# Patient Record
Sex: FEMALE | Race: WHITE | NOT HISPANIC OR LATINO | Employment: FULL TIME
[De-identification: names, ages, dates, MRNs, and addresses within clinical notes are randomized per-mention and may not be internally consistent; named-entity substitution may affect disease eponyms.]

---

## 2018-02-28 ENCOUNTER — RECORDS - HEALTHEAST (OUTPATIENT)
Dept: ADMINISTRATIVE | Facility: OTHER | Age: 38
End: 2018-02-28

## 2019-07-18 ENCOUNTER — RECORDS - HEALTHEAST (OUTPATIENT)
Dept: LAB | Facility: HOSPITAL | Age: 39
End: 2019-07-18

## 2019-07-22 LAB
GAMMA INTERFERON BACKGROUND BLD IA-ACNC: 0.14 IU/ML
M TB IFN-G BLD-IMP: NEGATIVE
MITOGEN IGNF BCKGRD COR BLD-ACNC: -0.07 IU/ML
MITOGEN IGNF BCKGRD COR BLD-ACNC: -0.08 IU/ML
QTF INTERPRETATION: NORMAL
QTF MITOGEN - NIL: 8.67 IU/ML

## 2019-10-09 ENCOUNTER — HOSPITAL ENCOUNTER (OUTPATIENT)
Dept: RADIOLOGY | Facility: HOSPITAL | Age: 39
Discharge: HOME | End: 2019-10-09

## 2019-10-09 ENCOUNTER — HOSPITAL ENCOUNTER (OUTPATIENT)
Dept: PHYSICAL MEDICINE AND REHAB | Facility: CLINIC | Age: 39
Discharge: HOME | End: 2019-10-09
Attending: NURSE PRACTITIONER

## 2019-10-09 DIAGNOSIS — M43.17 SPONDYLOLISTHESIS AT L5-S1 LEVEL: ICD-10-CM

## 2019-10-09 DIAGNOSIS — M47.816 LUMBAR FACET ARTHROPATHY: ICD-10-CM

## 2019-10-09 DIAGNOSIS — M53.3 SACROILIAC JOINT DYSFUNCTION: ICD-10-CM

## 2019-10-09 DIAGNOSIS — G89.29 CHRONIC BILATERAL LOW BACK PAIN WITH RIGHT-SIDED SCIATICA: ICD-10-CM

## 2019-10-09 DIAGNOSIS — M53.3 SACROILIAC JOINT PAIN: ICD-10-CM

## 2019-10-09 DIAGNOSIS — M54.41 CHRONIC BILATERAL LOW BACK PAIN WITH RIGHT-SIDED SCIATICA: ICD-10-CM

## 2019-10-09 DIAGNOSIS — M54.12 RADICULITIS OF LEFT CERVICAL REGION: ICD-10-CM

## 2019-10-09 DIAGNOSIS — R20.2 PARESTHESIA OF LEFT ARM: ICD-10-CM

## 2019-10-09 DIAGNOSIS — M79.18 CERVICAL MYOFASCIAL PAIN SYNDROME: ICD-10-CM

## 2019-10-09 DIAGNOSIS — M54.2 CERVICALGIA: ICD-10-CM

## 2019-10-11 ENCOUNTER — AMBULATORY - HEALTHEAST (OUTPATIENT)
Dept: PHYSICAL MEDICINE AND REHAB | Facility: CLINIC | Age: 39
End: 2019-10-11

## 2019-10-11 ENCOUNTER — COMMUNICATION - HEALTHEAST (OUTPATIENT)
Dept: PHYSICAL MEDICINE AND REHAB | Facility: CLINIC | Age: 39
End: 2019-10-11

## 2019-10-11 DIAGNOSIS — M54.2 CERVICALGIA: ICD-10-CM

## 2019-10-11 DIAGNOSIS — M79.18 CERVICAL MYOFASCIAL PAIN SYNDROME: ICD-10-CM

## 2019-10-11 DIAGNOSIS — R20.2 PARESTHESIA OF LEFT ARM: ICD-10-CM

## 2019-10-11 DIAGNOSIS — M54.12 RADICULITIS OF LEFT CERVICAL REGION: ICD-10-CM

## 2019-10-11 DIAGNOSIS — M54.41 CHRONIC BILATERAL LOW BACK PAIN WITH RIGHT-SIDED SCIATICA: ICD-10-CM

## 2019-10-11 DIAGNOSIS — G89.29 CHRONIC BILATERAL LOW BACK PAIN WITH RIGHT-SIDED SCIATICA: ICD-10-CM

## 2019-10-11 DIAGNOSIS — M53.3 SACROILIAC JOINT PAIN: ICD-10-CM

## 2019-10-11 DIAGNOSIS — M43.17 SPONDYLOLISTHESIS AT L5-S1 LEVEL: ICD-10-CM

## 2019-10-11 DIAGNOSIS — M47.816 LUMBAR FACET ARTHROPATHY: ICD-10-CM

## 2019-10-11 DIAGNOSIS — M53.3 SACROILIAC JOINT DYSFUNCTION: ICD-10-CM

## 2019-10-21 ENCOUNTER — COMMUNICATION - HEALTHEAST (OUTPATIENT)
Dept: PHYSICAL MEDICINE AND REHAB | Facility: CLINIC | Age: 39
End: 2019-10-21

## 2019-10-21 DIAGNOSIS — M79.18 MYOFASCIAL MUSCLE PAIN: ICD-10-CM

## 2019-10-24 ENCOUNTER — RECORDS - HEALTHEAST (OUTPATIENT)
Dept: RADIOLOGY | Facility: CLINIC | Age: 39
End: 2019-10-24

## 2019-11-01 ENCOUNTER — OFFICE VISIT - HEALTHEAST (OUTPATIENT)
Dept: PHYSICAL THERAPY | Facility: CLINIC | Age: 39
End: 2019-11-01

## 2019-11-01 DIAGNOSIS — G89.29 CHRONIC BILATERAL LOW BACK PAIN WITHOUT SCIATICA: ICD-10-CM

## 2019-11-01 DIAGNOSIS — M54.50 CHRONIC BILATERAL LOW BACK PAIN WITHOUT SCIATICA: ICD-10-CM

## 2019-11-01 DIAGNOSIS — M54.2 CHRONIC NECK PAIN: ICD-10-CM

## 2019-11-01 DIAGNOSIS — G89.29 CHRONIC NECK PAIN: ICD-10-CM

## 2019-11-01 DIAGNOSIS — R29.898 DECREASED ROM OF NECK: ICD-10-CM

## 2019-11-01 DIAGNOSIS — M79.18 MYOFASCIAL PAIN: ICD-10-CM

## 2019-11-01 DIAGNOSIS — M53.3 SACROILIAC JOINT DYSFUNCTION: ICD-10-CM

## 2019-11-26 ENCOUNTER — COMMUNICATION - HEALTHEAST (OUTPATIENT)
Dept: PHYSICAL MEDICINE AND REHAB | Facility: CLINIC | Age: 39
End: 2019-11-26

## 2019-11-26 DIAGNOSIS — M79.18 MYOFASCIAL MUSCLE PAIN: ICD-10-CM

## 2019-12-05 ENCOUNTER — AMBULATORY - HEALTHEAST (OUTPATIENT)
Dept: PHYSICAL MEDICINE AND REHAB | Facility: CLINIC | Age: 39
End: 2019-12-05

## 2019-12-05 DIAGNOSIS — M54.12 RADICULITIS OF LEFT CERVICAL REGION: ICD-10-CM

## 2019-12-05 DIAGNOSIS — M54.2 CERVICALGIA: ICD-10-CM

## 2019-12-05 DIAGNOSIS — M79.18 CERVICAL MYOFASCIAL PAIN SYNDROME: ICD-10-CM

## 2019-12-05 DIAGNOSIS — R20.2 PARESTHESIA OF HAND, BILATERAL: ICD-10-CM

## 2020-01-07 ENCOUNTER — COMMUNICATION - HEALTHEAST (OUTPATIENT)
Dept: PHYSICAL MEDICINE AND REHAB | Facility: CLINIC | Age: 40
End: 2020-01-07

## 2020-01-07 ENCOUNTER — HOSPITAL ENCOUNTER (OUTPATIENT)
Dept: PHYSICAL MEDICINE AND REHAB | Facility: CLINIC | Age: 40
Discharge: HOME | End: 2020-01-07
Attending: PHYSICAL MEDICINE & REHABILITATION

## 2020-01-07 DIAGNOSIS — R20.2 PARESTHESIA OF HAND, BILATERAL: ICD-10-CM

## 2020-01-08 ENCOUNTER — AMBULATORY - HEALTHEAST (OUTPATIENT)
Dept: PHYSICAL MEDICINE AND REHAB | Facility: CLINIC | Age: 40
End: 2020-01-08

## 2020-01-08 DIAGNOSIS — G56.03 BILATERAL CARPAL TUNNEL SYNDROME: ICD-10-CM

## 2020-01-08 DIAGNOSIS — R20.2 PARESTHESIA OF HAND, BILATERAL: ICD-10-CM

## 2020-01-13 ENCOUNTER — HOSPITAL ENCOUNTER (OUTPATIENT)
Dept: PHYSICAL MEDICINE AND REHAB | Facility: CLINIC | Age: 40
Discharge: HOME | End: 2020-01-13
Attending: PAIN MEDICINE

## 2020-01-13 DIAGNOSIS — G56.03 BILATERAL CARPAL TUNNEL SYNDROME: ICD-10-CM

## 2020-01-13 DIAGNOSIS — R20.2 PARESTHESIA OF HAND, BILATERAL: ICD-10-CM

## 2020-04-27 ENCOUNTER — HOSPITAL ENCOUNTER (OUTPATIENT)
Dept: PHYSICAL MEDICINE AND REHAB | Facility: CLINIC | Age: 40
Discharge: HOME | End: 2020-04-27
Attending: PHYSICAL MEDICINE & REHABILITATION

## 2020-04-27 ENCOUNTER — COMMUNICATION - HEALTHEAST (OUTPATIENT)
Dept: PHYSICAL MEDICINE AND REHAB | Facility: CLINIC | Age: 40
End: 2020-04-27

## 2020-04-27 DIAGNOSIS — M54.50 LUMBAR SPINE PAIN: ICD-10-CM

## 2020-04-27 DIAGNOSIS — M54.16 LUMBAR RADICULAR PAIN: ICD-10-CM

## 2020-04-27 DIAGNOSIS — M51.369 DDD (DEGENERATIVE DISC DISEASE), LUMBAR: ICD-10-CM

## 2020-05-12 ENCOUNTER — COMMUNICATION - HEALTHEAST (OUTPATIENT)
Dept: PHYSICAL MEDICINE AND REHAB | Facility: CLINIC | Age: 40
End: 2020-05-12

## 2020-05-12 ENCOUNTER — RECORDS - HEALTHEAST (OUTPATIENT)
Dept: RADIOLOGY | Facility: CLINIC | Age: 40
End: 2020-05-12

## 2020-05-14 ENCOUNTER — AMBULATORY - HEALTHEAST (OUTPATIENT)
Dept: PHYSICAL MEDICINE AND REHAB | Facility: CLINIC | Age: 40
End: 2020-05-14

## 2020-05-14 ENCOUNTER — COMMUNICATION - HEALTHEAST (OUTPATIENT)
Dept: PHYSICAL MEDICINE AND REHAB | Facility: CLINIC | Age: 40
End: 2020-05-14

## 2020-05-14 ENCOUNTER — HOSPITAL ENCOUNTER (OUTPATIENT)
Dept: PHYSICAL MEDICINE AND REHAB | Facility: CLINIC | Age: 40
Discharge: HOME | End: 2020-05-14
Attending: PAIN MEDICINE

## 2020-05-14 DIAGNOSIS — G56.03 BILATERAL CARPAL TUNNEL SYNDROME: ICD-10-CM

## 2020-05-18 ENCOUNTER — COMMUNICATION - HEALTHEAST (OUTPATIENT)
Dept: PHYSICAL MEDICINE AND REHAB | Facility: CLINIC | Age: 40
End: 2020-05-18

## 2020-05-20 ENCOUNTER — HOSPITAL ENCOUNTER (OUTPATIENT)
Dept: PHYSICAL MEDICINE AND REHAB | Facility: CLINIC | Age: 40
Discharge: HOME | End: 2020-05-20
Attending: NURSE PRACTITIONER

## 2020-05-20 DIAGNOSIS — M54.42 CHRONIC BILATERAL LOW BACK PAIN WITH BILATERAL SCIATICA: ICD-10-CM

## 2020-05-20 DIAGNOSIS — G89.29 CHRONIC BILATERAL LOW BACK PAIN WITH BILATERAL SCIATICA: ICD-10-CM

## 2020-05-20 DIAGNOSIS — M54.16 LUMBAR RADICULAR PAIN: ICD-10-CM

## 2020-05-20 DIAGNOSIS — M54.41 CHRONIC BILATERAL LOW BACK PAIN WITH BILATERAL SCIATICA: ICD-10-CM

## 2020-05-20 DIAGNOSIS — M51.369 DDD (DEGENERATIVE DISC DISEASE), LUMBAR: ICD-10-CM

## 2020-05-20 DIAGNOSIS — M54.50 LUMBAR SPINE PAIN: ICD-10-CM

## 2020-05-20 DIAGNOSIS — M47.816 LUMBAR FACET ARTHROPATHY: ICD-10-CM

## 2020-05-20 DIAGNOSIS — M79.18 MYOFASCIAL MUSCLE PAIN: ICD-10-CM

## 2020-05-20 DIAGNOSIS — M48.061 LUMBAR FORAMINAL STENOSIS: ICD-10-CM

## 2020-06-03 ENCOUNTER — HOSPITAL ENCOUNTER (OUTPATIENT)
Dept: PHYSICAL MEDICINE AND REHAB | Facility: CLINIC | Age: 40
Discharge: HOME | End: 2020-06-03
Attending: PAIN MEDICINE

## 2020-06-03 DIAGNOSIS — M54.41 CHRONIC BILATERAL LOW BACK PAIN WITH BILATERAL SCIATICA: ICD-10-CM

## 2020-06-03 DIAGNOSIS — M54.16 LUMBAR RADICULAR PAIN: ICD-10-CM

## 2020-06-03 DIAGNOSIS — M54.42 CHRONIC BILATERAL LOW BACK PAIN WITH BILATERAL SCIATICA: ICD-10-CM

## 2020-06-03 DIAGNOSIS — G89.29 CHRONIC BILATERAL LOW BACK PAIN WITH BILATERAL SCIATICA: ICD-10-CM

## 2020-06-03 DIAGNOSIS — M48.061 LUMBAR FORAMINAL STENOSIS: ICD-10-CM

## 2020-06-03 DIAGNOSIS — M51.369 DDD (DEGENERATIVE DISC DISEASE), LUMBAR: ICD-10-CM

## 2020-06-16 ENCOUNTER — HOSPITAL ENCOUNTER (OUTPATIENT)
Dept: PHYSICAL MEDICINE AND REHAB | Facility: CLINIC | Age: 40
Discharge: HOME | End: 2020-06-16
Attending: NURSE PRACTITIONER

## 2020-06-16 DIAGNOSIS — M48.061 LUMBAR FORAMINAL STENOSIS: ICD-10-CM

## 2020-06-16 DIAGNOSIS — R29.898 DECONDITIONED LOW BACK: ICD-10-CM

## 2020-06-16 DIAGNOSIS — T14.90XA TRAUMA: ICD-10-CM

## 2020-06-16 DIAGNOSIS — M54.42 CHRONIC BILATERAL LOW BACK PAIN WITH BILATERAL SCIATICA: ICD-10-CM

## 2020-06-16 DIAGNOSIS — M51.369 DDD (DEGENERATIVE DISC DISEASE), LUMBAR: ICD-10-CM

## 2020-06-16 DIAGNOSIS — T14.8XXA MUSCULOSKELETAL STRAIN: ICD-10-CM

## 2020-06-16 DIAGNOSIS — M47.816 LUMBAR FACET ARTHROPATHY: ICD-10-CM

## 2020-06-16 DIAGNOSIS — M79.18 MYOFASCIAL PAIN: ICD-10-CM

## 2020-06-16 DIAGNOSIS — M54.41 CHRONIC BILATERAL LOW BACK PAIN WITH BILATERAL SCIATICA: ICD-10-CM

## 2020-06-16 DIAGNOSIS — G89.29 CHRONIC BILATERAL LOW BACK PAIN WITH BILATERAL SCIATICA: ICD-10-CM

## 2020-06-16 ASSESSMENT — MIFFLIN-ST. JEOR: SCORE: 1456.47

## 2020-10-25 ENCOUNTER — OFFICE VISIT - HEALTHEAST (OUTPATIENT)
Dept: FAMILY MEDICINE | Facility: CLINIC | Age: 40
End: 2020-10-25

## 2020-10-25 DIAGNOSIS — G43.911 INTRACTABLE MIGRAINE WITH STATUS MIGRAINOSUS, UNSPECIFIED MIGRAINE TYPE: ICD-10-CM

## 2020-10-25 DIAGNOSIS — N39.0 URINARY TRACT INFECTION IN FEMALE: ICD-10-CM

## 2020-10-25 DIAGNOSIS — R30.9 PAINFUL URINATION: ICD-10-CM

## 2020-10-25 LAB
BACTERIA #/AREA URNS HPF: ABNORMAL HPF
CLUE CELLS: NORMAL
MUCOUS THREADS #/AREA URNS LPF: ABNORMAL LPF
RBC #/AREA URNS AUTO: ABNORMAL HPF
SQUAMOUS #/AREA URNS AUTO: ABNORMAL LPF
TRICHOMONAS, WET PREP: NORMAL
WBC #/AREA URNS AUTO: ABNORMAL HPF
YEAST, WET PREP: NORMAL

## 2020-10-27 LAB — BACTERIA SPEC CULT: ABNORMAL

## 2020-11-04 ENCOUNTER — OFFICE VISIT - HEALTHEAST (OUTPATIENT)
Dept: FAMILY MEDICINE | Facility: CLINIC | Age: 40
End: 2020-11-04

## 2020-11-04 DIAGNOSIS — Z20.822 SUSPECTED 2019 NOVEL CORONAVIRUS INFECTION: ICD-10-CM

## 2020-11-05 ENCOUNTER — RECORDS - HEALTHEAST (OUTPATIENT)
Dept: LAB | Facility: CLINIC | Age: 40
End: 2020-11-05

## 2020-11-05 LAB
SARS-COV-2 PCR COMMENT: NORMAL
SARS-COV-2 RNA SPEC QL NAA+PROBE: NEGATIVE
SARS-COV-2 VIRUS SPECIMEN SOURCE: NORMAL

## 2020-11-10 ENCOUNTER — COMMUNICATION - HEALTHEAST (OUTPATIENT)
Dept: PHYSICAL MEDICINE AND REHAB | Facility: CLINIC | Age: 40
End: 2020-11-10

## 2020-11-10 ENCOUNTER — AMBULATORY - HEALTHEAST (OUTPATIENT)
Dept: PHYSICAL MEDICINE AND REHAB | Facility: CLINIC | Age: 40
End: 2020-11-10

## 2020-11-10 DIAGNOSIS — R20.2 PARESTHESIA OF HAND, BILATERAL: ICD-10-CM

## 2020-11-10 DIAGNOSIS — G56.03 BILATERAL CARPAL TUNNEL SYNDROME: ICD-10-CM

## 2020-11-11 ENCOUNTER — HOSPITAL ENCOUNTER (OUTPATIENT)
Dept: PHYSICAL MEDICINE AND REHAB | Facility: CLINIC | Age: 40
Discharge: HOME | End: 2020-11-11
Attending: PAIN MEDICINE

## 2020-11-11 DIAGNOSIS — G56.03 BILATERAL CARPAL TUNNEL SYNDROME: ICD-10-CM

## 2020-11-11 DIAGNOSIS — R20.2 PARESTHESIA OF HAND, BILATERAL: ICD-10-CM

## 2020-12-14 ENCOUNTER — AMBULATORY - HEALTHEAST (OUTPATIENT)
Dept: NEUROSURGERY | Facility: CLINIC | Age: 40
End: 2020-12-14

## 2020-12-14 ENCOUNTER — HOSPITAL ENCOUNTER (OUTPATIENT)
Dept: RADIOLOGY | Facility: HOSPITAL | Age: 40
Discharge: HOME | End: 2020-12-14
Attending: SURGERY

## 2020-12-14 DIAGNOSIS — M54.9 BACK PAIN: ICD-10-CM

## 2020-12-15 ENCOUNTER — AMBULATORY - HEALTHEAST (OUTPATIENT)
Dept: NEUROSURGERY | Facility: CLINIC | Age: 40
End: 2020-12-15

## 2020-12-15 DIAGNOSIS — M54.16 LUMBAR RADICULOPATHY: ICD-10-CM

## 2020-12-16 ENCOUNTER — RECORDS - HEALTHEAST (OUTPATIENT)
Dept: RADIOLOGY | Facility: CLINIC | Age: 40
End: 2020-12-16

## 2020-12-16 ENCOUNTER — AMBULATORY - HEALTHEAST (OUTPATIENT)
Dept: NEUROSURGERY | Facility: CLINIC | Age: 40
End: 2020-12-16

## 2020-12-16 ENCOUNTER — OFFICE VISIT - HEALTHEAST (OUTPATIENT)
Dept: NEUROSURGERY | Facility: CLINIC | Age: 40
End: 2020-12-16

## 2020-12-16 DIAGNOSIS — M54.16 LUMBAR RADICULOPATHY: ICD-10-CM

## 2020-12-16 ASSESSMENT — MIFFLIN-ST. JEOR: SCORE: 1415.65

## 2020-12-29 ENCOUNTER — HOSPITAL ENCOUNTER (EMERGENCY)
Facility: CLINIC | Age: 40
Discharge: HOME | End: 2020-12-29
Attending: EMERGENCY MEDICINE | Admitting: EMERGENCY MEDICINE
Payer: COMMERCIAL

## 2020-12-29 VITALS
RESPIRATION RATE: 18 BRPM | OXYGEN SATURATION: 97 % | HEIGHT: 64 IN | SYSTOLIC BLOOD PRESSURE: 113 MMHG | TEMPERATURE: 98.3 F | BODY MASS INDEX: 29.37 KG/M2 | HEART RATE: 106 BPM | WEIGHT: 172 LBS | DIASTOLIC BLOOD PRESSURE: 68 MMHG

## 2020-12-29 DIAGNOSIS — T78.40XA ACUTE ALLERGIC REACTION, INITIAL ENCOUNTER: ICD-10-CM

## 2020-12-29 PROCEDURE — 96375 TX/PRO/DX INJ NEW DRUG ADDON: CPT

## 2020-12-29 PROCEDURE — 250N000011 HC RX IP 250 OP 636: Performed by: EMERGENCY MEDICINE

## 2020-12-29 PROCEDURE — 250N000009 HC RX 250

## 2020-12-29 PROCEDURE — 250N000009 HC RX 250: Performed by: EMERGENCY MEDICINE

## 2020-12-29 PROCEDURE — 96374 THER/PROPH/DIAG INJ IV PUSH: CPT

## 2020-12-29 PROCEDURE — 99291 CRITICAL CARE FIRST HOUR: CPT | Mod: 25

## 2020-12-29 PROCEDURE — 99207 PR NO CHARGE LOS: CPT | Performed by: NURSE PRACTITIONER

## 2020-12-29 PROCEDURE — 96372 THER/PROPH/DIAG INJ SC/IM: CPT | Mod: 59

## 2020-12-29 RX ORDER — GABAPENTIN 600 MG/1
600 TABLET ORAL DAILY
COMMUNITY
End: 2021-08-06

## 2020-12-29 RX ORDER — DIPHENHYDRAMINE HYDROCHLORIDE 50 MG/ML
50 INJECTION INTRAMUSCULAR; INTRAVENOUS ONCE
Status: COMPLETED | OUTPATIENT
Start: 2020-12-29 | End: 2020-12-29

## 2020-12-29 RX ORDER — METHYLPREDNISOLONE SODIUM SUCCINATE 125 MG/2ML
125 INJECTION, POWDER, LYOPHILIZED, FOR SOLUTION INTRAMUSCULAR; INTRAVENOUS ONCE
Status: COMPLETED | OUTPATIENT
Start: 2020-12-29 | End: 2020-12-29

## 2020-12-29 RX ORDER — FLUOXETINE 40 MG/1
40 CAPSULE ORAL DAILY
COMMUNITY
End: 2021-12-27

## 2020-12-29 RX ORDER — EPINEPHRINE 0.3 MG/.3ML
0.3 INJECTION SUBCUTANEOUS
Qty: 2 EACH | Refills: 3 | Status: SHIPPED | OUTPATIENT
Start: 2020-12-29 | End: 2024-02-01

## 2020-12-29 RX ORDER — DIPHENHYDRAMINE HYDROCHLORIDE 50 MG/ML
INJECTION INTRAMUSCULAR; INTRAVENOUS
Status: DISCONTINUED
Start: 2020-12-29 | End: 2020-12-29 | Stop reason: HOSPADM

## 2020-12-29 RX ORDER — KETOROLAC TROMETHAMINE 15 MG/ML
15 INJECTION, SOLUTION INTRAMUSCULAR; INTRAVENOUS ONCE
Status: COMPLETED | OUTPATIENT
Start: 2020-12-29 | End: 2020-12-29

## 2020-12-29 RX ORDER — PREDNISONE 20 MG/1
40 TABLET ORAL DAILY
Qty: 8 TABLET | Refills: 0 | Status: SHIPPED | OUTPATIENT
Start: 2020-12-30 | End: 2021-01-03

## 2020-12-29 RX ADMIN — DIPHENHYDRAMINE HYDROCHLORIDE 50 MG: 50 INJECTION, SOLUTION INTRAMUSCULAR; INTRAVENOUS at 10:07

## 2020-12-29 RX ADMIN — EPINEPHRINE 0.3 MG: 1 INJECTION, SOLUTION INTRAMUSCULAR; SUBCUTANEOUS at 10:07

## 2020-12-29 RX ADMIN — METHYLPREDNISOLONE SODIUM SUCCINATE 125 MG: 125 INJECTION, POWDER, FOR SOLUTION INTRAMUSCULAR; INTRAVENOUS at 10:08

## 2020-12-29 RX ADMIN — FAMOTIDINE 20 MG: 10 INJECTION, SOLUTION INTRAVENOUS at 10:08

## 2020-12-29 RX ADMIN — KETOROLAC TROMETHAMINE 15 MG: 15 INJECTION, SOLUTION INTRAMUSCULAR; INTRAVENOUS at 13:48

## 2020-12-29 RX ADMIN — EPINEPHRINE 0.3 MG: 1 INJECTION INTRAMUSCULAR; INTRAVENOUS; SUBCUTANEOUS at 11:08

## 2020-12-29 ASSESSMENT — ENCOUNTER SYMPTOMS
VOICE CHANGE: 1
SORE THROAT: 1
COUGH: 1

## 2020-12-29 ASSESSMENT — MIFFLIN-ST. JEOR: SCORE: 1425.19

## 2020-12-29 NOTE — CODE/RAPID RESPONSE
[facility]    RRT Note  12/29/2020   Time Called: 9: 45 AM    RRT called for: hives after COVID vaccine    Assessment & Plan   Concern for allergic reaction  RRT called for generalized itching of throat, roof of mouth, and whole body after receiving COVID vaccine at 9: 20 AM 12/29/2020. Airway, breathing, and circulation without obvious signs of compromise therefore Ms. Szymanski was transported quickly to ED for further evaluation.     Discussed with and defer further cares to Dr. Murphy, ED attending.     PERRI Alexander, CNP  Hospitalist Service, House Officer  [facility]     Text Page  Pager: [phone]    Allergies   Allergies   Allergen Reactions     Coconut Flavor Anaphylaxis     Covid-19 (Mrna) Vaccine Anaphylaxis     Pfizer      Penicillins Hives       Physical Exam   Vital Signs with Ranges:  Temp:  [98.1  F (36.7  C)] 98.1  F (36.7  C)  Pulse:  [86] 86  Resp:  [16] 16  BP: (155)/(107) 155/107  SpO2:  [100 %] 100 %  No intake/output data recorded.    Constitutional: 40- year old female without significant distress seated in chair in conference room.    Pulmonary: No audible wheezing or stridor. No obvious signs of immediate airway compromise. Breathing adequate, quiet, unlabored.   Cardiovascular: Warm, easily palpable radial pulse. Appears adequately perfused.   Skin/Integumen: Redness over throat, upper chest.   Neuro: Awake, alert, oriented x 4. Non-focal.   Psych:  Calm, cooperative.   Extremities: Moves all extremities.    Time Spent on this Encounter   I spent 15 minutes on the unit/floor managing the care of Madeline Szymanski. Over 50% of my time was spent counseling the patient and/or coordinating care regarding services listed in this note.

## 2020-12-29 NOTE — ED NOTES
"Pt. States she is itchy \"all over\". Scratching face, arms. Redness noted over left arm and face. MD aware and will order another dose of epi.  "

## 2020-12-29 NOTE — ED PROVIDER NOTES
"History   Chief Complaint:  Allergic Reaction     HPI   Madeline Szymanski is a 40 year old female who presents with an allergic reaction. Per nursing report, the patient was administered the COVID vaccine at 0920 and developed tingling in the back of the throat around 0950. She has an allergy to penicillin, coconut, and dragonfruit. The patient denies any other ingestions and states when she ate dragonfruit last she had significant lip swelling. She reports her voice is a slightly more raspy than at baseline.     Review of Systems   HENT: Positive for sore throat and voice change.    Respiratory: Positive for cough.    Skin: Positive for rash.   All other systems reviewed and are negative.    Allergies:  No known drug allergies     Medications:  The patient is not currently taking any prescribed medications.     Past Medical History:    The patient does not have any past medical history.      Social History:  Patient presents unaccompanied to the ED.   Patient works at [employer].     Physical Exam     Patient Vitals for the past 24 hrs:   BP Temp Temp src Pulse Resp SpO2 Height Weight   12/29/20 1338 113/68 98.3  F (36.8  C) Oral 106 18 97 % -- --   12/29/20 1152 131/78 98.1  F (36.7  C) Oral 103 16 99 % -- --   12/29/20 1100 117/75 -- -- 96 13 99 % -- --   12/29/20 1045 135/80 -- -- 99 11 100 % -- --   12/29/20 1015 137/86 -- -- 93 10 100 % -- --   12/29/20 1008 -- -- -- -- -- -- 1.626 m (5' 4\") 78 kg (172 lb)   12/29/20 1005 (!) 138/92 -- -- 91 12 100 % -- --   12/29/20 1004 (!) 155/107 98.1  F (36.7  C) Oral 86 16 100 % -- --     Physical Exam  General: Alert, No distress. Nontoxic appearance  Head: No signs of trauma.   Mouth/Throat: Oropharynx moist.   Eyes: Conjunctivae are normal. Pupils are equal..   Neck: Normal range of motion.  No stridor.  CV: Appears well perfused.  Resp:No respiratory distress. Intermittent cough. Lungs are clear.  MSK: Normal range of motion. No obvious deformity.   Neuro: The " patient is alert and interactive. SHAY. Speech normal. GCS 15  Skin: No lesion or sign of trauma noted. Red raised, itchy rash over her upper chest.  Psych: normal mood and affect. behavior is normal.     Emergency Department Course     Emergency Department Course:  Reviewed:  0959: I reviewed the patient's nursing notes, vitals, past medical records, and Care Everywhere.     Assessments:  0953: I performed an exam of the patient as documented above.     1013: I rechecked the patient and discussed their ED workup thus far.      1226: I rechecked the patient.     1315: I rechecked the patient.     1425: I rechecked the patient and she feels comfortable with discharge.     Interventions:  1007 Adrenalin 0.3 mg IM    1007 Benadryl 50 mg IV    1008 Pepcid 20 mg IV    1008 solu-Medrol 125 mg IV    1108 Adrenalin 0.3 mg IM    Disposition:  Discharged to home.    Impression & Plan     Medical Decision Making:  Madeline Szymanski is a 40 year old female who presents for evaluation of an allergic reaction.  Their rash and associated symptoms are most consistent with allergic phenomena.  I do not appreciate  signs of angioedema, respiratory compromise, shock, serious systemic reaction, etc.  There are no signs of serious infection, cellulitis, vasculitis, or other skin manifestation of a serious underlying disease.  The patient responded well to symptomatic medication while in the Emergency Department. Supportive outpatient management is indicated, medications for discharge noted below.  Close followup with primary care physician.  Return if  wheezing, progressive shortness of breath, facial or throat/tongue swelling. Full anticipatory guidance given prior to discharge.     Diagnosis:    ICD-10-CM   1. Acute allergic reaction, initial encounter  T78.40XA         Scribe Disclosure:  Cyndi CAMPOS, am serving as a scribe at 10:06 AM on 12/29/2020 to document services personally performed by Jhon Murphy MD based on my  observations and the provider's statements to me.       Jhon Murphy MD  12/29/20 4724

## 2020-12-29 NOTE — ED AVS SNAPSHOT
[facility] Emergency Dept  [address] [address]  Phone: [phone]  Fax: [phone]                                    Madeline Szymanski   MRN: [id number]    Department: [facility] Emergency Dept   Date of Visit: 12/29/2020           After Visit Summary Signature Page    I have received my discharge instructions, and my questions have been answered. I have discussed any challenges I see with this plan with the nurse or doctor.    ..........................................................................................................................................  Patient/Patient Representative Signature      ..........................................................................................................................................  Patient Representative Print Name and Relationship to Patient    ..................................................               ................................................  Date                                   Time    ..........................................................................................................................................  Reviewed by Signature/Title    ...................................................              ..............................................  Date                                               Time          22EPIC Rev 08/18

## 2021-02-25 ENCOUNTER — OFFICE VISIT (OUTPATIENT)
Dept: NEUROSURGERY | Facility: CLINIC | Age: 41
End: 2021-02-25
Attending: PSYCHIATRY & NEUROLOGY
Payer: COMMERCIAL

## 2021-02-25 VITALS
HEIGHT: 64 IN | WEIGHT: 172 LBS | OXYGEN SATURATION: 99 % | SYSTOLIC BLOOD PRESSURE: 134 MMHG | DIASTOLIC BLOOD PRESSURE: 85 MMHG | BODY MASS INDEX: 29.37 KG/M2 | HEART RATE: 95 BPM

## 2021-02-25 DIAGNOSIS — G43.001 MIGRAINE WITHOUT AURA AND WITH STATUS MIGRAINOSUS, NOT INTRACTABLE: Primary | ICD-10-CM

## 2021-02-25 PROCEDURE — 99204 OFFICE O/P NEW MOD 45 MIN: CPT | Performed by: PSYCHIATRY & NEUROLOGY

## 2021-02-25 RX ORDER — ELETRIPTAN HYDROBROMIDE 40 MG/1
40 TABLET, FILM COATED ORAL
COMMUNITY
Start: 2019-06-05 | End: 2022-01-20

## 2021-02-25 RX ORDER — LIDOCAINE 50 MG/G
1 PATCH TOPICAL PRN
COMMUNITY
Start: 2020-10-01 | End: 2021-09-20

## 2021-02-25 RX ORDER — MULTIVITAMIN,THER AND MINERALS
1 TABLET ORAL DAILY
COMMUNITY
End: 2021-08-06

## 2021-02-25 RX ORDER — RIZATRIPTAN BENZOATE 10 MG/1
10 TABLET, ORALLY DISINTEGRATING ORAL PRN
COMMUNITY
Start: 2019-07-15 | End: 2021-08-06

## 2021-02-25 SDOH — HEALTH STABILITY: MENTAL HEALTH: HOW OFTEN DO YOU HAVE 6 OR MORE DRINKS ON ONE OCCASION?: NEVER

## 2021-02-25 SDOH — HEALTH STABILITY: MENTAL HEALTH: HOW OFTEN DO YOU HAVE A DRINK CONTAINING ALCOHOL?: NEVER

## 2021-02-25 SDOH — HEALTH STABILITY: MENTAL HEALTH: HOW MANY STANDARD DRINKS CONTAINING ALCOHOL DO YOU HAVE ON A TYPICAL DAY?: NOT ASKED

## 2021-02-25 ASSESSMENT — PAIN SCALES - GENERAL: PAINLEVEL: EXTREME PAIN (8)

## 2021-02-25 ASSESSMENT — MIFFLIN-ST. JEOR: SCORE: 1420.19

## 2021-02-25 NOTE — NURSING NOTE
"February 25, 2021 1:10 PM   Madeline Szymanski is a 41 year old female who presents for:    Chief Complaint   Patient presents with     Consult     Migraines      Initial Vitals: /85   Pulse 95   Ht 5' 4\" (1.626 m)   Wt 172 lb (78 kg)   SpO2 99%   BMI 29.52 kg/m   Estimated body mass index is 29.52 kg/m  as calculated from the following:    Height as of this encounter: 5' 4\" (1.626 m).    Weight as of this encounter: 172 lb (78 kg). Body surface area is 1.88 meters squared.  Data Unavailable Comment: Data Unavailable       Clinical concerns: Madeline Szymanski is here today for migraines.    Alvin Clemente MA  "
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
## 2021-05-06 ENCOUNTER — AMBULATORY - HEALTHEAST (OUTPATIENT)
Dept: PHYSICAL MEDICINE AND REHAB | Facility: CLINIC | Age: 41
End: 2021-05-06

## 2021-05-06 DIAGNOSIS — R20.2 PARESTHESIA OF HAND, BILATERAL: ICD-10-CM

## 2021-05-06 DIAGNOSIS — G56.03 BILATERAL CARPAL TUNNEL SYNDROME: ICD-10-CM

## 2021-05-12 ENCOUNTER — HOSPITAL ENCOUNTER (OUTPATIENT)
Dept: PHYSICAL MEDICINE AND REHAB | Facility: CLINIC | Age: 41
Discharge: HOME | End: 2021-05-12
Attending: PAIN MEDICINE
Payer: COMMERCIAL

## 2021-05-12 DIAGNOSIS — R20.2 PARESTHESIA OF HAND, BILATERAL: ICD-10-CM

## 2021-05-12 DIAGNOSIS — G56.03 BILATERAL CARPAL TUNNEL SYNDROME: ICD-10-CM

## 2021-05-19 ENCOUNTER — APPOINTMENT (OUTPATIENT)
Dept: CT IMAGING | Facility: CLINIC | Age: 41
End: 2021-05-19
Attending: EMERGENCY MEDICINE
Payer: COMMERCIAL

## 2021-05-19 ENCOUNTER — HOSPITAL ENCOUNTER (EMERGENCY)
Facility: CLINIC | Age: 41
Discharge: HOME | End: 2021-05-19
Attending: EMERGENCY MEDICINE | Admitting: EMERGENCY MEDICINE
Payer: COMMERCIAL

## 2021-05-19 VITALS
HEIGHT: 64 IN | HEART RATE: 92 BPM | BODY MASS INDEX: 32.78 KG/M2 | DIASTOLIC BLOOD PRESSURE: 94 MMHG | TEMPERATURE: 99.3 F | SYSTOLIC BLOOD PRESSURE: 131 MMHG | OXYGEN SATURATION: 98 % | WEIGHT: 192 LBS | RESPIRATION RATE: 14 BRPM

## 2021-05-19 DIAGNOSIS — M54.50 ACUTE RIGHT-SIDED LOW BACK PAIN WITHOUT SCIATICA: ICD-10-CM

## 2021-05-19 LAB
ALBUMIN SERPL-MCNC: 3.6 G/DL (ref 3.4–5)
ALBUMIN UR-MCNC: NEGATIVE MG/DL
ALP SERPL-CCNC: 78 U/L (ref 40–150)
ALT SERPL W P-5'-P-CCNC: 20 U/L (ref 0–50)
ANION GAP SERPL CALCULATED.3IONS-SCNC: 6 MMOL/L (ref 3–14)
APPEARANCE UR: CLEAR
AST SERPL W P-5'-P-CCNC: 14 U/L (ref 0–45)
BASOPHILS # BLD AUTO: 0.1 10E9/L (ref 0–0.2)
BASOPHILS NFR BLD AUTO: 1.1 %
BILIRUB SERPL-MCNC: 0.5 MG/DL (ref 0.2–1.3)
BILIRUB UR QL STRIP: NEGATIVE
BUN SERPL-MCNC: 11 MG/DL (ref 7–30)
CALCIUM SERPL-MCNC: 8.9 MG/DL (ref 8.5–10.1)
CHLORIDE SERPL-SCNC: 108 MMOL/L (ref 94–109)
CO2 SERPL-SCNC: 26 MMOL/L (ref 20–32)
COLOR UR AUTO: NORMAL
CREAT SERPL-MCNC: 1.04 MG/DL (ref 0.52–1.04)
DIFFERENTIAL METHOD BLD: NORMAL
EOSINOPHIL # BLD AUTO: 0.1 10E9/L (ref 0–0.7)
EOSINOPHIL NFR BLD AUTO: 0.8 %
ERYTHROCYTE [DISTWIDTH] IN BLOOD BY AUTOMATED COUNT: 12.5 % (ref 10–15)
GFR SERPL CREATININE-BSD FRML MDRD: 66 ML/MIN/{1.73_M2}
GLUCOSE SERPL-MCNC: 87 MG/DL (ref 70–99)
GLUCOSE UR STRIP-MCNC: NEGATIVE MG/DL
HCT VFR BLD AUTO: 44 % (ref 35–47)
HGB BLD-MCNC: 14.4 G/DL (ref 11.7–15.7)
HGB UR QL STRIP: NEGATIVE
IMM GRANULOCYTES # BLD: 0 10E9/L (ref 0–0.4)
IMM GRANULOCYTES NFR BLD: 0.5 %
KETONES UR STRIP-MCNC: NEGATIVE MG/DL
LEUKOCYTE ESTERASE UR QL STRIP: NEGATIVE
LYMPHOCYTES # BLD AUTO: 2.1 10E9/L (ref 0.8–5.3)
LYMPHOCYTES NFR BLD AUTO: 27.9 %
MCH RBC QN AUTO: 28.9 PG (ref 26.5–33)
MCHC RBC AUTO-ENTMCNC: 32.7 G/DL (ref 31.5–36.5)
MCV RBC AUTO: 88 FL (ref 78–100)
MONOCYTES # BLD AUTO: 0.5 10E9/L (ref 0–1.3)
MONOCYTES NFR BLD AUTO: 6.4 %
NEUTROPHILS # BLD AUTO: 4.7 10E9/L (ref 1.6–8.3)
NEUTROPHILS NFR BLD AUTO: 63.3 %
NITRATE UR QL: NEGATIVE
NRBC # BLD AUTO: 0 10*3/UL
NRBC BLD AUTO-RTO: 0 /100
PH UR STRIP: 6.5 PH (ref 5–7)
PLATELET # BLD AUTO: 318 10E9/L (ref 150–450)
POTASSIUM SERPL-SCNC: 3.7 MMOL/L (ref 3.4–5.3)
PROT SERPL-MCNC: 7.4 G/DL (ref 6.8–8.8)
RBC # BLD AUTO: 4.98 10E12/L (ref 3.8–5.2)
SODIUM SERPL-SCNC: 140 MMOL/L (ref 133–144)
SOURCE: NORMAL
SP GR UR STRIP: 1 (ref 1–1.03)
UROBILINOGEN UR STRIP-MCNC: 0 MG/DL (ref 0–2)
WBC # BLD AUTO: 7.5 10E9/L (ref 4–11)

## 2021-05-19 PROCEDURE — 96374 THER/PROPH/DIAG INJ IV PUSH: CPT

## 2021-05-19 PROCEDURE — 81003 URINALYSIS AUTO W/O SCOPE: CPT | Performed by: EMERGENCY MEDICINE

## 2021-05-19 PROCEDURE — 96376 TX/PRO/DX INJ SAME DRUG ADON: CPT

## 2021-05-19 PROCEDURE — 96361 HYDRATE IV INFUSION ADD-ON: CPT

## 2021-05-19 PROCEDURE — 250N000011 HC RX IP 250 OP 636: Performed by: EMERGENCY MEDICINE

## 2021-05-19 PROCEDURE — 258N000003 HC RX IP 258 OP 636: Performed by: EMERGENCY MEDICINE

## 2021-05-19 PROCEDURE — 74176 CT ABD & PELVIS W/O CONTRAST: CPT

## 2021-05-19 PROCEDURE — 99285 EMERGENCY DEPT VISIT HI MDM: CPT | Mod: 25

## 2021-05-19 PROCEDURE — 96375 TX/PRO/DX INJ NEW DRUG ADDON: CPT

## 2021-05-19 PROCEDURE — 80053 COMPREHEN METABOLIC PANEL: CPT | Performed by: EMERGENCY MEDICINE

## 2021-05-19 PROCEDURE — 85025 COMPLETE CBC W/AUTO DIFF WBC: CPT | Performed by: EMERGENCY MEDICINE

## 2021-05-19 RX ORDER — CYCLOBENZAPRINE HCL 10 MG
10 TABLET ORAL 3 TIMES DAILY PRN
Qty: 20 TABLET | Refills: 0 | Status: SHIPPED | OUTPATIENT
Start: 2021-05-19 | End: 2021-05-26

## 2021-05-19 RX ORDER — ONDANSETRON 2 MG/ML
4 INJECTION INTRAMUSCULAR; INTRAVENOUS EVERY 30 MIN PRN
Status: DISCONTINUED | OUTPATIENT
Start: 2021-05-19 | End: 2021-05-19 | Stop reason: HOSPADM

## 2021-05-19 RX ORDER — MORPHINE SULFATE 4 MG/ML
4 INJECTION, SOLUTION INTRAMUSCULAR; INTRAVENOUS
Status: COMPLETED | OUTPATIENT
Start: 2021-05-19 | End: 2021-05-19

## 2021-05-19 RX ORDER — HYDROCODONE BITARTRATE AND ACETAMINOPHEN 5; 325 MG/1; MG/1
1 TABLET ORAL EVERY 6 HOURS PRN
Qty: 10 TABLET | Refills: 0 | Status: SHIPPED | OUTPATIENT
Start: 2021-05-19 | End: 2021-05-22

## 2021-05-19 RX ORDER — KETOROLAC TROMETHAMINE 15 MG/ML
30 INJECTION, SOLUTION INTRAMUSCULAR; INTRAVENOUS ONCE
Status: COMPLETED | OUTPATIENT
Start: 2021-05-19 | End: 2021-05-19

## 2021-05-19 RX ADMIN — ONDANSETRON 4 MG: 2 INJECTION INTRAMUSCULAR; INTRAVENOUS at 12:24

## 2021-05-19 RX ADMIN — SODIUM CHLORIDE 1000 ML: 9 INJECTION, SOLUTION INTRAVENOUS at 11:29

## 2021-05-19 RX ADMIN — MORPHINE SULFATE 4 MG: 4 INJECTION, SOLUTION INTRAMUSCULAR; INTRAVENOUS at 11:29

## 2021-05-19 RX ADMIN — ONDANSETRON 4 MG: 2 INJECTION INTRAMUSCULAR; INTRAVENOUS at 11:29

## 2021-05-19 RX ADMIN — KETOROLAC TROMETHAMINE 30 MG: 15 INJECTION, SOLUTION INTRAMUSCULAR; INTRAVENOUS at 12:26

## 2021-05-19 ASSESSMENT — ENCOUNTER SYMPTOMS
FLANK PAIN: 1
VOMITING: 0
NAUSEA: 1
FEVER: 0
DIFFICULTY URINATING: 1
HEMATURIA: 0

## 2021-05-19 ASSESSMENT — MIFFLIN-ST. JEOR: SCORE: 1510.91

## 2021-05-19 NOTE — ED PROVIDER NOTES
"  History   Chief Complaint:  Flank Pain       HPI   Madeline Szymanski is a 41 year old female with history of pancreatitis and hypertension who presents with right sided flank pain with associated nausea and difficulty urinating since this morning. She denies any hematuria, fever, or vomiting. She denies a history of kidney stones. She denies recent heavy lifting. She did take gabapentin without relief but has not taken any pain medications.    Review of Systems   Constitutional: Negative for fever.   Gastrointestinal: Positive for nausea. Negative for vomiting.   Genitourinary: Positive for difficulty urinating and flank pain. Negative for hematuria.   All other systems reviewed and are negative.      Allergies:  Prochlorperazine  Cephalexin  Metoclopramide   Coconut Flavor  Covid-19 (Mrna) Vaccine  Penicillins    Medications:  Topamax  Gabapentin  Flexeril  Prozac  Relpax  Maxalt  Elavil  Epinephrine     Past Medical History:    Depression   Migraines without aura  Obesity   Pancreatitis  TMJ  Lyme disease  Panic attacks  DDD  Sciatica  Hypertension  Gastric ulcer     Past Surgical History:    Gastric sleeve  Tubal ligation  Hysterectomy   C section x3  EGD    Family History:    Alcohol - father  MI - father  Lipids - father  Hypertension - father  Stroke - father    Social History:  Patient presents alone.  Patient works in neurosurgery.    Physical Exam     Patient Vitals for the past 24 hrs:   BP Temp Temp src Pulse Resp SpO2 Height Weight   05/19/21 1230 (!) 131/94 -- -- 92 -- 98 % -- --   05/19/21 1158 (!) 140/93 99.3  F (37.4  C) Oral 108 14 98 % 1.626 m (5' 4\") 87.1 kg (192 lb)       Physical Exam  General: Patient is alert and uncomfortable appearing.  HEENT: Head atraumatic    Eyes: pupils equal and reactive. Conjunctiva clear   Nares: patent   Oropharynx: no lesions, uvula midline, no palatal draping, normal voice, no trismus  Neck: Supple without lymphadenopathy, no meningismus  Chest: Tachycardic but " regular  Lungs: Equal clear to auscultation with no wheeze or rales  Abdomen: Soft, non tender, nondistended, normal bowel sounds  Back: Right costovertebral angle tenderness, no midline C, T or L spine tenderness  Neuro: Grossly nonfocal, normal speech, strength equal bilaterally, CN 2-12 intact  Extremities: No deformities, equal radial and DP pulses. No clubbing, cyanosis.  No edema  Skin: Warm and dry with no rash.       Emergency Department Course   Imaging:  CT Abdomen Pelvis w/o Contrast  1.  No acute findings in the abdomen and pelvis on noncontrast CT. No  urinary system calculi, hydronephrosis or perinephric stranding.  2.  Hepatic steatosis.  As read by Radiology.    Laboratory:  CMP: WNL (Creatinine 1.04)    CBC: WBC 7.5, HGB 14.4,      UA with microscopic: WNL     Emergency Department Course:    Reviewed:  I reviewed nursing notes, vitals, past medical history and care everywhere    Assessments:  1115 I obtained history and examined the patient as noted above.   1220 I rechecked the patient and explained findings. I answered all questions prior to discharge.    Interventions:  1129 NS 1L IV Bolus  1129 morphine 4 mg IV  1129 Zofran 4 mg IV  1224 Zofran 4 mg IV  1226 Toradol 30 mg IV    Disposition:  The patient was discharged to home.       Impression & Plan   CMS Diagnoses: None    Medical Decision Making:  Patient is a 41-year-old female who presents emergency department with right flank pain.  She denies any specific injury.  She has no midline pain on examination and no loss of control of her bowel or bladder or weakness or numbness into her lower extremities.  Do not suspect equina syndrome, spinal epidural abscess or hematoma or cord impingement.  Patient also states she had some difficulty urinating.  No evidence of pyelonephritis or urinary tract infection.  CT scan without obvious obstructing ureteral calculus or cause for her pain.  She has had a gastric sleeve but does not have  significant vomiting and I do not suspect failure of this as the cause of her symptoms.  All of her symptoms are in the right low back.  She did feel improvement with pain medications here in the emergency department.  Her appendix was normal and no other identifiable cause of her symptoms was found.  She was informed of the hepatic steatosis as an incidental finding.  Discussed with patient symptomatic care at home and follow-up instructions with orthospine.  If she has continued symptoms will need an MRI of her low back.  In addition small prescription for Norco was provided as she is unable to tolerate ibuprofen due to her gastric sleeve.  Flexeril was provided as well.  Gentle activity was discussed.  Patient expressed agreement understanding of the plan for discharge.  All questions and concerns addressed.    Diagnosis:    ICD-10-CM   1. Acute right-sided low back pain without sciatica  M54.5       Discharge Medications:  New Prescriptions    CYCLOBENZAPRINE (FLEXERIL) 10 MG TABLET    Take 1 tablet (10 mg) by mouth 3 times daily as needed for muscle spasms    HYDROCODONE-ACETAMINOPHEN (NORCO) 5-325 MG TABLET    Take 1 tablet by mouth every 6 hours as needed for severe pain       Scribe Disclosure:  BETH, Torrey Weaver, am serving as a scribe at 11:10 AM on 5/19/2021 to document services personally performed by Lily Ma MD based on my observations and the provider's statements to me.              Lily Ma MD  05/19/21 2133

## 2021-05-27 VITALS
OXYGEN SATURATION: 97 % | SYSTOLIC BLOOD PRESSURE: 114 MMHG | DIASTOLIC BLOOD PRESSURE: 75 MMHG | RESPIRATION RATE: 12 BRPM | HEART RATE: 81 BPM | TEMPERATURE: 98.2 F

## 2021-06-02 NOTE — TELEPHONE ENCOUNTER
Called and spoke with pt. She will  the rx. Advised her to follow up with Esme. She states she will call and make appt once Esme returns on 11/6.

## 2021-06-02 NOTE — PROGRESS NOTES
[facility]   Cervico-thoracic and Lumbo-Pelvic Initial Evaluation    Patient Name: Madeline Szymanski  Date of evaluation: 11/1/2019  Referral Diagnosis:   Cervicalgia [M54.2]  - Primary       Radiculitis of left cervical region [M54.12]       Paresthesia of left arm [R20.2]       Cervical myofascial pain syndrome [M79.18]       Chronic bilateral low back pain with right-sided sciatica [M54.41, G89.29]       Sacroiliac joint dysfunction [M53.3]       Sacroiliac joint pain [M53.3]       Spondylolisthesis at L5-S1 level [M43.17]       Lumbar facet arthropathy [M47.816]       Referring provider: Esme Hodgson C*  Visit Diagnosis:     ICD-10-CM    1. Chronic bilateral low back pain without sciatica M54.5     G89.29    2. Sacroiliac joint dysfunction M53.3    3. Chronic neck pain M54.2     G89.29    4. Myofascial pain M79.18    5. Decreased ROM of neck R29.898        Assessment:        Madeline Szymanski is a 39 y.o. female who presents to therapy today with chief complaints of chronic neck and low back pain. Patient does report occasional right glut pain and left LE pain in posterior thigh. She has newer onset left> right numbness while driving primarily with the arm the is extended on the steering wheel. She is limited with yard/housework, moving head/neck for driving, walking, lifting due to pain and tightness. With examination, the patient demonstrates decreased cervical and hip mobility, tightness and tenderness to gluts and cervical musculature. PSIS tender bilaterally, but negative SI pain provocation testing today. The patients pain is consistent with myofascial pain, and likely SI pain, but SI pain improving with regular medication usage. The patient would benefit from skilled PT to improve her mobility, strength, pain, and overall function.    POC and pathology of condition were reviewed with patient.  Pt. is in agreement with the Plan of Care  A Home Exercise Program (HEP) was initiated today.  Pt.  was instructed in exercises by PT and patient was given a handout with detailed instructions.    Goals:  Pt. will be independent with home exercise program in : 4 weeks  Pt. will report decreased intensity, frequency of : Pain;in 4 weeks    Patient will decrease : WIL score;NDI score;by _ points;for improved quality of function;in 6 weeks  by ___ points: 30% and 10%  Pt will: report doing house and yardwork without increased pain in 7 weeks:  Pt will: be able to rotated to the right 60 degrees or greater, for improved ability to driving in 4 weeks:  Pt will: demonstrated > average strength on MEDX per age matched norms, in 8 weeks, cervical and lumbar:      Patient's expectations/goals are realistic.    Barriers to Learning or Achieving Goals:  No Barriers.       Plan / Patient Instructions:        Plan of Care:   Communication with: Referral Source  Patient Related Instruction: Treatment plan and rationale;Nature of Condition;Self Care instruction;Expected outcome;Basis of treatment;Body mechanics;Precautions;Posture;Next steps  Times per Week: 1-2  Number of Weeks: 12  Number of Visits: 17  Discharge Planning: pt will meet all PT goals or reach a plateau with PT  Precautions / Restrictions : stable spondylolisthesis  Therapeutic Exercise: ROM;Stretching;Strengthening  Neuromuscular Reeducation: posture;TNE;core  Manual Therapy: soft tissue mobilization;myofascial release;joint mobilization;muscle energy  Modalities: TENS;electrical stimulation      Plan for next visit: initiate MEDX lumbar and cervical testing and DE, rotary torso, hip flexor stretch, look at mechanics with lumbar exercises and pelvic control. Cervical strengthening. Rotation SNAGS, possible cervical proprioception in future, patient rests with head rotated left     Subjective:         Social information:   Occupation:[employer]   Work Status:Working full time    History of Present Illness:      Pain is chronic in the low back. Had ablation for low back  in 2019, was better until September. Started getting pain down R>L at the time. Had stopped taking medication. Began taking gabapentin regularly which has helped to reduce her pain to normal again. Was waking up at night due to pain. Occasionally now will get pain in the back and leg. Worse with aerobic exercises and too much walking, also limited with doing yardwork and house projects. Usually about 30 minutes is her maximum with walking. Denies weakness in the legs, with exception of one episode knee gave out on her. No balance issues or change in bowel/bladder control.    In the neck she reports tension migraines with sensitivity to light and sound. Can wake up with a headache and has significantly limited ROM. Medication can help. Doesn't do well with MT. Neck pain has been present for 3 years. Left arm falls asleep when she is driving or doing things with the arm bent. Whole hand can be affected with no specific fingers. Occasionally some on the right. Uses ice to help with the neck, occasionally heat.    Previous Treatment Bilateral L5-S1 TFESI 3/21/2018 Dr. Gustafson [place].  Preproc pain 710, post 0/10.  Inpatient bilateral L4, L5 MBB 2018- [place]  Bilateral L4, L5 MBB 2019 and 2019 [place].  RFA bilateral L4, L5 3/6/2019 [place] with relief x5 months only then return of symptoms    Pain Ratin  Pain rating at best: 2  Pain rating at worst: 9  Pain description: aching, dull, numbness, pain, sharp and tingling    Functional limitations are described as occurring with:   Walking, changing sleeping positions, stairs, lifting, yard/housework, moving head/neck, driving.            Objective:      Note: Items left blank indicates the item was not performed or not indicated at the time of the evaluation.    Patient Outcome Measures :    Modified Oswestry Low Back Pain Disablity Questionnaire  in %: 34     Scores range from 0-100%, where a score of 0% represents minimal pain and  maximal function. The minimal clinically important difference is a score reduction of 12%.  Neck Disability Score in %: 36     Scores range from 0-100%, where a score of 0% represents minimal pain and maximal function. The minmal clinically important difference is a score reduction of 10%.    Examination  1. Chronic bilateral low back pain without sciatica     2. Sacroiliac joint dysfunction     3. Chronic neck pain     4. Myofascial pain     5. Decreased ROM of neck       Precautions/Restrictions: None- stable spondylolisthesis   Involved side: R>L low back, L>R neck  Posture Observation:      Cervical protraction, scapular protraction    Lumbar ROM:    Date: 11/1/19     *Indicate scale AROM AROM AROM   Lumbar Flexion Just shy of toes     Lumbar Extension WNL      Right Left Right Left Right Left   Lumbar Sidebending WNL WNL       Lumbar Rotation         Thoracic Rotation           Cervical ROM  Date: 11/1/19     *Indicate scale AROM AROM AROM   Cervical Flexion 40 deg     Cervical Extension 44 deg      Right Left Right Left Right Left   Cervical Sidebending 25 deg 26 deg       Cervical Rotation 45 deg 60 deg         UE myotome screen: intact throughout.    Lower Extremity Strength:     Date: 11/1/19     LE strength/5 Right Left Right Left Right Left   Hip Flexion (L1-3) 5 5       Hip Extension (L5-S1) 5- 5-       Hip Abduction (L4-5) 5 5-       Hip Adduction (L2-3)         Hip External Rotation         Hip Internal Rotation         Knee Extension (L3-4) 5 5       Knee Flexion         Ankle Dorsiflexion (L4-5) 5 5       Great Toe Extension (L5)         Ankle Plantar flexion (S1) Can toe walk Can toe walk       Abdominals        UE and LE Sensation Screen    WNL       Reflex Testing  Lumbar Dermatomes Right Left UE Reflexes Right Left   Iliac Crest and Groin (L1)   Biceps (C5-6)     Anterior Medial Thigh (L2)   Brachioradialis (C5-6)     Anterior Thigh, Medial Epicondyle Femur (L3)   Triceps (C7-8)     Lateral  Thigh, Anterior Knee, Medial Leg/Malleolus (L4)   Corby s test     Lateral Leg, Dorsal Foot (L5)   LE Reflexes     Lateral Foot (S1)   Patellar (L3-4)     Posterior Leg (S2)   Achilles (S1-2)     Other:   Babinski Response       Palpation: tenderness to PSIS and QL bilaterally, right glut tender with referral to foot with numbness.    Cervical Special Tests  Cervical Special Tests Right Left UE Nerve Mobility Right Left   Cervical compression   Median nerve - -   Cervical distraction - - Ulnar nerve - -   Spurling s test - - Radial nerve - -   Shoulder abduction sign   Thoracic outlet     Deep neck flexor endurance test   Scott     Upper cervical rotation   Adson s     Sharper-Kahlil   Cervical rotation lateral flexion     Alar ligament test   Other:     Other:   Other:       Lumbar Special Tests:     Lumbar Special Tests Right Left SI Tests Right  Left   Quadrant test   SI Compression     Straight leg raise 75 deg 75 deg SI Distraction     Crossover response   POSH Test - -   Slump   Sacral Thrust - -   Sit-up test  FADIR - tight - tight   Trunk extensor endurance test  Resisted Abduction - -   Prone instability test  MIREYA - tight - tight    Pubic shotgun  Other:       Passive Mobility - Joint Integrity:  WNL  lumbar, not tested cervical    LE Screen:  hip ROM restricted bilaterally     Treatment Today     TREATMENT MINUTES COMMENTS   Evaluation 23 Low complexity   Self-care/ Home management     Manual therapy     Neuromuscular Re-education     Therapeutic Activity     Therapeutic Exercises 8 HEP trial, handout given, MEDX protocol described   Gait training     Modality__________________                Total 31    Blank areas are intentional and mean the treatment did not include these items.     PT Evaluation Code: (Please list factors)  Patient History/Comorbidities:   Past Medical History:   Diagnosis Date     Kidney infection      Migraine      Pancreatitis      UTI (lower urinary tract infection)     Examination: see objective  Clinical Presentation: stable  Clinical Decision Making: low    Patient History/  Comorbidities Examination  (body structures and functions, activity limitations, and/or participation restrictions) Clinical Presentation Clinical Decision Making (Complexity)   No documented Comorbidities or personal factors 1-2 Elements Stable and/or uncomplicated Low   1-2 documented comorbidities or personal factor 3 Elements Evolving clinical presentation with changing characteristics Moderate   3-4 documented comorbidities or personal factors 4 or more Unstable and unpredictable High            Zheng LEIGH  11/1/2019  6:47 AM

## 2021-06-02 NOTE — PATIENT INSTRUCTIONS - HE
Discussed the importance of core strengthening, ROM, stretching exercises with the patient and how each of these entities is important in decreasing pain.  Explained to the patient that the purpose of physical therapy is to teach the patient a home exercise program.  These exercises need to be performed every day in order to decrease pain and prevent future occurrences of pain.        ~Please call Nurse Navigation line [phone] with any questions or concerns about your treatment plan, if symptoms worsen and you would like to be seen urgently, or if you have problems controlling bladder and bowel function.  ~Follow Up Appointment time slots with [facility]son C[facility], are also available at the [facility] location near [facility] on the first and third THURSDAY afternoons of each month.

## 2021-06-02 NOTE — TELEPHONE ENCOUNTER
I can provide short course of Flexeril for her until she can be seen by Esme in the next couple of weeks.

## 2021-06-02 NOTE — TELEPHONE ENCOUNTER
I called and left a VM on pt's phone. Rx for Flexeril was sent to [facility] on [address] [facility] [address] in [place] by Dr. Bay. Short course was sent since Esme is out. To call us back if she has any other questions.

## 2021-06-02 NOTE — TELEPHONE ENCOUNTER
Pt stated that her neck pain has been worse lately with stiffness. Also C/O B/L shoulder pain as well. She won't be starting PT until the end of this week 10/24/19. Pt is wondering if she get a a muscle relaxer prescribed for the time being, like Flexeril if possible. If ok with Rx, she uses [facility] on [address] in [facility].     Last OV was 10/9/19 with Esme. No future appt with [facility] scheduled as of now. Please advise.

## 2021-06-02 NOTE — PROGRESS NOTES
ASSESSMENT: Madeline Szymanski is a 39 y.o. female presents for consultation at the request of PCP Tre Jj MD, with past medical history significant for gastric banding procedure 2014 with slippage and removal of gastric banding components surgery 9/26/2019, history Helicobacter infection, history tubal ligation, migraine, who presents today for new patient evaluation of :     -Chronic generalized cervicalgia with progressive left arm pain and paresthesias nonspecific pattern.  Previous cervical CT scan 2017 did show degenerative changes at C5-6 with disc protrusion and mild central stenosis.    -Generalized cervical myofascial pain    -Chronic bilateral low back pain at the lumbosacral junction with pain into the right buttock right lateral thigh and posterior thigh, 2018 lumbar spine imaging showed no nerve compression on the right however very minimal L5-S1 spondylolisthesis.  History of short-term relief with bilateral L4, L5 radio frequency ablation targeting L5-S1 facet joint 3/2019 O[facility].  Increased pain with facet loading on exam right greater than left today.    -Right buttock pain with increased pain with SI provocative maneuvers indicating sacroiliac joint dysfunction/pain.    Patient is neurologically intact on exam. No myelopathic or red flag symptoms.      WIL Score: 32    WHO 5: 13       Diagnoses and all orders for this visit:    Cervicalgia  -     MR Cervical Spine Without Contrast; Future; Expected date: 10/09/2019    Radiculitis of left cervical region  -     MR Cervical Spine Without Contrast; Future; Expected date: 10/09/2019    Paresthesia of left arm  -     MR Cervical Spine Without Contrast; Future; Expected date: 10/09/2019    Cervical myofascial pain syndrome    Chronic bilateral low back pain with right-sided sciatica  -     XR Lumbar Spine Flex and Ext 2 or 3 VWS; Future; Expected date: 10/09/2019    Sacroiliac joint dysfunction    Sacroiliac joint pain    Spondylolisthesis at L5-S1  level  -     XR Lumbar Spine Flex and Ext 2 or 3 VWS; Future; Expected date: 10/09/2019    Lumbar facet arthropathy  -     XR Lumbar Spine Flex and Ext 2 or 3 VWS; Future; Expected date: 10/09/2019       PLAN:  Reviewed spine anatomy and disease process. Discussed diagnosis and treatment options with the patient today. A shared decision making model was used. The patient's values and choices were respected. The following represents what was discussed and decided upon by the provider and the patient.     -DIAGNOSTIC TESTS:  Images were personally reviewed and interpreted and explained to patient today using spine model.   --Ordered cervical spine MRI to further evaluate progressive neck pain and left arm pain/paresthesias.  Patient prefers [facility] imaging for MRI.  --Also ordered lumbar flexion extension x-ray to evaluate L5-S1 spondylolisthesis stability.  Patient okay with [facility] for x-ray.  --[facility] lumbar spine MRI 2/28/2018 shows mild to moderate degenerative changes L1-2 and mild at L5-S1.  Mild facet arthropathy right greater than left L5-S1, with minimal left foraminal stenosis.    -PHYSICAL THERAPY: Discussed that we would likely recommend physical therapy pending MRI imaging prior to considering any injections.  We did discuss different options including pool therapy as she previously did not tolerate land therapy.  Discussed the importance of core strengthening, ROM, stretching exercises with the patient and how each of these entities is important in decreasing pain.  Explained to the patient that the purpose of physical therapy is to teach the patient a home exercise program.  These exercises need to be performed every day in order to decrease pain and prevent future occurrences of pain.  Likened it to brushing one's teeth.      -MEDICATIONS: No change in medications at this time.    -INTERVENTIONS: Discussed with patient that if flexion-extension x-ray shows no instability and patient is  not getting relief with physical therapy we could consider a right sacroiliac joint steroid injection.  --Would recommend physical therapy prior to any cervical injections unless severe nerve compression noted on MRI.  Discussed risks and benefits of injections with patient today.  --Rudimentary S1-S2 disc space.    -PATIENT EDUCATION: 45 minutes of total visit time was spent face to face with the patient today, greater than 50% of total time spent with patient was spent on counseling, education, and coordinating care.   -10 minutes spent outside of visit time, non-face-to-face time, reviewing chart.    -FOLLOW-UP:   Advised patient to follow-up to review imaging however she does prefer a phone call to discuss results of her imaging versus coming in for follow-up appointment unless significant findings are noted.    Advised patient to call the [facility] if symptoms worsen or you have problems controlling bladder and bowel function.   ______________________________________________________________________    SUBJECTIVE:   Madeline Szymanski  is a 39 y.o. female who presents today for new patient evaluation of neck pain generalized cervical spine pain is been ongoing for years however worse in the last 1 month bilateral neck and trapezius region that radiates intermittently into the left forearm with numbness and tingling into second through fourth fingers typically worse with repetitive movements and driving, she does report that the thumb is typically spared with symptoms, very minimal symptoms on the right, she is left-hand-dominant.  Patient denies upper extremity weakness, denies recent trips falls or balance changes, denies bowel or bladder loss control.  Patient also endorses chronic low back pain that is slightly worse on the right lumbosacral junction that radiates to the right buttock right medial and lateral thigh as well as posterior calf with occasional pain into the right foot without numbness or tingling  sensations.  She does report back pain is constant however also worse in the last 1 month.  Overall neck and back symptoms are currently 6/10, and 9 at its worst, a 2 at its best.    -Treatment to Date: No prior spinal surgery    Bilateral L5-S1 TFESI 3/21/2018 Dr. Gustafson Buna.  Preproc pain 7/10, post 0/10.  Inpatient bilateral L4, L5 MBB 4/6/2018- [facility]  Bilateral L4, L5 MBB 1/30/2019 and 2/13/2019 B[facility].  RFA bilateral L4, L5 3/6/2019 Buna with relief x5 months only then return of symptoms.    -Medications:  Gabapentin 600 mg at bedtime  Celebrex 1 twice daily    Current Outpatient Medications on File Prior to Encounter   Medication Sig Dispense Refill     celecoxib (CELEBREX) 200 MG capsule Take 200 mg by mouth 2 (two) times a day as needed.       cholecalciferol, vitamin D3, 2,000 unit capsule Take 2,000 Units by mouth daily.       famotidine (PEPCID) 20 MG tablet Take 20 mg by mouth 2 (two) times a day.       FLUoxetine (PROZAC) 40 MG capsule Take 40 mg by mouth daily.       HYDROcodone-acetaminophen (HYCET) 5-216.7 mg/10 mL Take 10 mL by mouth every 6 (six) hours as needed for pain. 118 mL 0     metroNIDAZOLE (METROCREAM) 0.75 % cream Apply 2 times daily as needed       multivitamin with minerals (THERA-M) 9 mg iron-400 mcg Tab tablet Take 1 tablet by mouth daily.       omeprazole (PRILOSEC) 40 MG capsule Take 40 mg by mouth as needed.              phentermine (ADIPEX-P) 37.5 mg tablet Take 37.5 mg by mouth daily.       topiramate (TOPAMAX) 50 MG tablet Take 50 mg by mouth 2 (two) times a day.       eletriptan (RELPAX) 40 MG tablet TK 1 T PO PRF HEADACHE AT ONSET OF MIGRAINE. MAY REPEAT IN 2 H IF NEEDED. MAS 2 / DAY AND 4 DAYS / MONTH.  3     gabapentin (NEURONTIN) 300 MG capsule Take 2 capsules by mouth at bedtime.  5     rizatriptan (MAXALT-MLT) 10 MG disintegrating tablet DIS ONE T PO PRF HEADACHE AT ONSET OF MIGRAINE. MAY REPEAT IN 2 H IF NEEDED. MAX 30MG / 24 H AND 5 DAYS / MONTH.  3      [DISCONTINUED] ondansetron (ZOFRAN ODT) 4 MG disintegrating tablet Take 1 tablet (4 mg total) by mouth every 8 (eight) hours as needed for nausea. 5 tablet 0     [DISCONTINUED] ondansetron (ZOFRAN) 4 MG tablet Take 1 tablet (4 mg total) by mouth every 6 (six) hours. 12 tablet 0     [DISCONTINUED] SUMAtriptan (IMITREX) 100 MG tablet        [DISCONTINUED] traZODone (DESYREL) 50 MG tablet Take 50 mg by mouth bedtime.       No current facility-administered medications on file prior to encounter.        Allergies   Allergen Reactions     Cephalexin Hives     Other Allergy (See Comments) Anaphylaxis     Dragon Fruit      Compazine [Prochlorperazine Edisylate]      Penicillins      Reglan [Metoclopramide Hcl]        Past Medical History:   Diagnosis Date     Kidney infection      Migraine      Pancreatitis      UTI (lower urinary tract infection)         There is no problem list on file for this patient.      Past Surgical History:   Procedure Laterality Date      SECTION         No family history on file.    Reviewed past medical, surgical, and family history with patient found on new patient intake packet located in EMR Media tab.     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  Patient is .  Patient denies smoking/tobacco use occasionally drinks socially but this is rare, denies history being a heavy drinker, denies recreational drug use.    ROS: Positive for joint pain, muscle pain, dizziness, insomnia, abdominal pain, nausea/vomiting, headache, weight gain.  Specifically negative for bowel/bladder dysfunction, balance changes, headache, dizziness, foot drop, fevers, chills, appetite changes, nausea/vomiting, unexplained weight loss. Otherwise 13 systems reviewed are negative. Please see the patient's intake questionnaire from today for details.    OBJECTIVE:  /79 (Patient Site: Right Arm, Patient Position: Sitting)   Pulse (!) 105    Wt 211 lb (95.7 kg)   LMP 04/03/2015   SpO2 98%   BMI 36.22 kg/m      PHYSICAL EXAMINATION:  --CONSTITUTIONAL: Vital signs as above. No acute distress. The patient is well nourished and well groomed.  --PSYCHIATRIC: The patient is awake, alert, oriented to person, place, time and answering questions appropriately with clear speech. Appropriate mood and affect   --HEENT: Sclera are non-injected. Extraocular muscles are intact. Thyroid moves easily upon swallowing.  Moist oral mucosa.  --SKIN: Skin over the face, bilateral upper extremities, and posterior torso is clean, dry, intact without rashes.  --RESPIRATORY: Normal rhythm and effort. No abnormal accessory muscle breathing patterns noted.   --GROSS MOTOR: Easily arises from a seated position.   --CERVICAL SPINE: Inspection reveals no evidence of deformity. Range of motion is not limited in cervical flexion, extension, lateral rotation, slight increased pain with forward flexion.  Generalized tenderness to palpation cervical spine greatest at C2-3 C3-4 and bilateral trapezius region.  Spurling maneuver negative bilaterally.  --SHOULDERS: Full range of motion bilaterally. Negative empty can.  --UPPER EXTREMITY MOTOR TESTING:  Wrist flexion left 5/5, right 5/5  Wrist extension left 5/5, right 5/5  Pronators left 5/5, right 5/5  Biceps left 5/5, right 5/5   Triceps left 5/5, right 5/5   Shoulder abduction left 5/5, right 5/5   left 5/5, right 5/5  --NEUROLOGIC: CN III-XII are grossly intact. 2/4 symmetric biceps, brachioradialis, triceps reflexes bilaterally. Sensation to upper extremities is intact.  Negative William's bilaterally.    --VASCULAR: 2/4 radial pulses bilaterally. Warm upper limbs bilaterally. Capillary refill in the upper extremities is less than 1 second.    --STANDING EXAMINATION:  Normal lumbar lordosis noted, no lateral shift.  --MUSCULOSKELETAL: Lumbar spine inspection reveals no evidence of deformity. Range of motion is slightly limited in  forward flexion due to pain with tightness into the right hamstring, increased pain with right greater than left lateral rotation and extension simultaneously bilaterally.  Tenderness to palpation lumbar sacral junction right greater than left. Straight leg raising in the supine position is negative to radicular pain. Sciatic notch non-tender.  --SACROILIAC JOINT: Positive right greater than left distraction.  Positive right Zeb's with reproduction of pain to affected extremity. One Finger point test positive right.  --GROSS MOTOR: Gait is non-antalgic. Easily arises from a seated position. Toe walking and heel walking are normal without significant difficulty.    --LOWER EXTREMITY MOTOR TESTING:  Plantar flexion left 5/5, right 5/5   Dorsiflexion left 5/5, right 5/5   Great toe MTP extension left 5/5, right 5/5  Knee flexion left 5/5, right 5/5  Knee extension left 5/5, right 5/5   Hip flexion left 5/5, right 5/5  Hip abduction left 5/5, right 5/5  Hip adduction left 5/5, right 5/5   --HIPS: Full range of motion bilaterally. Negative FABERs on the involved lower extremity.   --NEUROLOGICAL:  3/4 patellar, medial hamstring, and achilles reflexes bilaterally.  Sensation to light touch is intact in the bilateral L4, L5, and S1 dermatomes. Babinski is negative. No clonus.  --VASCULAR:  2/4 dorsalis pedis and posterior tibialsi pulses bilaterally.  Bilateral lower extremities are warm.  There is no pitting edema of the bilateral lower extremities.    RESULTS: Prior medical records from [facility] 1/21/2016 to current and care everywhere were reviewed today.     Imaging:  Cervical spine Imaging was personally reviewed and interpreted today. The images were shown to the patient and the findings were explained using a spine model.      Ct Abdomen Pelvis Without Oral With Iv Contrast  Result Date: 9/26/2019  Musculoskeletal: Minimal degenerative changes in the spine.   CONCLUSION:   1.  Interval development of  minimal inferior displacement of the gastric band creating a small pouch with residual fluid material involving in the upper stomach. Band is more inferior than typically placed and therefore causing left upper quadrant or epigastric pain given the distention with food material the upper quarter stomach. No distal esophageal dilatation. Recommend consideration of follow-up surgical consultation. It also may be beneficial to further follow-up with a upper GI evaluation to evaluate drainage through the gastric band given retention of food material. Distal stomach unremarkable.   2.  Diffuse fatty infiltration of the liver.   3.  Normal size spleen. No evidence for pancreatitis or left renal hydronephrosis as a cause of left upper quadrant pain.        MR LUMBAR SPINE WO IV CONT -[facility]  2/28/2018   INDICATION: Continual back pain.  TECHNIQUE: Without IV contrast.  CONTRAST: None.  COMPARISON: 02/22/2017 CT abdomen pelvis provides partial comparison.  FINDINGS: 5 lumbar type vertebrae with a rudimentary S1-S2 disc space.  Exaggerated lumbar lordosis. Slight rightward curvature to the lower   Thoracic and upper lumbar spine. Minimal retrolisthesis of L5 on S1. Small Schmorl's nodes along the superior and inferior endplates of T12 and L1. Vertebral Body heights otherwise maintained. Small osseous hemangioma versus prominent Focal fat at the posterior aspect of L2. Small amount of type II Modic endplate degenerative change at the anterior inferior aspect of L1. Marrow signal otherwise unremarkable. No pars defect. The conus tip is identified at L2. 1.4 cm presumed cyst in the right kidney. No abdominal aortic aneurysm. The visualized portions of the bony pelvis are normal for age.  T12-L1: Moderate loss of disc height with mild loss of disc signal and no  significant posterior disc bulge. No significant facet arthropathy. Spinal Canal and neural foramina are widely patent.  L1-L2: Mild to moderate  loss of disc height with an unremarkable disc   signal and no significant posterior disc bulge. Facet arthropathy is minimal. Spinal canal and neural foramina are widely patent.  L2-L3: Normal disc height and signal with no posterior disc bulge. Facet  arthropathy is minimal. Spinal canal and neural foramina are widely patent.  L3-L4: Normal disc height and signal with no posterior disc bulge and only minimal bilateral facet arthropathy. Spinal canal and neural foramina are Widely patent.  L4-L5: Normal disc height and signal with no posterior disc bulge and no  significant facet arthropathy. Spinal canal and neural foramina are widely patent.  L5-S1: Mild loss of disc height. Mild loss of disc signal. Very small   Diffuse posterior disc bulge with a left foraminal predominance. Mild right-sided Facet arthropathy. Spinal canal and right neural foramen are patent. Mild/minimal left neural foraminal stenosis.  CONCLUSION:  1.  Degenerative disc disease is moderate at the T12-L1 level, mild to   Moderate at the L1-L2 level, and mild at the L5-S1 level. There is no significant posterior disc bulge at any level, and no significant spinal canal or Neural foraminal stenosis. Facet arthropathy is predominantly minimal, with only mild right-sided facet arthropathy at L5-S1.      Cervical CT scan without contrast-[facility] everywhere  05/19/2017  8:23 PM CDT  Clinical History: Migraine headache, fall recently, trauma, neck pain.   Technique: Cervical spine CT without contrast. Helical acquisition with multiplanar reconstructions. This CT scan used dose modulation, iterative reconstruction, and/or weight based dosing when appropriate to reduce radiation dose to as low as reasonably achievable.  Comparison: None.  Findings: Cervical vertebral bodies normal in height and alignment. Odontoid process intact. Modest cervical degenerative changes. Posterior disc bulging or shallow protrusion C5-C6 appears to have mild effect upon  the ventral aspect of the cord. No acute cervical fracture or subluxation.  Impression:  1. No acute cervical fracture or subluxation.  2. Modest lower cervical degenerative changes most notably C5-C6 where there is posterior disc bulging or shallow protrusion which appears to have mild effect upon the ventral cord.  [facility]

## 2021-06-02 NOTE — TELEPHONE ENCOUNTER
Pt returned call. Results and recommendations given. Pt stated understanding. She would like PT referral to be placed through [facility], her preferred location being at the [facility] in [place].

## 2021-06-02 NOTE — TELEPHONE ENCOUNTER
----- Message from Esme Hodgson CNP sent at 10/10/2019  4:14 PM CDT -----  Please call patient and notify her that her lumbar spine x-ray shows very minimal spondylolisthesis/shifting at L5-S1 2 mm that is stable with flexion-extension, no instability or concerning findings.  Please also notify patient that I did review her cervical spine MRI with [facility] completed 10/9/2019, which does show C5-6 disc degeneration with disc protrusion with mild nerve compression on the left with mild facet arthropathy/arthritis at this level.  Mild disc bulge with mild central stenosis at C6-7.  No high-grade nerve compression at any level and no concerning findings.  Would recommend physical therapy again for both neck and back prior to considering any injections at this point to establish long-term home exercise program for both neck and back symptoms, strengthening and nerve glides.  If no benefit with physical therapy then I would recommend following up in clinic to discuss other options including possible injections.

## 2021-06-03 VITALS — WEIGHT: 211 LBS | BODY MASS INDEX: 36.22 KG/M2

## 2021-06-03 NOTE — TELEPHONE ENCOUNTER
----- Message from Leticia Carlos sent at 11/26/2019  7:45 AM CST -----  Regarding: med refill  Esme,    Can you please send a refill of Flexerial 10mg to the [facility] in [place].    Thank you  Leticia HAYDEN

## 2021-06-04 VITALS — WEIGHT: 180 LBS | HEIGHT: 64 IN | BODY MASS INDEX: 30.73 KG/M2

## 2021-06-05 VITALS
HEIGHT: 64 IN | RESPIRATION RATE: 16 BRPM | BODY MASS INDEX: 29.19 KG/M2 | WEIGHT: 171 LBS | OXYGEN SATURATION: 98 % | HEART RATE: 84 BPM | DIASTOLIC BLOOD PRESSURE: 84 MMHG | SYSTOLIC BLOOD PRESSURE: 127 MMHG

## 2021-06-05 NOTE — TELEPHONE ENCOUNTER
Injection order placed for bilateral carpal tunnel steroid injection with Dr. Zaragoza under ultrasound, given the injection is for both wrists, she should have a  the day of the injection.

## 2021-06-05 NOTE — TELEPHONE ENCOUNTER
----- Message from Esme Hodgson CNP sent at 1/7/2020  2:28 PM CST -----  Regarding: EMG results  Please call patient and notify her that her EMG did show mild bilateral carpal tunnel syndrome.     Recommend wrist braces at night to limit wrist movement and diminish symptoms.   Also could consider hand therapy with OT.  Steroid injections would be an option as well if she would like with Dr. Zaragoza.    Thanks,  Esme

## 2021-06-05 NOTE — PATIENT INSTRUCTIONS - HE
DISCHARGE INSTRUCTIONS    During office hours (8:00 a.m.- 4:00 p.m.) questions or concerns may be answered  by calling [facility] Navigation Nurses at  [phone].  Messages received after hours will be returned the following business day.      In the case of an emergency, please dial 911 or seek assistance at the nearest Emergency Room/Urgent Care facility.     All Patients:    ? You may experience an increase in your symptoms for the first 2 days (It may take anywhere between 2 days- 2 weeks for the steroid to have maximum effect).    ? You may use ice on the injection site, as frequently as 20 minutes each hour if needed.    ? You may take your pain medicine.    ? You may continue taking your regular medication after your injection.     ? You may shower. No swimming, tub bath or hot tub for 48 hours.  You may remove your bandaid/bandage as soon as you are home.    ? You may resume light activities, as tolerated.    ? Resume your usual diet as tolerated.      POSSIBLE STEROID SIDE EFFECTS (If steroid/cortisone was used for your procedure)    -If you experience these symptoms, it should only last for a short period      Swelling of the legs                Skin redness (flushing)       Mouth (oral) irritation     Blood sugar (glucose) levels              Sweats                      Mood changes    Headache    Sleeplessness         POSSIBLE PROCEDURE SIDE EFFECTS  -Call the [facility] Center if you are concerned    Increased Pain             Increased numbness/tingling        Nausea/Vomiting            Bruising/bleeding at site        Redness or swelling                                                Difficulty walking        Weakness             Fever greater than 100.5

## 2021-06-05 NOTE — TELEPHONE ENCOUNTER
Spoke with patient about provider's response. Pt stated understanding. Pt will schedule injections at her earliest convenience.

## 2021-06-05 NOTE — PROGRESS NOTES
Patient presents at the request of Esme Hodgson for a bilateral upper extremity EMG.  She has bilateral hand numbness and tingling digits 1-5.  Right equal to left in severity.  She is left-handed.  Has been wearing carpal tunnel splints.    EMG/NCS  results: Please see scanned full report.    Comment NCS: Abnormal study:    1.  Mildly prolonged latency bilateral median  transcarpal studies.  2.  Prolonged bilateral median versus ulnar transcarpal latency comparison.  Normal left median and ulnar SNAPs3.,  Bilateral ulnar transcarpal studies, bilateral median and ulnar CMAPs.    Comment EMG: Normal study.  1.  Normal needle EMG bilateral upper extremities.    Interpretation: Abnormal study: There is electrodiagnostic evidence of:    1.  Bilateral median neuropathy at the wrist consistent with entrapment in the carpal tunnel, mild in severity.  Right equal to left in severity.    2. There is no electrodiagnostic evidence of cervical radiculopathy, brachial plexopathy, or ulnar neuropathy in the bilateral upper extremities.      The testing was completed in its entirety by the physician.      It was our pleasure caring for your patient today, if there any questions or concerns please do not hesitate to contact us.

## 2021-06-05 NOTE — TELEPHONE ENCOUNTER
Discussed results with patient. She has wrist braces at home and will use these. She also would like to try steroid injections with Dr. Zaragoza. Will inform provider to have order placed. Then will go over injection requirements with pt and have her scheduled.

## 2021-06-05 NOTE — PATIENT INSTRUCTIONS - HE
Thank you for choosing the [facility] for your EMG testing.    The ordering provider will receive your final EMG results within the next few days.  Please follow up with your provider for the results and further treatment recommendations.

## 2021-06-07 NOTE — PROGRESS NOTES
[facility] Discharge Summary  Patient Name: Madeline Szymanski  Date: 4/8/2020  Referral Diagnosis:   Cervicalgia [M54.2]  - Primary       Radiculitis of left cervical region [M54.12]       Paresthesia of left arm [R20.2]       Cervical myofascial pain syndrome [M79.18]       Chronic bilateral low back pain with right-sided sciatica [M54.41, G89.29]       Sacroiliac joint dysfunction [M53.3]       Sacroiliac joint pain [M53.3]       Spondylolisthesis at L5-S1 level [M43.17]       Lumbar facet arthropathy [M47.816]       Referring provider: Esme Hodgson C*  Visit Diagnosis:   1. Chronic bilateral low back pain without sciatica     2. Sacroiliac joint dysfunction     3. Chronic neck pain     4. Myofascial pain     5. Decreased ROM of neck         Goals: Not Assessed  Pt. will be independent with home exercise program in : 4 weeks  Pt. will report decreased intensity, frequency of : Pain;in 4 weeks  Patient will decrease : WIL score;NDI score;by _ points;for improved quality of function;in 6 weeks  by ___ points: 30% and 10%  Pt will: report doing house and yardwork without increased pain in 7 weeks:  Pt will: be able to rotated to the right 60 degrees or greater, for improved ability to driving in 4 weeks:  Pt will: demonstrated > average strength on MEDX per age matched norms, in 8 weeks, cervical and lumbar:    Patient was seen for 1 visit on 11/1/19 for her initial evaluation. The patient did not return to PT following this visit. Goals not met/assessed.    Therapy will be discontinued at this time.  The patient will need a new referral to resume.    Thank you for your referral.  Zheng LEIGH, PT  4/8/2020  9:39 AM

## 2021-06-07 NOTE — PROGRESS NOTES
"Madeline Szymanski is a 40 y.o. female who is being evaluated via a billable telephone visit.      The patient has been notified of following:     \"This telephone visit will be conducted via a call between you and your physician/provider. We have found that certain health care needs can be provided without the need for a physical exam.  This service lets us provide the care you need with a short phone conversation.  If a prescription is necessary we can send it directly to your pharmacy.    If during the course of the call the physician/provider feels a telephone visit is not appropriate, you will not be charged for this service.\"     Due to the COVID 19 crisis, this visit was done over the telephone with a telemedicine visit as opposed to face-to-face.  This was done to limit exposure/risk for the patient and provider.    Patient has given verbal consent to a Telephone visit??Yes          Madeline Szymanski complains of    Chief Complaint   Patient presents with     Back Pain     Leg Pain     left leg     Patient presents for an evaluation of low back pain via telemedicine visit.  She has a history of chronic back pain for several years and reports having radiofrequency ablation done in the lower lumbar spine at [facility].  It was helpful but over the past few months she has had waxing and waning of her pain.  2 to 3 weeks ago without new injury had severe increase in pain.  Pain is across lumbosacral junction shooting down the left greater than right leg to the back of the knee the left leg is fairly constant rated a 5/10 today.  Worse with any bending but also sitting and lying down are quite painful in the back.  The best position is standing but that is also difficult for her.  Pain is sharp shooting with some numbness and tingling left greater than right.  Pain was so severe that she presented to chiropractor last week had plain films done in a adjustment which was not helpful.  Has been doing some physical therapy " exercises.  She was seen by physical therapist in November and has been doing her home exercises daily for the past couple of weeks.  These are stretching exercises.  Takes gabapentin daily which has not been helpful.  Tylenol has not been helpful and cyclobenzaprine has not been helpful.  She denies weakness of the legs or bowel or bladder incontinence.      Imaging: I personally reviewed CT abdomen and pelvis from September 2019.  This shows gastric sleeve.  This shows some degenerative disc disease L5-S1 with disc height loss no high-grade central stenosis or foraminal stenosis on my review.    Plain films done last Friday at the chiropractor was sent via my chart and I reviewed these there is suboptimal imaging they do show normal alignment with degenerative disc height loss L5-S1.  Left greater than right hip osteoarthritis which is relatively mild.      ALLERGIES  Cephalexin; Other allergy (see comments); Compazine [prochlorperazine edisylate]; Penicillins; and Reglan [metoclopramide hcl]    Review of systems:  Complains of headaches which is not new.  No numbness, tingling or weakness.  No bowel or bladder incontinence.  No urinary retention.  No fevers, unintentional weight loss, balance changes,  frequent falling, difficulty swallowing, or coordination difficulties.    I have reviewed and updated the patient's Past Medical History, Social History, Family History and Medication List.      Assessment:  Diagnoses and all orders for this visit:    Lumbar spine pain  -     MR Lumbar Spine Without Contrast; Future; Expected date: 04/27/2020  -     methylPREDNISolone (MEDROL DOSEPACK) 4 mg tablet; Follow package directions  Dispense: 21 tablet; Refill: 0    Lumbar radicular pain  -     MR Lumbar Spine Without Contrast; Future; Expected date: 04/27/2020  -     methylPREDNISolone (MEDROL DOSEPACK) 4 mg tablet; Follow package directions  Dispense: 21 tablet; Refill: 0    DDD (degenerative disc disease), lumbar  -      MR Lumbar Spine Without Contrast; Future; Expected date: 04/27/2020  -     methylPREDNISolone (MEDROL DOSEPACK) 4 mg tablet; Follow package directions  Dispense: 21 tablet; Refill: 0        Pleasant 40 y.o. female with past medical history significant for gastric banding procedure 2014 with slippage and removal of gastric banding components surgery 9/26/2019, history Helicobacter infection, history tubal ligation, migraine, with:      1.  Acute lumbar spine pain with left greater than right radicular pain down the back of the leg to the knee with dysesthesias.  She has degenerative disc disease at L5-S1 with some facet arthropathy on CT scan of the abdomen and pelvis from September 2019.  Tells me she has had radiofrequency ablation in the past and [place].  Severe flare over the past 2 to 3 weeks.  Has failed physical therapy exercises, activity modifications and chiropractic.  Unable to take NSAIDs due to gastric band.       PLAN:    1.  I reviewed the CT abdomen and pelvis.  Reviewed treatment options including imaging versus medications and further therapy.  Given that she has been doing physical therapy home exercises regularly and tried medication such as gabapentin, cyclobenzaprine and Tylenol, she would like to be more aggressive.  The pain is to a point where she would be unable to return to work at this point if she was needed to return to work.    2.  We will order an MRI of the lumbar spine at her request at [facility] for an evaluation of the lumbar L5-S1 L4-5 disc for disc herniation.    3.  Trial Medrol Dosepak.    4.  Can consider lumbar injection based on MRI results.  We will schedule video phone visit for follow-up in 1 week after MRIs been completed.      Call start: [id number]  Call completed: [phone]    Phone call duration: 17 minutes    Natanael Bay DO

## 2021-06-08 NOTE — PATIENT INSTRUCTIONS - HE

## 2021-06-08 NOTE — TELEPHONE ENCOUNTER
Discussed with patient. She reports she had complete relief since her last injection of 1/13. Numbness and tingling returned last with and has been progressively worsening. Dr. Zaragoza did her last injection and will do another unilateral for patient.

## 2021-06-08 NOTE — TELEPHONE ENCOUNTER
How much relief did she have from the injection?  If she had significant relief, she could be scheduled for a repeat left carpal tunnel injection.  This would be considered a Tier 3 injection which I believe is being scheduled for July.

## 2021-06-08 NOTE — PROGRESS NOTES
"Madeline Szymanski is a 40 y.o. female who is being evaluated via a billable telephone visit.      The patient has been notified of following:     \"This telephone visit will be conducted via a call between you and your physician/provider. We have found that certain health care needs can be provided without the need for a physical exam.  This service lets us provide the care you need with a short phone conversation.  If a prescription is necessary we can send it directly to your pharmacy.  If lab work is needed we can place an order for that and you can then stop by our lab to have the test done at a later time.    Telephone visits are billed at different rates depending on your insurance coverage. During this emergency period, for some insurers they may be billed the same as an in-person visit.  Please reach out to your insurance provider with any questions.    If during the course of the call the physician/provider feels a telephone visit is not appropriate, you will not be charged for this service.\"    Patient has given verbal consent to a Telephone visit? Yes    What phone number would you like to be contacted at? [phone]    Patient would like to receive their AVS by AVS Preference: Andreea.    Phone call duration: 15 minutes    Esme Hodgson CNP      Assessment:     Diagnoses and all orders for this visit:    Lumbar radicular pain  -     OPS TFESI Lumbar Bilateral; Future; Expected date: 05/20/2020    Lumbar spine pain    DDD (degenerative disc disease), lumbar  -     OPS TFESI Lumbar Bilateral; Future; Expected date: 05/20/2020    Lumbar facet arthropathy    Myofascial muscle pain  -     cyclobenzaprine (FLEXERIL) 10 MG tablet; Take 0.5-1 tablets (5-10 mg total) by mouth 3 (three) times a day as needed for muscle spasms.  Dispense: 30 tablet; Refill: 1    Chronic bilateral low back pain with bilateral sciatica  -     OPS TFESI Lumbar Bilateral; Future; Expected date: 05/20/2020    Lumbar foraminal " stenosis  -     OPS TFESI Lumbar Bilateral; Future; Expected date: 05/20/2020       Madeline Szymanski is a 40 y.o. y.o. female with past medical history significant for gastric banding procedure 2014 with slippage and removal of gastric banding components surgery 9/26/2019, history Helicobacter infection, history tubal ligation, migraine who presents today for follow-up via telephone regarding:    -Progressive bilateral low back pain with bilateral right greater than left alternating radicular pain.  Patient does have L5-S1 disc osteophyte complex and degenerative changes with mild bilateral foraminal stenosis and has done well with a bilateral L5-S1 TFESI in the past.  Patient denies weakness lower extremities.    I have reviewed the note as documented. This accurately captures the substance of my conversation with the patient.     Plan:     A shared decision making plan was used. The patient's values and choices were respected. Prior medical records from 10/9/2019 to current were reviewed today. The following represents what was discussed and decided upon by the provider and the patient.         -DIAGNOSTIC TESTS: Images were personally reviewed and interpreted.   --Lumbar spine MRI SPR 5/8/2020 shows left disc osteophyte complex at L5-S1 with protrusion with mild lateral recess stenosis and left foraminal stenosis.  Mild facet arthropathy.  --Bilateral upper extremity EMG 1/7/2020 with mild carpal tunnel syndrome right equal to left.  No cervical radiculopathy noted.  --Lumbar spine flexion-extension x-ray 10/9/2019 shows 2 mm L5-S1 spondylolisthesis, NO dynamic instability.  --Cervical spine MRI [facility] 10/9/2019 shows C5-6 disc degeneration with left disc protrusion with mild left cord compression.  Mild spinal stenosis C6-7.  Right disc protrusion C7-T1 without nerve compression.  --[facility] lumbar spine MRI 2/28/2018 shows mild to moderate degenerative changes L1-2 and mild at L5-S1.  Mild facet arthropathy  right greater than left L5-S1, with minimal left foraminal stenosis.    -INTERVENTIONS: Ordered urgent bilateral L5-S1 transforaminal epidural steroid injection to be completed with Dr. Zaragoza see if we get further relief of her back and radicular pain.  Currently her symptoms are worse on the right but she pain that alternates right to the left in the last couple of weeks.    -MEDICATIONS: Refilled Flexeril 10 mg 1 tablet 3 times daily as needed for myofascial pain.  Discussed side effects of medications and proper use. Patient verbalized understanding.    -PHYSICAL THERAPY: Encourage patient continue with home exercises from prior physical therapy sessions which she is doing at this time with minimal benefit.  Patient does not feel she would tolerate the [facility] program at this time however if she does get benefit with the injection this would be a good option for more intensive core strengthening and she will consider it down the road.  Discussed the importance of core strengthening, ROM, stretching exercises with the patient and how each of these entities is important in decreasing pain.  Explained to the patient that the purpose of physical therapy is to teach the patient a home exercise program.  These exercises need to be performed every day in order to decrease pain and prevent future occurrences of pain.        -PATIENT EDUCATION: 15 minutes of total visit time was spent on the telephone with the patient today, 60 % of the visit was spent on counseling, education, and coordinating care.   -5 minutes spent outside of visit time, non-face-to-face time, reviewing chart.  -Today we also discussed the issues related to the current COVID-19 pandemic, the pros and cons of the current treatment plan, the CDC guidelines such as social distancing, washing the hands, and covering the cough.    -FOLLOW UP: Follow-up for injection with Dr. Zaragoza in 2 weeks postinjection.  Advised to contact clinic if symptoms  worsen or change.    Subjective:     Madeline Szymanski is a 40 y.o. female who presents today for follow-up via telephone regarding progressive worsening bilateral low back pain right greater than left with lower extremity pain that is worse into the lateral thigh with some numbness and tingling in bilateral feet with prolonged sitting as well as prolonged standing.  Patient reports generalized lower extremity fatigue but denies any weakness in her lower extremities or any episodes of her legs giving out on her.  Her pain has been progressively worsening however though and is significant at a 7/10 and intolerable at this time, she does not feel that she can wait until July for an injection at this time.  She is hoping for an injection for her back and leg pain and has done well with injections in the past.  Patient denies bowel or bladder loss control, denies saddle anesthesia.    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    -Treatment to Date: No prior spinal surgery     Bilateral L5-S1 TFESI 3/21/2018 Dr. Gustafson [place].  Preproc pain 7/10, post 0/10.  Inpatient bilateral L4, L5 MBB 4/6/2018- [place]  Bilateral L4, L5 MBB 1/30/2019 and 2/13/2019 [place].  RFA bilateral L4, L5 3/6/2019 [place] with relief x5 months only then return of symptoms.  Bilateral carpal tunnel steroid injection 1/13/2020 with relief.  Left carpal tunnel steroid injection 5/14/2020 with relief.     -Medications:  Flexeril 10 mg  Gabapentin 600 mg at bedtime  Celebrex 200 mg 1 twice daily     *Medrol Dosepak prescribed 4/27/2020.    Current Outpatient Medications on File Prior to Encounter   Medication Sig Dispense Refill     calcium citrate (CALCITRATE) 200 mg (950 mg) tablet Take 1 tablet by mouth daily. Patient reports taking 1500mg PO daily       celecoxib (CELEBREX) 200 MG capsule Take 200 mg by mouth 2 (two) times a day as needed.       cholecalciferol, vitamin D3, 2,000 unit capsule Take 2,000 Units  by mouth daily.       eletriptan (RELPAX) 40 MG tablet TK 1 T PO PRF HEADACHE AT ONSET OF MIGRAINE. MAY REPEAT IN 2 H IF NEEDED. MAS 2 / DAY AND 4 DAYS / MONTH.  3     FLUoxetine (PROZAC) 40 MG capsule Take 40 mg by mouth daily.       gabapentin (NEURONTIN) 300 MG capsule Take 2 capsules by mouth at bedtime.  5     HYDROcodone-acetaminophen (HYCET) 5-216.7 mg/10 mL Take 10 mL by mouth every 6 (six) hours as needed for pain. 118 mL 0     methylPREDNISolone (MEDROL DOSEPACK) 4 mg tablet Follow package directions 21 tablet 0     metroNIDAZOLE (METROCREAM) 0.75 % cream Apply 2 times daily as needed       multivitamin with minerals (THERA-M) 9 mg iron-400 mcg Tab tablet Take 1 tablet by mouth daily.       omeprazole (PRILOSEC) 40 MG capsule Take 40 mg by mouth as needed.              rizatriptan (MAXALT-MLT) 10 MG disintegrating tablet DIS ONE T PO PRF HEADACHE AT ONSET OF MIGRAINE. MAY REPEAT IN 2 H IF NEEDED. MAX 30MG / 24 H AND 5 DAYS / MONTH.  3     topiramate (TOPAMAX) 50 MG tablet Take 50 mg by mouth 2 (two) times a day.       [DISCONTINUED] cyclobenzaprine (FLEXERIL) 10 MG tablet Take 0.5-1 tablets (5-10 mg total) by mouth 3 (three) times a day as needed for muscle spasms. 30 tablet 1     No current facility-administered medications on file prior to encounter.        Allergies   Allergen Reactions     Cephalexin Hives     Other Allergy (See Comments) Anaphylaxis     Dragon Fruit      Compazine [Prochlorperazine Edisylate]      Penicillins      Reglan [Metoclopramide Hcl]        Past Medical History:   Diagnosis Date     Kidney infection      Migraine      Pancreatitis      UTI (lower urinary tract infection)         Review of Systems  ROS: Positive for numbness and tingling, headache, increased pain in the evening hours.  Specifically negative for bowel/bladder dysfunction, balance changes, headache, dizziness, foot drop, fevers, chills, appetite changes, nausea/vomiting, unexplained weight loss. Otherwise 13  systems reviewed are negative. Please see the patient's intake questionnaire from today for details.    Reviewed Social, Family, Past Medical and Past Surgical history with patient, no significant changes noted since prior visit.     Objective:     Virtual telephone visit: No physical exam completed.  --PSYCHIATRIC: The patient is verbally awake, alert, oriented to person, place, time and answering questions appropriately with clear speech. Appropriate mood.    RESULTS:   Imaging: Spine imaging was reviewed today.       MRI LUMBAR SPINE W/O CONTRAST  LOCATION: [facility]  DATE/TIME: 5/8/2020 11:00 AM  INDICATION: Lumbar spine pain.  COMPARISON: Lumbar MRI 02/28/2018. Lumbar radiograph 10/09/2019.  TECHNIQUE: Without IV contrast  FINDINGS:  Nomenclature is based on 5 lumbar type vertebral bodies. Normal vertebral body heights and alignment. Intraosseous hemangioma posteriorly at L2. Marrow signal intensity is otherwise unremarkable. Normal distal spinal cord and cauda equina with conus medullaris at mid L2. Round T2 hyperintensity within the inferior pole of the right kidney, most consistent with a cyst. Unremarkable visualized bony pelvis.  T12-L1: Mild disc space narrowing with mild anterior marginal osteophyte. No herniation. Normal facets. No spinal canal or foraminal stenosis.  L1-L2: Mild disc space narrowing with mild disc bulge and marginal osteophyte. Mild facet arthropathy. No spinal canal or foraminal stenosis.  L2-L3: Normal disc height without herniation. Mild facet arthropathy. No spinal canal or foraminal stenosis.  L3-L4: Normal disc height without herniation. Mild facet arthropathy. No spinal canal or foraminal stenosis.  L4-L5: Normal disc height without herniation. Minimal disc bulge. Minimal facet arthropathy. No spinal canal or foraminal stenosis.  L5-S1: Mild disc space narrowing with mild disc bulge and broad-based left subarticular/foraminal disc protrusion. Unremarkable  facets. No spinal canal stenosis. Mild left lateral recess stenosis and mild left foraminal stenosis. No right foraminal stenosis.  IMPRESSION:  1. Left posterolateral osteophyte at L5-S1 with broad-based disc protrusion causes mild left lateral recess stenosis and minimal left foraminal stenosis.  2. Mild spondylosis at other levels without spinal canal or foraminal stenosis.      MR CERVICAL SPINE WITHOUT CONTRAST 1.5T  [facility] 10/9/2019  CLINICAL INFORMATION: Neck, bilateral trapezius and bilateral arm pain with tingling and numbness left greater than right. No injury.  TECHNICAL INFORMATION: T1, T2 GRE, T2 FSE and STIR sagittal thin sections with T2 GRE and FSE axial sections at selected levels.  COMPARISON IMAGES: No comparisons.  INTERPRETATION: Normal signal intensity within the cervical and upper thoracic cord. No Chiari malformation or syrinx, no intrinsic cord lesion and no intradural mass. Normal vertebral artery flow voids.  Cervical and upper thoracic spondylosis with lordotic alignment of the cervical vertebrae. No posterior ligamentous injury and no spinous process avulsion.  T3-4: Moderate disc degeneration with dorsal and right lateral bulging, normal facet joints and no stenosis or impingement.  T2-3 and T1-2: Mild disc degeneration with normal facet joints and no stenosis or impingement.  C7-T1: Mild disc degeneration with a 2 mm broad-based right paracentral disc protrusion, normal facet joints and no stenosis or impingement.  C6-7: Mild dorsal bulging with mild narrowing of the central canal. Normal facet joints and patent neural foramina.  C5-6: Mild disc degeneration with a 1-2 mm left paracentral disc protrusion, mild to moderate narrowing of the central canal (midline AP diameter 8 mm) and mild left ventral cord compression. Neural foramina patent with uncovertebral arthrosis on the left and normal facet joints.  C4-5: Dorsal disc margins unremarkable with normal facet joints and no stenosis or  impingement.  C3-4: Uncovertebral arthrosis on the right with normal facet joints and no stenosis or impingement.  C2-3: Normal intervertebral disc and facet joints. No stenosis or impingement.  No degenerative or erosive changes within the atlantoaxial or cervico-occipital joints.  Normal signal intensity with the vertebral marrow spaces. No destructive bone lesion and no paraspinous mass.  CONCLUSION:  1. C5-6 disc degeneration with a 1-2 mm left paracentral disc protrusion and mild left ventral cord compression.  2. Mild central canal stenosis at C6-7.  3. 2 mm right paracentral disc protrusion at C7-T1 without cord impingement.  4. No intrinsic cord abnormality and no intradural mass.  5. No neoplasm, fracture or infection.      Ct Abdomen Pelvis Without Oral With Iv Contrast  Result Date: 9/26/2019  Musculoskeletal: Minimal degenerative changes in the spine.   CONCLUSION:   1.  Interval development of minimal inferior displacement of the gastric band creating a small pouch with residual fluid material involving in the upper stomach. Band is more inferior than typically placed and therefore causing left upper quadrant or epigastric pain given the distention with food material the upper quarter stomach. No distal esophageal dilatation. Recommend consideration of follow-up surgical consultation. It also may be beneficial to further follow-up with a upper GI evaluation to evaluate drainage through the gastric band given retention of food material. Distal stomach unremarkable.   2.  Diffuse fatty infiltration of the liver.   3.  Normal size spleen. No evidence for pancreatitis or left renal hydronephrosis as a cause of left upper quadrant pain.                    MR LUMBAR SPINE WO IV CONT -[facility] everywhere  2/28/2018   INDICATION: Continual back pain.  TECHNIQUE: Without IV contrast.  CONTRAST: None.  COMPARISON: 02/22/2017 CT abdomen pelvis provides partial comparison.  FINDINGS: 5 lumbar type  vertebrae with a rudimentary S1-S2 disc space.  Exaggerated lumbar lordosis. Slight rightward curvature to the lower   Thoracic and upper lumbar spine. Minimal retrolisthesis of L5 on S1. Small Schmorl's nodes along the superior and inferior endplates of T12 and L1. Vertebral Body heights otherwise maintained. Small osseous hemangioma versus prominent Focal fat at the posterior aspect of L2. Small amount of type II Modic endplate degenerative change at the anterior inferior aspect of L1. Marrow signal otherwise unremarkable. No pars defect. The conus tip is identified at L2. 1.4 cm presumed cyst in the right kidney. No abdominal aortic aneurysm. The visualized portions of the bony pelvis are normal for age.  T12-L1: Moderate loss of disc height with mild loss of disc signal and no  significant posterior disc bulge. No significant facet arthropathy. Spinal Canal and neural foramina are widely patent.  L1-L2: Mild to moderate loss of disc height with an unremarkable disc   signal and no significant posterior disc bulge. Facet arthropathy is minimal. Spinal canal and neural foramina are widely patent.  L2-L3: Normal disc height and signal with no posterior disc bulge. Facet  arthropathy is minimal. Spinal canal and neural foramina are widely patent.  L3-L4: Normal disc height and signal with no posterior disc bulge and only minimal bilateral facet arthropathy. Spinal canal and neural foramina are Widely patent.  L4-L5: Normal disc height and signal with no posterior disc bulge and no  significant facet arthropathy. Spinal canal and neural foramina are widely patent.  L5-S1: Mild loss of disc height. Mild loss of disc signal. Very small   Diffuse posterior disc bulge with a left foraminal predominance. Mild right-sided Facet arthropathy. Spinal canal and right neural foramen are patent. Mild/minimal left neural foraminal stenosis.  CONCLUSION:  1.  Degenerative disc disease is moderate at the T12-L1 level, mild to    Moderate at the L1-L2 level, and mild at the L5-S1 level. There is no significant posterior disc bulge at any level, and no significant spinal canal or Neural foraminal stenosis. Facet arthropathy is predominantly minimal, with only mild right-sided facet arthropathy at L5-S1.        Cervical CT scan without contrast-[facility] everywhere  05/19/2017  8:23 PM CDT  Clinical History: Migraine headache, fall recently, trauma, neck pain.   Technique: Cervical spine CT without contrast. Helical acquisition with multiplanar reconstructions. This CT scan used dose modulation, iterative reconstruction, and/or weight based dosing when appropriate to reduce radiation dose to as low as reasonably achievable.  Comparison: None.  Findings: Cervical vertebral bodies normal in height and alignment. Odontoid process intact. Modest cervical degenerative changes. Posterior disc bulging or shallow protrusion C5-C6 appears to have mild effect upon the ventral aspect of the cord. No acute cervical fracture or subluxation.  Impression:  1. No acute cervical fracture or subluxation.  2. Modest lower cervical degenerative changes most notably C5-C6 where there is posterior disc bulging or shallow protrusion which appears to have mild effect upon the ventral cord.  [facility]

## 2021-06-08 NOTE — PROGRESS NOTES
Assessment:     Diagnoses and all orders for this visit:    Chronic bilateral low back pain with bilateral sciatica  -     Ambulatory referral to PT/OT  -     lidocaine (LIDODERM) 5 %; Place 1 patch on the skin daily. Remove & Discard patch within 12 hours or as directed by MD  Dispense: 14 patch; Refill: 1    DDD (degenerative disc disease), lumbar  -     Ambulatory referral to PT/OT  -     lidocaine (LIDODERM) 5 %; Place 1 patch on the skin daily. Remove & Discard patch within 12 hours or as directed by MD  Dispense: 14 patch; Refill: 1    Lumbar foraminal stenosis  -     Ambulatory referral to PT/OT  -     lidocaine (LIDODERM) 5 %; Place 1 patch on the skin daily. Remove & Discard patch within 12 hours or as directed by MD  Dispense: 14 patch; Refill: 1    Lumbar facet arthropathy  -     Ambulatory referral to PT/OT  -     lidocaine (LIDODERM) 5 %; Place 1 patch on the skin daily. Remove & Discard patch within 12 hours or as directed by MD  Dispense: 14 patch; Refill: 1    Myofascial pain  -     Ambulatory referral to PT/OT  -     lidocaine (LIDODERM) 5 %; Place 1 patch on the skin daily. Remove & Discard patch within 12 hours or as directed by MD  Dispense: 14 patch; Refill: 1    Deconditioned low back  -     Ambulatory referral to PT/OT    Trauma  -     lidocaine (LIDODERM) 5 %; Place 1 patch on the skin daily. Remove & Discard patch within 12 hours or as directed by MD  Dispense: 14 patch; Refill: 1    Musculoskeletal strain  -     acetaminophen-codeine (TYLENOL #3) 300-30 mg per tablet; Take 1 tablet by mouth 2 (two) times a day as needed for pain (Severe breakthrough pain).  Dispense: 10 tablet; Refill: 0    Other orders  -     ketorolac injection (TORADOL)       Madeline Szymanski is a 40 y.o. y.o. female with past medical history significant for gastric banding procedure 2014 with slippage and removal of gastric banding components surgery 9/26/2019, history Helicobacter infection, history tubal ligation,  migraine who presents today for follow-up regarding:    -Chronic bilateral low back pain with right greater than left radicular pain, short-term relief with bilateral L5-S1 TFESI however patient did have a recent fall landing on right hip which reaggravated her pain.  Mild perceived weakness bilateral lower extremities however patient is neurologically intact on exam today.  Low concern for acute fracture.     Plan:     A shared decision making plan was used. The patient's values and choices were respected. Prior medical records from 5/20/2020 were reviewed today. The following represents what was discussed and decided upon by the provider and the patient.        -DIAGNOSTIC TESTS: Images were personally reviewed and interpreted.   --Could consider further imaging if symptoms are not improving in the next couple weeks.  --Lumbar spine MRI SPR 5/8/2020 shows left disc osteophyte complex at L5-S1 with protrusion with mild lateral recess stenosis and left foraminal stenosis.  Mild facet arthropathy.  --Bilateral upper extremity EMG 1/7/2020 with mild carpal tunnel syndrome right equal to left.  No cervical radiculopathy noted.  --Lumbar spine flexion-extension x-ray 10/9/2019 shows 2 mm L5-S1 spondylolisthesis, NO dynamic instability.  --Cervical spine MRI [facility] 10/9/2019 shows C5-6 disc degeneration with left disc protrusion with mild left cord compression.  Mild spinal stenosis C6-7.  Right disc protrusion C7-T1 without nerve compression.  --[facility] lumbar spine MRI 2/28/2018 shows mild to moderate degenerative changes L1-2 and mild at L5-S1.  Mild facet arthropathy right greater than left L5-S1, with minimal left foraminal stenosis.    -INTERVENTIONS: No further injection recommendations at this time.    -MEDICATIONS: Prescribed lidocaine patch that she can utilize at nighttime to help with pain.  -Patient reports that she has diclofenac liquid gel at home, advised 3 times a day currently for  anti-inflammatory.  -Ordered Toradol 30 mg IM, given in clinic today.  -Prescribed Tylenol 3 1 tablet twice daily as needed for severe breakthrough pain, number 10 tablets given for 5 days worth.  Patient is aware that this is for acute pain post fall only and not a medication we recommend for long-term pain control.  -Patient unable to take oral NSAIDs due to history of gastric band during procedure.  -[place]  checked. Discussed the risks (eg, addiction, overdose, worsening pain) verses benefit of opioid use with patient today. Explained that this medication will not be a long term solution to ongoing pain. Discussed using lowest effective dose and the importance of other measures for pain management including PT, other non-opioid medications, behavioral treatments, and other procedure options.   Discussed side effects of medications and proper use. Patient verbalized understanding.    -PHYSICAL THERAPY: Referral to physical therapy here at the spine center starting with traditional therapy to establish home exercises however progressing to [facility] program as tolerated for intensive core strengthening for long-term spine health.  Discussed the importance of core strengthening, ROM, stretching exercises with the patient and how each of these entities is important in decreasing pain.  Explained to the patient that the purpose of physical therapy is to teach the patient a home exercise program.  These exercises need to be performed every day in order to decrease pain and prevent future occurrences of pain.        -PATIENT EDUCATION:  18 minutes of total visit time was spent face to face with the patient today, 60 % of the visit was spent on counseling, education, and coordinating care.   -5 minutes spent outside of visit time, non-face-to-face time, reviewing chart.  -Today we also discussed the issues related to the current COVID-19 pandemic, the pros and cons of the current treatment plan, the CDC guidelines such as  social distancing, washing the hands, and covering the cough.    -FOLLOW UP: Follow-up in 2 weeks if symptoms are not improving, sooner if pain is worsening or new symptoms arise.  Advised to contact clinic if symptoms worsen or change.    Subjective:     Madeline Szymanski is a 40 y.o. female who presents today for follow-up in person regarding ongoing chronic bilateral low back pain at the belt line and lumbosacral junction that radiates right greater than left in the lateral hips lateral thighs lateral ankles/shin and into the lateral foot.  Patient did have bilateral L5-S1 TFESI 6/3/2020 in which she did feel some initial benefit.  However 3 days after she had a fall while she was rollerblading and landed on her right hip and did have significant bruising where her rollerblade hit her left ankle as well.  Since then her typical back pain has been significantly worse currently 6/10, 9 at its worst, a 2 at its best.  She reports that sitting and lying is most comfortable however her pain is constant, walking makes her pain worse.  She does tolerate bending at this time.  Patient denies any trips or falls because of her legs giving out on her however she does feel slight perceived weakness in her lower extremities since the fall, worse on the right.  Patient denies any bowel or bladder loss control, denies saddle anesthesia.    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     -Treatment to Date: No prior spinal surgery     Bilateral L5-S1 TFESI 3/21/2018 Dr. Gustafson [place].  Preproc pain 7/10, post 0/10.  Inpatient bilateral L4, L5 MBB 4/6/2018- [facility]  Bilateral L4, L5 MBB 1/30/2019 and 2/13/2019 [facility].  RFA bilateral L4, L5 3/6/2019 [facility] with relief x5 months only then return of symptoms.    Dr. Zaragoza injections:  Bilateral carpal tunnel steroid injection 1/13/2020 with relief.  Left carpal tunnel steroid injection 5/14/2020 with relief.  Bilateral L5-S1 TFESI 6/3/2020  with initial benefit only however had trauma/fall 3 days postinjection.  Preprocedure pain 8/10, post 5/10.     -Medications:  Flexeril 10 mg  Gabapentin 600 mg at bedtime  Celebrex 200 mg 1 twice daily      *Medrol Dosepak prescribed 4/27/2020.    Current Outpatient Medications on File Prior to Encounter   Medication Sig Dispense Refill     calcium citrate (CALCITRATE) 200 mg (950 mg) tablet Take 1 tablet by mouth daily. Patient reports taking 1500mg PO daily       cholecalciferol, vitamin D3, 2,000 unit capsule Take 2,000 Units by mouth daily.       cyclobenzaprine (FLEXERIL) 10 MG tablet Take 0.5-1 tablets (5-10 mg total) by mouth 3 (three) times a day as needed for muscle spasms. 30 tablet 1     eletriptan (RELPAX) 40 MG tablet TK 1 T PO PRF HEADACHE AT ONSET OF MIGRAINE. MAY REPEAT IN 2 H IF NEEDED. MAS 2 / DAY AND 4 DAYS / MONTH.  3     FLUoxetine (PROZAC) 40 MG capsule Take 40 mg by mouth daily.       gabapentin (NEURONTIN) 300 MG capsule Take 2 capsules by mouth at bedtime.  5     metroNIDAZOLE (METROCREAM) 0.75 % cream Apply 2 times daily as needed       multivitamin with minerals (THERA-M) 9 mg iron-400 mcg Tab tablet Take 1 tablet by mouth daily.       omeprazole (PRILOSEC) 40 MG capsule Take 40 mg by mouth as needed.              rizatriptan (MAXALT-MLT) 10 MG disintegrating tablet DIS ONE T PO PRF HEADACHE AT ONSET OF MIGRAINE. MAY REPEAT IN 2 H IF NEEDED. MAX 30MG / 24 H AND 5 DAYS / MONTH.  3     topiramate (TOPAMAX) 50 MG tablet Take 50 mg by mouth 2 (two) times a day.       [DISCONTINUED] acetaminophen-codeine (TYLENOL #3) 300-30 mg per tablet Take 1-2 tablets by mouth every 6 (six) hours as needed for pain. 15 tablet 0     celecoxib (CELEBREX) 200 MG capsule Take 200 mg by mouth 2 (two) times a day as needed.       HYDROcodone-acetaminophen (HYCET) 5-216.7 mg/10 mL Take 10 mL by mouth every 6 (six) hours as needed for pain. 118 mL 0     methylPREDNISolone (MEDROL DOSEPACK) 4 mg tablet Follow  "package directions 21 tablet 0     No current facility-administered medications on file prior to encounter.        Allergies   Allergen Reactions     Cephalexin Hives     Other Allergy (See Comments) Anaphylaxis     Dragon Fruit      Compazine [Prochlorperazine Edisylate]      Penicillins      Reglan [Metoclopramide Hcl]        Past Medical History:   Diagnosis Date     Kidney infection      Migraine      Pancreatitis      UTI (lower urinary tract infection)         Review of Systems  ROS: Positive for numbness and tingling and weakness, headache, increased pain in the evening hours.  Specifically negative for bowel/bladder dysfunction, balance changes, dizziness, foot drop, fevers, chills, appetite changes, nausea/vomiting, unexplained weight loss. Otherwise 13 systems reviewed are negative. Please see the patient's intake questionnaire from today for details.    Reviewed Social, Family, Past Medical and Past Surgical history with patient, no significant changes noted since prior visit.     Objective:     BP (!) 130/91 (Patient Site: Right Arm, Patient Position: Sitting, Cuff Size: Adult Regular)   Pulse 97   Temp 98.2  F (36.8  C) (Oral)   Ht 5' 4\" (1.626 m)   Wt 180 lb (81.6 kg)   LMP 04/03/2015   BMI 30.90 kg/m      PHYSICAL EXAMINATION:    --CONSTITUTIONAL: Well developed, well nourished, healthy appearing individual.  --PSYCHIATRIC: Appropriate mood and affect. No difficulty interacting due to temper, social withdrawal, or memory issues.  --SKIN: Lumbar region is dry and intact.   --RESPIRATORY: Normal rhythm and effort. No abnormal accessory muscle breathing patterns noted.   --MUSCULOSKELETAL:  Normal lumbar lordosis noted, no lateral shift.  --GROSS MOTOR: Easily arises from a seated position. Gait is non-antalgic  --LUMBAR SPINE:  Inspection reveals no evidence of deformity. Range of motion is not limited in lumbar flexion, extension, lateral rotation.  Tenderness to palpation L4-5 and L5-S1 midline " and bilaterally. Straight leg raising in the supine position is negative to radicular pain however does irritate on the right into the right low back. Sciatic notch non-tender.   --SACROILIAC JOINT: Negative distraction.  Negative Zeb's with reproduction of pain to affected extremity. One Finger point test negative.  --LOWER EXTREMITY MOTOR TESTING:  Plantar flexion left 5/5, right 5/5   Dorsiflexion left 5/5, right 5/5   Great toe MTP extension left 5/5, right 5/5  Knee flexion left 5/5, right 5/5  Knee extension left 5/5, right 5/5   Hip flexion left 5/5, right 5/5  Hip abduction left 5/5, right 5/5  Hip adduction left 5/5, right 5/5   --HIPS: Full range of motion bilaterally, however slight increase in pain with all range of motion on the right. Negative FABERs on the involved lower extremity.   --NEUROLOGIC: Bilateral patellar and achilles reflexes are physiologic and symmetric. Sensation to light touch is intact in the bilateral L4, L5, and S1 dermatomes.    RESULTS:   Imaging: Lumbar spine imaging was reviewed today. The images were shown to the patient and the findings were explained using a spine model.      MRI LUMBAR SPINE W/O CONTRAST  LOCATION: [facility]  DATE/TIME: 5/8/2020 11:00 AM  INDICATION: Lumbar spine pain.  COMPARISON: Lumbar MRI 02/28/2018. Lumbar radiograph 10/09/2019.  TECHNIQUE: Without IV contrast  FINDINGS:  Nomenclature is based on 5 lumbar type vertebral bodies. Normal vertebral body heights and alignment. Intraosseous hemangioma posteriorly at L2. Marrow signal intensity is otherwise unremarkable. Normal distal spinal cord and cauda equina with conus medullaris at mid L2. Round T2 hyperintensity within the inferior pole of the right kidney, most consistent with a cyst. Unremarkable visualized bony pelvis.  T12-L1: Mild disc space narrowing with mild anterior marginal osteophyte. No herniation. Normal facets. No spinal canal or foraminal stenosis.  L1-L2: Mild disc  space narrowing with mild disc bulge and marginal osteophyte. Mild facet arthropathy. No spinal canal or foraminal stenosis.  L2-L3: Normal disc height without herniation. Mild facet arthropathy. No spinal canal or foraminal stenosis.  L3-L4: Normal disc height without herniation. Mild facet arthropathy. No spinal canal or foraminal stenosis.  L4-L5: Normal disc height without herniation. Minimal disc bulge. Minimal facet arthropathy. No spinal canal or foraminal stenosis.  L5-S1: Mild disc space narrowing with mild disc bulge and broad-based left subarticular/foraminal disc protrusion. Unremarkable facets. No spinal canal stenosis. Mild left lateral recess stenosis and mild left foraminal stenosis. No right foraminal stenosis.  IMPRESSION:  1. Left posterolateral osteophyte at L5-S1 with broad-based disc protrusion causes mild left lateral recess stenosis and minimal left foraminal stenosis.  2. Mild spondylosis at other levels without spinal canal or foraminal stenosis.        MR CERVICAL SPINE WITHOUT CONTRAST 1.5T  [facility] 10/9/2019  CLINICAL INFORMATION: Neck, bilateral trapezius and bilateral arm pain with tingling and numbness left greater than right. No injury.  TECHNICAL INFORMATION: T1, T2 GRE, T2 FSE and STIR sagittal thin sections with T2 GRE and FSE axial sections at selected levels.  COMPARISON IMAGES: No comparisons.  INTERPRETATION: Normal signal intensity within the cervical and upper thoracic cord. No Chiari malformation or syrinx, no intrinsic cord lesion and no intradural mass. Normal vertebral artery flow voids.  Cervical and upper thoracic spondylosis with lordotic alignment of the cervical vertebrae. No posterior ligamentous injury and no spinous process avulsion.  T3-4: Moderate disc degeneration with dorsal and right lateral bulging, normal facet joints and no stenosis or impingement.  T2-3 and T1-2: Mild disc degeneration with normal facet joints and no stenosis or impingement.  C7-T1: Mild  disc degeneration with a 2 mm broad-based right paracentral disc protrusion, normal facet joints and no stenosis or impingement.  C6-7: Mild dorsal bulging with mild narrowing of the central canal. Normal facet joints and patent neural foramina.  C5-6: Mild disc degeneration with a 1-2 mm left paracentral disc protrusion, mild to moderate narrowing of the central canal (midline AP diameter 8 mm) and mild left ventral cord compression. Neural foramina patent with uncovertebral arthrosis on the left and normal facet joints.  C4-5: Dorsal disc margins unremarkable with normal facet joints and no stenosis or impingement.  C3-4: Uncovertebral arthrosis on the right with normal facet joints and no stenosis or impingement.  C2-3: Normal intervertebral disc and facet joints. No stenosis or impingement.  No degenerative or erosive changes within the atlantoaxial or cervico-occipital joints.  Normal signal intensity with the vertebral marrow spaces. No destructive bone lesion and no paraspinous mass.  CONCLUSION:  1. C5-6 disc degeneration with a 1-2 mm left paracentral disc protrusion and mild left ventral cord compression.  2. Mild central canal stenosis at C6-7.  3. 2 mm right paracentral disc protrusion at C7-T1 without cord impingement.  4. No intrinsic cord abnormality and no intradural mass.  5. No neoplasm, fracture or infection.        Ct Abdomen Pelvis Without Oral With Iv Contrast  Result Date: 9/26/2019  Musculoskeletal: Minimal degenerative changes in the spine.   CONCLUSION:   1.  Interval development of minimal inferior displacement of the gastric band creating a small pouch with residual fluid material involving in the upper stomach. Band is more inferior than typically placed and therefore causing left upper quadrant or epigastric pain given the distention with food material the upper quarter stomach. No distal esophageal dilatation. Recommend consideration of follow-up surgical consultation. It also may be  beneficial to further follow-up with a upper GI evaluation to evaluate drainage through the gastric band given retention of food material. Distal stomach unremarkable.   2.  Diffuse fatty infiltration of the liver.   3.  Normal size spleen. No evidence for pancreatitis or left renal hydronephrosis as a cause of left upper quadrant pain.                    MR LUMBAR SPINE WO IV CONT -[facility] everywhere  2/28/2018   INDICATION: Continual back pain.  TECHNIQUE: Without IV contrast.  CONTRAST: None.  COMPARISON: 02/22/2017 CT abdomen pelvis provides partial comparison.  FINDINGS: 5 lumbar type vertebrae with a rudimentary S1-S2 disc space.  Exaggerated lumbar lordosis. Slight rightward curvature to the lower   Thoracic and upper lumbar spine. Minimal retrolisthesis of L5 on S1. Small Schmorl's nodes along the superior and inferior endplates of T12 and L1. Vertebral Body heights otherwise maintained. Small osseous hemangioma versus prominent Focal fat at the posterior aspect of L2. Small amount of type II Modic endplate degenerative change at the anterior inferior aspect of L1. Marrow signal otherwise unremarkable. No pars defect. The conus tip is identified at L2. 1.4 cm presumed cyst in the right kidney. No abdominal aortic aneurysm. The visualized portions of the bony pelvis are normal for age.  T12-L1: Moderate loss of disc height with mild loss of disc signal and no  significant posterior disc bulge. No significant facet arthropathy. Spinal Canal and neural foramina are widely patent.  L1-L2: Mild to moderate loss of disc height with an unremarkable disc   signal and no significant posterior disc bulge. Facet arthropathy is minimal. Spinal canal and neural foramina are widely patent.  L2-L3: Normal disc height and signal with no posterior disc bulge. Facet  arthropathy is minimal. Spinal canal and neural foramina are widely patent.  L3-L4: Normal disc height and signal with no posterior disc bulge and  only minimal bilateral facet arthropathy. Spinal canal and neural foramina are Widely patent.  L4-L5: Normal disc height and signal with no posterior disc bulge and no  significant facet arthropathy. Spinal canal and neural foramina are widely patent.  L5-S1: Mild loss of disc height. Mild loss of disc signal. Very small   Diffuse posterior disc bulge with a left foraminal predominance. Mild right-sided Facet arthropathy. Spinal canal and right neural foramen are patent. Mild/minimal left neural foraminal stenosis.  CONCLUSION:  1.  Degenerative disc disease is moderate at the T12-L1 level, mild to   Moderate at the L1-L2 level, and mild at the L5-S1 level. There is no significant posterior disc bulge at any level, and no significant spinal canal or Neural foraminal stenosis. Facet arthropathy is predominantly minimal, with only mild right-sided facet arthropathy at L5-S1.        Cervical CT scan without contrast-[facility] care everywhere  05/19/2017  8:23 PM CDT  Clinical History: Migraine headache, fall recently, trauma, neck pain.   Technique: Cervical spine CT without contrast. Helical acquisition with multiplanar reconstructions. This CT scan used dose modulation, iterative reconstruction, and/or weight based dosing when appropriate to reduce radiation dose to as low as reasonably achievable.  Comparison: None.  Findings: Cervical vertebral bodies normal in height and alignment. Odontoid process intact. Modest cervical degenerative changes. Posterior disc bulging or shallow protrusion C5-C6 appears to have mild effect upon the ventral aspect of the cord. No acute cervical fracture or subluxation.  Impression:  1. No acute cervical fracture or subluxation.  2. Modest lower cervical degenerative changes most notably C5-C6 where there is posterior disc bulging or shallow protrusion which appears to have mild effect upon the ventral cord.  [facility]

## 2021-06-08 NOTE — TELEPHONE ENCOUNTER
Discussed results and recommendations with pt. Pt stated understanding. She will schedule follow-up appt with Dr. Bay.

## 2021-06-08 NOTE — TELEPHONE ENCOUNTER
Patient asking about getting another injection for carpal tunnel. States had them done bilaterally in January, but only would need for the left side this time. Reports she spoke to [facility] at clinic last week, but no order was placed. She is aware [facility] is not in clinic until next week.    Explained [facility]SP or covering provider would be notified of her request.

## 2021-06-08 NOTE — TELEPHONE ENCOUNTER
Please contact the patient and inform her that the MRI of the lumbar spine was reviewed.  This shows really minimal degenerative changes.  At L5-S1 there is a small left sided disc bulge which may contact the left S1 nerve.  This is small and she does not have any significant disc herniations or significant stenosis.      Recommend she schedule a video visit follow-up to review images and discuss options.

## 2021-06-08 NOTE — PATIENT INSTRUCTIONS - HE
Follow-up virtual visit with Esme Hodgson in 2 weeks to discuss injection outcome and determine care plan going forward.       DISCHARGE INSTRUCTIONS    During office hours (8:00 a.m.- 4:00 p.m.) questions or concerns may be answered  by calling [facility] Nurses at  [phone].  Messages received after hours will be returned the following business day.      In the case of an emergency, please dial 911 or seek assistance at the nearest Emergency Room/Urgent Care facility.     All Patients:    ? You may experience an increase in your symptoms for the first 2 days (It may take anywhere between 2 days- 2 weeks for the steroid to have maximum effect).    ? You may use ice on the injection site, as frequently as 20 minutes each hour if needed.    ? You may take your pain medicine.    ? You may continue taking your regular medication after your injection. If you have had a Medial Branch Block you may resume pain medication once your pain diary is completed.    ? You may shower. No swimming, tub bath or hot tub for 48 hours.  You may remove your bandaid/bandage as soon as you are home.    ? You may resume light activities, as tolerated.    ? Resume your usual diet as tolerated.    ? It is strongly advised that you do not drive for 1-3 hours post injection.    ? If you have had oral sedation:  Do not drive for 8 hours post injection.      ? If you have had IV sedation:  Do not drive for 24 hours post injection.  Do not operate hazardous machinery or make important personal/business decisions for 24 hours.      POSSIBLE STEROID SIDE EFFECTS (If steroid/cortisone was used for your procedure)    -If you experience these symptoms, it should only last for a short period      Swelling of the legs                Skin redness (flushing)       Mouth (oral) irritation     Blood sugar (glucose) levels              Sweats                      Mood changes    Headache    Sleeplessness         POSSIBLE PROCEDURE SIDE  EFFECTS  -Call the Spine Center if you are concerned    Increased Pain             Increased numbness/tingling        Nausea/Vomiting            Bruising/bleeding at site        Redness or swelling                                                Difficulty walking        Weakness             Fever greater than 100.5    *In the event of a severe headache after an epidural steroid injection that is relieved by lying down, please call the [facility] to speak with a clinical staff member*

## 2021-06-12 NOTE — PROGRESS NOTES
Migraine: 3 days, tension type migraine.  Regular migraine medications are not working, took all of them.  Cannot take NSAIDs due to bariatric surgery.  Toradol has worked in the past, can take injections of NSAIDS, not orally. Same as usual migraines in past.  Migraines requiring toradol occur every 6 months.      Urinary symptoms include burning, frequency, suprapubic pain.  Denies flank pain.      Currently on clindamycin, started 4 days ago for dental issues.  Has taken nitrofurontin in past for UTIs and has resolved symptoms.  Denies discharge, fevers.

## 2021-06-12 NOTE — TELEPHONE ENCOUNTER
Frankie Victoria,    Please advise pt's mychart message. Did you want her to F/U in clinic or order the injection? Thanks!    1/13/20 B/L carpal tunnel injection with Dr. Zaragoza   5/14/20 left carpal tunnel injection with Dr. Zaragoza

## 2021-06-13 NOTE — PROGRESS NOTES
NEUROSURGERY CONSULTATION NOTE    2020     Madeline Szymanski is a 40 y.o. female who is sent to us in consultation by Tre Jj    for evaluation of lumbar radiculopathy.         CONSULTATION ASSESSMENT AND PLAN:    41 yo female who presents with b/l leg pain and right leg numnbess. Left leg pain appears to be in L5 distribution. Right leg pain more diffuse. MRI lumbar spine shows mild L5-S1 far lateral stenosis on left.  Recommend CT with thin cuts through L5-S1 level and EMG of b/l legs. Return to clinic after EMG and CT.     I spent more than 30 minutes in this apt, examining the pt, reviewing the scans, reviewing notes from chart, discussing treatment options with risks and benefits and coordinating care. >50 % clinic time was spent in face to face counseling and coordinating care    Radha Lilly     HPI:  41 yo female who presents with b/l leg pain and right leg numnbess. Pain began almost 1 week ago. Left leg pain goes posterolateral to her knee and right leg pain goes to right groin and down lateral leg to her toes diffusely. Numbness on right side of lateral thigh and medial and lateral calves and foot. When numbness is severe will feel weak otherwise no jesus alberto weakness. Some gait issues when has the numbness. No bowel or bladder dysfunction. Sitting worsens symptoms and walking improves symptoms. Sleeping on the right side worsens her symptoms     B/l L5-S1 TEFSI on 6/3/20 worsened symptoms. Had RFA in the past with 8 months of relief. Chiropractor intermittently with relief when has symptoms. Medrol dose pack and gabapentin has helped in the past.     Prior Spine Surgery:no    Past Medical History:   Diagnosis Date     Kidney infection      Migraine      Pancreatitis      UTI (lower urinary tract infection)      Past Surgical History:   Procedure Laterality Date      SECTION         REVIEW OF SYSTEMS:  ROS reviewed with pt as documented on pt health form of 2020.         MEDICATIONS:  Current Outpatient Medications   Medication Sig Dispense Refill     cyclobenzaprine (FLEXERIL) 10 MG tablet Take 0.5-1 tablets (5-10 mg total) by mouth 3 (three) times a day as needed for muscle spasms. 30 tablet 1     eletriptan (RELPAX) 40 MG tablet TK 1 T PO PRF HEADACHE AT ONSET OF MIGRAINE. MAY REPEAT IN 2 H IF NEEDED. MAS 2 / DAY AND 4 DAYS / MONTH.  3     gabapentin (NEURONTIN) 300 MG capsule Take 2 capsules by mouth at bedtime.  5     lidocaine (LIDODERM) 5 % PLACE 1 PATCH ON THE SKIN D. REMOVE AND DISSCARDE PATCH WITHIN 12 H OR UTD BY MD       multivitamin with minerals (THERA-M) 9 mg iron-400 mcg Tab tablet Take 1 tablet by mouth daily.       rizatriptan (MAXALT-MLT) 10 MG disintegrating tablet DIS ONE T PO PRF HEADACHE AT ONSET OF MIGRAINE. MAY REPEAT IN 2 H IF NEEDED. MAX 30MG / 24 H AND 5 DAYS / MONTH.  3     topiramate (TOPAMAX) 50 MG tablet Take 50 mg by mouth 2 (two) times a day.       No current facility-administered medications for this visit.          ALLERGIES/SENSITIVITIES:     Allergies   Allergen Reactions     Cephalexin Hives     Other Allergy (See Comments) Anaphylaxis     Dragon Fruit      Coconut Unknown     Compazine [Prochlorperazine Edisylate]      Penicillins      Reglan [Metoclopramide Hcl]        PERTINENT SOCIAL HISTORY:   Social History     Socioeconomic History     Marital status:      Spouse name: None     Number of children: None     Years of education: None     Highest education level: None   Occupational History     None   Social Needs     Financial resource strain: None     Food insecurity     Worry: None     Inability: None     Transportation needs     Medical: None     Non-medical: None   Tobacco Use     Smoking status: Never Smoker     Smokeless tobacco: Never Used   Substance and Sexual Activity     Alcohol use: No     Drug use: None     Sexual activity: None   Lifestyle     Physical activity     Days per week: None     Minutes per session: None      "Stress: None   Relationships     Social connections     Talks on phone: None     Gets together: None     Attends Voodoo service: None     Active member of club or organization: None     Attends meetings of clubs or organizations: None     Relationship status: None     Intimate partner violence     Fear of current or ex partner: None     Emotionally abused: None     Physically abused: None     Forced sexual activity: None   Other Topics Concern     None   Social History Narrative     None         FAMILY HISTORY:  History reviewed. No pertinent family history.     PHYSICAL EXAM:   Constitutional: /84   Pulse 84   Resp 16   Ht 5' 4\" (1.626 m)   Wt 171 lb (77.6 kg)   LMP 04/03/2015   SpO2 98%   BMI 29.35 kg/m       Mental Status: A & O in no acute distress.  Affect is appropriate.  Speech is fluent.  Recent and remote memory are intact.  Attention span and concentration are normal.     Motor:  Normal bulk and tone all muscle groups of upper and lower extremities.      Sensory: Sensation intact bilaterally to light touch diminished ditally below knee to LT on right     Coordination:  Heel/toe/  gait intact. Intact   tandem gait      Reflexes; supinator, biceps, triceps, knee/ ankle jerk intact. no hoffmans/ no    babinski/ clonus.    IMAGING: I personally reviewed all radiographic images      Cc:   Tre Jj MD  [address] [facility] [address]  "

## 2021-06-13 NOTE — PROGRESS NOTES
Neurosurgery consultation was requested by: Self   Pain: Low back   Radicular Pain is present: Right leg and groin on right  Motor complaints: No weakness   Sensory complaints: Numbness right leg to toes   Gait and balance issues: Some trouble walking with pain/numbness   Bowel or bladder issues: None   Duration of SX is: 1 week   The symptoms are worse with: Sitting   The symptoms are better with: Walking   Injury: None   Severity is:   Patient has tried the following conservative measures: Chiropractor, Injection, Radio frequency ablation   Lucy Tineo,[facility],8:53 AM     Modified Oswestry Low back Pain Disability Questionnaire    1. PAIN INTENSITY  1- The pain is bad, but I manage without taking pain medication.  2. PERSONAL CARE  1- I can take care of myself normally, but it increases my pain.   3. LIFTING  1- I can lift heavy weights, but it gives me extra pain  4. WALKING  0- Pain does not prevent me from walking any distance  5. SITTING  4- Pain prevents me from sitting more than 10 minutes.                                                                          6. STANDING  1- I can stand as long as I want, but it increases my pain.  7. SLEEPING  2- Even when I take medication, I sleep less than 6 hours.  8. SOCIAL LIFE  2- Pain prevents me from participating in more energetic activities (e.g., sports, dancing).  9. TRAVELING  3- My pain restricts my travel over 1 hour.  10. EMPLOYMENT/HOMEMAKING  1- My normal homemaking/job activities increase my pain, but I can still perform all that is required of me.    SCORE:16 x2=32%

## 2021-06-13 NOTE — PATIENT INSTRUCTIONS - HE
DISCHARGE INSTRUCTIONS    During office hours (8:00 a.m.- 4:00 p.m.) questions or concerns may be answered  by calling [facility] Nurses at  [phone].  Messages received after hours will be returned the following business day.      In the case of an emergency, please dial 911 or seek assistance at the nearest Emergency Room/Urgent Care facility.     All Patients:    ? You may experience an increase in your symptoms for the first 2 days (It may take anywhere between 2 days- 2 weeks for the steroid to have maximum effect).    ? You may use ice on the injection site, as frequently as 20 minutes each hour if needed.    ? You may take your pain medicine.    ? You may continue taking your regular medication after your injection.    ? You may shower. No swimming, tub bath or hot tub for 48 hours.  You may remove your bandaid/bandage as soon as you are home.    ? You may resume light activities, as tolerated.    ? Resume your usual diet as tolerated.      POSSIBLE STEROID SIDE EFFECTS (If steroid/cortisone was used for your procedure)    -If you experience these symptoms, it should only last for a short period      Swelling of the legs                Skin redness (flushing)       Mouth (oral) irritation     Blood sugar (glucose) levels              Sweats                      Mood changes    Headache    Sleeplessness    Weakened immune system for up to 14 days, which could increase the risk of manuel the COVID-19 virus and/or experiencing more severe symptoms of the disease, if exposed.    Decreased effectiveness of the flu vaccine if given within 2 weeks of the steroid.         POSSIBLE PROCEDURE SIDE EFFECTS  -Call the Spine Center if you are concerned    Increased Pain             Increased numbness/tingling        Nausea/Vomiting            Bruising/bleeding at site        Redness or swelling                                                Difficulty walking        Weakness             Fever  greater than 100.5

## 2021-06-17 NOTE — PATIENT INSTRUCTIONS - HE
Patient Instructions by Zheng Mesa PT at 11/1/2019  7:00 AM     Author: Zheng Mesa PT Service: -- Author Type: Physical Therapist    Filed: 11/1/2019  7:34 AM Encounter Date: 11/1/2019 Status: Signed    : Zheng Mesa PT (Physical Therapist)        DOORWAY STRETCH - HIGH    While standing in a doorway, place your arms up on the door frame and lean in until a stretch is felt along the front of your chest and/or shoulders. Your upper arms should be placed upward along the door frame.     NOTE: Your legs should control how much you stretch by bending or straightening your knee through the doorway.    Hold 30 seconds.  2-3x/day      LEVATOR SCAPULAE STRETCH    Place the arm on the affected side behind your back and use your other hand to draw your head downward and towards the opposite side.     You should be looking towards your opposite pocket of the affected side.    Hold 30 seconds.  2-3x/day      UPPER TRAP STRETCH    Begin by retracting your head back into a chin tuck position. Next, place one hand behind your back and gently draw your head towards the opposite side with the help of your other arm.    Hold 30 seconds.  2-3x/day         SEATED PIRIFORMIS STRETCH    While sitting in a chair, cross your leg with the ankle of one foot on the knee of the other.    Next, pull the top knee upward towards your opposite shoulder for a stretch.    Hold 30 seconds.  2-3x/day

## 2021-06-17 NOTE — PATIENT INSTRUCTIONS - HE
Follow-up visit as needed to discuss injection outcome and determine care plan going forward.     DISCHARGE INSTRUCTIONS    During office hours (8:00 a.m.- 4:00 p.m.) questions or concerns may be answered  by calling [facility] Nurses at  [phone].  Messages received after hours will be returned the following business day.      In the case of an emergency, please dial 911 or seek assistance at the nearest Emergency Room/Urgent Care facility.     All Patients:    ? You may experience an increase in your symptoms for the first 2 days (It may take anywhere between 2 days- 2 weeks for the steroid to have maximum effect).    ? You may use ice on the injection site, as frequently as 20 minutes each hour if needed.    ? You may take your pain medicine.    ? You may continue taking your regular medication after your injection. If you have had a Medial Branch Block you may resume pain medication once your pain diary is completed.    ? You may shower. No swimming, tub bath or hot tub for 48 hours.  You may remove your bandaid/bandage as soon as you are home.    ? You may resume light activities, as tolerated.    ? Resume your usual diet as tolerated.        POSSIBLE STEROID SIDE EFFECTS (If steroid/cortisone was used for your procedure)    -If you experience these symptoms, it should only last for a short period      Swelling of the legs                Skin redness (flushing)       Mouth (oral) irritation     Blood sugar (glucose) levels              Sweats                      Mood changes    Headache    Sleeplessness    Weakened immune system for up to 14 days, which could increase the risk of manuel the COVID-19 virus and/or experiencing more severe symptoms of the disease, if exposed.    Decreased effectiveness of the flu vaccine if given within 2 weeks of the steroid.         POSSIBLE PROCEDURE SIDE EFFECTS  -Call the Spine Center if you are concerned    Increased Pain             Increased  numbness/tingling        Nausea/Vomiting            Bruising/bleeding at site        Redness or swelling                                                Difficulty walking        Weakness             Fever greater than 100.5    *In the event of a severe headache after an epidural steroid injection that is relieved by lying down, please call the [facility] to speak with a clinical staff member*

## 2021-06-18 NOTE — PATIENT INSTRUCTIONS - HE
Patient Instructions by Rosalind Brito at 10/25/2020  9:00 AM     Author: Rosalind Brito Service: -- Author Type: Medical Student    Filed: 10/25/2020 10:27 AM Encounter Date: 10/25/2020 Status: Signed    : Rosalind Brito (Medical Student)       Patient Education     Follow-up for UTI in 2 to 3 days if still having symptoms.  Follow-up sooner if you develop a fever or abdominal pain worsens.    If migraine symptoms continue tomorrow please follow-up with your primary care provider.  If symptoms worsen significantly today you can follow-up in the emergency department.    Migraine Headache  Urinary Tract Infections in Women    Urinary tract infections (UTIs) are most often caused by bacteria. These bacteria enter the urinary tract. The bacteria may come from inside the body. Or they may travel from the skin outside the rectum or vagina into the urethra. Female anatomy makes it easy for bacteria from the bowel to enter a womans urinary tract, which is the most common source of UTI. This means women develop UTIs more often than men. Pain in or around the urinary tract is a common UTI symptom. But the only way to know for sure if you have a UTI for the healthcare provider to test your urine. The two tests that may be done are the urinalysis and urine culture.  Types of UTIs    Cystitis. A bladder infection (cystitis) is the most common UTI in women. You may have urgent or frequent need to pee. You may also have pain, burning when you pee, and bloody urine.    Urethritis. This is an inflamed urethra, which is the tube that carries urine from the bladder to outside the body. You may have lower stomach or back pain. You may also have urgent or frequent need to pee.    Pyelonephritis. This is a kidney infection. If not treated, it can be serious and damage your kidneys. In severe cases, you may need to stay in the hospital. You may have a fever and lower back pain.    Medicines to treat a UTI  Most UTIs are treated with  antibiotics. These kill the bacteria. The length of time you need to take them depends on the type of infection. It may be as short as 3 days. If you have repeated UTIs, you may need a low-dose antibiotic for several months. Take antibiotics exactly as directed. Dont stop taking them until all of the medicine is gone. If you stop taking the antibiotic too soon, the infection may not go away. You may also develop a resistance to the antibiotic. This can make it much harder to treat.  Lifestyle changes to treat and prevent UTIs  The lifestyle changes below will help get rid of your UTI. They may also help prevent future UTIs.    Drink plenty of fluids. This includes water, juice, or other caffeine-free drinks. Fluids help flush bacteria out of your body.    Empty your bladder. Always empty your bladder when you feel the urge to pee. And always pee before going to sleep. Urine that stays in your bladder can lead to infection. Try to pee before and after sex as well.    Practice good personal hygiene. Wipe yourself from front to back after using the toilet. This helps keep bacteria from getting into the urethra.    Use condoms during sex. These help prevent UTIs caused by sexually transmitted bacteria. Also don't use spermicides during sex. These can increase the risk for UTIs. Choose other forms of birth control instead. For women who tend to get UTIs after sex, a low-dose of a preventive antibiotic may be used. Be sure to discuss this option with your healthcare provider.    Follow up with your healthcare provider as directed. He or she may test to make sure the infection has cleared. If needed, more treatment may be started.  Date Last Reviewed: 7/1/2019 2000-2019 [employer]. [address] [place] [address], [place] [address]. All rights reserved. This information is not intended as a substitute for professional medical care. Always follow your healthcare professional's instructions.           This often  severe type of headache is different from other types of headaches in that symptoms other than pain occur with the headache. Nausea and vomiting, lightheadedness, sensitivity to light (photophobia), and other visual disturbances are common migraine symptoms. The pain may last from a few hours to several days. It is not clear why migraines occur but certain factors called triggers can raise the risk of having a migraine attack. A migraine may be triggered by emotional stress or depression, or by hormone changes during the menstrual cycle. Other triggers include birth control pills, overuse of migraine medicines, alcohol or caffeine, foods with tyramine (such as aged cheese and wine), eyestrain, weather changes, missed meals, or too little or too much sleep.  Home care  Follow these tips when taking care of yourself at home:    Dont drive yourself home if you were given pain medicine for your headache or are having visual symptoms. Instead, have someone else drive you home. Try to sleep when you get home. You should feel much better when you wake up.    Cold can help ease migraine symptoms. Put an ice pack on your forehead or at the base of your skull. Put heat on the back of your neck to help ease any neck spasm.    Drink only clear liquids or eat a light diet until your symptoms get better. This will help you avoid nausea and vomiting.  How to prevent migraines  Pay attention to what seems to trigger your headache. Try to avoid the triggers when you can. If you have frequent headaches, consider keeping a headache diary. In it, write down what you were doing, feeling, or eating in the hours before each headache. Show this to your healthcare provider to help find the cause of your headaches.  If stress seems to be a trigger for your headaches, figure out what is causing stress in your life. Learn new ways to handle your stress. Ideas include regular exercise, biofeedback, self-hypnosis, yoga, and meditation. Talk with  your healthcare provider to find out more information about managing stress. Many books and digital media are also available on this subject.  Tyramine is a substance found in many foods. It can trigger a migraine in some people. These foods contain tyramine:    Chocolate    Yogurt    All cheeses, but especially aged cheeses    Smoked or pickled fish and meat, including herring, caviar, bologna, pepperoni, and salami    Liver    Avocados    Bananas    Figs    Raisins    Red wine  Try staying away from these foods for 1 to 2 months to see if you have fewer headaches.  How to treat future headaches    Take time out at the first sign of a headache, if possible. Find a quiet, dark, comfortable place to sit or lie down. Let yourself relax or sleep.    Put an ice pack on your forehead or on the area of greatest pain. A heating pad and massage may help if you are having a muscle spasm and tightness in your neck.    If you have been prescribed a medicine to stop a migraine headache, use this at the first warning sign of the headache for best results. First signs may be an aura or pain.    If you need to take medicine often for your migraine, talk with your healthcare provider about other ways to prevent your headaches.  Follow-up care  Follow up with your healthcare provider, or as advised. Talk with your provider if you have frequent headaches. He or she can figure out a treatment plan. Ask if you can have medicine to take at home the next time you get a bad headache. This may keep you from having to visit the emergency department in the future. You may need to see a headache specialist (neurologist) if you continue to have headaches.  When to seek medical advice  Call your healthcare provider right away if any of these occur:    Your head pain gets worse, or doesnt get better within 24 hours    You cant keep liquids down (repeated vomiting)    Pain in your sinuses, ears, or throat    Fever of 100.4  F (38  C) or higher,  or as directed by your healthcare provider    Stiff neck    Extreme drowsiness, confusion, or fainting    Dizziness, or dizziness with spinning sensation (vertigo)    Weakness in an arm or leg, or on one side of your face    Difficulty talking or seeing  Date Last Reviewed: 8/1/2016 2000-2017 [employer]. [address] [place] [address]. All rights reserved. This information is not intended as a substitute for professional medical care. Always follow your healthcare professional's instructions.

## 2021-06-19 NOTE — LETTER
Letter by Esme Hodgson CNP at      Author: Esme Hodgson CNP Service: -- Author Type: --    Filed:  Date of Service:  Status: Signed       October 9, 2019     Patient: Madeline Szymanski   YOB: 1980   Date of Visit: 10/9/2019       To Whom It May Concern:    It is my medical opinion that Madeline Szymanski Will greatly benefit from a sit to stand workstation due to ongoing back pain, to allow transitions throughout the day as needed/tolerated without interrupting her work.    If you have any questions or concerns, please don't hesitate to call.    Sincerely,        Esme Hodgson CNP

## 2021-06-29 NOTE — PROGRESS NOTES
Progress Notes by Stefanie Garcia CNP at 10/25/2020  9:00 AM     Author: Stefanie Garcia CNP Service: -- Author Type: Nurse Practitioner    Filed: 10/25/2020  4:08 PM Encounter Date: 10/25/2020 Status: Signed    : Stefanie Garcia CNP (Nurse Practitioner)       Chief Complaint   Patient presents with   ? Urinary Tract Infection     painful urniation x 1 week- patient on antibiotics for teeth    ? Migraine     x 3 days        ASSESSMENT & PLAN:   Diagnoses and all orders for this visit:    Urinary tract infection in female  -     nitrofurantoin, macrocrystal-monohydrate, (MACROBID) 100 MG capsule; Take 1 capsule (100 mg total) by mouth 2 (two) times a day for 5 days.  Dispense: 10 capsule; Refill: 0  -     Culture, Urine    Painful urination  -     Cancel: Urinalysis-UC if Indicated  -     Urine,Microscopic  -     Wet Prep, Vaginal    Intractable migraine with status migrainosus, unspecified migraine type  -     ketorolac injection 30 mg (TORADOL)        MDM:    Exam revealed suprapubic pain but no CVA tenderness or fever.  Symptoms consistent with UTI.      UA showed bacteria and 5-10 WBCs. Due to history of clindamycin for past 4 days, wet prep performed to rule out candida infection which was negative.  Hx of hysterectomy so no concern for PID.    Symptoms consistent with previous migraines.  30 mg ketoralac given IM in clinic as per routine, pt reported symptoms improved following administration.    Instructed to follow up with PCP if UTI symptoms do not improve in 3 days or if migraine symptoms not improved by tomorrow.      Supportive care discussed.  See discharge instructions below for specific recommendations given.    At the end of the encounter, I discussed results, diagnosis, medications. Discussed red flags for immediate return to clinic/ER, as well as indications for follow up if no improvement. Patient and/or caregiver understood and agreed to plan. Patient was stable for  "discharge.    SUBJECTIVE    HPI:  HPI  Madeline Szymanski presents to the walk-in clinic with   Chief Complaint   Patient presents with   ? Urinary Tract Infection     painful urniation x 1 week- patient on antibiotics for teeth    ? Migraine     x 3 days      UTI:  Reports increased frequency, pain and burning with urination, suprapubic pain x 1 week.  Has had UTIs in past and this feels like a UTI to her.  Has also had pyelonephritis before but this does not feel like it is \"that bad\" to her.  Due to allergies has had success with nitrofurotin in past.      Migraine: Migraine for past three days.  Took all of her migraine medications but cannot take PO NSAIDs due to bariatric surgery.  Has had to come to clinic before when symptoms do not improve for ketoralac injection.  Symptoms consistent with previous migraines.      Taking clindamycin for past 4 days due to dental problem.      Associated with: History of migraines.      Denies: Fevers, chills, flank tenderness.    See ROS for additional symptoms and/or pertinent negatives.       History obtained from the patient.    Past Medical History:   Diagnosis Date   ? Kidney infection    ? Migraine    ? Pancreatitis    ? UTI (lower urinary tract infection)        There are no active non-hospital problems to display for this patient.      No family history on file.    Social History     Tobacco Use   ? Smoking status: Never Smoker   ? Smokeless tobacco: Never Used   Substance Use Topics   ? Alcohol use: No       Review of Systems   Constitutional: Negative for appetite change, chills and fever.   HENT: Positive for dental problem.    Gastrointestinal: Negative for nausea and vomiting.   Genitourinary: Positive for dysuria, frequency and pelvic pain. Negative for decreased urine volume, flank pain, hematuria and vaginal discharge.   Musculoskeletal: Negative for back pain.   Neurological: Positive for headaches.       OBJECTIVE    Vitals:    10/25/20 0913   BP: 114/75 "   Pulse: 81   Resp: 12   Temp: 98.2  F (36.8  C)   TempSrc: Oral   SpO2: 97%       Physical Exam  Constitutional:       Appearance: Normal appearance.   HENT:      Head: Normocephalic.      Right Ear: External ear normal.      Left Ear: External ear normal.   Eyes:      General:         Right eye: No discharge.         Left eye: No discharge.      Conjunctiva/sclera: Conjunctivae normal.   Cardiovascular:      Pulses: Normal pulses.   Pulmonary:      Effort: Pulmonary effort is normal.   Abdominal:      General: There is no distension.      Palpations: Abdomen is soft. There is no mass.      Tenderness: There is abdominal tenderness. There is no right CVA tenderness, left CVA tenderness, guarding or rebound.      Hernia: No hernia is present.      Comments: Suprapubic tenderness to palpation     Musculoskeletal: Normal range of motion.   Skin:     General: Skin is warm and dry.   Neurological:      Mental Status: She is alert and oriented to person, place, and time.   Psychiatric:         Mood and Affect: Mood normal.         Behavior: Behavior normal.         Thought Content: Thought content normal.         Judgment: Judgment normal.         Labs:  Recent Results (from the past 240 hour(s))   Urine,Microscopic   Result Value Ref Range    Bacteria, UA Many (!) None Seen hpf    RBC, UA 0-2 None Seen, 0-2 hpf    WBC, UA 5-10 (!) None Seen, 0-5 hpf    Squam Epithel, UA 0-5 None Seen, 0-5 lpf    Mucus, UA Many (!) None Seen lpf   Wet Prep, Vaginal    Specimen: Genital   Result Value Ref Range    Yeast Result No yeast seen No yeast seen    Trichomonas No Trichomonas seen No Trichomonas seen    Clue Cells, Wet Prep No Clue cells seen No Clue cells seen         Radiology:    No results found.    PATIENT INSTRUCTIONS:   Patient Instructions   Patient Education     Follow-up for UTI in 2 to 3 days if still having symptoms.  Follow-up sooner if you develop a fever or abdominal pain worsens.    If migraine symptoms continue  tomorrow please follow-up with your primary care provider.  If symptoms worsen significantly today you can follow-up in the emergency department.    Migraine Headache  Urinary Tract Infections in Women    Urinary tract infections (UTIs) are most often caused by bacteria. These bacteria enter the urinary tract. The bacteria may come from inside the body. Or they may travel from the skin outside the rectum or vagina into the urethra. Female anatomy makes it easy for bacteria from the bowel to enter a womans urinary tract, which is the most common source of UTI. This means women develop UTIs more often than men. Pain in or around the urinary tract is a common UTI symptom. But the only way to know for sure if you have a UTI for the healthcare provider to test your urine. The two tests that may be done are the urinalysis and urine culture.  Types of UTIs    Cystitis. A bladder infection (cystitis) is the most common UTI in women. You may have urgent or frequent need to pee. You may also have pain, burning when you pee, and bloody urine.    Urethritis. This is an inflamed urethra, which is the tube that carries urine from the bladder to outside the body. You may have lower stomach or back pain. You may also have urgent or frequent need to pee.    Pyelonephritis. This is a kidney infection. If not treated, it can be serious and damage your kidneys. In severe cases, you may need to stay in the hospital. You may have a fever and lower back pain.    Medicines to treat a UTI  Most UTIs are treated with antibiotics. These kill the bacteria. The length of time you need to take them depends on the type of infection. It may be as short as 3 days. If you have repeated UTIs, you may need a low-dose antibiotic for several months. Take antibiotics exactly as directed. Dont stop taking them until all of the medicine is gone. If you stop taking the antibiotic too soon, the infection may not go away. You may also develop a resistance to  the antibiotic. This can make it much harder to treat.  Lifestyle changes to treat and prevent UTIs  The lifestyle changes below will help get rid of your UTI. They may also help prevent future UTIs.    Drink plenty of fluids. This includes water, juice, or other caffeine-free drinks. Fluids help flush bacteria out of your body.    Empty your bladder. Always empty your bladder when you feel the urge to pee. And always pee before going to sleep. Urine that stays in your bladder can lead to infection. Try to pee before and after sex as well.    Practice good personal hygiene. Wipe yourself from front to back after using the toilet. This helps keep bacteria from getting into the urethra.    Use condoms during sex. These help prevent UTIs caused by sexually transmitted bacteria. Also don't use spermicides during sex. These can increase the risk for UTIs. Choose other forms of birth control instead. For women who tend to get UTIs after sex, a low-dose of a preventive antibiotic may be used. Be sure to discuss this option with your healthcare provider.    Follow up with your healthcare provider as directed. He or she may test to make sure the infection has cleared. If needed, more treatment may be started.  Date Last Reviewed: 7/1/2019 2000-2019 [employer]. [address] [place] [address], [place] [address]. All rights reserved. This information is not intended as a substitute for professional medical care. Always follow your healthcare professional's instructions.           This often severe type of headache is different from other types of headaches in that symptoms other than pain occur with the headache. Nausea and vomiting, lightheadedness, sensitivity to light (photophobia), and other visual disturbances are common migraine symptoms. The pain may last from a few hours to several days. It is not clear why migraines occur but certain factors called triggers can raise the risk of having a migraine attack. A  migraine may be triggered by emotional stress or depression, or by hormone changes during the menstrual cycle. Other triggers include birth control pills, overuse of migraine medicines, alcohol or caffeine, foods with tyramine (such as aged cheese and wine), eyestrain, weather changes, missed meals, or too little or too much sleep.  Home care  Follow these tips when taking care of yourself at home:    Dont drive yourself home if you were given pain medicine for your headache or are having visual symptoms. Instead, have someone else drive you home. Try to sleep when you get home. You should feel much better when you wake up.    Cold can help ease migraine symptoms. Put an ice pack on your forehead or at the base of your skull. Put heat on the back of your neck to help ease any neck spasm.    Drink only clear liquids or eat a light diet until your symptoms get better. This will help you avoid nausea and vomiting.  How to prevent migraines  Pay attention to what seems to trigger your headache. Try to avoid the triggers when you can. If you have frequent headaches, consider keeping a headache diary. In it, write down what you were doing, feeling, or eating in the hours before each headache. Show this to your healthcare provider to help find the cause of your headaches.  If stress seems to be a trigger for your headaches, figure out what is causing stress in your life. Learn new ways to handle your stress. Ideas include regular exercise, biofeedback, self-hypnosis, yoga, and meditation. Talk with your healthcare provider to find out more information about managing stress. Many books and digital media are also available on this subject.  Tyramine is a substance found in many foods. It can trigger a migraine in some people. These foods contain tyramine:    Chocolate    Yogurt    All cheeses, but especially aged cheeses    Smoked or pickled fish and meat, including herring, caviar, bologna, pepperoni, and  salami    Liver    Avocados    Bananas    Figs    Raisins    Red wine  Try staying away from these foods for 1 to 2 months to see if you have fewer headaches.  How to treat future headaches    Take time out at the first sign of a headache, if possible. Find a quiet, dark, comfortable place to sit or lie down. Let yourself relax or sleep.    Put an ice pack on your forehead or on the area of greatest pain. A heating pad and massage may help if you are having a muscle spasm and tightness in your neck.    If you have been prescribed a medicine to stop a migraine headache, use this at the first warning sign of the headache for best results. First signs may be an aura or pain.    If you need to take medicine often for your migraine, talk with your healthcare provider about other ways to prevent your headaches.  Follow-up care  Follow up with your healthcare provider, or as advised. Talk with your provider if you have frequent headaches. He or she can figure out a treatment plan. Ask if you can have medicine to take at home the next time you get a bad headache. This may keep you from having to visit the emergency department in the future. You may need to see a headache specialist (neurologist) if you continue to have headaches.  When to seek medical advice  Call your healthcare provider right away if any of these occur:    Your head pain gets worse, or doesnt get better within 24 hours    You cant keep liquids down (repeated vomiting)    Pain in your sinuses, ears, or throat    Fever of 100.4  F (38  C) or higher, or as directed by your healthcare provider    Stiff neck    Extreme drowsiness, confusion, or fainting    Dizziness, or dizziness with spinning sensation (vertigo)    Weakness in an arm or leg, or on one side of your face    Difficulty talking or seeing  Date Last Reviewed: 8/1/2016 2000-2017 [employer]. [address] [place] [address], [place] [address]. All rights reserved. This information is not  intended as a substitute for professional medical care. Always follow your healthcare professional's instructions.

## 2021-07-03 NOTE — ADDENDUM NOTE
Addendum Note by Esme Hodgson CNP at 10/9/2019  9:00 AM     Author: Esme Hodgson CNP Service: -- Author Type: Nurse Practitioner    Filed: 10/10/2019  4:10 PM Date of Service: 10/9/2019  9:00 AM Status: Signed    : Esme Hodgson CNP (Nurse Practitioner)    Encounter addended by: Esme Hodgson CNP on: 10/10/2019  4:10 PM      Actions taken: Clinical Note Signed

## 2021-07-03 NOTE — ADDENDUM NOTE
Addendum Note by Cristel Olmstead CMA at 10/9/2019  9:00 AM     Author: Cristel Olmstead CMA Service: -- Author Type: Certified Medical Assistant    Filed: 10/9/2019 11:10 AM Date of Service: 10/9/2019  9:00 AM Status: Signed    : Cristel Olmstead CMA ([facility] Medical Assistant)    Encounter addended by: Cristel Olmstead CMA on: 10/9/2019 11:10 AM      Actions taken: Visit Navigator Flowsheet section accepted

## 2021-07-03 NOTE — ADDENDUM NOTE
Addendum Note by Esme Hodgson CNP at 6/16/2020  3:20 PM     Author: Esme Hodgson CNP Service: -- Author Type: Nurse Practitioner    Filed: 6/16/2020 11:54 AM Date of Service: 6/16/2020  3:20 PM Status: Signed    : Esme Hodgson CNP (Nurse Practitioner)    Encounter addended by: Esme Hodgson CNP on: 6/16/2020 11:54 AM      Actions taken: Clinical Note Signed

## 2021-08-02 DIAGNOSIS — M54.16 LUMBAR RADICULOPATHY: Primary | ICD-10-CM

## 2021-08-02 RX ORDER — METHYLPREDNISOLONE 4 MG
TABLET, DOSE PACK ORAL
Qty: 21 TABLET | Refills: 0 | Status: SHIPPED | OUTPATIENT
Start: 2021-08-02 | End: 2021-08-31

## 2021-08-03 ENCOUNTER — ANCILLARY PROCEDURE (OUTPATIENT)
Dept: PHYSICAL MEDICINE AND REHAB | Facility: CLINIC | Age: 41
End: 2021-08-03
Attending: NURSE PRACTITIONER
Payer: COMMERCIAL

## 2021-08-03 ENCOUNTER — OFFICE VISIT (OUTPATIENT)
Dept: NEUROSURGERY | Facility: CLINIC | Age: 41
End: 2021-08-03
Attending: PHYSICIAN ASSISTANT
Payer: COMMERCIAL

## 2021-08-03 VITALS
WEIGHT: 197 LBS | HEIGHT: 64 IN | SYSTOLIC BLOOD PRESSURE: 120 MMHG | DIASTOLIC BLOOD PRESSURE: 84 MMHG | OXYGEN SATURATION: 97 % | BODY MASS INDEX: 33.63 KG/M2 | HEART RATE: 103 BPM

## 2021-08-03 VITALS
HEIGHT: 64 IN | OXYGEN SATURATION: 98 % | BODY MASS INDEX: 33.63 KG/M2 | TEMPERATURE: 98.2 F | SYSTOLIC BLOOD PRESSURE: 121 MMHG | HEART RATE: 111 BPM | WEIGHT: 197 LBS | DIASTOLIC BLOOD PRESSURE: 87 MMHG

## 2021-08-03 DIAGNOSIS — M54.16 LUMBAR RADICULOPATHY: ICD-10-CM

## 2021-08-03 DIAGNOSIS — M54.16 LUMBAR RADICULOPATHY: Primary | ICD-10-CM

## 2021-08-03 PROCEDURE — 99203 OFFICE O/P NEW LOW 30 MIN: CPT | Performed by: NURSE PRACTITIONER

## 2021-08-03 PROCEDURE — G0463 HOSPITAL OUTPT CLINIC VISIT: HCPCS

## 2021-08-03 PROCEDURE — 64483 NJX AA&/STRD TFRM EPI L/S 1: CPT | Mod: LT | Performed by: PHYSICAL MEDICINE & REHABILITATION

## 2021-08-03 RX ORDER — LIDOCAINE HYDROCHLORIDE 10 MG/ML
INJECTION, SOLUTION EPIDURAL; INFILTRATION; INTRACAUDAL; PERINEURAL
Status: COMPLETED | OUTPATIENT
Start: 2021-08-03 | End: 2021-08-03

## 2021-08-03 RX ORDER — METHYLPREDNISOLONE ACETATE 80 MG/ML
INJECTION, SUSPENSION INTRA-ARTICULAR; INTRALESIONAL; INTRAMUSCULAR; SOFT TISSUE
Status: COMPLETED | OUTPATIENT
Start: 2021-08-03 | End: 2021-08-03

## 2021-08-03 RX ADMIN — METHYLPREDNISOLONE ACETATE 80 MG: 80 INJECTION, SUSPENSION INTRA-ARTICULAR; INTRALESIONAL; INTRAMUSCULAR; SOFT TISSUE at 15:20

## 2021-08-03 RX ADMIN — LIDOCAINE HYDROCHLORIDE 2 ML: 10 INJECTION, SOLUTION EPIDURAL; INFILTRATION; INTRACAUDAL; PERINEURAL at 15:20

## 2021-08-03 ASSESSMENT — PAIN SCALES - GENERAL
PAINLEVEL: EXTREME PAIN (8)
PAINLEVEL: EXTREME PAIN (8)
PAINLEVEL: MILD PAIN (2)

## 2021-08-03 ASSESSMENT — MIFFLIN-ST. JEOR
SCORE: 1533.59
SCORE: 1533.59

## 2021-08-03 NOTE — PROGRESS NOTES
Dr. Rony Johns   [facility] Neurosurgery Clinic Visit      CC: low back and leg pain     Primary care Provider: Tre Jj    Reason For Visit:   Evaluation of low back and leg pain       HPI: Madeline Szymanski is a 41 year old female who presents for evaluation of acute on chronic low back and left leg pain that initially started in . She reports pain recently increased after a long motorcycle ride on . She also recalls a near-fall on Saturday after missing a step. Pain is located in the left side low back and radiates to left posterior/lateral left leg to calf and bottom of left foot. Numbness in this pattern as well. She has noticed some weakness in left leg that started yesterday. Denies any bladder/bowel incontinence. Denies right leg symptoms. Describes the pain as shooting and aching. Pain is worsened with activity. She saw Sports Medicine and Dr. Lilly in the past. She tried MBB/RFA, CURT, and PT. Dr. Lilly ordered EMG and CT in , but patient did not get this done at the time because symptoms improved. She was recently prescribed a medrol dosepak 3 days ago. She also went to chiropractic yesterday and has been using TENS unit and ice packs, but symptoms continue to worsen.     Current pain: 8/10   At worst: 10/10    Past Medical History:   Diagnosis Date     Depressive disorder      Kidney infection      Migraine      Pancreatitis      UTI (lower urinary tract infection)        Past Medical History reviewed with patient during visit.    Past Surgical History:   Procedure Laterality Date      SECTION       GI SURGERY       Past Surgical History reviewed with patient during visit.    Current Outpatient Medications   Medication     amitriptyline (ELAVIL) 25 MG tablet     eletriptan (RELPAX) 40 MG tablet     EPINEPHrine (ANY BX GENERIC EQUIV) 0.3 MG/0.3ML injection 2-pack     FLUoxetine (PROZAC) 40 MG capsule     gabapentin (NEURONTIN) 600 MG tablet     lidocaine (LIDODERM) 5 % patch      methylPREDNISolone (MEDROL DOSEPAK) 4 MG tablet therapy pack     rizatriptan (MAXALT-MLT) 10 MG ODT     vitamin  s/Minerals TABS     No current facility-administered medications for this visit.       Allergies   Allergen Reactions     Coconut Flavor Anaphylaxis     Covid-19 (Mrna) Vaccine Anaphylaxis     Pfizer      Penicillins Hives       Social History     Socioeconomic History     Marital status:      Spouse name: Not on file     Number of children: Not on file     Years of education: Not on file     Highest education level: Not on file   Occupational History     Not on file   Tobacco Use     Smoking status: Never Smoker     Smokeless tobacco: Never Used   Substance and Sexual Activity     Alcohol use: Never     Drug use: Never     Sexual activity: Not on file   Other Topics Concern     Not on file   Social History Narrative     Not on file     Social Determinants of Health     Financial Resource Strain:      Difficulty of Paying Living Expenses:    Food Insecurity:      Worried About Running Out of Food in the Last Year:      Ran Out of Food in the Last Year:    Transportation Needs:      Lack of Transportation (Medical):      Lack of Transportation (Non-Medical):    Physical Activity:      Days of Exercise per Week:      Minutes of Exercise per Session:    Stress:      Feeling of Stress :    Social Connections:      Frequency of Communication with Friends and Family:      Frequency of Social Gatherings with Friends and Family:      Attends Islam Services:      Active Member of Clubs or Organizations:      Attends Club or Organization Meetings:      Marital Status:    Intimate Partner Violence:      Fear of Current or Ex-Partner:      Emotionally Abused:      Physically Abused:      Sexually Abused:      ROS: 10 point ROS neg other than the symptoms noted above in the HPI.    Vital Signs: /87 (BP Location: Left arm, Patient Position: Standing, Cuff Size: Adult Regular)   Pulse 111   Temp  "98.2  F (36.8  C)   Ht 5' 4\" (1.626 m)   Wt 197 lb (89.4 kg)   SpO2 98%   BMI 33.81 kg/m      Examination:  Neurological:  Awake  Alert  Oriented x 3  Speech clear  Cranial nerves II - XII grossly intact  PERRL  EOMI  Face symmetric  Tongue midline  Motor exam   Hip Flexor:                 Right: 5/5  Left:  5/5  Quadriceps:               Right:  5/5  Left:  5/5  Hamstrings:               Right:  5/5  Left:  5/5  Gastroc Soleus:         Right:  5/5  Left:  5/5  Tib/Ant:                      Right:  5/5  Left:  5/5  EHL:                          Right:  5/5  Left:  5/5         Sensation normal to bilateral lower extremities.    Reflexes are 2+ in the patellar and Achilles. There is no clonus. Downgoing Babinski.    Musculoskeletal:  Gait: Able to stand from a seated position. Antalgic gait noted.     Lumbar examination reveals no tenderness of the spine or paraspinous muscles.  Hip height is symmetrical. Negative SI joint, sciatic notch or greater trochanteric tenderness to palpation bilaterally.      Imaging:   MRI of the lumbar spine from 12/15/20 was reviewed in the office today. Reveals mild L5-S1 far lateral stenosis on the left.     Assessment/Plan:   41 year old female who presents for evaluation of acute on chronic low back and left leg pain that initially started in 2017. She reports pain recently increased after a long motorcycle ride on Sunday. She also recalls a near-fall on Saturday after missing a step. Pain is located in the left side low back and radiates to left posterior/lateral left leg to calf and bottom of left foot. Numbness in this pattern as well. She has noticed some weakness in left leg that started yesterday. Symptoms persist despite conservative measures. Imaging reviewed and we discussed options at this time. Recommend left L5-S1 CURT. Advised patient to follow-up in clinic if symptoms persist or worsen.     Advised patient to call our clinic with any questions or concerns. Discussed " red flag symptoms and advised to seek medical attention if these develop. Patient voiced understanding and agreement.      Belén Power CNP  [facility] Neurosurgery  [address] [address]  Tel [phone]  Pager [phone]

## 2021-08-03 NOTE — LETTER
8/3/2021         RE: Madeline Szymanski  [address]  [facility] [address]        Dear Colleague,    Thank you for referring your patient, Madeline Szymanski, to the [facility] NEUROSURGERY [facility]INIC [place]. Please see a copy of my visit note below.    Dr. Rony Johns   [facility] Neurosurgery Clinic Visit      CC: low back and leg pain     Primary care Provider: Tre Jj    Reason For Visit:   Evaluation of low back and leg pain       HPI: Madeline Szymanski is a 41 year old female who presents for evaluation of acute on chronic low back and left leg pain that initially started in . She reports pain recently increased after a long motorcycle ride on . She also recalls a near-fall on Saturday after missing a step. Pain is located in the left side low back and radiates to left posterior/lateral left leg to calf and bottom of left foot. Numbness in this pattern as well. She has noticed some weakness in left leg that started yesterday. Denies any bladder/bowel incontinence. Denies right leg symptoms. Describes the pain as shooting and aching. Pain is worsened with activity. She saw Sports Medicine and Dr. Lilly in the past. She tried MBB/RFA, CURT, and PT. Dr. Lilly ordered EMG and CT in , but patient did not get this done at the time because symptoms improved. She was recently prescribed a medrol dosepak 3 days ago. She also went to chiropractic yesterday and has been using TENS unit and ice packs, but symptoms continue to worsen.     Current pain: 8/10   At worst: 10/10    Past Medical History:   Diagnosis Date     Depressive disorder      Kidney infection      Migraine      Pancreatitis      UTI (lower urinary tract infection)        Past Medical History reviewed with patient during visit.    Past Surgical History:   Procedure Laterality Date      SECTION       GI SURGERY       Past Surgical History reviewed with patient during visit.    Current Outpatient Medications   Medication      amitriptyline (ELAVIL) 25 MG tablet     eletriptan (RELPAX) 40 MG tablet     EPINEPHrine (ANY BX GENERIC EQUIV) 0.3 MG/0.3ML injection 2-pack     FLUoxetine (PROZAC) 40 MG capsule     gabapentin (NEURONTIN) 600 MG tablet     lidocaine (LIDODERM) 5 % patch     methylPREDNISolone (MEDROL DOSEPAK) 4 MG tablet therapy pack     rizatriptan (MAXALT-MLT) 10 MG ODT     vitamin  s/Minerals TABS     No current facility-administered medications for this visit.       Allergies   Allergen Reactions     Coconut Flavor Anaphylaxis     Covid-19 (Mrna) Vaccine Anaphylaxis     Pfizer      Penicillins Hives       Social History     Socioeconomic History     Marital status:      Spouse name: Not on file     Number of children: Not on file     Years of education: Not on file     Highest education level: Not on file   Occupational History     Not on file   Tobacco Use     Smoking status: Never Smoker     Smokeless tobacco: Never Used   Substance and Sexual Activity     Alcohol use: Never     Drug use: Never     Sexual activity: Not on file   Other Topics Concern     Not on file   Social History Narrative     Not on file     Social Determinants of Health     Financial Resource Strain:      Difficulty of Paying Living Expenses:    Food Insecurity:      Worried About Running Out of Food in the Last Year:      Ran Out of Food in the Last Year:    Transportation Needs:      Lack of Transportation (Medical):      Lack of Transportation (Non-Medical):    Physical Activity:      Days of Exercise per Week:      Minutes of Exercise per Session:    Stress:      Feeling of Stress :    Social Connections:      Frequency of Communication with Friends and Family:      Frequency of Social Gatherings with Friends and Family:      Attends Spiritism Services:      Active Member of Clubs or Organizations:      Attends Club or Organization Meetings:      Marital Status:    Intimate Partner Violence:      Fear of Current or Ex-Partner:       "Emotionally Abused:      Physically Abused:      Sexually Abused:      ROS: 10 point ROS neg other than the symptoms noted above in the HPI.    Vital Signs: /87 (BP Location: Left arm, Patient Position: Standing, Cuff Size: Adult Regular)   Pulse 111   Temp 98.2  F (36.8  C)   Ht 5' 4\" (1.626 m)   Wt 197 lb (89.4 kg)   SpO2 98%   BMI 33.81 kg/m      Examination:  Neurological:  Awake  Alert  Oriented x 3  Speech clear  Cranial nerves II - XII grossly intact  PERRL  EOMI  Face symmetric  Tongue midline  Motor exam   Hip Flexor:                 Right: 5/5  Left:  5/5  Quadriceps:               Right:  5/5  Left:  5/5  Hamstrings:               Right:  5/5  Left:  5/5  Gastroc Soleus:         Right:  5/5  Left:  5/5  Tib/Ant:                      Right:  5/5  Left:  5/5  EHL:                          Right:  5/5  Left:  5/5         Sensation normal to bilateral lower extremities.    Reflexes are 2+ in the patellar and Achilles. There is no clonus. Downgoing Babinski.    Musculoskeletal:  Gait: Able to stand from a seated position. Antalgic gait noted.     Lumbar examination reveals no tenderness of the spine or paraspinous muscles.  Hip height is symmetrical. Negative SI joint, sciatic notch or greater trochanteric tenderness to palpation bilaterally.      Imaging:   MRI of the lumbar spine from 12/15/20 was reviewed in the office today. Reveals mild L5-S1 far lateral stenosis on the left.     Assessment/Plan:   41 year old female who presents for evaluation of acute on chronic low back and left leg pain that initially started in 2017. She reports pain recently increased after a long motorcycle ride on Sunday. She also recalls a near-fall on Saturday after missing a step. Pain is located in the left side low back and radiates to left posterior/lateral left leg to calf and bottom of left foot. Numbness in this pattern as well. She has noticed some weakness in left leg that started yesterday. Symptoms persist " despite conservative measures. Imaging reviewed and we discussed options at this time. Recommend left L5-S1 CURT. Advised patient to follow-up in clinic if symptoms persist or worsen.     Advised patient to call our clinic with any questions or concerns. Discussed red flag symptoms and advised to seek medical attention if these develop. Patient voiced understanding and agreement.      Belén Power CNP  [facility] Neurosurgery  [address] [address]  Tel [phone]  Pager [phone]          Again, thank you for allowing me to participate in the care of your patient.        Sincerely,        Belén Power, EVANGELIST

## 2021-08-03 NOTE — NURSING NOTE
"Madeline Szymanski is a 41 year old female who presents for:  Chief Complaint   Patient presents with     Neurologic Problem     lower back        Initial Vitals:  /87 (BP Location: Left arm, Patient Position: Standing, Cuff Size: Adult Regular)   Pulse 111   Temp 98.2  F (36.8  C)   Ht 5' 4\" (1.626 m)   Wt 197 lb (89.4 kg)   SpO2 98%   BMI 33.81 kg/m   Estimated body mass index is 33.81 kg/m  as calculated from the following:    Height as of this encounter: 5' 4\" (1.626 m).    Weight as of this encounter: 197 lb (89.4 kg).. Body surface area is 2.01 meters squared. BP completed using cuff size: regular  Extreme Pain (8)          "

## 2021-08-03 NOTE — PATIENT INSTRUCTIONS
Please follow up two weeks post procedure to evaluate your plan of care.       DISCHARGE INSTRUCTIONS    During office hours (8:00 a.m.- 4:00 p.m.) questions or concerns may be answered  by calling Spine Center Navigation Nurses at  [phone].  Messages received after hours will be returned the following business day.      In the case of an emergency, please dial 911 or seek assistance at the nearest Emergency Room/Urgent Care facility.     All Patients:    ? You may experience an increase in your symptoms for the first 2 days (It may take anywhere between 2 days- 2 weeks for the steroid to have maximum effect).    ? You may use ice on the injection site, as frequently as 20 minutes each hour if needed.    ? You may take your pain medicine.    ? You may continue taking your regular medication after your injection. If you have had a Medial Branch Block you may resume pain medication once your pain diary is completed.    ? You may shower. No swimming, tub bath or hot tub for 48 hours.  You may remove your bandaid/bandage as soon as you are home.    ? You may resume light activities, as tolerated.    ? Resume your usual diet as tolerated.    ? It is strongly advised that you do not drive for 1-3 hours post injection.    ? If you have had oral sedation:  Do not drive for 8 hours post injection.      ? If you have had IV sedation:  Do not drive for 24 hours post injection.  Do not operate hazardous machinery or make important personal/business decisions for 24 hours.      POSSIBLE STEROID SIDE EFFECTS (If steroid/cortisone was used for your procedure)    -If you experience these symptoms, it should only last for a short period      Swelling of the legs                Skin redness (flushing)       Mouth (oral) irritation     Blood sugar (glucose) levels              Sweats                      Mood changes    Headache    Sleeplessness    Weakened immune system for up to 14 days, which could increase the risk of  manuel the COVID-19 virus and/or experiencing more severe symptoms of the disease, if exposed.    Decreased effectiveness of the flu vaccine if given within 2 weeks of the steroid.         POSSIBLE PROCEDURE SIDE EFFECTS  -Call the Spine Center if you are concerned    Increased Pain             Increased numbness/tingling        Nausea/Vomiting            Bruising/bleeding at site        Redness or swelling                                                Difficulty walking        Weakness             Fever greater than 100.5    *In the event of a severe headache after an epidural steroid injection that is relieved by lying down, please call the [facility] to speak with a clinical staff member*

## 2021-08-04 ENCOUNTER — HOSPITAL ENCOUNTER (EMERGENCY)
Facility: HOSPITAL | Age: 41
Discharge: LEFT BEFORE DISCHARGE | End: 2021-08-04
Payer: COMMERCIAL

## 2021-08-04 VITALS
RESPIRATION RATE: 16 BRPM | BODY MASS INDEX: 33.81 KG/M2 | SYSTOLIC BLOOD PRESSURE: 125 MMHG | OXYGEN SATURATION: 97 % | TEMPERATURE: 99 F | WEIGHT: 197 LBS | DIASTOLIC BLOOD PRESSURE: 88 MMHG | HEART RATE: 110 BPM

## 2021-08-04 NOTE — ED TRIAGE NOTES
Pt developed left leg pain three days ago. She states that it radiates from her left lower back pain. She states she has flares of chronic back pain. Yesterday, pt had an epidural. She states that after the numbness wore off, her left leg pain was significantly worse.

## 2021-08-06 ENCOUNTER — HOSPITAL ENCOUNTER (OUTPATIENT)
Facility: CLINIC | Age: 41
Setting detail: OBSERVATION
Discharge: HOME | End: 2021-08-07
Attending: EMERGENCY MEDICINE | Admitting: HOSPITALIST
Payer: COMMERCIAL

## 2021-08-06 ENCOUNTER — APPOINTMENT (OUTPATIENT)
Dept: MRI IMAGING | Facility: CLINIC | Age: 41
End: 2021-08-06
Attending: NURSE PRACTITIONER
Payer: COMMERCIAL

## 2021-08-06 ENCOUNTER — APPOINTMENT (OUTPATIENT)
Dept: MRI IMAGING | Facility: CLINIC | Age: 41
End: 2021-08-06
Attending: EMERGENCY MEDICINE
Payer: COMMERCIAL

## 2021-08-06 ENCOUNTER — TELEPHONE (OUTPATIENT)
Dept: NEUROSURGERY | Facility: CLINIC | Age: 41
End: 2021-08-06

## 2021-08-06 DIAGNOSIS — M54.16 ACUTE RADICULAR LOW BACK PAIN: Primary | ICD-10-CM

## 2021-08-06 DIAGNOSIS — G83.4: ICD-10-CM

## 2021-08-06 DIAGNOSIS — R33.9: ICD-10-CM

## 2021-08-06 LAB
ALBUMIN UR-MCNC: NEGATIVE MG/DL
ANION GAP SERPL CALCULATED.3IONS-SCNC: 1 MMOL/L (ref 3–14)
APPEARANCE UR: CLEAR
BASOPHILS # BLD AUTO: 0.1 10E3/UL (ref 0–0.2)
BASOPHILS NFR BLD AUTO: 1 %
BILIRUB UR QL STRIP: NEGATIVE
BUN SERPL-MCNC: 12 MG/DL (ref 7–30)
CALCIUM SERPL-MCNC: 8.9 MG/DL (ref 8.5–10.1)
CHLORIDE BLD-SCNC: 105 MMOL/L (ref 94–109)
CO2 SERPL-SCNC: 32 MMOL/L (ref 20–32)
COLOR UR AUTO: ABNORMAL
CREAT SERPL-MCNC: 1.03 MG/DL (ref 0.52–1.04)
EOSINOPHIL # BLD AUTO: 0.1 10E3/UL (ref 0–0.7)
EOSINOPHIL NFR BLD AUTO: 2 %
ERYTHROCYTE [DISTWIDTH] IN BLOOD BY AUTOMATED COUNT: 13 % (ref 10–15)
GFR SERPL CREATININE-BSD FRML MDRD: 67 ML/MIN/1.73M2
GLUCOSE BLD-MCNC: 84 MG/DL (ref 70–99)
GLUCOSE UR STRIP-MCNC: NEGATIVE MG/DL
HCT VFR BLD AUTO: 40.9 % (ref 35–47)
HGB BLD-MCNC: 13.3 G/DL (ref 11.7–15.7)
HGB UR QL STRIP: NEGATIVE
IMM GRANULOCYTES # BLD: 0.1 10E3/UL
IMM GRANULOCYTES NFR BLD: 1 %
KETONES UR STRIP-MCNC: NEGATIVE MG/DL
LEUKOCYTE ESTERASE UR QL STRIP: NEGATIVE
LYMPHOCYTES # BLD AUTO: 1.8 10E3/UL (ref 0.8–5.3)
LYMPHOCYTES NFR BLD AUTO: 24 %
MCH RBC QN AUTO: 29 PG (ref 26.5–33)
MCHC RBC AUTO-ENTMCNC: 32.5 G/DL (ref 31.5–36.5)
MCV RBC AUTO: 89 FL (ref 78–100)
MONOCYTES # BLD AUTO: 0.5 10E3/UL (ref 0–1.3)
MONOCYTES NFR BLD AUTO: 6 %
MUCOUS THREADS #/AREA URNS LPF: PRESENT /LPF
NEUTROPHILS # BLD AUTO: 5 10E3/UL (ref 1.6–8.3)
NEUTROPHILS NFR BLD AUTO: 66 %
NITRATE UR QL: NEGATIVE
NRBC # BLD AUTO: 0 10E3/UL
NRBC BLD AUTO-RTO: 0 /100
PH UR STRIP: 6.5 [PH] (ref 5–7)
PLATELET # BLD AUTO: 291 10E3/UL (ref 150–450)
POTASSIUM BLD-SCNC: 3.7 MMOL/L (ref 3.4–5.3)
RBC # BLD AUTO: 4.59 10E6/UL (ref 3.8–5.2)
RBC URINE: <1 /HPF
SODIUM SERPL-SCNC: 138 MMOL/L (ref 133–144)
SP GR UR STRIP: 1.01 (ref 1–1.03)
UROBILINOGEN UR STRIP-MCNC: NORMAL MG/DL
WBC # BLD AUTO: 7.5 10E3/UL (ref 4–11)
WBC URINE: <1 /HPF

## 2021-08-06 PROCEDURE — 250N000013 HC RX MED GY IP 250 OP 250 PS 637: Performed by: HOSPITALIST

## 2021-08-06 PROCEDURE — 96376 TX/PRO/DX INJ SAME DRUG ADON: CPT

## 2021-08-06 PROCEDURE — 96374 THER/PROPH/DIAG INJ IV PUSH: CPT

## 2021-08-06 PROCEDURE — 250N000011 HC RX IP 250 OP 636: Performed by: EMERGENCY MEDICINE

## 2021-08-06 PROCEDURE — G0378 HOSPITAL OBSERVATION PER HR: HCPCS

## 2021-08-06 PROCEDURE — 99220 PR INITIAL OBSERVATION CARE,LEVEL III: CPT | Performed by: HOSPITALIST

## 2021-08-06 PROCEDURE — 72148 MRI LUMBAR SPINE W/O DYE: CPT

## 2021-08-06 PROCEDURE — 99285 EMERGENCY DEPT VISIT HI MDM: CPT | Mod: 25

## 2021-08-06 PROCEDURE — 96375 TX/PRO/DX INJ NEW DRUG ADDON: CPT

## 2021-08-06 PROCEDURE — 80048 BASIC METABOLIC PNL TOTAL CA: CPT | Performed by: EMERGENCY MEDICINE

## 2021-08-06 PROCEDURE — C9803 HOPD COVID-19 SPEC COLLECT: HCPCS

## 2021-08-06 PROCEDURE — 81001 URINALYSIS AUTO W/SCOPE: CPT | Performed by: EMERGENCY MEDICINE

## 2021-08-06 PROCEDURE — 85025 COMPLETE CBC W/AUTO DIFF WBC: CPT | Performed by: EMERGENCY MEDICINE

## 2021-08-06 PROCEDURE — 72146 MRI CHEST SPINE W/O DYE: CPT

## 2021-08-06 PROCEDURE — 87635 SARS-COV-2 COVID-19 AMP PRB: CPT | Performed by: HOSPITALIST

## 2021-08-06 PROCEDURE — 36415 COLL VENOUS BLD VENIPUNCTURE: CPT | Performed by: EMERGENCY MEDICINE

## 2021-08-06 RX ORDER — CYCLOBENZAPRINE HCL 10 MG
10 TABLET ORAL 3 TIMES DAILY PRN
Status: DISCONTINUED | OUTPATIENT
Start: 2021-08-06 | End: 2021-08-07 | Stop reason: HOSPADM

## 2021-08-06 RX ORDER — TOPIRAMATE 50 MG/1
50 TABLET, FILM COATED ORAL DAILY
COMMUNITY
End: 2021-12-27

## 2021-08-06 RX ORDER — TOPIRAMATE 50 MG/1
50 TABLET, FILM COATED ORAL DAILY
Status: DISCONTINUED | OUTPATIENT
Start: 2021-08-06 | End: 2021-08-07 | Stop reason: HOSPADM

## 2021-08-06 RX ORDER — ONDANSETRON 4 MG/1
4 TABLET, ORALLY DISINTEGRATING ORAL EVERY 6 HOURS PRN
Status: DISCONTINUED | OUTPATIENT
Start: 2021-08-06 | End: 2021-08-07 | Stop reason: HOSPADM

## 2021-08-06 RX ORDER — ONDANSETRON 2 MG/ML
4 INJECTION INTRAMUSCULAR; INTRAVENOUS EVERY 6 HOURS PRN
Status: DISCONTINUED | OUTPATIENT
Start: 2021-08-06 | End: 2021-08-07 | Stop reason: HOSPADM

## 2021-08-06 RX ORDER — ELETRIPTAN HYDROBROMIDE 40 MG/1
40 TABLET, FILM COATED ORAL
Status: DISCONTINUED | OUTPATIENT
Start: 2021-08-06 | End: 2021-08-07 | Stop reason: HOSPADM

## 2021-08-06 RX ORDER — NALOXONE HYDROCHLORIDE 0.4 MG/ML
0.4 INJECTION, SOLUTION INTRAMUSCULAR; INTRAVENOUS; SUBCUTANEOUS
Status: DISCONTINUED | OUTPATIENT
Start: 2021-08-06 | End: 2021-08-07 | Stop reason: HOSPADM

## 2021-08-06 RX ORDER — LIDOCAINE 4 G/G
1 PATCH TOPICAL EVERY 24 HOURS
Status: DISCONTINUED | OUTPATIENT
Start: 2021-08-06 | End: 2021-08-07 | Stop reason: HOSPADM

## 2021-08-06 RX ORDER — DOCUSATE SODIUM 100 MG/1
100 CAPSULE, LIQUID FILLED ORAL 2 TIMES DAILY
Status: DISCONTINUED | OUTPATIENT
Start: 2021-08-06 | End: 2021-08-07 | Stop reason: HOSPADM

## 2021-08-06 RX ORDER — DIAZEPAM 10 MG/2ML
2.5 INJECTION, SOLUTION INTRAMUSCULAR; INTRAVENOUS ONCE
Status: COMPLETED | OUTPATIENT
Start: 2021-08-06 | End: 2021-08-06

## 2021-08-06 RX ORDER — RIZATRIPTAN BENZOATE 10 MG/1
10 TABLET, ORALLY DISINTEGRATING ORAL
COMMUNITY
End: 2022-01-20

## 2021-08-06 RX ORDER — ACETAMINOPHEN 650 MG/1
650 SUPPOSITORY RECTAL EVERY 6 HOURS PRN
Status: DISCONTINUED | OUTPATIENT
Start: 2021-08-06 | End: 2021-08-07 | Stop reason: HOSPADM

## 2021-08-06 RX ORDER — GABAPENTIN 300 MG/1
300-600 CAPSULE ORAL AT BEDTIME
Status: DISCONTINUED | OUTPATIENT
Start: 2021-08-06 | End: 2021-08-07 | Stop reason: HOSPADM

## 2021-08-06 RX ORDER — NALOXONE HYDROCHLORIDE 0.4 MG/ML
0.2 INJECTION, SOLUTION INTRAMUSCULAR; INTRAVENOUS; SUBCUTANEOUS
Status: DISCONTINUED | OUTPATIENT
Start: 2021-08-06 | End: 2021-08-07 | Stop reason: HOSPADM

## 2021-08-06 RX ORDER — ACETAMINOPHEN 325 MG/1
650 TABLET ORAL EVERY 6 HOURS PRN
Status: DISCONTINUED | OUTPATIENT
Start: 2021-08-06 | End: 2021-08-07 | Stop reason: HOSPADM

## 2021-08-06 RX ORDER — AMITRIPTYLINE HYDROCHLORIDE 75 MG/1
75 TABLET ORAL AT BEDTIME
COMMUNITY
End: 2022-02-15

## 2021-08-06 RX ORDER — RIZATRIPTAN BENZOATE 10 MG/1
10 TABLET, ORALLY DISINTEGRATING ORAL
Status: DISCONTINUED | OUTPATIENT
Start: 2021-08-06 | End: 2021-08-07 | Stop reason: HOSPADM

## 2021-08-06 RX ORDER — CYCLOBENZAPRINE HCL 10 MG
10 TABLET ORAL 3 TIMES DAILY PRN
Status: ON HOLD | COMMUNITY
End: 2021-10-04

## 2021-08-06 RX ORDER — GABAPENTIN 300 MG/1
300-600 CAPSULE ORAL AT BEDTIME
COMMUNITY
End: 2022-02-24 | Stop reason: ALTCHOICE

## 2021-08-06 RX ORDER — HYDROMORPHONE HYDROCHLORIDE 1 MG/ML
0.5 INJECTION, SOLUTION INTRAMUSCULAR; INTRAVENOUS; SUBCUTANEOUS
Status: COMPLETED | OUTPATIENT
Start: 2021-08-06 | End: 2021-08-06

## 2021-08-06 RX ORDER — OXYCODONE HYDROCHLORIDE 5 MG/1
5 TABLET ORAL EVERY 4 HOURS PRN
Status: DISCONTINUED | OUTPATIENT
Start: 2021-08-06 | End: 2021-08-07

## 2021-08-06 RX ADMIN — CYCLOBENZAPRINE 10 MG: 10 TABLET, FILM COATED ORAL at 21:15

## 2021-08-06 RX ADMIN — GABAPENTIN 300 MG: 300 CAPSULE ORAL at 21:15

## 2021-08-06 RX ADMIN — FLUOXETINE 40 MG: 20 CAPSULE ORAL at 18:39

## 2021-08-06 RX ADMIN — LIDOCAINE 1 PATCH: 246 PATCH TOPICAL at 18:39

## 2021-08-06 RX ADMIN — TOPIRAMATE 50 MG: 50 TABLET, FILM COATED ORAL at 18:40

## 2021-08-06 RX ADMIN — AMITRIPTYLINE HYDROCHLORIDE 75 MG: 25 TABLET, FILM COATED ORAL at 21:15

## 2021-08-06 RX ADMIN — HYDROMORPHONE HYDROCHLORIDE 0.5 MG: 1 INJECTION, SOLUTION INTRAMUSCULAR; INTRAVENOUS; SUBCUTANEOUS at 11:29

## 2021-08-06 RX ADMIN — HYDROMORPHONE HYDROCHLORIDE 0.5 MG: 1 INJECTION, SOLUTION INTRAMUSCULAR; INTRAVENOUS; SUBCUTANEOUS at 15:12

## 2021-08-06 RX ADMIN — OXYCODONE HYDROCHLORIDE 5 MG: 5 TABLET ORAL at 19:54

## 2021-08-06 RX ADMIN — DIAZEPAM 2.5 MG: 10 INJECTION, SOLUTION INTRAMUSCULAR; INTRAVENOUS at 11:30

## 2021-08-06 ASSESSMENT — MIFFLIN-ST. JEOR: SCORE: 1538.12

## 2021-08-06 ASSESSMENT — ENCOUNTER SYMPTOMS
BACK PAIN: 1
FEVER: 0
NUMBNESS: 1
CHILLS: 0
WEAKNESS: 1
DIFFICULTY URINATING: 1
COUGH: 0

## 2021-08-06 NOTE — ED TRIAGE NOTES
Patient reports severe low back that radiates down left leg and into left foot. Patient reports having a steroid injection to area on Tuesday followed by a medrol dose pack which she did not finish. Patient took gabapentin for pain this morning with no relief. Last night she had been taking gabapentin and flexeril.

## 2021-08-06 NOTE — PROGRESS NOTES
RECEIVING UNIT ED HANDOFF REVIEW    ED Nurse Handoff Report was reviewed by: Michelle Tsang RN on August 6, 2021 at 4:43 PM

## 2021-08-06 NOTE — ED PROVIDER NOTES
History   Chief Complaint:  Back Pain and Leg Pain    HPI   Madeline Szymanski is a 41 year old female with a history of lower back pain who presents with lumbar pain and pelvic numbness. The patient has experienced exacerbated pain since going on a long motorcycle ride 5 days ago and numbness in her pelvis since yesterday afternoon. She also reports numbness and some weakness as well as difficulty urinating. The pain is constant with a severity of 8/10, radiating from her lower back down the back of her leg, wrapping around to her groin. She was last seen by neurosurgery on 08/03/21,when the patient received an epidural steroid injection, as ordered by Belén Power CNP. The injection improved her pain for 3 hours. It has since become much worse and she has been unable to relieve her pain. The patient also reports a near fall on Saturday that may have contributed to the exacerbation. She denies feeling fever, chills, cough, or cold symptoms. There are also no arm symptoms. Of note, her history of lumbar pain dates back to 2017, and she has never had back surgery. The most recent MRI of her lumbar spine was taken on 12/15/20, which revealed mild L5 to S1 far lateral stenosis on the left, per neurosurgery notes.    Review of Systems   Constitutional: Negative for chills and fever.   Respiratory: Negative for cough.    Genitourinary: Positive for difficulty urinating.   Musculoskeletal: Positive for back pain (Lumbar).   Neurological: Positive for weakness and numbness (Pelvis and bottom of left foot).   All other systems reviewed and are negative.    Allergies:  Cephalexin  Coconut Flavor  Covid-19 (Mrna) Vaccine  Prochlorperazine  Coconut Fatty Acids  Metoclopramide  Penicillins    Medications:  Elavil  Relpax  Prozac  Neurontin  Medrol Dosepak  Maxalt    Past Medical History:    Depressive disorder  Kidney infection  Migraine   Pancreatitis  UTI    Anxiety  Lumbar radiculopathy  diastolic dysfunction  Carpal tunnel  "syndrome  Sciatica  Epicondylitis  TMJ  Lyme disease  Panic attack  Helicobacter infection  Gastric ulcer     Past Surgical History:      Laparoscopic gastric banding  Tubal ligation  Hysterectomy   Esophagoscopy, gastroscopy, duodenoscopy, combined  Endometrial ablation  Cyst removal  Endoscopic ultrasound    Family History:    The patient denies past family history.     Social History:  The patient presents alone.   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     Physical Exam     Patient Vitals for the past 24 hrs:   BP Temp Temp src Pulse Resp SpO2 Height Weight   21 0907 (!) 137/91 98.1  F (36.7  C) Temporal 112 16 98 % 1.626 m (5' 4\") 89.8 kg (198 lb)       Physical Exam  Nursing note and vitals reviewed.  Constitutional:  Appears well-developed and well-nourished.   HENT:   Head:    Atraumatic.   Mouth/Throat:   Oropharynx is clear and moist. No oropharyngeal exudate.   Eyes:    Pupils are equal, round, and reactive to light.   Neck:    Normal range of motion. Neck supple.      No tracheal deviation present. No thyromegaly present.   Cardiovascular:  Normal rate, regular rhythm, no murmur   Pulmonary/Chest: Breath sounds are clear and equal without wheezes or crackles.  Abdominal:   Soft. Bowel sounds are normal. Exhibits no distension and      no mass. There is no tenderness.      There is no rebound and no guarding.   Musculoskeletal:  Exhibits no edema.   Lymphadenopathy:  No cervical adenopathy.   Neurological:   Alert and oriented to person, place, and time. GCS 15.  CN 2-12 intact.  and proximal upper extremity strength strong and equal.  Bilateral lower extremity strength strong and equal, including strong dorsiflexion and plantarflexion strength.  Sensation intact and equal to the face, arms and legs.  No facial droop or weakness. Normal speech.  Follows commands and answers questions normally. Decreased sensation to saddle area. DTR's +2 equal bilateral patella " and achilles.   Skin:    Skin is warm and dry. No rash noted. No pallor.       Emergency Department Course   Imaging:  MRI Lumbar Spine WO Contrast  1. Multilevel degenerative changes of the lumbar spine, as described.  2. No high-grade spinal canal stenosis.  3. Mild left greater than right lateral recess stenosis at L5-S1.  4. Mild left neural foraminal stenosis at L5-S1. Otherwise, no  significant neural foraminal narrowing.  Reading per radiology.    Laboratory:  CBC: WBC 7.5, HGB 13.3,   BMP: Anion Gap 1 (L) o/w WNL (Creatinine 1.03)       UA with microscopic: Pending     Asymptomatic COVID19 Virus PCR by nasopharyngeal swab: Pending     Emergency Department Course:    Reviewed:  I reviewed nursing notes, vitals, past medical history and care everywhere    Assessments:  1102 I obtained history and examined the patient as noted above.   1309 I rechecked the patient and explained findings.     Consults:   1252 I spoke with Belén Power CNP, neurosurgery, regarding the patient.   1341 I spoke with CAROLINA Power again regarding the patient.   1422 I spoke with Dr. Mtz, hospitalist, who agreed to admit the patient.     Interventions:  1129 Dilaudid 0.5 mg IV   1130 Valium 2.5 mg IV     Disposition:  The patient was admitted to the hospital under the care of Dr. Mtz.       Impression & Plan     Medical Decision Making:  I found this patient to have low back pain with radiculopathy, urinary retention, and subtle area anaesthesia concerning for acute cauda equina syndrome with neurogenic bladder. Therefore MRI of the lumbar spine was obtained with the above findings, which are not showing any obvious findings of cauda equina syndrome or surgical findings. I consulted neurosurgery and spoke with Belén. Based on our discussion, a Maxwell catheter was placed and she was admitted to the care of the hospitalist, Dr. Mtz, for further evaluation and treatment.    [id number]  Madeline Szymanski was  evaluated during a global COVID-19 pandemic, which necessitated consideration that the patient might be at risk for infection with the SARS-CoV-2 virus that causes COVID-19.   Applicable protocols for evaluation were followed during the patient's care.       Diagnosis:    ICD-10-CM    1. Painless urinary retention due to cauda equina syndrome (H)  G83.4     R33.9      Scribe Disclosure:  I, Jarad Naidu, am serving as a scribe on 8/6/2021 at 12:12 PM to personally document services performed by Solange Lui MD based on my observations and the provider's statements to me.     I, Og Campa, am serving as a scribe () on 8/6/2021 at 12:28 PM to personally document services performed by Solange Lui MD based on my observations and the provider's statements to me.        Solange Lui MD  08/06/21 2604

## 2021-08-06 NOTE — PHARMACY-ADMISSION MEDICATION HISTORY
Pharmacy Medication History  Admission medication history interview status for the 8/6/2021  admission is complete. See EPIC admission navigator for prior to admission medications     Location of Interview: Patient room  Medication history sources: Patient and Surescripts    Significant changes made to the medication list:  Changed: Elavil from 25 mg to 75 mg  Added directions: Relpax and Maxalt  Added: MVI patch, Saxenda, Topiramate, Flexeril  Modified: Gabapentin    In the past week, patient estimated taking medication this percent of the time: greater than 90%    Additional medication history information:   none    Medication reconciliation completed by provider prior to medication history? No    Time spent in this activity: 20 min    Prior to Admission medications    Medication Sig Last Dose Taking? Auth Provider   amitriptyline (ELAVIL) 75 MG tablet Take 75 mg by mouth At Bedtime 8/5/2021 at Unknown time Yes Unknown, Entered By History   cyclobenzaprine (FLEXERIL) 10 MG tablet Take 10 mg by mouth 3 times daily as needed for muscle spasms 8/5/2021 at Unknown time Yes Unknown, Entered By History   eletriptan (RELPAX) 40 MG tablet Take 40 mg by mouth at onset of headache May repeat if needed in 2 hours. Max of 2 doses/24hours Max of 4 days/month prn at prn Yes Reported, Patient   EPINEPHrine (ANY BX GENERIC EQUIV) 0.3 MG/0.3ML injection 2-pack Inject 0.3 mLs (0.3 mg) into the muscle once as needed for anaphylaxis prn at prn Yes Jhon Murphy MD   FLUoxetine (PROZAC) 40 MG capsule Take 40 mg by mouth daily 8/5/2021 at Unknown time Yes Reported, Patient   gabapentin (NEURONTIN) 300 MG capsule Take 300-600 mg by mouth At Bedtime Usually takes 300 mg qhs 8/6/2021 at Unknown time Yes Unknown, Entered By History   lidocaine (LIDODERM) 5 % patch Place 1 patch onto the skin as needed > month Yes Reported, Patient   liraglutide - Weight Management (SAXENDA) 18 MG/3ML pen Inject 3 mg Subcutaneous daily 8/5/2021 at  Unknown time Yes Unknown, Entered By History   rizatriptan (MAXALT-MLT) 10 MG ODT Take 10 mg by mouth at onset of headache for migraine May repeat in 2 hours, with a max of 30 mg in 24 hours and a max of 5 days/month prn at prn Yes Unknown, Entered By History   topiramate (TOPAMAX) 50 MG tablet Take 50 mg by mouth daily RX reads 50 mg twice a day 8/5/2021 at Unknown time Yes Unknown, Entered By History   UNABLE TO FIND Apply 1 patch topically At Bedtime MEDICATION NAME: Patch MD-Vitamin patch 8/5/2021 at Unknown time Yes Unknown, Entered By History   methylPREDNISolone (MEDROL DOSEPAK) 4 MG tablet therapy pack Follow Package Directions   Ashley Lopez PA-C       The information provided in this note is only as accurate as the sources available at the time of update(s)

## 2021-08-06 NOTE — ED NOTES
[facility]  ED Nurse Handoff Report    ED Chief complaint: Back Pain and Leg Pain      ED Diagnosis:   Final diagnoses:   None       Code Status: Full Code    Allergies:   Allergies   Allergen Reactions     Cephalexin Hives     Coconut Flavor Anaphylaxis     Covid-19 (Mrna) Vaccine Anaphylaxis     Pfizer      Prochlorperazine Other (See Comments)     Other reaction(s): Tremors  Tremors  Other reaction(s): Tremors       Coconut Fatty Acids      Other reaction(s): Edema     Metoclopramide Other (See Comments)     Other reaction(s): Tremors  tremors  Other reaction(s): Tremors       Penicillins Hives       Patient Story: Patient presents complaining of back pain and urinary retention   Focused Assessment:  Alert, oriented, able to ambulated.  Bladder scanned for 600 MLs.  Unable to void, aquino in place.  MRI done in the ED.     Treatments and/or interventions provided: labs, imaging   Patient's response to treatments and/or interventions:   Labs Ordered and Resulted from Time of ED Arrival Up to the Time of Departure from the ED   BASIC METABOLIC PANEL - Abnormal; Notable for the following components:       Result Value    Anion Gap 1 (*)     All other components within normal limits   CBC WITH PLATELETS AND DIFFERENTIAL - Abnormal; Notable for the following components:    Absolute Immature Granulocytes 0.1 (*)     All other components within normal limits   CBC WITH PLATELETS & DIFFERENTIAL    Narrative:     The following orders were created for panel order CBC with platelets differential.  Procedure                               Abnormality         Status                     ---------                               -----------         ------                     CBC with platelets and d...[[id number]]  Abnormal            Final result                 Please view results for these tests on the individual orders.   ROUTINE UA WITH MICROSCOPIC REFLEX TO CULTURE   IP ACUTE INDWELLING URINARY CATHETER  "(MELANY)   BLADDER SCAN         To be done/followed up on inpatient unit:      Does this patient have any cognitive concerns?: none    Activity level - Baseline/Home:  Independent  Activity Level - Current:   Stand with Assist    Patient's Preferred language: English   Needed?: No    Isolation: None  Infection: Not Applicable  Patient tested for COVID 19 prior to admission: YES  Bariatric?: No    Vital Signs:   Vitals:    08/06/21 0907   BP: (!) 137/91   Pulse: 112   Resp: 16   Temp: 98.1  F (36.7  C)   TempSrc: Temporal   SpO2: 98%   Weight: 89.8 kg (198 lb)   Height: 1.626 m (5' 4\")       Cardiac Rhythm:     Was the PSS-3 completed:   Yes  What interventions are required if any?               Family Comments:   OBS brochure/video discussed/provided to patient/family: N/A              Name of person given brochure if not patient:               Relationship to patient:     For the majority of the shift this patient's behavior was Green.   Behavioral interventions performed were .    ED NURSE PHONE NUMBER: *[id number]         "

## 2021-08-06 NOTE — CONSULTS
[facility]    Neurosurgery Consultation     Date of Admission:  8/6/2021  Date of Consult (When I saw the patient): 08/06/21    Assessment & Plan   Madeline Szymanski is a 41 year old female who was admitted on 8/6/2021. I was asked to see the patient in consultation.     Assessment: 41F with increased left sided low back pain, left leg pain, urinary retention, and saddle anesthesia. MRI lumbar spine with mild left lateral recess stenosis and mild left foraminal stenosis at L5-S1, no high grade spinal canal stenosis.   Plan:  - No surgical intervention at this time.  - Recommend thoracic spine MRI without contrast to further evaluate for any possible spinal cord findings.   - Continue pain control measures.   - Will continue to follow and provide further recommendations once MRI has been completed.     I have discussed the following assessment and plan with Dr. Escamilla who is in agreement.    Belén Power CNP  [facility] Neurosurgery  [address] [address]  Tel [phone]  Pager [phone]    Code Status    No Order    Reason for Consult   Reason for consult: I was asked by Dr. Lui to evaluate this patient for low back and leg pain.    Primary Care Physician   Tre Jj    Chief Complaint   Low back pain, left leg pain, saddle anesthesia, urinary retention    History is obtained from the patient, electronic health record and emergency department physician    History of Present Illness   Madeline Szymanski is a 41 year old female who was admitted on 8/6/2021. I was asked to see the patient for severe left sided low back pain, left leg pain, urinary retention, and saddle anesthesia. Patient was recently seen at our Neurosurgery clinic on 8/3/21. At that visit, she reported acute on chronic low back pain and left leg pain that started to increase after a long motorcycle ride on Sunday. She also recalls a near-fall on Saturday  after missing a step. She tried a medrol dosepak and underwent S1-2 CURT on 8/3/21. This provided a few hours of relief, but then pain returned and became more severe, along with numbness in the left leg and foot. She developed urinary retention and saddle anesthesia this morning, which prompted her to present to the ED for further evaluation. Patient reports pain starts in the left side low back and radiates to left buttocks and left posterior leg to foot. On exam, patient is awake, alert, and oriented. She is able to move all extremities, but has some pain with movement of left leg. No weakness noted. She was able to feel when tugging on aquino catheter. She did have tenderness to palpation of left SI joint as well.     MRI LUMBAR SPINE WITHOUT CONTRAST   2021 12:23 PM   IMPRESSION:  1. Multilevel degenerative changes of the lumbar spine, as described.  2. No high-grade spinal canal stenosis.  3. Mild left greater than right lateral recess stenosis at L5-S1.  4. Mild left neural foraminal stenosis at L5-S1. Otherwise, no  significant neural foraminal narrowing.    Past Medical History   I have reviewed this patient's medical history and updated it with pertinent information if needed.   Past Medical History:   Diagnosis Date     Depressive disorder      Kidney infection      Migraine      Pancreatitis      UTI (lower urinary tract infection)        Past Surgical History   I have reviewed this patient's surgical history and updated it with pertinent information if needed.  Past Surgical History:   Procedure Laterality Date      SECTION       GI SURGERY         Prior to Admission Medications   Prior to Admission Medications   Prescriptions Last Dose Informant Patient Reported? Taking?   EPINEPHrine (ANY BX GENERIC EQUIV) 0.3 MG/0.3ML injection 2-pack   No No   Sig: Inject 0.3 mLs (0.3 mg) into the muscle once as needed for anaphylaxis   FLUoxetine (PROZAC) 40 MG capsule   Yes No   Sig: Take 40 mg by mouth  daily   amitriptyline (ELAVIL) 25 MG tablet   Yes No   Sig: Take 25 mg by mouth daily   eletriptan (RELPAX) 40 MG tablet   Yes No   Sig: Take 40 mg by mouth as needed   gabapentin (NEURONTIN) 600 MG tablet   Yes No   Sig: Take 600 mg by mouth daily   lidocaine (LIDODERM) 5 % patch   Yes No   Sig: Place 1 patch onto the skin as needed   methylPREDNISolone (MEDROL DOSEPAK) 4 MG tablet therapy pack   No No   Sig: Follow Package Directions   rizatriptan (MAXALT-MLT) 10 MG ODT   Yes No   Sig: Take 10 mg by mouth as needed   vitamin  s/Minerals TABS   Yes No   Sig: Take 1 tablet by mouth daily      Facility-Administered Medications: None     Allergies   Allergies   Allergen Reactions     Cephalexin Hives     Coconut Flavor Anaphylaxis     Covid-19 (Mrna) Vaccine Anaphylaxis     Pfizer      Prochlorperazine Other (See Comments)     Other reaction(s): Tremors  Tremors  Other reaction(s): Tremors       Coconut Fatty Acids      Other reaction(s): Edema     Metoclopramide Other (See Comments)     Other reaction(s): Tremors  tremors  Other reaction(s): Tremors       Penicillins Hives       Social History   I have reviewed this patient's social history and updated it with pertinent information if needed. Madeline Szymanski  reports that she has never smoked. She has never used smokeless tobacco. She reports that she does not drink alcohol and does not use drugs.    Family History   I have reviewed this patient's family history and updated it with pertinent information if needed.   History reviewed. No pertinent family history.    Review of Systems   10 point ROS negative other than symptoms noted in HPI    Physical Exam   Temp: 98.1  F (36.7  C) Temp src: Temporal BP: (!) 137/91 Pulse: 112   Resp: 16 SpO2: 98 %      Vital Signs with Ranges  Temp:  [98.1  F (36.7  C)] 98.1  F (36.7  C)  Pulse:  [112] 112  Resp:  [16] 16  BP: (137)/(91) 137/91  SpO2:  [98 %] 98 %  198 lbs 0 oz     , Blood pressure (!) 137/91, pulse 112, temperature  "98.1  F (36.7  C), temperature source Temporal, resp. rate 16, height 5' 4\" (1.626 m), weight 198 lb (89.8 kg), SpO2 98 %.  198 lbs 0 oz    NEUROLOGICAL EXAMINATION:   Mental status:  Alert and Oriented x 3, speech is fluent.  Motor:    Hip Flexor:                Right: 5/5  Left:  5/5  Hip Adductor:            Right:  5/5  Left:  5/5  Hip Abductor:            Right:  5/5  Left:  5/5  Gastroc Soleus:        Right:  5/5  Left:  5/5  Tib/Ant:                     Right:  5/5  Left:  5/5  EHL:                         Right:  5/5  Left:  5/5  Sensation:  Patient able to feel when tugging on aquino catheter. Sensation intact throughout BLE.   Reflexes:   Negative Babinski.  Negative Clonus.     Lumbar examination reveals no tenderness of the spine or paraspinous muscles.    Tenderness to palpation of left SI joint.     Data     CBC RESULTS:   Recent Labs   Lab Test 08/06/21  1122   WBC 7.5   RBC 4.59   HGB 13.3   HCT 40.9   MCV 89   MCH 29.0   MCHC 32.5   RDW 13.0        Basic Metabolic Panel:  Lab Results   Component Value Date     08/06/2021     05/19/2021      Lab Results   Component Value Date    POTASSIUM 3.7 08/06/2021    POTASSIUM 3.7 05/19/2021     Lab Results   Component Value Date    CHLORIDE 105 08/06/2021    CHLORIDE 108 05/19/2021     Lab Results   Component Value Date    JE 8.9 08/06/2021    JE 8.9 05/19/2021     Lab Results   Component Value Date    CO2 32 08/06/2021    CO2 26 05/19/2021     Lab Results   Component Value Date    BUN 12 08/06/2021    BUN 11 05/19/2021     Lab Results   Component Value Date    CR 1.03 08/06/2021    CR 1.04 05/19/2021     Lab Results   Component Value Date    GLC 84 08/06/2021    GLC 87 05/19/2021     INR:  No results found for: INR      "

## 2021-08-06 NOTE — TELEPHONE ENCOUNTER
Spoke to patient today. Patient had CURT on 8/3/21. She states she developed numbness in her foot and leg that started yesterday, and has now progressed to concerns for saddle anesthesia. She describes decreased sensation in the groin and inability to fully empty bladder. Her leg pain has also increased making it difficult to walk.     Given her symptoms, I advised patient to present to the ED for urgent evaluation. Patient voiced agreement with this plan.     Belén Power CNP  [facility] Neurosurgery  [address] [address]  Tel [phone]  Pager [phone]

## 2021-08-06 NOTE — H&P
[facility]    History and Physical - Hospitalist Service       Date of Admission:  8/6/2021    Assessment & Plan      Madeline Szymanski is a 41 year old female admitted on 8/6/2021.     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  Pain localizes to lumbar spine  I am not able appreciate any true weakness  Neurosurgery consulted in the emergency department, nonoperative management advised at this time, but they will consult  Plan  -Registered observation  -Neurosurgery and neurology consultations  -Urinary catheter placed in the emergency department  -UA  -Symptom control with Flexeril, gabapentin, lidocaine patches  -As needed low-dose oxycodone  -Stool softeners      Chronic medical problems  Chronic migraines  Panic attacks  -Resume amitriptyline, Relpax, Prozac, Maxalt, and Topamax     Diet:   regular  DVT Prophylaxis: Pneumatic Compression Devices  Maxwell Catheter: PRESENT, indication:    Central Lines: None  Code Status:   full code    Clinically Significant Risk Factors Present on Admission                   Disposition Plan   Expected discharge:  recommended to prior living arrangement once workup complete.     The patient's care was discussed with the Patient.    Orion Mtz DO  [facility]  Securely message with the Vocera Web Console (learn more here)  Text page via [employer] Paging/Directory      ______________________________________________________________________    Chief Complaint   Saddle anesthesia    History is obtained from the patient    History of Present Illness   Madeline Szymanski is a 41 year old female who comes into the emergency department for evaluation of severe low back pain that radiates down her left leg  into her left foot.  She reports chronic pain that started initially in .  It seems to be worse after a long bicycle ride on .  Additionally she had a near fall fall on Saturday after losing her footing, but endorses no significant trauma following that episode.  She had a steroid injection on Tuesday with 3 hours of relief.  She has been taking gabapentin, Medrol Dosepak, and Flexeril with no relief.    Neurosurgery was involved in the emergency department and recommended nonoperative management and symptom control.  MRI of the L-spine as below.    She was unable to urinate with greater than 500 cc in her bladder and so Maxwell catheter was placed    Review of Systems    The 10 point Review of Systems is negative other than noted in the HPI or here.     Past Medical History    I have reviewed this patient's medical history and updated it with pertinent information if needed.   Past Medical History:   Diagnosis Date     Depressive disorder      Kidney infection      Migraine      Pancreatitis      UTI (lower urinary tract infection)        Past Surgical History   I have reviewed this patient's surgical history and updated it with pertinent information if needed.  Past Surgical History:   Procedure Laterality Date      SECTION       GI SURGERY         Social History   I have reviewed this patient's social history and updated it with pertinent information if needed.  Social History     Tobacco Use     Smoking status: Never Smoker     Smokeless tobacco: Never Used   Substance Use Topics     Alcohol use: Never     Drug use: Never       Family History   I have reviewed this patient's family history and updated it with pertinent information if needed.  Family History   Problem Relation Age of Onset     Heart Disease Father      Low Back Problems Mother         back surgery         Prior to Admission Medications   Prior to Admission Medications   Prescriptions Last Dose Informant Patient Reported? Taking?    EPINEPHrine (ANY BX GENERIC EQUIV) 0.3 MG/0.3ML injection 2-pack prn at prn  No Yes   Sig: Inject 0.3 mLs (0.3 mg) into the muscle once as needed for anaphylaxis   FLUoxetine (PROZAC) 40 MG capsule 8/5/2021 at Unknown time  Yes Yes   Sig: Take 40 mg by mouth daily   UNABLE TO FIND 8/5/2021 at Unknown time  Yes Yes   Sig: Apply 1 patch topically At Bedtime MEDICATION NAME: Patch MD-Vitamin patch   amitriptyline (ELAVIL) 75 MG tablet 8/5/2021 at Unknown time  Yes Yes   Sig: Take 75 mg by mouth At Bedtime   cyclobenzaprine (FLEXERIL) 10 MG tablet 8/5/2021 at Unknown time  Yes Yes   Sig: Take 10 mg by mouth 3 times daily as needed for muscle spasms   eletriptan (RELPAX) 40 MG tablet prn at prn  Yes Yes   Sig: Take 40 mg by mouth at onset of headache May repeat if needed in 2 hours. Max of 2 doses/24hours Max of 4 days/month   gabapentin (NEURONTIN) 300 MG capsule 8/6/2021 at Unknown time  Yes Yes   Sig: Take 300-600 mg by mouth At Bedtime Usually takes 300 mg qhs   lidocaine (LIDODERM) 5 % patch > month  Yes Yes   Sig: Place 1 patch onto the skin as needed   liraglutide - Weight Management (SAXENDA) 18 MG/3ML pen 8/5/2021 at Unknown time  Yes Yes   Sig: Inject 3 mg Subcutaneous daily   methylPREDNISolone (MEDROL DOSEPAK) 4 MG tablet therapy pack   No No   Sig: Follow Package Directions   rizatriptan (MAXALT-MLT) 10 MG ODT prn at prn  Yes Yes   Sig: Take 10 mg by mouth at onset of headache for migraine May repeat in 2 hours, with a max of 30 mg in 24 hours and a max of 5 days/month   topiramate (TOPAMAX) 50 MG tablet 8/5/2021 at Unknown time  Yes Yes   Sig: Take 50 mg by mouth daily RX reads 50 mg twice a day      Facility-Administered Medications: None     Allergies   Allergies   Allergen Reactions     Cephalexin Hives     Coconut Flavor Anaphylaxis     Covid-19 (Mrna) Vaccine Anaphylaxis     Pfizer      Prochlorperazine Other (See Comments)     Other reaction(s): Tremors  Tremors  Other reaction(s): Tremors        Coconut Fatty Acids      Other reaction(s): Edema     Metoclopramide Other (See Comments)     Other reaction(s): Tremors  tremors  Other reaction(s): Tremors       Penicillins Hives       Physical Exam   Vital Signs: Temp: 98.1  F (36.7  C) Temp src: Temporal BP: (!) 137/91 Pulse: 112   Resp: 16 SpO2: 98 %      Weight: 198 lbs 0 oz    Physical Exam  Vitals and nursing note reviewed.   Constitutional:       Appearance: Normal appearance.   HENT:      Head: Normocephalic and atraumatic.      Nose: Nose normal.      Mouth/Throat:      Mouth: Mucous membranes are moist.      Pharynx: Oropharynx is clear.   Eyes:      Extraocular Movements: Extraocular movements intact.      Conjunctiva/sclera: Conjunctivae normal.      Pupils: Pupils are equal, round, and reactive to light.   Cardiovascular:      Rate and Rhythm: Normal rate and regular rhythm.      Pulses: Normal pulses.      Heart sounds: Normal heart sounds.   Pulmonary:      Effort: Pulmonary effort is normal.      Breath sounds: Normal breath sounds.   Abdominal:      General: Abdomen is flat. Bowel sounds are normal.      Palpations: Abdomen is soft.   Musculoskeletal:         General: No swelling or deformity. Normal range of motion.      Cervical back: Normal range of motion and neck supple.      Comments: Left straight leg elicits pain < 30 degrees.     Skin:     General: Skin is warm and dry.      Capillary Refill: Capillary refill takes less than 2 seconds.   Neurological:      General: No focal deficit present.      Mental Status: She is alert and oriented to person, place, and time. Mental status is at baseline.      Cranial Nerves: No cranial nerve deficit.      Sensory: Sensory deficit present.      Motor: No weakness.      Deep Tendon Reflexes: Reflexes normal.      Comments: Muscle strength intact bilaterally   Psychiatric:         Mood and Affect: Mood normal.         Behavior: Behavior normal.         Thought Content: Thought content normal.          Judgment: Judgment normal.           Data   Data reviewed today: I reviewed all medications, new labs and imaging results over the last 24 hours. I personally reviewed     MRI of the L-spine with and without contrast 8/6/2021: Multilevel degenerative change with no high-grade spinal canal stenosis, mild right lateral stenosis L5/S1, mild left foraminal stenosis at L5/S1    Recent Labs   Lab 08/06/21  1122   WBC 7.5   HGB 13.3   MCV 89         POTASSIUM 3.7   CHLORIDE 105   CO2 32   BUN 12   CR 1.03   ANIONGAP 1*   JE 8.9   GLC 84     Most Recent 3 CBC's:  Recent Labs   Lab Test 08/06/21  1122 05/19/21  1126 09/26/19  0254   WBC 7.5 7.5 9.0   HGB 13.3 14.4 14.0   MCV 89 88 88    318 333     Most Recent 3 BMP's:  Recent Labs   Lab Test 08/06/21  1122 05/19/21  1126 09/26/19  0254    140 138   POTASSIUM 3.7 3.7 4.0   CHLORIDE 105 108 105   CO2 32 26 25   BUN 12 11 8   CR 1.03 1.04 0.83   ANIONGAP 1* 6 8   JE 8.9 8.9 9.1   GLC 84 87 97     Most Recent 2 LFT's:  Recent Labs   Lab Test 05/19/21  1126 09/26/19  0254   AST 14 17   ALT 20 25   ALKPHOS 78 74   BILITOTAL 0.5 0.5     Recent Results (from the past 24 hour(s))   Lumbar spine MRI w/o contrast    Narrative    MRI LUMBAR SPINE WITHOUT CONTRAST   8/6/2021 12:23 PM     HISTORY: Low back pain, cauda equina syndrome suspected; Saddle  anesthesia, unable to urinate and low back pain radiating down left  leg with foot numbness.     TECHNIQUE: Multiplanar multisequence MRI of the lumbar spine without  contrast.     COMPARISON: None.     FINDINGS: Nomenclature is based on 5 lumbar vertebral bodies. Normal  vertebral body heights and sagittal alignment. Unremarkable bone  marrow signal. Varying degrees of multilevel intervertebral disc  desiccation, most pronounced at L5-S1. Normal appearance of the distal  spinal cord with the conus terminating at L2. There is a T2  hyperintense ovoid lesion in the right kidney measuring up to  approximately 14  mm (series 801 image 18) incompletely characterized,  but presumably representing a cyst. Otherwise, the visualized  paraspinous soft tissues and bony pelvis are unremarkable.    Segmental analysis:  T12-L1: Minimal disc height loss. No herniation. Normal facets. No  spinal canal or neural foraminal stenosis.    L1-L2: Minimal disc height loss. No herniation. Normal facets. No  spinal canal or neural foraminal stenosis.    L2-L3: Normal disc height. No herniation. Normal facets. No spinal  canal or neural foraminal stenosis.    L3-L4: Normal disc height. No herniation. Normal facets. No spinal  canal or neural foraminal stenosis.    L4-L5: Normal disc height. No herniation. Normal facets. No spinal  canal or neural foraminal stenosis.    L5-S1: Mild disc height loss. Disc bulge slightly eccentric to the  left with posterior/posterolateral endplate marginal osteophytes. Mild  facet arthropathy. Mild bilateral, left greater than right, lateral  recess stenosis without displacement or overt compression of the  traversing S1 nerve roots. No central spinal canal stenosis. Mild left  neural foraminal narrowing. The right neural foramen is not  significantly narrowed.      Impression    IMPRESSION:  1. Multilevel degenerative changes of the lumbar spine, as described.  2. No high-grade spinal canal stenosis.  3. Mild left greater than right lateral recess stenosis at L5-S1.  4. Mild left neural foraminal stenosis at L5-S1. Otherwise, no  significant neural foraminal narrowing.

## 2021-08-07 ENCOUNTER — APPOINTMENT (OUTPATIENT)
Dept: MRI IMAGING | Facility: CLINIC | Age: 41
End: 2021-08-07
Attending: INTERNAL MEDICINE
Payer: COMMERCIAL

## 2021-08-07 VITALS
BODY MASS INDEX: 33.8 KG/M2 | HEIGHT: 64 IN | OXYGEN SATURATION: 97 % | DIASTOLIC BLOOD PRESSURE: 67 MMHG | HEART RATE: 92 BPM | SYSTOLIC BLOOD PRESSURE: 113 MMHG | RESPIRATION RATE: 16 BRPM | TEMPERATURE: 96.7 F | WEIGHT: 198 LBS

## 2021-08-07 LAB
ANION GAP SERPL CALCULATED.3IONS-SCNC: 2 MMOL/L (ref 3–14)
BASOPHILS # BLD AUTO: 0.1 10E3/UL (ref 0–0.2)
BASOPHILS NFR BLD AUTO: 1 %
BUN SERPL-MCNC: 11 MG/DL (ref 7–30)
CALCIUM SERPL-MCNC: 8.7 MG/DL (ref 8.5–10.1)
CHLORIDE BLD-SCNC: 107 MMOL/L (ref 94–109)
CO2 SERPL-SCNC: 30 MMOL/L (ref 20–32)
CREAT SERPL-MCNC: 0.9 MG/DL (ref 0.52–1.04)
EOSINOPHIL # BLD AUTO: 0.1 10E3/UL (ref 0–0.7)
EOSINOPHIL NFR BLD AUTO: 1 %
ERYTHROCYTE [DISTWIDTH] IN BLOOD BY AUTOMATED COUNT: 13 % (ref 10–15)
GFR SERPL CREATININE-BSD FRML MDRD: 79 ML/MIN/1.73M2
GLUCOSE BLD-MCNC: 87 MG/DL (ref 70–99)
HCT VFR BLD AUTO: 41.7 % (ref 35–47)
HGB BLD-MCNC: 13.7 G/DL (ref 11.7–15.7)
IMM GRANULOCYTES # BLD: 0.1 10E3/UL
IMM GRANULOCYTES NFR BLD: 1 %
LYMPHOCYTES # BLD AUTO: 1.9 10E3/UL (ref 0.8–5.3)
LYMPHOCYTES NFR BLD AUTO: 27 %
MCH RBC QN AUTO: 29.5 PG (ref 26.5–33)
MCHC RBC AUTO-ENTMCNC: 32.9 G/DL (ref 31.5–36.5)
MCV RBC AUTO: 90 FL (ref 78–100)
MONOCYTES # BLD AUTO: 0.4 10E3/UL (ref 0–1.3)
MONOCYTES NFR BLD AUTO: 6 %
NEUTROPHILS # BLD AUTO: 4.4 10E3/UL (ref 1.6–8.3)
NEUTROPHILS NFR BLD AUTO: 64 %
NRBC # BLD AUTO: 0 10E3/UL
NRBC BLD AUTO-RTO: 0 /100
PLATELET # BLD AUTO: 270 10E3/UL (ref 150–450)
POTASSIUM BLD-SCNC: 3.7 MMOL/L (ref 3.4–5.3)
RBC # BLD AUTO: 4.65 10E6/UL (ref 3.8–5.2)
SARS-COV-2 RNA RESP QL NAA+PROBE: NEGATIVE
SODIUM SERPL-SCNC: 139 MMOL/L (ref 133–144)
WBC # BLD AUTO: 6.9 10E3/UL (ref 4–11)

## 2021-08-07 PROCEDURE — 72141 MRI NECK SPINE W/O DYE: CPT

## 2021-08-07 PROCEDURE — 99217 PR OBSERVATION CARE DISCHARGE: CPT | Performed by: PHYSICIAN ASSISTANT

## 2021-08-07 PROCEDURE — 80048 BASIC METABOLIC PNL TOTAL CA: CPT | Performed by: HOSPITALIST

## 2021-08-07 PROCEDURE — 36415 COLL VENOUS BLD VENIPUNCTURE: CPT | Performed by: HOSPITALIST

## 2021-08-07 PROCEDURE — G0378 HOSPITAL OBSERVATION PER HR: HCPCS

## 2021-08-07 PROCEDURE — 250N000013 HC RX MED GY IP 250 OP 250 PS 637: Performed by: HOSPITALIST

## 2021-08-07 PROCEDURE — 96375 TX/PRO/DX INJ NEW DRUG ADDON: CPT

## 2021-08-07 PROCEDURE — 85025 COMPLETE CBC W/AUTO DIFF WBC: CPT | Performed by: HOSPITALIST

## 2021-08-07 PROCEDURE — 250N000011 HC RX IP 250 OP 636: Performed by: PHYSICIAN ASSISTANT

## 2021-08-07 RX ORDER — KETOROLAC TROMETHAMINE 30 MG/ML
30 INJECTION, SOLUTION INTRAMUSCULAR; INTRAVENOUS EVERY 6 HOURS PRN
Status: DISCONTINUED | OUTPATIENT
Start: 2021-08-07 | End: 2021-08-07 | Stop reason: HOSPADM

## 2021-08-07 RX ORDER — OXYCODONE HYDROCHLORIDE 5 MG/1
2.5-5 TABLET ORAL EVERY 4 HOURS PRN
Qty: 6 TABLET | Refills: 0 | Status: SHIPPED | OUTPATIENT
Start: 2021-08-07 | End: 2021-08-10

## 2021-08-07 RX ORDER — ACETAMINOPHEN 500 MG
1000 TABLET ORAL EVERY 6 HOURS PRN
COMMUNITY
Start: 2021-08-07 | End: 2022-08-04

## 2021-08-07 RX ADMIN — TOPIRAMATE 50 MG: 50 TABLET, FILM COATED ORAL at 08:51

## 2021-08-07 RX ADMIN — RIZATRIPTAN BENZOATE 10 MG: 10 TABLET, ORALLY DISINTEGRATING ORAL at 08:52

## 2021-08-07 RX ADMIN — CYCLOBENZAPRINE 10 MG: 10 TABLET, FILM COATED ORAL at 07:01

## 2021-08-07 RX ADMIN — OXYCODONE HYDROCHLORIDE 5 MG: 5 TABLET ORAL at 06:59

## 2021-08-07 RX ADMIN — FLUOXETINE 40 MG: 20 CAPSULE ORAL at 08:50

## 2021-08-07 RX ADMIN — KETOROLAC TROMETHAMINE 30 MG: 30 INJECTION, SOLUTION INTRAMUSCULAR; INTRAVENOUS at 13:54

## 2021-08-07 NOTE — PLAN OF CARE
Pt A&O X4. VSS on RA. Up SBA. Regular diet. PIV SL. Maxwell in place with good output. PRN oxycodone given @1954 and Flexeril given @2115 for pain. Thoracic MRI done and concern for possible cervical spine stenosis, MRI C spine done @0135. Neurology/ Neurosurgery/PT consult. Continue to monitor.      Observation goals PRIOR TO DISCHARGE    Comments: -diagnostic tests and consults completed and resulted -Not met  -vital signs normal or at patient baseline -Met  Nurse to notify provider when observation goals have been met and patient is ready for discharge.

## 2021-08-07 NOTE — PROGRESS NOTES
Observation goals PRIOR TO DISCHARGE    Comments: -diagnostic tests and consults completed and resulted - partially met, nuero surgery consulted  -vital signs normal or at patient baseline -Met.

## 2021-08-07 NOTE — PROGRESS NOTES
Coverage: paged bt Dr Feldman- Neuroradiologist; it seems that her MRI T spine looks fine but there was concern for possible cervical spine stenosis that can cause some edema; recommended MRI C spine- ordered.    ADDENDUM: 2:45 am: reviewed MRI C spine-  at C5-6 there is moderate to severe central canal stenosis to the left of midline secondary to an asymmetric left disc osteophyte complex. No abnormal cord signal; NS will review MRI C spine in am for further recommendations.     Mihaela Robbins MD

## 2021-08-07 NOTE — PROVIDER NOTIFICATION
MD Notification    Notified Person: MD    Notified Person Name: Dr Robbins    Notification Date/Time: 8/7/21 @0017    Notification Interaction: [employer]    Purpose of Notification: Could you call Dr Feldman at [phone] regarding MR thoracic spine results? Thanks    Orders Received:    Comments:

## 2021-08-07 NOTE — PLAN OF CARE
Physical Therapy: Orders received. Chart reviewed and discussed with care team.? Per chart & confirmed with RN at rounds, pt is up SBA, no acute PT needs identified. Physical Therapy not indicated due to no acute PT needs identified.? Defer discharge recommendations to medical team.? Will complete orders.

## 2021-08-07 NOTE — DISCHARGE SUMMARY
[facility]  Hospitalist Discharge Summary       Date of Admission:  8/6/2021  Date of Discharge:  8/7/2021  Discharging Provider: JoAnna K. Barthell, PA-C    Discharge Diagnoses    Left radicular leg pain, suspected sciatica.  Acute low back pain.  Acute urinary retention.  Saddle anesthesia.  Moderate-severe C5-C6 central canal stenosis.  Concern for cauda equina syndrome.  Chronic migraines.  Panic attacks.    Follow-ups Needed After Discharge   Follow-up Appointments     Follow-up and recommended labs and tests      Follow-up with Dr. Escamilla for further evaluation of cervical stenosis   and outpatient EMG study.           Discharge Disposition   Discharged to home  Condition at discharge: Stable    Hospital Course   Madeline Szymanski is a 41 year old female with chronic back pain who presented with acute L low back pain, numbness/tingling pain down posterior L leg, urinary retention (>500 on bladder scan in ED), and complaints of saddles anesthesia. No high grade spinal canal stenosis or neural foraminal narrowing demonstrated on MRI T- and L-spine imaging. T-spine MRI incidentally showed disc osteophyte complex at C5-C6 that was further evaluated with dedicated C-spine MRI that did show moderate-severe central canal stenosis to the left of midline 2/2 disc osteophyte.    Maxwell removed and patient voiding on own prior to discharge with reported resolution of saddle anesthesia -- possible these symptoms related to several doses of Flexeril within short time period and PTA anti-cholinergics. No weakness appreciated on admitting provider or NSG exams.  - Discussed potential side effects of medications.  - Discharge with PTA meds and limited Rx oxycodone. Additionally instructed to resume medrol dose yuni discontinued earlier in the week when she had an CURT.  - Neurology consulted as well, patient informed of having outpatient EMG ordered through her NSG clinic with plans to follow-up with  neurology as indicated. With improving symptoms, would like to discharge home.  - Follow-up with neurosurg for further mngt C5-C6 central canal stenosis.    Consultations This Hospital Stay   NEUROLOGY IP CONSULT  NEUROSURGERY IP CONSULT  PHYSICAL THERAPY ADULT IP CONSULT  PHYSICAL THERAPY ADULT IP CONSULT    Code Status   Full Code    Time Spent on this Encounter   I, JoAnna K. Barthell, PA-C, personally saw the patient today and spent greater than 30 minutes discharging this patient.       This patient was discussed with Dr. Carrera of the Hospitalist Service who agrees with current plans as outlined above.    JoAnna K. Barthell, PA-C  [facility]  ______________________________________________________________________  Physical Exam   Temp: (!) 96.7  F (35.9  C) Temp src: Oral BP: 113/67 Pulse: 92   Resp: 16 SpO2: 97 % O2 Device: None (Room air)    Constitutional: Appears stated age, no acute distress.  Respiratory: Breath sounds CTA. No increased work of breathing.  Cardiovascular: RRR, no rub or murmur. No peripheral edema.  GI: Soft, non-tender, non-distended.  Skin: Warm, dry, no rashes or lesions.  Neuro: Deferred. Present during thorough neurosurg physical evaluation. Please refer exam by Bonnie Mohan PA-C.       Primary Care Physician   Tre Jj    Discharge Orders      Follow-up and recommended labs and tests    Follow-up with Dr. Escamilla for further evaluation of cervical stenosis and outpatient EMG study.     Activity    Your activity upon discharge: activity as tolerated     Reason for your hospital stay    Further evaluation and management of left radicular back pain suspect secondary to sciatica.     Full Code     Diet    Follow this diet upon discharge: Orders Placed This Encounter      Regular Diet Adult         Significant Results and Procedures   Most Recent 3 CBC's:Recent Labs   Lab Test 08/07/21  0649 08/06/21  1122 05/19/21  1126   WBC 6.9 7.5 7.5   HGB 13.7 13.3  14.4   MCV 90 89 88    291 318     Most Recent 3 BMP's:Recent Labs   Lab Test 08/07/21  0649 08/06/21  1122 05/19/21  1126    138 140   POTASSIUM 3.7 3.7 3.7   CHLORIDE 107 105 108   CO2 30 32 26   BUN 11 12 11   CR 0.90 1.03 1.04   ANIONGAP 2* 1* 6   JE 8.7 8.9 8.9   GLC 87 84 87     Most Recent 2 LFT's:Recent Labs   Lab Test 05/19/21  1126 09/26/19  0254   AST 14 17   ALT 20 25   ALKPHOS 78 74   BILITOTAL 0.5 0.5     Most Recent ESR & CRP:No lab results found.,   Results for orders placed or performed during the hospital encounter of 08/06/21   Lumbar spine MRI w/o contrast    Narrative    MRI LUMBAR SPINE WITHOUT CONTRAST   8/6/2021 12:23 PM     HISTORY: Low back pain, cauda equina syndrome suspected; Saddle  anesthesia, unable to urinate and low back pain radiating down left  leg with foot numbness.     TECHNIQUE: Multiplanar multisequence MRI of the lumbar spine without  contrast.     COMPARISON: None.     FINDINGS: Nomenclature is based on 5 lumbar vertebral bodies. Normal  vertebral body heights and sagittal alignment. Unremarkable bone  marrow signal. Varying degrees of multilevel intervertebral disc  desiccation, most pronounced at L5-S1. Normal appearance of the distal  spinal cord with the conus terminating at L2. There is a T2  hyperintense ovoid lesion in the right kidney measuring up to  approximately 14 mm (series 801 image 18) incompletely characterized,  but presumably representing a cyst. Otherwise, the visualized  paraspinous soft tissues and bony pelvis are unremarkable.    Segmental analysis:  T12-L1: Minimal disc height loss. No herniation. Normal facets. No  spinal canal or neural foraminal stenosis.    L1-L2: Minimal disc height loss. No herniation. Normal facets. No  spinal canal or neural foraminal stenosis.    L2-L3: Normal disc height. No herniation. Normal facets. No spinal  canal or neural foraminal stenosis.    L3-L4: Normal disc height. No herniation. Normal facets. No  spinal  canal or neural foraminal stenosis.    L4-L5: Normal disc height. No herniation. Normal facets. No spinal  canal or neural foraminal stenosis.    L5-S1: Mild disc height loss. Disc bulge slightly eccentric to the  left with posterior/posterolateral endplate marginal osteophytes. Mild  facet arthropathy. Mild bilateral, left greater than right, lateral  recess stenosis without displacement or overt compression of the  traversing S1 nerve roots. No central spinal canal stenosis. Mild left  neural foraminal narrowing. The right neural foramen is not  significantly narrowed.      Impression    IMPRESSION:  1. Multilevel degenerative changes of the lumbar spine, as described.  2. No high-grade spinal canal stenosis.  3. Mild left greater than right lateral recess stenosis at L5-S1.  4. Mild left neural foraminal stenosis at L5-S1. Otherwise, no  significant neural foraminal narrowing.    JUNIOR MARTINEZ MD         SYSTEM ID:  [id number]   MR Thoracic Spine w/o Contrast    Narrative    EXAM: MR THORACIC SPINE W/O CONTRAST  LOCATION: [facility]  DATE/TIME: 8/6/2021 11:23 PM    INDICATION: Low back pain, leg pain, urinary retention, saddle anesthesia.    COMPARISON: Lumbar spine MRI dated 8/6/2021.    TECHNIQUE: Routine Thoracic Spine MRI without IV contrast.    FINDINGS: Partially visualized disc osteophyte complex at C5-C6 indents the ventral spinal cord. Question cord edema in the lower cervical spine cord, only seen on the first few images of the axial T2 sequence.    Slight left apex curvature centered in the lower thoracic spine. There is accentuation of the normal thoracic spine kyphosis, with degenerative endplate changes and Schmorl's nodes, suggestive of Scheuermann's disease. There are Modic type I degenerative   endplate changes at T3-T4. No evidence of an acute compression deformity. There are multilevel disc protrusions with a small disc protrusion at C7-T1, T1-T2, a central  disc extrusion at T3-T4 that extends above and below the intervertebral disc space   measuring 12 mm in craniocaudal dimension, at T7-T8, T10-T11, and T11-T12. There is no high grade canal stenosis. Multilevel facet arthropathy. There is no high grade neural foraminal narrowing. No definite cord signal abnormality within the thoracic   spinal cord.     Small bilateral pleural effusions.      Impression    IMPRESSION:  1.  Degenerative changes of the thoracic spine with findings suggestive of Scheuermann's disease.  2.  No high grade spinal canal or neural foraminal narrowing in the thoracic spine.  3.  Partially visualized disc osteophyte complex at C5-C6 that indents the ventral spinal cord. In addition there is questionable cord signal change in the lower cervical spine, which is incompletely evaluated on the current exam. Consider further   evaluation with dedicated cervical spine MRI.    Findings were discussed with Dr. Robbins at 12:24 AM on 08/07/2021.   MR Cervical Spine w/o Contrast    Narrative    EXAM: MR CERVICAL SPINE W/O CONTRAST  LOCATION: [facility]  DATE/TIME: 8/7/2021 1:19 AM    INDICATION: Spinal stenosis, C-spine  COMPARISON: Thoracic spine MRI 8/6/2021  TECHNIQUE: MRI Cervical Spine without IV contrast.    FINDINGS:   Vertebral body height and alignment is preserved. Moderate Modic type I changes anteriorly at T3-4. No definite abnormal signal in the cervical spine. No abnormal cord signal. No extraspinal abnormality.    Craniovertebral junction and C1-C2: Normal.    C2-C3: Normal disc height. No herniation. Normal facets. No spinal canal or neural foraminal stenosis.     C3-C4: Normal disc height. No herniation. Normal facets. No spinal canal or neural foraminal stenosis.     C4-C5: Slight loss of disc height. Slight facet arthropathy. No spinal canal or neural foraminal stenosis.     C5-C6: Moderate loss of disc height. Mild to moderate diffuse disc osteophyte complex,  asymmetric to the left. Slight left uncinate spurring. Moderate to severe central canal stenosis to the left of midline. No definite foraminal narrowing.    C6-C7: Normal disc height. No herniation. Normal facets. No spinal canal or neural foraminal stenosis.     C7-T1: Normal disc height. No herniation. Normal facets. No spinal canal or neural foraminal stenosis.      Impression    IMPRESSION:  1.  At C5-6 there is moderate to severe central canal stenosis to the left of midline secondary to an asymmetric left disc osteophyte complex. No abnormal cord signal.  2.  Minimal degenerative changes elsewhere as described.           Discharge Medications   Current Discharge Medication List      START taking these medications    Details   acetaminophen (TYLENOL) 500 MG tablet Take 2 tablets (1,000 mg) by mouth every 6 hours as needed for mild pain or other (and adjunct with moderate or severe pain or per patient request)    Associated Diagnoses: Acute radicular low back pain      oxyCODONE (ROXICODONE) 5 MG tablet Take 0.5-1 tablets (2.5-5 mg) by mouth every 4 hours as needed for moderate to severe pain  Qty: 6 tablet, Refills: 0    Associated Diagnoses: Acute radicular low back pain         CONTINUE these medications which have NOT CHANGED    Details   amitriptyline (ELAVIL) 75 MG tablet Take 75 mg by mouth At Bedtime      cyclobenzaprine (FLEXERIL) 10 MG tablet Take 10 mg by mouth 3 times daily as needed for muscle spasms      eletriptan (RELPAX) 40 MG tablet Take 40 mg by mouth at onset of headache May repeat if needed in 2 hours. Max of 2 doses/24hours Max of 4 days/month      EPINEPHrine (ANY BX GENERIC EQUIV) 0.3 MG/0.3ML injection 2-pack Inject 0.3 mLs (0.3 mg) into the muscle once as needed for anaphylaxis  Qty: 2 each, Refills: 3      FLUoxetine (PROZAC) 40 MG capsule Take 40 mg by mouth daily      gabapentin (NEURONTIN) 300 MG capsule Take 300-600 mg by mouth At Bedtime Usually takes 300 mg qhs      lidocaine  (LIDODERM) 5 % patch Place 1 patch onto the skin as needed      liraglutide - Weight Management (SAXENDA) 18 MG/3ML pen Inject 3 mg Subcutaneous daily      rizatriptan (MAXALT-MLT) 10 MG ODT Take 10 mg by mouth at onset of headache for migraine May repeat in 2 hours, with a max of 30 mg in 24 hours and a max of 5 days/month      topiramate (TOPAMAX) 50 MG tablet Take 50 mg by mouth daily RX reads 50 mg twice a day      UNABLE TO FIND Apply 1 patch topically At Bedtime MEDICATION NAME: Patch MD-Vitamin patch      methylPREDNISolone (MEDROL DOSEPAK) 4 MG tablet therapy pack Follow Package Directions  Qty: 21 tablet, Refills: 0    Associated Diagnoses: Lumbar radiculopathy           Allergies   Allergies   Allergen Reactions     Cephalexin Hives     Coconut Flavor Anaphylaxis     Covid-19 (Mrna) Vaccine Anaphylaxis     Pfizer      Prochlorperazine Other (See Comments)     Other reaction(s): Tremors  Tremors  Other reaction(s): Tremors       Coconut Fatty Acids      Other reaction(s): Edema     Metoclopramide Other (See Comments)     Other reaction(s): Tremors  tremors  Other reaction(s): Tremors       Penicillins Hives

## 2021-08-07 NOTE — PLAN OF CARE
A&Ox4. VSS on RA. Regular diet. Ind in room. Maxwell removed, voiding, PVR 6mL. Back pain managed w/ oxycodone, tylenol, toradol. MRI spine completed. Neuro surgery consult complete, no surgical interventions ordered. C/O some tingling in LLE foot when ambulating. IV removed, AVS and discharge medication script explained and given to pt at discharge, all questions answered. Pt left unit via w/c by NA,  providing ride home for pt.   no

## 2021-08-07 NOTE — PLAN OF CARE
Observation goals PRIOR TO DISCHARGE     Comments: -diagnostic tests and consults completed and resulted - not met  -vital signs normal or at patient baseline - met  Nurse to notify provider when observation goals have been met and patient is ready for discharge.        A&Ox4. VSS on RA. SBA. Back pain 7/10 radiates to LLE, L flank, and abdomen, lidocaine patch applied. Regular diet. C/O LLE tingling, BLE neuros intact. Maxwell patent and draining. Neurology and Neurosurgery following, Thoracic MRI ordered. PT Consulted. Will monitor.

## 2021-08-07 NOTE — PROGRESS NOTES
Neurosurgery following for left lateral leg pain/paresthesia.  Her aquino catheter was removed this morning.  She feels better today than yesterday.  No upper extremity symptoms or loss of dexterity.  On exam, here saddle numbness has improved, intact strength and sensation.  She does have a jackson sign bilaterally and her reflexes are 3+ throughout, no clonus or babinski.    She underwent a thoracic and cervical MRI which shoes moderate stenosis at C5-C6    Cervical MRI:  FINDINGS:   Vertebral body height and alignment is preserved. Moderate Modic type I changes anteriorly at T3-4. No definite abnormal signal in the cervical spine. No abnormal cord signal. No extraspinal abnormality.     Craniovertebral junction and C1-C2: Normal.     C2-C3: Normal disc height. No herniation. Normal facets. No spinal canal or neural foraminal stenosis.      C3-C4: Normal disc height. No herniation. Normal facets. No spinal canal or neural foraminal stenosis.      C4-C5: Slight loss of disc height. Slight facet arthropathy. No spinal canal or neural foraminal stenosis.      C5-C6: Moderate loss of disc height. Mild to moderate diffuse disc osteophyte complex, asymmetric to the left. Slight left uncinate spurring. Moderate to severe central canal stenosis to the left of midline. No definite foraminal narrowing.     C6-C7: Normal disc height. No herniation. Normal facets. No spinal canal or neural foraminal stenosis.      C7-T1: Normal disc height. No herniation. Normal facets. No spinal canal or neural foraminal stenosis.                                                                    IMPRESSION:  1.  At C5-6 there is moderate to severe central canal stenosis to the left of midline secondary to an asymmetric left disc osteophyte complex. No abnormal cord signal.  2.  Minimal degenerative changes elsewhere as described.    Thoracic MRI:     IMPRESSION:  1.  Degenerative changes of the thoracic spine with findings suggestive of  Scheuermann's disease.  2.  No high grade spinal canal or neural foraminal narrowing in the thoracic spine.  3.  Partially visualized disc osteophyte complex at C5-C6 that indents the ventral spinal cord. In addition there is questionable cord signal change in the lower cervical spine, which is incompletely evaluated on the current exam. Consider further   evaluation with dedicated cervical spine MRI.     RECOMMENDATIONS:  Neurology consult for further work up.  Discussed her cervical MRI findings with her and recommended outpatient follow up with Dr. Lilly to discuss possible surgical intervention in the future for her findings at C5-C6.  No immediate operative NS intervention indicated.  Okay to discharge from our standpoint.  Can continue medrol dose pack at home.    Discussed with Dr. Lilly

## 2021-08-07 NOTE — PROGRESS NOTES
Observation goals PRIOR TO DISCHARGE    Comments: -diagnostic tests and consults completed and resulted -Not met  -vital signs normal or at patient baseline -Met  Nurse to notify provider when observation goals have been met and patient is ready for discharge.

## 2021-08-07 NOTE — PROGRESS NOTES
Observation goals PRIOR TO DISCHARGE    Comments: -diagnostic tests and consults completed and resulted -Not met. Neurology and neurosurgery consulted and following.   -vital signs normal or at patient baseline -Met.   Patient with complaints of migraine headache this morning.  Patient with lingering left low lumbar back pain that radiates down to her foot.

## 2021-08-07 NOTE — PLAN OF CARE
RN:  Patient A/O x4.  VSS on RA.  Able to call and state needs.  Maxalt for complaints of  migraine pain.  States pain in left lumbar area still exists.  Pain radiates down to toes. Ice packs to lower back helps with pain control.   Calls for assistance; not a fall risk.  Tolerating diet.  Neurosurgery following but intends to sign off.  Neurology following.  Maxwell removed.  Patient due to void.   Discharge pending progress.

## 2021-08-10 ENCOUNTER — ANCILLARY PROCEDURE (OUTPATIENT)
Dept: GENERAL RADIOLOGY | Facility: CLINIC | Age: 41
End: 2021-08-10
Attending: SURGERY
Payer: COMMERCIAL

## 2021-08-10 ENCOUNTER — TELEPHONE (OUTPATIENT)
Dept: NEUROSURGERY | Facility: CLINIC | Age: 41
End: 2021-08-10

## 2021-08-10 DIAGNOSIS — M54.2 NECK PAIN: ICD-10-CM

## 2021-08-10 DIAGNOSIS — M54.16 LUMBAR RADICULOPATHY: ICD-10-CM

## 2021-08-10 DIAGNOSIS — M54.2 NECK PAIN: Primary | ICD-10-CM

## 2021-08-10 PROCEDURE — 72050 X-RAY EXAM NECK SPINE 4/5VWS: CPT | Performed by: RADIOLOGY

## 2021-08-10 PROCEDURE — 72110 X-RAY EXAM L-2 SPINE 4/>VWS: CPT | Performed by: RADIOLOGY

## 2021-08-10 NOTE — TELEPHONE ENCOUNTER
PATIENT NAME: Madeline Szymanski  YOB: 1980   MRN: [id number]  SURGEON: Maxx  DATE of CONSULT: 8/7/2021  FINDINGS: central canal stenosis at C5-6 , lumbar radiculopathy with saddle anesthesia       FOLLOW-UP PLAN:   Provider: Dr. Lilly  DIAGNOSTICS: none  DISPOSITION: home         ADDITIONAL INSTRUCTIONS FOR MEDICAL STAFF:

## 2021-08-11 ENCOUNTER — OFFICE VISIT (OUTPATIENT)
Dept: NEUROSURGERY | Facility: CLINIC | Age: 41
End: 2021-08-11
Payer: COMMERCIAL

## 2021-08-11 VITALS
WEIGHT: 198 LBS | RESPIRATION RATE: 16 BRPM | SYSTOLIC BLOOD PRESSURE: 134 MMHG | HEIGHT: 64 IN | DIASTOLIC BLOOD PRESSURE: 92 MMHG | BODY MASS INDEX: 33.8 KG/M2 | OXYGEN SATURATION: 98 % | HEART RATE: 96 BPM

## 2021-08-11 DIAGNOSIS — M48.061 SPINAL STENOSIS OF LUMBAR REGION, UNSPECIFIED WHETHER NEUROGENIC CLAUDICATION PRESENT: Primary | ICD-10-CM

## 2021-08-11 PROCEDURE — 99214 OFFICE O/P EST MOD 30 MIN: CPT | Performed by: SURGERY

## 2021-08-11 ASSESSMENT — MIFFLIN-ST. JEOR: SCORE: 1538.12

## 2021-08-11 NOTE — NURSING NOTE
Neurosurgery consultation was requested by: Self   Pain: Neck and Back  Radicular Pain is present: Left leg, left arm   Lhermitte sign: No  Motor complaints: Minimal weakness   Sensory complaints: Numbness/tingling left foot and left arm intermittently   Gait and balance issues: Pain with walking   Bowel or bladder issues: No  Duration of SX is: 1-2 weeks   Injury: No  Severity is: Severe   Patient has tried the following conservative measures: Injection, PT at home  Lucy Tineo MA,CMA,3:12 PM

## 2021-08-11 NOTE — LETTER
"    8/11/2021         RE: Madeline Szymanski  [address]  [facility] [address]        Dear Colleague,    Thank you for referring your patient, Madeline Szymanski, to the [facility]. Please see a copy of my visit note below.    NEUROSURGERY FOLLOWUP  NOTE    Madeline Szymanski comes today in f/u.  Left lower back pain and left leg pain. Pain is located primarily in her left calf and posterior and lateral foot. Injection at left S1-2 on 8/3/21 last week. Felt great afterwards then worsened her symptoms after that. Developed numbness in her saddle region and was admitted to [facility]. Saddle anesthesia has improved. Leg symptoms stable. Gabapentin and flexeril with minimal relief. Oxycodone and medrol dose pack without significant relief.      She has clumsiness in general of her hands and is s/p CTR b/l. Left hand will fall asleep while driving regularly which may be d/t residual CTS. No arm weakness or pain or paresthesias.     PHYSICAL EXAM:   Constitutional: BP (!) 134/92   Pulse 96   Resp 16   Ht 5' 4\" (1.626 m)   Wt 198 lb (89.8 kg)   SpO2 98%   BMI 33.99 kg/m       Mental Status: A & O in no acute distress.  Affect is appropriate.  Speech is fluent.  Recent and remote memory are intact.  Attention span and concentration are normal.     Motor:  Normal bulk and tone all muscle groups of upper and lower extremities.     Sensory: Sensation intact bilaterally to light touch.     Coordination:   intact tandem gait      Reflexes; supinator, biceps, triceps, knee/ ankle jerk intact- brisk x 4.  b/l hoffmans/   no babinski/ clonus.    IMAGING:   I personally reviewed all radiographic images        CONSULTATION ASSESSMENT AND PLAN:    42 yo female who presenst with left lower back and foot pain as well as hand clumsiness.  MRI lumbar spine shows mild facet arthropathy at lumbar 5-sacral 1 as well as mild bilateral left and right lateral recess stenosis without significant " progression of the S1 nerve roots.  Mild left foraminal narrowing at this level as well.  MRI of cervical spine shows cervical 5-6 moderate to severe spinal canal stenosis due to a left eccentric disc osteophyte complex.  In regards to lumbar spine discussed that without overt compression of the S1 nerve root is unclear how much benefit patient would have from decompressing the nerve root. Could consider SCS trial. Also discussed cervical stenosis and risks and benefits of decompressing this level. At this time she is largely asymptomatic and would like to proceed with conservative management.     I spent more than 30 minutes in this apt, examining the pt, reviewing the scans, reviewing notes from chart, discussing treatment options with risks and benefits and coordinating care.     Radha Lilly MD      CC:     Tre Jj  [address]  [place] [id number]        Again, thank you for allowing me to participate in the care of your patient.        Sincerely,        Radha Lilly MD

## 2021-08-11 NOTE — PROGRESS NOTES
"NEUROSURGERY FOLLOWUP  NOTE    Madeline Szymanski comes today in f/u.  Left lower back pain and left leg pain. Pain is located primarily in her left calf and posterior and lateral foot. Injection at left S1-2 on 8/3/21 last week. Felt great afterwards then worsened her symptoms after that. Developed numbness in her saddle region and was admitted to [facility]. Saddle anesthesia has improved. Leg symptoms stable. Gabapentin and flexeril with minimal relief. Oxycodone and medrol dose pack without significant relief.      She has clumsiness in general of her hands and is s/p CTR b/l. Left hand will fall asleep while driving regularly which may be d/t residual CTS. No arm weakness or pain or paresthesias.     PHYSICAL EXAM:   Constitutional: BP (!) 134/92   Pulse 96   Resp 16   Ht 5' 4\" (1.626 m)   Wt 198 lb (89.8 kg)   SpO2 98%   BMI 33.99 kg/m       Mental Status: A & O in no acute distress.  Affect is appropriate.  Speech is fluent.  Recent and remote memory are intact.  Attention span and concentration are normal.     Motor:  Normal bulk and tone all muscle groups of upper and lower extremities.     Sensory: Sensation intact bilaterally to light touch.     Coordination:   intact tandem gait      Reflexes; supinator, biceps, triceps, knee/ ankle jerk intact- brisk x 4.  b/l hoffmans/   no babinski/ clonus.    IMAGING:   I personally reviewed all radiographic images        CONSULTATION ASSESSMENT AND PLAN:    42 yo female who presenst with left lower back and foot pain as well as hand clumsiness.  MRI lumbar spine shows mild facet arthropathy at lumbar 5-sacral 1 as well as mild bilateral left and right lateral recess stenosis without significant progression of the S1 nerve roots.  Mild left foraminal narrowing at this level as well.  MRI of cervical spine shows cervical 5-6 moderate to severe spinal canal stenosis due to a left eccentric disc osteophyte complex.  In regards to lumbar spine discussed that " without overt compression of the S1 nerve root is unclear how much benefit patient would have from decompressing the nerve root. Could consider SCS trial. Also discussed cervical stenosis and risks and benefits of decompressing this level with artifical disc replacement given patient complains of myelopathy symptoms and as well as examination findings of myelopathy. She will call the office if she wishes to proceed.     I spent more than 30 minutes in this apt, examining the pt, reviewing the scans, reviewing notes from chart, discussing treatment options with risks and benefits and coordinating care.     Radha Lilly MD      CC:     Tre Jj  [address]  [facility] [address]

## 2021-08-15 ENCOUNTER — HEALTH MAINTENANCE LETTER (OUTPATIENT)
Age: 41
End: 2021-08-15

## 2021-08-24 ENCOUNTER — PREP FOR PROCEDURE (OUTPATIENT)
Dept: NEUROSURGERY | Facility: CLINIC | Age: 41
End: 2021-08-24

## 2021-08-24 ENCOUNTER — HOSPITAL ENCOUNTER (INPATIENT)
Facility: HOSPITAL | Age: 41
Setting detail: SURGERY ADMIT
End: 2021-08-24
Attending: SURGERY | Admitting: SURGERY
Payer: COMMERCIAL

## 2021-08-24 DIAGNOSIS — M48.02 SPINAL STENOSIS IN CERVICAL REGION: Primary | ICD-10-CM

## 2021-08-24 DIAGNOSIS — M48.02 SPINAL STENOSIS IN CERVICAL REGION: ICD-10-CM

## 2021-08-31 ENCOUNTER — OFFICE VISIT (OUTPATIENT)
Dept: PHYSICAL MEDICINE AND REHAB | Facility: CLINIC | Age: 41
End: 2021-08-31
Payer: COMMERCIAL

## 2021-08-31 VITALS
DIASTOLIC BLOOD PRESSURE: 84 MMHG | BODY MASS INDEX: 33.8 KG/M2 | WEIGHT: 198 LBS | HEIGHT: 64 IN | SYSTOLIC BLOOD PRESSURE: 128 MMHG

## 2021-08-31 DIAGNOSIS — M48.061 LUMBAR FORAMINAL STENOSIS: ICD-10-CM

## 2021-08-31 DIAGNOSIS — M51.369 DDD (DEGENERATIVE DISC DISEASE), LUMBAR: ICD-10-CM

## 2021-08-31 DIAGNOSIS — M54.16 LEFT LUMBAR RADICULOPATHY: Primary | ICD-10-CM

## 2021-08-31 DIAGNOSIS — M51.369 BULGING LUMBAR DISC: ICD-10-CM

## 2021-08-31 PROCEDURE — 99214 OFFICE O/P EST MOD 30 MIN: CPT | Performed by: NURSE PRACTITIONER

## 2021-08-31 ASSESSMENT — PAIN SCALES - GENERAL: PAINLEVEL: SEVERE PAIN (6)

## 2021-08-31 ASSESSMENT — MIFFLIN-ST. JEOR: SCORE: 1538.12

## 2021-08-31 NOTE — LETTER
2021         RE: Madeline Szymanski  [address]  [facility] [address]        Dear Colleague,    Thank you for referring your patient, Madeline Szymanski, to the [facility] [place]. Please see a copy of my visit note below.      Assessment:     Diagnoses and all orders for this visit:  Left lumbar radiculopathy  -     PAIN Transforaminal CURT Inj Lumbosacral Left; Future  Bulging lumbar disc  -     PAIN Transforaminal CURT Inj Lumbosacral Left; Future  Lumbar foraminal stenosis  -     PAIN Transforaminal CURT Inj Lumbosacral Left; Future  DDD (degenerative disc disease), lumbar  -     PAIN Transforaminal CURT Inj Lumbosacral Left; Future     Madeline Szymanski is a 41 year old y.o. female with past medical history significant for migraine, depressive disorder, , gastric bypass surgery who presents today for follow-up regarding:    -Ongoing left lumbar radiculopathy L5 and S1 dermatome pattern, no lasting benefit with left S1-S2 TFESI.  Currently her leg pain is her biggest concern.    -Subtle weakness on exam left EHL as well as heel raises on the left.     Plan:     A shared decision making plan was used. The patient's values and choices were respected. Prior medical records were reviewed today. The following represents what was discussed and decided upon by the provider and the patient.        -DIAGNOSTIC TESTS: Images were personally reviewed and interpreted.   --Consider left lower extremity EMG if weakness does not improve post injection.  Discussed this option today and patient would like to hold off and trial the injection first.  --Lumbar spine flexion-extension x-ray 8/10/2021 shows no spondylolisthesis and no instability.  --Lumbar spine MRI 2021 with L5-S1 mild disc height loss with disc bulge on the left with osteophytes with mild left foraminal stenosis lateral recess stenosis and S1 compression.    -INTERVENTIONS: Ordered and Left L5-S1 TFESI to see if we get further  relief of left lumbar radiculopathy.  She got no lasting benefit with left S1-S2 TFESI unfortunately.  -Did discuss medial branch block and radiofrequency ablation options however discussed that this would only improve potential back pain, does not improve lower extremity pain.  Patient verbalized understanding.    -MEDICATIONS: Unable to tolerate OTC NSAIDs due to history of gastric bypass surgery.  Advised patient to continue with gabapentin at this time  Discussed side effects of medications and proper use. Patient verbalized understanding.    -PHYSICAL THERAPY: Did discuss lumbar MedX program for intensive core strengthening which patient will be a good candidate for.  Will notify us if she would like to trial this.  Advised patient to continue with home exercises from prior physical therapy sessions and did give her further nerve flossing/nerve glide exercises to trial at home as well.  Discussed the importance of core strengthening, ROM, stretching exercises with the patient and how each of these entities is important in decreasing pain.  Explained to the patient that the purpose of physical therapy is to teach the patient a home exercise program.  These exercises need to be performed every day in order to decrease pain and prevent future occurrences of pain.        -PATIENT EDUCATION:  Total time of 32 minutes spent with the patient, reviewing the chart, placing orders, and documenting.   -Today we also discussed the issues related to the current COVID-19 pandemic, the pros and cons of the current treatment plan, the CDC guidelines such as social distancing, washing the hands, and covering the cough.    -FOLLOW UP: Follow-up for injection with Dr. Zaragoza, sooner at any time if pain symptoms are worsening or weakness worsens.  Advised to contact clinic if symptoms worsen or change.    Subjective:     Madeline Szymanski is a 41 year old female who presents today for follow-up regarding bilateral low back pain  with worsening left low back pain rating to the left lateral thigh posterior calf and lateral calf which is fairly significant into the calf area currently a 6/10, 9 at its worst, 3 at its best.  She does report that its constant and quite significant at this time specifically at bedtime as well.  She reports she got minimal lasting benefit unfortunately with recent TFESI and again her leg pain is much greater than her back pain at this time.    Patient denies current numbness or tingling sensations, does feel slight fatiguing left lower extremity however denies any episodes of her leg giving out on her.  Currently denies right leg symptoms.    Treatment to Date:    Bilateral carpal tunnel steroid injection 11/11/2020 and 5/12/2021 with significant benefit.    L5-S1 TFESI 3/21/2018  Lumbar MBB 2/13/2019 with benefit LBP  Lumbar RFA 3/6/2019  Bilateral L5-S1 TFESI 6/3/2020 with benefit LBP.  Preprocedure pain 8/10, post 5/10.  Left S1-S2 TFESI 8/3/2021 with no lasting benefit.  Preprocedure pain 8/10, post 2/10.    Medications:  Gabapentin 300 mg 1 to 2 tablets at bedtime, unable to tolerate during the daytime.  Amitriptyline 75 mg at bedtime for migraines  Flexeril as needed  Tylenol as needed  Lidocaine gel as needed    No benefit with recent Medrol Dosepak.    *Patient unable to tolerate NSAIDs due to GI surgery.    Patient Active Problem List   Diagnosis     Painless urinary retention due to cauda equina syndrome (H)     Spinal stenosis in cervical region       Current Outpatient Medications   Medication     acetaminophen (TYLENOL) 500 MG tablet     amitriptyline (ELAVIL) 75 MG tablet     cyclobenzaprine (FLEXERIL) 10 MG tablet     gabapentin (NEURONTIN) 300 MG capsule     lidocaine (LIDODERM) 5 % patch     eletriptan (RELPAX) 40 MG tablet     EPINEPHrine (ANY BX GENERIC EQUIV) 0.3 MG/0.3ML injection 2-pack     FLUoxetine (PROZAC) 40 MG capsule     liraglutide - Weight Management (SAXENDA) 18 MG/3ML pen      "rizatriptan (MAXALT-MLT) 10 MG ODT     topiramate (TOPAMAX) 50 MG tablet     UNABLE TO FIND     No current facility-administered medications for this visit.       Allergies   Allergen Reactions     Cephalexin Hives     Coconut Flavor Anaphylaxis     Covid-19 (Mrna) Vaccine Anaphylaxis     Pfizer      Prochlorperazine Other (See Comments)     Other reaction(s): Tremors  Tremors  Other reaction(s): Tremors       Coconut Fatty Acids      Other reaction(s): Edema     Metoclopramide Other (See Comments)     Other reaction(s): Tremors  tremors  Other reaction(s): Tremors       Penicillins Hives       Past Medical History:   Diagnosis Date     Depressive disorder      Kidney infection      Migraine      Pancreatitis      UTI (lower urinary tract infection)         Review of Systems  ROS:  Specifically negative for bowel/bladder dysfunction, balance changes, headache, dizziness, foot drop, fevers, chills, appetite changes, nausea/vomiting, unexplained weight loss. Otherwise 13 systems reviewed are negative. Please see the patient's intake questionnaire from today for details.    Reviewed Social, Family, Past Medical and Past Surgical history with patient, no significant changes noted since prior visit.     Objective:     /84 (BP Location: Right arm, Patient Position: Sitting)   Ht 5' 4\" (1.626 m)   Wt 198 lb (89.8 kg)   BMI 33.99 kg/m      PHYSICAL EXAMINATION:    --CONSTITUTIONAL: Well developed, well nourished, healthy appearing individual.  --PSYCHIATRIC: Appropriate mood and affect. No difficulty interacting due to temper, social withdrawal, or memory issues.  --SKIN: Lumbar region is dry and intact.   --RESPIRATORY: Normal rhythm and effort. No abnormal accessory muscle breathing patterns noted.   --MUSCULOSKELETAL:  Normal lumbar lordosis noted, no lateral shift.  --GROSS MOTOR: Easily arises from a seated position. Gait is non-antalgic  --LUMBAR SPINE:  Inspection reveals no evidence of deformity. Range of " motion is not limited in lumbar flexion, extension, lateral rotation.   --SACROILIAC JOINT:  One Finger point test negative.  --LOWER EXTREMITY MOTOR TESTING:  Plantar flexion left 5/5, right 5/5   Dorsiflexion left 5/5, right 5/5   Great toe MTP extension left 4/5, right 5/5  Knee flexion left 5/5, right 5/5  Knee extension left 5/5, right 5/5   Hip flexion left 5/5, right 5/5  Hip abduction left 5/5, right 5/5  Hip adduction left 5/5, right 5/5   Heel raises 3/5 left and 5/5 right  --HIPS: Full range of motion bilaterally.  --NEUROLOGIC: Bilateral patellar and achilles reflexes are physiologic and symmetric. Sensation to light touch is intact in the bilateral L4, L5, and S1 dermatomes.    RESULTS:   Imaging: Lumbar spine imaging was reviewed today. The images were shown to the patient and the findings were explained using a spine model.      XR Cervical Spine G/E 4 Views  Result Date: 8/11/2021  CERVICAL SPINE FOUR OR MORE VIEWS August 10, 2021 5:20 PM HISTORY: Neck pain. COMPARISON: MRI of the cervical spine dated 8/7/2021.   IMPRESSION: No gross vertebral body height loss identified. Alignment is within normal limits. On flexion and extension views, no significant dynamic spondylolisthesis identified. Multilevel degenerative disc disease, including mild-to-moderate disc space narrowing at C5-C6 and C6-C7. Small multilevel anterior endplate osteophytes. The prevertebral soft tissues appear normal. JUNIOR MARTINEZ MD   SYSTEM ID:  [id number]      XR Lumbar Spine G/E 4 Views  Result Date: 8/11/2021  LUMBAR SPINE FOUR OR MORE VIEWS August 10, 2021 5:15 PM HISTORY: Lumbar radiculopathy. COMPARISON: MRI lumbar spine 8/6/2021. CT abdomen and pelvis 5/19/2021.   IMPRESSION: Nomenclature is based on five lumbar vertebral bodies. There appears to be mild dextroconvex curvature at the thoracolumbar junction. Alignment otherwise appears within normal limits. No significant dynamic spondylolisthesis identified on  flexion-extension views. No gross vertebral body height loss. Mild multilevel degenerative disc space narrowing with small anterior marginal endplate osteophytes. Mild lower lumbar degenerative facet arthropathy. There is a suture material noted in the left upper quadrant of the abdomen, as before. JUNIOR MARTINEZ MD   SYSTEM ID:  [id number]      MR Cervical Spine w/o Contrast  Result Date: 8/7/2021  EXAM: MR CERVICAL SPINE W/O CONTRAST LOCATION: [facility] DATE/TIME: 8/7/2021 1:19 AM INDICATION: Spinal stenosis, C-spine COMPARISON: Thoracic spine MRI 8/6/2021 TECHNIQUE: MRI Cervical Spine without IV contrast. FINDINGS: Vertebral body height and alignment is preserved. Moderate Modic type I changes anteriorly at T3-4. No definite abnormal signal in the cervical spine. No abnormal cord signal. No extraspinal abnormality. Craniovertebral junction and C1-C2: Normal. C2-C3: Normal disc height. No herniation. Normal facets. No spinal canal or neural foraminal stenosis. C3-C4: Normal disc height. No herniation. Normal facets. No spinal canal or neural foraminal stenosis. C4-C5: Slight loss of disc height. Slight facet arthropathy. No spinal canal or neural foraminal stenosis. C5-C6: Moderate loss of disc height. Mild to moderate diffuse disc osteophyte complex, asymmetric to the left. Slight left uncinate spurring. Moderate to severe central canal stenosis to the left of midline. No definite foraminal narrowing. C6-C7: Normal disc height. No herniation. Normal facets. No spinal canal or neural foraminal stenosis. C7-T1: Normal disc height. No herniation. Normal facets. No spinal canal or neural foraminal stenosis.   IMPRESSION:   1.  At C5-6 there is moderate to severe central canal stenosis to the left of midline secondary to an asymmetric left disc osteophyte complex. No abnormal cord signal.   2.  Minimal degenerative changes elsewhere as described.     Lumbar spine MRI w/o contrast  Result  Date: 8/6/2021  MRI LUMBAR SPINE WITHOUT CONTRAST   8/6/2021 12:23 PM HISTORY: Low back pain, cauda equina syndrome suspected; Saddle anesthesia, unable to urinate and low back pain radiating down left leg with foot numbness. TECHNIQUE: Multiplanar multisequence MRI of the lumbar spine without contrast. COMPARISON: None. FINDINGS: Nomenclature is based on 5 lumbar vertebral bodies. Normal vertebral body heights and sagittal alignment. Unremarkable bone marrow signal. Varying degrees of multilevel intervertebral disc desiccation, most pronounced at L5-S1. Normal appearance of the distal spinal cord with the conus terminating at L2. There is a T2 hyperintense ovoid lesion in the right kidney measuring up to approximately 14 mm (series 801 image 18) incompletely characterized, but presumably representing a cyst. Otherwise, the visualized paraspinous soft tissues and bony pelvis are unremarkable. Segmental analysis: T12-L1: Minimal disc height loss. No herniation. Normal facets. No spinal canal or neural foraminal stenosis. L1-L2: Minimal disc height loss. No herniation. Normal facets. No spinal canal or neural foraminal stenosis. L2-L3: Normal disc height. No herniation. Normal facets. No spinal canal or neural foraminal stenosis. L3-L4: Normal disc height. No herniation. Normal facets. No spinal canal or neural foraminal stenosis. L4-L5: Normal disc height. No herniation. Normal facets. No spinal canal or neural foraminal stenosis. L5-S1: Mild disc height loss. Disc bulge slightly eccentric to the left with posterior/posterolateral endplate marginal osteophytes. Mild facet arthropathy. Mild bilateral, left greater than right, lateral recess stenosis without displacement or overt compression of the traversing S1 nerve roots. No central spinal canal stenosis. Mild left neural foraminal narrowing. The right neural foramen is not significantly narrowed.   IMPRESSION:   1. Multilevel degenerative changes of the lumbar  spine, as described.   2. No high-grade spinal canal stenosis.   3. Mild left greater than right lateral recess stenosis at L5-S1.   4. Mild left neural foraminal stenosis at L5-S1. Otherwise, no significant neural foraminal narrowing. JUNIOR MARTINEZ MD   SYSTEM ID:  [id number]    MR Thoracic Spine w/o Contrast  Result Date: 8/7/2021  EXAM: MR THORACIC SPINE W/O CONTRAST LOCATION: [facility] DATE/TIME: 8/6/2021 11:23 PM INDICATION: Low back pain, leg pain, urinary retention, saddle anesthesia. COMPARISON: Lumbar spine MRI dated 8/6/2021. TECHNIQUE: Routine Thoracic Spine MRI without IV contrast. FINDINGS: Partially visualized disc osteophyte complex at C5-C6 indents the ventral spinal cord. Question cord edema in the lower cervical spine cord, only seen on the first few images of the axial T2 sequence. Slight left apex curvature centered in the lower thoracic spine. There is accentuation of the normal thoracic spine kyphosis, with degenerative endplate changes and Schmorl's nodes, suggestive of Scheuermann's disease. There are Modic type I degenerative  endplate changes at T3-T4. No evidence of an acute compression deformity. There are multilevel disc protrusions with a small disc protrusion at C7-T1, T1-T2, a central disc extrusion at T3-T4 that extends above and below the intervertebral disc space measuring 12 mm in craniocaudal dimension, at T7-T8, T10-T11, and T11-T12. There is no high grade canal stenosis. Multilevel facet arthropathy. There is no high grade neural foraminal narrowing. No definite cord signal abnormality within the thoracic spinal cord. Small bilateral pleural effusions.   IMPRESSION:   1.  Degenerative changes of the thoracic spine with findings suggestive of Scheuermann's disease.   2.  No high grade spinal canal or neural foraminal narrowing in the thoracic spine.   3.  Partially visualized disc osteophyte complex at C5-C6 that indents the ventral spinal cord. In  addition there is questionable cord signal change in the lower cervical spine, which is incompletely evaluated on the current exam. Consider further evaluation with dedicated cervical spine MRI. Findings were discussed with Dr. Robbins at 12:24 AM on 08/07/2021.                            Again, thank you for allowing me to participate in the care of your patient.        Sincerely,        Esme Hodgson, CNP

## 2021-08-31 NOTE — PROGRESS NOTES
Assessment:     Diagnoses and all orders for this visit:  Left lumbar radiculopathy  -     PAIN Transforaminal CURT Inj Lumbosacral Left; Future  -     oxyCODONE-acetaminophen (PERCOCET) 5-325 MG tablet; Take 1 tablet by mouth nightly as needed for severe pain  Bulging lumbar disc  -     PAIN Transforaminal CURT Inj Lumbosacral Left; Future  Lumbar foraminal stenosis  -     PAIN Transforaminal CURT Inj Lumbosacral Left; Future  DDD (degenerative disc disease), lumbar  -     PAIN Transforaminal CURT Inj Lumbosacral Left; Future     Madeline Szymanski is a 41 year old y.o. female with past medical history significant for migraine, depressive disorder, , gastric bypass surgery who presents today for follow-up regarding:    -Ongoing left lumbar radiculopathy L5 and S1 dermatome pattern, no lasting benefit with left S1-S2 TFESI.  Currently her leg pain is her biggest concern.    -Subtle weakness on exam left EHL as well as heel raises on the left.     Plan:     A shared decision making plan was used. The patient's values and choices were respected. Prior medical records were reviewed today. The following represents what was discussed and decided upon by the provider and the patient.        -DIAGNOSTIC TESTS: Images were personally reviewed and interpreted.   --Consider left lower extremity EMG if weakness does not improve post injection.  Discussed this option today and patient would like to hold off and trial the injection first.  --Lumbar spine flexion-extension x-ray 8/10/2021 shows no spondylolisthesis and no instability.  --Lumbar spine MRI 2021 with L5-S1 mild disc height loss with disc bulge on the left with osteophytes with mild left foraminal stenosis lateral recess stenosis and S1 compression.    -INTERVENTIONS: Ordered and Left L5-S1 TFESI to see if we get further relief of left lumbar radiculopathy.  She got no lasting benefit with left S1-S2 TFESI unfortunately.  -Did discuss medial branch block and  radiofrequency ablation options however discussed that this would only improve potential back pain, does not improve lower extremity pain.  Patient verbalized understanding.    -MEDICATIONS: Unable to tolerate OTC NSAIDs due to history of gastric bypass surgery.  Advised patient to continue with gabapentin at this time.  -Prescription sent for Percocet 5/325 mg 1 tablet as needed for severe breakthrough pain at bedtime only.  # 7 tablets given for 7 days worth.  Patient is aware that she should not drive while taking this medication and that this is for acute severe breakthrough pain only, not a long-term option for medication management.  [place]  checked. Discussed the risks (eg, addiction, overdose, worsening pain) verses benefit of opioid use with patient today. Explained that this medication will not be a long term solution to ongoing pain. Discussed using lowest effective dose and the importance of other measures for pain management including PT, other non-opioid medications, behavioral treatments, and other procedure options.   Discussed side effects of medications and proper use. Patient verbalized understanding.    -PHYSICAL THERAPY: Did discuss lumbar MedX program for intensive core strengthening which patient will be a good candidate for.  Will notify us if she would like to trial this.  Advised patient to continue with home exercises from prior physical therapy sessions and did give her further nerve flossing/nerve glide exercises to trial at home as well.  Discussed the importance of core strengthening, ROM, stretching exercises with the patient and how each of these entities is important in decreasing pain.  Explained to the patient that the purpose of physical therapy is to teach the patient a home exercise program.  These exercises need to be performed every day in order to decrease pain and prevent future occurrences of pain.        -PATIENT EDUCATION:  Total time of 32 minutes spent with the patient,  reviewing the chart, placing orders, and documenting.   -Today we also discussed the issues related to the current COVID-19 pandemic, the pros and cons of the current treatment plan, the CDC guidelines such as social distancing, washing the hands, and covering the cough.    -FOLLOW UP: Follow-up for injection with Dr. Zaragoza, sooner at any time if pain symptoms are worsening or weakness worsens.  Advised to contact clinic if symptoms worsen or change.    Subjective:     Madeline Szymanski is a 41 year old female who presents today for follow-up regarding bilateral low back pain with worsening left low back pain rating to the left lateral thigh posterior calf and lateral calf which is fairly significant into the calf area currently a 6/10, 9 at its worst, 3 at its best.  She does report that its constant and quite significant at this time specifically at bedtime as well.  She reports she got minimal lasting benefit unfortunately with recent TFESI and again her leg pain is much greater than her back pain at this time.    Patient denies current numbness or tingling sensations, does feel slight fatiguing left lower extremity however denies any episodes of her leg giving out on her.  Currently denies right leg symptoms.    Treatment to Date:    Bilateral carpal tunnel steroid injection 11/11/2020 and 5/12/2021 with significant benefit.    L5-S1 TFESI 3/21/2018  Lumbar MBB 2/13/2019 with benefit LBP  Lumbar RFA 3/6/2019  Bilateral L5-S1 TFESI 6/3/2020 with benefit LBP.  Preprocedure pain 8/10, post 5/10.  Left S1-S2 TFESI 8/3/2021 with no lasting benefit.  Preprocedure pain 8/10, post 2/10.    Medications:  Gabapentin 300 mg 1 to 2 tablets at bedtime, unable to tolerate during the daytime.  Amitriptyline 75 mg at bedtime for migraines  Flexeril as needed  Tylenol as needed  Lidocaine gel as needed    No benefit with recent Medrol Dosepak.    *Patient unable to tolerate NSAIDs due to GI surgery.    Patient Active Problem  "List   Diagnosis     Painless urinary retention due to cauda equina syndrome (H)     Spinal stenosis in cervical region       Current Outpatient Medications   Medication     acetaminophen (TYLENOL) 500 MG tablet     amitriptyline (ELAVIL) 75 MG tablet     cyclobenzaprine (FLEXERIL) 10 MG tablet     gabapentin (NEURONTIN) 300 MG capsule     lidocaine (LIDODERM) 5 % patch     oxyCODONE-acetaminophen (PERCOCET) 5-325 MG tablet     eletriptan (RELPAX) 40 MG tablet     EPINEPHrine (ANY BX GENERIC EQUIV) 0.3 MG/0.3ML injection 2-pack     FLUoxetine (PROZAC) 40 MG capsule     liraglutide - Weight Management (SAXENDA) 18 MG/3ML pen     rizatriptan (MAXALT-MLT) 10 MG ODT     topiramate (TOPAMAX) 50 MG tablet     UNABLE TO FIND     No current facility-administered medications for this visit.       Allergies   Allergen Reactions     Cephalexin Hives     Coconut Flavor Anaphylaxis     Covid-19 (Mrna) Vaccine Anaphylaxis     Pfizer      Prochlorperazine Other (See Comments)     Other reaction(s): Tremors  Tremors  Other reaction(s): Tremors       Coconut Fatty Acids      Other reaction(s): Edema     Metoclopramide Other (See Comments)     Other reaction(s): Tremors  tremors  Other reaction(s): Tremors       Penicillins Hives       Past Medical History:   Diagnosis Date     Depressive disorder      Kidney infection      Migraine      Pancreatitis      UTI (lower urinary tract infection)         Review of Systems  ROS:  Specifically negative for bowel/bladder dysfunction, balance changes, headache, dizziness, foot drop, fevers, chills, appetite changes, nausea/vomiting, unexplained weight loss. Otherwise 13 systems reviewed are negative. Please see the patient's intake questionnaire from today for details.    Reviewed Social, Family, Past Medical and Past Surgical history with patient, no significant changes noted since prior visit.     Objective:     /84 (BP Location: Right arm, Patient Position: Sitting)   Ht 5' 4\" (1.626 " m)   Wt 198 lb (89.8 kg)   BMI 33.99 kg/m      PHYSICAL EXAMINATION:    --CONSTITUTIONAL: Well developed, well nourished, healthy appearing individual.  --PSYCHIATRIC: Appropriate mood and affect. No difficulty interacting due to temper, social withdrawal, or memory issues.  --SKIN: Lumbar region is dry and intact.   --RESPIRATORY: Normal rhythm and effort. No abnormal accessory muscle breathing patterns noted.   --MUSCULOSKELETAL:  Normal lumbar lordosis noted, no lateral shift.  --GROSS MOTOR: Easily arises from a seated position. Gait is non-antalgic  --LUMBAR SPINE:  Inspection reveals no evidence of deformity. Range of motion is not limited in lumbar flexion, extension, lateral rotation.   --SACROILIAC JOINT:  One Finger point test negative.  --LOWER EXTREMITY MOTOR TESTING:  Plantar flexion left 5/5, right 5/5   Dorsiflexion left 5/5, right 5/5   Great toe MTP extension left 4/5, right 5/5  Knee flexion left 5/5, right 5/5  Knee extension left 5/5, right 5/5   Hip flexion left 5/5, right 5/5  Hip abduction left 5/5, right 5/5  Hip adduction left 5/5, right 5/5   Heel raises 3/5 left and 5/5 right  --HIPS: Full range of motion bilaterally.  --NEUROLOGIC: Bilateral patellar and achilles reflexes are physiologic and symmetric. Sensation to light touch is intact in the bilateral L4, L5, and S1 dermatomes.    RESULTS:   Imaging: Lumbar spine imaging was reviewed today. The images were shown to the patient and the findings were explained using a spine model.      XR Cervical Spine G/E 4 Views  Result Date: 8/11/2021  CERVICAL SPINE FOUR OR MORE VIEWS August 10, 2021 5:20 PM HISTORY: Neck pain. COMPARISON: MRI of the cervical spine dated 8/7/2021.   IMPRESSION: No gross vertebral body height loss identified. Alignment is within normal limits. On flexion and extension views, no significant dynamic spondylolisthesis identified. Multilevel degenerative disc disease, including mild-to-moderate disc space narrowing at  C5-C6 and C6-C7. Small multilevel anterior endplate osteophytes. The prevertebral soft tissues appear normal. JUNIOR MARTINEZ MD   SYSTEM ID:  [id number]      XR Lumbar Spine G/E 4 Views  Result Date: 8/11/2021  LUMBAR SPINE FOUR OR MORE VIEWS August 10, 2021 5:15 PM HISTORY: Lumbar radiculopathy. COMPARISON: MRI lumbar spine 8/6/2021. CT abdomen and pelvis 5/19/2021.   IMPRESSION: Nomenclature is based on five lumbar vertebral bodies. There appears to be mild dextroconvex curvature at the thoracolumbar junction. Alignment otherwise appears within normal limits. No significant dynamic spondylolisthesis identified on flexion-extension views. No gross vertebral body height loss. Mild multilevel degenerative disc space narrowing with small anterior marginal endplate osteophytes. Mild lower lumbar degenerative facet arthropathy. There is a suture material noted in the left upper quadrant of the abdomen, as before. JUNIOR MARTINEZ MD   SYSTEM ID:  [id number]      MR Cervical Spine w/o Contrast  Result Date: 8/7/2021  EXAM: MR CERVICAL SPINE W/O CONTRAST LOCATION: [facility] DATE/TIME: 8/7/2021 1:19 AM INDICATION: Spinal stenosis, C-spine COMPARISON: Thoracic spine MRI 8/6/2021 TECHNIQUE: MRI Cervical Spine without IV contrast. FINDINGS: Vertebral body height and alignment is preserved. Moderate Modic type I changes anteriorly at T3-4. No definite abnormal signal in the cervical spine. No abnormal cord signal. No extraspinal abnormality. Craniovertebral junction and C1-C2: Normal. C2-C3: Normal disc height. No herniation. Normal facets. No spinal canal or neural foraminal stenosis. C3-C4: Normal disc height. No herniation. Normal facets. No spinal canal or neural foraminal stenosis. C4-C5: Slight loss of disc height. Slight facet arthropathy. No spinal canal or neural foraminal stenosis. C5-C6: Moderate loss of disc height. Mild to moderate diffuse disc osteophyte complex, asymmetric to the left.  Slight left uncinate spurring. Moderate to severe central canal stenosis to the left of midline. No definite foraminal narrowing. C6-C7: Normal disc height. No herniation. Normal facets. No spinal canal or neural foraminal stenosis. C7-T1: Normal disc height. No herniation. Normal facets. No spinal canal or neural foraminal stenosis.   IMPRESSION:   1.  At C5-6 there is moderate to severe central canal stenosis to the left of midline secondary to an asymmetric left disc osteophyte complex. No abnormal cord signal.   2.  Minimal degenerative changes elsewhere as described.     Lumbar spine MRI w/o contrast  Result Date: 8/6/2021  MRI LUMBAR SPINE WITHOUT CONTRAST   8/6/2021 12:23 PM HISTORY: Low back pain, cauda equina syndrome suspected; Saddle anesthesia, unable to urinate and low back pain radiating down left leg with foot numbness. TECHNIQUE: Multiplanar multisequence MRI of the lumbar spine without contrast. COMPARISON: None. FINDINGS: Nomenclature is based on 5 lumbar vertebral bodies. Normal vertebral body heights and sagittal alignment. Unremarkable bone marrow signal. Varying degrees of multilevel intervertebral disc desiccation, most pronounced at L5-S1. Normal appearance of the distal spinal cord with the conus terminating at L2. There is a T2 hyperintense ovoid lesion in the right kidney measuring up to approximately 14 mm (series 801 image 18) incompletely characterized, but presumably representing a cyst. Otherwise, the visualized paraspinous soft tissues and bony pelvis are unremarkable. Segmental analysis: T12-L1: Minimal disc height loss. No herniation. Normal facets. No spinal canal or neural foraminal stenosis. L1-L2: Minimal disc height loss. No herniation. Normal facets. No spinal canal or neural foraminal stenosis. L2-L3: Normal disc height. No herniation. Normal facets. No spinal canal or neural foraminal stenosis. L3-L4: Normal disc height. No herniation. Normal facets. No spinal canal or  neural foraminal stenosis. L4-L5: Normal disc height. No herniation. Normal facets. No spinal canal or neural foraminal stenosis. L5-S1: Mild disc height loss. Disc bulge slightly eccentric to the left with posterior/posterolateral endplate marginal osteophytes. Mild facet arthropathy. Mild bilateral, left greater than right, lateral recess stenosis without displacement or overt compression of the traversing S1 nerve roots. No central spinal canal stenosis. Mild left neural foraminal narrowing. The right neural foramen is not significantly narrowed.   IMPRESSION:   1. Multilevel degenerative changes of the lumbar spine, as described.   2. No high-grade spinal canal stenosis.   3. Mild left greater than right lateral recess stenosis at L5-S1.   4. Mild left neural foraminal stenosis at L5-S1. Otherwise, no significant neural foraminal narrowing. JUNIOR MARTINEZ MD   SYSTEM ID:  [id number]    MR Thoracic Spine w/o Contrast  Result Date: 8/7/2021  EXAM: MR THORACIC SPINE W/O CONTRAST LOCATION: [facility] DATE/TIME: 8/6/2021 11:23 PM INDICATION: Low back pain, leg pain, urinary retention, saddle anesthesia. COMPARISON: Lumbar spine MRI dated 8/6/2021. TECHNIQUE: Routine Thoracic Spine MRI without IV contrast. FINDINGS: Partially visualized disc osteophyte complex at C5-C6 indents the ventral spinal cord. Question cord edema in the lower cervical spine cord, only seen on the first few images of the axial T2 sequence. Slight left apex curvature centered in the lower thoracic spine. There is accentuation of the normal thoracic spine kyphosis, with degenerative endplate changes and Schmorl's nodes, suggestive of Scheuermann's disease. There are Modic type I degenerative  endplate changes at T3-T4. No evidence of an acute compression deformity. There are multilevel disc protrusions with a small disc protrusion at C7-T1, T1-T2, a central disc extrusion at T3-T4 that extends above and below the  intervertebral disc space measuring 12 mm in craniocaudal dimension, at T7-T8, T10-T11, and T11-T12. There is no high grade canal stenosis. Multilevel facet arthropathy. There is no high grade neural foraminal narrowing. No definite cord signal abnormality within the thoracic spinal cord. Small bilateral pleural effusions.   IMPRESSION:   1.  Degenerative changes of the thoracic spine with findings suggestive of Scheuermann's disease.   2.  No high grade spinal canal or neural foraminal narrowing in the thoracic spine.   3.  Partially visualized disc osteophyte complex at C5-C6 that indents the ventral spinal cord. In addition there is questionable cord signal change in the lower cervical spine, which is incompletely evaluated on the current exam. Consider further evaluation with dedicated cervical spine MRI. Findings were discussed with Dr. Robbins at 12:24 AM on 08/07/2021.

## 2021-09-01 ENCOUNTER — ANCILLARY PROCEDURE (OUTPATIENT)
Dept: PHYSICAL MEDICINE AND REHAB | Facility: CLINIC | Age: 41
End: 2021-09-01
Attending: NURSE PRACTITIONER
Payer: COMMERCIAL

## 2021-09-01 VITALS
TEMPERATURE: 98.5 F | SYSTOLIC BLOOD PRESSURE: 122 MMHG | OXYGEN SATURATION: 99 % | DIASTOLIC BLOOD PRESSURE: 88 MMHG | HEART RATE: 93 BPM

## 2021-09-01 DIAGNOSIS — M51.369 BULGING LUMBAR DISC: ICD-10-CM

## 2021-09-01 DIAGNOSIS — M51.369 DDD (DEGENERATIVE DISC DISEASE), LUMBAR: ICD-10-CM

## 2021-09-01 DIAGNOSIS — M54.16 LEFT LUMBAR RADICULOPATHY: ICD-10-CM

## 2021-09-01 DIAGNOSIS — M48.061 LUMBAR FORAMINAL STENOSIS: ICD-10-CM

## 2021-09-01 PROCEDURE — 64483 NJX AA&/STRD TFRM EPI L/S 1: CPT | Mod: LT | Performed by: PHYSICAL MEDICINE & REHABILITATION

## 2021-09-01 RX ORDER — METHYLPREDNISOLONE ACETATE 80 MG/ML
INJECTION, SUSPENSION INTRA-ARTICULAR; INTRALESIONAL; INTRAMUSCULAR; SOFT TISSUE
Status: COMPLETED | OUTPATIENT
Start: 2021-09-01 | End: 2021-09-01

## 2021-09-01 RX ORDER — LIDOCAINE HYDROCHLORIDE 10 MG/ML
INJECTION, SOLUTION EPIDURAL; INFILTRATION; INTRACAUDAL; PERINEURAL
Status: COMPLETED | OUTPATIENT
Start: 2021-09-01 | End: 2021-09-01

## 2021-09-01 RX ORDER — OXYCODONE AND ACETAMINOPHEN 5; 325 MG/1; MG/1
1 TABLET ORAL
Qty: 7 TABLET | Refills: 0 | Status: SHIPPED | OUTPATIENT
Start: 2021-09-01 | End: 2021-09-08

## 2021-09-01 RX ADMIN — LIDOCAINE HYDROCHLORIDE 2 ML: 10 INJECTION, SOLUTION EPIDURAL; INFILTRATION; INTRACAUDAL; PERINEURAL at 11:43

## 2021-09-01 RX ADMIN — METHYLPREDNISOLONE ACETATE 80 MG: 80 INJECTION, SUSPENSION INTRA-ARTICULAR; INTRALESIONAL; INTRAMUSCULAR; SOFT TISSUE at 11:43

## 2021-09-01 ASSESSMENT — PAIN SCALES - GENERAL
PAINLEVEL: MODERATE PAIN (4)
PAINLEVEL: SEVERE PAIN (7)

## 2021-09-01 NOTE — PATIENT INSTRUCTIONS
Please follow up two weeks post procedure with Esme to evaluate your plan of care.       DISCHARGE INSTRUCTIONS    During office hours (8:00 a.m.- 4:00 p.m.) questions or concerns may be answered  by calling Spine Center Navigation Nurses at  [phone].  Messages received after hours will be returned the following business day.      In the case of an emergency, please dial 911 or seek assistance at the nearest Emergency Room/Urgent Care facility.     All Patients:    ? You may experience an increase in your symptoms for the first 2 days (It may take anywhere between 2 days- 2 weeks for the steroid to have maximum effect).    ? You may use ice on the injection site, as frequently as 20 minutes each hour if needed.    ? You may take your pain medicine.    ? You may continue taking your regular medication after your injection. If you have had a Medial Branch Block you may resume pain medication once your pain diary is completed.    ? You may shower. No swimming, tub bath or hot tub for 48 hours.  You may remove your bandaid/bandage as soon as you are home.    ? You may resume light activities, as tolerated.    ? Resume your usual diet as tolerated.    ? It is strongly advised that you do not drive for 1-3 hours post injection.    ? If you have had oral sedation:  Do not drive for 8 hours post injection.      ? If you have had IV sedation:  Do not drive for 24 hours post injection.  Do not operate hazardous machinery or make important personal/business decisions for 24 hours.      POSSIBLE STEROID SIDE EFFECTS (If steroid/cortisone was used for your procedure)    -If you experience these symptoms, it should only last for a short period      Swelling of the legs                Skin redness (flushing)       Mouth (oral) irritation     Blood sugar (glucose) levels              Sweats                      Mood changes    Headache    Sleeplessness    Weakened immune system for up to 14 days, which could increase the  risk of manuel the COVID-19 virus and/or experiencing more severe symptoms of the disease, if exposed.    Decreased effectiveness of the flu vaccine if given within 2 weeks of the steroid.         POSSIBLE PROCEDURE SIDE EFFECTS  -Call the Spine Center if you are concerned    Increased Pain             Increased numbness/tingling        Nausea/Vomiting            Bruising/bleeding at site        Redness or swelling                                                Difficulty walking        Weakness             Fever greater than 100.5    *In the event of a severe headache after an epidural steroid injection that is relieved by lying down, please call the [facility] to speak with a clinical staff member*

## 2021-09-03 DIAGNOSIS — Z11.59 ENCOUNTER FOR SCREENING FOR OTHER VIRAL DISEASES: ICD-10-CM

## 2021-09-07 ENCOUNTER — MEDICAL CORRESPONDENCE (OUTPATIENT)
Dept: NEUROSURGERY | Facility: CLINIC | Age: 41
End: 2021-09-07

## 2021-09-10 DIAGNOSIS — G89.4 CHRONIC PAIN SYNDROME: ICD-10-CM

## 2021-09-10 DIAGNOSIS — M54.16 LEFT LUMBAR RADICULOPATHY: Primary | ICD-10-CM

## 2021-09-15 ENCOUNTER — TELEPHONE (OUTPATIENT)
Dept: NEUROSURGERY | Facility: CLINIC | Age: 41
End: 2021-09-15

## 2021-09-15 NOTE — TELEPHONE ENCOUNTER
ORDER FROM: Dr. Lilly    PRE AUTHORIZATION: PA pending. Ok to schedule    METHOD OF PATIENT CONTACT: Spoke with Madeline on the phone. Best number to reach: [phone]    PROCEDURE: Cervical 5-cervical 6 cervical discectomy and artificial disc replacement with Mobi-C    SURGICAL DATE: 10/01/2021 @ 7:30 am - JALEN BARROSO TEST: 9/28/21 @ [facility] lab    READINESS VISIT: PLEASE CALL    PCP, CLINIC, PHONE#: Dr. Tre Jj, [facility], [phone]    PRE-OP PHYSICAL: 9/28/21 @ 7:00 AM with TRISTON Brandt PA-C at the [facility]    FILM INFO: XRAY CERVICAL: 8/10/21 @ [facility]          MRI CERVICAL/TH/LUMBAR: 8/6/21 @ [facility]    SURGICAL LETTER: Mailed to patient on 9/15/21

## 2021-09-20 ENCOUNTER — OFFICE VISIT (OUTPATIENT)
Dept: PHYSICAL MEDICINE AND REHAB | Facility: CLINIC | Age: 41
End: 2021-09-20
Payer: COMMERCIAL

## 2021-09-20 VITALS — SYSTOLIC BLOOD PRESSURE: 128 MMHG | DIASTOLIC BLOOD PRESSURE: 82 MMHG

## 2021-09-20 DIAGNOSIS — M51.369 BULGING LUMBAR DISC: ICD-10-CM

## 2021-09-20 DIAGNOSIS — M54.2 CHRONIC NECK PAIN: ICD-10-CM

## 2021-09-20 DIAGNOSIS — M54.16 LEFT LUMBAR RADICULOPATHY: ICD-10-CM

## 2021-09-20 DIAGNOSIS — R29.898 WEAKNESS OF LEFT LOWER EXTREMITY: ICD-10-CM

## 2021-09-20 DIAGNOSIS — M48.061 LUMBAR FORAMINAL STENOSIS: ICD-10-CM

## 2021-09-20 DIAGNOSIS — M54.16 LEFT LUMBAR RADICULOPATHY: Primary | ICD-10-CM

## 2021-09-20 DIAGNOSIS — G89.29 CHRONIC NECK PAIN: ICD-10-CM

## 2021-09-20 DIAGNOSIS — M51.369 DDD (DEGENERATIVE DISC DISEASE), LUMBAR: ICD-10-CM

## 2021-09-20 PROCEDURE — 95909 NRV CNDJ TST 5-6 STUDIES: CPT | Performed by: PHYSICAL MEDICINE & REHABILITATION

## 2021-09-20 PROCEDURE — 95886 MUSC TEST DONE W/N TEST COMP: CPT | Mod: LT | Performed by: PHYSICAL MEDICINE & REHABILITATION

## 2021-09-20 PROCEDURE — 99214 OFFICE O/P EST MOD 30 MIN: CPT | Performed by: NURSE PRACTITIONER

## 2021-09-20 RX ORDER — LIDOCAINE 50 MG/G
PATCH TOPICAL
Qty: 14 PATCH | Refills: 3 | Status: SHIPPED | OUTPATIENT
Start: 2021-09-20 | End: 2022-04-20

## 2021-09-20 RX ORDER — OXYCODONE AND ACETAMINOPHEN 5; 325 MG/1; MG/1
1 TABLET ORAL EVERY 8 HOURS PRN
Qty: 21 TABLET | Refills: 0 | Status: SHIPPED | OUTPATIENT
Start: 2021-09-20 | End: 2021-09-27

## 2021-09-20 ASSESSMENT — PAIN SCALES - GENERAL: PAINLEVEL: SEVERE PAIN (7)

## 2021-09-20 NOTE — PROGRESS NOTES
Patient presents at the request of Esme Hodgson for a left lower extremity EMG.  She has 1 month history of low back with left lower extremity pain with foot numbness and tingling and weakness.  On exam she has weakness of the ankle plantar flexors and dorsiflexors of left lower extremity 4/5.  Normal sensation to light touch through the lower extremities and normal patellar and Achilles reflexes.    EMG/NCS  results: Please see scanned full report.    Comment NCS: Normal study  1.  Normal left sural SNAP, peroneal and tibial CMAPs, and symmetric tibial H reflexes.    Comment EMG: Abnormal study  1.  Increased insertional activity with denervation potentials of the left peroneus longus and tibialis posterior.  Remainder left lower extremity needle EMG normal.    Interpretation: Abnormal study: There is electrodiagnostic evidence of:    1.  Left L5 and/or S1 radiculopathy, active.  S1 nerve root involvement would be less likely given symmetric tibial H reflexes and nerve conduction study testing.      2. There is no electrodiagnostic evidence of  focal neuropathy in the left lower extremity.    The testing was completed in its entirety by the physician.      It was our pleasure caring for your patient today, if there any questions or concerns please do not hesitate to contact us.        
No complaints

## 2021-09-20 NOTE — PATIENT INSTRUCTIONS
~Please call our [facility] Nurse Navigation line [phone] with any questions or concerns about your treatment plan, if symptoms worsen and you would like to be seen urgently, or if you have problems controlling bladder and bowel function.

## 2021-09-20 NOTE — LETTER
9/20/2021         RE: Madeline Szymanski  [address]  [place] [facility] [address]        Dear Colleague,    Thank you for referring your patient, Madeline Szymanski, to the [facility] [place]. Please see a copy of my visit note below.    Patient presents at the request of Esme Hodgson for a left lower extremity EMG.  She has 1 month history of low back with left lower extremity pain with foot numbness and tingling and weakness.  On exam she has weakness of the ankle plantar flexors and dorsiflexors of left lower extremity 4/5.  Normal sensation to light touch through the lower extremities and normal patellar and Achilles reflexes.    EMG/NCS  results: Please see scanned full report.    Comment NCS: Normal study  1.  Normal left sural SNAP, peroneal and tibial CMAPs, and symmetric tibial H reflexes.    Comment EMG: Abnormal study  1.  Increased insertional activity with denervation potentials of the left peroneus longus and tibialis posterior.  Remainder left lower extremity needle EMG normal.    Interpretation: Abnormal study: There is electrodiagnostic evidence of:    1.  Left L5 and/or S1 radiculopathy, active.  S1 nerve root involvement would be less likely given symmetric tibial H reflexes and nerve conduction study testing.      2. There is no electrodiagnostic evidence of  focal neuropathy in the left lower extremity.    The testing was completed in its entirety by the physician.      It was our pleasure caring for your patient today, if there any questions or concerns please do not hesitate to contact us.            Again, thank you for allowing me to participate in the care of your patient.        Sincerely,        Natanael Bay, DO

## 2021-09-20 NOTE — LETTER
2021         RE: Madeline Szymanski  [address]  [facility] [address]        Dear Colleague,    Thank you for referring your patient, Madeline Szymanski, to the [facility] [place]. Please see a copy of my visit note below.      Assessment:     Diagnoses and all orders for this visit:  Left lumbar radiculopathy  -     EMG; Future  -     oxyCODONE-acetaminophen (PERCOCET) 5-325 MG tablet; Take 1 tablet by mouth every 8 hours as needed for severe pain  Bulging lumbar disc  -     EMG; Future  Lumbar foraminal stenosis  -     EMG; Future  DDD (degenerative disc disease), lumbar  Weakness of left lower extremity  -     EMG; Future  Chronic neck pain  -     lidocaine (LIDODERM) 5 % patch; Apply 1 patch to affected area as needed for 12 hours, remove for 12 hours before reapplying.     Madeline Szymanski is a 41 year old y.o. female with past medical history significant for migraine, depressive disorder, , gastric bypass surgery who presents today for follow-up regarding:    -Ongoing significant left lumbar radiculopathy L5 and S1 dermatomal pattern with perceived weakness as well as weakness on exam left EHL and heel raises with concern for potential acute radiculopathy.    -Chronic neck pain.  Moderate to severe spinal stenosis at C5-6.    *Patient is scheduled for C5-6 discectomy with Mobi-C disc replacement 10/1/2021 with Dr. Lilly.     Plan:     A shared decision making plan was used. The patient's values and choices were respected. Prior medical records were reviewed today. The following represents what was discussed and decided upon by the provider and the patient.        -DIAGNOSTIC TESTS: Images were personally reviewed and interpreted.   --Ordered left lower extremity EMG to evaluate for potential acute radiculopathy.  Discussed with patient that if there is active radiculopathy would recommend reevaluation with Dr. Lilly on a more urgent basis for surgical evaluation.  If no  active radiculopathy noted we could consider further conservative treatments such as MedX program or potential spinal cord stimulator down the road.  --Cervical MRI 8/7/2021 Moderate to Severe spinal stenosis C5-6.   --Lumbar spine flexion-extension x-ray 8/10/2021 shows no spondylolisthesis and no instability.  --Lumbar spine MRI 8/6/2021 with L5-S1 mild disc height loss with disc bulge on the left with osteophytes with mild left foraminal stenosis lateral recess stenosis and S1 compression.    -INTERVENTIONS: No further spinal injections recommendations at this time.  No lasting benefit with L5-S1 or S1-S2 TFESI.    -MEDICATIONS: Advised patient continue with gabapentin.  Refilled lidocaine patch for ongoing neck pain which does provide benefit.  Percocet 5/325 mg 1 tablet every 8 hours as needed for severe breakthrough pain, number 21 tablets given for 7 days worth.  She is only been taking a half a tablet occasionally at bedtime mostly but it does help with pain and sleep at this time.  Patient is aware that this is not a long-term solution it is not something we will prescribe long-term here at the spine center.  [place]  checked. Discussed the risks (eg, addiction, overdose, worsening pain) verses benefit of opioid use with patient today. Explained that this medication will not be a long term solution to ongoing pain. Discussed using lowest effective dose and the importance of other measures for pain management including PT, other non-opioid medications, behavioral treatments, and other procedure options.   Discussed side effects of medications and proper use. Patient verbalized understanding.    -PHYSICAL THERAPY: Consider MedX program if no active radiculopathy noted on EMG.  Discussed the importance of core strengthening, ROM, stretching exercises with the patient and how each of these entities is important in decreasing pain.  Explained to the patient that the purpose of physical therapy is to teach the  patient a home exercise program.  These exercises need to be performed every day in order to decrease pain and prevent future occurrences of pain.        -PATIENT EDUCATION:  Total time of 32 minutes spent with the patient, reviewing the chart, placing orders, and documenting.   -Today we also discussed the issues related to the current COVID-19 pandemic, the pros and cons of the current treatment plan, the CDC guidelines such as social distancing, washing the hands, and covering the cough.    -FOLLOW UP: Follow-up for EMG with Dr. Bay  Advised to contact clinic if symptoms worsen or change.    Subjective:     Madeline Szymanski is a 41 year old female who presents today for follow-up regarding left low back pain rating to the left lateral hip and buttock region but most intense pain is in the left lateral and posterior calf and lateral foot with associated tingling and perceived weakness.  She received 20% initial relief only with left L5-S1 TFESI, unfortunately no lasting benefit.  No lasting benefit with prior S1-S2 TFESI.  Patient is concerned about the weakness in her leg at this time as well.  Patient denies bowel or bladder loss control, denies saddle anesthesia.    *Neurosurgical evaluation Dr. Lilly 8/11/2021.  No recommendations for lumbar spine surgery at this time, recommended conservative treatment potentially spinal cord stimulator trial.  *Patient is scheduled for C5-6 discectomy with Mobi-C disc replacement 10/1/2021 with Dr. Lilly.    Treatment to Date: No prior spinal surgery.     Bilateral carpal tunnel steroid injection 11/11/2020 and 5/12/2021 with significant benefit.     L5-S1 TFESI 3/21/2018  Lumbar MBB 2/13/2019 with benefit LBP  Lumbar RFA 3/6/2019  Bilateral L5-S1 TFESI 6/3/2020 with benefit LBP.  Preprocedure pain 8/10, post 5/10.  Left S1-S2 TFESI 8/3/2021 with no lasting benefit.  Preprocedure pain 8/10, post 2/10.  Left L5-S1 TFESI 9/1/2021 with minimal lasting benefit.   Preprocedure pain 7/10, post 4/10.     Medications:  Gabapentin 300 mg 1 to 2 tablets at bedtime, unable to tolerate during the daytime.  Amitriptyline 75 mg at bedtime for migraines  Flexeril as needed  Tylenol as needed  Lidocaine gel as needed     No benefit with recent Medrol Dosepak.     *Patient unable to tolerate NSAIDs due to GI surgery.    Patient Active Problem List   Diagnosis     Painless urinary retention due to cauda equina syndrome (H)     Spinal stenosis in cervical region       Current Outpatient Medications   Medication     acetaminophen (TYLENOL) 500 MG tablet     cyclobenzaprine (FLEXERIL) 10 MG tablet     gabapentin (NEURONTIN) 300 MG capsule     lidocaine (LIDODERM) 5 % patch     oxyCODONE-acetaminophen (PERCOCET) 5-325 MG tablet     amitriptyline (ELAVIL) 75 MG tablet     eletriptan (RELPAX) 40 MG tablet     EPINEPHrine (ANY BX GENERIC EQUIV) 0.3 MG/0.3ML injection 2-pack     FLUoxetine (PROZAC) 40 MG capsule     liraglutide - Weight Management (SAXENDA) 18 MG/3ML pen     rizatriptan (MAXALT-MLT) 10 MG ODT     topiramate (TOPAMAX) 50 MG tablet     UNABLE TO FIND     No current facility-administered medications for this visit.       Allergies   Allergen Reactions     Cephalexin Hives     Coconut Flavor Anaphylaxis     Covid-19 (Mrna) Vaccine Anaphylaxis     Pfizer      Prochlorperazine Other (See Comments)     Other reaction(s): Tremors  Tremors  Other reaction(s): Tremors       Coconut Fatty Acids      Other reaction(s): Edema     Metoclopramide Other (See Comments)     Other reaction(s): Tremors  tremors  Other reaction(s): Tremors       Penicillins Hives       Past Medical History:   Diagnosis Date     Depressive disorder      Kidney infection      Migraine      Pancreatitis      UTI (lower urinary tract infection)         Review of Systems  ROS:  Specifically negative for bowel/bladder dysfunction, balance changes, headache, dizziness, foot drop, fevers, chills, appetite changes,  nausea/vomiting, unexplained weight loss. Otherwise 13 systems reviewed are negative. Please see the patient's intake questionnaire from today for details.    Reviewed Social, Family, Past Medical and Past Surgical history with patient, no significant changes noted since prior visit.     Objective:     /82 (BP Location: Right arm, Patient Position: Sitting)     PHYSICAL EXAMINATION:    --CONSTITUTIONAL: Well developed, well nourished, healthy appearing individual.  --PSYCHIATRIC: Appropriate mood and affect. No difficulty interacting due to temper, social withdrawal, or memory issues.  --SKIN: Lumbar region is dry and intact.   --RESPIRATORY: Normal rhythm and effort. No abnormal accessory muscle breathing patterns noted.   --MUSCULOSKELETAL:  Normal lumbar lordosis noted, no lateral shift.  --GROSS MOTOR: Easily arises from a seated position. Gait is non-antalgic  --LUMBAR SPINE:  Inspection reveals no evidence of deformity.   --LOWER EXTREMITY MOTOR TESTING:  Plantar flexion left 5/5, right 5/5   Dorsiflexion left 5/5, right 5/5   Great toe MTP extension left 4/5, right 5/5  Knee flexion left 5/5, right 5/5  Knee extension left 5/5, right 5/5   Hip flexion left 5/5, right 5/5  Hip abduction left 5/5, right 5/5  Hip adduction left 5/5, right 5/5   Heel raises 0/5 left and 5/5 right.  --HIPS: Full range of motion bilaterally.   --NEUROLOGIC: Bilateral patellar and achilles reflexes are physiologic and symmetric. Sensation to light touch is intact in the bilateral L4, L5, and S1 dermatomes.    RESULTS:   Imaging: Lumbar spine imaging was reviewed today. The images were shown to the patient and the findings were explained using a spine model.      XR Cervical Spine G/E 4 Views  Result Date: 8/11/2021  CERVICAL SPINE FOUR OR MORE VIEWS August 10, 2021 5:20 PM HISTORY: Neck pain. COMPARISON: MRI of the cervical spine dated 8/7/2021.   IMPRESSION: No gross vertebral body height loss identified. Alignment is within  normal limits. On flexion and extension views, no significant dynamic spondylolisthesis identified. Multilevel degenerative disc disease, including mild-to-moderate disc space narrowing at C5-C6 and C6-C7. Small multilevel anterior endplate osteophytes. The prevertebral soft tissues appear normal. JUNIOR MARTINEZ MD   SYSTEM ID:  [id number]        XR Lumbar Spine G/E 4 Views  Result Date: 8/11/2021  LUMBAR SPINE FOUR OR MORE VIEWS August 10, 2021 5:15 PM HISTORY: Lumbar radiculopathy. COMPARISON: MRI lumbar spine 8/6/2021. CT abdomen and pelvis 5/19/2021.   IMPRESSION: Nomenclature is based on five lumbar vertebral bodies. There appears to be mild dextroconvex curvature at the thoracolumbar junction. Alignment otherwise appears within normal limits. No significant dynamic spondylolisthesis identified on flexion-extension views. No gross vertebral body height loss. Mild multilevel degenerative disc space narrowing with small anterior marginal endplate osteophytes. Mild lower lumbar degenerative facet arthropathy. There is a suture material noted in the left upper quadrant of the abdomen, as before. JUNIOR MARTINEZ MD   SYSTEM ID:  [id number]        MR Cervical Spine w/o Contrast  Result Date: 8/7/2021  EXAM: MR CERVICAL SPINE W/O CONTRAST LOCATION: [facility] DATE/TIME: 8/7/2021 1:19 AM INDICATION: Spinal stenosis, C-spine COMPARISON: Thoracic spine MRI 8/6/2021 TECHNIQUE: MRI Cervical Spine without IV contrast. FINDINGS: Vertebral body height and alignment is preserved. Moderate Modic type I changes anteriorly at T3-4. No definite abnormal signal in the cervical spine. No abnormal cord signal. No extraspinal abnormality. Craniovertebral junction and C1-C2: Normal. C2-C3: Normal disc height. No herniation. Normal facets. No spinal canal or neural foraminal stenosis. C3-C4: Normal disc height. No herniation. Normal facets. No spinal canal or neural foraminal stenosis. C4-C5: Slight loss of disc  height. Slight facet arthropathy. No spinal canal or neural foraminal stenosis. C5-C6: Moderate loss of disc height. Mild to moderate diffuse disc osteophyte complex, asymmetric to the left. Slight left uncinate spurring. Moderate to severe central canal stenosis to the left of midline. No definite foraminal narrowing. C6-C7: Normal disc height. No herniation. Normal facets. No spinal canal or neural foraminal stenosis. C7-T1: Normal disc height. No herniation. Normal facets. No spinal canal or neural foraminal stenosis.   IMPRESSION:   1.  At C5-6 there is moderate to severe central canal stenosis to the left of midline secondary to an asymmetric left disc osteophyte complex. No abnormal cord signal.   2.  Minimal degenerative changes elsewhere as described.      Lumbar spine MRI w/o contrast  Result Date: 8/6/2021  MRI LUMBAR SPINE WITHOUT CONTRAST   8/6/2021 12:23 PM HISTORY: Low back pain, cauda equina syndrome suspected; Saddle anesthesia, unable to urinate and low back pain radiating down left leg with foot numbness. TECHNIQUE: Multiplanar multisequence MRI of the lumbar spine without contrast. COMPARISON: None. FINDINGS: Nomenclature is based on 5 lumbar vertebral bodies. Normal vertebral body heights and sagittal alignment. Unremarkable bone marrow signal. Varying degrees of multilevel intervertebral disc desiccation, most pronounced at L5-S1. Normal appearance of the distal spinal cord with the conus terminating at L2. There is a T2 hyperintense ovoid lesion in the right kidney measuring up to approximately 14 mm (series 801 image 18) incompletely characterized, but presumably representing a cyst. Otherwise, the visualized paraspinous soft tissues and bony pelvis are unremarkable. Segmental analysis: T12-L1: Minimal disc height loss. No herniation. Normal facets. No spinal canal or neural foraminal stenosis. L1-L2: Minimal disc height loss. No herniation. Normal facets. No spinal canal or neural foraminal  stenosis. L2-L3: Normal disc height. No herniation. Normal facets. No spinal canal or neural foraminal stenosis. L3-L4: Normal disc height. No herniation. Normal facets. No spinal canal or neural foraminal stenosis. L4-L5: Normal disc height. No herniation. Normal facets. No spinal canal or neural foraminal stenosis. L5-S1: Mild disc height loss. Disc bulge slightly eccentric to the left with posterior/posterolateral endplate marginal osteophytes. Mild facet arthropathy. Mild bilateral, left greater than right, lateral recess stenosis without displacement or overt compression of the traversing S1 nerve roots. No central spinal canal stenosis. Mild left neural foraminal narrowing. The right neural foramen is not significantly narrowed.   IMPRESSION:   1. Multilevel degenerative changes of the lumbar spine, as described.   2. No high-grade spinal canal stenosis.   3. Mild left greater than right lateral recess stenosis at L5-S1.   4. Mild left neural foraminal stenosis at L5-S1. Otherwise, no significant neural foraminal narrowing. JUNIOR MARTINEZ MD   SYSTEM ID:  [id number]     MR Thoracic Spine w/o Contrast  Result Date: 8/7/2021  EXAM: MR THORACIC SPINE W/O CONTRAST LOCATION: [facility] DATE/TIME: 8/6/2021 11:23 PM INDICATION: Low back pain, leg pain, urinary retention, saddle anesthesia. COMPARISON: Lumbar spine MRI dated 8/6/2021. TECHNIQUE: Routine Thoracic Spine MRI without IV contrast. FINDINGS: Partially visualized disc osteophyte complex at C5-C6 indents the ventral spinal cord. Question cord edema in the lower cervical spine cord, only seen on the first few images of the axial T2 sequence. Slight left apex curvature centered in the lower thoracic spine. There is accentuation of the normal thoracic spine kyphosis, with degenerative endplate changes and Schmorl's nodes, suggestive of Scheuermann's disease. There are Modic type I degenerative  endplate changes at T3-T4. No evidence of  an acute compression deformity. There are multilevel disc protrusions with a small disc protrusion at C7-T1, T1-T2, a central disc extrusion at T3-T4 that extends above and below the intervertebral disc space measuring 12 mm in craniocaudal dimension, at T7-T8, T10-T11, and T11-T12. There is no high grade canal stenosis. Multilevel facet arthropathy. There is no high grade neural foraminal narrowing. No definite cord signal abnormality within the thoracic spinal cord. Small bilateral pleural effusions.   IMPRESSION:   1.  Degenerative changes of the thoracic spine with findings suggestive of Scheuermann's disease.   2.  No high grade spinal canal or neural foraminal narrowing in the thoracic spine.   3.  Partially visualized disc osteophyte complex at C5-C6 that indents the ventral spinal cord. In addition there is questionable cord signal change in the lower cervical spine, which is incompletely evaluated on the current exam. Consider further evaluation with dedicated cervical spine MRI. Findings were discussed with Dr. Robbins at 12:24 AM on 08/07/2021.                          Again, thank you for allowing me to participate in the care of your patient.        Sincerely,        Esme Hodgson, CNP

## 2021-09-20 NOTE — PATIENT INSTRUCTIONS
Thank you for choosing the [facility] for your EMG testing.    The ordering provider will receive your final EMG results within the next few days.  Please follow up with your provider for the results and further treatment recommendations.

## 2021-09-20 NOTE — PROGRESS NOTES
Assessment:     Diagnoses and all orders for this visit:  Left lumbar radiculopathy  -     EMG; Future  -     oxyCODONE-acetaminophen (PERCOCET) 5-325 MG tablet; Take 1 tablet by mouth every 8 hours as needed for severe pain  Bulging lumbar disc  -     EMG; Future  Lumbar foraminal stenosis  -     EMG; Future  DDD (degenerative disc disease), lumbar  Weakness of left lower extremity  -     EMG; Future  Chronic neck pain  -     lidocaine (LIDODERM) 5 % patch; Apply 1 patch to affected area as needed for 12 hours, remove for 12 hours before reapplying.     Madeline Szymanski is a 41 year old y.o. female with past medical history significant for migraine, depressive disorder, , gastric bypass surgery who presents today for follow-up regarding:    -Ongoing significant left lumbar radiculopathy L5 and S1 dermatomal pattern with perceived weakness as well as weakness on exam left EHL and heel raises with concern for potential acute radiculopathy.    -Chronic neck pain.  Moderate to severe spinal stenosis at C5-6.    *Patient is scheduled for C5-6 discectomy with Mobi-C disc replacement 10/1/2021 with Dr. Lilly.     Plan:     A shared decision making plan was used. The patient's values and choices were respected. Prior medical records were reviewed today. The following represents what was discussed and decided upon by the provider and the patient.        -DIAGNOSTIC TESTS: Images were personally reviewed and interpreted.   --Ordered left lower extremity EMG to evaluate for potential acute radiculopathy.  Discussed with patient that if there is active radiculopathy would recommend reevaluation with Dr. Lilly on a more urgent basis for surgical evaluation.  If no active radiculopathy noted we could consider further conservative treatments such as MedX program or potential spinal cord stimulator down the road.  --Cervical MRI 2021 Moderate to Severe spinal stenosis C5-6.   --Lumbar spine flexion-extension x-ray  8/10/2021 shows no spondylolisthesis and no instability.  --Lumbar spine MRI 8/6/2021 with L5-S1 mild disc height loss with disc bulge on the left with osteophytes with mild left foraminal stenosis lateral recess stenosis and S1 compression.    -INTERVENTIONS: No further spinal injections recommendations at this time.  No lasting benefit with L5-S1 or S1-S2 TFESI.    -MEDICATIONS: Advised patient continue with gabapentin.  Refilled lidocaine patch for ongoing neck pain which does provide benefit.  Percocet 5/325 mg 1 tablet every 8 hours as needed for severe breakthrough pain, number 21 tablets given for 7 days worth.  She is only been taking a half a tablet occasionally at bedtime mostly but it does help with pain and sleep at this time.  Patient is aware that this is not a long-term solution it is not something we will prescribe long-term here at the spine center.  [place]  checked. Discussed the risks (eg, addiction, overdose, worsening pain) verses benefit of opioid use with patient today. Explained that this medication will not be a long term solution to ongoing pain. Discussed using lowest effective dose and the importance of other measures for pain management including PT, other non-opioid medications, behavioral treatments, and other procedure options.   Discussed side effects of medications and proper use. Patient verbalized understanding.    -PHYSICAL THERAPY: Consider MedX program if no active radiculopathy noted on EMG.  Discussed the importance of core strengthening, ROM, stretching exercises with the patient and how each of these entities is important in decreasing pain.  Explained to the patient that the purpose of physical therapy is to teach the patient a home exercise program.  These exercises need to be performed every day in order to decrease pain and prevent future occurrences of pain.        -PATIENT EDUCATION:  Total time of 32 minutes spent with the patient, reviewing the chart, placing orders,  and documenting.   -Today we also discussed the issues related to the current COVID-19 pandemic, the pros and cons of the current treatment plan, the CDC guidelines such as social distancing, washing the hands, and covering the cough.    -FOLLOW UP: Follow-up for EMG with Dr. Bay  Advised to contact clinic if symptoms worsen or change.    Subjective:     Madeline Szymanski is a 41 year old female who presents today for follow-up regarding left low back pain rating to the left lateral hip and buttock region but most intense pain is in the left lateral and posterior calf and lateral foot with associated tingling and perceived weakness.  She received 20% initial relief only with left L5-S1 TFESI, unfortunately no lasting benefit.  No lasting benefit with prior S1-S2 TFESI.  Patient is concerned about the weakness in her leg at this time as well.  Patient denies bowel or bladder loss control, denies saddle anesthesia.    *Neurosurgical evaluation Dr. Lilly 8/11/2021.  No recommendations for lumbar spine surgery at this time, recommended conservative treatment potentially spinal cord stimulator trial.  *Patient is scheduled for C5-6 discectomy with Mobi-C disc replacement 10/1/2021 with Dr. Lilly.    Treatment to Date: No prior spinal surgery.     Bilateral carpal tunnel steroid injection 11/11/2020 and 5/12/2021 with significant benefit.     L5-S1 TFESI 3/21/2018  Lumbar MBB 2/13/2019 with benefit LBP  Lumbar RFA 3/6/2019  Bilateral L5-S1 TFESI 6/3/2020 with benefit LBP.  Preprocedure pain 8/10, post 5/10.  Left S1-S2 TFESI 8/3/2021 with no lasting benefit.  Preprocedure pain 8/10, post 2/10.  Left L5-S1 TFESI 9/1/2021 with minimal lasting benefit.  Preprocedure pain 7/10, post 4/10.     Medications:  Gabapentin 300 mg 1 to 2 tablets at bedtime, unable to tolerate during the daytime.  Amitriptyline 75 mg at bedtime for migraines  Flexeril as needed  Tylenol as needed  Lidocaine gel as needed     No benefit with  recent Medrol Dosepak.     *Patient unable to tolerate NSAIDs due to GI surgery.    Patient Active Problem List   Diagnosis     Painless urinary retention due to cauda equina syndrome (H)     Spinal stenosis in cervical region       Current Outpatient Medications   Medication     acetaminophen (TYLENOL) 500 MG tablet     cyclobenzaprine (FLEXERIL) 10 MG tablet     gabapentin (NEURONTIN) 300 MG capsule     lidocaine (LIDODERM) 5 % patch     oxyCODONE-acetaminophen (PERCOCET) 5-325 MG tablet     amitriptyline (ELAVIL) 75 MG tablet     eletriptan (RELPAX) 40 MG tablet     EPINEPHrine (ANY BX GENERIC EQUIV) 0.3 MG/0.3ML injection 2-pack     FLUoxetine (PROZAC) 40 MG capsule     liraglutide - Weight Management (SAXENDA) 18 MG/3ML pen     rizatriptan (MAXALT-MLT) 10 MG ODT     topiramate (TOPAMAX) 50 MG tablet     UNABLE TO FIND     No current facility-administered medications for this visit.       Allergies   Allergen Reactions     Cephalexin Hives     Coconut Flavor Anaphylaxis     Covid-19 (Mrna) Vaccine Anaphylaxis     Pfizer      Prochlorperazine Other (See Comments)     Other reaction(s): Tremors  Tremors  Other reaction(s): Tremors       Coconut Fatty Acids      Other reaction(s): Edema     Metoclopramide Other (See Comments)     Other reaction(s): Tremors  tremors  Other reaction(s): Tremors       Penicillins Hives       Past Medical History:   Diagnosis Date     Depressive disorder      Kidney infection      Migraine      Pancreatitis      UTI (lower urinary tract infection)         Review of Systems  ROS:  Specifically negative for bowel/bladder dysfunction, balance changes, headache, dizziness, foot drop, fevers, chills, appetite changes, nausea/vomiting, unexplained weight loss. Otherwise 13 systems reviewed are negative. Please see the patient's intake questionnaire from today for details.    Reviewed Social, Family, Past Medical and Past Surgical history with patient, no significant changes noted since prior  visit.     Objective:     /82 (BP Location: Right arm, Patient Position: Sitting)     PHYSICAL EXAMINATION:    --CONSTITUTIONAL: Well developed, well nourished, healthy appearing individual.  --PSYCHIATRIC: Appropriate mood and affect. No difficulty interacting due to temper, social withdrawal, or memory issues.  --SKIN: Lumbar region is dry and intact.   --RESPIRATORY: Normal rhythm and effort. No abnormal accessory muscle breathing patterns noted.   --MUSCULOSKELETAL:  Normal lumbar lordosis noted, no lateral shift.  --GROSS MOTOR: Easily arises from a seated position. Gait is non-antalgic  --LUMBAR SPINE:  Inspection reveals no evidence of deformity.   --LOWER EXTREMITY MOTOR TESTING:  Plantar flexion left 5/5, right 5/5   Dorsiflexion left 5/5, right 5/5   Great toe MTP extension left 4/5, right 5/5  Knee flexion left 5/5, right 5/5  Knee extension left 5/5, right 5/5   Hip flexion left 5/5, right 5/5  Hip abduction left 5/5, right 5/5  Hip adduction left 5/5, right 5/5   Heel raises 0/5 left and 5/5 right.  --HIPS: Full range of motion bilaterally.   --NEUROLOGIC: Bilateral patellar and achilles reflexes are physiologic and symmetric. Sensation to light touch is intact in the bilateral L4, L5, and S1 dermatomes.    RESULTS:   Imaging: Lumbar spine imaging was reviewed today. The images were shown to the patient and the findings were explained using a spine model.      XR Cervical Spine G/E 4 Views  Result Date: 8/11/2021  CERVICAL SPINE FOUR OR MORE VIEWS August 10, 2021 5:20 PM HISTORY: Neck pain. COMPARISON: MRI of the cervical spine dated 8/7/2021.   IMPRESSION: No gross vertebral body height loss identified. Alignment is within normal limits. On flexion and extension views, no significant dynamic spondylolisthesis identified. Multilevel degenerative disc disease, including mild-to-moderate disc space narrowing at C5-C6 and C6-C7. Small multilevel anterior endplate osteophytes. The prevertebral soft  tissues appear normal. JUNIOR MARTINEZ MD   SYSTEM ID:  [id number]        XR Lumbar Spine G/E 4 Views  Result Date: 8/11/2021  LUMBAR SPINE FOUR OR MORE VIEWS August 10, 2021 5:15 PM HISTORY: Lumbar radiculopathy. COMPARISON: MRI lumbar spine 8/6/2021. CT abdomen and pelvis 5/19/2021.   IMPRESSION: Nomenclature is based on five lumbar vertebral bodies. There appears to be mild dextroconvex curvature at the thoracolumbar junction. Alignment otherwise appears within normal limits. No significant dynamic spondylolisthesis identified on flexion-extension views. No gross vertebral body height loss. Mild multilevel degenerative disc space narrowing with small anterior marginal endplate osteophytes. Mild lower lumbar degenerative facet arthropathy. There is a suture material noted in the left upper quadrant of the abdomen, as before. JUNIOR MARTINEZ MD   SYSTEM ID:  [id number]        MR Cervical Spine w/o Contrast  Result Date: 8/7/2021  EXAM: MR CERVICAL SPINE W/O CONTRAST LOCATION: [facility] DATE/TIME: 8/7/2021 1:19 AM INDICATION: Spinal stenosis, C-spine COMPARISON: Thoracic spine MRI 8/6/2021 TECHNIQUE: MRI Cervical Spine without IV contrast. FINDINGS: Vertebral body height and alignment is preserved. Moderate Modic type I changes anteriorly at T3-4. No definite abnormal signal in the cervical spine. No abnormal cord signal. No extraspinal abnormality. Craniovertebral junction and C1-C2: Normal. C2-C3: Normal disc height. No herniation. Normal facets. No spinal canal or neural foraminal stenosis. C3-C4: Normal disc height. No herniation. Normal facets. No spinal canal or neural foraminal stenosis. C4-C5: Slight loss of disc height. Slight facet arthropathy. No spinal canal or neural foraminal stenosis. C5-C6: Moderate loss of disc height. Mild to moderate diffuse disc osteophyte complex, asymmetric to the left. Slight left uncinate spurring. Moderate to severe central canal stenosis to the left  of midline. No definite foraminal narrowing. C6-C7: Normal disc height. No herniation. Normal facets. No spinal canal or neural foraminal stenosis. C7-T1: Normal disc height. No herniation. Normal facets. No spinal canal or neural foraminal stenosis.   IMPRESSION:   1.  At C5-6 there is moderate to severe central canal stenosis to the left of midline secondary to an asymmetric left disc osteophyte complex. No abnormal cord signal.   2.  Minimal degenerative changes elsewhere as described.      Lumbar spine MRI w/o contrast  Result Date: 8/6/2021  MRI LUMBAR SPINE WITHOUT CONTRAST   8/6/2021 12:23 PM HISTORY: Low back pain, cauda equina syndrome suspected; Saddle anesthesia, unable to urinate and low back pain radiating down left leg with foot numbness. TECHNIQUE: Multiplanar multisequence MRI of the lumbar spine without contrast. COMPARISON: None. FINDINGS: Nomenclature is based on 5 lumbar vertebral bodies. Normal vertebral body heights and sagittal alignment. Unremarkable bone marrow signal. Varying degrees of multilevel intervertebral disc desiccation, most pronounced at L5-S1. Normal appearance of the distal spinal cord with the conus terminating at L2. There is a T2 hyperintense ovoid lesion in the right kidney measuring up to approximately 14 mm (series 801 image 18) incompletely characterized, but presumably representing a cyst. Otherwise, the visualized paraspinous soft tissues and bony pelvis are unremarkable. Segmental analysis: T12-L1: Minimal disc height loss. No herniation. Normal facets. No spinal canal or neural foraminal stenosis. L1-L2: Minimal disc height loss. No herniation. Normal facets. No spinal canal or neural foraminal stenosis. L2-L3: Normal disc height. No herniation. Normal facets. No spinal canal or neural foraminal stenosis. L3-L4: Normal disc height. No herniation. Normal facets. No spinal canal or neural foraminal stenosis. L4-L5: Normal disc height. No herniation. Normal facets. No  spinal canal or neural foraminal stenosis. L5-S1: Mild disc height loss. Disc bulge slightly eccentric to the left with posterior/posterolateral endplate marginal osteophytes. Mild facet arthropathy. Mild bilateral, left greater than right, lateral recess stenosis without displacement or overt compression of the traversing S1 nerve roots. No central spinal canal stenosis. Mild left neural foraminal narrowing. The right neural foramen is not significantly narrowed.   IMPRESSION:   1. Multilevel degenerative changes of the lumbar spine, as described.   2. No high-grade spinal canal stenosis.   3. Mild left greater than right lateral recess stenosis at L5-S1.   4. Mild left neural foraminal stenosis at L5-S1. Otherwise, no significant neural foraminal narrowing. JUNIOR MARTINEZ MD   SYSTEM ID:  [id number]     MR Thoracic Spine w/o Contrast  Result Date: 8/7/2021  EXAM: MR THORACIC SPINE W/O CONTRAST LOCATION: [facility] DATE/TIME: 8/6/2021 11:23 PM INDICATION: Low back pain, leg pain, urinary retention, saddle anesthesia. COMPARISON: Lumbar spine MRI dated 8/6/2021. TECHNIQUE: Routine Thoracic Spine MRI without IV contrast. FINDINGS: Partially visualized disc osteophyte complex at C5-C6 indents the ventral spinal cord. Question cord edema in the lower cervical spine cord, only seen on the first few images of the axial T2 sequence. Slight left apex curvature centered in the lower thoracic spine. There is accentuation of the normal thoracic spine kyphosis, with degenerative endplate changes and Schmorl's nodes, suggestive of Scheuermann's disease. There are Modic type I degenerative  endplate changes at T3-T4. No evidence of an acute compression deformity. There are multilevel disc protrusions with a small disc protrusion at C7-T1, T1-T2, a central disc extrusion at T3-T4 that extends above and below the intervertebral disc space measuring 12 mm in craniocaudal dimension, at T7-T8, T10-T11, and  T11-T12. There is no high grade canal stenosis. Multilevel facet arthropathy. There is no high grade neural foraminal narrowing. No definite cord signal abnormality within the thoracic spinal cord. Small bilateral pleural effusions.   IMPRESSION:   1.  Degenerative changes of the thoracic spine with findings suggestive of Scheuermann's disease.   2.  No high grade spinal canal or neural foraminal narrowing in the thoracic spine.   3.  Partially visualized disc osteophyte complex at C5-C6 that indents the ventral spinal cord. In addition there is questionable cord signal change in the lower cervical spine, which is incompletely evaluated on the current exam. Consider further evaluation with dedicated cervical spine MRI. Findings were discussed with Dr. Robbins at 12:24 AM on 08/07/2021.

## 2021-09-23 ENCOUNTER — OFFICE VISIT (OUTPATIENT)
Dept: NEUROSURGERY | Facility: CLINIC | Age: 41
End: 2021-09-23
Payer: COMMERCIAL

## 2021-09-23 VITALS
DIASTOLIC BLOOD PRESSURE: 94 MMHG | BODY MASS INDEX: 33.8 KG/M2 | WEIGHT: 198 LBS | HEART RATE: 104 BPM | HEIGHT: 64 IN | SYSTOLIC BLOOD PRESSURE: 128 MMHG | OXYGEN SATURATION: 98 %

## 2021-09-23 DIAGNOSIS — M47.12 CERVICAL SPONDYLOSIS WITH MYELOPATHY: Primary | ICD-10-CM

## 2021-09-23 PROCEDURE — 99213 OFFICE O/P EST LOW 20 MIN: CPT | Performed by: SURGERY

## 2021-09-23 ASSESSMENT — MIFFLIN-ST. JEOR: SCORE: 1538.12

## 2021-09-23 NOTE — LETTER
9/23/2021         RE: Madeline Szymanski  [address]  [facility] [address]        Dear Colleague,    Thank you for referring your patient, Madeline Szymanski, to the [facility]. Please see a copy of my visit note below.    Discussed findings of new EMG. Shows active left L5/S1 radiculopathy. Does have mild lateral recess and foraminal stenosis at left L5-S.At this time she is more concerned with dropping objects and feels imbalanced while walking. On exam continues to have some symptoms of myelopathy including brisk reflexes. We discussed cervical stenosis and risks and benefits of decompressing this level with artifical disc replacement. She would like to proceed. We will re discuss possible lumbar decompression following cervical surgery.     Radha Lilly MD       Again, thank you for allowing me to participate in the care of your patient.        Sincerely,        Radha Lilly MD

## 2021-09-23 NOTE — PROGRESS NOTES
Discussed findings of new EMG. Shows active left L5/S1 radiculopathy. Does have mild lateral recess and foraminal stenosis at left L5-S.At this time she is more concerned with dropping objects and feels imbalanced while walking. On exam continues to have some symptoms of myelopathy including brisk reflexes. We discussed cervical stenosis and risks and benefits of decompressing this level with artifical disc replacement. She would like to proceed. We will re discuss possible lumbar decompression following cervical surgery.     Radha Lilly MD

## 2021-09-28 ENCOUNTER — OFFICE VISIT (OUTPATIENT)
Dept: FAMILY MEDICINE | Facility: CLINIC | Age: 41
End: 2021-09-28
Payer: COMMERCIAL

## 2021-09-28 VITALS
HEIGHT: 64 IN | TEMPERATURE: 98.2 F | WEIGHT: 206.5 LBS | BODY MASS INDEX: 35.26 KG/M2 | DIASTOLIC BLOOD PRESSURE: 86 MMHG | OXYGEN SATURATION: 98 % | SYSTOLIC BLOOD PRESSURE: 131 MMHG | HEART RATE: 96 BPM

## 2021-09-28 DIAGNOSIS — Z01.818 PREOP GENERAL PHYSICAL EXAM: Primary | ICD-10-CM

## 2021-09-28 DIAGNOSIS — Z11.59 ENCOUNTER FOR SCREENING FOR OTHER VIRAL DISEASES: ICD-10-CM

## 2021-09-28 DIAGNOSIS — M54.16 LUMBAR RADICULOPATHY: ICD-10-CM

## 2021-09-28 DIAGNOSIS — M48.062 SPINAL STENOSIS, LUMBAR REGION, WITH NEUROGENIC CLAUDICATION: ICD-10-CM

## 2021-09-28 LAB
ANION GAP SERPL CALCULATED.3IONS-SCNC: 6 MMOL/L (ref 3–14)
APTT PPP: 32 SECONDS (ref 22–38)
BUN SERPL-MCNC: 11 MG/DL (ref 7–30)
CALCIUM SERPL-MCNC: 9.1 MG/DL (ref 8.5–10.1)
CHLORIDE BLD-SCNC: 108 MMOL/L (ref 94–109)
CLOSURE TME COLL+EPINEP BLD: 75 SECONDS
CO2 SERPL-SCNC: 29 MMOL/L (ref 20–32)
CREAT SERPL-MCNC: 1.11 MG/DL (ref 0.52–1.04)
ERYTHROCYTE [DISTWIDTH] IN BLOOD BY AUTOMATED COUNT: 13.3 % (ref 10–15)
GFR SERPL CREATININE-BSD FRML MDRD: 61 ML/MIN/1.73M2
GLUCOSE BLD-MCNC: 92 MG/DL (ref 70–99)
HCT VFR BLD AUTO: 41.8 % (ref 35–47)
HGB BLD-MCNC: 13.5 G/DL (ref 11.7–15.7)
INR PPP: 0.95 (ref 0.85–1.15)
MCH RBC QN AUTO: 29.9 PG (ref 26.5–33)
MCHC RBC AUTO-ENTMCNC: 32.3 G/DL (ref 31.5–36.5)
MCV RBC AUTO: 93 FL (ref 78–100)
PLATELET # BLD AUTO: 259 10E3/UL (ref 150–450)
POTASSIUM BLD-SCNC: 4 MMOL/L (ref 3.4–5.3)
RBC # BLD AUTO: 4.52 10E6/UL (ref 3.8–5.2)
SODIUM SERPL-SCNC: 143 MMOL/L (ref 133–144)
WBC # BLD AUTO: 5.1 10E3/UL (ref 4–11)

## 2021-09-28 PROCEDURE — U0003 INFECTIOUS AGENT DETECTION BY NUCLEIC ACID (DNA OR RNA); SEVERE ACUTE RESPIRATORY SYNDROME CORONAVIRUS 2 (SARS-COV-2) (CORONAVIRUS DISEASE [COVID-19]), AMPLIFIED PROBE TECHNIQUE, MAKING USE OF HIGH THROUGHPUT TECHNOLOGIES AS DESCRIBED BY CMS-2020-01-R: HCPCS | Performed by: PHYSICIAN ASSISTANT

## 2021-09-28 PROCEDURE — 85730 THROMBOPLASTIN TIME PARTIAL: CPT | Performed by: PHYSICIAN ASSISTANT

## 2021-09-28 PROCEDURE — 36415 COLL VENOUS BLD VENIPUNCTURE: CPT | Performed by: PHYSICIAN ASSISTANT

## 2021-09-28 PROCEDURE — U0005 INFEC AGEN DETEC AMPLI PROBE: HCPCS | Performed by: PHYSICIAN ASSISTANT

## 2021-09-28 PROCEDURE — 85027 COMPLETE CBC AUTOMATED: CPT | Performed by: PHYSICIAN ASSISTANT

## 2021-09-28 PROCEDURE — 80048 BASIC METABOLIC PNL TOTAL CA: CPT | Performed by: PHYSICIAN ASSISTANT

## 2021-09-28 PROCEDURE — 99214 OFFICE O/P EST MOD 30 MIN: CPT | Performed by: PHYSICIAN ASSISTANT

## 2021-09-28 PROCEDURE — 85610 PROTHROMBIN TIME: CPT | Performed by: PHYSICIAN ASSISTANT

## 2021-09-28 PROCEDURE — 85576 BLOOD PLATELET AGGREGATION: CPT

## 2021-09-28 ASSESSMENT — MIFFLIN-ST. JEOR: SCORE: 1576.68

## 2021-09-28 NOTE — PROGRESS NOTES
[facility]  [address] [address]  Phone: [phone]  Primary Provider: Tre Jj  Pre-op Performing Provider: DIXON MATHEW      PREOPERATIVE EVALUATION:  Today's date: 9/28/2021    Madeline Szymanski is a 41 year old female who presents for a preoperative evaluation.    Surgical Information:  Surgery/Procedure: LEFT LUMBAR 5-SACRAL 1 HEMILAMINECTOMY, MEDIAL FACETECTOMY      Surgery Location: [facility]  Surgeon: Dr. Radha Lilly  Surgery Date: 10/01/2021  Time of Surgery: 7:30 AM  Where patient plans to recover: At home with family  Fax number for surgical facility: Note does not need to be faxed, will be available electronically in Epic.    Type of Anesthesia Anticipated: General    Assessment & Plan     The proposed surgical procedure is considered INTERMEDIATE risk.  1. Preop general physical exam  2. Lumbar radiculopathy  - Platelet function closure with reflex  - INR  - Partial thromboplastin time  - Basic metabolic panel  (Ca, Cl, CO2, Creat, Gluc, K, Na, BUN)  - CBC with platelets    3. Spinal stenosis, lumbar region,     4. Encounter for screening for other viral diseases  - Asymptomatic COVID-19 Virus (Coronavirus) by PCR Nose           RECOMMENDATION:  APPROVAL GIVEN to proceed with proposed procedure, without further diagnostic evaluation.    Subjective     HPI related to upcoming procedure: Upper extremity weakness for several years.      Health Care Directive:  Patient does not have a Health Care Directive or Living Will: Discussed advance care planning with patient; however, patient declined at this time.    Preoperative Review of :   reviewed - controlled substances reflected in medication list.          Review of Systems  CONSTITUTIONAL: NEGATIVE for fever, chills, change in weight  INTEGUMENTARY/SKIN: NEGATIVE for worrisome rashes, moles or lesions  EYES: NEGATIVE for vision changes or irritation  ENT/MOUTH: NEGATIVE for  ear, mouth and throat problems  RESP: NEGATIVE for significant cough or SOB  CV: NEGATIVE for chest pain, palpitations or peripheral edema  GI: NEGATIVE for nausea, abdominal pain, heartburn, or change in bowel habits  : NEGATIVE for frequency, dysuria, or hematuria  MUSCULOSKELETAL: NEGATIVE for significant arthralgias or myalgia  NEURO: NEGATIVE for weakness, dizziness or paresthesias  ENDOCRINE: NEGATIVE for temperature intolerance, skin/hair changes  HEME: NEGATIVE for bleeding problems  PSYCHIATRIC: NEGATIVE for changes in mood or affect    Patient Active Problem List    Diagnosis Date Noted     Spinal stenosis in cervical region 2021     Priority: Medium     Added automatically from request for surgery [id number]       Painless urinary retention due to cauda equina syndrome (H) 2021     Priority: Medium      Past Medical History:   Diagnosis Date     Arthritis      Depressive disorder      Kidney infection      Migraine      Pancreatitis      UTI (lower urinary tract infection)      Past Surgical History:   Procedure Laterality Date      SECTION       GI SURGERY       GYN SURGERY  2018    Hysterectomy     Current Outpatient Medications   Medication Sig Dispense Refill     acetaminophen (TYLENOL) 500 MG tablet Take 2 tablets (1,000 mg) by mouth every 6 hours as needed for mild pain or other (and adjunct with moderate or severe pain or per patient request)       amitriptyline (ELAVIL) 75 MG tablet Take 75 mg by mouth At Bedtime       cyclobenzaprine (FLEXERIL) 10 MG tablet Take 10 mg by mouth 3 times daily as needed for muscle spasms       eletriptan (RELPAX) 40 MG tablet Take 40 mg by mouth at onset of headache May repeat if needed in 2 hours. Max of 2 doses/24hours Max of 4 days/month       EPINEPHrine (ANY BX GENERIC EQUIV) 0.3 MG/0.3ML injection 2-pack Inject 0.3 mLs (0.3 mg) into the muscle once as needed for anaphylaxis 2 each 3     FLUoxetine (PROZAC) 40 MG capsule Take 40 mg by mouth  "daily       gabapentin (NEURONTIN) 300 MG capsule Take 300-600 mg by mouth At Bedtime Usually takes 300 mg qhs       lidocaine (LIDODERM) 5 % patch Apply 1 patch to affected area as needed for 12 hours, remove for 12 hours before reapplying. 14 patch 3     rizatriptan (MAXALT-MLT) 10 MG ODT Take 10 mg by mouth at onset of headache for migraine May repeat in 2 hours, with a max of 30 mg in 24 hours and a max of 5 days/month       topiramate (TOPAMAX) 50 MG tablet Take 50 mg by mouth daily RX reads 50 mg twice a day       UNABLE TO FIND Apply 1 patch topically At Bedtime MEDICATION NAME: Patch MD-Vitamin patch       liraglutide - Weight Management (SAXENDA) 18 MG/3ML pen Inject 3 mg Subcutaneous daily         Allergies   Allergen Reactions     Cephalexin Hives     Coconut Flavor Anaphylaxis     Covid-19 (Mrna) Vaccine Anaphylaxis     Pfizer      Prochlorperazine Other (See Comments)     Other reaction(s): Tremors  Tremors  Other reaction(s): Tremors       Coconut Fatty Acids      Other reaction(s): Edema     Metoclopramide Other (See Comments)     Other reaction(s): Tremors  tremors  Other reaction(s): Tremors       Penicillins Hives        Social History     Tobacco Use     Smoking status: Never Smoker     Smokeless tobacco: Never Used   Substance Use Topics     Alcohol use: Never       History   Drug Use Unknown         Objective     /86   Pulse 96   Temp 98.2  F (36.8  C) (Oral)   Ht 1.626 m (5' 4\")   Wt 93.7 kg (206 lb 8 oz)   SpO2 98%   BMI 35.45 kg/m      Physical Exam    GENERAL APPEARANCE: healthy, alert and no distress     EYES: EOMI, PERRL     HENT: ear canals and TM's normal and nose and mouth without ulcers or lesions     NECK: no adenopathy, no asymmetry, masses, or scars and thyroid normal to palpation     RESP: lungs clear to auscultation - no rales, rhonchi or wheezes     CV: regular rates and rhythm, normal S1 S2, no S3 or S4 and no murmur, click or rub     ABDOMEN:  soft, nontender, no " HSM or masses and bowel sounds normal     MS: extremities normal- no gross deformities noted, no evidence of inflammation in joints, FROM in all extremities.     SKIN: no suspicious lesions or rashes     NEURO: Normal strength and tone, sensory exam grossly normal, mentation intact and speech normal     PSYCH: mentation appears normal. and affect normal/bright     LYMPHATICS: No cervical adenopathy    Recent Labs   Lab Test 08/07/21  0649 08/06/21  1122   HGB 13.7 13.3    291    138   POTASSIUM 3.7 3.7   CR 0.90 1.03        Diagnostics:  Labs pending at this time.  Results will be reviewed when available.   No EKG required, no history of coronary heart disease, significant arrhythmia, peripheral arterial disease or other structural heart disease.    Revised Cardiac Risk Index (RCRI):  The patient has the following serious cardiovascular risks for perioperative complications:   - No serious cardiac risks = 0 points     RCRI Interpretation: 0 points: Class I (very low risk - 0.4% complication rate)           Signed Electronically by: Ranulfo Brandt PA-C  Copy of this evaluation report is provided to requesting physician.

## 2021-09-28 NOTE — H&P (VIEW-ONLY)
[facility]  [address] [address]  Phone: [phone]  Primary Provider: Tre Jj  Pre-op Performing Provider: DIXON MATHEW      PREOPERATIVE EVALUATION:  Today's date: 9/28/2021    Madeline Szymanski is a 41 year old female who presents for a preoperative evaluation.    Surgical Information:  Surgery/Procedure: LEFT LUMBAR 5-SACRAL 1 HEMILAMINECTOMY, MEDIAL FACETECTOMY      Surgery Location: [facility]  Surgeon: Dr. Radha Lilly  Surgery Date: 10/01/2021  Time of Surgery: 7:30 AM  Where patient plans to recover: At home with family  Fax number for surgical facility: Note does not need to be faxed, will be available electronically in Epic.    Type of Anesthesia Anticipated: General    Assessment & Plan     The proposed surgical procedure is considered INTERMEDIATE risk.  1. Preop general physical exam  2. Lumbar radiculopathy  - Platelet function closure with reflex  - INR  - Partial thromboplastin time  - Basic metabolic panel  (Ca, Cl, CO2, Creat, Gluc, K, Na, BUN)  - CBC with platelets    3. Spinal stenosis, lumbar region,     4. Encounter for screening for other viral diseases  - Asymptomatic COVID-19 Virus (Coronavirus) by PCR Nose           RECOMMENDATION:  APPROVAL GIVEN to proceed with proposed procedure, without further diagnostic evaluation.    Subjective     HPI related to upcoming procedure: Upper extremity weakness for several years.      Health Care Directive:  Patient does not have a Health Care Directive or Living Will: Discussed advance care planning with patient; however, patient declined at this time.    Preoperative Review of :   reviewed - controlled substances reflected in medication list.          Review of Systems  CONSTITUTIONAL: NEGATIVE for fever, chills, change in weight  INTEGUMENTARY/SKIN: NEGATIVE for worrisome rashes, moles or lesions  EYES: NEGATIVE for vision changes or irritation  ENT/MOUTH: NEGATIVE for  ear, mouth and throat problems  RESP: NEGATIVE for significant cough or SOB  CV: NEGATIVE for chest pain, palpitations or peripheral edema  GI: NEGATIVE for nausea, abdominal pain, heartburn, or change in bowel habits  : NEGATIVE for frequency, dysuria, or hematuria  MUSCULOSKELETAL: NEGATIVE for significant arthralgias or myalgia  NEURO: NEGATIVE for weakness, dizziness or paresthesias  ENDOCRINE: NEGATIVE for temperature intolerance, skin/hair changes  HEME: NEGATIVE for bleeding problems  PSYCHIATRIC: NEGATIVE for changes in mood or affect    Patient Active Problem List    Diagnosis Date Noted     Spinal stenosis in cervical region 2021     Priority: Medium     Added automatically from request for surgery [id number]       Painless urinary retention due to cauda equina syndrome (H) 2021     Priority: Medium      Past Medical History:   Diagnosis Date     Arthritis      Depressive disorder      Kidney infection      Migraine      Pancreatitis      UTI (lower urinary tract infection)      Past Surgical History:   Procedure Laterality Date      SECTION       GI SURGERY       GYN SURGERY  2018    Hysterectomy     Current Outpatient Medications   Medication Sig Dispense Refill     acetaminophen (TYLENOL) 500 MG tablet Take 2 tablets (1,000 mg) by mouth every 6 hours as needed for mild pain or other (and adjunct with moderate or severe pain or per patient request)       amitriptyline (ELAVIL) 75 MG tablet Take 75 mg by mouth At Bedtime       cyclobenzaprine (FLEXERIL) 10 MG tablet Take 10 mg by mouth 3 times daily as needed for muscle spasms       eletriptan (RELPAX) 40 MG tablet Take 40 mg by mouth at onset of headache May repeat if needed in 2 hours. Max of 2 doses/24hours Max of 4 days/month       EPINEPHrine (ANY BX GENERIC EQUIV) 0.3 MG/0.3ML injection 2-pack Inject 0.3 mLs (0.3 mg) into the muscle once as needed for anaphylaxis 2 each 3     FLUoxetine (PROZAC) 40 MG capsule Take 40 mg by mouth  "daily       gabapentin (NEURONTIN) 300 MG capsule Take 300-600 mg by mouth At Bedtime Usually takes 300 mg qhs       lidocaine (LIDODERM) 5 % patch Apply 1 patch to affected area as needed for 12 hours, remove for 12 hours before reapplying. 14 patch 3     rizatriptan (MAXALT-MLT) 10 MG ODT Take 10 mg by mouth at onset of headache for migraine May repeat in 2 hours, with a max of 30 mg in 24 hours and a max of 5 days/month       topiramate (TOPAMAX) 50 MG tablet Take 50 mg by mouth daily RX reads 50 mg twice a day       UNABLE TO FIND Apply 1 patch topically At Bedtime MEDICATION NAME: Patch MD-Vitamin patch       liraglutide - Weight Management (SAXENDA) 18 MG/3ML pen Inject 3 mg Subcutaneous daily         Allergies   Allergen Reactions     Cephalexin Hives     Coconut Flavor Anaphylaxis     Covid-19 (Mrna) Vaccine Anaphylaxis     Pfizer      Prochlorperazine Other (See Comments)     Other reaction(s): Tremors  Tremors  Other reaction(s): Tremors       Coconut Fatty Acids      Other reaction(s): Edema     Metoclopramide Other (See Comments)     Other reaction(s): Tremors  tremors  Other reaction(s): Tremors       Penicillins Hives        Social History     Tobacco Use     Smoking status: Never Smoker     Smokeless tobacco: Never Used   Substance Use Topics     Alcohol use: Never       History   Drug Use Unknown         Objective     /86   Pulse 96   Temp 98.2  F (36.8  C) (Oral)   Ht 1.626 m (5' 4\")   Wt 93.7 kg (206 lb 8 oz)   SpO2 98%   BMI 35.45 kg/m      Physical Exam    GENERAL APPEARANCE: healthy, alert and no distress     EYES: EOMI, PERRL     HENT: ear canals and TM's normal and nose and mouth without ulcers or lesions     NECK: no adenopathy, no asymmetry, masses, or scars and thyroid normal to palpation     RESP: lungs clear to auscultation - no rales, rhonchi or wheezes     CV: regular rates and rhythm, normal S1 S2, no S3 or S4 and no murmur, click or rub     ABDOMEN:  soft, nontender, no " HSM or masses and bowel sounds normal     MS: extremities normal- no gross deformities noted, no evidence of inflammation in joints, FROM in all extremities.     SKIN: no suspicious lesions or rashes     NEURO: Normal strength and tone, sensory exam grossly normal, mentation intact and speech normal     PSYCH: mentation appears normal. and affect normal/bright     LYMPHATICS: No cervical adenopathy    Recent Labs   Lab Test 08/07/21  0649 08/06/21  1122   HGB 13.7 13.3    291    138   POTASSIUM 3.7 3.7   CR 0.90 1.03        Diagnostics:  Labs pending at this time.  Results will be reviewed when available.   No EKG required, no history of coronary heart disease, significant arrhythmia, peripheral arterial disease or other structural heart disease.    Revised Cardiac Risk Index (RCRI):  The patient has the following serious cardiovascular risks for perioperative complications:   - No serious cardiac risks = 0 points     RCRI Interpretation: 0 points: Class I (very low risk - 0.4% complication rate)           Signed Electronically by: Ranulof Brandt PA-C  Copy of this evaluation report is provided to requesting physician.

## 2021-09-28 NOTE — PATIENT INSTRUCTIONS

## 2021-09-29 LAB — SARS-COV-2 RNA RESP QL NAA+PROBE: NEGATIVE

## 2021-09-30 ENCOUNTER — PREP FOR PROCEDURE (OUTPATIENT)
Dept: NEUROSURGERY | Facility: CLINIC | Age: 41
End: 2021-09-30

## 2021-09-30 DIAGNOSIS — Z11.52 ENCOUNTER FOR PREOPERATIVE SCREENING LABORATORY TESTING FOR COVID-19 VIRUS: Primary | ICD-10-CM

## 2021-09-30 DIAGNOSIS — M54.16 LUMBAR RADICULOPATHY: Primary | ICD-10-CM

## 2021-09-30 DIAGNOSIS — Z01.812 ENCOUNTER FOR PREOPERATIVE SCREENING LABORATORY TESTING FOR COVID-19 VIRUS: Primary | ICD-10-CM

## 2021-09-30 DIAGNOSIS — M48.061 SPINAL STENOSIS, LUMBAR REGION, WITHOUT NEUROGENIC CLAUDICATION: ICD-10-CM

## 2021-10-01 ENCOUNTER — TELEPHONE (OUTPATIENT)
Dept: NEUROSURGERY | Facility: CLINIC | Age: 41
End: 2021-10-01

## 2021-10-01 ENCOUNTER — TELEPHONE (OUTPATIENT)
Dept: FAMILY MEDICINE | Facility: CLINIC | Age: 41
End: 2021-10-01

## 2021-10-01 ENCOUNTER — LAB (OUTPATIENT)
Dept: LAB | Facility: CLINIC | Age: 41
End: 2021-10-01
Payer: COMMERCIAL

## 2021-10-01 DIAGNOSIS — Z01.812 ENCOUNTER FOR PREOPERATIVE SCREENING LABORATORY TESTING FOR COVID-19 VIRUS: ICD-10-CM

## 2021-10-01 DIAGNOSIS — Z11.52 ENCOUNTER FOR PREOPERATIVE SCREENING LABORATORY TESTING FOR COVID-19 VIRUS: ICD-10-CM

## 2021-10-01 LAB — SARS-COV-2 RNA RESP QL NAA+PROBE: NEGATIVE

## 2021-10-01 PROCEDURE — U0005 INFEC AGEN DETEC AMPLI PROBE: HCPCS

## 2021-10-01 PROCEDURE — U0003 INFECTIOUS AGENT DETECTION BY NUCLEIC ACID (DNA OR RNA); SEVERE ACUTE RESPIRATORY SYNDROME CORONAVIRUS 2 (SARS-COV-2) (CORONAVIRUS DISEASE [COVID-19]), AMPLIFIED PROBE TECHNIQUE, MAKING USE OF HIGH THROUGHPUT TECHNOLOGIES AS DESCRIBED BY CMS-2020-01-R: HCPCS

## 2021-10-01 NOTE — TELEPHONE ENCOUNTER
Called patient, discussed surgery, post-op course, expectations, follow up plan.    Reviewed H&P from 09/28/2021 - cleared for surgery  Labs - WNL    MRI done on 08/06/2021 - in [place]    To OR as planned.     Check in - 0830    Nothing to eat or drink after midnight the night before surgery.     Reviewed with patient: Arrive 2.5 -3 hours prior to scheduled surgery.    Bring all pertinent films to hospital the day of surgery.     Continue to refrain from NSAIDS (Ibuprofen, Aleve, Naprosyn), ASA, Over the counter herbal medications or supplements, anti-coagulants and blood thinners.     Patient confirmed they have help/assistance in place at home upon discharge    Instructions: using a washcloth and a bottle of provided Hibiclens, wash your body, avoiding your face and genitals. Preferably, shower the night before surgery and the morning of surgery using a half a bottle each time for your whole body shower.        Patient was informed that we will provide up to 1 week prescription of pain medication for post-operative pain.      Instructed patient about the following: After your surgery, if you will be staying in-patient, a nursing provider team will be monitoring you closely throughout your stay and communicate your health status to your surgeon and other providers.  You will be seen by Advanced Practice Providers (e.g., nurse practitioners, clinic nurse specialist, and physician assistants) who will check on you regularly to assess the status of your surgery.     Christy Rose RN, CNRN

## 2021-10-01 NOTE — TELEPHONE ENCOUNTER
ORDER FROM: Dr. Lilly    PRE AUTHORIZATION: No PA required. Ok to schedule    METHOD OF PATIENT CONTACT: Spoke with Madeline in clinic and on the phone. Best number to reach: [phone]    PROCEDURE: Left lumbar 5-sacral 1 hemilaminectomy, medial facetectomy, foraminotomy with possible lumbar 5-sacral 1 microdiscectomy    SURGICAL DATE: 10/4/21 @ 10:25 am - JALEN BARROSO TEST: 10/01/21 @ 1:45 PM at the [place] lab    READINESS VISIT: PLEASE CALL    PCP, CLINIC, PHONE#: Dr. Tre Jj, [facility], [phone]    PRE-OP PHYSICAL: 9/28/21 @ 7:00 AM with Dr. Brandt, [facility], [phone]    FILM INFO: XRAY LUMBAR: 8/10/21 @ [facility]          MRI LUMBAR: 8/6/21 @ [facility]    SURGICAL LETTER: GIVEN TO PATIENT IN CLINIC

## 2021-10-01 NOTE — TELEPHONE ENCOUNTER
Reason for Call:  Other addendum    Detailed comments: Deena from [facility] called and said pt was seen on 9/28 for a pre op for a surgical procedure but she will be getting the Lumbar surgery first and her surgery in on Monday and they are requesting and addendum to the pre op that was done on 9/28 Please advise thank you    Phone Number Patient can be reached at: Other phone number:  [phone]    Best Time: anytime    Can we leave a detailed message on this number? YES    Call taken on 10/1/2021 at 9:19 AM by Cely Cleveland

## 2021-10-04 ENCOUNTER — APPOINTMENT (OUTPATIENT)
Dept: RADIOLOGY | Facility: HOSPITAL | Age: 41
End: 2021-10-04
Attending: SURGERY
Payer: COMMERCIAL

## 2021-10-04 ENCOUNTER — TELEPHONE (OUTPATIENT)
Dept: NEUROSURGERY | Facility: CLINIC | Age: 41
End: 2021-10-04

## 2021-10-04 ENCOUNTER — HOSPITAL ENCOUNTER (OUTPATIENT)
Facility: HOSPITAL | Age: 41
Discharge: HOME | End: 2021-10-04
Attending: SURGERY | Admitting: SURGERY
Payer: COMMERCIAL

## 2021-10-04 ENCOUNTER — ANESTHESIA (OUTPATIENT)
Dept: SURGERY | Facility: HOSPITAL | Age: 41
End: 2021-10-04
Payer: COMMERCIAL

## 2021-10-04 ENCOUNTER — ANESTHESIA EVENT (OUTPATIENT)
Dept: SURGERY | Facility: HOSPITAL | Age: 41
End: 2021-10-04
Payer: COMMERCIAL

## 2021-10-04 VITALS
TEMPERATURE: 97.4 F | HEART RATE: 90 BPM | OXYGEN SATURATION: 95 % | BODY MASS INDEX: 35.27 KG/M2 | DIASTOLIC BLOOD PRESSURE: 65 MMHG | SYSTOLIC BLOOD PRESSURE: 113 MMHG | RESPIRATION RATE: 12 BRPM | WEIGHT: 205.5 LBS

## 2021-10-04 DIAGNOSIS — M54.16 LUMBAR RADICULOPATHY: ICD-10-CM

## 2021-10-04 DIAGNOSIS — M48.061 SPINAL STENOSIS, LUMBAR REGION, WITHOUT NEUROGENIC CLAUDICATION: ICD-10-CM

## 2021-10-04 PROCEDURE — 370N000017 HC ANESTHESIA TECHNICAL FEE, PER MIN: Performed by: SURGERY

## 2021-10-04 PROCEDURE — 250N000009 HC RX 250: Performed by: ANESTHESIOLOGY

## 2021-10-04 PROCEDURE — 999N000063 XR CROSSTABLE LATERAL LUMBAR SPINE PORTABLE

## 2021-10-04 PROCEDURE — 250N000009 HC RX 250: Performed by: STUDENT IN AN ORGANIZED HEALTH CARE EDUCATION/TRAINING PROGRAM

## 2021-10-04 PROCEDURE — 258N000003 HC RX IP 258 OP 636: Performed by: ANESTHESIOLOGY

## 2021-10-04 PROCEDURE — 999N000141 HC STATISTIC PRE-PROCEDURE NURSING ASSESSMENT: Performed by: SURGERY

## 2021-10-04 PROCEDURE — 360N000077 HC SURGERY LEVEL 4, PER MIN: Performed by: SURGERY

## 2021-10-04 PROCEDURE — 250N000025 HC SEVOFLURANE, PER MIN: Performed by: SURGERY

## 2021-10-04 PROCEDURE — 250N000011 HC RX IP 250 OP 636: Performed by: STUDENT IN AN ORGANIZED HEALTH CARE EDUCATION/TRAINING PROGRAM

## 2021-10-04 PROCEDURE — 250N000011 HC RX IP 250 OP 636: Performed by: NURSE PRACTITIONER

## 2021-10-04 PROCEDURE — 250N000011 HC RX IP 250 OP 636: Performed by: ANESTHESIOLOGY

## 2021-10-04 PROCEDURE — 250N000011 HC RX IP 250 OP 636: Performed by: REGISTERED NURSE

## 2021-10-04 PROCEDURE — 999N000063 XR LUMBAR SPINE 1 VIEW

## 2021-10-04 PROCEDURE — 250N000013 HC RX MED GY IP 250 OP 250 PS 637: Performed by: NURSE PRACTITIONER

## 2021-10-04 PROCEDURE — 710N000009 HC RECOVERY PHASE 1, LEVEL 1, PER MIN: Performed by: SURGERY

## 2021-10-04 PROCEDURE — 250N000011 HC RX IP 250 OP 636

## 2021-10-04 PROCEDURE — 250N000009 HC RX 250: Performed by: SURGERY

## 2021-10-04 PROCEDURE — 272N000001 HC OR GENERAL SUPPLY STERILE: Performed by: SURGERY

## 2021-10-04 PROCEDURE — 710N000012 HC RECOVERY PHASE 2, PER MINUTE: Performed by: SURGERY

## 2021-10-04 PROCEDURE — 63047 LAM FACETEC & FORAMOT LUMBAR: CPT | Performed by: SURGERY

## 2021-10-04 RX ORDER — ACETAMINOPHEN 325 MG/1
650 TABLET ORAL EVERY 4 HOURS PRN
Status: DISCONTINUED | OUTPATIENT
Start: 2021-10-07 | End: 2021-10-04 | Stop reason: HOSPADM

## 2021-10-04 RX ORDER — CLINDAMYCIN PHOSPHATE 900 MG/50ML
900 INJECTION, SOLUTION INTRAVENOUS
Status: COMPLETED | OUTPATIENT
Start: 2021-10-04 | End: 2021-10-04

## 2021-10-04 RX ORDER — SODIUM CHLORIDE, SODIUM LACTATE, POTASSIUM CHLORIDE, CALCIUM CHLORIDE 600; 310; 30; 20 MG/100ML; MG/100ML; MG/100ML; MG/100ML
INJECTION, SOLUTION INTRAVENOUS CONTINUOUS
Status: DISCONTINUED | OUTPATIENT
Start: 2021-10-04 | End: 2021-10-04 | Stop reason: HOSPADM

## 2021-10-04 RX ORDER — PROPOFOL 10 MG/ML
INJECTION, EMULSION INTRAVENOUS CONTINUOUS PRN
Status: DISCONTINUED | OUTPATIENT
Start: 2021-10-04 | End: 2021-10-04

## 2021-10-04 RX ORDER — DEXAMETHASONE SODIUM PHOSPHATE 10 MG/ML
10 INJECTION, SOLUTION INTRAMUSCULAR; INTRAVENOUS ONCE
Status: COMPLETED | OUTPATIENT
Start: 2021-10-04 | End: 2021-10-04

## 2021-10-04 RX ORDER — FENTANYL CITRATE 50 UG/ML
25 INJECTION, SOLUTION INTRAMUSCULAR; INTRAVENOUS EVERY 5 MIN PRN
Status: DISCONTINUED | OUTPATIENT
Start: 2021-10-04 | End: 2021-10-04 | Stop reason: HOSPADM

## 2021-10-04 RX ORDER — NALOXONE HYDROCHLORIDE 0.4 MG/ML
INJECTION, SOLUTION INTRAMUSCULAR; INTRAVENOUS; SUBCUTANEOUS PRN
Status: DISCONTINUED | OUTPATIENT
Start: 2021-10-04 | End: 2021-10-04

## 2021-10-04 RX ORDER — ONDANSETRON 2 MG/ML
INJECTION INTRAMUSCULAR; INTRAVENOUS PRN
Status: DISCONTINUED | OUTPATIENT
Start: 2021-10-04 | End: 2021-10-04

## 2021-10-04 RX ORDER — METHOCARBAMOL 500 MG/1
500 TABLET, FILM COATED ORAL EVERY 6 HOURS PRN
Status: DISCONTINUED | OUTPATIENT
Start: 2021-10-04 | End: 2021-10-04 | Stop reason: HOSPADM

## 2021-10-04 RX ORDER — HYDROMORPHONE HCL IN WATER/PF 6 MG/30 ML
0.4 PATIENT CONTROLLED ANALGESIA SYRINGE INTRAVENOUS
Status: DISCONTINUED | OUTPATIENT
Start: 2021-10-04 | End: 2021-10-04 | Stop reason: HOSPADM

## 2021-10-04 RX ORDER — ACETAMINOPHEN 325 MG/1
975 TABLET ORAL EVERY 8 HOURS
Status: DISCONTINUED | OUTPATIENT
Start: 2021-10-04 | End: 2021-10-04 | Stop reason: HOSPADM

## 2021-10-04 RX ORDER — ONDANSETRON 4 MG/1
4 TABLET, ORALLY DISINTEGRATING ORAL EVERY 30 MIN PRN
Status: DISCONTINUED | OUTPATIENT
Start: 2021-10-04 | End: 2021-10-04 | Stop reason: HOSPADM

## 2021-10-04 RX ORDER — GABAPENTIN 300 MG/1
300 CAPSULE ORAL
Status: COMPLETED | OUTPATIENT
Start: 2021-10-04 | End: 2021-10-04

## 2021-10-04 RX ORDER — ACETAMINOPHEN 325 MG/1
975 TABLET ORAL ONCE
Status: COMPLETED | OUTPATIENT
Start: 2021-10-04 | End: 2021-10-04

## 2021-10-04 RX ORDER — ONDANSETRON 2 MG/ML
4 INJECTION INTRAMUSCULAR; INTRAVENOUS EVERY 6 HOURS PRN
Status: DISCONTINUED | OUTPATIENT
Start: 2021-10-04 | End: 2021-10-04 | Stop reason: HOSPADM

## 2021-10-04 RX ORDER — LIDOCAINE HYDROCHLORIDE 20 MG/ML
INJECTION, SOLUTION INFILTRATION; PERINEURAL PRN
Status: DISCONTINUED | OUTPATIENT
Start: 2021-10-04 | End: 2021-10-04

## 2021-10-04 RX ORDER — METHOCARBAMOL 500 MG/1
500 TABLET, FILM COATED ORAL EVERY 6 HOURS PRN
Qty: 56 TABLET | Refills: 0 | Status: SHIPPED | OUTPATIENT
Start: 2021-10-04 | End: 2021-12-27

## 2021-10-04 RX ORDER — HYDROMORPHONE HCL IN WATER/PF 6 MG/30 ML
0.2 PATIENT CONTROLLED ANALGESIA SYRINGE INTRAVENOUS EVERY 5 MIN PRN
Status: DISCONTINUED | OUTPATIENT
Start: 2021-10-04 | End: 2021-10-04 | Stop reason: HOSPADM

## 2021-10-04 RX ORDER — OXYCODONE HYDROCHLORIDE 5 MG/1
10 TABLET ORAL EVERY 4 HOURS PRN
Status: DISCONTINUED | OUTPATIENT
Start: 2021-10-04 | End: 2021-10-04 | Stop reason: HOSPADM

## 2021-10-04 RX ORDER — NALOXONE HYDROCHLORIDE 0.4 MG/ML
0.2 INJECTION, SOLUTION INTRAMUSCULAR; INTRAVENOUS; SUBCUTANEOUS
Status: DISCONTINUED | OUTPATIENT
Start: 2021-10-04 | End: 2021-10-04 | Stop reason: HOSPADM

## 2021-10-04 RX ORDER — OXYCODONE HYDROCHLORIDE 5 MG/1
5 TABLET ORAL EVERY 4 HOURS PRN
Status: DISCONTINUED | OUTPATIENT
Start: 2021-10-04 | End: 2021-10-04 | Stop reason: HOSPADM

## 2021-10-04 RX ORDER — OXYCODONE HYDROCHLORIDE 5 MG/1
5 TABLET ORAL EVERY 4 HOURS PRN
Qty: 42 TABLET | Refills: 0 | Status: SHIPPED | OUTPATIENT
Start: 2021-10-04 | End: 2021-12-02

## 2021-10-04 RX ORDER — CLINDAMYCIN PHOSPHATE 900 MG/50ML
900 INJECTION, SOLUTION INTRAVENOUS SEE ADMIN INSTRUCTIONS
Status: DISCONTINUED | OUTPATIENT
Start: 2021-10-04 | End: 2021-10-04 | Stop reason: HOSPADM

## 2021-10-04 RX ORDER — ONDANSETRON 4 MG/1
4 TABLET, ORALLY DISINTEGRATING ORAL EVERY 6 HOURS PRN
Status: DISCONTINUED | OUTPATIENT
Start: 2021-10-04 | End: 2021-10-04 | Stop reason: HOSPADM

## 2021-10-04 RX ORDER — FAMOTIDINE 20 MG/1
20 TABLET, FILM COATED ORAL ONCE
Status: COMPLETED | OUTPATIENT
Start: 2021-10-04 | End: 2021-10-04

## 2021-10-04 RX ORDER — PROPOFOL 10 MG/ML
INJECTION, EMULSION INTRAVENOUS PRN
Status: DISCONTINUED | OUTPATIENT
Start: 2021-10-04 | End: 2021-10-04

## 2021-10-04 RX ORDER — LIDOCAINE 40 MG/G
CREAM TOPICAL
Status: DISCONTINUED | OUTPATIENT
Start: 2021-10-04 | End: 2021-10-04 | Stop reason: HOSPADM

## 2021-10-04 RX ORDER — NALOXONE HYDROCHLORIDE 0.4 MG/ML
0.4 INJECTION, SOLUTION INTRAMUSCULAR; INTRAVENOUS; SUBCUTANEOUS
Status: DISCONTINUED | OUTPATIENT
Start: 2021-10-04 | End: 2021-10-04 | Stop reason: HOSPADM

## 2021-10-04 RX ORDER — FENTANYL CITRATE 50 UG/ML
25 INJECTION, SOLUTION INTRAMUSCULAR; INTRAVENOUS
Status: DISCONTINUED | OUTPATIENT
Start: 2021-10-04 | End: 2021-10-04 | Stop reason: HOSPADM

## 2021-10-04 RX ORDER — ONDANSETRON 2 MG/ML
4 INJECTION INTRAMUSCULAR; INTRAVENOUS EVERY 30 MIN PRN
Status: DISCONTINUED | OUTPATIENT
Start: 2021-10-04 | End: 2021-10-04 | Stop reason: HOSPADM

## 2021-10-04 RX ORDER — KETAMINE HYDROCHLORIDE 10 MG/ML
INJECTION INTRAMUSCULAR; INTRAVENOUS PRN
Status: DISCONTINUED | OUTPATIENT
Start: 2021-10-04 | End: 2021-10-04

## 2021-10-04 RX ORDER — FENTANYL CITRATE 50 UG/ML
INJECTION, SOLUTION INTRAMUSCULAR; INTRAVENOUS PRN
Status: DISCONTINUED | OUTPATIENT
Start: 2021-10-04 | End: 2021-10-04

## 2021-10-04 RX ORDER — FENTANYL CITRATE 50 UG/ML
50 INJECTION, SOLUTION INTRAMUSCULAR; INTRAVENOUS ONCE
Status: DISCONTINUED | OUTPATIENT
Start: 2021-10-04 | End: 2021-10-04 | Stop reason: HOSPADM

## 2021-10-04 RX ORDER — SCOLOPAMINE TRANSDERMAL SYSTEM 1 MG/1
1 PATCH, EXTENDED RELEASE TRANSDERMAL ONCE
Status: DISCONTINUED | OUTPATIENT
Start: 2021-10-04 | End: 2021-10-04 | Stop reason: HOSPADM

## 2021-10-04 RX ORDER — MAGNESIUM SULFATE HEPTAHYDRATE 40 MG/ML
2 INJECTION, SOLUTION INTRAVENOUS ONCE
Status: COMPLETED | OUTPATIENT
Start: 2021-10-04 | End: 2021-10-04

## 2021-10-04 RX ORDER — HYDROMORPHONE HCL IN WATER/PF 6 MG/30 ML
0.2 PATIENT CONTROLLED ANALGESIA SYRINGE INTRAVENOUS
Status: DISCONTINUED | OUTPATIENT
Start: 2021-10-04 | End: 2021-10-04 | Stop reason: HOSPADM

## 2021-10-04 RX ADMIN — ONDANSETRON 4 MG: 2 INJECTION INTRAMUSCULAR; INTRAVENOUS at 11:34

## 2021-10-04 RX ADMIN — MAGNESIUM SULFATE HEPTAHYDRATE 2 G: 40 INJECTION, SOLUTION INTRAVENOUS at 09:53

## 2021-10-04 RX ADMIN — NALOXONE HYDROCHLORIDE 0.02 MG: 0.4 INJECTION, SOLUTION INTRAMUSCULAR; INTRAVENOUS; SUBCUTANEOUS at 12:06

## 2021-10-04 RX ADMIN — ROCURONIUM BROMIDE 20 MG: 50 INJECTION, SOLUTION INTRAVENOUS at 10:20

## 2021-10-04 RX ADMIN — ACETAMINOPHEN 975 MG: 325 TABLET ORAL at 09:15

## 2021-10-04 RX ADMIN — HYDROMORPHONE HYDROCHLORIDE 0.5 MG: 1 INJECTION, SOLUTION INTRAMUSCULAR; INTRAVENOUS; SUBCUTANEOUS at 10:40

## 2021-10-04 RX ADMIN — SCOPALAMINE 1 PATCH: 1 PATCH, EXTENDED RELEASE TRANSDERMAL at 09:16

## 2021-10-04 RX ADMIN — ROCURONIUM BROMIDE 50 MG: 50 INJECTION, SOLUTION INTRAVENOUS at 10:04

## 2021-10-04 RX ADMIN — MIDAZOLAM HYDROCHLORIDE 2 MG: 1 INJECTION, SOLUTION INTRAMUSCULAR; INTRAVENOUS at 09:52

## 2021-10-04 RX ADMIN — PROPOFOL 200 MG: 10 INJECTION, EMULSION INTRAVENOUS at 10:03

## 2021-10-04 RX ADMIN — FAMOTIDINE 20 MG: 20 TABLET, FILM COATED ORAL at 09:15

## 2021-10-04 RX ADMIN — DEXAMETHASONE SODIUM PHOSPHATE 10 MG: 10 INJECTION, SOLUTION INTRAMUSCULAR; INTRAVENOUS at 10:20

## 2021-10-04 RX ADMIN — SODIUM CHLORIDE, POTASSIUM CHLORIDE, SODIUM LACTATE AND CALCIUM CHLORIDE: 600; 310; 30; 20 INJECTION, SOLUTION INTRAVENOUS at 09:52

## 2021-10-04 RX ADMIN — FENTANYL CITRATE 50 MCG: 50 INJECTION, SOLUTION INTRAMUSCULAR; INTRAVENOUS at 10:03

## 2021-10-04 RX ADMIN — SODIUM CHLORIDE, POTASSIUM CHLORIDE, SODIUM LACTATE AND CALCIUM CHLORIDE: 600; 310; 30; 20 INJECTION, SOLUTION INTRAVENOUS at 12:13

## 2021-10-04 RX ADMIN — SUGAMMADEX 200 MG: 100 INJECTION, SOLUTION INTRAVENOUS at 11:47

## 2021-10-04 RX ADMIN — KETAMINE HYDROCHLORIDE 50 MG: 10 INJECTION, SOLUTION INTRAMUSCULAR; INTRAVENOUS at 10:03

## 2021-10-04 RX ADMIN — NALOXONE HYDROCHLORIDE 0.02 MG: 0.4 INJECTION, SOLUTION INTRAMUSCULAR; INTRAVENOUS; SUBCUTANEOUS at 12:02

## 2021-10-04 RX ADMIN — PROPOFOL 200 MCG/KG/MIN: 10 INJECTION, EMULSION INTRAVENOUS at 10:03

## 2021-10-04 RX ADMIN — LIDOCAINE HYDROCHLORIDE 60 MG: 20 INJECTION, SOLUTION INFILTRATION; PERINEURAL at 10:03

## 2021-10-04 RX ADMIN — GABAPENTIN 300 MG: 300 CAPSULE ORAL at 09:16

## 2021-10-04 RX ADMIN — FENTANYL CITRATE 50 MCG: 50 INJECTION, SOLUTION INTRAMUSCULAR; INTRAVENOUS at 09:57

## 2021-10-04 RX ADMIN — CLINDAMYCIN PHOSPHATE 900 MG: 900 INJECTION, SOLUTION INTRAVENOUS at 10:12

## 2021-10-04 RX ADMIN — NALOXONE HYDROCHLORIDE 0.01 MG: 0.4 INJECTION, SOLUTION INTRAMUSCULAR; INTRAVENOUS; SUBCUTANEOUS at 12:04

## 2021-10-04 RX ADMIN — HYDROMORPHONE HYDROCHLORIDE 0.5 MG: 1 INJECTION, SOLUTION INTRAMUSCULAR; INTRAVENOUS; SUBCUTANEOUS at 11:22

## 2021-10-04 RX ADMIN — NALOXONE HYDROCHLORIDE 0.03 MG: 0.4 INJECTION, SOLUTION INTRAMUSCULAR; INTRAVENOUS; SUBCUTANEOUS at 12:07

## 2021-10-04 NOTE — TELEPHONE ENCOUNTER
PATIENT NAME:  Madeline Szymanski  YOB: 1980  MRN: [id number]  SURGEON: Dr. Lilly  DATE of SURGERY: 10/04/2021  PROCEDURE: Left L5-S1 HLMD    FOLLOW-UP:  Wound Check: 7-10 days  Staples/Sutures Out: n/a  Post Op Visit: 6 weeks  Post-op Provider: JOHN on Dr. Lilly clinic day  DIAGNOSTICS: n/a  DISPOSITION: Home same day    ADDITIONAL INSTRUCTIONS FOR MEDICAL STAFF:        Christy Rose RN, CNRN

## 2021-10-04 NOTE — ANESTHESIA PREPROCEDURE EVALUATION
Anesthesia Pre-Procedure Evaluation    Patient: Madeline Szymanski   MRN: [id number] : 1980        Preoperative Diagnosis: Lumbar radiculopathy [M54.16]  Spinal stenosis, lumbar region, without neurogenic claudication [M48.061]   Procedure : Procedure(s):  LEFT LUMBAR 5-SACRAL 1 HEMILAMINECTOMY, MEDIAL FACETECTOMY, FORAMINOTOMY WITH  POSSIBLE LUMBAR 5-SACRAL 1 MICRODISCECTOMY     Past Medical History:   Diagnosis Date     Arthritis      Depressive disorder      Kidney infection      Migraine      Painless urinary retention due to cauda equina syndrome (H)      Pancreatitis      Spinal stenosis in cervical region      UTI (lower urinary tract infection)       Past Surgical History:   Procedure Laterality Date      SECTION       GI SURGERY       GYN SURGERY  2018    Hysterectomy      Allergies   Allergen Reactions     Cephalexin Hives     Coconut Flavor Anaphylaxis     Covid-19 (Mrna) Vaccine Anaphylaxis     Pfizer      Prochlorperazine Other (See Comments)     Other reaction(s): Tremors  Tremors  Other reaction(s): Tremors       Coconut Fatty Acids      Other reaction(s): Edema     Metoclopramide Other (See Comments)     Other reaction(s): Tremors  tremors  Other reaction(s): Tremors       Penicillins Hives      Social History     Tobacco Use     Smoking status: Never Smoker     Smokeless tobacco: Never Used   Substance Use Topics     Alcohol use: Never      Wt Readings from Last 1 Encounters:   10/04/21 93.2 kg (205 lb 8 oz)        Anesthesia Evaluation   Pt has had prior anesthetic. Type: General.    History of anesthetic complications  - PONV.      ROS/MED HX  ENT/Pulmonary:  - neg pulmonary ROS  (-) asthma   Neurologic: Comment: Disc herniation with cauda equina s/f L5-s1 hemilaminectomy w/ medial facetectomy      (+) migraines (Reglan works best for migraine abatement in pacu),     Cardiovascular:  - neg cardiovascular ROS  (-) hypertension   METS/Exercise Tolerance:     Hematologic:  - neg  hematologic  ROS     Musculoskeletal:       GI/Hepatic:     (+) GERD (hx of gastric bypass),     Renal/Genitourinary:     (+) renal disease, type: CRI,     Endo:  - neg endo ROS   (+) Obesity (BMI 35),     Psychiatric/Substance Use:       Infectious Disease: Comment: Recent Results (from the past 120 hour(s))  -Asymptomatic COVID-19 Virus (Coronavirus) by PCR Nose  Collection Time: 10/01/21  1:36 PM  Specimen: Nose; Swab       Result                                            Value                         Ref Range                       SARS CoV2 PCR                                     Negative                      Negative                    - neg infectious disease ROS     Malignancy:  - neg malignancy ROS     Other:            Physical Exam    Airway        Mallampati: III   TM distance: > 3 FB   Neck ROM: full   Mouth opening: > 3 cm    Respiratory Devices and Support         Dental  no notable dental history         Cardiovascular   cardiovascular exam normal          Pulmonary   pulmonary exam normal                OUTSIDE LABS:  CBC:   Lab Results   Component Value Date    WBC 5.1 09/28/2021    WBC 6.9 08/07/2021    HGB 13.5 09/28/2021    HGB 13.7 08/07/2021    HCT 41.8 09/28/2021    HCT 41.7 08/07/2021     09/28/2021     08/07/2021     BMP:   Lab Results   Component Value Date     09/28/2021     08/07/2021    POTASSIUM 4.0 09/28/2021    POTASSIUM 3.7 08/07/2021    CHLORIDE 108 09/28/2021    CHLORIDE 107 08/07/2021    CO2 29 09/28/2021    CO2 30 08/07/2021    BUN 11 09/28/2021    BUN 11 08/07/2021    CR 1.11 (H) 09/28/2021    CR 0.90 08/07/2021    GLC 92 09/28/2021    GLC 87 08/07/2021     COAGS:   Lab Results   Component Value Date    PTT 32 09/28/2021    INR 0.95 09/28/2021     POC: No results found for: BGM, HCG, HCGS  HEPATIC:   Lab Results   Component Value Date    ALBUMIN 3.6 05/19/2021    PROTTOTAL 7.4 05/19/2021    ALT 20 05/19/2021    AST 14 05/19/2021    ALKPHOS 78  05/19/2021    BILITOTAL 0.5 05/19/2021     OTHER:   Lab Results   Component Value Date    JE 9.1 09/28/2021    MAG 2.0 09/26/2019    LIPASE 34 09/26/2019       Anesthesia Plan    ASA Status:  2   NPO Status:  NPO Appropriate    Anesthesia Type: General.     - Airway: ETT   Induction: Intravenous, Propofol.   Maintenance: Balanced.        Consents    Anesthesia Plan(s) and associated risks, benefits, and realistic alternatives discussed. Questions answered and patient/representative(s) expressed understanding.     - Discussed with:  Patient         Postoperative Care    Pain management: IV analgesics, Multi-modal analgesia.   PONV prophylaxis: Ondansetron (or other 5HT-3), Dexamethasone or Solumedrol, Background Propofol Infusion, Scopolamine patch     Comments:    Magnesium gtt, ketamine 50mg post induction, dilaudid IV for pain control  Decadron 10, zofran 4, scop patch for antiemesis             Nighat Whipple MD

## 2021-10-04 NOTE — DISCHARGE INSTRUCTIONS
Discharge Instructions: After Your Surgery  You ve just had surgery. During surgery, you were given medicine called anesthesia to keep you relaxed and free of pain. After surgery, you may have some pain or nausea. This is common. Here are some tips for feeling better and getting well after surgery.     Stay on schedule with your medicine.   Going home  Your healthcare provider will show you how to take care of yourself when you go home. He or she will also answer your questions. Have an adult family member or friend drive you home. For the first 24 hours after your surgery:    Don't drive or use heavy equipment.    Don't make important decisions or sign legal papers.    Don't drink alcohol.    Have someone stay with you, if needed. He or she can watch for problems and help keep you safe.  Be sure to go to all follow-up visits with your healthcare provider. And rest after your surgery for as long as your healthcare provider tells you to.  Coping with pain  If you have pain after surgery, pain medicine will help you feel better. Take it as told, before pain becomes severe. Also, ask your healthcare provider or pharmacist about other ways to control pain. This might be with heat, ice, or relaxation. And follow any other instructions your surgeon or nurse gives you.  Tips for taking pain medicine  To get the best relief possible, remember these points:    Pain medicines can upset your stomach. Taking them with a little food may help.    Most pain relievers taken by mouth need at least 20 to 30 minutes to start to work.    Don't wait till your pain becomes severe before you take your medicine. Try to time your medicine so that you can take it before starting an activity. This might be before you get dressed, go for a walk, or sit down for dinner.    Constipation is a common side effect of pain medicines. Call your healthcare provider before taking any medicines such as laxatives or stool softeners to help ease  constipation. Also ask if you should skip any foods. Drinking lots of fluids and eating foods such as fruits and vegetables that are high in fiber can also help. Remember, don't take laxatives unless your surgeon has prescribed them.    Drinking alcohol and taking pain medicine can cause dizziness and slow your breathing. It can even be deadly. Don't drink alcohol while taking pain medicine.    Pain medicine can make you react more slowly to things. Don't drive or run machinery while taking pain medicine.  Your healthcare provider may tell you to take acetaminophen to help ease your pain. Ask him or her how much you are supposed to take each day. Acetaminophen or other pain relievers may interact with your prescription medicines or other over-the-counter (OTC) medicines. Some prescription medicines have acetaminophen and other ingredients. Using both prescription and OTC acetaminophen for pain can cause you to overdose. Read the labels on your OTC medicines with care. This will help you to clearly know the list of ingredients, how much to take, and any warnings. It may also help you not take too much acetaminophen. If you have questions or don't understand the information, ask your pharmacist or healthcare provider to explain it to you before you take the OTC medicine.  Managing nausea  Some people have an upset stomach after surgery. This is often because of anesthesia, pain, or pain medicine, or the stress of surgery. These tips will help you handle nausea and eat healthy foods as you get better. If you were on a special food plan before surgery, ask your healthcare provider if you should follow it while you get better. These tips may help:    Don't push yourself to eat. Your body will tell you when to eat and how much.    Start off with clear liquids and soup. They are easier to digest.    Next try semi-solid foods, such as mashed potatoes, applesauce, and gelatin, as you feel ready.    Slowly move to solid  foods. Don t eat fatty, rich, or spicy foods at first.    Don't force yourself to have 3 large meals a day. Instead eat smaller amounts more often.    Take pain medicines with a small amount of solid food, such as crackers or toast, to prevent nausea.  When to call your healthcare provider  Call your healthcare provider if:    You still have intolerable pain an hour after taking medicine. The medicine may not be strong enough.    You feel too sleepy, dizzy, or groggy. The medicine may be too strong.    You have side effects such as nausea or vomiting, or skin changes such as rash, itching, or hives. Your healthcare provider may suggest other medicines to control side effects.  Rash, itching, or hives may mean you have an allergic reaction. Report this right away. If you have trouble breathing or facial swelling, call 911 right away.  If you have obstructive sleep apnea  You were given anesthesia medicine during surgery to keep you comfortable and free of pain. After surgery, you may have more apnea spells because of this medicine and other medicines you were given. The spells may last longer than usual.   At home:    Keep using the continuous positive airway pressure (CPAP) device when you sleep. Unless your healthcare provider tells you not to, use it when you sleep, day or night. CPAP is a common device used to treat obstructive sleep apnea.    Talk with your provider before taking any pain medicine, muscle relaxants, or sedatives. Your provider will tell you about the possible dangers of taking these medicines.  Dat[employer] last reviewed this educational content on 3/1/2019    2302-6577 [employer]. All rights reserved. This information is not intended as a substitute for professional medical care. Always follow your healthcare professional's instructions.

## 2021-10-04 NOTE — OP NOTE
NEUROSURGERY OPERATIVE REPORT    DATE OF SERVICE: 10/4/2021    PREOPERATIVE DIAGNOSES:  Lumbar radiculopathy  Spinal stenosis, lumbar region, without neurogenic claudication     POSTOPERATIVE DIAGNOSES:  same     PROCEDURES:  1.  Left lumbar 5-sacral 1 hemilaminectomy, medial facetectomy, foraminotomy and microdiscectomy.  2.  Use of operating room microscope.    SURGEON:  Radha Lilly MD    INDICATIONS:  Madeline Szymanski is a 41 year old female who presents with left leg pain. EMG showed a left L5 and/or S1 radiculopathy, active. Lumbar xray showed no significant dynamic spondylolisthesis. MRI lumbar spine shows a disc bulge at lumbar 5-sacral 1 with left>right lateral recess stenosis with encroachment of the S1 nerve roots as well as mild left L5-S1 foraminal narrowing. We discussed risks and benefits of left lumbar 5-sacral 1 hemilaminectomy, medial facetectomy, foraminotomy and possible microdiscectomy. She understood and elected to proceed.    PROCEDURE:  After obtaining informed consent, the patient was brought to the operating room with TEDs and pneumatic stockings in place.  IV antibiotics was administered.  She was intubated under general  endotracheal anesthesia with her neck in a completely neutral position, no hyperextension.  She was turned into the radiolucent laminectomy frame with all pressure points well padded.  She was prepped and draped in the usual sterile fashion.  A preincisional infiltration of local anesthetic was performed along the midline and the incision was opened sharply and extended down to the fascia.  The fascia was opened in one layer with monopolar electrocautery.  A subperiosteal dissection was undertaken to expose the lamina at left lumbar 5-sacral 1 .   We verified levels with a lateral x-ray.      Once levels were verified, we then proceeded to remove the inferior portion of the lamina of left lumbar 5 with  Kerrison's and a Midas drill. We drilled down to the ligamentum  elevated the ligamentum from the underlying thecal sac and removed it with a combination of Kerrison's and curettes.  We extended the dissection superiorly and inferiorly until there was no compression by the lamina or ligamentum on the underlying thecal sac.   We brought in the operating room microscope for this and all microdissection.  We next drilled a partial medial facetectomy  and foraminotomies opening up the lateral recess and expose the underlying impinged lateral recess and nerve roots.    We next exposed the annulus of the disc space protected the nerve and cut the annulus as needed to evacuate the disc bulge to release impingement on the nerve.  We used a combination of pituitaries and curettes to remove sufficient disc loose in the canal and from the interdiscal space, so as to decompress the nerve.   we used a small blunt hook under the thecal sac and in the foramen to verify that there was no further disc material to milk out into the dissection site.  Once the thecal sac and nerve roots were completely decompressed we used a small ball tip probe to verify no further pressure either in the canal or in the foramina.  The lateral recess was nicely decompressed.   We then proceeded to copiously irrigate the incision with antibiotic-containing saline and achieved hemostasis.    An assistant was needed for hemostasis and retraction and closure.    The incisions were closed in layers with interrupted 0 Vicryl to approximate the muscle and fascia, inverted 2-0 Vicryl to approximate the subcutaneous tissues and Monocryl to approximate the skin edges. Exofin was then placed. Sponge and needle counts were correct prior to closure x2.   Sterile dressings were placed.      Patient tolerated the procedure well, was taken to the recovery room where she was extubated and found to be at her neurological baseline with no new deficits appreciated.    Estimated blood loss: 25 ml    Specimens: none    Implants:  none    Findings:Small disc bulge extending from L5-S1 disc space causing S1 nerve impingement       Radha Lilly MD    Cc: Tre Jj A

## 2021-10-04 NOTE — ANESTHESIA POSTPROCEDURE EVALUATION
Patient: Madeline Szymanski    Procedure: Procedure(s):  LEFT LUMBAR 5-SACRAL 1 HEMILAMINECTOMY, MEDIAL FACETECTOMY, FORAMINOTOMY WITH  LUMBAR 5-SACRAL 1 MICRODISCECTOMY       Diagnosis:Lumbar radiculopathy [M54.16]  Spinal stenosis, lumbar region, without neurogenic claudication [M48.061]  Diagnosis Additional Information: No value filed.    Anesthesia Type:  General    Note:  Disposition: Outpatient   Postop Pain Control: Uneventful            Sign Out: Well controlled pain   PONV: No   Neuro/Psych: Uneventful            Sign Out: Acceptable/Baseline neuro status   Airway/Respiratory: Uneventful            Sign Out: Acceptable/Baseline resp. status   CV/Hemodynamics: Uneventful            Sign Out: Acceptable CV status; No obvious hypovolemia; No obvious fluid overload   Other NRE: NONE   DID A NON-ROUTINE EVENT OCCUR? No           Last vitals:  Vitals Value Taken Time   /59 10/04/21 1315   Temp 36.3  C (97.4  F) 10/04/21 1300   Pulse 88 10/04/21 1323   Resp 19 10/04/21 1323   SpO2 93 % 10/04/21 1323   Vitals shown include unvalidated device data.    Electronically Signed By: Nighat Whipple MD  October 4, 2021  4:57 PM

## 2021-10-04 NOTE — ANESTHESIA CARE TRANSFER NOTE
Patient: Madeline Szymanski    Procedure: Procedure(s):  LEFT LUMBAR 5-SACRAL 1 HEMILAMINECTOMY, MEDIAL FACETECTOMY, FORAMINOTOMY WITH  LUMBAR 5-SACRAL 1 MICRODISCECTOMY       Diagnosis: Lumbar radiculopathy [M54.16]  Spinal stenosis, lumbar region, without neurogenic claudication [M48.061]  Diagnosis Additional Information: No value filed.    Anesthesia Type:   General     Note:    Oropharynx: oropharynx clear of all foreign objects  Level of Consciousness: drowsy  Oxygen Supplementation: face mask  Level of Supplemental Oxygen (L/min / FiO2): 10  Independent Airway: airway patency satisfactory and stable  Dentition: dentition unchanged  Vital Signs Stable: post-procedure vital signs reviewed and stable  Report to RN Given: handoff report given  Patient transferred to: PACU    Handoff Report: Identifed the Patient, Identified the Reponsible Provider, Reviewed the pertinent medical history, Discussed the surgical course, Reviewed Intra-OP anesthesia mangement and issues during anesthesia, Set expectations for post-procedure period and Allowed opportunity for questions and acknowledgement of understanding      Vitals:  Vitals Value Taken Time   /75 10/04/21 1215   Temp 97.4F    Pulse 93 10/04/21 1215   Resp 15 10/04/21 1215   SpO2 99 % 10/04/21 1215   Vitals shown include unvalidated device data.    Electronically Signed By: PERRI Baptiste CRNA  October 4, 2021  12:15 PM

## 2021-10-04 NOTE — ANESTHESIA PROCEDURE NOTES
Airway       Patient location during procedure: OR       Procedure Start/Stop Times: 10/4/2021 10:06 AM  Staff -        CRNA: Tory Dias APRN CRNA       Performed By: CRNA  Consent for Airway        Urgency: elective  Indications and Patient Condition       Indications for airway management: erin-procedural       Induction type:intravenous       Mask difficulty assessment: 1 - vent by mask    Final Airway Details       Final airway type: endotracheal airway       Successful airway: ETT - single  Endotracheal Airway Details        ETT size (mm): 7.0       Cuffed: yes       Successful intubation technique: direct laryngoscopy       DL Blade Type: Cain 2       Grade View of Cords: 3       Adjucts: stylet and tooth guard       Position: Right       Measured from: lips       Secured at (cm): 21       Bite block used: None    Post intubation assessment        Placement verified by: capnometry and equal breath sounds        Number of attempts at approach: 1       Number of other approaches attempted: 0       Secured with: silk tape       Ease of procedure: easy       Dentition: Intact and Unchanged    Additional Comments       Stiff neck

## 2021-10-04 NOTE — BRIEF OP NOTE
[facility] Brief Operative Note    Pre-operative diagnosis: Lumbar radiculopathy [M54.16]  Spinal stenosis, lumbar region, without neurogenic claudication [M48.061]   Post-operative diagnosis same   Procedure: Procedure(s):  LEFT LUMBAR 5-SACRAL 1 HEMILAMINECTOMY, MEDIAL FACETECTOMY, FORAMINOTOMY WITH  LUMBAR 5-SACRAL 1 MICRODISCECTOMY   Surgeon(s): Surgeon(s) and Role:     * Radha Lilly MD - Primary     * Ashley Lopez PA-C   Estimated blood loss: 25 mL    Specimens: none   Findings: Small disc bulge extending from L5-S1 disc space causing S1 nerve impingement

## 2021-10-05 ENCOUNTER — MEDICAL CORRESPONDENCE (OUTPATIENT)
Dept: NEUROSURGERY | Facility: CLINIC | Age: 41
End: 2021-10-05

## 2021-10-06 DIAGNOSIS — M54.16 LUMBAR RADICULOPATHY: Primary | ICD-10-CM

## 2021-10-06 RX ORDER — CYCLOBENZAPRINE HCL 10 MG
10 TABLET ORAL 3 TIMES DAILY PRN
Qty: 30 TABLET | Refills: 0 | Status: SHIPPED | OUTPATIENT
Start: 2021-10-06 | End: 2021-12-27

## 2021-10-06 NOTE — TELEPHONE ENCOUNTER
After discussion with Dr. Lilly, instructed Madeline to stop taking Robaxin and to start on Flexeril 10 mg. Reminded about ice/heat, positioning. Encouraged to keep us updated.  Christy Rose RN, CNRN

## 2021-10-08 ENCOUNTER — ALLIED HEALTH/NURSE VISIT (OUTPATIENT)
Dept: NEUROSURGERY | Facility: CLINIC | Age: 41
End: 2021-10-08
Payer: COMMERCIAL

## 2021-10-08 VITALS — HEART RATE: 76 BPM | RESPIRATION RATE: 18 BRPM | SYSTOLIC BLOOD PRESSURE: 110 MMHG | DIASTOLIC BLOOD PRESSURE: 66 MMHG

## 2021-10-08 DIAGNOSIS — M54.16 LUMBAR RADICULOPATHY: Primary | ICD-10-CM

## 2021-10-08 PROCEDURE — 99024 POSTOP FOLLOW-UP VISIT: CPT

## 2021-10-08 RX ORDER — METHYLPREDNISOLONE 4 MG
TABLET, DOSE PACK ORAL
Qty: 21 TABLET | Refills: 0 | Status: SHIPPED | OUTPATIENT
Start: 2021-10-08 | End: 2021-12-02

## 2021-10-08 RX ORDER — OXYCODONE HYDROCHLORIDE 5 MG/1
10 TABLET ORAL EVERY 6 HOURS PRN
Qty: 32 TABLET | Refills: 0 | Status: SHIPPED | OUTPATIENT
Start: 2021-10-09 | End: 2021-10-13

## 2021-10-08 NOTE — PROGRESS NOTES
Madeline Szymanski is status post Left lumbar 5-sacral 1 hemilaminectomy, medial facetectomy, foraminotomy and microdiscectomy on 10/4/2021 with Dr. Lilly.  Preoperatively presented with left leg pain.  Today she returns in follow up for wound check and pain management. Reports much improved leg pain, but low back and left posterior moderate pain. Denies sensory or motor issues in LE. On Oxycodone, Flexeril and Gabapentin as prescribed. Uses ice packs.    Surgical wound WNL - CDI, no signs of infection or skin breakdown.  Incision well-healed: good skin approximation, no redness or visible/palpable edema, no tenderness to palpation.  PT. AF, denies fever, chills or sweats.  Pt. reports that the symptoms are improved from pre-op.    Christy Rose RN, CNRN

## 2021-10-08 NOTE — PROGRESS NOTES
Having increased pack low back into posterior left thigh. Currently taking the flexeril 10 mg TID, oxycodone 5 mg every 4 hours , gabapentin 600 mg at bedtime. Incision CDI.     Recommend continue flexeril 10 mg TID and gabapentin 600 mg at bedtime. Recommend medrol dose pack and increase oxycodone to 10 mg every 6 hours. We will schedule a telephone visit next Tuesday.     Radha Lilly MD

## 2021-10-12 ENCOUNTER — VIRTUAL VISIT (OUTPATIENT)
Dept: NEUROSURGERY | Facility: CLINIC | Age: 41
End: 2021-10-12
Payer: COMMERCIAL

## 2021-10-12 DIAGNOSIS — M54.9 BACK PAIN: Primary | ICD-10-CM

## 2021-10-12 PROCEDURE — 99207 PR NO CHARGE NURSE ONLY: CPT

## 2021-10-12 NOTE — TELEPHONE ENCOUNTER
10/12/21 Wound check changed to telephone visit so I called patient and left message for her to cb. 6 wk post op appt needs to be scheduled.

## 2021-10-12 NOTE — PROGRESS NOTES
Madeline Szymanski is status post Left lumbar 5-sacral 1 hemilaminectomy, medial facetectomy, foraminotomy and microdiscectomy on 10/4/2021 with Dr. Lilly.  Preoperatively presented with left leg pain.  Last seen in clinic on  10/8/2021 for increased low back and posterior left thigh pain.   Today she reports much improved back and leg pain (of note, did not initiate a course of oral steroids). States Flexeril is more beneficial than Oxycodone. Denies sensory or motor issues in LE.   Verbalizes no concerns regarding appearance of her wound - CDI, no redness, no edema, no TTP.    Will follow up in 4 weeks, sooner should she develop new or worsening symptoms.  Christy Rose, RN, CNRN

## 2021-10-17 ENCOUNTER — HOSPITAL ENCOUNTER (EMERGENCY)
Facility: HOSPITAL | Age: 41
Discharge: HOME | End: 2021-10-17
Attending: EMERGENCY MEDICINE | Admitting: EMERGENCY MEDICINE
Payer: COMMERCIAL

## 2021-10-17 ENCOUNTER — APPOINTMENT (OUTPATIENT)
Dept: MRI IMAGING | Facility: HOSPITAL | Age: 41
End: 2021-10-17
Attending: EMERGENCY MEDICINE
Payer: COMMERCIAL

## 2021-10-17 VITALS
HEIGHT: 64 IN | SYSTOLIC BLOOD PRESSURE: 135 MMHG | TEMPERATURE: 98 F | RESPIRATION RATE: 16 BRPM | BODY MASS INDEX: 35 KG/M2 | WEIGHT: 205 LBS | DIASTOLIC BLOOD PRESSURE: 76 MMHG | OXYGEN SATURATION: 98 % | HEART RATE: 96 BPM

## 2021-10-17 DIAGNOSIS — M54.9 POSTOPERATIVE BACK PAIN: ICD-10-CM

## 2021-10-17 DIAGNOSIS — G89.18 POSTOPERATIVE BACK PAIN: ICD-10-CM

## 2021-10-17 LAB
ALBUMIN UR-MCNC: NEGATIVE MG/DL
ANION GAP SERPL CALCULATED.3IONS-SCNC: 10 MMOL/L (ref 5–18)
APPEARANCE UR: CLEAR
BILIRUB UR QL STRIP: NEGATIVE
BUN SERPL-MCNC: 10 MG/DL (ref 8–22)
C REACTIVE PROTEIN LHE: 0.8 MG/DL (ref 0–0.8)
CALCIUM SERPL-MCNC: 9.9 MG/DL (ref 8.5–10.5)
CHLORIDE BLD-SCNC: 104 MMOL/L (ref 98–107)
CO2 SERPL-SCNC: 26 MMOL/L (ref 22–31)
COLOR UR AUTO: COLORLESS
CREAT SERPL-MCNC: 1.01 MG/DL (ref 0.6–1.1)
ERYTHROCYTE [DISTWIDTH] IN BLOOD BY AUTOMATED COUNT: 12.3 % (ref 10–15)
ERYTHROCYTE [SEDIMENTATION RATE] IN BLOOD BY WESTERGREN METHOD: 17 MM/HR (ref 0–20)
GFR SERPL CREATININE-BSD FRML MDRD: 68 ML/MIN/1.73M2
GLUCOSE BLD-MCNC: 102 MG/DL (ref 70–125)
GLUCOSE UR STRIP-MCNC: NEGATIVE MG/DL
HCT VFR BLD AUTO: 43.4 % (ref 35–47)
HGB BLD-MCNC: 14.1 G/DL (ref 11.7–15.7)
HGB UR QL STRIP: NEGATIVE
KETONES UR STRIP-MCNC: NEGATIVE MG/DL
LEUKOCYTE ESTERASE UR QL STRIP: NEGATIVE
MCH RBC QN AUTO: 29.7 PG (ref 26.5–33)
MCHC RBC AUTO-ENTMCNC: 32.5 G/DL (ref 31.5–36.5)
MCV RBC AUTO: 91 FL (ref 78–100)
NITRATE UR QL: NEGATIVE
PH UR STRIP: 7.5 [PH] (ref 5–7)
PLATELET # BLD AUTO: 325 10E3/UL (ref 150–450)
POTASSIUM BLD-SCNC: 4.2 MMOL/L (ref 3.5–5)
RBC # BLD AUTO: 4.75 10E6/UL (ref 3.8–5.2)
RBC URINE: <1 /HPF
SODIUM SERPL-SCNC: 140 MMOL/L (ref 136–145)
SP GR UR STRIP: 1.01 (ref 1–1.03)
UROBILINOGEN UR STRIP-MCNC: <2 MG/DL
WBC # BLD AUTO: 10.3 10E3/UL (ref 4–11)
WBC URINE: <1 /HPF

## 2021-10-17 PROCEDURE — 85652 RBC SED RATE AUTOMATED: CPT | Performed by: EMERGENCY MEDICINE

## 2021-10-17 PROCEDURE — 250N000011 HC RX IP 250 OP 636: Performed by: EMERGENCY MEDICINE

## 2021-10-17 PROCEDURE — 96374 THER/PROPH/DIAG INJ IV PUSH: CPT | Mod: 59

## 2021-10-17 PROCEDURE — 96375 TX/PRO/DX INJ NEW DRUG ADDON: CPT | Mod: 59

## 2021-10-17 PROCEDURE — 85027 COMPLETE CBC AUTOMATED: CPT | Performed by: EMERGENCY MEDICINE

## 2021-10-17 PROCEDURE — 82565 ASSAY OF CREATININE: CPT | Performed by: EMERGENCY MEDICINE

## 2021-10-17 PROCEDURE — 72158 MRI LUMBAR SPINE W/O & W/DYE: CPT

## 2021-10-17 PROCEDURE — 81001 URINALYSIS AUTO W/SCOPE: CPT | Performed by: EMERGENCY MEDICINE

## 2021-10-17 PROCEDURE — A9585 GADOBUTROL INJECTION: HCPCS | Performed by: EMERGENCY MEDICINE

## 2021-10-17 PROCEDURE — 86141 C-REACTIVE PROTEIN HS: CPT | Performed by: EMERGENCY MEDICINE

## 2021-10-17 PROCEDURE — 99285 EMERGENCY DEPT VISIT HI MDM: CPT | Mod: 25

## 2021-10-17 PROCEDURE — 36415 COLL VENOUS BLD VENIPUNCTURE: CPT | Performed by: EMERGENCY MEDICINE

## 2021-10-17 PROCEDURE — 255N000002 HC RX 255 OP 636: Performed by: EMERGENCY MEDICINE

## 2021-10-17 PROCEDURE — 250N000013 HC RX MED GY IP 250 OP 250 PS 637: Performed by: EMERGENCY MEDICINE

## 2021-10-17 RX ORDER — CYCLOBENZAPRINE HCL 10 MG
10 TABLET ORAL ONCE
Status: COMPLETED | OUTPATIENT
Start: 2021-10-17 | End: 2021-10-17

## 2021-10-17 RX ORDER — OXYCODONE HYDROCHLORIDE 5 MG/1
5 TABLET ORAL ONCE
Status: COMPLETED | OUTPATIENT
Start: 2021-10-17 | End: 2021-10-17

## 2021-10-17 RX ORDER — GADOBUTROL 604.72 MG/ML
10 INJECTION INTRAVENOUS ONCE
Status: COMPLETED | OUTPATIENT
Start: 2021-10-17 | End: 2021-10-17

## 2021-10-17 RX ORDER — ONDANSETRON 2 MG/ML
4 INJECTION INTRAMUSCULAR; INTRAVENOUS ONCE
Status: COMPLETED | OUTPATIENT
Start: 2021-10-17 | End: 2021-10-17

## 2021-10-17 RX ADMIN — CYCLOBENZAPRINE 10 MG: 10 TABLET, FILM COATED ORAL at 15:01

## 2021-10-17 RX ADMIN — GADOBUTROL 10 ML: 604.72 INJECTION INTRAVENOUS at 14:23

## 2021-10-17 RX ADMIN — ONDANSETRON 4 MG: 2 INJECTION INTRAMUSCULAR; INTRAVENOUS at 13:54

## 2021-10-17 RX ADMIN — HYDROMORPHONE HYDROCHLORIDE 0.5 MG: 1 INJECTION, SOLUTION INTRAMUSCULAR; INTRAVENOUS; SUBCUTANEOUS at 13:29

## 2021-10-17 RX ADMIN — OXYCODONE HYDROCHLORIDE 5 MG: 5 TABLET ORAL at 15:01

## 2021-10-17 ASSESSMENT — MIFFLIN-ST. JEOR: SCORE: 1569.87

## 2021-10-17 NOTE — ED TRIAGE NOTES
Pt had lumbar surgery 2 weeks ago. Pt did clean house last night and felt fine but today she developed sharp,shooting pains tht go down the left leg. Pain is 9/10.

## 2021-10-17 NOTE — ED PROVIDER NOTES
Emergency Department Encounter     Evaluation Date & Time:   10/17/2021 12:31 PM    CHIEF COMPLAINT:  Back Pain      Triage Note:Pt had lumbar surgery 2 weeks ago. Pt did clean house last night and felt fine but today she developed sharp,shooting pains tht go down the left leg. Pain is 9/10.        Impression and Plan       FINAL IMPRESSION:    ICD-10-CM    1. Postoperative back pain  G89.18     M54.9          ED COURSE & MEDICAL DECISION MAKIN:40 PM I met with the patient to gather history and perform an initial exam. PPE: gloves, N95 mask, face shield.     41 year old F, history of lumbar radiculopathy and lumbar spinal stenosis s/p left lumbar 5-sacral 1 hemilaminectomy, medial facetectomy, foraminotomy and microdiscectomy on 10/4/2021 (~2 weeks ago) with Dr Lilly, who presents for evaluation of acute, severe, sharp low back pain radiating to her left abdomen / groin and down the posterior aspect of her left leg to the knee. No lower extremity weakness or paresthesias. She urinated this morning, but has not gone since. No urinary or fecal incontinence. No fevers. No injury.    On exam, post-op wound clean, dry and intact without purulent drainage or tenderness to palpation (exam a little limited as it overlies a tattoo).  She is neuro intact.    Given recent surgery and severe pain, decision made to proceed with MRI imaging that demonstrated:  1.  New post-op changes with no residual disc herniation. No obvious spinal canal or neural foraminal narrowing at the postoperative level. There is enhancing   granulation tissue at the postoperative bed which abuts the traversing left S1 nerve root.  2.  No suspicious rim-enhancing fluid collection.  3.  Mild degenerative disc changes in the visualized upper lumbar spine and lower thoracic spine. No significant spinal canal or neural foraminal narrowing at any level.    Labs remarkable for no leukocytosis (WBC 10.3) with minimally elevated CRP (0.8) and normal  ESR (17).    UA negative for infection.  No hematuria to suggest a ureteral stone.    Patient feeling better after analgesics and was discharged to home. Patient advised to follow-up with her neurosurgeon. Return precautions provided. Patient stable throughout ED course.    At the conclusion of the encounter I discussed the results of all the tests and the disposition. The questions were answered. The patient acknowledged understanding and was agreeable with the care plan.    MEDICATIONS GIVEN IN THE EMERGENCY DEPARTMENT:  Medications   HYDROmorphone (DILAUDID) injection 0.5 mg (0.5 mg Intravenous Given 10/17/21 1329)   ondansetron (ZOFRAN) injection 4 mg (4 mg Intravenous Given 10/17/21 1354)   gadobutrol (GADAVIST) injection 10 mL (10 mLs Intravenous Given 10/17/21 1423)   oxyCODONE (ROXICODONE) tablet 5 mg (5 mg Oral Given 10/17/21 1501)   cyclobenzaprine (FLEXERIL) tablet 10 mg (10 mg Oral Given 10/17/21 1501)       NEW PRESCRIPTIONS STARTED AT TODAY'S ED VISIT:  Discharge Medication List as of 10/17/2021  3:11 PM          HPI     The history is provided by the patient.        Madeline Szymanski is a 41 year old female with a pertinent history of lumbar radiculopathy and lumbar spinal stenosis s/p left lumbar 5-sacral 1 hemilaminectomy, medial facetectomy, foraminotomy and microdiscectomy on 10/4/2021 (~2 weeks ago) with Dr Lilly who presents to this ED via walk-in for evaluation of back pain.    The patient reports severe lower back pain since this morning. The pain starts in her lower back and radiates around to her left abdomen, into her left groin and down the posterior aspect of her left leg to her knee. She describes it as a constant sharp pain that feels different than her chronic back pain and recent post-operative pain. She took gabapentin this morning without any relief of her pain. Movement, sitting, and riding in the car (hitting bumps) exacerbates her pain. Her post-operative pain was feeling improved  until Tuesday when it started worsening again, so she was started on Medrol Dose Scott of which she has completed four days. She thinks her pain flare today may be related to housecleaning yesterday including vacuuming and carrying laundry. She denies any injuries yesterday. She is concerned that she reherniated her lumbar spine. She reports associated nausea due to her pain without vomiting. She urinated around 5AM today (7.5 hours ago), but has been unable to urinate again since, although she does not feel the need to urinate.  No incontinence of urine or stool. No fevers.     She has surgical post-op recheck in about 6 weeks.    She has otherwise been in her usual state of health and denies chest pain, SOB, abdominal pain, diarrhea, cough or other concerns.     She reports a history of hysterectomy and gastric sleeve.      REVIEW OF SYSTEMS:  All other systems reviewed and are negative.      Medical History     Past Medical History:   Diagnosis Date     Arthritis      Depressive disorder      Kidney infection      Migraine      Painless urinary retention due to cauda equina syndrome (H)      Pancreatitis      Spinal stenosis in cervical region      UTI (lower urinary tract infection)        Past Surgical History:   Procedure Laterality Date      SECTION       GI SURGERY       GYN SURGERY  2018    Hysterectomy     LAMINECTOMY LUMBAR ONE LEVEL Left 10/4/2021    Procedure: LEFT LUMBAR 5-SACRAL 1 HEMILAMINECTOMY, MEDIAL FACETECTOMY, FORAMINOTOMY WITH;  Surgeon: Radha Lilly MD;  Location: [facility] OR     LUMBAR DISCECTOMY Left 10/4/2021    Procedure: LUMBAR 5-SACRAL 1 MICRODISCECTOMY;  Surgeon: Radah Lilly MD;  Location: [facility] OR       Family History   Problem Relation Age of Onset     Heart Disease Father      Hypertension Father      Substance Abuse Father      Low Back Problems Mother         back surgery     Breast Cancer Cousin      Breast Cancer Other      Mental Illness  "Nephew      Obesity Sister      Obesity Paternal Grandmother      Obesity Other        Social History     Tobacco Use     Smoking status: Never Smoker     Smokeless tobacco: Never Used   Substance Use Topics     Alcohol use: Never     Drug use: Never       acetaminophen (TYLENOL) 500 MG tablet  amitriptyline (ELAVIL) 75 MG tablet  cyclobenzaprine (FLEXERIL) 10 MG tablet  eletriptan (RELPAX) 40 MG tablet  EPINEPHrine (ANY BX GENERIC EQUIV) 0.3 MG/0.3ML injection 2-pack  FLUoxetine (PROZAC) 40 MG capsule  gabapentin (NEURONTIN) 300 MG capsule  lidocaine (LIDODERM) 5 % patch  methocarbamol (ROBAXIN) 500 MG tablet  methylPREDNISolone (MEDROL DOSEPAK) 4 MG tablet therapy pack  oxyCODONE (ROXICODONE) 5 MG tablet  rizatriptan (MAXALT-MLT) 10 MG ODT  topiramate (TOPAMAX) 50 MG tablet        Physical Exam     First Vitals:  Patient Vitals for the past 24 hrs:   BP Temp Temp src Pulse Resp SpO2 Height Weight   10/17/21 1517 135/76 98  F (36.7  C) -- 96 16 98 % -- --   10/17/21 1500 134/78 -- -- 101 -- 95 % -- --   10/17/21 1445 (!) 142/89 -- -- -- -- -- -- --   10/17/21 1345 125/76 -- -- 102 -- 96 % -- --   10/17/21 1330 116/71 -- -- 97 -- 98 % -- --   10/17/21 1229 (!) 136/98 98.4  F (36.9  C) Oral 117 12 96 % 1.626 m (5' 4\") 93 kg (205 lb)       PHYSICAL EXAM:   Physical Exam    GENERAL: Awake, alert.  In moderate acute distress secondary to back pain.   HEENT: Normocephalic, atraumatic. Pupils equal, round and reactive. Conjunctiva normal.  NECK: No stridor.  PULMONARY: Symmetrical breath sounds without distress.  Lungs clear to auscultation bilaterally without wheezes, rhonchi or rales.  CARDIO: Tachycardic rate with regular rhythm.  No significant murmur, rub or gallop.  Radial and pedal pulses strong and symmetrical.  ABDOMINAL: Abdomen soft, non-distended and non-tender to palpation.    BACK:  Post-op wound clean, dry and intact without purulent drainage or tenderness to palpation (exam a little limited as it overlies " a tattoo).  EXTREMITIES: No lower extremity swelling or edema.      NEURO: Alert and oriented to person, place and time.  Cranial nerves grossly intact.  No focal motor deficit.  Strength 5/5 BL lower extremities (hip flexion, knee extension / flexion, plantarflexion / dorsiflexion ankles and great toes) with sensation to light touch grossly intact.  PSYCH: Normal mood and affect.  SKIN: No rashes.     Results     LAB:  All pertinent labs reviewed and interpreted  Labs Ordered and Resulted from Time of ED Arrival Up to the Time of Departure from the ED   CRP INFLAMMATION - Abnormal; Notable for the following components:       Result Value    CRP 0.8 (*)     All other components within normal limits   CBC WITH PLATELETS - Normal   BASIC METABOLIC PANEL - Normal   ERYTHROCYTE SEDIMENTATION RATE AUTO - Normal   BLADDER SCAN       RADIOLOGY:  MR Lumbar Spine w/o & w Contrast   Final Result   IMPRESSION:   1.  New postoperative changes on the left at the L5-S1 level compared to preoperative MR 10/4/2021. No residual disc herniation is appreciated. No obvious spinal canal or neural foraminal narrowing at the postoperative level. There is enhancing    granulation tissue at the postoperative bed which abuts the traversing left S1 nerve root.   2.  No suspicious rim-enhancing fluid collection.   3.  Mild degenerative disc changes in the visualized upper lumbar spine and lower thoracic spine. No significant spinal canal or neural foraminal narrowing at any level.          I, Mable Gates, am serving as a scribe to document services personally performed by Prisca Jennings MD based on my observation and the provider's statements to me. I, Prisca Jennings MD attest that Mable Gates is acting in a scribe capacity, has observed my performance of the services and has documented them in accordance with my direction.    Prisca Jennings MD  Emergency Medicine  [facility] EMERGENCY DEPARTMENT            Prisca Jennings MD  10/17/21 7720

## 2021-10-17 NOTE — DISCHARGE INSTRUCTIONS
Please follow-up with the Neurosurgery Clinic this upcoming week; call to arrange appointment.    Return to the ER for worsening symptoms, worsening pain, weakness or numbness in either leg, if you are unable to empty your bladder, if you lose control of urine or stool, fever or other concerns.

## 2021-10-20 ENCOUNTER — ALLIED HEALTH/NURSE VISIT (OUTPATIENT)
Dept: NEUROSURGERY | Facility: CLINIC | Age: 41
End: 2021-10-20
Payer: COMMERCIAL

## 2021-10-20 DIAGNOSIS — M54.16 LUMBAR RADICULOPATHY: Primary | ICD-10-CM

## 2021-10-20 PROCEDURE — 99207 PR NO CHARGE NURSE ONLY: CPT

## 2021-10-20 NOTE — PROGRESS NOTES
"Pt presented today for an incision check. She is s/p Left L5-S1 HLMD with Dr. Lilly on 10/4/2021. Pre-op presented with LBP and LLE pain.    She reports that there is a piece of her incision that is \"poking out\" and that she may have torn her incision open on the waist band of her pants.    Upon inspection, incision was C/D/I, no signs of infection or skin breakdown.  Incision well-healed: good skin approximation, no redness or visible/palpable edema, no tenderness to palpation. There was one small piece of scab that pt identified that was causing her concern, writer removed it with sterile scissors and tweezers after cleaning the area with alcohol.     Discussed ongoing medication pain management with patient. She verbalized that she is trying not to take any narcotic pain medications. Instructed pt to continue muscle relaxants QID and gabapentin TID. Pt declined to use Tylenol as it is not helpful, she cannot use NSAIDS d/t previous bariatric surgery.     Pt will update us about her continued progress if her symptoms do not improve, change or worsen.     Odette Duran RN     "

## 2021-10-22 ENCOUNTER — MEDICAL CORRESPONDENCE (OUTPATIENT)
Dept: NEUROSURGERY | Facility: CLINIC | Age: 41
End: 2021-10-22

## 2021-10-28 DIAGNOSIS — M47.12 CERVICAL SPONDYLOSIS WITH MYELOPATHY: Primary | ICD-10-CM

## 2021-10-28 DIAGNOSIS — M54.16 LUMBAR RADICULOPATHY: ICD-10-CM

## 2021-10-28 RX ORDER — LIDOCAINE 50 MG/G
1 PATCH TOPICAL EVERY 24 HOURS
Qty: 10 PATCH | Refills: 0 | Status: SHIPPED | OUTPATIENT
Start: 2021-10-28 | End: 2021-12-27

## 2021-11-05 ENCOUNTER — MEDICAL CORRESPONDENCE (OUTPATIENT)
Dept: NEUROSURGERY | Facility: CLINIC | Age: 41
End: 2021-11-05

## 2021-11-15 ENCOUNTER — HOSPITAL ENCOUNTER (OUTPATIENT)
Dept: PHYSICAL THERAPY | Facility: CLINIC | Age: 41
End: 2021-11-15
Payer: COMMERCIAL

## 2021-11-15 DIAGNOSIS — M54.16 LUMBAR RADICULOPATHY: ICD-10-CM

## 2021-11-15 DIAGNOSIS — M47.12 CERVICAL SPONDYLOSIS WITH MYELOPATHY: Primary | ICD-10-CM

## 2021-11-15 PROCEDURE — 97110 THERAPEUTIC EXERCISES: CPT | Mod: GP | Performed by: PHYSICAL THERAPIST

## 2021-11-15 PROCEDURE — 97162 PT EVAL MOD COMPLEX 30 MIN: CPT | Mod: GP | Performed by: PHYSICAL THERAPIST

## 2021-11-15 NOTE — PROGRESS NOTES
11/15/21 0700   General Information   Type of Visit Initial OP Ortho PT Evaluation   Start of Care Date 11/15/21   Referring Physician Radha Lilly MD   Orders Evaluate and Treat   Certification Required? No   Medical Diagnosis cervical spondylosis with myelopathy, lumbar radiculopathy   Surgical/Medical history reviewed Yes   Precautions/Limitations other (see comments)  (s/p lumbar surgery)   Body Part(s)   Body Part(s) Cervical Spine;Lumbar Spine/SI   Presentation and Etiology   Pertinent history of current problem (include personal factors and/or comorbidities that impact the POC) Patient started having cervical myelopathy symptoms about a year ago. She reports noticing having trouble with fingers falling asleep, recently with brushing her hair. She is also noticing imbalance, dropping things on occasion. She reports not other significant weakness in the arms. No pain in the neck, no change in bowel/bladder control. She does feel slightly off balance, especially when closing her eyes, no falls, but some near falls. She notices symptoms worsen with brushing hair, driving, walking/balancing. Nothing helps symptoms. No other treatment at this time, has met with neurosurgery and will likely be pursuing this. Lumbar pain started pre-operatively was having lower back pain on the left started in July 2021, worsened in August. Leg symptoms progressed quickly, no relief. Ended up Madeline Szymanski is status post Left lumbar 5-sacral 1 hemilaminectomy, medial facetectomy, foraminotomy and microdiscectomy on 10/4/2021 with Dr. Lilly. Pain Described as at this point no pre operative symptoms have all improved. Some occasional back pain, mild localized to the back, some mild weakness generally feels weak. Worse with nothing.has been taking it easy. Better with deep tissue massage and lidocaine, ice.   Fall Risk Screen   Fall screen completed by PT   Have you fallen 2 or more times in the past year? No   Have you fallen  and had an injury in the past year? No   Is patient a fall risk? No   Abuse Screen (yes response referral indicated)   Feels Unsafe at Home or Work/School no   Feels Threatened by Someone no   Does Anyone Try to Keep You From Having Contact with Others or Doing Things Outside Your Home? no   Physical Signs of Abuse Present no   System Outcome Measures   Outcome Measures Low Back Pain (see Oswestry and Alfonzo)  (WIL 18, NDI 24)   Lumbar Spine/SI Objective Findings   Flexion ROM 14    Extension ROM normal   Right Side Bending ROM tight on left   Left Side Bending ROM normal   Ankle Plantar Flexion (S1) Strength heel raises 13 on left, > 15 on right   Lumbar/Hip/Knee/Foot Strength Comments normal unless otherwise noted   SLR 75/60   Cervical Spine   Posture scapular protraction moderate   Cervical Flexion ROM 25   Cervical Extension ROM 20   Cervical Right Side Bending ROM 20   Cervical Left Side Bending ROM 25   Cervical Right Rotation ROM 57   Cervical Left Rotation ROM 50   Shoulder Abd (C5) Strength 4/5   5th Finger Add (T1) Strength 4/5   Shoulder/Wrist/Hand Strength Comments  strength average 65#/45# Normal strength unless noted   Spurling Test -/-   Cervical Distraction Test -/-   Palpation increased tension to UT, levator, L cervical paraspinals   Dermatome/Sensory Testing dec left middle finger/pinky   Neurological Testing Comments + Median, ulnar, radial N tension   Planned Therapy Interventions   Planned Therapy Interventions ROM;strengthening;stretching;neuromuscular re-education;manual therapy   Planned Modality Interventions   Planned Modality Interventions Traction;TENS   Clinical Impression   Criteria for Skilled Therapeutic Interventions Met yes, treatment indicated   PT Diagnosis decreased neck ROM, neck myofascial pain, neural tension, gastroc weakness, decreased lumbar mobility   Clinical Presentation Stable/Uncomplicated   Clinical Decision Making (Complexity) Moderate complexity   Therapy  Frequency 2 times/Week   Predicted Duration of Therapy Intervention (days/wks) 6 visits   Risk & Benefits of therapy have been explained Yes   Patient, Family & other staff in agreement with plan of care Yes   Clinical Impression Comments Patient seen in PT for evaluation with 2 different diagnosis/complaints. The first being symptoms of left UE tingling/numbness, weakness, imbalance, consistent with cervical myelopathy with know stenosis on MRI. Patient is looking to pursue surgery, but is required to have PT first. She does demonstrate s/s consistent with this, PT will give education and HEP to address impairments of balance, strength of neck/scapular region/, as well as cervical mobility, however 2/2 nature of symptoms, may have limited benefit to improve symptoms. Will benefit from PT for pre-operative rehabilitation. Patients second area of concern related to low back pain following left L5-S1 hemilaminectomy, medial facetectomy, foraminotomy and microdiscectomy on 10/4/21 with Dr. Lilly. She is improving well at this time, with mostly some occasional discomfort and tightness in the low back. This will require further assessment next session as time limited with 30 minute evaluation. Patient will benefit from skilled PT to improve mobility restrictions and improve strength post surgery.   Education Assessment   Preferred Learning Style Listening;Reading;Demonstration;Pictures/video   Barriers to Learning No barriers   ORTHO GOALS   PT Ortho Eval Goals 1;2;3;4   Ortho Goal 1   Goal Identifier Strength   Goal Description Patient will improve gastroc strength to equal with right >15 heel raises  in 8 weeks   Target Date 01/10/22   Ortho Goal 2   Goal Identifier Mobility cervical   Goal Description patient will improve cervical rotation to > 60 degrees bilaterally for decrease neck tension, improved function, in 6 weeks:   Target Date 12/27/21   Ortho Goal 3   Goal Identifier Mobility lumbar   Goal  Description Patient will report no tightness with lumbar mobility testing in 6 weeks:   Target Date 12/27/21   Ortho Goal 4   Goal Identifier Balance   Goal Description Patient will demonstrate improved balance, with tandem stance EC > 30 seocnds no UE assist, in 8 weeks:   Target Date 01/10/22   Total Evaluation Time   PT Eval, Moderate Complexity Minutes (87224) 28     Date 11/15/2021   Exercise    UT stretch X 30 seconds   Median, ulnar, radial N glides X 10   Tandem Stance  X 30 second EC                                             Zheng Mesa, PT

## 2021-11-17 ENCOUNTER — HOSPITAL ENCOUNTER (OUTPATIENT)
Dept: PHYSICAL THERAPY | Facility: CLINIC | Age: 41
End: 2021-11-17
Payer: COMMERCIAL

## 2021-11-17 ENCOUNTER — OFFICE VISIT (OUTPATIENT)
Dept: NEUROSURGERY | Facility: CLINIC | Age: 41
End: 2021-11-17
Payer: COMMERCIAL

## 2021-11-17 ENCOUNTER — TELEPHONE (OUTPATIENT)
Dept: NEUROSURGERY | Facility: CLINIC | Age: 41
End: 2021-11-17

## 2021-11-17 ENCOUNTER — TELEPHONE (OUTPATIENT)
Dept: PHYSICAL MEDICINE AND REHAB | Facility: CLINIC | Age: 41
End: 2021-11-17

## 2021-11-17 VITALS
HEART RATE: 103 BPM | RESPIRATION RATE: 18 BRPM | DIASTOLIC BLOOD PRESSURE: 86 MMHG | OXYGEN SATURATION: 98 % | SYSTOLIC BLOOD PRESSURE: 125 MMHG

## 2021-11-17 DIAGNOSIS — M54.16 LUMBAR RADICULOPATHY: Primary | ICD-10-CM

## 2021-11-17 DIAGNOSIS — M54.2 CHRONIC NECK PAIN: ICD-10-CM

## 2021-11-17 DIAGNOSIS — M25.519 TRIGGER POINT OF SHOULDER REGION, UNSPECIFIED LATERALITY: Primary | ICD-10-CM

## 2021-11-17 DIAGNOSIS — G89.29 CHRONIC NECK PAIN: ICD-10-CM

## 2021-11-17 DIAGNOSIS — M54.16 LUMBAR RADICULOPATHY: ICD-10-CM

## 2021-11-17 DIAGNOSIS — M47.12 CERVICAL SPONDYLOSIS WITH MYELOPATHY: Primary | ICD-10-CM

## 2021-11-17 DIAGNOSIS — M79.18 MYOFASCIAL PAIN: ICD-10-CM

## 2021-11-17 PROCEDURE — 97110 THERAPEUTIC EXERCISES: CPT | Mod: GP | Performed by: PHYSICAL THERAPIST

## 2021-11-17 PROCEDURE — 99024 POSTOP FOLLOW-UP VISIT: CPT | Performed by: NURSE PRACTITIONER

## 2021-11-17 NOTE — TELEPHONE ENCOUNTER
Patient stated that she just started physical therapy. Her neck and B/L trapezius muscle is bothersome and wondering if she can trail a trigger point injection with Esme. Pt is aware provider is out of the office and is ok waiting upon the providers return.     Please advise

## 2021-11-17 NOTE — PATIENT INSTRUCTIONS
1. Monitor wound - let us know if drainage occurs, redness, larger opening  2. Return as needed for your back   3. Return for your neck follow up after your PT is completed  4. Ok to advance activity as you tolerate  5. OK to increase weight by 5 lbs per week until back to normal

## 2021-11-17 NOTE — PROGRESS NOTES
Neurosurgery Progress Note  11/17/2021    A/P: Madeline is S/P L5-S1 hemilaminectomy, medical facetectomy, foraminotomy, microdiscectomy 10/4/2021 with Dr Lilly.     Pre-op leg symptoms have resolved. No leg pain, no numbness or tingling. No weakness. She has started PT for her neck which will at some point incorporate her low back. She has a pin point opening at the top of her incision - appears very superficial. No drainage noted. Request she monitor it daily and alert us of any changes or non healing. Small pimple type raised skin noted left of bottom of incision.     PLAN: 1. Monitor wound - let us know if drainage occurs, redness, larger opening  2. Return as needed for your back   3. Return for your neck follow up after your PT is completed  4. Ok to advance activity as you tolerate  5. OK to increase weight by 5 lbs per week until back to normal     HPI: Madeline Szymanski is a 41 year old female who presents with left leg pain. EMG showed a left L5 and/or S1 radiculopathy, active. Lumbar xray showed no significant dynamic spondylolisthesis. MRI lumbar spine shows a disc bulge at lumbar 5-sacral 1 with left>right lateral recess stenosis with encroachment of the S1 nerve roots as well as mild left L5-S1 foraminal narrowing.    Physical Exam  /86   Pulse 103   Resp 18   SpO2 98%     General: alert, oriented. SHAY. Speech normal     Motor: normal bulk and tone    Strength: Full lower extremity strength     Sensation: intact to light touch     Incision:  She has a pin point opening at the top of her incision - appears very superficial. No drainage noted. Request she monitor it daily and alert us of any changes or non healing. Small pimple type raised skin noted left of bottom of incision.     Leticia To, [facility] Neurosurgery  O: [phone]

## 2021-11-22 NOTE — TELEPHONE ENCOUNTER
Patient could see another provider to eval for trigger point injections if she would like or wait for Esme's return.

## 2021-11-23 ENCOUNTER — E-VISIT (OUTPATIENT)
Dept: FAMILY MEDICINE | Facility: CLINIC | Age: 41
End: 2021-11-23
Payer: COMMERCIAL

## 2021-11-23 DIAGNOSIS — Z20.822 SUSPECTED COVID-19 VIRUS INFECTION: Primary | ICD-10-CM

## 2021-11-23 PROCEDURE — 99421 OL DIG E/M SVC 5-10 MIN: CPT | Performed by: PHYSICIAN ASSISTANT

## 2021-11-23 NOTE — PATIENT INSTRUCTIONS
Dear Madeline Szymanski,    Your symptoms show that you may have coronavirus (COVID-19). This illness can cause fever, cough and trouble breathing. Many people get a mild case and get better on their own. Some people can get very sick.    Will I be tested for COVID-19?  We would like to test you for Covid-19 virus. I have placed orders for this test.     For all employees or close contacts (except [place] and [facility] - see below), go to your [facility] home page and scroll down to the section that says  You have an appointment that needs to be scheduled  and click the large green button that says  Schedule Now  and follow the steps to find the next available opening.     If you are unable to complete these steps or if you cannot find any available times, please call [phone] to schedule employee testing.     [place] employees or close contacts, please call [phone].   [place] ([facility]) employees or close contacts call [phone].    Return to work/school/ guidance:  Please let your workplace manager and staffing office know when your quarantine ends     We can t give you an exact date as it depends on the above. You can calculate this on your own or work with your manager/staffing office to calculate this. (For example if you were exposed on 10/4, you would have to quarantine for 14 full days. That would be through 10/18. You could return on 10/19.)      If you receive a positive COVID-19 test result, follow the guidance of the those who are giving you the results. Usually the return to work is 10 (or in some cases 20 days from symptom onset.) If you work at [email or web address] [facility], you must also be cleared by Employee Occupational Health and Safety to return to work.        If you receive a negative COVID-19 test result and did not have a high risk exposure to someone with a known positive COVID-19 test, you can return to work once you're free of fever for 24 hours without fever-reducing medication and  your symptoms are improving or resolved.      If you receive a negative COVID-19 test and If you had a high risk exposure to someone who has tested positive for COVID-19 then you can return to work 14 days after your last contact with the positive individual    Note: If you have ongoing exposure to the covid positive person, this quarantine period may be more than 14 days. (For example, if you are continued to be exposed to your child who tested positive and cannot isolate from them, then the quarantine of 7-14 days can't start until your child is no longer contagious. This is typically 10 days from onset of the child's symptoms. So the total duration may be 17-24 days in this case.)    Sign up for [employer].   We know it's scary to hear that you might have COVID-19. We want to track your symptoms to make sure you're okay over the next 2 weeks. Please look for an email from [employer]--this is a free, online program that we'll use to keep in touch. To sign up, follow the link in the email you will receive. Learn more at [email or web address]    How can I take care of myself?    Get lots of rest. Drink extra fluids (unless a doctor has told you not to)    Take Tylenol (acetaminophen) or ibuprofen for fever or pain. If you have liver or kidney problems, ask your family doctor if it's okay to take Tylenol o ibuprofen    If you have other health problems (like cancer, heart failure, an organ transplant or severe kidney disease): Call your specialty clinic if you don't feel better in the next 2 days.    Know when to call 911. Emergency warning signs include:  o Trouble breathing or shortness of breath  o Pain or pressure in the chest that doesn't go away  o Feeling confused like you haven't felt before, or not being able to wake up  o Bluish-colored lips or face    Where can I get more information?   [employer] [place] - About COVID-19:   [email or web address]    CDC - What to Do If You're Sick:    www.cdc.gov/coronavirus/2019-ncov/about/steps-when-sick.html    November 23, 2021  RE:  Madeline Szymanski                                                                                                                  [address]  [facility] [address]      To whom it may concern:    I evaluated Madeline Szymanski on November 23, 2021. Madeline Szymanski should be excused from work/school.     They should let their workplace manager and staffing office know when their quarantine ends.    We can not give an exact date as it depends on the information below. They can calculate this on their own or work with their manager/staffing office to calculate this. (For example if they were exposed on 10/04, they would have to quarantine for 14 full days. That would be through 10/18. They could return on 10/19.)    Quarantine Guidelines:      If patient receives a positive COVID-19 test result, they should follow the guidance of those who are giving the results. Usually the return to work is 10 (or in some cases 20 days from symptom onset.) If they work at [employer], they must be cleared by Employee Occupational Health and Safety to return to work.        If patient receives a negative COVID-19 test result and did not have a high risk exposure to someone with a known positive COVID-19 test, they can return to work once they're free of fever for 24 hours without fever-reducing medication and their symptoms are improving or resolved.      If patient receives a negative COVID-19 test and if they had a high risk exposure to someone who has tested positive for COVID-19 then they can return to work 14 days after their last contact with the positive individual    Note: If there is ongoing exposure to the covid positive person, this quarantine period may be longer than 14 days. (For example, if they are continually exposed to their child, who tested positive and cannot isolate from them, then the quarantine of 7-14 days can't start  until their child is no longer contagious. This is typically 10 days from onset to the child's symptoms. So the total duration may be 17-24 days in this case.)     Sincerely,  Angy Novoa PA-C

## 2021-11-24 ENCOUNTER — LAB (OUTPATIENT)
Dept: FAMILY MEDICINE | Facility: CLINIC | Age: 41
End: 2021-11-24
Attending: PHYSICIAN ASSISTANT

## 2021-11-24 ENCOUNTER — APPOINTMENT (OUTPATIENT)
Dept: MRI IMAGING | Facility: HOSPITAL | Age: 41
End: 2021-11-24
Attending: EMERGENCY MEDICINE
Payer: COMMERCIAL

## 2021-11-24 ENCOUNTER — HOSPITAL ENCOUNTER (EMERGENCY)
Facility: HOSPITAL | Age: 41
Discharge: HOME | End: 2021-11-24
Attending: EMERGENCY MEDICINE | Admitting: EMERGENCY MEDICINE
Payer: COMMERCIAL

## 2021-11-24 ENCOUNTER — APPOINTMENT (OUTPATIENT)
Dept: CT IMAGING | Facility: HOSPITAL | Age: 41
End: 2021-11-24
Attending: EMERGENCY MEDICINE
Payer: COMMERCIAL

## 2021-11-24 VITALS
BODY MASS INDEX: 35.19 KG/M2 | SYSTOLIC BLOOD PRESSURE: 147 MMHG | WEIGHT: 205 LBS | HEART RATE: 95 BPM | DIASTOLIC BLOOD PRESSURE: 94 MMHG | RESPIRATION RATE: 20 BRPM | OXYGEN SATURATION: 97 % | TEMPERATURE: 97.9 F

## 2021-11-24 DIAGNOSIS — Z20.822 SUSPECTED COVID-19 VIRUS INFECTION: ICD-10-CM

## 2021-11-24 DIAGNOSIS — S19.9XXA INJURY OF NECK, INITIAL ENCOUNTER: ICD-10-CM

## 2021-11-24 DIAGNOSIS — S09.90XA INJURY OF HEAD, INITIAL ENCOUNTER: ICD-10-CM

## 2021-11-24 LAB — SARS-COV-2 RNA RESP QL NAA+PROBE: NEGATIVE

## 2021-11-24 PROCEDURE — 250N000013 HC RX MED GY IP 250 OP 250 PS 637: Performed by: EMERGENCY MEDICINE

## 2021-11-24 PROCEDURE — 99284 EMERGENCY DEPT VISIT MOD MDM: CPT | Mod: 25

## 2021-11-24 PROCEDURE — U0005 INFEC AGEN DETEC AMPLI PROBE: HCPCS

## 2021-11-24 PROCEDURE — 70450 CT HEAD/BRAIN W/O DYE: CPT

## 2021-11-24 PROCEDURE — 250N000011 HC RX IP 250 OP 636: Performed by: EMERGENCY MEDICINE

## 2021-11-24 PROCEDURE — 72141 MRI NECK SPINE W/O DYE: CPT

## 2021-11-24 PROCEDURE — U0003 INFECTIOUS AGENT DETECTION BY NUCLEIC ACID (DNA OR RNA); SEVERE ACUTE RESPIRATORY SYNDROME CORONAVIRUS 2 (SARS-COV-2) (CORONAVIRUS DISEASE [COVID-19]), AMPLIFIED PROBE TECHNIQUE, MAKING USE OF HIGH THROUGHPUT TECHNOLOGIES AS DESCRIBED BY CMS-2020-01-R: HCPCS

## 2021-11-24 RX ORDER — ONDANSETRON 4 MG/1
4 TABLET, ORALLY DISINTEGRATING ORAL ONCE
Status: COMPLETED | OUTPATIENT
Start: 2021-11-24 | End: 2021-11-24

## 2021-11-24 RX ORDER — OXYCODONE HYDROCHLORIDE 5 MG/1
5 TABLET ORAL ONCE
Status: COMPLETED | OUTPATIENT
Start: 2021-11-24 | End: 2021-11-24

## 2021-11-24 RX ORDER — HYDROCODONE BITARTRATE AND ACETAMINOPHEN 5; 325 MG/1; MG/1
2 TABLET ORAL ONCE
Status: COMPLETED | OUTPATIENT
Start: 2021-11-24 | End: 2021-11-24

## 2021-11-24 RX ADMIN — HYDROCODONE BITARTRATE AND ACETAMINOPHEN 2 TABLET: 5; 325 TABLET ORAL at 16:29

## 2021-11-24 RX ADMIN — OXYCODONE HYDROCHLORIDE 5 MG: 5 TABLET ORAL at 18:41

## 2021-11-24 RX ADMIN — ONDANSETRON 4 MG: 4 TABLET, ORALLY DISINTEGRATING ORAL at 16:29

## 2021-11-24 NOTE — ED PROVIDER NOTES
ED Provider In Triage Note  [facility]  Encounter Date: Nov 24, 2021    Chief Complaint   Patient presents with     Head Injury       Brief HPI:   Madeline Szymanski is a 41 year old female presenting to the Emergency Department with a chief complaint of head injury. She was up on a ladder earlier today trying to fix her garage door when her son hit the button and a panel on the garage door hit her on the top of her head. This occurred around 2:30 PM (1.5 hours ago). No loss of consciousness. She now has bilateral arm tingling, facial tingling, and neck pain with movement. She denies any other concerns.    Brief Physical Exam:  BP (!) 149/90   Pulse 109   Temp 97.9  F (36.6  C) (Temporal)   Resp 16   Wt 93 kg (205 lb)   SpO2 99%   BMI 35.19 kg/m    General: Non-toxic appearing  HEENT: midline lower cervical tenderness.   Resp: No respiratory distress  Abdomen: Non-peritoneal  Neuro: Alert, oriented, answers questions appropriately. Face symmetric  Psych: Behavior appropriate    Plan Initiated in Triage:  Orders Placed This Encounter     Cervical spine MRI w/o contrast       PIT Dispo:   Return to lobby while awaiting workup and ED bed availability    Eleazar Rudolph MD on 11/24/2021 at 4:05 PM    Patient was evaluated by the Physician in Triage due to a limitation of available rooms in the Emergency Department. A plan of care was discussed based on the information obtained on the initial evaluation and patient was consuled to return back to the Emergency Department lobby after this initial evalutaiton until results were obtained or a room became available in the Emergency Department. Patient was counseled not to leave prior to receiving the results of their workup.        Eleazar Rudolph MD  11/24/21 2107

## 2021-11-24 NOTE — ED TRIAGE NOTES
Pt arrives after getting hit on the top of the head by her garage door. Was on a ladder at the time. No LOC. Now has bilateral arm tingling and left sided face tingling. C-collar placed in triage. States she has cervical myelopathy C 6-7. Is also nauseated.

## 2021-11-24 NOTE — ED PROVIDER NOTES
"EMERGENCY DEPARTMENT ENCOUNTER            IMPRESSION:  Head injury  Neck injury with radicular features      MEDICAL DECISION MAKING:  Patient evaluated for head and neck injury.  She reported bilateral upper extremity radicular symptoms.  She had additional headache and neck pain.  No nausea or vomiting.     On exam the vital signs are normal.  There was no evidence of head injury.  There is some distal spinal tenderness palpation.  She has full range of motion.  Upper extremities have full strength and sensation.    She was given pain medication    CT of the head was unremarkable    MRI of the cervical spine showed some chronic degenerative changes but no acute spinal cord or radicular impingement.    The results were discussed the patient.  She feels better after pain medication.  She is stable for discharge home      =================================================================  CHIEF COMPLAINT:  Chief Complaint   Patient presents with     Head Injury         HPI  Madeline Szymanski is a 41 year old female with a history of migraine, spinal stenosis in cervical spine, arthritis, and depressive disorder who presents to the ED by walk-in accompanied by  for evaluation of a head injury.     The patient reports being struck on the top of the head by a double wide garage door that had fallen off the track while being repaired at ~1430 today (11/24). She noted to now feel tingling and aching in that radiates from her \"mid-back out\" through her arms down to her palms. There is no apparent laceration or open wound, but retains some pain in her lower neck as well as nausea. Furthermore, se has not taken any medication for pain and prefers to not be given valium or dilaudid. The patient denies any other symptoms or complaints at this time.        I, Mannie Escalona am serving as a scribe to document services personally performed by Dr. Joby Robin MD, based on my observation and the provider's statements to me. I, " Dr. Joby Robin MD attest that Mannie Escalona is acting in a scribe capacity, has observed my performance of the services and has documented them in accordance with my direction.      REVIEW OF SYSTEMS   Constitutional: Denies fever, chills, unintentional weight loss or fatigue   Eyes: Denies visual changes or discharge    HENT: Denies sore throat, ear pain or neck pain  Respiratory: Denies cough or shortness of breath    Cardiovascular: Denies chest pain, palpitations or leg swelling  GI: Denies abdominal pain, vomiting, or dark, bloody stools. Positive for nausea.   : Denies hematuria, dysuria, or flank pain  Musculoskeletal: Denies any new back pain or new muscle/joint pains. Positive for pain in her lower neck and top of her head.   Skin: Denies rash or wound  Neurologic: Denies current headache, new weakness, focal weakness, or sensory changes. Positive for tingling in her arms and back.   Lymphatic: Denies swollen glands    Psychiatric: Denies depression, suicidal ideation or homicidal ideation.    Remainder of systems reviewed, unless noted in HPI all others negative.      PAST MEDICAL HISTORY:  Past Medical History:   Diagnosis Date     Arthritis      Depressive disorder      Kidney infection      Migraine      Painless urinary retention due to cauda equina syndrome (H)      Pancreatitis      Spinal stenosis in cervical region      UTI (lower urinary tract infection)        PAST SURGICAL HISTORY:  Past Surgical History:   Procedure Laterality Date      SECTION       GI SURGERY       GYN SURGERY  2018    Hysterectomy     LAMINECTOMY LUMBAR ONE LEVEL Left 10/4/2021    Procedure: LEFT LUMBAR 5-SACRAL 1 HEMILAMINECTOMY, MEDIAL FACETECTOMY, FORAMINOTOMY WITH;  Surgeon: Radha Lilly MD;  [place] [facility] [place]     LUMBAR DISCECTOMY Left 10/4/2021    Procedure: LUMBAR 5-SACRAL 1 MICRODISCECTOMY;  Surgeon: Radha Lilly MD;  [place] [facility] [place]         CURRENT MEDICATIONS:     acetaminophen (TYLENOL) 500 MG tablet  amitriptyline (ELAVIL) 75 MG tablet  cyclobenzaprine (FLEXERIL) 10 MG tablet  eletriptan (RELPAX) 40 MG tablet  EPINEPHrine (ANY BX GENERIC EQUIV) 0.3 MG/0.3ML injection 2-pack  FLUoxetine (PROZAC) 40 MG capsule  gabapentin (NEURONTIN) 300 MG capsule  lidocaine (LIDODERM) 5 % patch  lidocaine (LIDODERM) 5 % patch  methocarbamol (ROBAXIN) 500 MG tablet  methylPREDNISolone (MEDROL DOSEPAK) 4 MG tablet therapy pack  oxyCODONE (ROXICODONE) 5 MG tablet  rizatriptan (MAXALT-MLT) 10 MG ODT  topiramate (TOPAMAX) 50 MG tablet        ALLERGIES:  Allergies   Allergen Reactions     Cephalexin Hives     Coconut Flavor Anaphylaxis     Covid-19 (Mrna) Vaccine Anaphylaxis     Pfizer      Prochlorperazine Other (See Comments)     Other reaction(s): Tremors  Tremors  Other reaction(s): Tremors       Coconut Fatty Acids      Other reaction(s): Edema     Metoclopramide Other (See Comments)     Other reaction(s): Tremors  tremors  Other reaction(s): Tremors       Penicillins Hives       FAMILY HISTORY:  Family History   Problem Relation Age of Onset     Heart Disease Father      Hypertension Father      Substance Abuse Father      Low Back Problems Mother         back surgery     Breast Cancer Cousin      Breast Cancer Other      Mental Illness Nephew      Obesity Sister      Obesity Paternal Grandmother      Obesity Other        SOCIAL HISTORY:   Social History     Socioeconomic History     Marital status:      Spouse name: Not on file     Number of children: Not on file     Years of education: Not on file     Highest education level: Not on file   Occupational History     Not on file   Tobacco Use     Smoking status: Never Smoker     Smokeless tobacco: Never Used   Substance and Sexual Activity     Alcohol use: Never     Drug use: Never     Sexual activity: Yes     Partners: Male     Birth control/protection: Female Surgical   Other Topics Concern     Parent/sibling w/ CABG, MI or  angioplasty before 65F 55M? Yes     Comment: Father   Social History Narrative     Not on file     Social Determinants of Health     Financial Resource Strain: Not on file   Food Insecurity: Not on file   Transportation Needs: Not on file   Physical Activity: Not on file   Stress: Not on file   Social Connections: Not on file   Intimate Partner Violence: Not on file   Housing Stability: Not on file       PHYSICAL EXAM:    BP (!) 147/94   Pulse 95   Temp 97.9  F (36.6  C) (Temporal)   Resp 20   Wt 93 kg (205 lb)   SpO2 97%   BMI 35.19 kg/m      Constitutional: Awake, alert, in no acute distress  Head: Normocephalic, atraumatic.  ENT: Mucous membranes moist. Posterior oropharynx appears normal.  Eyes: Pupils midrange and reactive ,no conjunctival discharge  Neck: No lymphadenopathy, no stridor, supple, soft tissue swelling.  Some tenderness distal cervical spine  Chest: No tenderness   Respiratory: Respirations even, unlabored. Lungs clear to ascultation bilaterally, in no acute respiratory distress.  Cardiovascular: Regular rate and rhythm.+2 radial pulses, equal bilaterally.  No murmurs.   GI: Abdomen soft, non-tender to palpation in all 4 quadrants. No guarding or rebound. Bowel sounds intact on all 4 quadrants.   Back: No CVA tenderness.    Musculoskeletal: Moves all 4 extremities equally, strength symmetrical on bilateral uppers and lowers.  No peripheral edema  Integument: Warm, dry. No rash. No bruising or petechiae.  Lymphatic: No cervical lymphadenopathy  Neurologic: Alert & oriented x 3. Normal speech. Grossly normal motor and sensory function. No focal deficits noted.  Upper extremities good sensation and good motor function  Psychiatric: Normal mood and affect. Normal judgement.    ED COURSE:    4:18 PM I met with the patient to gather history and to perform my initial exam. I discussed the plan for care while in the Emergency Department. PPE (gown, gloves, glasses, surgical cap, N95 mask, surgical  mask, and face shield) was worn during patient encounters.       LAB:  All pertinent labs reviewed and interpreted.  Results for orders placed or performed during the hospital encounter of 11/24/21   Cervical spine MRI w/o contrast    Impression    IMPRESSION:  1.  Mild to moderate left paracentral spinal stenosis at C5-C6 secondary to eccentric disc osteophyte complex. Finding is stable/unchanged compared to 08/07/2021.  2.  No acute abnormalities or other significant degenerative abnormalities identified.     CT Head w/o Contrast    Impression    IMPRESSION:  1.  No acute intracranial abnormality.       RADIOLOGY:  Reviewed all pertinent imaging. Please see official radiology report.  Cervical spine MRI w/o contrast   Final Result   IMPRESSION:   1.  Mild to moderate left paracentral spinal stenosis at C5-C6 secondary to eccentric disc osteophyte complex. Finding is stable/unchanged compared to 08/07/2021.   2.  No acute abnormalities or other significant degenerative abnormalities identified.         CT Head w/o Contrast   Final Result   IMPRESSION:   1.  No acute intracranial abnormality.             MEDICATIONS GIVEN IN THE EMERGENCY:  Medications   HYDROcodone-acetaminophen (NORCO) 5-325 MG per tablet 2 tablet (2 tablets Oral Given 11/24/21 1629)   ondansetron (ZOFRAN-ODT) ODT tab 4 mg (4 mg Oral Given 11/24/21 1629)   oxyCODONE (ROXICODONE) tablet 5 mg (5 mg Oral Given 11/24/21 1841)           NEW PRESCRIPTIONS STARTED AT TODAY'S ER VISIT:  New Prescriptions    No medications on file          FINAL DIAGNOSIS:    ICD-10-CM    1. Injury of head, initial encounter  S09.90XA    2. Injury of neck, initial encounter  S19.9XXA             At the conclusion of the encounter I discussed the results of all of the tests and the disposition. The questions were answered. The patient or family acknowledged understanding and was agreeable with the care plan.     NAME: Madeline BLAS Szymanski  AGE: 41 year old female  DATE OF BIRTH:  1980  MRN: [id number]  EVALUATION DATE & TIME: 11/24/2021  4:13 PM    PCP: Tre Jj    ED PROVIDER: Joby Robin M.D.      I, Mannie Escalona, am serving as a scribe to document services personally performed by Dr. Joby Robin based on my observation and the provider's statements to me. I, Joby Robin MD attest that Mannie Escalona is acting in a scribe capacity, has observed my performance of the services and has documented them in accordance with my direction.    Joby Roibn M.D.  Emergency Medicine  [facility] EMERGENCY DEPARTMENT  [facility] [facility] [address]  [phone]  Dept: [phone]  11/24/2021         Joby Robin MD  11/24/21 1123

## 2021-11-25 NOTE — DISCHARGE INSTRUCTIONS
Your CT is normal.  The MRI shows some degenerative changes but no acute impingement on the spinal cord.  Take over-the-counter Tylenol and Motrin and apply heat for symptoms.

## 2021-11-26 ENCOUNTER — HOSPITAL ENCOUNTER (OUTPATIENT)
Dept: PHYSICAL THERAPY | Facility: CLINIC | Age: 41
End: 2021-11-26
Payer: COMMERCIAL

## 2021-11-26 DIAGNOSIS — M47.12 CERVICAL SPONDYLOSIS WITH MYELOPATHY: Primary | ICD-10-CM

## 2021-11-26 DIAGNOSIS — M54.16 LUMBAR RADICULOPATHY: ICD-10-CM

## 2021-11-26 PROCEDURE — 97110 THERAPEUTIC EXERCISES: CPT | Mod: GP | Performed by: PHYSICAL THERAPIST

## 2021-12-01 DIAGNOSIS — M79.18 MYOFASCIAL PAIN: ICD-10-CM

## 2021-12-01 DIAGNOSIS — M25.519 TRIGGER POINT OF SHOULDER REGION, UNSPECIFIED LATERALITY: ICD-10-CM

## 2021-12-01 DIAGNOSIS — G89.29 CHRONIC NECK PAIN: Primary | ICD-10-CM

## 2021-12-01 DIAGNOSIS — M54.2 CHRONIC NECK PAIN: Primary | ICD-10-CM

## 2021-12-02 ENCOUNTER — OFFICE VISIT (OUTPATIENT)
Dept: PHYSICAL MEDICINE AND REHAB | Facility: CLINIC | Age: 41
End: 2021-12-02
Attending: NURSE PRACTITIONER
Payer: COMMERCIAL

## 2021-12-02 VITALS
SYSTOLIC BLOOD PRESSURE: 128 MMHG | OXYGEN SATURATION: 98 % | HEART RATE: 108 BPM | TEMPERATURE: 98.5 F | DIASTOLIC BLOOD PRESSURE: 72 MMHG

## 2021-12-02 DIAGNOSIS — G89.29 CHRONIC NECK PAIN: ICD-10-CM

## 2021-12-02 DIAGNOSIS — M25.519 TRIGGER POINT OF SHOULDER REGION, UNSPECIFIED LATERALITY: ICD-10-CM

## 2021-12-02 DIAGNOSIS — M54.2 CHRONIC NECK PAIN: ICD-10-CM

## 2021-12-02 DIAGNOSIS — M79.18 MYOFASCIAL PAIN: ICD-10-CM

## 2021-12-02 PROCEDURE — 20552 NJX 1/MLT TRIGGER POINT 1/2: CPT | Performed by: NURSE PRACTITIONER

## 2021-12-02 PROCEDURE — 99213 OFFICE O/P EST LOW 20 MIN: CPT | Mod: 25 | Performed by: NURSE PRACTITIONER

## 2021-12-02 NOTE — PROCEDURES
Pre-procedure diagnosis: Bilateral trapezius trigger points, myofascial pain.  Post-procedure diagnosis:  Same    Procedure: Bilateral trapezius trigger point injections (no steroid).    The risks and benefits of the procedure were discussed with the patient which included infection, bleeding, pneumothorax, damage to surrounding structures/tissues.  The patient gave both verbal and written consent toproceed.    The trigger points were palpated and marked with indelible marking pen.  The areas to be injected today are:  1.  Trapezius RIGHT x2  2.  Trapezius LEFT x2    The area over the first jatin was cleansed with an alcohol swab.  Using a 27 guage 1.25 inch needle, the trigger point was dry needled.  Several muscle twitches were felt and the patient reported reproduction of pain.  After aspiration was negative 1 ml of 1% Lidocaine was injected.  This was repeated at one other spots.  There was no bleeding afterwards but a bandaid was placed to prevent oozing offluid onto the clothes.      The patient's trapezius muscle was stretched.  The patient was shown how to stretchthis muscle. Stretching should be performed at least 3 times a day, holding the stretch for at least 60 seconds.   After a short period of observation the patient was discharged of their own power.

## 2021-12-02 NOTE — PROGRESS NOTES
Assessment:     Diagnoses and all orders for this visit:  Chronic neck pain  -     PAIN Trigger Point Injection One to Two Muscles  -     lidocaine 1 % 5 mL  Myofascial pain  -     PAIN Trigger Point Injection One to Two Muscles  -     lidocaine 1 % 5 mL  Trigger point of shoulder region, unspecified laterality  -     PAIN Trigger Point Injection One to Two Muscles  -     lidocaine 1 % 5 mL     Madeline Szymanski is a 41 year old y.o. female with past medical history significant for migraine, depressive disorder, , gastric bypass surgery who presents today for follow-up regarding:     -Chronic generalized neck pain with bilateral upper extremity radiculopathy with ongoing difficulty with fine motor skills, slightly progressed.    -Cervical myofascial pain and cervical trigger points bilateral trapezius region.    *Awaiting conservative treatment prior to insurance approving C5-6 discectomy with Mobi-C disc replacement that was previously recommended by Dr. Lilly however denied by insurance that she needs to trial of 6 weeks of physical therapy 1st.    Plan:     A shared decision making plan was used.  The patient's values and choices were respected. Prior medical records were reviewed today. The following represents what was discussed and decided upon by the provider and the patient.     -DIAGNOSTIC TESTS: Images were personally reviewed and interpreted.   --Cervical MRI 2021 Moderate to Severe spinal stenosis C5-6.   --Lumbar spine flexion-extension x-ray 8/10/2021 shows no spondylolisthesis and no instability.  --Lumbar spine MRI 2021 with L5-S1 mild disc height loss with disc bulge on the left with osteophytes with mild left foraminal stenosis lateral recess stenosis and S1 compression.    -INTERVENTIONS: Completed bilateral trapezius trigger point injections today, see procedure note.    -MEDICATIONS: No changes in medications today.  Discussed side effects of medications and proper use. Patient  verbalized understanding.    -PHYSICAL THERAPY: Advised patient continue with physical therapy.  Did discuss trialing TENS unit for generalized neck pain as well and myofascial pain, she does have a home unit, advised her to bring this with her to her next PT appointment for further education on placement.  Discussed the importance of core strengthening, ROM, stretching exercises with the patient and how each of these entities is important in decreasing pain.  Explained to the patient that the purpose of physical therapy is to teach the patient a home exercise program.  These exercises need to be performed every day in order to decrease pain and prevent future occurrences of pain.        -PATIENT EDUCATION: Total time of 28 minutes, on the day of service, spent with the patient, reviewing the chart, placing orders, and documenting.   -Today we also discussed the issues related to the current COVID-19 pandemic, the pros and cons of the current treatment plan, the CDC guidelines such as social distancing, washing the hands, and covering the cough.    -FOLLOW UP: Follow-up as needed  Advised to contact clinic if symptoms worsen or change.    Subjective:     Madeline Szymanski is a 41 year old female who presents today for follow-up regarding chronic generalized neck pain with upper extremity pain and paresthesias greater on the right that has been progressive.  She does report perceived weakness right greater than left upper extremity.  She is left-hand dominant.    She does feel difficulty with fine motor skills, specifically putting her earring backs on and is dropping objects due to lack of finger dexterity at this time.  Patient does report chronic balance problems.  Denies any trips or falls on flat ground but does have difficulties with uneven ground and walking on an incline in regards to feeling unsteady.  Patient denies bowel or bladder loss control, denies saddle anesthesia.    Treatment to Date:  Left L5-S1  hemilaminectomy, medial facetectomy, foraminotomy with L5-S1 left microdiscectomy 10/4/2021 Dr. Lilly.     Bilateral carpal tunnel steroid injection 11/11/2020 and 5/12/2021 with significant benefit.     L5-S1 TFESI 3/21/2018  Lumbar MBB 2/13/2019 with benefit LBP  Lumbar RFA 3/6/2019  Bilateral L5-S1 TFESI 6/3/2020 with benefit LBP.  Preprocedure pain 8/10, post 5/10.  Left S1-S2 TFESI 8/3/2021 with no lasting benefit.  Preprocedure pain 8/10, post 2/10.  Left L5-S1 TFESI 9/1/2021 with minimal lasting benefit.  Preprocedure pain 7/10, post 4/10.     Medications:  Gabapentin 300 mg 1 to 2 tablets at bedtime, unable to tolerate during the daytime.  Amitriptyline 75 mg at bedtime for migraines  Flexeril as needed  Tylenol as needed  Lidocaine gel as needed     No benefit with recent Medrol Dosepak.     *Patient unable to tolerate NSAIDs due to GI surgery.    Patient Active Problem List   Diagnosis     Painless urinary retention due to cauda equina syndrome (H)     Spinal stenosis in cervical region       Current Outpatient Medications   Medication     acetaminophen (TYLENOL) 500 MG tablet     amitriptyline (ELAVIL) 75 MG tablet     cyclobenzaprine (FLEXERIL) 10 MG tablet     gabapentin (NEURONTIN) 300 MG capsule     lidocaine (LIDODERM) 5 % patch     lidocaine (LIDODERM) 5 % patch     methocarbamol (ROBAXIN) 500 MG tablet     eletriptan (RELPAX) 40 MG tablet     EPINEPHrine (ANY BX GENERIC EQUIV) 0.3 MG/0.3ML injection 2-pack     FLUoxetine (PROZAC) 40 MG capsule     rizatriptan (MAXALT-MLT) 10 MG ODT     topiramate (TOPAMAX) 50 MG tablet     No current facility-administered medications for this visit.       Allergies   Allergen Reactions     Cephalexin Hives     Coconut Flavor Anaphylaxis     Covid-19 (Mrna) Vaccine Anaphylaxis     Pfizer      Prochlorperazine Other (See Comments)     Other reaction(s): Tremors  Tremors  Other reaction(s): Tremors       Coconut Fatty Acids      Other reaction(s): Edema      Metoclopramide Other (See Comments)     Other reaction(s): Tremors  tremors  Other reaction(s): Tremors       Penicillins Hives       Past Medical History:   Diagnosis Date     Arthritis      Depressive disorder      Kidney infection      Migraine      Painless urinary retention due to cauda equina syndrome (H)      Pancreatitis      Spinal stenosis in cervical region      UTI (lower urinary tract infection)         Review of Systems  ROS: Specifically negative for bowel/bladder dysfunction, balance changes, headache, dizziness, foot drop, fevers, chills, appetite changes, nausea/vomiting, unexplained weight loss. Otherwise 13 systems reviewed are negative. Please see the patient's intake questionnaire from today for details.    Reviewed Social, Family, Past Medical and Past Surgical history with patient, no significant changes noted since prior visit.     Objective:     /72 (BP Location: Right arm, Patient Position: Sitting)   Pulse 108   Temp 98.5  F (36.9  C) (Oral)   SpO2 98%     PHYSICAL EXAMINATION:   --CONSTITUTIONAL: Vital signs as above. No acute distress. The patient is well nourished and well groomed.  --PSYCHIATRIC:  The patient is awake, alert, oriented to person, place, time and answering questions appropriately with clear speech. Appropriate mood and affect   --HEENT: Sclera are non-injected. Extraocular muscles are intact.   --SKIN: Skin over the face, bilateral upper extremities, and posterior torso is clean, dry, intact without rashes.  --RESPIRATORY: Normal rhythm and effort. No abnormal accessory muscle breathing patterns noted.   --GROSS MOTOR: Easily arises from a seated position.   --CERVICAL SPINE: Inspection reveals no evidence of deformity. Range of motion is not limited in cervical flexion, extension, lateral rotation.  Generalized tenderness to palpation cervical paraspinal and trapezius region with tautness to the muscles as well as referring pain with palpation  bilaterally.  --UPPER EXTREMITY MOTOR TESTING:  Wrist flexion left 5/5, right 5/5  Wrist extension left 5/5, right 5/5  Biceps left 5/5, right 5/5   Triceps left 5/5, right 5/5   Shoulder abduction left 5/5, right 5/5   left 5/5, right 5/5  --NEUROLOGIC: CN III-XII are grossly intact. 2/4 symmetric biceps, brachioradialis, triceps reflexes bilaterally. Sensation to upper extremities is intact on the left, diminished below the elbow on the right. Negative William's bilaterally.   --VASCULAR: Warm upper limbs bilaterally.     Imaging of the cervical spine: Cervical spine imaging was reviewed today. The images were shown to the patient and the findings were explained using a spine model.    Cervical spine MRI w/o contrast  Result Date: 11/24/2021  EXAM: MR CERVICAL SPINE W/O CONTRAST LOCATION: [facility] DATE/TIME: 11/24/2021 7:50 PM INDICATION: Neck trauma, focal neuro deficit or paresthesia (Age 16-64y) COMPARISON: MRI cervical spine dated 08/07/2021. TECHNIQUE: MRI Cervical Spine without IV contrast. FINDINGS: Normal vertebral body heights, alignment and marrow signal. No abnormal cord signal. No extraspinal abnormality. Craniovertebral junction and C1-C2: Normal. C2-C3: Normal disc height. No herniation. Normal facets. No spinal canal or neural foraminal stenosis. C3-C4: Normal disc height. No herniation. Normal facets. No spinal canal or neural foraminal stenosis. C4-C5: Slight loss of disc height. No herniation. Mild facet arthropathy. No spinal canal or neural foraminal stenosis. C5-C6: Moderate loss of disc height. Moderate diffuse disc osteophyte complex asymmetric to left. No herniation. Mild to moderate left paracentral spinal stenosis. Neural foraminal stenosis. C6-C7: Normal disc height. No herniation. Normal facets. No spinal canal or neural foraminal stenosis. C7-T1: Normal disc height. No herniation. Normal facets. No spinal canal or neural foraminal stenosis.   IMPRESSION:    1.  Mild to moderate left paracentral spinal stenosis at C5-C6 secondary to eccentric disc osteophyte complex. Finding is stable/unchanged compared to 08/07/2021.   2.  No acute abnormalities or other significant degenerative abnormalities identified.       CT Head w/o Contrast  Result Date: 11/24/2021  EXAM: CT HEAD W/O CONTRAST LOCATION: [facility] DATE/TIME: 11/24/2021 4:55 PM INDICATION: Head injury. COMPARISON: Head CT 05/01/2019. TECHNIQUE: Routine CT Head without IV contrast. Multiplanar reformats. Dose reduction techniques were used. FINDINGS: INTRACRANIAL CONTENTS: No intracranial hemorrhage, extraaxial collection, or mass effect.  No CT evidence of acute infarct. Normal parenchymal attenuation. Normal ventricles and sulci. VISUALIZED ORBITS/SINUSES/MASTOIDS: No intraorbital abnormality. No paranasal sinus mucosal disease. No middle ear or mastoid effusion. BONES/SOFT TISSUES: No acute abnormality.   IMPRESSION: 1.  No acute intracranial abnormality.       XR Cervical Spine G/E 4 Views  Result Date: 8/11/2021  CERVICAL SPINE FOUR OR MORE VIEWS August 10, 2021 5:20 PM HISTORY: Neck pain. COMPARISON: MRI of the cervical spine dated 8/7/2021.   IMPRESSION: No gross vertebral body height loss identified. Alignment is within normal limits. On flexion and extension views, no significant dynamic spondylolisthesis identified. Multilevel degenerative disc disease, including mild-to-moderate disc space narrowing at C5-C6 and C6-C7. Small multilevel anterior endplate osteophytes. The prevertebral soft tissues appear normal. JUNIOR MARTINEZ MD   SYSTEM ID:  [id number]        XR Lumbar Spine G/E 4 Views  Result Date: 8/11/2021  LUMBAR SPINE FOUR OR MORE VIEWS August 10, 2021 5:15 PM HISTORY: Lumbar radiculopathy. COMPARISON: MRI lumbar spine 8/6/2021. CT abdomen and pelvis 5/19/2021.   IMPRESSION: Nomenclature is based on five lumbar vertebral bodies. There appears to be mild dextroconvex  curvature at the thoracolumbar junction. Alignment otherwise appears within normal limits. No significant dynamic spondylolisthesis identified on flexion-extension views. No gross vertebral body height loss. Mild multilevel degenerative disc space narrowing with small anterior marginal endplate osteophytes. Mild lower lumbar degenerative facet arthropathy. There is a suture material noted in the left upper quadrant of the abdomen, as before. JUNIOR MARTINEZ MD   SYSTEM ID:  [id number]        MR Cervical Spine w/o Contrast  Result Date: 8/7/2021  EXAM: MR CERVICAL SPINE W/O CONTRAST LOCATION: [facility] DATE/TIME: 8/7/2021 1:19 AM INDICATION: Spinal stenosis, C-spine COMPARISON: Thoracic spine MRI 8/6/2021 TECHNIQUE: MRI Cervical Spine without IV contrast. FINDINGS: Vertebral body height and alignment is preserved. Moderate Modic type I changes anteriorly at T3-4. No definite abnormal signal in the cervical spine. No abnormal cord signal. No extraspinal abnormality. Craniovertebral junction and C1-C2: Normal. C2-C3: Normal disc height. No herniation. Normal facets. No spinal canal or neural foraminal stenosis. C3-C4: Normal disc height. No herniation. Normal facets. No spinal canal or neural foraminal stenosis. C4-C5: Slight loss of disc height. Slight facet arthropathy. No spinal canal or neural foraminal stenosis. C5-C6: Moderate loss of disc height. Mild to moderate diffuse disc osteophyte complex, asymmetric to the left. Slight left uncinate spurring. Moderate to severe central canal stenosis to the left of midline. No definite foraminal narrowing. C6-C7: Normal disc height. No herniation. Normal facets. No spinal canal or neural foraminal stenosis. C7-T1: Normal disc height. No herniation. Normal facets. No spinal canal or neural foraminal stenosis.   IMPRESSION:   1.  At C5-6 there is moderate to severe central canal stenosis to the left of midline secondary to an asymmetric left disc  osteophyte complex. No abnormal cord signal.   2.  Minimal degenerative changes elsewhere as described.      Lumbar spine MRI w/o contrast-presurgical  Result Date: 8/6/2021  MRI LUMBAR SPINE WITHOUT CONTRAST   8/6/2021 12:23 PM HISTORY: Low back pain, cauda equina syndrome suspected; Saddle anesthesia, unable to urinate and low back pain radiating down left leg with foot numbness. TECHNIQUE: Multiplanar multisequence MRI of the lumbar spine without contrast. COMPARISON: None. FINDINGS: Nomenclature is based on 5 lumbar vertebral bodies. Normal vertebral body heights and sagittal alignment. Unremarkable bone marrow signal. Varying degrees of multilevel intervertebral disc desiccation, most pronounced at L5-S1. Normal appearance of the distal spinal cord with the conus terminating at L2. There is a T2 hyperintense ovoid lesion in the right kidney measuring up to approximately 14 mm (series 801 image 18) incompletely characterized, but presumably representing a cyst. Otherwise, the visualized paraspinous soft tissues and bony pelvis are unremarkable. Segmental analysis: T12-L1: Minimal disc height loss. No herniation. Normal facets. No spinal canal or neural foraminal stenosis. L1-L2: Minimal disc height loss. No herniation. Normal facets. No spinal canal or neural foraminal stenosis. L2-L3: Normal disc height. No herniation. Normal facets. No spinal canal or neural foraminal stenosis. L3-L4: Normal disc height. No herniation. Normal facets. No spinal canal or neural foraminal stenosis. L4-L5: Normal disc height. No herniation. Normal facets. No spinal canal or neural foraminal stenosis. L5-S1: Mild disc height loss. Disc bulge slightly eccentric to the left with posterior/posterolateral endplate marginal osteophytes. Mild facet arthropathy. Mild bilateral, left greater than right, lateral recess stenosis without displacement or overt compression of the traversing S1 nerve roots. No central spinal canal stenosis. Mild  left neural foraminal narrowing. The right neural foramen is not significantly narrowed.   IMPRESSION:   1. Multilevel degenerative changes of the lumbar spine, as described.   2. No high-grade spinal canal stenosis.   3. Mild left greater than right lateral recess stenosis at L5-S1.   4. Mild left neural foraminal stenosis at L5-S1. Otherwise, no significant neural foraminal narrowing. JUNIOR MARTINEZ MD   SYSTEM ID:  [id number]     MR Thoracic Spine w/o Contrast  Result Date: 8/7/2021  EXAM: MR THORACIC SPINE W/O CONTRAST LOCATION: [facility] DATE/TIME: 8/6/2021 11:23 PM INDICATION: Low back pain, leg pain, urinary retention, saddle anesthesia. COMPARISON: Lumbar spine MRI dated 8/6/2021. TECHNIQUE: Routine Thoracic Spine MRI without IV contrast. FINDINGS: Partially visualized disc osteophyte complex at C5-C6 indents the ventral spinal cord. Question cord edema in the lower cervical spine cord, only seen on the first few images of the axial T2 sequence. Slight left apex curvature centered in the lower thoracic spine. There is accentuation of the normal thoracic spine kyphosis, with degenerative endplate changes and Schmorl's nodes, suggestive of Scheuermann's disease. There are Modic type I degenerative  endplate changes at T3-T4. No evidence of an acute compression deformity. There are multilevel disc protrusions with a small disc protrusion at C7-T1, T1-T2, a central disc extrusion at T3-T4 that extends above and below the intervertebral disc space measuring 12 mm in craniocaudal dimension, at T7-T8, T10-T11, and T11-T12. There is no high grade canal stenosis. Multilevel facet arthropathy. There is no high grade neural foraminal narrowing. No definite cord signal abnormality within the thoracic spinal cord. Small bilateral pleural effusions.   IMPRESSION:   1.  Degenerative changes of the thoracic spine with findings suggestive of Scheuermann's disease.   2.  No high grade spinal canal  or neural foraminal narrowing in the thoracic spine.   3.  Partially visualized disc osteophyte complex at C5-C6 that indents the ventral spinal cord. In addition there is questionable cord signal change in the lower cervical spine, which is incompletely evaluated on the current exam. Consider further evaluation with dedicated cervical spine MRI. Findings were discussed with Dr. Robbins at 12:24 AM on 08/07/2021.

## 2021-12-02 NOTE — LETTER
2021         RE: Madeline Szymanski  [address]  [facility] [address]        Dear Colleague,    Thank you for referring your patient, Madeline Szymanski, to the [facility] [place]. Please see a copy of my visit note below.      Assessment:     Diagnoses and all orders for this visit:  Chronic neck pain  -     PAIN Trigger Point Injection One to Two Muscles  -     lidocaine 1 % 5 mL  Myofascial pain  -     PAIN Trigger Point Injection One to Two Muscles  -     lidocaine 1 % 5 mL  Trigger point of shoulder region, unspecified laterality  -     PAIN Trigger Point Injection One to Two Muscles  -     lidocaine 1 % 5 mL     Madeline Szymanski is a 41 year old y.o. female with past medical history significant for migraine, depressive disorder, , gastric bypass surgery who presents today for follow-up regarding:     -Chronic generalized neck pain with bilateral upper extremity radiculopathy with ongoing difficulty with fine motor skills, slightly progressed.    -Cervical myofascial pain and cervical trigger points bilateral trapezius region.    *Awaiting conservative treatment prior to insurance approving C5-6 discectomy with Mobi-C disc replacement that was previously recommended by Dr. Lilly however denied by insurance that she needs to trial of 6 weeks of physical therapy 1st.    Plan:     A shared decision making plan was used.  The patient's values and choices were respected. Prior medical records were reviewed today. The following represents what was discussed and decided upon by the provider and the patient.     -DIAGNOSTIC TESTS: Images were personally reviewed and interpreted.   --Cervical MRI 2021 Moderate to Severe spinal stenosis C5-6.   --Lumbar spine flexion-extension x-ray 8/10/2021 shows no spondylolisthesis and no instability.  --Lumbar spine MRI 2021 with L5-S1 mild disc height loss with disc bulge on the left with osteophytes with mild left foraminal stenosis  lateral recess stenosis and S1 compression.    -INTERVENTIONS: Completed bilateral trapezius trigger point injections today, see procedure note.    -MEDICATIONS: No changes in medications today.  Discussed side effects of medications and proper use. Patient verbalized understanding.    -PHYSICAL THERAPY: Advised patient continue with physical therapy.  Did discuss trialing TENS unit for generalized neck pain as well and myofascial pain, she does have a home unit, advised her to bring this with her to her next PT appointment for further education on placement.  Discussed the importance of core strengthening, ROM, stretching exercises with the patient and how each of these entities is important in decreasing pain.  Explained to the patient that the purpose of physical therapy is to teach the patient a home exercise program.  These exercises need to be performed every day in order to decrease pain and prevent future occurrences of pain.        -PATIENT EDUCATION: Total time of 28 minutes, on the day of service, spent with the patient, reviewing the chart, placing orders, and documenting.   -Today we also discussed the issues related to the current COVID-19 pandemic, the pros and cons of the current treatment plan, the CDC guidelines such as social distancing, washing the hands, and covering the cough.    -FOLLOW UP: Follow-up as needed  Advised to contact clinic if symptoms worsen or change.    Subjective:     Madeline Szymanski is a 41 year old female who presents today for follow-up regarding chronic generalized neck pain with upper extremity pain and paresthesias greater on the right that has been progressive.  She does report perceived weakness right greater than left upper extremity.  She is left-hand dominant.    She does feel difficulty with fine motor skills, specifically putting her earring backs on and is dropping objects due to lack of finger dexterity at this time.  Patient does report chronic balance problems.   Denies any trips or falls on flat ground but does have difficulties with uneven ground and walking on an incline in regards to feeling unsteady.  Patient denies bowel or bladder loss control, denies saddle anesthesia.    Treatment to Date:  Left L5-S1 hemilaminectomy, medial facetectomy, foraminotomy with L5-S1 left microdiscectomy 10/4/2021 Dr. Lilly.     Bilateral carpal tunnel steroid injection 11/11/2020 and 5/12/2021 with significant benefit.     L5-S1 TFESI 3/21/2018  Lumbar MBB 2/13/2019 with benefit LBP  Lumbar RFA 3/6/2019  Bilateral L5-S1 TFESI 6/3/2020 with benefit LBP.  Preprocedure pain 8/10, post 5/10.  Left S1-S2 TFESI 8/3/2021 with no lasting benefit.  Preprocedure pain 8/10, post 2/10.  Left L5-S1 TFESI 9/1/2021 with minimal lasting benefit.  Preprocedure pain 7/10, post 4/10.     Medications:  Gabapentin 300 mg 1 to 2 tablets at bedtime, unable to tolerate during the daytime.  Amitriptyline 75 mg at bedtime for migraines  Flexeril as needed  Tylenol as needed  Lidocaine gel as needed     No benefit with recent Medrol Dosepak.     *Patient unable to tolerate NSAIDs due to GI surgery.    Patient Active Problem List   Diagnosis     Painless urinary retention due to cauda equina syndrome (H)     Spinal stenosis in cervical region       Current Outpatient Medications   Medication     acetaminophen (TYLENOL) 500 MG tablet     amitriptyline (ELAVIL) 75 MG tablet     cyclobenzaprine (FLEXERIL) 10 MG tablet     gabapentin (NEURONTIN) 300 MG capsule     lidocaine (LIDODERM) 5 % patch     lidocaine (LIDODERM) 5 % patch     methocarbamol (ROBAXIN) 500 MG tablet     eletriptan (RELPAX) 40 MG tablet     EPINEPHrine (ANY BX GENERIC EQUIV) 0.3 MG/0.3ML injection 2-pack     FLUoxetine (PROZAC) 40 MG capsule     rizatriptan (MAXALT-MLT) 10 MG ODT     topiramate (TOPAMAX) 50 MG tablet     No current facility-administered medications for this visit.       Allergies   Allergen Reactions     Cephalexin Hives      Coconut Flavor Anaphylaxis     Covid-19 (Mrna) Vaccine Anaphylaxis     Pfizer      Prochlorperazine Other (See Comments)     Other reaction(s): Tremors  Tremors  Other reaction(s): Tremors       Coconut Fatty Acids      Other reaction(s): Edema     Metoclopramide Other (See Comments)     Other reaction(s): Tremors  tremors  Other reaction(s): Tremors       Penicillins Hives       Past Medical History:   Diagnosis Date     Arthritis      Depressive disorder      Kidney infection      Migraine      Painless urinary retention due to cauda equina syndrome (H)      Pancreatitis      Spinal stenosis in cervical region      UTI (lower urinary tract infection)         Review of Systems  ROS: Specifically negative for bowel/bladder dysfunction, balance changes, headache, dizziness, foot drop, fevers, chills, appetite changes, nausea/vomiting, unexplained weight loss. Otherwise 13 systems reviewed are negative. Please see the patient's intake questionnaire from today for details.    Reviewed Social, Family, Past Medical and Past Surgical history with patient, no significant changes noted since prior visit.     Objective:     /72 (BP Location: Right arm, Patient Position: Sitting)   Pulse 108   Temp 98.5  F (36.9  C) (Oral)   SpO2 98%     PHYSICAL EXAMINATION:   --CONSTITUTIONAL: Vital signs as above. No acute distress. The patient is well nourished and well groomed.  --PSYCHIATRIC:  The patient is awake, alert, oriented to person, place, time and answering questions appropriately with clear speech. Appropriate mood and affect   --HEENT: Sclera are non-injected. Extraocular muscles are intact.   --SKIN: Skin over the face, bilateral upper extremities, and posterior torso is clean, dry, intact without rashes.  --RESPIRATORY: Normal rhythm and effort. No abnormal accessory muscle breathing patterns noted.   --GROSS MOTOR: Easily arises from a seated position.   --CERVICAL SPINE: Inspection reveals no evidence of  deformity. Range of motion is not limited in cervical flexion, extension, lateral rotation.  Generalized tenderness to palpation cervical paraspinal and trapezius region with tautness to the muscles as well as referring pain with palpation bilaterally.  --UPPER EXTREMITY MOTOR TESTING:  Wrist flexion left 5/5, right 5/5  Wrist extension left 5/5, right 5/5  Biceps left 5/5, right 5/5   Triceps left 5/5, right 5/5   Shoulder abduction left 5/5, right 5/5   left 5/5, right 5/5  --NEUROLOGIC: CN III-XII are grossly intact. 2/4 symmetric biceps, brachioradialis, triceps reflexes bilaterally. Sensation to upper extremities is intact on the left, diminished below the elbow on the right. Negative William's bilaterally.   --VASCULAR: Warm upper limbs bilaterally.     Imaging of the cervical spine: Cervical spine imaging was reviewed today. The images were shown to the patient and the findings were explained using a spine model.    Cervical spine MRI w/o contrast  Result Date: 11/24/2021  EXAM: MR CERVICAL SPINE W/O CONTRAST LOCATION: [facility] DATE/TIME: 11/24/2021 7:50 PM INDICATION: Neck trauma, focal neuro deficit or paresthesia (Age 16-64y) COMPARISON: MRI cervical spine dated 08/07/2021. TECHNIQUE: MRI Cervical Spine without IV contrast. FINDINGS: Normal vertebral body heights, alignment and marrow signal. No abnormal cord signal. No extraspinal abnormality. Craniovertebral junction and C1-C2: Normal. C2-C3: Normal disc height. No herniation. Normal facets. No spinal canal or neural foraminal stenosis. C3-C4: Normal disc height. No herniation. Normal facets. No spinal canal or neural foraminal stenosis. C4-C5: Slight loss of disc height. No herniation. Mild facet arthropathy. No spinal canal or neural foraminal stenosis. C5-C6: Moderate loss of disc height. Moderate diffuse disc osteophyte complex asymmetric to left. No herniation. Mild to moderate left paracentral spinal stenosis. Neural  foraminal stenosis. C6-C7: Normal disc height. No herniation. Normal facets. No spinal canal or neural foraminal stenosis. C7-T1: Normal disc height. No herniation. Normal facets. No spinal canal or neural foraminal stenosis.   IMPRESSION:   1.  Mild to moderate left paracentral spinal stenosis at C5-C6 secondary to eccentric disc osteophyte complex. Finding is stable/unchanged compared to 08/07/2021.   2.  No acute abnormalities or other significant degenerative abnormalities identified.       CT Head w/o Contrast  Result Date: 11/24/2021  EXAM: CT HEAD W/O CONTRAST LOCATION: [facility] DATE/TIME: 11/24/2021 4:55 PM INDICATION: Head injury. COMPARISON: Head CT 05/01/2019. TECHNIQUE: Routine CT Head without IV contrast. Multiplanar reformats. Dose reduction techniques were used. FINDINGS: INTRACRANIAL CONTENTS: No intracranial hemorrhage, extraaxial collection, or mass effect.  No CT evidence of acute infarct. Normal parenchymal attenuation. Normal ventricles and sulci. VISUALIZED ORBITS/SINUSES/MASTOIDS: No intraorbital abnormality. No paranasal sinus mucosal disease. No middle ear or mastoid effusion. BONES/SOFT TISSUES: No acute abnormality.   IMPRESSION: 1.  No acute intracranial abnormality.       XR Cervical Spine G/E 4 Views  Result Date: 8/11/2021  CERVICAL SPINE FOUR OR MORE VIEWS August 10, 2021 5:20 PM HISTORY: Neck pain. COMPARISON: MRI of the cervical spine dated 8/7/2021.   IMPRESSION: No gross vertebral body height loss identified. Alignment is within normal limits. On flexion and extension views, no significant dynamic spondylolisthesis identified. Multilevel degenerative disc disease, including mild-to-moderate disc space narrowing at C5-C6 and C6-C7. Small multilevel anterior endplate osteophytes. The prevertebral soft tissues appear normal. JUNIOR MARTINEZ MD   SYSTEM ID:  [id number]        XR Lumbar Spine G/E 4 Views  Result Date: 8/11/2021  LUMBAR SPINE FOUR OR MORE VIEWS  August 10, 2021 5:15 PM HISTORY: Lumbar radiculopathy. COMPARISON: MRI lumbar spine 8/6/2021. CT abdomen and pelvis 5/19/2021.   IMPRESSION: Nomenclature is based on five lumbar vertebral bodies. There appears to be mild dextroconvex curvature at the thoracolumbar junction. Alignment otherwise appears within normal limits. No significant dynamic spondylolisthesis identified on flexion-extension views. No gross vertebral body height loss. Mild multilevel degenerative disc space narrowing with small anterior marginal endplate osteophytes. Mild lower lumbar degenerative facet arthropathy. There is a suture material noted in the left upper quadrant of the abdomen, as before. JUNIOR MARTINEZ MD   SYSTEM ID:  [id number]        MR Cervical Spine w/o Contrast  Result Date: 8/7/2021  EXAM: MR CERVICAL SPINE W/O CONTRAST LOCATION: [facility] DATE/TIME: 8/7/2021 1:19 AM INDICATION: Spinal stenosis, C-spine COMPARISON: Thoracic spine MRI 8/6/2021 TECHNIQUE: MRI Cervical Spine without IV contrast. FINDINGS: Vertebral body height and alignment is preserved. Moderate Modic type I changes anteriorly at T3-4. No definite abnormal signal in the cervical spine. No abnormal cord signal. No extraspinal abnormality. Craniovertebral junction and C1-C2: Normal. C2-C3: Normal disc height. No herniation. Normal facets. No spinal canal or neural foraminal stenosis. C3-C4: Normal disc height. No herniation. Normal facets. No spinal canal or neural foraminal stenosis. C4-C5: Slight loss of disc height. Slight facet arthropathy. No spinal canal or neural foraminal stenosis. C5-C6: Moderate loss of disc height. Mild to moderate diffuse disc osteophyte complex, asymmetric to the left. Slight left uncinate spurring. Moderate to severe central canal stenosis to the left of midline. No definite foraminal narrowing. C6-C7: Normal disc height. No herniation. Normal facets. No spinal canal or neural foraminal stenosis. C7-T1:  Normal disc height. No herniation. Normal facets. No spinal canal or neural foraminal stenosis.   IMPRESSION:   1.  At C5-6 there is moderate to severe central canal stenosis to the left of midline secondary to an asymmetric left disc osteophyte complex. No abnormal cord signal.   2.  Minimal degenerative changes elsewhere as described.      Lumbar spine MRI w/o contrast-presurgical  Result Date: 8/6/2021  MRI LUMBAR SPINE WITHOUT CONTRAST   8/6/2021 12:23 PM HISTORY: Low back pain, cauda equina syndrome suspected; Saddle anesthesia, unable to urinate and low back pain radiating down left leg with foot numbness. TECHNIQUE: Multiplanar multisequence MRI of the lumbar spine without contrast. COMPARISON: None. FINDINGS: Nomenclature is based on 5 lumbar vertebral bodies. Normal vertebral body heights and sagittal alignment. Unremarkable bone marrow signal. Varying degrees of multilevel intervertebral disc desiccation, most pronounced at L5-S1. Normal appearance of the distal spinal cord with the conus terminating at L2. There is a T2 hyperintense ovoid lesion in the right kidney measuring up to approximately 14 mm (series 801 image 18) incompletely characterized, but presumably representing a cyst. Otherwise, the visualized paraspinous soft tissues and bony pelvis are unremarkable. Segmental analysis: T12-L1: Minimal disc height loss. No herniation. Normal facets. No spinal canal or neural foraminal stenosis. L1-L2: Minimal disc height loss. No herniation. Normal facets. No spinal canal or neural foraminal stenosis. L2-L3: Normal disc height. No herniation. Normal facets. No spinal canal or neural foraminal stenosis. L3-L4: Normal disc height. No herniation. Normal facets. No spinal canal or neural foraminal stenosis. L4-L5: Normal disc height. No herniation. Normal facets. No spinal canal or neural foraminal stenosis. L5-S1: Mild disc height loss. Disc bulge slightly eccentric to the left with posterior/posterolateral  endplate marginal osteophytes. Mild facet arthropathy. Mild bilateral, left greater than right, lateral recess stenosis without displacement or overt compression of the traversing S1 nerve roots. No central spinal canal stenosis. Mild left neural foraminal narrowing. The right neural foramen is not significantly narrowed.   IMPRESSION:   1. Multilevel degenerative changes of the lumbar spine, as described.   2. No high-grade spinal canal stenosis.   3. Mild left greater than right lateral recess stenosis at L5-S1.   4. Mild left neural foraminal stenosis at L5-S1. Otherwise, no significant neural foraminal narrowing. JUNIOR MARTINEZ MD   SYSTEM ID:  [id number]     MR Thoracic Spine w/o Contrast  Result Date: 8/7/2021  EXAM: MR THORACIC SPINE W/O CONTRAST LOCATION: [facility] DATE/TIME: 8/6/2021 11:23 PM INDICATION: Low back pain, leg pain, urinary retention, saddle anesthesia. COMPARISON: Lumbar spine MRI dated 8/6/2021. TECHNIQUE: Routine Thoracic Spine MRI without IV contrast. FINDINGS: Partially visualized disc osteophyte complex at C5-C6 indents the ventral spinal cord. Question cord edema in the lower cervical spine cord, only seen on the first few images of the axial T2 sequence. Slight left apex curvature centered in the lower thoracic spine. There is accentuation of the normal thoracic spine kyphosis, with degenerative endplate changes and Schmorl's nodes, suggestive of Scheuermann's disease. There are Modic type I degenerative  endplate changes at T3-T4. No evidence of an acute compression deformity. There are multilevel disc protrusions with a small disc protrusion at C7-T1, T1-T2, a central disc extrusion at T3-T4 that extends above and below the intervertebral disc space measuring 12 mm in craniocaudal dimension, at T7-T8, T10-T11, and T11-T12. There is no high grade canal stenosis. Multilevel facet arthropathy. There is no high grade neural foraminal narrowing. No definite cord  signal abnormality within the thoracic spinal cord. Small bilateral pleural effusions.   IMPRESSION:   1.  Degenerative changes of the thoracic spine with findings suggestive of Scheuermann's disease.   2.  No high grade spinal canal or neural foraminal narrowing in the thoracic spine.   3.  Partially visualized disc osteophyte complex at C5-C6 that indents the ventral spinal cord. In addition there is questionable cord signal change in the lower cervical spine, which is incompletely evaluated on the current exam. Consider further evaluation with dedicated cervical spine MRI. Findings were discussed with Dr. Robbins at 12:24 AM on 08/07/2021.                   Again, thank you for allowing me to participate in the care of your patient.        Sincerely,        Esme Hodgson, CNP

## 2021-12-03 ENCOUNTER — VIRTUAL VISIT (OUTPATIENT)
Dept: NEUROSURGERY | Facility: CLINIC | Age: 41
End: 2021-12-03
Payer: COMMERCIAL

## 2021-12-03 ENCOUNTER — HOSPITAL ENCOUNTER (OUTPATIENT)
Dept: PHYSICAL THERAPY | Facility: CLINIC | Age: 41
End: 2021-12-03
Payer: COMMERCIAL

## 2021-12-03 DIAGNOSIS — R51.9 HEADACHE: ICD-10-CM

## 2021-12-03 DIAGNOSIS — M48.02 SPINAL STENOSIS IN CERVICAL REGION: Primary | ICD-10-CM

## 2021-12-03 DIAGNOSIS — M47.12 CERVICAL SPONDYLOSIS WITH MYELOPATHY: ICD-10-CM

## 2021-12-03 DIAGNOSIS — M54.16 LUMBAR RADICULOPATHY: Primary | ICD-10-CM

## 2021-12-03 PROCEDURE — 99207 PR NO CHARGE LOS: CPT | Performed by: SURGERY

## 2021-12-03 PROCEDURE — 97140 MANUAL THERAPY 1/> REGIONS: CPT | Mod: GP | Performed by: PHYSICAL THERAPIST

## 2021-12-03 PROCEDURE — 97110 THERAPEUTIC EXERCISES: CPT | Mod: GP | Performed by: PHYSICAL THERAPIST

## 2021-12-03 NOTE — LETTER
12/3/2021         RE: Madeline Szymanski  [address]  [facility] [address]        Dear Colleague,    Thank you for referring your patient, Madeline Szymanski, to the [facility]. Please see a copy of my visit note below.    Physical therapy is worsening her symptoms. Trigger point injections improve symptoms for 1 hour only at a time. Headache for last 2 weeks after an injury to her head. MRI cervical spine was done and showed no new changes which is reassuring.  Her PRN migraine medication is no longer working well. Difficulty lifting arms for prolonged period without getting numbness in arms. She also notes she is dropping more objects and having increased difficulty with fine motor skills like putting her earrings on. Feels her hands and arms are become increasingly weak as well. We discussed consultation with a concussion specialist to see if new headaches and neck pain improve with concussion therapy. She will call our office if new or worsening symptoms.    Radha Lilly MD       Again, thank you for allowing me to participate in the care of your patient.        Sincerely,        Radha Lilly MD

## 2021-12-03 NOTE — PROGRESS NOTES
Physical therapy is worsening her symptoms. Trigger point injections improve symptoms for 1 hour only at a time. Headache for last 2 weeks after an injury to her head. MRI cervical spine was done and showed no new changes which is reassuring.  Her PRN migraine medication is no longer working well. Difficulty lifting arms for prolonged period without getting numbness in arms. She also notes she is dropping more objects and having increased difficulty with fine motor skills like putting her earrings on. Feels her hands and arms are become increasingly weak as well. We discussed consultation with a concussion specialist to see if new headaches and neck pain improve with concussion therapy. She will call our office if new or worsening symptoms.    Radha Lilly MD

## 2021-12-08 ENCOUNTER — HOSPITAL ENCOUNTER (OUTPATIENT)
Dept: PHYSICAL THERAPY | Facility: CLINIC | Age: 41
End: 2021-12-08
Payer: COMMERCIAL

## 2021-12-08 ENCOUNTER — MYC MEDICAL ADVICE (OUTPATIENT)
Dept: PHYSICAL MEDICINE AND REHAB | Facility: CLINIC | Age: 41
End: 2021-12-08

## 2021-12-08 DIAGNOSIS — M25.561 BILATERAL CHRONIC KNEE PAIN: Primary | ICD-10-CM

## 2021-12-08 DIAGNOSIS — M47.12 CERVICAL SPONDYLOSIS WITH MYELOPATHY: ICD-10-CM

## 2021-12-08 DIAGNOSIS — G89.29 BILATERAL CHRONIC KNEE PAIN: Primary | ICD-10-CM

## 2021-12-08 DIAGNOSIS — M54.16 LUMBAR RADICULOPATHY: Primary | ICD-10-CM

## 2021-12-08 DIAGNOSIS — M25.562 BILATERAL CHRONIC KNEE PAIN: Primary | ICD-10-CM

## 2021-12-08 PROCEDURE — 97110 THERAPEUTIC EXERCISES: CPT | Mod: GP | Performed by: PHYSICAL THERAPIST

## 2021-12-10 ENCOUNTER — HOSPITAL ENCOUNTER (OUTPATIENT)
Dept: RADIOLOGY | Facility: HOSPITAL | Age: 41
End: 2021-12-10
Attending: NURSE PRACTITIONER
Payer: COMMERCIAL

## 2021-12-10 DIAGNOSIS — M25.562 BILATERAL CHRONIC KNEE PAIN: ICD-10-CM

## 2021-12-10 DIAGNOSIS — G89.29 BILATERAL CHRONIC KNEE PAIN: ICD-10-CM

## 2021-12-10 DIAGNOSIS — M25.561 BILATERAL CHRONIC KNEE PAIN: ICD-10-CM

## 2021-12-10 PROCEDURE — 73562 X-RAY EXAM OF KNEE 3: CPT | Mod: 50

## 2021-12-15 ENCOUNTER — HOSPITAL ENCOUNTER (OUTPATIENT)
Dept: PHYSICAL THERAPY | Facility: CLINIC | Age: 41
End: 2021-12-15

## 2021-12-15 DIAGNOSIS — M47.12 CERVICAL SPONDYLOSIS WITH MYELOPATHY: ICD-10-CM

## 2021-12-15 DIAGNOSIS — M54.16 LUMBAR RADICULOPATHY: Primary | ICD-10-CM

## 2021-12-15 PROCEDURE — 97110 THERAPEUTIC EXERCISES: CPT | Mod: GP | Performed by: PHYSICAL THERAPIST

## 2021-12-15 NOTE — PROGRESS NOTES
Daily Assessment:  Patient tested on lumbar and cervical Medx today, patient does have weakness on both lumbar and cervical machines, would benefit from use of Medx in POC to improve strength and mobility. Patient appropriate to continue with skilled PT per POC.    Date 12/15/2021   Lumbar Parameters    Top Dead Center (TDC) 18   Counterbalance (CB) 356   Seat Pad 1   Femur Restraint 6   Week/Visit    Enter Week/Visit # 1/1   Weight (lbs) 234# Max  110 Ex   Reps (#) 15   Time 124   ROM (degrees) 0-39   Pain fatigue   Flex:Ext ratio 3.21:1   Cervical Parameters    Top Dead Center (TDC) 54   Counterbalance (CB) 1.7   Seat Height 437   Week/Visit    Enter Week/Visit # 1/1   Weight (lbs) 214# Max  141# ex   Reps (#) 20   Time 81   ROM (degrees) 24-63   Pain    Flex:Ext ratio 1.86:1     Date 12/15/2021   Exercise    Nustep X 5 min level 4   Rotary torso 90 seconds 30#'s started ILDA [place], PT

## 2021-12-19 ENCOUNTER — OFFICE VISIT (OUTPATIENT)
Dept: FAMILY MEDICINE | Facility: CLINIC | Age: 41
End: 2021-12-19
Payer: COMMERCIAL

## 2021-12-19 ENCOUNTER — LAB REQUISITION (OUTPATIENT)
Dept: LAB | Facility: CLINIC | Age: 41
End: 2021-12-19

## 2021-12-19 DIAGNOSIS — Z91.199 FAILURE TO ATTEND APPOINTMENT: Primary | ICD-10-CM

## 2021-12-19 PROCEDURE — U0005 INFEC AGEN DETEC AMPLI PROBE: HCPCS | Performed by: INTERNAL MEDICINE

## 2021-12-20 LAB — SARS-COV-2 RNA RESP QL NAA+PROBE: NEGATIVE

## 2021-12-23 ENCOUNTER — APPOINTMENT (OUTPATIENT)
Dept: FAMILY MEDICINE | Facility: CLINIC | Age: 41
End: 2021-12-23
Payer: COMMERCIAL

## 2021-12-23 ENCOUNTER — LAB REQUISITION (OUTPATIENT)
Dept: LAB | Facility: CLINIC | Age: 41
End: 2021-12-23

## 2021-12-23 LAB — SARS-COV-2 RNA RESP QL NAA+PROBE: POSITIVE

## 2021-12-23 PROCEDURE — U0003 INFECTIOUS AGENT DETECTION BY NUCLEIC ACID (DNA OR RNA); SEVERE ACUTE RESPIRATORY SYNDROME CORONAVIRUS 2 (SARS-COV-2) (CORONAVIRUS DISEASE [COVID-19]), AMPLIFIED PROBE TECHNIQUE, MAKING USE OF HIGH THROUGHPUT TECHNOLOGIES AS DESCRIBED BY CMS-2020-01-R: HCPCS | Performed by: INTERNAL MEDICINE

## 2021-12-26 ENCOUNTER — APPOINTMENT (OUTPATIENT)
Dept: CT IMAGING | Facility: HOSPITAL | Age: 41
End: 2021-12-26
Attending: FAMILY MEDICINE
Payer: COMMERCIAL

## 2021-12-26 ENCOUNTER — HOSPITAL ENCOUNTER (EMERGENCY)
Facility: HOSPITAL | Age: 41
Discharge: HOME | End: 2021-12-26
Attending: FAMILY MEDICINE | Admitting: FAMILY MEDICINE
Payer: COMMERCIAL

## 2021-12-26 VITALS
TEMPERATURE: 99 F | WEIGHT: 195 LBS | DIASTOLIC BLOOD PRESSURE: 57 MMHG | BODY MASS INDEX: 33.47 KG/M2 | HEART RATE: 90 BPM | RESPIRATION RATE: 19 BRPM | OXYGEN SATURATION: 95 % | SYSTOLIC BLOOD PRESSURE: 112 MMHG

## 2021-12-26 DIAGNOSIS — J12.82 PNEUMONIA DUE TO 2019 NOVEL CORONAVIRUS: ICD-10-CM

## 2021-12-26 DIAGNOSIS — U07.1 PNEUMONIA DUE TO 2019 NOVEL CORONAVIRUS: ICD-10-CM

## 2021-12-26 DIAGNOSIS — S29.012A STRAIN OF THORACIC BACK REGION: ICD-10-CM

## 2021-12-26 LAB
ANION GAP SERPL CALCULATED.3IONS-SCNC: 7 MMOL/L (ref 5–18)
BNP SERPL-MCNC: <10 PG/ML (ref 0–64)
BUN SERPL-MCNC: 7 MG/DL (ref 8–22)
CALCIUM SERPL-MCNC: 9 MG/DL (ref 8.5–10.5)
CHLORIDE BLD-SCNC: 102 MMOL/L (ref 98–107)
CO2 SERPL-SCNC: 29 MMOL/L (ref 22–31)
CREAT SERPL-MCNC: 0.78 MG/DL (ref 0.6–1.1)
D DIMER PPP FEU-MCNC: 0.89 UG/ML FEU (ref 0–0.5)
GFR SERPL CREATININE-BSD FRML MDRD: >90 ML/MIN/1.73M2
GLUCOSE BLD-MCNC: 102 MG/DL (ref 70–125)
HOLD SPECIMEN: NORMAL
MAGNESIUM SERPL-MCNC: 1.9 MG/DL (ref 1.8–2.6)
POTASSIUM BLD-SCNC: 3.7 MMOL/L (ref 3.5–5)
SODIUM SERPL-SCNC: 138 MMOL/L (ref 136–145)
TROPONIN I SERPL-MCNC: <0.01 NG/ML (ref 0–0.29)

## 2021-12-26 PROCEDURE — 84484 ASSAY OF TROPONIN QUANT: CPT | Performed by: FAMILY MEDICINE

## 2021-12-26 PROCEDURE — 85379 FIBRIN DEGRADATION QUANT: CPT | Performed by: FAMILY MEDICINE

## 2021-12-26 PROCEDURE — 74177 CT ABD & PELVIS W/CONTRAST: CPT

## 2021-12-26 PROCEDURE — 36415 COLL VENOUS BLD VENIPUNCTURE: CPT | Performed by: FAMILY MEDICINE

## 2021-12-26 PROCEDURE — 83735 ASSAY OF MAGNESIUM: CPT | Performed by: FAMILY MEDICINE

## 2021-12-26 PROCEDURE — 250N000011 HC RX IP 250 OP 636: Performed by: FAMILY MEDICINE

## 2021-12-26 PROCEDURE — 80048 BASIC METABOLIC PNL TOTAL CA: CPT | Performed by: FAMILY MEDICINE

## 2021-12-26 PROCEDURE — 71275 CT ANGIOGRAPHY CHEST: CPT

## 2021-12-26 PROCEDURE — 96374 THER/PROPH/DIAG INJ IV PUSH: CPT | Mod: 59

## 2021-12-26 PROCEDURE — 96361 HYDRATE IV INFUSION ADD-ON: CPT

## 2021-12-26 PROCEDURE — 99285 EMERGENCY DEPT VISIT HI MDM: CPT | Mod: 25

## 2021-12-26 PROCEDURE — 96375 TX/PRO/DX INJ NEW DRUG ADDON: CPT

## 2021-12-26 PROCEDURE — 93005 ELECTROCARDIOGRAM TRACING: CPT | Performed by: FAMILY MEDICINE

## 2021-12-26 PROCEDURE — 83880 ASSAY OF NATRIURETIC PEPTIDE: CPT | Performed by: FAMILY MEDICINE

## 2021-12-26 PROCEDURE — 258N000003 HC RX IP 258 OP 636: Performed by: FAMILY MEDICINE

## 2021-12-26 RX ORDER — ONDANSETRON 2 MG/ML
4 INJECTION INTRAMUSCULAR; INTRAVENOUS ONCE
Status: COMPLETED | OUTPATIENT
Start: 2021-12-26 | End: 2021-12-26

## 2021-12-26 RX ORDER — KETOROLAC TROMETHAMINE 30 MG/ML
15 INJECTION, SOLUTION INTRAMUSCULAR; INTRAVENOUS ONCE
Status: COMPLETED | OUTPATIENT
Start: 2021-12-26 | End: 2021-12-26

## 2021-12-26 RX ORDER — IOPAMIDOL 755 MG/ML
100 INJECTION, SOLUTION INTRAVASCULAR ONCE
Status: COMPLETED | OUTPATIENT
Start: 2021-12-26 | End: 2021-12-26

## 2021-12-26 RX ORDER — ACETAMINOPHEN 325 MG/1
975 TABLET ORAL ONCE
Status: DISCONTINUED | OUTPATIENT
Start: 2021-12-26 | End: 2021-12-26

## 2021-12-26 RX ORDER — ONDANSETRON 4 MG/1
4 TABLET, ORALLY DISINTEGRATING ORAL ONCE
Status: DISCONTINUED | OUTPATIENT
Start: 2021-12-26 | End: 2021-12-26

## 2021-12-26 RX ADMIN — IOPAMIDOL 100 ML: 755 INJECTION, SOLUTION INTRAVENOUS at 13:31

## 2021-12-26 RX ADMIN — KETOROLAC TROMETHAMINE 15 MG: 30 INJECTION, SOLUTION INTRAMUSCULAR; INTRAVENOUS at 12:04

## 2021-12-26 RX ADMIN — ONDANSETRON 4 MG: 2 INJECTION INTRAMUSCULAR; INTRAVENOUS at 12:05

## 2021-12-26 RX ADMIN — SODIUM CHLORIDE 1000 ML: 9 INJECTION, SOLUTION INTRAVENOUS at 12:09

## 2021-12-26 NOTE — ED PROVIDER NOTES
EMERGENCY DEPARTMENT ENCOUNTER      NAME: Madeline Szymanski  AGE: 41 year old female  YOB: 1980  MRN: [id number]  EVALUATION DATE & TIME: 12/26/2021 11:31 AM    PCP: Tre Jj    ED PROVIDER: Joby Pickard M.D.    Chief Complaint   Patient presents with     Shortness of Breath       FINAL IMPRESSION:  1. Pneumonia due to 2019 novel coronavirus    2. Strain of thoracic back region        ED COURSE & MEDICAL DECISION MAKING:    Pertinent Labs & Imaging studies personally reviewed and interpreted by me. (See chart for details)  11:34 AM Patient seen and examined, prior records reviewed.  Differential diagnosis includes but not limited to musculoskeletal pain, pulmonary embolism, pneumothorax, pleurisy.  Patient with known Covid, increased shortness of breath and some left-sided back pain.  Labs and chest CT ordered.  2:35 PM D-dimer is minimally elevated, CT scan is negative for pulmonary embolism.  No other intrathoracic or retroperitoneal findings.  Patient's back and flank pain likely due to musculoskeletal strain from coughing.  Continue Tylenol ibuprofen, follow-up with primary care.       At the conclusion of the encounter I discussed the results of all of the tests and the disposition. The questions were answered. The patient or family acknowledged understanding and was agreeable with the care plan.     PROCEDURES:   Procedures    MEDICATIONS GIVEN IN THE EMERGENCY:  Medications   ketorolac (TORADOL) injection 15 mg (15 mg Intravenous Given 12/26/21 1204)   ondansetron (ZOFRAN) injection 4 mg (4 mg Intravenous Given 12/26/21 1205)   0.9% sodium chloride BOLUS (1,000 mLs Intravenous New Bag 12/26/21 1209)   iopamidol (ISOVUE-370) solution 100 mL (100 mLs Intravenous Given 12/26/21 1331)       NEW PRESCRIPTIONS STARTED AT TODAY'S ER VISIT  New Prescriptions    No medications on file       =================================================================    HPI    Patient information was obtained  from: Patient  Madeline Szymanski is a 41 year old female with a pertinent history of COVID who presents to this ED by private vehicle for evaluation of shortness of breath, fever, and back pain.  Patient had a positive Covid test on .    REVIEW OF SYSTEMS   Review of Systems   All other systems reviewed and negative    PAST MEDICAL HISTORY:  Past Medical History:   Diagnosis Date     Arthritis      Depressive disorder      Kidney infection      Migraine      Painless urinary retention due to cauda equina syndrome (H)      Pancreatitis      Spinal stenosis in cervical region      UTI (lower urinary tract infection)        PAST SURGICAL HISTORY:  Past Surgical History:   Procedure Laterality Date      SECTION       GI SURGERY       GYN SURGERY  2018    Hysterectomy     LAMINECTOMY LUMBAR ONE LEVEL Left 10/4/2021    Procedure: LEFT LUMBAR 5-SACRAL 1 HEMILAMINECTOMY, MEDIAL FACETECTOMY, FORAMINOTOMY WITH;  Surgeon: Radha Lilly MD;  Location: [facility] OR     LUMBAR DISCECTOMY Left 10/4/2021    Procedure: LUMBAR 5-SACRAL 1 MICRODISCECTOMY;  Surgeon: Radha Lilly MD;  Location: [facility] OR       CURRENT MEDICATIONS:    No current facility-administered medications for this encounter.     Current Outpatient Medications   Medication     acetaminophen (TYLENOL) 500 MG tablet     amitriptyline (ELAVIL) 75 MG tablet     cyclobenzaprine (FLEXERIL) 10 MG tablet     eletriptan (RELPAX) 40 MG tablet     EPINEPHrine (ANY BX GENERIC EQUIV) 0.3 MG/0.3ML injection 2-pack     FLUoxetine (PROZAC) 40 MG capsule     gabapentin (NEURONTIN) 300 MG capsule     lidocaine (LIDODERM) 5 % patch     lidocaine (LIDODERM) 5 % patch     methocarbamol (ROBAXIN) 500 MG tablet     rizatriptan (MAXALT-MLT) 10 MG ODT     topiramate (TOPAMAX) 50 MG tablet       ALLERGIES:  Allergies   Allergen Reactions     Cephalexin Hives     Coconut Flavor Anaphylaxis     Covid-19 (Mrna) Vaccine Anaphylaxis     Pfizer       Prochlorperazine Other (See Comments)     Other reaction(s): Tremors  Tremors  Other reaction(s): Tremors       Coconut Fatty Acids      Other reaction(s): Edema     Metoclopramide Other (See Comments)     Other reaction(s): Tremors  tremors  Other reaction(s): Tremors       Penicillins Hives       FAMILY HISTORY:  Family History   Problem Relation Age of Onset     Heart Disease Father      Hypertension Father      Substance Abuse Father      Low Back Problems Mother         back surgery     Breast Cancer Cousin      Breast Cancer Other      Mental Illness Nephew      Obesity Sister      Obesity Paternal Grandmother      Obesity Other        SOCIAL HISTORY:   Social History     Socioeconomic History     Marital status:      Spouse name: Not on file     Number of children: Not on file     Years of education: Not on file     Highest education level: Not on file   Occupational History     Not on file   Tobacco Use     Smoking status: Never Smoker     Smokeless tobacco: Never Used   Substance and Sexual Activity     Alcohol use: Never     Drug use: Never     Sexual activity: Yes     Partners: Male     Birth control/protection: Female Surgical   Other Topics Concern     Parent/sibling w/ CABG, MI or angioplasty before 65F 55M? Yes     Comment: Father   Social History Narrative     Not on file     Social Determinants of Health     Financial Resource Strain: Not on file   Food Insecurity: Not on file   Transportation Needs: Not on file   Physical Activity: Not on file   Stress: Not on file   Social Connections: Not on file   Intimate Partner Violence: Not on file   Housing Stability: Not on file       VITALS:  /69   Pulse 103   Temp 99  F (37.2  C)   Resp (!) 37   Wt 88.5 kg (195 lb)   SpO2 94%   BMI 33.47 kg/m      PHYSICAL EXAM:  Physical Exam     LAB:  All pertinent labs reviewed and interpreted.  Results for orders placed or performed during the hospital encounter of 12/26/21   CT Chest Pulmonary  Embolism w Contrast    Impression    IMPRESSION:   1.  No PE.  2.  Commonly reported imaging features of COVID 19 pneumonia are present. Other processes can have a similar imaging appearance.  3.  No hydronephrosis or hydroureter. No ureteral calculi.    CT Abdomen Pelvis w Contrast    Impression    IMPRESSION:   1.  No PE.  2.  Commonly reported imaging features of COVID 19 pneumonia are present. Other processes can have a similar imaging appearance.  3.  No hydronephrosis or hydroureter. No ureteral calculi.    Basic metabolic panel   Result Value Ref Range    Sodium 138 136 - 145 mmol/L    Potassium 3.7 3.5 - 5.0 mmol/L    Chloride 102 98 - 107 mmol/L    Carbon Dioxide (CO2) 29 22 - 31 mmol/L    Anion Gap 7 5 - 18 mmol/L    Urea Nitrogen 7 (L) 8 - 22 mg/dL    Creatinine 0.78 0.60 - 1.10 mg/dL    Calcium 9.0 8.5 - 10.5 mg/dL    Glucose 102 70 - 125 mg/dL    GFR Estimate >90 >60 mL/min/1.73m2   Result Value Ref Range    Troponin I <0.01 0.00 - 0.29 ng/mL   Result Value Ref Range    Magnesium 1.9 1.8 - 2.6 mg/dL   B-Type Natriuretic Peptide ([facility] Only)   Result Value Ref Range    BNP <10 0 - 64 pg/mL   D dimer quantitative   Result Value Ref Range    D-Dimer Quantitative 0.89 (H) 0.00 - 0.50 ug/mL FEU   Extra Blood Culture Bottle   Result Value Ref Range    Hold Specimen JIC    Extra Blue Top Tube   Result Value Ref Range    Hold Specimen JIC    Extra Red Top Tube   Result Value Ref Range    Hold Specimen JIC    Extra Green Top (Lithium Heparin) Tube   Result Value Ref Range    Hold Specimen JIC    Extra Purple Top Tube   Result Value Ref Range    Hold Specimen JIC    Extra Green Top (Lithium Heparin) ON ICE   Result Value Ref Range    Hold Specimen JIC        RADIOLOGY:  Reviewed all pertinent imaging. Please see official radiology report.  CT Abdomen Pelvis w Contrast   Final Result   IMPRESSION:    1.  No PE.   2.  Commonly reported imaging features of COVID 19 pneumonia are present. Other processes can have a  similar imaging appearance.   3.  No hydronephrosis or hydroureter. No ureteral calculi.       CT Chest Pulmonary Embolism w Contrast   Final Result   IMPRESSION:    1.  No PE.   2.  Commonly reported imaging features of COVID 19 pneumonia are present. Other processes can have a similar imaging appearance.   3.  No hydronephrosis or hydroureter. No ureteral calculi.         Joby Pickard M.D.  Emergency Medicine  [facility] EMERGENCY DEPARTMENT  [facility] [facility] [id number] [phone]  Dept: [phone]     Joby Pickard MD  12/26/21 8362

## 2021-12-26 NOTE — ED TRIAGE NOTES
Patient presents here with increasing shortness of breath and pain to her upper back, just under her left scapular area. She has been diagnosed with Covid 19 on 12/23. She had received one dose of a two step Moderna series. She states that she had an allergic reaction to her first dose.

## 2021-12-26 NOTE — Clinical Note
Madeline Szymanski was seen and treated in our emergency department on 12/26/2021.  She may return to work on 01/03/2022.       If you have any questions or concerns, please don't hesitate to call.      Joby Pickard MD

## 2021-12-27 ENCOUNTER — HOSPITAL ENCOUNTER (INPATIENT)
Facility: CLINIC | Age: 41
LOS: 5 days | Discharge: HOME | DRG: 177 | End: 2022-01-01
Attending: STUDENT IN AN ORGANIZED HEALTH CARE EDUCATION/TRAINING PROGRAM | Admitting: STUDENT IN AN ORGANIZED HEALTH CARE EDUCATION/TRAINING PROGRAM
Payer: COMMERCIAL

## 2021-12-27 ENCOUNTER — PATIENT OUTREACH (OUTPATIENT)
Dept: CARE COORDINATION | Facility: CLINIC | Age: 41
End: 2021-12-27
Payer: COMMERCIAL

## 2021-12-27 DIAGNOSIS — U07.1 INFECTION DUE TO 2019 NOVEL CORONAVIRUS: ICD-10-CM

## 2021-12-27 DIAGNOSIS — J96.21 ACUTE AND CHRONIC RESPIRATORY FAILURE WITH HYPOXIA (H): Primary | ICD-10-CM

## 2021-12-27 DIAGNOSIS — U07.1 PNEUMONIA DUE TO 2019 NOVEL CORONAVIRUS: ICD-10-CM

## 2021-12-27 DIAGNOSIS — J12.82 PNEUMONIA DUE TO 2019 NOVEL CORONAVIRUS: ICD-10-CM

## 2021-12-27 LAB
ABO/RH(D): NORMAL
ALBUMIN SERPL-MCNC: 2.8 G/DL (ref 3.5–5)
ALP SERPL-CCNC: 57 U/L (ref 45–120)
ALT SERPL W P-5'-P-CCNC: 22 U/L (ref 0–45)
ANION GAP SERPL CALCULATED.3IONS-SCNC: 5 MMOL/L (ref 5–18)
AST SERPL W P-5'-P-CCNC: 26 U/L (ref 0–40)
BASOPHILS # BLD AUTO: 0 10E3/UL (ref 0–0.2)
BASOPHILS NFR BLD AUTO: 0 %
BILIRUB SERPL-MCNC: 0.4 MG/DL (ref 0–1)
BNP SERPL-MCNC: <10 PG/ML (ref 0–64)
BUN SERPL-MCNC: 5 MG/DL (ref 8–22)
CALCIUM SERPL-MCNC: 8.1 MG/DL (ref 8.5–10.5)
CHLORIDE BLD-SCNC: 100 MMOL/L (ref 98–107)
CO2 SERPL-SCNC: 28 MMOL/L (ref 22–31)
CREAT SERPL-MCNC: 0.74 MG/DL (ref 0.6–1.1)
D DIMER PPP FEU-MCNC: 0.63 UG/ML FEU (ref 0–0.5)
D DIMER PPP FEU-MCNC: 0.68 UG/ML FEU (ref 0–0.5)
EOSINOPHIL # BLD AUTO: 0 10E3/UL (ref 0–0.7)
EOSINOPHIL NFR BLD AUTO: 0 %
ERYTHROCYTE [DISTWIDTH] IN BLOOD BY AUTOMATED COUNT: 12 % (ref 10–15)
GFR SERPL CREATININE-BSD FRML MDRD: >90 ML/MIN/1.73M2
GLUCOSE BLD-MCNC: 120 MG/DL (ref 70–125)
HCT VFR BLD AUTO: 37.6 % (ref 35–47)
HGB BLD-MCNC: 11.8 G/DL (ref 11.7–15.7)
HOLD SPECIMEN: NORMAL
IMM GRANULOCYTES # BLD: 0 10E3/UL
IMM GRANULOCYTES NFR BLD: 0 %
INR PPP: 0.95 (ref 0.85–1.15)
LACTATE SERPL-SCNC: 0.7 MMOL/L (ref 0.7–2)
LYMPHOCYTES # BLD AUTO: 0.5 10E3/UL (ref 0.8–5.3)
LYMPHOCYTES NFR BLD AUTO: 8 %
MAGNESIUM SERPL-MCNC: 1.7 MG/DL (ref 1.8–2.6)
MCH RBC QN AUTO: 28.5 PG (ref 26.5–33)
MCHC RBC AUTO-ENTMCNC: 31.4 G/DL (ref 31.5–36.5)
MCV RBC AUTO: 91 FL (ref 78–100)
MONOCYTES # BLD AUTO: 0.1 10E3/UL (ref 0–1.3)
MONOCYTES NFR BLD AUTO: 2 %
NEUTROPHILS # BLD AUTO: 5.5 10E3/UL (ref 1.6–8.3)
NEUTROPHILS NFR BLD AUTO: 90 %
NRBC # BLD AUTO: 0 10E3/UL
NRBC BLD AUTO-RTO: 0 /100
PLATELET # BLD AUTO: 139 10E3/UL (ref 150–450)
POTASSIUM BLD-SCNC: 3.4 MMOL/L (ref 3.5–5)
PROCALCITONIN SERPL-MCNC: 0.36 NG/ML (ref 0–0.49)
PROT SERPL-MCNC: 6 G/DL (ref 6–8)
RBC # BLD AUTO: 4.14 10E6/UL (ref 3.8–5.2)
SODIUM SERPL-SCNC: 133 MMOL/L (ref 136–145)
SPECIMEN EXPIRATION DATE: NORMAL
TROPONIN I SERPL-MCNC: <0.01 NG/ML (ref 0–0.29)
WBC # BLD AUTO: 6.1 10E3/UL (ref 4–11)

## 2021-12-27 PROCEDURE — XW043E5 INTRODUCTION OF REMDESIVIR ANTI-INFECTIVE INTO CENTRAL VEIN, PERCUTANEOUS APPROACH, NEW TECHNOLOGY GROUP 5: ICD-10-PCS | Performed by: STUDENT IN AN ORGANIZED HEALTH CARE EDUCATION/TRAINING PROGRAM

## 2021-12-27 PROCEDURE — 258N000003 HC RX IP 258 OP 636: Performed by: STUDENT IN AN ORGANIZED HEALTH CARE EDUCATION/TRAINING PROGRAM

## 2021-12-27 PROCEDURE — 96367 TX/PROPH/DG ADDL SEQ IV INF: CPT

## 2021-12-27 PROCEDURE — 250N000009 HC RX 250: Performed by: STUDENT IN AN ORGANIZED HEALTH CARE EDUCATION/TRAINING PROGRAM

## 2021-12-27 PROCEDURE — 96375 TX/PRO/DX INJ NEW DRUG ADDON: CPT

## 2021-12-27 PROCEDURE — 99285 EMERGENCY DEPT VISIT HI MDM: CPT | Mod: 25

## 2021-12-27 PROCEDURE — 85379 FIBRIN DEGRADATION QUANT: CPT | Performed by: STUDENT IN AN ORGANIZED HEALTH CARE EDUCATION/TRAINING PROGRAM

## 2021-12-27 PROCEDURE — 85025 COMPLETE CBC W/AUTO DIFF WBC: CPT | Performed by: STUDENT IN AN ORGANIZED HEALTH CARE EDUCATION/TRAINING PROGRAM

## 2021-12-27 PROCEDURE — 83735 ASSAY OF MAGNESIUM: CPT | Performed by: STUDENT IN AN ORGANIZED HEALTH CARE EDUCATION/TRAINING PROGRAM

## 2021-12-27 PROCEDURE — 83880 ASSAY OF NATRIURETIC PEPTIDE: CPT | Performed by: STUDENT IN AN ORGANIZED HEALTH CARE EDUCATION/TRAINING PROGRAM

## 2021-12-27 PROCEDURE — 96365 THER/PROPH/DIAG IV INF INIT: CPT

## 2021-12-27 PROCEDURE — 84484 ASSAY OF TROPONIN QUANT: CPT | Performed by: STUDENT IN AN ORGANIZED HEALTH CARE EDUCATION/TRAINING PROGRAM

## 2021-12-27 PROCEDURE — 85610 PROTHROMBIN TIME: CPT | Performed by: STUDENT IN AN ORGANIZED HEALTH CARE EDUCATION/TRAINING PROGRAM

## 2021-12-27 PROCEDURE — 120N000001 HC R&B MED SURG/OB

## 2021-12-27 PROCEDURE — 36415 COLL VENOUS BLD VENIPUNCTURE: CPT | Performed by: STUDENT IN AN ORGANIZED HEALTH CARE EDUCATION/TRAINING PROGRAM

## 2021-12-27 PROCEDURE — 250N000013 HC RX MED GY IP 250 OP 250 PS 637: Performed by: STUDENT IN AN ORGANIZED HEALTH CARE EDUCATION/TRAINING PROGRAM

## 2021-12-27 PROCEDURE — 99223 1ST HOSP IP/OBS HIGH 75: CPT | Mod: AI | Performed by: STUDENT IN AN ORGANIZED HEALTH CARE EDUCATION/TRAINING PROGRAM

## 2021-12-27 PROCEDURE — 83605 ASSAY OF LACTIC ACID: CPT | Performed by: STUDENT IN AN ORGANIZED HEALTH CARE EDUCATION/TRAINING PROGRAM

## 2021-12-27 PROCEDURE — 250N000011 HC RX IP 250 OP 636: Performed by: STUDENT IN AN ORGANIZED HEALTH CARE EDUCATION/TRAINING PROGRAM

## 2021-12-27 PROCEDURE — 86901 BLOOD TYPING SEROLOGIC RH(D): CPT | Performed by: STUDENT IN AN ORGANIZED HEALTH CARE EDUCATION/TRAINING PROGRAM

## 2021-12-27 PROCEDURE — 80053 COMPREHEN METABOLIC PANEL: CPT | Performed by: STUDENT IN AN ORGANIZED HEALTH CARE EDUCATION/TRAINING PROGRAM

## 2021-12-27 PROCEDURE — 84145 PROCALCITONIN (PCT): CPT | Performed by: STUDENT IN AN ORGANIZED HEALTH CARE EDUCATION/TRAINING PROGRAM

## 2021-12-27 RX ORDER — ONDANSETRON 2 MG/ML
4 INJECTION INTRAMUSCULAR; INTRAVENOUS ONCE
Status: COMPLETED | OUTPATIENT
Start: 2021-12-27 | End: 2021-12-27

## 2021-12-27 RX ORDER — POLYETHYLENE GLYCOL 3350 17 G/17G
17 POWDER, FOR SOLUTION ORAL DAILY PRN
Status: DISCONTINUED | OUTPATIENT
Start: 2021-12-27 | End: 2022-01-01 | Stop reason: HOSPADM

## 2021-12-27 RX ORDER — ACETAMINOPHEN 650 MG/1
650 SUPPOSITORY RECTAL EVERY 6 HOURS PRN
Status: DISCONTINUED | OUTPATIENT
Start: 2021-12-27 | End: 2022-01-01 | Stop reason: HOSPADM

## 2021-12-27 RX ORDER — GABAPENTIN 300 MG/1
300-600 CAPSULE ORAL AT BEDTIME
Status: DISCONTINUED | OUTPATIENT
Start: 2021-12-27 | End: 2022-01-01 | Stop reason: HOSPADM

## 2021-12-27 RX ORDER — ACETAMINOPHEN 325 MG/1
650 TABLET ORAL EVERY 6 HOURS PRN
Status: DISCONTINUED | OUTPATIENT
Start: 2021-12-27 | End: 2021-12-27

## 2021-12-27 RX ORDER — KETOROLAC TROMETHAMINE 15 MG/ML
15 INJECTION, SOLUTION INTRAMUSCULAR; INTRAVENOUS ONCE
Status: COMPLETED | OUTPATIENT
Start: 2021-12-27 | End: 2021-12-27

## 2021-12-27 RX ORDER — ONDANSETRON 2 MG/ML
4 INJECTION INTRAMUSCULAR; INTRAVENOUS EVERY 6 HOURS PRN
Status: DISCONTINUED | OUTPATIENT
Start: 2021-12-27 | End: 2022-01-01 | Stop reason: HOSPADM

## 2021-12-27 RX ORDER — PROCHLORPERAZINE MALEATE 10 MG
10 TABLET ORAL EVERY 6 HOURS PRN
Status: DISCONTINUED | OUTPATIENT
Start: 2021-12-27 | End: 2021-12-27

## 2021-12-27 RX ORDER — LIDOCAINE 4 G/G
1 PATCH TOPICAL
Status: DISCONTINUED | OUTPATIENT
Start: 2021-12-28 | End: 2022-01-01 | Stop reason: HOSPADM

## 2021-12-27 RX ORDER — LIDOCAINE 50 MG/G
1 PATCH TOPICAL EVERY 24 HOURS
Status: DISCONTINUED | OUTPATIENT
Start: 2021-12-28 | End: 2021-12-27

## 2021-12-27 RX ORDER — FAMOTIDINE 20 MG/1
20 TABLET, FILM COATED ORAL 2 TIMES DAILY
Status: DISCONTINUED | OUTPATIENT
Start: 2021-12-27 | End: 2022-01-01 | Stop reason: HOSPADM

## 2021-12-27 RX ORDER — LIDOCAINE 40 MG/G
CREAM TOPICAL
Status: DISCONTINUED | OUTPATIENT
Start: 2021-12-27 | End: 2022-01-01 | Stop reason: HOSPADM

## 2021-12-27 RX ORDER — ACETAMINOPHEN 325 MG/1
975 TABLET ORAL EVERY 6 HOURS PRN
Status: DISCONTINUED | OUTPATIENT
Start: 2021-12-27 | End: 2022-01-01 | Stop reason: HOSPADM

## 2021-12-27 RX ORDER — ACETAMINOPHEN 650 MG/1
650 SUPPOSITORY RECTAL EVERY 6 HOURS PRN
Status: DISCONTINUED | OUTPATIENT
Start: 2021-12-27 | End: 2021-12-27

## 2021-12-27 RX ORDER — PROCHLORPERAZINE 25 MG
25 SUPPOSITORY, RECTAL RECTAL EVERY 12 HOURS PRN
Status: DISCONTINUED | OUTPATIENT
Start: 2021-12-27 | End: 2021-12-27

## 2021-12-27 RX ORDER — ACETAMINOPHEN 325 MG/1
975 TABLET ORAL ONCE
Status: COMPLETED | OUTPATIENT
Start: 2021-12-27 | End: 2021-12-27

## 2021-12-27 RX ORDER — MAGNESIUM SULFATE HEPTAHYDRATE 40 MG/ML
2 INJECTION, SOLUTION INTRAVENOUS ONCE
Status: COMPLETED | OUTPATIENT
Start: 2021-12-27 | End: 2021-12-27

## 2021-12-27 RX ORDER — ONDANSETRON 4 MG/1
4 TABLET, ORALLY DISINTEGRATING ORAL EVERY 6 HOURS PRN
Status: DISCONTINUED | OUTPATIENT
Start: 2021-12-27 | End: 2022-01-01 | Stop reason: HOSPADM

## 2021-12-27 RX ORDER — IBUPROFEN 600 MG/1
600 TABLET, FILM COATED ORAL EVERY 6 HOURS PRN
Status: DISCONTINUED | OUTPATIENT
Start: 2021-12-27 | End: 2021-12-28

## 2021-12-27 RX ADMIN — GABAPENTIN 300 MG: 300 CAPSULE ORAL at 20:07

## 2021-12-27 RX ADMIN — FAMOTIDINE 20 MG: 20 TABLET ORAL at 20:06

## 2021-12-27 RX ADMIN — KETOROLAC TROMETHAMINE 15 MG: 15 INJECTION, SOLUTION INTRAMUSCULAR; INTRAVENOUS at 15:25

## 2021-12-27 RX ADMIN — AMITRIPTYLINE HYDROCHLORIDE 75 MG: 50 TABLET, FILM COATED ORAL at 20:08

## 2021-12-27 RX ADMIN — SODIUM CHLORIDE 50 ML: 9 INJECTION, SOLUTION INTRAVENOUS at 17:01

## 2021-12-27 RX ADMIN — ACETAMINOPHEN 975 MG: 325 TABLET ORAL at 15:23

## 2021-12-27 RX ADMIN — DEXAMETHASONE 6 MG: 2 TABLET ORAL at 18:47

## 2021-12-27 RX ADMIN — MAGNESIUM SULFATE HEPTAHYDRATE 2 G: 40 INJECTION, SOLUTION INTRAVENOUS at 15:25

## 2021-12-27 RX ADMIN — REMDESIVIR 200 MG: 100 INJECTION, POWDER, LYOPHILIZED, FOR SOLUTION INTRAVENOUS at 16:31

## 2021-12-27 RX ADMIN — ACETAMINOPHEN 650 MG: 325 TABLET ORAL at 18:46

## 2021-12-27 RX ADMIN — ENOXAPARIN SODIUM 40 MG: 100 INJECTION SUBCUTANEOUS at 18:47

## 2021-12-27 RX ADMIN — GUAIFENESIN 10 ML: 100 SOLUTION ORAL at 21:17

## 2021-12-27 RX ADMIN — ONDANSETRON 4 MG: 2 INJECTION INTRAMUSCULAR; INTRAVENOUS at 15:23

## 2021-12-27 ASSESSMENT — ACTIVITIES OF DAILY LIVING (ADL)
WALKING_OR_CLIMBING_STAIRS: OTHER (SEE COMMENTS)
ADLS_ACUITY_SCORE: 3
ADLS_ACUITY_SCORE: 3
WEAR_GLASSES_OR_BLIND: NO
DRESSING/BATHING_DIFFICULTY: NO
DIFFICULTY_EATING/SWALLOWING: NO
ADLS_ACUITY_SCORE: 6
ADLS_ACUITY_SCORE: 6
ADLS_ACUITY_SCORE: 4
DOING_ERRANDS_INDEPENDENTLY_DIFFICULTY: NO
WALKING_OR_CLIMBING_STAIRS_DIFFICULTY: YES
ADLS_ACUITY_SCORE: 6
TOILETING_ISSUES: NO
WHICH_OF_THE_ABOVE_FUNCTIONAL_RISKS_HAD_A_RECENT_ONSET_OR_CHANGE?: AMBULATION
ADLS_ACUITY_SCORE: 6
DIFFICULTY_COMMUNICATING: NO
FALL_HISTORY_WITHIN_LAST_SIX_MONTHS: NO
CONCENTRATING,_REMEMBERING_OR_MAKING_DECISIONS_DIFFICULTY: NO
ADLS_ACUITY_SCORE: 3

## 2021-12-27 ASSESSMENT — MIFFLIN-ST. JEOR
SCORE: 1524.51
SCORE: 1598

## 2021-12-27 NOTE — H&P
[facility]    History and Physical - Teaching Service        Date of Admission:  12/27/2021    Assessment & Plan      Madeline Szymanski is a 41 year old female admitted on 12/27/2021. She has a history of migraine and anxiety and is admitted for COVID-19 pneumonia with hypoxia.     Acute Hypoxic Respiratory Failure secondary to COVID-19 infection  Viral Pneumonia secondary to COVID-19 infection  Patient reports that symptoms started 12/20/2021 and she tested positive on 12/23/2021.  She previously received 1 dose of Moderna vaccine but had anaphylactic reaction to it.   and children also have COVID-19 right now.  Patient developed shortness of breath on 12/26/2021 and was seen at ED where she had CT chest which demonstrates Covid pneumonia.  Came back and was admitted 12/27/2021 due to hypoxia requiring oxygen at 4 L nasal cannula.    Admit to medical floor with continuous pulse ox, COVID-19 special precautions    Oxygen: continue current support with nasal cannula at 4 L/min; titrate to keep SpO2 between 90-96%    Daily Covid labs for the next 4 days    No IV fluids or antibiotics indicated at this time    COVID-Focused Medications: Dexamethasone 6 mg x 10 days or until hospital discharge, started on 12/27/21 and Remdesivir x 5 days or until hospital discharge, started on 12/27/21    DVT Prophylaxis: Lovenox 40 mg daily     Chronic medical conditions  Migraines: Continue PTA amitriptyline  Chronic pain, prior back surgery: Continue PTA gabapentin, lidocaine patch       Diet: Combination Diet Regular Diet Adult    DVT Prophylaxis: Enoxaparin (Lovenox) subcutaneous; famotidine  Maxwell Catheter: Not present  Fluids: Oral for now  Central Lines: None  Code Status: Full Code      Clinically Significant Risk Factors Present on Admission             # Obesity: last Body mass index is 33.47 kg/m .      Disposition Plan   Expected Discharge: TBD, pending oxygen needs     The patient's care  "was discussed with the Attending Physician, Dr. Mable Altamirano and Patient.    Arielle Bruno MD  [facility]  Text page via [facility] Paging/Directory    ______________________________________________________________________    Chief Complaint   Chief Complaint   Patient presents with     Covid Concern     Shortness of Breath     History is obtained from the patient who is a good historian.    History of Present Illness   Madeline Szymanski is a 41 year old female who has a history of migraines and is admitted for COVID-19 infection with hypoxia.  Patient reports that her symptoms started 1 week ago on 12/20/2021 with \"regular cold symptoms\" including runny nose, sneezing, and generalized fatigue.  Since then, symptoms have worsened and now include myalgias, occasional fever, nausea, poor appetite, and generalized fatigue.  She has been off of work with the symptoms and both her children and her  have Covid as well.    Starting yesterday, she developed shortness of breath which has progressively worsened.  On presentation to the ED, patient was requiring 4 L nasal cannula and is quite exhausted.    Patient received 1 dose of Moderna Covid vaccine, but had anaphylactic reaction so she did not receive further doses.    Review of Systems     ROS: 10 point ROS neg other than the symptoms noted above in the HPI.    Past Medical History    I have reviewed this patient's medical history and updated it with pertinent information if needed.   Past Medical History:   Diagnosis Date     Arthritis      Depressive disorder      Kidney infection      Migraine      Painless urinary retention due to cauda equina syndrome (H)      Pancreatitis      Spinal stenosis in cervical region      UTI (lower urinary tract infection)       Past Surgical History   I have reviewed this patient's surgical history and updated it with pertinent information if needed.  Past Surgical History:   Procedure " Laterality Date      SECTION       GI SURGERY       GYN SURGERY  2018    Hysterectomy     LAMINECTOMY LUMBAR ONE LEVEL Left 10/4/2021    Procedure: LEFT LUMBAR 5-SACRAL 1 HEMILAMINECTOMY, MEDIAL FACETECTOMY, FORAMINOTOMY WITH;  Surgeon: Radha Lilly MD;  Location: [facility] OR     LUMBAR DISCECTOMY Left 10/4/2021    Procedure: LUMBAR 5-SACRAL 1 MICRODISCECTOMY;  Surgeon: Radha Lilly MD;  Location: [facility] OR      Social History   I have reviewed this patient's social history and updated it with pertinent information if needed. Madeline Szymanski  reports that she has never smoked. She has never used smokeless tobacco. She reports that she does not drink alcohol and does not use drugs.    Family History   I have reviewed this patient's family history and updated it with pertinent information if needed.  Family History   Problem Relation Age of Onset     Heart Disease Father      Hypertension Father      Substance Abuse Father      Low Back Problems Mother         back surgery     Breast Cancer Cousin      Breast Cancer Other      Mental Illness Nephew      Obesity Sister      Obesity Paternal Grandmother      Obesity Other        Prior to Admission Medications   Prior to Admission Medications   Prescriptions Last Dose Informant Patient Reported? Taking?   EPINEPHrine (ANY BX GENERIC EQUIV) 0.3 MG/0.3ML injection 2-pack Unknown at Unknown time  No Yes   Sig: Inject 0.3 mLs (0.3 mg) into the muscle once as needed for anaphylaxis   acetaminophen (TYLENOL) 500 MG tablet 2021 at Unknown time  No Yes   Sig: Take 2 tablets (1,000 mg) by mouth every 6 hours as needed for mild pain or other (and adjunct with moderate or severe pain or per patient request)   amitriptyline (ELAVIL) 75 MG tablet 2021 at Unknown time  Yes Yes   Sig: Take 75 mg by mouth At Bedtime   eletriptan (RELPAX) 40 MG tablet Unknown at Unknown time  Yes Yes   Sig: Take 40 mg by mouth at onset of headache May  repeat if needed in 2 hours. Max of 2 doses/24hours Max of 4 days/month   gabapentin (NEURONTIN) 300 MG capsule 12/26/2021 at Unknown time  Yes Yes   Sig: Take 300-600 mg by mouth At Bedtime    lidocaine (LIDODERM) 5 % patch 12/27/2021 at patch on- need to remove 12/27  No Yes   Sig: Apply 1 patch to affected area as needed for 12 hours, remove for 12 hours before reapplying.   rizatriptan (MAXALT-MLT) 10 MG ODT Unknown at Unknown time  Yes Yes   Sig: Take 10 mg by mouth at onset of headache for migraine May repeat in 2 hours, with a max of 30 mg in 24 hours and a max of 5 days/month      Facility-Administered Medications: None     Allergies   Allergies   Allergen Reactions     Cephalexin Hives     Coconut Flavor Anaphylaxis     Covid-19 (Mrna) Vaccine Anaphylaxis     Pfizer      Prochlorperazine Other (See Comments)     Other reaction(s): Tremors  Tremors  Other reaction(s): Tremors       Coconut Fatty Acids      Other reaction(s): Edema     Metoclopramide Other (See Comments)     Other reaction(s): Tremors  tremors  Other reaction(s): Tremors       Penicillins Hives       Physical Exam   Vital Signs: Temp: 99.8  F (37.7  C) Temp src: Oral BP: 104/62 Pulse: 90   Resp: 20 SpO2: 93 % O2 Device: Nasal cannula Oxygen Delivery: 2 LPM Weight: 195 lbs 0 oz    General Appearance: Alert,cooperative, appears in mild distress, tired appearing  Head: Normocephalic, atraumatic  Eyes: conjunctiva/corneas clear, extraocular movements intact  Lungs: Diminished breath sounds bilaterally, labored respirations, wearing nasal cannula  Heart: RRR, normal S1 and S2. No murmurs, rubs, or gallops  Abdomen: Soft, non-tender, non-distended, bowel sounds present, no rebound or guarding  Extremities: Normal bulk and tone, atraumatic, no cyanosis, or LE edema  Pulses:  2+ radial, dorsalis pedis  Skin: Normal color, texture, and turgor. No concerning rashes or lesions noted.  Neurologic: Alert and oriented x3. No focal deficits. Normal speech,  no facial droop.    Data   Data reviewed today: I reviewed all medications, new labs and imaging results over the last 24 hours.    Results for orders placed or performed during the hospital encounter of 12/27/21   D dimer quantitative     Status: Abnormal   Result Value Ref Range    D-Dimer Quantitative 0.68 (H) 0.00 - 0.50 ug/mL FEU    Narrative    This D-dimer assay is intended for use in conjunction with a clinical pretest probability assessment model to exclude pulmonary embolism (PE) and deep venous thrombosis (DVT) in outpatients suspected of PE or DVT. The cut-off value is 0.50 ug/mL FEU.   INR     Status: Normal   Result Value Ref Range    INR 0.95 0.85 - 1.15   Comprehensive metabolic panel     Status: Abnormal   Result Value Ref Range    Sodium 133 (L) 136 - 145 mmol/L    Potassium 3.4 (L) 3.5 - 5.0 mmol/L    Chloride 100 98 - 107 mmol/L    Carbon Dioxide (CO2) 28 22 - 31 mmol/L    Anion Gap 5 5 - 18 mmol/L    Urea Nitrogen 5 (L) 8 - 22 mg/dL    Creatinine 0.74 0.60 - 1.10 mg/dL    Calcium 8.1 (L) 8.5 - 10.5 mg/dL    Glucose 120 70 - 125 mg/dL    Alkaline Phosphatase 57 45 - 120 U/L    AST 26 0 - 40 U/L    ALT 22 0 - 45 U/L    Protein Total 6.0 6.0 - 8.0 g/dL    Albumin 2.8 (L) 3.5 - 5.0 g/dL    Bilirubin Total 0.4 0.0 - 1.0 mg/dL    GFR Estimate >90 >60 mL/min/1.73m2   Lactic acid whole blood     Status: Normal   Result Value Ref Range    Lactic Acid 0.7 0.7 - 2.0 mmol/L   Troponin I     Status: Normal   Result Value Ref Range    Troponin I <0.01 0.00 - 0.29 ng/mL   Magnesium     Status: Abnormal   Result Value Ref Range    Magnesium 1.7 (L) 1.8 - 2.6 mg/dL   B-Type Natriuretic Peptide ([facility] Only)     Status: Normal   Result Value Ref Range    BNP <10 0 - 64 pg/mL   Procalcitonin     Status: Normal   Result Value Ref Range    Procalcitonin 0.36 0.00 - 0.49 ng/mL   CBC with platelets and differential     Status: Abnormal   Result Value Ref Range    WBC Count 6.1 4.0 - 11.0 10e3/uL    RBC Count 4.14  3.80 - 5.20 10e6/uL    Hemoglobin 11.8 11.7 - 15.7 g/dL    Hematocrit 37.6 35.0 - 47.0 %    MCV 91 78 - 100 fL    MCH 28.5 26.5 - 33.0 pg    MCHC 31.4 (L) 31.5 - 36.5 g/dL    RDW 12.0 10.0 - 15.0 %    Platelet Count 139 (L) 150 - 450 10e3/uL    % Neutrophils 90 %    % Lymphocytes 8 %    % Monocytes 2 %    % Eosinophils 0 %    % Basophils 0 %    % Immature Granulocytes 0 %    NRBCs per 100 WBC 0 <1 /100    Absolute Neutrophils 5.5 1.6 - 8.3 10e3/uL    Absolute Lymphocytes 0.5 (L) 0.8 - 5.3 10e3/uL    Absolute Monocytes 0.1 0.0 - 1.3 10e3/uL    Absolute Eosinophils 0.0 0.0 - 0.7 10e3/uL    Absolute Basophils 0.0 0.0 - 0.2 10e3/uL    Absolute Immature Granulocytes 0.0 <=0.4 10e3/uL    Absolute NRBCs 0.0 10e3/uL   CBC with platelets differential     Status: Abnormal    Narrative    The following orders were created for panel order CBC with platelets differential.  Procedure                               Abnormality         Status                     ---------                               -----------         ------                     CBC with platelets and d...[[id number]]  Abnormal            Final result                 Please view results for these tests on the individual orders.     CT Abdomen Pelvis w Contrast  Result Date: 12/26/2021    IMPRESSION: 1.  No PE. 2.  Commonly reported imaging features of COVID 19 pneumonia are present. Other processes can have a similar imaging appearance. 3.  No hydronephrosis or hydroureter. No ureteral calculi.     CT Chest Pulmonary Embolism w Contrast  Result Date: 12/26/2021  IMPRESSION: 1.  No PE. 2.  Commonly reported imaging features of COVID 19 pneumonia are present. Other processes can have a similar imaging appearance. 3.  No hydronephrosis or hydroureter. No ureteral calculi.

## 2021-12-27 NOTE — PROGRESS NOTES
Clinic Care Coordination Contact    Care Coordination ED Discharge Follow up Note    Patient referred for Virtual Home Monitoring Program for COVID-19.     Called and left message for patient encouraging them to schedule virtual visit with PCP and to watch for invitation from [employer]. Phone number to reach [facility] primary scheduling and Nurse Advisor line reviewed on message.      Stefanie Burch RN  [facility]  - Clinic Care Coordinator

## 2021-12-27 NOTE — PHARMACY-ADMISSION MEDICATION HISTORY
Pharmacy Note - Admission Medication History    Pertinent Provider Information: none     ______________________________________________________________________    Prior To Admission (PTA) med list completed and updated in EMR.       PTA Med List   Medication Sig Last Dose     acetaminophen (TYLENOL) 500 MG tablet Take 2 tablets (1,000 mg) by mouth every 6 hours as needed for mild pain or other (and adjunct with moderate or severe pain or per patient request) 12/27/2021 at Unknown time     amitriptyline (ELAVIL) 75 MG tablet Take 75 mg by mouth At Bedtime 12/26/2021 at Unknown time     eletriptan (RELPAX) 40 MG tablet Take 40 mg by mouth at onset of headache May repeat if needed in 2 hours. Max of 2 doses/24hours Max of 4 days/month Unknown at Unknown time     EPINEPHrine (ANY BX GENERIC EQUIV) 0.3 MG/0.3ML injection 2-pack Inject 0.3 mLs (0.3 mg) into the muscle once as needed for anaphylaxis Unknown at Unknown time     gabapentin (NEURONTIN) 300 MG capsule Take 300-600 mg by mouth At Bedtime  12/26/2021 at Unknown time     lidocaine (LIDODERM) 5 % patch Apply 1 patch to affected area as needed for 12 hours, remove for 12 hours before reapplying. 12/27/2021 at patch on- need to remove 12/27     rizatriptan (MAXALT-MLT) 10 MG ODT Take 10 mg by mouth at onset of headache for migraine May repeat in 2 hours, with a max of 30 mg in 24 hours and a max of 5 days/month Unknown at Unknown time       Information source(s): Patient and CareEverywhere/SureScripts  Method of interview communication: phone    Summary of Changes to PTA Med List  New: none  Discontinued: cyclobenzaprine, fluoxetine (Aug), duplicate lidocaine patch, methocarbamol (for surgery), topiramate (June)  Changed: none    Patient was asked about OTC/herbal products specifically.  PTA med list reflects this.    In the past week, patient estimated taking medication this percent of the time:  greater than 90%.    Allergies were reviewed, assessed, and updated  with the patient.      Patient does not use any multi-dose medications prior to admission.    The information provided in this note is only as accurate as the sources available at the time of the update(s).    Thank you for the opportunity to participate in the care of this patient.    Lety Madrid [facility]  12/27/2021 4:44 PM

## 2021-12-27 NOTE — ED PROVIDER NOTES
Emergency Department Encounter         FINAL IMPRESSION:  COVID      ED COURSE AND MEDICAL DECISION MAKING       ED Course as of 12/27/21 1704   Mon Dec 27, 2021   1403 Patient is an obese 41-year-old with a history of mental health issues was seen yesterday Víctor Torres for Covid, here now hypoxic.  Improved on 4 L nasal cannula.  Patient states he is a headache.  No nausea vomiting.  No abdominal pain.  No chest pain.  No urination issues.  Physical examination overall is unremarkable.  Heart and lungs are normal other than some mild tachycardia.  Lungs are clear.  Abdomen benign.  No leg swelling.     -Patient received Decadron and remdesivir.  Admitted to the resident service.  Patient otherwise hemodynamically stable at this time on 2 to 4 L nasal cannula.  Patient had CT PE done yesterday.  We will continue to monitor patient status and not rescan her.               2:01 PM I met with the patient to gather history and to perform my initial exam. I discussed the plan for care while in the Emergency Department. PPE (gloves N95 mask, and surgical mask) was worn by me during patient encounters while patient wore mask.   3:26 PM I discussed the case with resident, Dr. Bruno, who accepts the patient for admission.           At the conclusion of the encounter I discussed the results of all the tests and the disposition. The questions were answered. The patient or family acknowledged understanding and was agreeable with the care plan.                  MEDICATIONS GIVEN IN THE EMERGENCY DEPARTMENT:  Medications   remdesivir 200 mg in sodium chloride 0.9 % 250 mL intermittent infusion (200 mg Intravenous New Bag 12/27/21 1631)     Followed by   0.9% sodium chloride BOLUS (has no administration in time range)   remdesivir 100 mg in sodium chloride 0.9 % 250 mL intermittent infusion (has no administration in time range)     And   0.9% sodium chloride BOLUS (has no administration in time range)   ketorolac (TORADOL)  injection 15 mg (15 mg Intravenous Given 12/27/21 1525)   acetaminophen (TYLENOL) tablet 975 mg (975 mg Oral Given 12/27/21 1523)   ondansetron (ZOFRAN) injection 4 mg (4 mg Intravenous Given 12/27/21 1523)   magnesium sulfate 2 g in water intermittent infusion (0 g Intravenous Stopped 12/27/21 1625)       NEW PRESCRIPTIONS STARTED AT TODAY'S ED VISIT:  New Prescriptions    No medications on file       HPI     Patient information obtained from: Patient    Use of Interpretor: N/A     Madelineloraine Szymanski is a 41 year old female with a pertinent history of migraines who presents to this ED by walk in for evaluation of shortness of breath.    Per chart review, patient was seen in ED yesterday for known COVID, shortness of breath, fever, and back pain. D-dimer was minimally elevated at 0.89. CT negative for PE and showed findings consistent with COVID pneumonia.    Patient tested positive for COVID on 12/23/21 (4 days ago). She was seen yesterday for shortness of breath, fever, and back pain. She presents today with continued shortness of breath and arrived to ED hypoxic, which improved on 4L NC. She endorses a headache. She otherwise denies nausea, vomiting, abdominal pain, chest pain, urinary symptoms, or additional medical concerns or complaints at this time.      REVIEW OF SYSTEMS:  Review of Systems   Constitutional: Positive for fever. Negative for malaise  HEENT: Negative runny nose, sore throat, ear pain, neck pain  Respiratory: Positive for shortness of breath. Negative for cough, congestion  Cardiovascular: Negative for chest pain, leg edema  Gastrointestinal: Negative for abdominal distention, abdominal pain, constipation, vomiting, nausea, diarrhea  Genitourinary: Negative for dysuria and hematuria.   Integument: Negative for rash, skin breakdown  Neurological: Positive for headache. Negative for paresthesias, weakness.  Musculoskeletal: Positive for back pain. Negative for joint pain, joint swelling      All other  "systems reviewed and are negative.        MEDICAL HISTORY     Past Medical History:   Diagnosis Date     Arthritis      Depressive disorder      Kidney infection      Migraine      Painless urinary retention due to cauda equina syndrome (H)      Pancreatitis      Spinal stenosis in cervical region      UTI (lower urinary tract infection)        Past Surgical History:   Procedure Laterality Date      SECTION       GI SURGERY       GYN SURGERY  2018    Hysterectomy     LAMINECTOMY LUMBAR ONE LEVEL Left 10/4/2021    Procedure: LEFT LUMBAR 5-SACRAL 1 HEMILAMINECTOMY, MEDIAL FACETECTOMY, FORAMINOTOMY WITH;  Surgeon: Radha Lilly MD;  Location: [facility]     LUMBAR DISCECTOMY Left 10/4/2021    Procedure: LUMBAR 5-SACRAL 1 MICRODISCECTOMY;  Surgeon: Radha Lilly MD;  Location: [facility] OR       Social History     Tobacco Use     Smoking status: Never Smoker     Smokeless tobacco: Never Used   Substance Use Topics     Alcohol use: Never     Drug use: Never       No current outpatient medications on file.          PHYSICAL EXAM     /68   Pulse 91   Temp 99.8  F (37.7  C) (Oral)   Resp 25   Ht 1.626 m (5' 4\")   Wt 88.5 kg (195 lb)   SpO2 93%   BMI 33.47 kg/m        PHYSICAL EXAM:     General: Patient appears well, nontoxic, comfortable  HEENT: Moist mucous membranes, no tongue swelling.  No head trauma.  No midline neck pain.  Cardiovascular: Mild tachycardia, normal rhythm, no extremity edema.  No appreciable murmur.  Respiratory: No signs of respiratory distress, lungs are clear to auscultation bilaterally with no wheezes rhonchi or rales.   Abdominal: Soft, nontender, nondistended, no palpable masses, no guarding, no rebound  Musculoskeletal: Full range of motion of joints, no deformities appreciated.  Neurological: Alert and oriented, grossly neurologically intact.  Psychological: Normal affect and mood.  Integument: No rashes appreciated    RESULTS       Labs Ordered " and Resulted from Time of ED Arrival to Time of ED Departure   D DIMER QUANTITATIVE - Abnormal       Result Value    D-Dimer Quantitative 0.68 (*)    COMPREHENSIVE METABOLIC PANEL - Abnormal    Sodium 133 (*)     Potassium 3.4 (*)     Chloride 100      Carbon Dioxide (CO2) 28      Anion Gap 5      Urea Nitrogen 5 (*)     Creatinine 0.74      Calcium 8.1 (*)     Glucose 120      Alkaline Phosphatase 57      AST 26      ALT 22      Protein Total 6.0      Albumin 2.8 (*)     Bilirubin Total 0.4      GFR Estimate >90     MAGNESIUM - Abnormal    Magnesium 1.7 (*)    CBC WITH PLATELETS AND DIFFERENTIAL - Abnormal    WBC Count 6.1      RBC Count 4.14      Hemoglobin 11.8      Hematocrit 37.6      MCV 91      MCH 28.5      MCHC 31.4 (*)     RDW 12.0      Platelet Count 139 (*)     % Neutrophils 90      % Lymphocytes 8      % Monocytes 2      % Eosinophils 0      % Basophils 0      % Immature Granulocytes 0      NRBCs per 100 WBC 0      Absolute Neutrophils 5.5      Absolute Lymphocytes 0.5 (*)     Absolute Monocytes 0.1      Absolute Eosinophils 0.0      Absolute Basophils 0.0      Absolute Immature Granulocytes 0.0      Absolute NRBCs 0.0     INR - Normal    INR 0.95     LACTIC ACID WHOLE BLOOD - Normal    Lactic Acid 0.7     TROPONIN I - Normal    Troponin I <0.01     B-TYPE NATRIURETIC PEPTIDE ([facility] ONLY) - Normal    BNP <10     PROCALCITONIN - Normal    Procalcitonin 0.36         No orders to display                     PROCEDURES:  Procedures:  Procedures       I, Paulette Perez am serving as a scribe to document services personally performed by Corona Lambert DO, based on my observations and the provider's statements to me.  I, Corona Lambert DO, attest that Paulette Perez is acting in a scribe capacity, has observed my performance of the services and has documented them in accordance with my direction.    Corona Lambert DO  Emergency Medicine  [facility] EMERGENCY ROOM     OhCorona gonzalez,  DO  12/27/21 2036

## 2021-12-27 NOTE — LETTER
January 1, 2022      Madeline Szymanski  [address] [address]        To Whom It May Concern:    Madeline Szymanski  was hospitalized at [facility] from 12/27/2021 to 1/1/2022.  Please excuse her from work until 1/10/22 due to illness.        Sincerely,  MD Arielle Catherine MD

## 2021-12-27 NOTE — ED TRIAGE NOTES
Pt arrives to ED with c/o shortness of breath, weakness, fever, dizziness, headache, and fatigue related to covid 19. Pt was diagnosed with covid last Monday and was seen at [facility] yesterday but released. Pt oxygen was 83% in triage on room air. Pt flushed and tachycardiac at 123. Pt brought back to room 2 and put on 4L of nasal cannula now pt is at 95% and heart rate is now better at 104. Pt has an anaphylaxis reaction to the first Covid vaccine last year.

## 2021-12-28 PROBLEM — Z87.11 HISTORY OF GASTRIC ULCER: Status: ACTIVE | Noted: 2018-08-22

## 2021-12-28 PROBLEM — M54.16 LUMBAR RADICULOPATHY: Status: ACTIVE | Noted: 2019-01-21

## 2021-12-28 PROBLEM — K95.09 GASTRIC BAND SLIPPAGE: Status: ACTIVE | Noted: 2019-09-26

## 2021-12-28 PROBLEM — F41.0 PANIC ATTACKS: Status: ACTIVE | Noted: 2018-08-22

## 2021-12-28 LAB
ANION GAP SERPL CALCULATED.3IONS-SCNC: 12 MMOL/L (ref 5–18)
BUN SERPL-MCNC: 9 MG/DL (ref 8–22)
C REACTIVE PROTEIN LHE: 27.5 MG/DL (ref 0–0.8)
CALCIUM SERPL-MCNC: 8.7 MG/DL (ref 8.5–10.5)
CHLORIDE BLD-SCNC: 103 MMOL/L (ref 98–107)
CO2 SERPL-SCNC: 24 MMOL/L (ref 22–31)
CREAT SERPL-MCNC: 0.65 MG/DL (ref 0.6–1.1)
D DIMER PPP FEU-MCNC: 0.39 UG/ML FEU (ref 0–0.5)
ERYTHROCYTE [DISTWIDTH] IN BLOOD BY AUTOMATED COUNT: 11.9 % (ref 10–15)
FIBRINOGEN PPP-MCNC: 715 MG/DL (ref 170–490)
GFR SERPL CREATININE-BSD FRML MDRD: >90 ML/MIN/1.73M2
GLUCOSE BLD-MCNC: 126 MG/DL (ref 70–125)
HCT VFR BLD AUTO: 40.2 % (ref 35–47)
HGB BLD-MCNC: 12.6 G/DL (ref 11.7–15.7)
MCH RBC QN AUTO: 28.6 PG (ref 26.5–33)
MCHC RBC AUTO-ENTMCNC: 31.3 G/DL (ref 31.5–36.5)
MCV RBC AUTO: 91 FL (ref 78–100)
PLATELET # BLD AUTO: 164 10E3/UL (ref 150–450)
POTASSIUM BLD-SCNC: 4.3 MMOL/L (ref 3.5–5)
RBC # BLD AUTO: 4.41 10E6/UL (ref 3.8–5.2)
SODIUM SERPL-SCNC: 139 MMOL/L (ref 136–145)
WBC # BLD AUTO: 4.8 10E3/UL (ref 4–11)

## 2021-12-28 PROCEDURE — 86141 C-REACTIVE PROTEIN HS: CPT | Performed by: STUDENT IN AN ORGANIZED HEALTH CARE EDUCATION/TRAINING PROGRAM

## 2021-12-28 PROCEDURE — 85379 FIBRIN DEGRADATION QUANT: CPT | Performed by: STUDENT IN AN ORGANIZED HEALTH CARE EDUCATION/TRAINING PROGRAM

## 2021-12-28 PROCEDURE — 250N000013 HC RX MED GY IP 250 OP 250 PS 637: Performed by: STUDENT IN AN ORGANIZED HEALTH CARE EDUCATION/TRAINING PROGRAM

## 2021-12-28 PROCEDURE — 99233 SBSQ HOSP IP/OBS HIGH 50: CPT | Mod: GC | Performed by: STUDENT IN AN ORGANIZED HEALTH CARE EDUCATION/TRAINING PROGRAM

## 2021-12-28 PROCEDURE — 80048 BASIC METABOLIC PNL TOTAL CA: CPT | Performed by: STUDENT IN AN ORGANIZED HEALTH CARE EDUCATION/TRAINING PROGRAM

## 2021-12-28 PROCEDURE — 36415 COLL VENOUS BLD VENIPUNCTURE: CPT | Performed by: STUDENT IN AN ORGANIZED HEALTH CARE EDUCATION/TRAINING PROGRAM

## 2021-12-28 PROCEDURE — 250N000009 HC RX 250: Performed by: STUDENT IN AN ORGANIZED HEALTH CARE EDUCATION/TRAINING PROGRAM

## 2021-12-28 PROCEDURE — 85384 FIBRINOGEN ACTIVITY: CPT | Performed by: STUDENT IN AN ORGANIZED HEALTH CARE EDUCATION/TRAINING PROGRAM

## 2021-12-28 PROCEDURE — 258N000003 HC RX IP 258 OP 636: Performed by: STUDENT IN AN ORGANIZED HEALTH CARE EDUCATION/TRAINING PROGRAM

## 2021-12-28 PROCEDURE — 120N000001 HC R&B MED SURG/OB

## 2021-12-28 PROCEDURE — 85027 COMPLETE CBC AUTOMATED: CPT | Performed by: STUDENT IN AN ORGANIZED HEALTH CARE EDUCATION/TRAINING PROGRAM

## 2021-12-28 PROCEDURE — 250N000011 HC RX IP 250 OP 636: Performed by: STUDENT IN AN ORGANIZED HEALTH CARE EDUCATION/TRAINING PROGRAM

## 2021-12-28 RX ORDER — BENZONATATE 100 MG/1
100 CAPSULE ORAL 3 TIMES DAILY PRN
Status: DISCONTINUED | OUTPATIENT
Start: 2021-12-28 | End: 2022-01-01 | Stop reason: HOSPADM

## 2021-12-28 RX ADMIN — POLYETHYLENE GLYCOL 3350 17 G: 17 POWDER, FOR SOLUTION ORAL at 19:52

## 2021-12-28 RX ADMIN — ACETAMINOPHEN 975 MG: 325 TABLET ORAL at 08:18

## 2021-12-28 RX ADMIN — FAMOTIDINE 20 MG: 20 TABLET ORAL at 19:36

## 2021-12-28 RX ADMIN — GUAIFENESIN 10 ML: 100 SOLUTION ORAL at 01:56

## 2021-12-28 RX ADMIN — DEXAMETHASONE 6 MG: 2 TABLET ORAL at 11:56

## 2021-12-28 RX ADMIN — FAMOTIDINE 20 MG: 20 TABLET ORAL at 08:19

## 2021-12-28 RX ADMIN — SODIUM CHLORIDE 50 ML: 9 INJECTION, SOLUTION INTRAVENOUS at 15:46

## 2021-12-28 RX ADMIN — TOCILIZUMAB 800 MG: 20 INJECTION, SOLUTION, CONCENTRATE INTRAVENOUS at 17:52

## 2021-12-28 RX ADMIN — BENZONATATE 100 MG: 100 CAPSULE ORAL at 08:18

## 2021-12-28 RX ADMIN — LIDOCAINE 1 PATCH: 246 PATCH TOPICAL at 08:19

## 2021-12-28 RX ADMIN — ENOXAPARIN SODIUM 40 MG: 100 INJECTION SUBCUTANEOUS at 17:51

## 2021-12-28 RX ADMIN — GUAIFENESIN 10 ML: 100 SOLUTION ORAL at 08:19

## 2021-12-28 RX ADMIN — ACETAMINOPHEN 975 MG: 325 TABLET ORAL at 19:52

## 2021-12-28 RX ADMIN — REMDESIVIR 100 MG: 100 INJECTION, POWDER, LYOPHILIZED, FOR SOLUTION INTRAVENOUS at 15:45

## 2021-12-28 RX ADMIN — AMITRIPTYLINE HYDROCHLORIDE 75 MG: 50 TABLET, FILM COATED ORAL at 22:15

## 2021-12-28 ASSESSMENT — ACTIVITIES OF DAILY LIVING (ADL)
ADLS_ACUITY_SCORE: 6

## 2021-12-28 NOTE — PLAN OF CARE
Problem: Adult Inpatient Plan of Care  Goal: Absence of Hospital-Acquired Illness or Injury  Outcome: No Change  Intervention: Identify and Manage Fall Risk  Recent Flowsheet Documentation  Taken 12/27/2021 1830 by Nabila Marquis RN  Safety Promotion/Fall Prevention: activity supervised  Intervention: Prevent Skin Injury  Recent Flowsheet Documentation  Taken 12/27/2021 1830 by Nabila Marquis RN  Body Position: position changed independently  Goal: Optimal Comfort and Wellbeing  Outcome: No Change   Pt got to the floor at 1830. Vss. Pt experiencing head pain described as migraine pain. PRN tylenol given. Lung sounds diminished. Pt has not had a bowel movement in 3 days. Will continue to monitor.

## 2021-12-28 NOTE — PLAN OF CARE
Problem: Adult Inpatient Plan of Care  Goal: Readiness for Transition of Care    Problem: Activity Intolerance  Goal: Enhanced Capacity and Energy  Outcome: No Change    Problem: Gas Exchange Impaired  Goal: Optimal Gas Exchange  Outcome: Declining     Patient alert and oriented x 4. Able to make needs known. Patient O2 needs increased to 6L via nasal cannula overnight. Saturations in low 90's on 6L. Able to get IS up to 500mL only and it causes her to cough. She has a dry cough, MD notified and order received for prn robitussin, administered x 2. Shortness of breath with exertion. VSS. Call light within reach.

## 2021-12-28 NOTE — PROGRESS NOTES
[facility]    Progress Note - Teaching Service        Date of Admission:  12/27/2021    Assessment & Plan             Madeline Szymanski is a 41 year old female admitted on 12/27/2021. She has a history of migraine and anxiety and is admitted for COVID-19 pneumonia with hypoxia.      Acute Hypoxic Respiratory Failure secondary to COVID-19 infection  Viral Pneumonia secondary to COVID-19 infection  Symptoms started 12/20/2021, test positive 12/23/2021.  Had anaphylactic reaction to 1 dose of Materna, so partially vaccinated.  Developed shortness of breath 12/26/2021 and CT chest shows Covid pneumonia.  On admission 12/27 was requiring 4 L nasal cannula, now with increased oxygen needs--up to 6 L nasal cannula currently.    Remdesivir X 5 days (started 12/27/2021)    Dexamethasone 6 mg X 10 days (started 12/27/2021)    Consult ID --patient may qualify for monoclonal antibody    Continue daily Covid labs, currently labs are unremarkable    DVT prophylaxis with Lovenox 40 mg daily    No indication for IV fluids or antibiotics at this time    Chronic medical conditions  Mood disorder: Patient has history of anxiety and mood disorder.  Currently with somewhat depressed mood and flat affect.  We will continue to monitor.  Migraines: Continue PTA amitriptyline  Chronic pain, prior back surgery: Continue PTA gabapentin, lidocaine patch  History of gastric ulcers, bariatric surgery: Avoid NSAIDs  History of urinary retention: Monitor urine output, consider bladder protocol if needed     Diet: Combination Diet Regular Diet Adult    DVT Prophylaxis: Enoxaparin (Lovenox) SQ  Maxwell Catheter: Not present  Fluids: oral  Central Lines: None  Code Status: Full Code      Disposition Plan   Expected Discharge: 12/31/2021     Anticipated discharge location:  Awaiting care coordination huddle  Delays:     Covid Test Positive  Oxygen Needs            The patient's care was discussed with the Attending Physician,  Dr. Mable Altamirano and Patient.    Arielle Bruno MD  Teaching [facility]  Text page via [facility] Paging/Directory      Clinically Significant Risk Factors Present on Admission                # Obesity: last Body mass index is 36.25 kg/m .      ______________________________________________________________________    Interval History   Overnight there were no acute events.  Patient is now having higher oxygen requirements.  She is taking as needed antitussives and believes they are helping somewhat.  Patient reports she is having increasing shortness of breath with activity to the commode or bathroom.  She is trying incentive spirometry but not getting more than at 1000 mL.    Denies any fever, chest pain, nausea, or vomiting.    Data reviewed today: I reviewed all medications, new labs and imaging results over the last 24 hours. I personally reviewed no images or EKG's today.    Physical Exam   Vital Signs: Temp: 98.1  F (36.7  C) Temp src: Oral BP: 108/71 Pulse: 85   Resp: 18 SpO2: 92 % O2 Device: Nasal cannula Oxygen Delivery: 6 LPM  Weight: 211 lbs 3.2 oz     General Appearance: Alert,cooperative, NAD  Head: Normocephalic, atraumatic  Eyes: conjunctiva/corneas clear, extraocular movements intact  Lungs: Diminished breath sounds/air movement bilaterally, crackles on right middle and lower lobe; wearing nasal cannula  Heart: RRR, normal S1 and S2. No murmurs, rubs, or gallops  Abdomen: Soft, non-tender, non-distended, bowel sounds present, no rebound or guarding  Extremities: Normal bulk and tone, atraumatic, no cyanosis, or LE edema  Pulses:  2+ radial, dorsalis pedis  Skin: Normal color, texture, and turgor. No concerning rashes or lesions noted.  Neurologic: Alert and oriented x3. No focal deficits. Normal speech, no facial droop.  Psychiatric: Depressed mood, flat affect      Data   Results for today unremarkable    Basic metabolic panel normal, CRP elevated to 27.5, CBC  normal  Fibrinogen 715, D-dimer 0.39 (was 0.63 yesterday)    No new imaging

## 2021-12-29 LAB
ANION GAP SERPL CALCULATED.3IONS-SCNC: 8 MMOL/L (ref 5–18)
BUN SERPL-MCNC: 9 MG/DL (ref 8–22)
C REACTIVE PROTEIN LHE: 11.8 MG/DL (ref 0–0.8)
CALCIUM SERPL-MCNC: 8.4 MG/DL (ref 8.5–10.5)
CHLORIDE BLD-SCNC: 105 MMOL/L (ref 98–107)
CO2 SERPL-SCNC: 28 MMOL/L (ref 22–31)
CREAT SERPL-MCNC: 0.62 MG/DL (ref 0.6–1.1)
D DIMER PPP FEU-MCNC: 0.51 UG/ML FEU (ref 0–0.5)
ERYTHROCYTE [DISTWIDTH] IN BLOOD BY AUTOMATED COUNT: 12 % (ref 10–15)
FIBRINOGEN PPP-MCNC: 706 MG/DL (ref 170–490)
GFR SERPL CREATININE-BSD FRML MDRD: >90 ML/MIN/1.73M2
GLUCOSE BLD-MCNC: 132 MG/DL (ref 70–125)
HCT VFR BLD AUTO: 37.7 % (ref 35–47)
HGB BLD-MCNC: 12.1 G/DL (ref 11.7–15.7)
HOLD SPECIMEN: NORMAL
MCH RBC QN AUTO: 28 PG (ref 26.5–33)
MCHC RBC AUTO-ENTMCNC: 32.1 G/DL (ref 31.5–36.5)
MCV RBC AUTO: 87 FL (ref 78–100)
PLATELET # BLD AUTO: 225 10E3/UL (ref 150–450)
POTASSIUM BLD-SCNC: 4 MMOL/L (ref 3.5–5)
RBC # BLD AUTO: 4.32 10E6/UL (ref 3.8–5.2)
SODIUM SERPL-SCNC: 141 MMOL/L (ref 136–145)
WBC # BLD AUTO: 4.9 10E3/UL (ref 4–11)

## 2021-12-29 PROCEDURE — 85014 HEMATOCRIT: CPT | Performed by: STUDENT IN AN ORGANIZED HEALTH CARE EDUCATION/TRAINING PROGRAM

## 2021-12-29 PROCEDURE — 999N000157 HC STATISTIC RCP TIME EA 10 MIN

## 2021-12-29 PROCEDURE — 250N000009 HC RX 250: Performed by: STUDENT IN AN ORGANIZED HEALTH CARE EDUCATION/TRAINING PROGRAM

## 2021-12-29 PROCEDURE — 36415 COLL VENOUS BLD VENIPUNCTURE: CPT | Performed by: STUDENT IN AN ORGANIZED HEALTH CARE EDUCATION/TRAINING PROGRAM

## 2021-12-29 PROCEDURE — 250N000013 HC RX MED GY IP 250 OP 250 PS 637: Performed by: STUDENT IN AN ORGANIZED HEALTH CARE EDUCATION/TRAINING PROGRAM

## 2021-12-29 PROCEDURE — 258N000003 HC RX IP 258 OP 636: Performed by: STUDENT IN AN ORGANIZED HEALTH CARE EDUCATION/TRAINING PROGRAM

## 2021-12-29 PROCEDURE — 86141 C-REACTIVE PROTEIN HS: CPT | Performed by: STUDENT IN AN ORGANIZED HEALTH CARE EDUCATION/TRAINING PROGRAM

## 2021-12-29 PROCEDURE — 99233 SBSQ HOSP IP/OBS HIGH 50: CPT | Mod: GC | Performed by: STUDENT IN AN ORGANIZED HEALTH CARE EDUCATION/TRAINING PROGRAM

## 2021-12-29 PROCEDURE — 999N000215 HC STATISTIC HFNC ADULT NON-CPAP

## 2021-12-29 PROCEDURE — 80048 BASIC METABOLIC PNL TOTAL CA: CPT | Performed by: STUDENT IN AN ORGANIZED HEALTH CARE EDUCATION/TRAINING PROGRAM

## 2021-12-29 PROCEDURE — 250N000011 HC RX IP 250 OP 636: Performed by: STUDENT IN AN ORGANIZED HEALTH CARE EDUCATION/TRAINING PROGRAM

## 2021-12-29 PROCEDURE — 120N000001 HC R&B MED SURG/OB

## 2021-12-29 PROCEDURE — 85384 FIBRINOGEN ACTIVITY: CPT | Performed by: STUDENT IN AN ORGANIZED HEALTH CARE EDUCATION/TRAINING PROGRAM

## 2021-12-29 PROCEDURE — 85379 FIBRIN DEGRADATION QUANT: CPT | Performed by: STUDENT IN AN ORGANIZED HEALTH CARE EDUCATION/TRAINING PROGRAM

## 2021-12-29 RX ORDER — HYDROXYZINE HYDROCHLORIDE 25 MG/1
25 TABLET, FILM COATED ORAL 3 TIMES DAILY PRN
Status: DISCONTINUED | OUTPATIENT
Start: 2021-12-29 | End: 2022-01-01 | Stop reason: HOSPADM

## 2021-12-29 RX ADMIN — GABAPENTIN 600 MG: 300 CAPSULE ORAL at 21:03

## 2021-12-29 RX ADMIN — SODIUM CHLORIDE 50 ML: 9 INJECTION, SOLUTION INTRAVENOUS at 17:08

## 2021-12-29 RX ADMIN — ENOXAPARIN SODIUM 40 MG: 100 INJECTION SUBCUTANEOUS at 17:06

## 2021-12-29 RX ADMIN — HYDROXYZINE HYDROCHLORIDE 25 MG: 25 TABLET ORAL at 10:17

## 2021-12-29 RX ADMIN — AMITRIPTYLINE HYDROCHLORIDE 75 MG: 50 TABLET, FILM COATED ORAL at 21:02

## 2021-12-29 RX ADMIN — REMDESIVIR 100 MG: 100 INJECTION, POWDER, LYOPHILIZED, FOR SOLUTION INTRAVENOUS at 17:05

## 2021-12-29 RX ADMIN — FAMOTIDINE 20 MG: 20 TABLET ORAL at 19:52

## 2021-12-29 RX ADMIN — HYDROXYZINE HYDROCHLORIDE 25 MG: 25 TABLET ORAL at 17:35

## 2021-12-29 RX ADMIN — DEXAMETHASONE 6 MG: 2 TABLET ORAL at 12:21

## 2021-12-29 RX ADMIN — FAMOTIDINE 20 MG: 20 TABLET ORAL at 08:00

## 2021-12-29 RX ADMIN — LIDOCAINE 1 PATCH: 246 PATCH TOPICAL at 07:56

## 2021-12-29 ASSESSMENT — ACTIVITIES OF DAILY LIVING (ADL)
ADLS_ACUITY_SCORE: 6

## 2021-12-29 NOTE — PROGRESS NOTES
Patient reported having a bowel movement. Abdominal cramps have gone away. Oxygen was decreased to 5 L via NC at 2245. Sa02 has been reading 94-95%. Patient had no complaints of headache anymore.

## 2021-12-29 NOTE — PROGRESS NOTES
[facility]    Progress Note - Teaching Service        Date of Admission:  12/27/2021    Assessment & Plan             Madeline Szymanski is a 41 year old female admitted on 12/27/2021. She has a history of migraine and anxiety and is admitted for COVID-19 pneumonia with hypoxia.      Acute Hypoxic Respiratory Failure secondary to COVID-19 infection  Viral Pneumonia secondary to COVID-19 infection  Symptoms started 12/20/2021, test positive 12/23/2021.  Had anaphylactic reaction to 1 dose of Materna, so partially vaccinated.  Developed shortness of breath 12/26/2021 and CT chest shows Covid pneumonia.  On admission 12/27 was requiring 4 L nasal cannula, now with increased oxygen needs james with ambulation--up to HFNC 50 LPM with 50% FiO2 currently.  CRP downtrending.    Start strict bed rest with commode privileges    Self prone when awake as much as able    Encourage incentive spirometry    Remdesivir X 5 days (started 12/27/2021); Dexamethasone 6 mg X 10 days (started 12/27/2021); Received tocilizumab 12/28/21    Continue daily Covid labs, currently labs are unremarkable    DVT prophylaxis with Lovenox 40 mg daily    No indication for IV fluids or antibiotics at this time    Chronic medical conditions  Mood disorder: Patient has history of anxiety and mood disorder.  Hydroxyzine prn.  Migraines: Continue PTA amitriptyline  Chronic pain, prior back surgery: Continue PTA gabapentin, lidocaine patch  History of gastric ulcers, bariatric surgery: Avoid NSAIDs  History of urinary retention: Monitor urine output, consider bladder protocol if needed     Diet: Combination Diet Regular Diet Adult    DVT Prophylaxis: Enoxaparin (Lovenox) SQ  Maxwell Catheter: Not present  Fluids: oral  Central Lines: None  Code Status: Full Code      Disposition Plan   Expected Discharge: 12/31/2021     Anticipated discharge location:  Awaiting care coordination huddle  Delays:     Covid Test Positive  Oxygen  Needs  Administering IV medications            The patient's care was discussed with the Attending Physician, Dr. Mable Altamirano and Patient as well as bedside RN and RT Lee Bruno MD  [facility]  Text page via [facility] Paging/Directory      Clinically Significant Risk Factors Present on Admission              # Obesity: last Body mass index is 36.25 kg/m .      ______________________________________________________________________    Interval History   This morning pt with significantly increased oxygen needs after going to the bathroom.  Had to be increased from 6L to 15 L oxymask.  RT at bedside.    Started patient on HFNC and she is tolerating this well, now on 50% FiO2 and 50 LPM.  She endorses some anxiety and has been doing incentive spirometry, but not so much.  Reaching 1000 mL on IS.    Data reviewed today: I reviewed all medications, new labs and imaging results over the last 24 hours. I personally reviewed no images or EKG's today.    Physical Exam   Vital Signs: Temp: 97.4  F (36.3  C) Temp src: Oral BP: 119/78 Pulse: 78   Resp: 18 SpO2: 93 % O2 Device: (S) High Flow Nasal Cannula (HFNC) Oxygen Delivery: (S) 50 LPM  Weight: 211 lbs 3.2 oz     General Appearance: Alert,cooperative, NAD  Head: Normocephalic, atraumatic  Eyes: conjunctiva/corneas clear, extraocular movements intact  Lungs: Diminished breath sounds/air movement bilaterally, crackles in all lung fields  Heart: RRR, normal S1 and S2. No murmurs, rubs, or gallops  Abdomen: Soft, non-tender, non-distended, bowel sounds present, no rebound or guarding  Extremities: Normal bulk and tone, atraumatic, no cyanosis, or LE edema  Pulses:  2+ radial, dorsalis pedis  Skin: Normal color, texture, and turgor. No concerning rashes or lesions noted.  Neurologic: Alert and oriented x3. No focal deficits. Normal speech, no facial droop.  Psychiatric: anxious mood and affect      Data   Results for today  unremarkable    Basic metabolic panel normal, CRP 11.8 (was 27.5 yesterday), CBC normal  Fibrinogen 706, D-dimer 0.51    No new imaging

## 2021-12-29 NOTE — PLAN OF CARE
Problem: Gas Exchange Impaired  Goal: Optimal Gas Exchange  Outcome: No Change  Intervention: Optimize Oxygenation and Ventilation  Recent Flowsheet Documentation  Taken 12/28/2021 1540 by Nabila Marquis RN  Head of Bed (HOB) Positioning: HOB at 20-30 degrees  Taken 12/28/2021 0818 by Nabila Marquis RN  Head of Bed ([facility]) Positioning: HOB at 20-30 degrees   Vss. Pt denies pain at rest and says her pain elevates to an 8-9 when coughing. Meds given for cough. Pt remains on 6L NC and sating in the low 90's at rest. While ambulating and moving she de-sats and has needed quite a bit of time to recover. R/t desating I have encouraged pt to use the commode and promoted fluid intake, proning, and using the incentive spirometer. Will continue to monitor.

## 2021-12-29 NOTE — PROGRESS NOTES
Patient complained of headache which 975 mg tylenol was given at 1952. Patient also complains of stomach cramps from being constipated. Miralax was given at 1952. Reports having issues with constipation and is normal to go once a week and hasn't gone in about a week. Rates abdominal cramps at 4/10. Encouraged patient to call when needing commode so oxygen can be monitored when moving.

## 2021-12-29 NOTE — PROGRESS NOTES
Patients Sa02 at 87% on 5 L NC. Increased to 6 L via NC. Patient turned to prone position. Sa02 increased to 93%

## 2021-12-30 LAB
ANION GAP SERPL CALCULATED.3IONS-SCNC: 10 MMOL/L (ref 5–18)
BUN SERPL-MCNC: 11 MG/DL (ref 8–22)
C REACTIVE PROTEIN LHE: 5.6 MG/DL (ref 0–0.8)
CALCIUM SERPL-MCNC: 8.6 MG/DL (ref 8.5–10.5)
CHLORIDE BLD-SCNC: 106 MMOL/L (ref 98–107)
CO2 SERPL-SCNC: 26 MMOL/L (ref 22–31)
CREAT SERPL-MCNC: 0.66 MG/DL (ref 0.6–1.1)
D DIMER PPP FEU-MCNC: 0.38 UG/ML FEU (ref 0–0.5)
ERYTHROCYTE [DISTWIDTH] IN BLOOD BY AUTOMATED COUNT: 11.9 % (ref 10–15)
FIBRINOGEN PPP-MCNC: 588 MG/DL (ref 170–490)
GFR SERPL CREATININE-BSD FRML MDRD: >90 ML/MIN/1.73M2
GLUCOSE BLD-MCNC: 118 MG/DL (ref 70–125)
HCT VFR BLD AUTO: 38.9 % (ref 35–47)
HGB BLD-MCNC: 12.5 G/DL (ref 11.7–15.7)
MCH RBC QN AUTO: 28 PG (ref 26.5–33)
MCHC RBC AUTO-ENTMCNC: 32.1 G/DL (ref 31.5–36.5)
MCV RBC AUTO: 87 FL (ref 78–100)
PLATELET # BLD AUTO: 270 10E3/UL (ref 150–450)
POTASSIUM BLD-SCNC: 4.2 MMOL/L (ref 3.5–5)
RBC # BLD AUTO: 4.47 10E6/UL (ref 3.8–5.2)
SODIUM SERPL-SCNC: 142 MMOL/L (ref 136–145)
WBC # BLD AUTO: 6.7 10E3/UL (ref 4–11)

## 2021-12-30 PROCEDURE — 85027 COMPLETE CBC AUTOMATED: CPT | Performed by: STUDENT IN AN ORGANIZED HEALTH CARE EDUCATION/TRAINING PROGRAM

## 2021-12-30 PROCEDURE — 999N000157 HC STATISTIC RCP TIME EA 10 MIN

## 2021-12-30 PROCEDURE — 120N000001 HC R&B MED SURG/OB

## 2021-12-30 PROCEDURE — 250N000013 HC RX MED GY IP 250 OP 250 PS 637: Performed by: STUDENT IN AN ORGANIZED HEALTH CARE EDUCATION/TRAINING PROGRAM

## 2021-12-30 PROCEDURE — 85384 FIBRINOGEN ACTIVITY: CPT | Performed by: STUDENT IN AN ORGANIZED HEALTH CARE EDUCATION/TRAINING PROGRAM

## 2021-12-30 PROCEDURE — 99232 SBSQ HOSP IP/OBS MODERATE 35: CPT | Mod: GC | Performed by: STUDENT IN AN ORGANIZED HEALTH CARE EDUCATION/TRAINING PROGRAM

## 2021-12-30 PROCEDURE — 36415 COLL VENOUS BLD VENIPUNCTURE: CPT | Performed by: STUDENT IN AN ORGANIZED HEALTH CARE EDUCATION/TRAINING PROGRAM

## 2021-12-30 PROCEDURE — 85379 FIBRIN DEGRADATION QUANT: CPT | Performed by: STUDENT IN AN ORGANIZED HEALTH CARE EDUCATION/TRAINING PROGRAM

## 2021-12-30 PROCEDURE — 250N000009 HC RX 250: Performed by: STUDENT IN AN ORGANIZED HEALTH CARE EDUCATION/TRAINING PROGRAM

## 2021-12-30 PROCEDURE — 86141 C-REACTIVE PROTEIN HS: CPT | Performed by: STUDENT IN AN ORGANIZED HEALTH CARE EDUCATION/TRAINING PROGRAM

## 2021-12-30 PROCEDURE — 82310 ASSAY OF CALCIUM: CPT | Performed by: STUDENT IN AN ORGANIZED HEALTH CARE EDUCATION/TRAINING PROGRAM

## 2021-12-30 PROCEDURE — 250N000011 HC RX IP 250 OP 636: Performed by: STUDENT IN AN ORGANIZED HEALTH CARE EDUCATION/TRAINING PROGRAM

## 2021-12-30 PROCEDURE — 258N000003 HC RX IP 258 OP 636: Performed by: STUDENT IN AN ORGANIZED HEALTH CARE EDUCATION/TRAINING PROGRAM

## 2021-12-30 RX ADMIN — SODIUM CHLORIDE 50 ML: 9 INJECTION, SOLUTION INTRAVENOUS at 16:56

## 2021-12-30 RX ADMIN — REMDESIVIR 100 MG: 100 INJECTION, POWDER, LYOPHILIZED, FOR SOLUTION INTRAVENOUS at 16:55

## 2021-12-30 RX ADMIN — HYDROXYZINE HYDROCHLORIDE 25 MG: 25 TABLET ORAL at 21:03

## 2021-12-30 RX ADMIN — LIDOCAINE 1 PATCH: 246 PATCH TOPICAL at 09:37

## 2021-12-30 RX ADMIN — ENOXAPARIN SODIUM 40 MG: 100 INJECTION SUBCUTANEOUS at 18:05

## 2021-12-30 RX ADMIN — HYDROXYZINE HYDROCHLORIDE 25 MG: 25 TABLET ORAL at 09:36

## 2021-12-30 RX ADMIN — FAMOTIDINE 20 MG: 20 TABLET ORAL at 09:36

## 2021-12-30 RX ADMIN — BENZONATATE 100 MG: 100 CAPSULE ORAL at 20:22

## 2021-12-30 RX ADMIN — AMITRIPTYLINE HYDROCHLORIDE 75 MG: 50 TABLET, FILM COATED ORAL at 21:03

## 2021-12-30 RX ADMIN — FAMOTIDINE 20 MG: 20 TABLET ORAL at 20:17

## 2021-12-30 RX ADMIN — GABAPENTIN 600 MG: 300 CAPSULE ORAL at 21:03

## 2021-12-30 RX ADMIN — DEXAMETHASONE 6 MG: 2 TABLET ORAL at 12:31

## 2021-12-30 ASSESSMENT — ACTIVITIES OF DAILY LIVING (ADL)
ADLS_ACUITY_SCORE: 6

## 2021-12-30 ASSESSMENT — MIFFLIN-ST. JEOR: SCORE: 1529.96

## 2021-12-30 NOTE — PROGRESS NOTES
Pt remain on HFNC with settin/30/21 0310   Tech Time   $Tech Time (10 minute increments) 4   Oxygen Therapy   SpO2 97 %   O2 Device High Flow Nasal Cannula (HFNC)   FiO2 (%) 50 %   Oxygen Delivery 30 LPM     Lilia Huynh, RT

## 2021-12-30 NOTE — PROGRESS NOTES
[facility]    Progress Note - Teaching Service        Date of Admission:  12/27/2021    Assessment & Plan             Madeline Szymanski is a 41 year old female admitted on 12/27/2021. She has a history of migraine and anxiety and is admitted for COVID-19 pneumonia with hypoxia.       Acute Hypoxic Respiratory Failure secondary to COVID-19 infection  Viral Pneumonia secondary to COVID-19 infection  Symptoms started 12/20/2021, test positive 12/23/2021.  Had anaphylactic reaction to 1 dose of Materna, so partially vaccinated.  Developed shortness of breath 12/26/2021 and CT chest shows Covid pneumonia. O2 needs maxed at HFNC 50L on 12/29. Doing well with self-proning, incentive spirometry. Now improving oxygen needs, back down to 4L NC. CRP, d-dimer, fibrinogen down trending    Continue strict bed rest with commode privileges    Self prone when awake as much as able    Continue incentive spirometry; flutter valve    Remdesivir X 5 days (started 12/27/2021); Dexamethasone 6 mg X 10 days (started 12/27/2021); Received tocilizumab 12/28/21    Continue daily Covid labs, currently labs are unremarkable    DVT prophylaxis with Lovenox 40 mg daily    No indication for IV fluids or antibiotics at this time    Chronic medical conditions  Mood disorder: Patient has history of anxiety and mood disorder.  Hydroxyzine prn.  Migraines: Continue PTA amitriptyline  Chronic pain, prior back surgery: Continue PTA gabapentin, lidocaine patch  History of gastric ulcers, bariatric surgery: Avoid NSAIDs  History of urinary retention: Monitor urine output, consider bladder protocol if needed     Diet: Combination Diet Regular Diet Adult    DVT Prophylaxis: Enoxaparin (Lovenox) SQ  Maxwell Catheter: Not present  Fluids: oral  Central Lines: None  Code Status: Full Code      Disposition Plan   Expected Discharge: 01/03/2022     Anticipated discharge location:  Awaiting care coordination huddle  Delays:     Covid Test  Positive  Oxygen Needs  Administering IV medications            The patient's care was discussed with the Attending Physician, Dr. Mable Altamirano and Patient.    Arielle Bruno MD  [facility]  Text page via [employer] Paging/Directory      Clinically Significant Risk Factors Present on Admission              # Obesity: last Body mass index is 33.68 kg/m .      ______________________________________________________________________    Interval History   Overnight Madeline did well with self-proning and incentive spirometry.  She has also been using flutter valve.  Reports feeling much better today than yesterday.  Now on 3L NC, much improved from being on HFNC 50L yesterday.    Denies headache, fever, abdominal pain, nausea, or vomiting. Overall feels better than yesterday.    Data reviewed today: I reviewed all medications, new labs and imaging results over the last 24 hours. I personally reviewed no images or EKG's today.    Physical Exam   Vital Signs: Temp: 98.2  F (36.8  C) Temp src: Oral BP: 114/71 Pulse: 77   Resp: 20 SpO2: 98 % O2 Device: Nasal cannula Oxygen Delivery: 3 LPM  Weight: 196 lbs 3.2 oz     General Appearance: Alert,cooperative, NAD  Head: Normocephalic, atraumatic  Eyes: conjunctiva/corneas clear, extraocular movements intact  Lungs:Good air movement bilaterally (much improvement), crackles in all lung fields  Heart: RRR, normal S1 and S2. No murmurs, rubs, or gallops  Abdomen: Soft, non-tender, non-distended, bowel sounds present, no rebound or guarding  Extremities: Normal bulk and tone, atraumatic, no cyanosis, or LE edema  Pulses:  2+ radial, dorsalis pedis  Skin: Normal color, texture, and turgor. No concerning rashes or lesions noted.  Neurologic: Alert and oriented x3. No focal deficits. Normal speech, no facial droop.  Psychiatric: normal mood and affect      Data     12/30/2021   BMP & CBC normal  CRP, fibrinogen, & d-dimer downtrending  No new  imaging

## 2021-12-30 NOTE — PLAN OF CARE
Problem: Respiratory Compromise (Pneumonia)  Goal: Effective Oxygenation and Ventilation  Outcome: No Change    High flow nasal cannula initiated around 0915 this morning. Nasal congestion worse w/ HFNC. RT following to keep LPM flows as low as possible to promote comfort. FiO2 requirements today have been 40-50%. O2 sats generally hovering between 93-94%. Continues on continuous pulse oximetry. PRN hydroxyzine given for anxiety which has been helpful. Remdesivir infused as ordered without complication. Bedrest w/ commode privileges to minimize O2 requirements per MD order.

## 2021-12-30 NOTE — PLAN OF CARE
Pt proned and utilized the IS like a champ overnight. Still has some crackles, most concentrated in the RLL. Continues to be on high flow NC at 30 LPM and 50%.     Problem: Gas Exchange Impaired  Goal: Optimal Gas Exchange  Outcome: Improving     Problem: Activity Intolerance  Goal: Enhanced Capacity and Energy  Outcome: Improving     Problem: Fluid Imbalance (Pneumonia)  Goal: Fluid Balance  Outcome: Improving

## 2021-12-30 NOTE — PROGRESS NOTES
12/29/21 1756   Oxygen Therapy   SpO2 93 %   O2 Device High Flow Nasal Cannula (HFNC)   FiO2 (%) 50 %   Oxygen Delivery 30 LPM     The patient was complaining that her high flow was too much. I have decreased her flow.

## 2021-12-30 NOTE — PROVIDER NOTIFICATION
BFM team notified that after walking to bathroom on 6L via NC, Madeline is unable to recover her sats. Required 15L via Oxymask to eventually recover. Discussed with MD and RT - high flow nasal cannula initiated. Breathing mildly labored while recovering O2 sats.

## 2021-12-31 LAB
ANION GAP SERPL CALCULATED.3IONS-SCNC: 10 MMOL/L (ref 5–18)
BUN SERPL-MCNC: 13 MG/DL (ref 8–22)
C REACTIVE PROTEIN LHE: 3.6 MG/DL (ref 0–0.8)
CALCIUM SERPL-MCNC: 8.7 MG/DL (ref 8.5–10.5)
CHLORIDE BLD-SCNC: 105 MMOL/L (ref 98–107)
CO2 SERPL-SCNC: 26 MMOL/L (ref 22–31)
CREAT SERPL-MCNC: 0.66 MG/DL (ref 0.6–1.1)
D DIMER PPP FEU-MCNC: 0.31 UG/ML FEU (ref 0–0.5)
ERYTHROCYTE [DISTWIDTH] IN BLOOD BY AUTOMATED COUNT: 11.8 % (ref 10–15)
FIBRINOGEN PPP-MCNC: 551 MG/DL (ref 170–490)
GFR SERPL CREATININE-BSD FRML MDRD: >90 ML/MIN/1.73M2
GLUCOSE BLD-MCNC: 105 MG/DL (ref 70–125)
GLUCOSE BLDC GLUCOMTR-MCNC: 162 MG/DL (ref 70–99)
HCT VFR BLD AUTO: 41.8 % (ref 35–47)
HGB BLD-MCNC: 13.2 G/DL (ref 11.7–15.7)
MCH RBC QN AUTO: 28.3 PG (ref 26.5–33)
MCHC RBC AUTO-ENTMCNC: 31.6 G/DL (ref 31.5–36.5)
MCV RBC AUTO: 90 FL (ref 78–100)
PLATELET # BLD AUTO: 290 10E3/UL (ref 150–450)
POTASSIUM BLD-SCNC: 4.3 MMOL/L (ref 3.5–5)
RBC # BLD AUTO: 4.67 10E6/UL (ref 3.8–5.2)
SODIUM SERPL-SCNC: 141 MMOL/L (ref 136–145)
WBC # BLD AUTO: 5.9 10E3/UL (ref 4–11)

## 2021-12-31 PROCEDURE — 250N000011 HC RX IP 250 OP 636: Performed by: STUDENT IN AN ORGANIZED HEALTH CARE EDUCATION/TRAINING PROGRAM

## 2021-12-31 PROCEDURE — 250N000013 HC RX MED GY IP 250 OP 250 PS 637: Performed by: STUDENT IN AN ORGANIZED HEALTH CARE EDUCATION/TRAINING PROGRAM

## 2021-12-31 PROCEDURE — 85027 COMPLETE CBC AUTOMATED: CPT | Performed by: STUDENT IN AN ORGANIZED HEALTH CARE EDUCATION/TRAINING PROGRAM

## 2021-12-31 PROCEDURE — 258N000003 HC RX IP 258 OP 636: Performed by: STUDENT IN AN ORGANIZED HEALTH CARE EDUCATION/TRAINING PROGRAM

## 2021-12-31 PROCEDURE — 99232 SBSQ HOSP IP/OBS MODERATE 35: CPT | Mod: GC | Performed by: STUDENT IN AN ORGANIZED HEALTH CARE EDUCATION/TRAINING PROGRAM

## 2021-12-31 PROCEDURE — 82310 ASSAY OF CALCIUM: CPT | Performed by: STUDENT IN AN ORGANIZED HEALTH CARE EDUCATION/TRAINING PROGRAM

## 2021-12-31 PROCEDURE — 36415 COLL VENOUS BLD VENIPUNCTURE: CPT | Performed by: STUDENT IN AN ORGANIZED HEALTH CARE EDUCATION/TRAINING PROGRAM

## 2021-12-31 PROCEDURE — 250N000009 HC RX 250: Performed by: STUDENT IN AN ORGANIZED HEALTH CARE EDUCATION/TRAINING PROGRAM

## 2021-12-31 PROCEDURE — 120N000001 HC R&B MED SURG/OB

## 2021-12-31 PROCEDURE — 86141 C-REACTIVE PROTEIN HS: CPT | Performed by: STUDENT IN AN ORGANIZED HEALTH CARE EDUCATION/TRAINING PROGRAM

## 2021-12-31 PROCEDURE — 85379 FIBRIN DEGRADATION QUANT: CPT | Performed by: STUDENT IN AN ORGANIZED HEALTH CARE EDUCATION/TRAINING PROGRAM

## 2021-12-31 PROCEDURE — 85384 FIBRINOGEN ACTIVITY: CPT | Performed by: STUDENT IN AN ORGANIZED HEALTH CARE EDUCATION/TRAINING PROGRAM

## 2021-12-31 RX ADMIN — ACETAMINOPHEN 975 MG: 325 TABLET ORAL at 21:13

## 2021-12-31 RX ADMIN — HYDROXYZINE HYDROCHLORIDE 25 MG: 25 TABLET ORAL at 21:13

## 2021-12-31 RX ADMIN — AMITRIPTYLINE HYDROCHLORIDE 75 MG: 50 TABLET, FILM COATED ORAL at 21:13

## 2021-12-31 RX ADMIN — REMDESIVIR 100 MG: 100 INJECTION, POWDER, LYOPHILIZED, FOR SOLUTION INTRAVENOUS at 16:09

## 2021-12-31 RX ADMIN — LIDOCAINE 1 PATCH: 246 PATCH TOPICAL at 08:52

## 2021-12-31 RX ADMIN — FAMOTIDINE 20 MG: 20 TABLET ORAL at 08:54

## 2021-12-31 RX ADMIN — ENOXAPARIN SODIUM 40 MG: 100 INJECTION SUBCUTANEOUS at 19:10

## 2021-12-31 RX ADMIN — DEXAMETHASONE 6 MG: 2 TABLET ORAL at 11:11

## 2021-12-31 RX ADMIN — ACETAMINOPHEN 975 MG: 325 TABLET ORAL at 13:46

## 2021-12-31 RX ADMIN — GABAPENTIN 600 MG: 300 CAPSULE ORAL at 21:13

## 2021-12-31 RX ADMIN — SODIUM CHLORIDE 50 ML: 9 INJECTION, SOLUTION INTRAVENOUS at 16:09

## 2021-12-31 RX ADMIN — FAMOTIDINE 20 MG: 20 TABLET ORAL at 21:13

## 2021-12-31 ASSESSMENT — ACTIVITIES OF DAILY LIVING (ADL)
ADLS_ACUITY_SCORE: 6

## 2021-12-31 ASSESSMENT — MIFFLIN-ST. JEOR: SCORE: 1523.15

## 2021-12-31 NOTE — PLAN OF CARE
Problem: Adult Inpatient Plan of Care  Goal: Plan of Care Review  Outcome: no change   Patient on 2L beginning of shift. She was up and about desating slightly with movement. MDs increased oxgen to 4L around 0830. Upon reassessment with patient sitting eating breakfast, she maintained oxygen and writer weaned down to 3L around 1100. Patient then laid in bed on her side around noon and maintained her oxygen at 100% on 3L. Weaned oxygen down to 2L oxygen saturation at 95%. Complains of pain only with cough. Cough is infrequent and patient did not request cough medications. Patient has somewhat a flat affect, quiet with responding to questions. Poor appetite. Bps low this AM-Mds aware. Rechecked while laying in bed was within normal limits. Encouraging fluids as per order.     Patient requesting tylenol at 1345, desating again while sitting and standing. Up to 4L nasal cannula again. MD present and encouraging patient to prone and rest.

## 2021-12-31 NOTE — PROVIDER NOTIFICATION
Notified Resident at 0915   AM regarding changes in vital signs.  BP 86/58    Spoke with: Resident, she will text the team follow the patient    Orders no new orders.      0919 Received call from resident who was in the room while BP was taken, stated to recheck once done with breakfast.   1004 BP rechecked 90/51 paging MD just to update. Patient denies dizziness or lightheaded.

## 2021-12-31 NOTE — PLAN OF CARE
Pt continues to prone and utilize IS. Still has some crackles, concentrated in the RLL. Continues to be on sat well on 2 L via NC (94-97%).  Dyspnea with exertion is improving. Can walk around her room without increasing her supplemental 02 needs. Pt stated that she is feeling much better.     Gave hydroxaxine q 1 for mild anxiety at HS.   Home o2 eval is needed today for possibly discharge.     Problem: Gas Exchange Impaired  Goal: Optimal Gas Exchange  Outcome: Improving     Problem: Activity Intolerance  Goal: Enhanced Capacity and Energy  Outcome: Improving     Problem: Fluid Imbalance (Pneumonia)  Goal: Fluid Balance  Outcome: Improving

## 2021-12-31 NOTE — PLAN OF CARE
"Problem: Respiratory Compromise (Pneumonia)  Goal: Effective Oxygenation and Ventilation  Outcome: Improving  High flow nasal cannula discontinued by RT first thing this morning. Transitioned to 4L regular NC - then down to 2L NC. Has been on 2L via NC throughout the day with O2 sats hovering around 93-94%. Still has a cough, cough is more productive today. Generally feels better today.     Up to shower this evening. Placed on 6L via NC during shower. Upon return from shower - O2 sats 92-93% and Madeline reassuringly said, \"I didn't feel like I was running out of air in the shower.\" Previous IV no longer functioning, new IV placed by SWAT. Remdesivir infused as ordered without complication. Nursing to continue to monitor and continue to wean oxygen as able.        "

## 2021-12-31 NOTE — PROGRESS NOTES
[facility]    Progress Note - Teaching Service        Date of Admission:  12/27/2021    Assessment & Plan             Madeline Szymanski is a 41 year old female admitted on 12/27/2021. She has a history of migraine and anxiety and is admitted for COVID-19 pneumonia with hypoxia.       Acute Hypoxic Respiratory Failure secondary to COVID-19 infection  Viral Pneumonia secondary to COVID-19 infection  Partially vaccinated d/t allergic reaction. Symptoms started 12/20, test positive 12/23.  Developed shortness of breath 12/26 and CT chest shows Covid pneumonia. O2 needs maxed at HFNC 50L on 12/29. Doing well with self-proning, incentive spirometry. Now improving oxygen needs,down to 3L NC. CRP, d-dimer, fibrinogen down trending    Activity: up in chair; to bathroom    Self prone  as much as able    Continue incentive spirometry; flutter valve    Remdesivir X 5 days (started 12/27/2021); Dexamethasone 6 mg X 10 days (started 12/27/2021); Received tocilizumab 12/28/21    Continue daily labs, currently labs are unremarkable    DVT prophylaxis with Lovenox 40 mg daily    No indication for IV fluids or antibiotics at this time    Low blood pressure  This AM bp 90/51.  Asymptomatic. Baseline bp likely 110s    Encourage oral intake of fluids    Recheck vitals    Consider IV fluid bolus if persistent    Chronic medical conditions  Mood disorder: Patient has history of anxiety and mood disorder.  Continue Hydroxyzine prn.  Migraines: Continue PTA amitriptyline  Chronic pain, prior back surgery: Continue PTA gabapentin, lidocaine patch  History of gastric ulcers, bariatric surgery: Avoid NSAIDs  History of urinary retention: Monitor urine output, consider bladder protocol if needed     Diet: Combination Diet Regular Diet Adult    DVT Prophylaxis: Enoxaparin (Lovenox) SQ  Maxwell Catheter: Not present  Fluids: oral  Central Lines: None  Code Status: Full Code      Disposition Plan   Expected Discharge:  01/02/2022     Anticipated discharge location:  Awaiting care coordination huddle  Delays:     Covid Test Positive  Oxygen Needs  Administering IV medications            The patient's care was discussed with the Attending Physician, Dr. Alf Johnson and Patient.    Arielle Bruno MD  [facility]  Text page via [facility] Paging/Directory      Clinically Significant Risk Factors Present on Admission              # Obesity: last Body mass index is 33.42 kg/m .      ______________________________________________________________________    Interval History   Patient reports she is doing okay today.  She notices that she is going up and down with oxygen needs and was hoping to be better by now.  Had some anxiety overnight, hydroxyzine helps.    Denies headache, fever, abdominal discomfort, nausea or vomiting.    Data reviewed today: I reviewed all medications, new labs and imaging results over the last 24 hours. I personally reviewed no images or EKG's today.    Physical Exam   Vital Signs: Temp: 98.1  F (36.7  C) Temp src: Oral BP: 90/51 (MD notified) Pulse: 93   Resp: 17 SpO2: 98 % O2 Device: Nasal cannula Oxygen Delivery: 3 LPM  Weight: 194 lbs 11.2 oz     General Appearance: Alert,cooperative, NAD--sitting up in chair  Head: Normocephalic, atraumatic  Eyes: conjunctiva/corneas clear, extraocular movements intact  Lungs:moving air well in all lung fields; minimal crackles  Heart: RRR, normal S1 and S2. No murmurs, rubs, or gallops  Abdomen: Soft, non-tender, non-distended, bowel sounds present, no rebound or guarding  Extremities: Normal bulk and tone, atraumatic, no cyanosis, or LE edema  Pulses:  2+ radial, dorsalis pedis  Skin: Normal color, texture, and turgor. No concerning rashes or lesions noted.  Neurologic: Alert and oriented x3. No focal deficits. Normal speech, no facial droop.  Psychiatric: states good mood; flat affect      Data     12/31/2021   BMP & CBC  normal  CRP, fibrinogen, & d-dimer downtrending  No new imaging

## 2022-01-01 ENCOUNTER — MEDICAL CORRESPONDENCE (OUTPATIENT)
Dept: HEALTH INFORMATION MANAGEMENT | Facility: CLINIC | Age: 42
End: 2022-01-01
Payer: COMMERCIAL

## 2022-01-01 ENCOUNTER — DOCUMENTATION ONLY (OUTPATIENT)
Dept: MEDSURG UNIT | Facility: CLINIC | Age: 42
End: 2022-01-01
Payer: COMMERCIAL

## 2022-01-01 VITALS
HEART RATE: 73 BPM | WEIGHT: 194.7 LBS | OXYGEN SATURATION: 97 % | TEMPERATURE: 98.6 F | HEIGHT: 64 IN | BODY MASS INDEX: 33.24 KG/M2 | DIASTOLIC BLOOD PRESSURE: 79 MMHG | SYSTOLIC BLOOD PRESSURE: 121 MMHG | RESPIRATION RATE: 16 BRPM

## 2022-01-01 PROBLEM — J12.82 PNEUMONIA DUE TO 2019 NOVEL CORONAVIRUS: Status: ACTIVE | Noted: 2021-12-27

## 2022-01-01 PROBLEM — U07.1 PNEUMONIA DUE TO 2019 NOVEL CORONAVIRUS: Status: ACTIVE | Noted: 2021-12-27

## 2022-01-01 PROBLEM — F41.9 ANXIETY: Status: ACTIVE | Noted: 2022-01-01

## 2022-01-01 PROBLEM — J96.21 ACUTE AND CHRONIC RESPIRATORY FAILURE WITH HYPOXIA (H): Status: ACTIVE | Noted: 2022-01-01

## 2022-01-01 LAB
C REACTIVE PROTEIN LHE: 2.3 MG/DL (ref 0–0.8)
ERYTHROCYTE [DISTWIDTH] IN BLOOD BY AUTOMATED COUNT: 11.8 % (ref 10–15)
HCT VFR BLD AUTO: 42.9 % (ref 35–47)
HGB BLD-MCNC: 13.5 G/DL (ref 11.7–15.7)
HOLD SPECIMEN: NORMAL
MCH RBC QN AUTO: 27.9 PG (ref 26.5–33)
MCHC RBC AUTO-ENTMCNC: 31.5 G/DL (ref 31.5–36.5)
MCV RBC AUTO: 89 FL (ref 78–100)
PLATELET # BLD AUTO: 340 10E3/UL (ref 150–450)
RBC # BLD AUTO: 4.84 10E6/UL (ref 3.8–5.2)
WBC # BLD AUTO: 5.3 10E3/UL (ref 4–11)

## 2022-01-01 PROCEDURE — 36415 COLL VENOUS BLD VENIPUNCTURE: CPT | Performed by: STUDENT IN AN ORGANIZED HEALTH CARE EDUCATION/TRAINING PROGRAM

## 2022-01-01 PROCEDURE — 250N000011 HC RX IP 250 OP 636: Performed by: STUDENT IN AN ORGANIZED HEALTH CARE EDUCATION/TRAINING PROGRAM

## 2022-01-01 PROCEDURE — 250N000013 HC RX MED GY IP 250 OP 250 PS 637: Performed by: STUDENT IN AN ORGANIZED HEALTH CARE EDUCATION/TRAINING PROGRAM

## 2022-01-01 PROCEDURE — 85027 COMPLETE CBC AUTOMATED: CPT | Performed by: STUDENT IN AN ORGANIZED HEALTH CARE EDUCATION/TRAINING PROGRAM

## 2022-01-01 PROCEDURE — 86141 C-REACTIVE PROTEIN HS: CPT | Performed by: STUDENT IN AN ORGANIZED HEALTH CARE EDUCATION/TRAINING PROGRAM

## 2022-01-01 PROCEDURE — 99238 HOSP IP/OBS DSCHRG MGMT 30/<: CPT | Mod: GC | Performed by: STUDENT IN AN ORGANIZED HEALTH CARE EDUCATION/TRAINING PROGRAM

## 2022-01-01 RX ORDER — HYDROXYZINE HYDROCHLORIDE 25 MG/1
25 TABLET, FILM COATED ORAL 3 TIMES DAILY PRN
Qty: 30 TABLET | Refills: 0 | Status: SHIPPED | OUTPATIENT
Start: 2022-01-01 | End: 2022-01-20

## 2022-01-01 RX ORDER — BENZONATATE 100 MG/1
100 CAPSULE ORAL 3 TIMES DAILY PRN
Qty: 30 CAPSULE | Refills: 0 | Status: SHIPPED | OUTPATIENT
Start: 2022-01-01 | End: 2022-01-20

## 2022-01-01 RX ORDER — DEXAMETHASONE 6 MG/1
6 TABLET ORAL
Qty: 4 TABLET | Refills: 0 | Status: SHIPPED | OUTPATIENT
Start: 2022-01-01 | End: 2022-01-20

## 2022-01-01 RX ADMIN — LIDOCAINE 1 PATCH: 246 PATCH TOPICAL at 08:41

## 2022-01-01 RX ADMIN — DEXAMETHASONE 6 MG: 2 TABLET ORAL at 11:20

## 2022-01-01 RX ADMIN — FAMOTIDINE 20 MG: 20 TABLET ORAL at 08:41

## 2022-01-01 RX ADMIN — ACETAMINOPHEN 975 MG: 325 TABLET ORAL at 13:47

## 2022-01-01 ASSESSMENT — ACTIVITIES OF DAILY LIVING (ADL)
ADLS_ACUITY_SCORE: 6

## 2022-01-01 NOTE — DISCHARGE INSTRUCTIONS
Oxygen Provider:  Arranged through [employer] [employer], contact number [phone].  If you have any questions or concerns please call the oxygen company directly.

## 2022-01-01 NOTE — PROGRESS NOTES
Pt discharged home, with oxygen concentrator, set a 3L. She was provided education about the equipment, and she states she had no questions about its use.

## 2022-01-01 NOTE — PROGRESS NOTES
Care Management Discharge Note    Discharge Date: 01/02/2022       Discharge Disposition: Home with family ()     Discharge Services: None    Discharge DME: Oxygen    Discharge Transportation: family or friend will provide ( to transport)    Private pay costs discussed: Not applicable    Education Provided on the Discharge Plan:  Patient denies needs from CM. AVS will be per bedside RN.   Persons Notified of Discharge Plans: patient   Patient/Family in Agreement with the Plan: yes    Additional Information:  Chart reviewed. MD placed discharge orders. CM spoke to patient via phone due to COVID +. She denies needs from CM.  will provide the transportation home.         Ute Ni RN           PATIENT NAME: COBY SHIELDS

: 1990

GENDER: FEMALE

MRN: [id number]

VISIT DATE: 2021

DISCHARGE DATE: 21 1136

VISIT LOCKED DATE TIME: 

PHYSICIAN: MANJULA BARRON

RESOURCE: MANJULA BARRON

 

           

           

REASON FOR APPOINTMENT

           

          1. BACK PAIN

           

HISTORY OF PRESENT ILLNESS

           

      GENERAL:  HERE FOR POST PROCEDURE FOLLOW-UP. HAD

          RIGHT COOL RADIOFREQUENCY L4-5, L5-S1 ON 2021. SHE'S BEEN

          DOING VERY WELL SINCE THE PROCEDURE. REPORTING IMPROVED ACTIVITY

          TOLERANCE. HAS NOTICED INCREASE IN HER LEFT LOW BACK PAIN.

          DISCUSSED DOING DIAGNOSTIC LUMBAR FACET BLOCK #2 AND SCHEDULING

          FOR RADIOFREQUENCY ON THE LEFT SIDE IN THE NEAR FUTURE.

           -.

           

      FALL RISK SCREENING:

      SCREENING

          : NO FALLS REPORTED IN THE LAST YEAR.

           

      PAIN SCREENING:

      PATIENT HAS A COMPLAINT OF ACUTE OR CHRONIC PAIN

           

           

          :YES

          LOCATION OF PAIN:LOW BACK

          INTENSITY OF PAIN (SCALE OF 1 TO 10):4

          WHAT DOES YOUR PAIN FEEL LIKE:THROBBING

          DURATION:CONTINOUS, CONSTANT, ALL DAY

          PAIN IS INCREASED BY:ACTIVITIES

          PAIN IS DECREASED BY:OTHERS HEAT

           

      NURSING NOTE:

           -.

           

      PAIN CENTER INTAKE QUESTIONS:

      DO YOU HAVE A HISTORY OF MRSA?

           

           

          :NO

           

      DO YOU TAKE A BLOOD THINNERS?

           

           

          :NO

           

      DO YOU HAVE ANY BLEEDING DISORDERS?

           

           

          :NO

           

      ANY NEW NUMBNESS OR WEAKNESS IN YOUR LEGS OR ARMS?

           

           

          :NO

           

      ANY PACEMAKER,DEFIBRILLATOR, OR DORSAL COLUMN

          STIMULATOR?

           

           

          :NO

           

      DO YOU HAVE ANY RASHES OR OPEN SORES?

           

           

          :NO

           

      ARE YOU ALLERGIC TO IV DYE?

           

           

          :NO

           

      ARE YOU DIABETIC?

           

           

          :NO

           

      ANY NEW PROBLEMS WITH YOUR MEDICATIONS?

           

           

          :NO

           

      HAVE YOU RECEIVED A VACCINE IN THE PAST 30 DAYS?

           

           

          :NO

           

      DO YOU PLAN TO RECEIVE A VACCINE IN THE NEXT 21

          DAYS?

           

           

          :NO

           

      DO YOU NEED ANY PRESCRIPTION?

           

           

          :NO

           

      DO YOU TAKE ANY IMMUNOSUPPRESSIVE MEDICATIONS?

           

           

          :NO

           

      IS THERE A CHANCE YOU COULD BE PREGNANT?

           

           

          :NO

           

      ARE YOU BREAST FEEDING?

           

           

          :NO

           

CURRENT MEDICATIONS

           

          TAKING FLONASE ALLERGY RELIEF 50 MCG/ACT SUSPENSION 1 SPRAY IN

          EACH NOSTRIL NASALLY ONCE A DAY

          TAKING MELATONIN 3 MG TABLET AS DIRECTED ORALLY

          TAKING CALCIUM 1000 + D 1000-800 MG-UNIT TABLET 1 TABLET WITH A

          MEAL ORALLY ONCE A DAY

          TAKING WELLBUTRIN  MG TABLET EXTENDED RELEASE 24 HOUR 1

          TABLET IN THE MORNING ORALLY ONCE A DAY

          TAKING AMBIEN 10 MG TABLET 1 TABLET AT BEDTIME AS NEEDED ORALLY

          ONCE A DAY

          TAKING VITAMIN D 1000 UNIT TABLET 1 TABLET ORALLY ONCE A DAY

          TAKING ZOLOFT 25 MG TABLET 1 TABLET ORALLY ONCE A DAY

          TAKING CEFACLOR 250 MG CAPSULE 1 CAPSULE ORALLY EVERY 8 HRS AS

          NEEDED

          TAKING GABAPENTIN 600 MG TABLET 1 TABLET ORALLY BID

          NOT-TAKING GABAPENTIN 400 MG CAPSULE 1 CAPSULE ORALLY ONCE A DAY

          NOT-TAKING HYDROXYZINE HCL 25 MG TABLET 1 TABLET AS NEEDED ORALLY

          EVERY 8 HRS, NOTES: NOT LATELY

          NOT-TAKING NORETHINDRONE ACETATE 5 MG TABLET 1 TABLET ORALLY ONCE

          A DAY

          NOT-TAKING ESTRACE 2 MG TABLET 1 TABLET ORALLY ONCE A DAY

          NOT-TAKING NORETHINDRONE ACETATE 5 MG TABLET 1 TABLET ORALLY ONCE

          A DAY

          NOT-TAKING HYDRALAZINE HCL 25 MG TABLET 1 TABLET WITH FOOD ORALLY

          THREE TIMES A DAY

          MEDICATION LIST REVIEWED AND RECONCILED WITH THE PATIENT

           

PAST MEDICAL HISTORY

           

          UTIS

          KIDNEY CYST

          CHRONIC BACK PAIN

          MIGRAINE

          PREMATURE OVARIAN FAILURE

          OSTEOPOROSIS

          DEGENERATIVE DISK DISEASE

          ALPORTS SYNDROME

           

ALLERGIES

           

          VICODIN: N/V, RASH - ALLERGY

          CAT ALLERGY: CONGESTION - ALLERGY

          LEXAPRO: PROLONGED QT - SIDE EFFECTS

           

SOCIAL HISTORY

           

          GENERAL:

           

          TOBACCO USE

          ARE YOU A:NONSMOKER

           

           

          LATEX QUESTIONNAIRE

          LATEX ALLERGY : HAVE YOU EVER DEVELOPED ANY TYPE OF REACTION

          AFTER HANDLING LATEX PRODUCTS SUCH AS RUBBER GLOVES, CONDOMS,

          DIAPHRAGMS, BALLOONS, SOCKS, OR UNDERWEAR?NO

          LATEX ALLERGY : HAVE YOU EVER DEVELOPED ANY TYPE OF REACTION

          DURING OR AFTER DENTAL APPOINTMENT, VAGINAL/RECTAL EXAMINATION,

          SURGICAL PROCEDURE, OR ANY OTHER EXPOSURE?NO

          LATEX RISK : HAVE YOU EVER HAD ANY DIFFICULTY BREATHING OR HIVES

          AFTER EATING OR HANDLING ANY FRUITS, OR VEGETABLES; SUCH AS KIWI,

          BANANAS, STONE FRUITS, OR CHESTNUTSNO

          LATEX RISK : DO YOU HAVE A PREVIOUS PERSONAL HISTORY OF MORE THAN

          NINE SURGERIES, SPINA BIFIDA, OR REPEATED CATHERIZATIONS? NO

          LATEX RISK : ARE YOU FREQUENTLY EXPOSED TO LATEX PRODUCTS IN YOUR

          OCCUPATION?NO

          DATE ASKED : 2021

           

           

          ALCOHOL USE: NO.

           

           

          ALCOHOL SCREENING

          DID YOU HAVE A DRINK CONTAINING ALCOHOL IN THE PAST YEAR?NO

          POINTS0

          INTERPRETATIONNEGATIVE

           

           

          RECREATIONAL DRUG USE

          DRUG USE?NO

           

           

          CAFFEINE

          CAFFEINE USE?YES 2 CUPS DAY

           

           

          Congregational

          [id number] Anglican NO Caodaism BELIEFS THAT WOULD IMPACT

          HEALTH CARE.

           

           

          LANGUAGE

          LANGUAGES SPOKEN:ENGLISH

           

           

          LEARNING BARRIERS / SPECIAL NEEDS

          CHANGE FROM LAST VISIT?NO

          BARRIERS TO LEARNING?NO

          HEARING IMPAIRED?NO

          VISION IMPAIRED?YES

          :CORRECTIVE LENSES

          COGNITIVELY IMPAIRED?NO

          READINESS TO LEARN?YES

          LEARNING PREFERENCES?NO

          LEARNING CAPABILITIES PRESENT?YES

          EMOTIONAL BARRIERS?NO

          SPECIAL DEVICES?NO

           NEEDED?NO

           

           

          DOMESTIC VIOLENCE

          STATUS:

          DO YOU FEEL SAFE IN YOUR ENVIRONMENT?YES

           

           

          OCCUPATION: SELF EMPLOYED.

           

           

          DIET: REGULAR.

           

           

          EXERCISE: DAILY.

           

           

          MARITAL STATUS: .

           

           

          -

          HAS THE PATIENT BEEN EDUCATED REGARDING HIS/HER PLAN OF CARE?YES

          HAS THE PATIENT BEEN EDUCATED REGARDING PAIN, THE RISK FOR PAIN,

          THE IMPORTANCE OF EFFECTIVE PAIN MANAGEMENT, AND THE PAIN

          ASSESSMENT PROCESS?YES

           

           

          HOUSING: RENTS APARTMENT.

           

           

          ADVANCE DIRECTIVE

          ADVANCE DIRECTIVE DISCUSSED WITH PATIENT:YES PT DOES NOT HAVE ANY

          ADVANCED DIRECTIVES AND SHE DECLINED INFORMATION ON HCP AT THIS

          TIME

           

REVIEW OF SYSTEMS

           

      CONSTITUTIONAL:

           

          ANY RECENT FEVER    NO . CHILLS    NO . WEIGHT CHANGE OF UNKNOWN

          REASONS    NO .

           

      GASTROENTEROLOGY:

           

          NEW UNEXPLAINABLE CHANGES IN BOWEL CONTROL    NO . CONSTIPATION  

           NO .

           

      GENITOURINARY:

           

          ANY NEW CHANGE IN BLADDER CONTROL?    NO .

           

      NEUROLOGY:

           

          NEW ONSET DIZZINESS OR NEUROLOGICAL CHANGES NOT MENTIONED    NO .

          NEW NUMBNESS OR PAIN PATTERNS NOT MENTIONED AND PERTINENT TO

          TODAY'S VISIT    NO .

           

      CARDIOLOGY:

           

          NEW CHEST PRESSURE    NO . PATIENT DENIES    NO .

           

      RESPIRATORY:

           

          UNEXPLAINABLE COUGH    NO . NEW SHORTNESS OF BREATH    NO .

           

VITAL SIGNS

           

          .6 LBS, HT 70 IN, BMI 25.91 INDEX, /70 MM HG, HR 87

          /MIN, RR 18 /MIN, TEMP 96.6 F, OXYGEN SAT % 98%, NA INITIALS SC

          10:51.

           

EXAMINATION

           

      GENERAL EXAMINATION:

          GENERAL AWAKE,ALERT ,PLEAASANT .

           

          PSYCH AFFECT NORMAL .

           

          LUNGS: LUNG FIELDS ARE CLEAR TO AUSCULTATION BILATERALLY. GOOD

          MOVEMENT OF AIR .

           

          HEART: S1, S2 IN A REGULAR RATE AND RHYTHM. NO SIGNIFICANT

          MURMURS, RUBS OR GALLOPS NOTED .

           

          LUMBAR:PALPATION:TENDER OVER LEFT. L4/5-L5/S1 LUMBAR FACETS WITH

          FACET LOADING..

           

ASSESSMENTS

           

          SPONDYLOSIS WITHOUT MYELOPATHY OR RADICULOPATHY, LUMBAR REGION -

          M47.816 (PRIMARY)

           

TREATMENT

           

      SPONDYLOSIS WITHOUT MYELOPATHY OR RADICULOPATHY,

          LUMBAR REGION

          NOTES: LEFT DIAGNOSTIC LUMBAR FACET BLOCK L4-5,L5-S1

          PRINTED AND REVIEWED PRE PROCEDURE INFORMATION WITH PATIENT

          KYRA GREER.

           

PROCEDURE CODES

           

           ESTABILISHED PATIENT [facility] FACILITY CHARGE

           

DISPOSITION & COMMUNICATION

           

FOLLOW UP

           

          POST PROC. (REASON: LEFT DIAGNOSTIC LUMBAR FACET BLOCK

          L4-5,L5-S1)

           

 

ELECTRONICALLY SIGNED BY MISAEL GIVENS ON

          2021 AT 02:20 PM EDT

           

           

           

 

DISCLAIMER :

THIS IS A VISIT SUMMARY EXTRACTED FROM THE ECLINICALWORKS CHART.

IT IS NOT A COPY OF THE [facility] PROGRESS NOTE.

MTDD

## 2022-01-01 NOTE — PROGRESS NOTES
Oxygen Documentation:   I certify that this patient, Madeline Szymanski has been under my care. This is the face-to-face encounter for oxygen medical necessity.      Madeline Szymanski is now in a chronic stable state and continues to require supplemental oxygen. Patient has continued oxygen desaturation due to COVID-19 pneumonia.    Alternative treatment(s) tried or considered and deemed clinically infective for treatment of COVID-19 pneumonia include steroids.  If portability is ordered, is the patient mobile within the home? yes    **Patients who qualify for home O2 coverage under the CMS guidelines require ABG tests or O2 sat readings obtained closest to, but no earlier than 2 days prior to the discharge, as evidence of the need for home oxygen therapy. Testing must be performed while patient is in the chronic stable state. See notes for O2 sats.**    ----  Arielle Bruno MD  PGY-3  Family Medicine Resident

## 2022-01-01 NOTE — PROGRESS NOTES
Patient has been assessed for Home Oxygen needs.     Pulse oximetry (SpO2) and Oxygen flow readings:    SpO2 = 90% on room air at rest while awake.    SpO2 improved to 96% on 3 liters/minute at rest.    SpO2 = 85% on room air during activity/with exercise.    *SpO2 improved to 92% on 3 liters/minute during activity/with exercise.      MD paged and updated [phone] to order.

## 2022-01-01 NOTE — PROGRESS NOTES
Inpatient progress note --discharge pending    SUBJECTIVE:  Saw patient earlier this morning.  She is doing well on current oxygen supplementation.  Using incentive spirometer, flutter valve, and self proning is much as tolerated.  Patient reports that she could manage home oxygen if we could discharge her later today or tomorrow morning.      Patient denies fever, chills, headache, abdominal discomfort, nausea, or vomiting.    OBJECTIVE:  Vital Signs: Temp: 98.1  F (36.7  C) Temp src: Oral BP: 123/80 Pulse: 64   Resp: 18 SpO2: 98 % O2 Device: Nasal cannula Oxygen Delivery: 3 LPM  Weight: 194 lbs 11.2 oz  Physical Exam  Vitals reviewed.   Constitutional:       General: She is not in acute distress.     Appearance: Normal appearance.      Interventions: Nasal cannula in place.   HENT:      Nose: No congestion or rhinorrhea.   Cardiovascular:      Rate and Rhythm: Normal rate and regular rhythm.      Pulses: Normal pulses.      Heart sounds: Normal heart sounds.   Pulmonary:      Effort: Pulmonary effort is normal. No respiratory distress.      Breath sounds: Decreased air movement present. No wheezing, rhonchi or rales.   Abdominal:      General: Abdomen is flat.      Palpations: Abdomen is soft.   Musculoskeletal:      Right lower leg: No edema.      Left lower leg: No edema.   Skin:     General: Skin is warm.   Neurological:      General: No focal deficit present.      Mental Status: She is alert and oriented to person, place, and time.   Psychiatric:         Attention and Perception: Attention normal.         Mood and Affect: Affect is flat.         Speech: Speech normal.         Behavior: Behavior is not withdrawn.     ASSESSMENT/PLAN:  COVID-19 pneumonia  Acute hypoxic respiratory failure  Downtrending CRP, D-dimer, and fibrinogen.  Oxygen needs stable and decreasing.  Home O2 evaluation this morning showed patient requiring 3 L oxygen with activity.    Continue oxygen supplementation    Continue self prone as  much as tolerated    Continue incentive spirometer and flutter valve    Hydroxyzine as needed for sleep and anxiety    Discharge home with oxygen--discharge orders placed--pending delivery of oxygen    If oxygen delivered to late, patient may stay overnight for continued monitoring    Patient dicussed with attending physician, Dr.David Johnson, who agrees with the plan.   -----  Arielle Bruno MD  PGY-3  Family Medicine Resident

## 2022-01-01 NOTE — PLAN OF CARE
Pt weaned from 4 to 3 liters 02 overnight. Sats 93-99.  LS do appear to be clearing up. Vitally stable. BP at 0400 117/71.   Gave hydroxaxine q 1 for mild anxiety and acetaminophen for back discomfort at HS.     Problem: Gas Exchange Impaired  Goal: Optimal Gas Exchange  Outcome: Improving     Problem: Activity Intolerance  Goal: Enhanced Capacity and Energy  Outcome: Improving     Problem: Fluid Imbalance (Pneumonia)  Goal: Fluid Balance  Outcome: Improving

## 2022-01-01 NOTE — PROGRESS NOTES
Received oxygen intake, reviewed the chart; All docuemnts are present in the chart including a valid order for home oxygen.  1:26pm- I spoke to the nurse, and let her know we had everuthing we needed for the patent to be setup on home oxygen. I gave the nurse an eta of 2-4 hours for delivery to the hospital.

## 2022-01-01 NOTE — PLAN OF CARE
Discussed all aspects of discharge paperwork with patient. Answered questions and reviewed everything. Awaiting delivery of oxygen, called regarding the status and they stated 2-4 hours from 1330. IV still in-needs to be removed prior to discharge. Patient to schedule her own follow up per her request as she is competent.

## 2022-01-01 NOTE — DISCHARGE SUMMARY
[facility]  Discharge Summary - Medicine & Pediatrics       Date of Admission:  12/27/2021  Date of Discharge:  1/1/2022  Discharging Provider: Arielle Bruno MD  Discharge Service: Teaching Service    Discharge Diagnoses    Pneumonia due to 2019 novel coronavirus; Acute and chronic respiratory failure with hypoxia (H);  Anxiety    Follow-ups Needed After Discharge   Follow-up with PCP within 1 week  Continue home oxygen--maintain oxygen saturations at >/= 90%      Discharge Disposition   Discharged to home  Condition at discharge: Stable    Hospital Course   Madeline Szymanski was admitted on 12/27/2021 for COVID-19 pneumonia with hypoxia.      Patient reported that symptoms started 12/20/2021 and she tested positive for COVID-19 on 12/23/2021.  She developed shortness of breath on 12/2621 and CT chest was consistent with COVID-19 pneumonia.  Required oxygen supplementation throughout hospitalization with improving oxygen needs, now requiring 3 L nasal cannula with activity.  Throughout admission, CRP, D-dimer, and fibrinogen downtrended.      Ms Szymanski was treated with oxygen supplementation, remdesivir for 5 days, dexamethasone for 6 days , and tocilizumab one dose on 12/20/2021.     Dexamethasone daily for 4 days (to complete 10-day course)    Home oxygen; 3 L with activity, titrate to maintain SPO2 >= 90%    Continue self prone as much as tolerated    Activity as tolerated, take it easy    Incentive spirometry and flutter valve as much as possible    Hydroxyzine as needed for anxiety and sleep      Consultations This Hospital Stay   None (Infectious disease curbsided to give pt monoclonal antibody treatment)    Code Status   Full Code       The patient was discussed with Dr. Alf Johnson.    Arielle Bruno MD  Teaching Service  [address] [address]  Phone: [phone]  Fax:  [phone]  ______________________________________________________________________    Physical Exam   Vital Signs: Temp: 98.1  F (36.7  C) Temp src: Oral BP: 123/80 Pulse: 64   Resp: 18 SpO2: 98 % O2 Device: Nasal cannula Oxygen Delivery: 3 LPM  Weight: 194 lbs 11.2 oz  Physical Exam  Vitals reviewed.   Constitutional:       General: She is not in acute distress.     Appearance: Normal appearance.      Interventions: Nasal cannula in place.   HENT:      Nose: No congestion or rhinorrhea.   Cardiovascular:      Rate and Rhythm: Normal rate and regular rhythm.      Pulses: Normal pulses.      Heart sounds: Normal heart sounds.   Pulmonary:      Effort: Pulmonary effort is normal. No respiratory distress.      Breath sounds: Decreased air movement present. No wheezing, rhonchi or rales.   Abdominal:      General: Abdomen is flat.      Palpations: Abdomen is soft.   Musculoskeletal:      Right lower leg: No edema.      Left lower leg: No edema.   Skin:     General: Skin is warm.   Neurological:      General: No focal deficit present.      Mental Status: She is alert and oriented to person, place, and time.   Psychiatric:         Attention and Perception: Attention normal.         Mood and Affect: Affect is flat.         Speech: Speech normal.         Behavior: Behavior is not withdrawn.           Primary Care Physician   Tre Jj    Discharge Orders      Reason for your hospital stay    You were hospitalized to due to COVID-19 pneumonia and hypoxia requiring oxygen supplementation.  You were treated with remdesivir, tocilizumab, and steroids.  On discharge, we are sending you home with home oxygen, 4 more days of oral steroids.  Follow-up with your primary doctor within 1 week.     Follow-up and recommended labs and tests     Follow up with primary care provider, Tre Jj, within 7 days for hospital follow- up.  No follow up labs or test are needed.     Activity    Your activity upon discharge: activity as  tolerated - take it easy; self-prone (lay on your stomach) as much as you can; continue using your breathing exercise tools     Flutter Valve    Continue to use flutter valve 6 times a day Until return to normal activity     Incentive Spirometry    Continue to use incentive spirometer 6 times a day Until return to normal activity     Oxygen Adult/Peds    Oxygen Documentation:   I certify that this patient, Madeline Szymanski has been under my care. This is the face-to-face encounter for oxygen medical necessity.      Madeline Szymanski is now in a chronic stable state and continues to require supplemental oxygen. Patient has continued oxygen desaturation due to COVID-19 pneumonia.    Alternative treatment(s) tried or considered and deemed clinically infective for treatment of COVID-19 pneumonia include steroids.  If portability is ordered, is the patient mobile within the home? yes    **Patients who qualify for home O2 coverage under the CMS guidelines require ABG tests or O2 sat readings obtained closest to, but no earlier than 2 days prior to the discharge, as evidence of the need for home oxygen therapy. Testing must be performed while patient is in the chronic stable state. See notes for O2 sats.**     Diet    Follow this diet upon discharge:  Combination Diet Regular Diet Adult       Significant Results and Procedures      12/29/2021 08:02 12/30/2021 08:22 12/31/2021 05:18 1/1/2022 06:36   Fibrinogen 706 (H) 588 (H) 551 (H)    D-Dimer Quantitative 0.51 (H) 0.38 0.31    CRP 11.8 (H) 5.6 (H) 3.6 (H) 2.3 (H)     CT chest 12/26/2021  1.  No PE.  2.  Commonly reported imaging features of COVID 19 pneumonia are present. Other processes can have a similar imaging appearance.  3.  No hydronephrosis or hydroureter. No ureteral calculi.       Discharge Medications   Current Discharge Medication List      START taking these medications    Details   benzonatate (TESSALON) 100 MG capsule Take 1 capsule (100 mg) by mouth 3 times daily  as needed for cough  Qty: 30 capsule, Refills: 0    Associated Diagnoses: Infection due to 2019 novel coronavirus      dexamethasone (DECADRON) 6 MG tablet Take 1 tablet (6 mg) by mouth daily (with breakfast) for 4 days  Qty: 4 tablet, Refills: 0    Associated Diagnoses: Infection due to 2019 novel coronavirus      hydrOXYzine (ATARAX) 25 MG tablet Take 1 tablet (25 mg) by mouth 3 times daily as needed for anxiety  Qty: 30 tablet, Refills: 0    Associated Diagnoses: Infection due to 2019 novel coronavirus         CONTINUE these medications which have NOT CHANGED    Details   acetaminophen (TYLENOL) 500 MG tablet Take 2 tablets (1,000 mg) by mouth every 6 hours as needed for mild pain or other (and adjunct with moderate or severe pain or per patient request)    Associated Diagnoses: Acute radicular low back pain      amitriptyline (ELAVIL) 75 MG tablet Take 75 mg by mouth At Bedtime      eletriptan (RELPAX) 40 MG tablet Take 40 mg by mouth at onset of headache May repeat if needed in 2 hours. Max of 2 doses/24hours Max of 4 days/month      EPINEPHrine (ANY BX GENERIC EQUIV) 0.3 MG/0.3ML injection 2-pack Inject 0.3 mLs (0.3 mg) into the muscle once as needed for anaphylaxis  Qty: 2 each, Refills: 3      gabapentin (NEURONTIN) 300 MG capsule Take 300-600 mg by mouth At Bedtime       lidocaine (LIDODERM) 5 % patch Apply 1 patch to affected area as needed for 12 hours, remove for 12 hours before reapplying.  Qty: 14 patch, Refills: 3    Associated Diagnoses: Chronic neck pain      rizatriptan (MAXALT-MLT) 10 MG ODT Take 10 mg by mouth at onset of headache for migraine May repeat in 2 hours, with a max of 30 mg in 24 hours and a max of 5 days/month           Allergies   Allergies   Allergen Reactions     Cephalexin Hives     Coconut Flavor Anaphylaxis     Covid-19 (Mrna) Vaccine Anaphylaxis     Pfizer      Prochlorperazine Other (See Comments)     Other reaction(s): Tremors  Tremors  Other reaction(s): Tremors        Coconut Fatty Acids      Other reaction(s): Edema     Metoclopramide Other (See Comments)     Other reaction(s): Tremors  tremors  Other reaction(s): Tremors       Penicillins Hives

## 2022-01-06 ENCOUNTER — OFFICE VISIT (OUTPATIENT)
Dept: FAMILY MEDICINE | Facility: CLINIC | Age: 42
End: 2022-01-06
Payer: COMMERCIAL

## 2022-01-06 VITALS
HEART RATE: 108 BPM | TEMPERATURE: 97.9 F | WEIGHT: 194.5 LBS | OXYGEN SATURATION: 99 % | BODY MASS INDEX: 33.39 KG/M2 | SYSTOLIC BLOOD PRESSURE: 94 MMHG | RESPIRATION RATE: 16 BRPM | DIASTOLIC BLOOD PRESSURE: 71 MMHG

## 2022-01-06 DIAGNOSIS — U07.1 PNEUMONIA DUE TO 2019 NOVEL CORONAVIRUS: ICD-10-CM

## 2022-01-06 DIAGNOSIS — J12.82 PNEUMONIA DUE TO 2019 NOVEL CORONAVIRUS: ICD-10-CM

## 2022-01-06 DIAGNOSIS — J96.21 ACUTE AND CHRONIC RESPIRATORY FAILURE WITH HYPOXIA (H): Primary | ICD-10-CM

## 2022-01-06 PROCEDURE — 99213 OFFICE O/P EST LOW 20 MIN: CPT | Performed by: FAMILY MEDICINE

## 2022-01-06 NOTE — PROGRESS NOTES
"  Assessment & Plan       ICD-10-CM    1. Acute and chronic respiratory failure with hypoxia (H)  J96.21    2. Pneumonia due to 2019 novel coronavirus  U07.1     J12.82      Because you have desaturations down to 89% without oxygen and activity I do not feel you are ready to return to work yet.    I am filling out your workability form to be off work through Monday, January 24 if you are able to return at that point.    We will set a follow-up appointment with Dr. Lopes on Thursday, January 20 at 2:00.    Please bring another workability form and at that time.  At that time we will we will we will reassess if you are able to back to go back to work, and if it is part-time or full-time.    Continue continuous oxygen 3 L by nasal cannula day and night.    Ideal oxygen saturations are 96 to 100%.    If you are getting readings of 96 and higher you can decrease down to 2 L of oxygen continuous.  Then if you are getting readings of 96 or higher you can decrease down to 1 L of oxygen.    You definitely need oxygen if your O2 sats are 92% or under and once we can keep your O2 saturations  off oxygen you can stop the oxygen.    Dr. Lopes will follow you and do paperwork until you are back to work then you can establish care with a new provider who is open to new patients at our clinic.  Options are Dr. Franca Leung or Dr. Marita Hough.      20 minutes spent on the date of the encounter doing chart review, history and exam, documentation and further activities per the note       BMI:   Estimated body mass index is 33.39 kg/m  as calculated from the following:    Height as of 12/27/21: 1.626 m (5' 4\").    Weight as of this encounter: 88.2 kg (194 lb 8 oz).           No follow-ups on file.    Radha Lopes MD  [facility] NORBERT Correa is a 41 year old who presents for the following health issues     HPI   CC:  [place] 12/27/21-1/1/22- covid  Covid: With up and down stairs " and not on oxygen O2 sat 89 %.   On 3 liters of O2 nwo intermittently.  Not Short of breath with O2, is SOB with cold air, and going up and down stairs.  Sitting doing nothing is ok.  O2 sat 93% after 2 minutes of deep breathing on 3 L of O2.  She is still coughing and low on energy.    O2 sat can be 94% -96 with 3 L O2.    Without O2 today in clinic for a few minutes, 97%.     Per hospital off work through this week.    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              Review of Systems         Objective    BP 94/71 (BP Location: Left arm, Patient Position: Sitting, Cuff Size: Adult Regular)   Pulse 108   Temp 97.9  F (36.6  C) (Oral)   Resp 16   Wt 88.2 kg (194 lb 8 oz)   SpO2 99%   BMI 33.39 kg/m    Body mass index is 33.39 kg/m .  Physical Exam   Gen:  Coughing and appears tired, little short of breath with talking off her O2  CV:  Regular rate and rhythm without murmur lungs: Clear to auscultation bilaterally but decreased breath sounds at the bases posteriorly

## 2022-01-06 NOTE — PATIENT INSTRUCTIONS
Because you have desaturations down to 89% without oxygen and activity I do not feel you are ready to return to work yet.    I am filling out your workability form to be off work through Monday, January 24 if you are able to return at that point.    We will set a follow-up appointment with Dr. Lopes on Thursday, January 20 at 2:00.    Please bring another workability form and at that time.  At that time we will we will we will reassess if you are able to back to go back to work, and if it is part-time or full-time.    Continue continuous oxygen 3 L by nasal cannula day and night.    Ideal oxygen saturations are 96 to 100%.    If you are getting readings of 96 and higher you can decrease down to 2 L of oxygen continuous.  Then if you are getting readings of 96 or higher you can decrease down to 1 L of oxygen.    You definitely need oxygen if your O2 sats are 92% or under and once we can keep your O2 saturations  off oxygen you can stop the oxygen.    Dr. Lopes will follow you and do paperwork until you are back to work then you can establish care with a new provider who is open to new patients at our clinic.  Options are Dr. Franca Leung or Dr. Marita Hough.

## 2022-01-10 ENCOUNTER — MYC MEDICAL ADVICE (OUTPATIENT)
Dept: FAMILY MEDICINE | Facility: CLINIC | Age: 42
End: 2022-01-10
Payer: COMMERCIAL

## 2022-01-10 ENCOUNTER — MYC MEDICAL ADVICE (OUTPATIENT)
Dept: PHYSICAL MEDICINE AND REHAB | Facility: CLINIC | Age: 42
End: 2022-01-10
Payer: COMMERCIAL

## 2022-01-10 DIAGNOSIS — R20.2 NUMBNESS AND TINGLING IN BOTH HANDS: Primary | ICD-10-CM

## 2022-01-10 DIAGNOSIS — G56.03 BILATERAL CARPAL TUNNEL SYNDROME: ICD-10-CM

## 2022-01-10 DIAGNOSIS — R20.0 NUMBNESS AND TINGLING IN BOTH HANDS: Primary | ICD-10-CM

## 2022-01-13 ENCOUNTER — RADIOLOGY INJECTION OFFICE VISIT (OUTPATIENT)
Dept: PHYSICAL MEDICINE AND REHAB | Facility: CLINIC | Age: 42
End: 2022-01-13
Attending: NURSE PRACTITIONER
Payer: COMMERCIAL

## 2022-01-13 VITALS
SYSTOLIC BLOOD PRESSURE: 128 MMHG | HEART RATE: 106 BPM | TEMPERATURE: 98.1 F | DIASTOLIC BLOOD PRESSURE: 78 MMHG | OXYGEN SATURATION: 97 %

## 2022-01-13 DIAGNOSIS — G56.03 BILATERAL CARPAL TUNNEL SYNDROME: ICD-10-CM

## 2022-01-13 DIAGNOSIS — R20.2 NUMBNESS AND TINGLING IN BOTH HANDS: ICD-10-CM

## 2022-01-13 DIAGNOSIS — R20.0 NUMBNESS AND TINGLING IN BOTH HANDS: ICD-10-CM

## 2022-01-13 PROCEDURE — 76942 ECHO GUIDE FOR BIOPSY: CPT | Performed by: PAIN MEDICINE

## 2022-01-13 PROCEDURE — 20526 THER INJECTION CARP TUNNEL: CPT | Mod: 50 | Performed by: PAIN MEDICINE

## 2022-01-13 RX ORDER — LIDOCAINE HYDROCHLORIDE 20 MG/ML
INJECTION, SOLUTION EPIDURAL; INFILTRATION; INTRACAUDAL; PERINEURAL
Status: COMPLETED | OUTPATIENT
Start: 2022-01-13 | End: 2022-01-13

## 2022-01-13 RX ORDER — METHYLPREDNISOLONE ACETATE 40 MG/ML
INJECTION, SUSPENSION INTRA-ARTICULAR; INTRALESIONAL; INTRAMUSCULAR; SOFT TISSUE
Status: COMPLETED | OUTPATIENT
Start: 2022-01-13 | End: 2022-01-13

## 2022-01-13 RX ADMIN — METHYLPREDNISOLONE ACETATE 40 MG: 40 INJECTION, SUSPENSION INTRA-ARTICULAR; INTRALESIONAL; INTRAMUSCULAR; SOFT TISSUE at 09:31

## 2022-01-13 RX ADMIN — LIDOCAINE HYDROCHLORIDE 5 ML: 20 INJECTION, SOLUTION EPIDURAL; INFILTRATION; INTRACAUDAL; PERINEURAL at 09:31

## 2022-01-13 ASSESSMENT — PAIN SCALES - GENERAL: PAINLEVEL: MODERATE PAIN (5)

## 2022-01-13 NOTE — PATIENT INSTRUCTIONS
Follow-up visit if your symptoms worsen or fail to improve with Esme Hodgson CNP to discuss injection outcome and determine care plan going forward.     DISCHARGE INSTRUCTIONS    During office hours (8:00 a.m.- 4:00 p.m.) questions or concerns may be answered  by calling [facility] Nurs[facility] at  [phone].  Messages received after hours will be returned the following business day.      In the case of an emergency, please dial 911 or seek assistance at the nearest Emergency Room/Urgent Care facility.     All Patients:    ? You may experience an increase in your symptoms for the first 2 days (It may take anywhere between 2 days- 2 weeks for the steroid to have maximum effect).    ? You may use ice on the injection site, as frequently as 20 minutes each hour if needed.    ? You may take your pain medicine.    ? You may continue taking your regular medication after your injection. If you have had a Medial Branch Block you may resume pain medication once your pain diary is completed.    ? You may shower. No swimming, tub bath or hot tub for 48 hours.  You may remove your bandaid/bandage as soon as you are home.    ? You may resume light activities, as tolerated.    ? Resume your usual diet as tolerated.    ? It is strongly advised that you do not drive for 1-3 hours post injection.    ? If you have had oral sedation:  Do not drive for 8 hours post injection.      ? If you have had IV sedation:  Do not drive for 24 hours post injection.  Do not operate hazardous machinery or make important personal/business decisions for 24 hours.      POSSIBLE STEROID SIDE EFFECTS (If steroid/cortisone was used for your procedure)    -If you experience these symptoms, it should only last for a short period      Swelling of the legs                Skin redness (flushing)       Mouth (oral) irritation     Blood sugar (glucose) levels              Sweats                      Mood  changes    Headache    Sleeplessness    Weakened immune system for up to 14 days, which could increase the risk of manuel the COVID-19 virus and/or experiencing more severe symptoms of the disease, if exposed.    Decreased effectiveness of the flu vaccine if given within 2 weeks of the steroid.         POSSIBLE PROCEDURE SIDE EFFECTS  -Call the Spine Center if you are concerned    Increased Pain             Increased numbness/tingling        Nausea/Vomiting            Bruising/bleeding at site        Redness or swelling                                                Difficulty walking        Weakness             Fever greater than 100.5    *In the event of a severe headache after an epidural steroid injection that is relieved by lying down, please call the [facility] to speak with a clinical staff member*

## 2022-01-14 ENCOUNTER — MYC MEDICAL ADVICE (OUTPATIENT)
Dept: FAMILY MEDICINE | Facility: CLINIC | Age: 42
End: 2022-01-14
Payer: COMMERCIAL

## 2022-01-14 NOTE — LETTER
Madeline Szymanski  1980 1[birth year]21        To whom it may concern:    This patient has recovered from COVID enough to be off her oxygen.  I feel she is safe to go back to work at 4 hours a day starting January 17, 2022.  I will be seeing her on January 20, 2022 and filling out the formal paperwork.  Please feel free to contact me with any further questions or concerns.        Sincerely,        Radha Lopes MD

## 2022-01-14 NOTE — TELEPHONE ENCOUNTER
Please print this form and put in my in basket.  I did send her a letter today via her MyChart.  Thank you.  Radha Lopes MD

## 2022-01-20 ENCOUNTER — ANCILLARY PROCEDURE (OUTPATIENT)
Dept: GENERAL RADIOLOGY | Facility: CLINIC | Age: 42
End: 2022-01-20
Attending: FAMILY MEDICINE
Payer: COMMERCIAL

## 2022-01-20 ENCOUNTER — OFFICE VISIT (OUTPATIENT)
Dept: FAMILY MEDICINE | Facility: CLINIC | Age: 42
End: 2022-01-20

## 2022-01-20 VITALS
HEART RATE: 110 BPM | DIASTOLIC BLOOD PRESSURE: 99 MMHG | SYSTOLIC BLOOD PRESSURE: 135 MMHG | WEIGHT: 196.25 LBS | BODY MASS INDEX: 33.69 KG/M2 | TEMPERATURE: 98.7 F | RESPIRATION RATE: 18 BRPM

## 2022-01-20 DIAGNOSIS — J18.9 PNEUMONIA OF BOTH LUNGS DUE TO INFECTIOUS ORGANISM, UNSPECIFIED PART OF LUNG: ICD-10-CM

## 2022-01-20 DIAGNOSIS — R05.9 COUGH: ICD-10-CM

## 2022-01-20 DIAGNOSIS — U07.1 INFECTION DUE TO 2019 NOVEL CORONAVIRUS: ICD-10-CM

## 2022-01-20 DIAGNOSIS — R51.9 NONINTRACTABLE HEADACHE, UNSPECIFIED CHRONICITY PATTERN, UNSPECIFIED HEADACHE TYPE: ICD-10-CM

## 2022-01-20 DIAGNOSIS — R00.0 TACHYCARDIA: ICD-10-CM

## 2022-01-20 DIAGNOSIS — G43.109 MIGRAINE WITH AURA AND WITHOUT STATUS MIGRAINOSUS, NOT INTRACTABLE: Primary | ICD-10-CM

## 2022-01-20 PROCEDURE — 99215 OFFICE O/P EST HI 40 MIN: CPT | Performed by: FAMILY MEDICINE

## 2022-01-20 PROCEDURE — 93005 ELECTROCARDIOGRAM TRACING: CPT | Performed by: FAMILY MEDICINE

## 2022-01-20 PROCEDURE — 71046 X-RAY EXAM CHEST 2 VIEWS: CPT | Mod: TC | Performed by: RADIOLOGY

## 2022-01-20 RX ORDER — ALBUTEROL SULFATE 90 UG/1
2 AEROSOL, METERED RESPIRATORY (INHALATION) EVERY 6 HOURS
Qty: 18 G | Refills: 0 | Status: SHIPPED | OUTPATIENT
Start: 2022-01-20 | End: 2022-02-24

## 2022-01-20 RX ORDER — LEVOFLOXACIN 500 MG/1
500 TABLET, FILM COATED ORAL DAILY
Qty: 10 TABLET | Refills: 0 | Status: SHIPPED | OUTPATIENT
Start: 2022-01-20 | End: 2022-01-30

## 2022-01-20 RX ORDER — RIZATRIPTAN BENZOATE 10 MG/1
10 TABLET, ORALLY DISINTEGRATING ORAL
Qty: 18 TABLET | Refills: 3 | Status: SHIPPED | OUTPATIENT
Start: 2022-01-20 | End: 2022-11-03

## 2022-01-20 RX ORDER — ELETRIPTAN HYDROBROMIDE 40 MG/1
40 TABLET, FILM COATED ORAL
Qty: 18 TABLET | Refills: 3 | Status: SHIPPED | OUTPATIENT
Start: 2022-01-20 | End: 2022-11-03

## 2022-01-20 RX ORDER — CELECOXIB 100 MG/1
100 CAPSULE ORAL 2 TIMES DAILY
Qty: 30 CAPSULE | Refills: 0 | Status: SHIPPED | OUTPATIENT
Start: 2022-01-20 | End: 2022-02-24

## 2022-01-20 RX ORDER — HYDROXYZINE HYDROCHLORIDE 25 MG/1
25 TABLET, FILM COATED ORAL 3 TIMES DAILY PRN
Qty: 90 TABLET | Refills: 3 | Status: SHIPPED | OUTPATIENT
Start: 2022-01-20 | End: 2024-05-21

## 2022-01-20 NOTE — PATIENT INSTRUCTIONS
For  Headache, will prescribe Celebrex 100 mg 1 pill 2 times a day as needed for headache, for 14 days.  Should be safe with the bariatric surgery, but short term.    Tessalon Perles 1 up to 3 times a day as needed for cough.    Prescribed albuterol inhaler 2 puffs up to 4 times a day as needed for cough.    Chest x-ray with pneumonia.    Levaquin 500 mg daily for 10 days.    EKG with tacycaridaia, fast heart rate otherwise normal.    Can add Flonase 2 sprays each nostril daily for 2-4 weeks to help with headache.    Fabian Lopes will talk to a cardiology and send you a [employer] message.  ?Holter or see them or B-bloker or time.    Right now we are cleairng you for full time , 8 hours a day, without restrictions starting 1-24-22.  If not able to do that, let me know.    Refilled Relpax, hydroxyzine and Maxalt.    Workability an d short term disability forms done.    Dr. Lopes will help complete this visit today and any follow-up needed regarding her COVID infection and paperwork.    After that she will establish care with a new provider that is excepting new patients.

## 2022-01-20 NOTE — PROGRESS NOTES
Assessment & Plan       ICD-10-CM    1. Migraine with aura and without status migrainosus, not intractable  G43.109 rizatriptan (MAXALT-MLT) 10 MG ODT     eletriptan (RELPAX) 40 MG tablet   2. Infection due to 2019 novel coronavirus  U07.1 hydrOXYzine (ATARAX) 25 MG tablet   3. Cough  R05.9 XR Chest 2 Views     albuterol (PROAIR HFA/PROVENTIL HFA/VENTOLIN HFA) 108 (90 Base) MCG/ACT inhaler   4. Nonintractable headache, unspecified chronicity pattern, unspecified headache type  R51.9 celecoxib (CELEBREX) 100 MG capsule   5. Tachycardia  R00.0 EKG 12-lead, tracing only   6. Pneumonia of both lungs due to infectious organism, unspecified part of lung  J18.9 levofloxacin (LEVAQUIN) 500 MG tablet     For  Headache, will prescribe Celebrex 100 mg 1 pill 2 times a day as needed for headache, for 14 days.  Should be safe with the bariatric surgery, but short term.    Tessalon Perles 1 up to 3 times a day as needed for cough.    Prescribed albuterol inhaler 2 puffs up to 4 times a day as needed for cough.    Chest x-ray with pneumonia. Per radiology appears like a COVID-pneumonia but with patient's recent fever and chills concern for overlying bacterial infection.    Levaquin 500 mg daily for 10 days.    EKG with tacycaridaia, fast heart rate otherwise normal.    Can add Flonase 2 sprays each nostril daily for 2-4 weeks to help with headache.    Fabian Lopes will talk to a cardiology and send you a [employer] message.  ?Holter or see them or B-bloker or give time.    Right now we are cleairng you to work full time , 8 hours a day, without restrictions starting 1-24-22.  If not able to do that, let me know.    Refilled Relpax, hydroxyzine and Maxalt.    Workability an d short term disability forms done.    Dr. Lopes will help complete this visit today and any follow-up needed regarding her COVID infection and paperwork.    After that she will establish care with a new provider that is excepting new patients.      40 minutes  "spent on the date of the encounter doing chart review, history and exam, documentation and further activities per the note       BMI:   Estimated body mass index is 33.69 kg/m  as calculated from the following:    Height as of 12/27/21: 1.626 m (5' 4\").    Weight as of this encounter: 89 kg (196 lb 4 oz).           No follow-ups on file.    Radha Lopes MD  [facility] NORBERT Correa is a 42 year old who presents for the following health issues     HPI   Covid: She feels she is improving and is able to get back to work. She has been back to work 4 hours a day this week and seems to go okay. She would like to start back to work full-time or 8 hours a day starting next week January 24. Oxygen saturation is now normal and she is off her O2 supplement.  A new symptom for her is tachycardia. She feels like her heart rate increased. It is 90 with rest and 130 with walking.  Regular but fast.  Feeling like a weight on her chest all of the time, not worse with increased heart rate.   Cough now since off 02 for 6-7 days. She did not initially cough with COVID.  Only used Tessalon perles 2 times after the hosptial and did not help, but not used since.  No fever, occasional chills, contiuous body aches and fatigue.   In hospital Remdisavir infusion and on discharge Prednisone, Tessalon perles.  No antibiotics used.    Headache:  Using migraine meds to try to get ried of headache. Only help for a short time.  Feels different than a migraine.  Wakes uo with a headache and worsens throughout the day.  Cant use NSAIDs with gastric bypass.          Review of Systems         Objective    BP (!) 135/99 (BP Location: Left arm, Patient Position: Sitting, Cuff Size: Adult Regular)   Pulse 110   Temp 98.7  F (37.1  C) (Oral)   Resp 18   Wt 89 kg (196 lb 4 oz)   BMI 33.69 kg/m    Body mass index is 33.69 kg/m .  Physical Exam   GENERAL: healthy, alert and no distress sitting  RESP: lungs " clear to auscultation - no rales, rhonchi or wheezes, except for some mildly decreased airflow at the bases posteriorly  CV: regular rate and rhythm, except tachycardic, normal S1 S2, no S3 or S4, no murmur, click or rub, no peripheral edema and peripheral pulses strong    Chest x-ray and EKG done, see results

## 2022-01-21 DIAGNOSIS — R07.89 CHEST PRESSURE: ICD-10-CM

## 2022-01-21 DIAGNOSIS — R00.0 TACHYCARDIA: Primary | ICD-10-CM

## 2022-02-01 ENCOUNTER — TELEPHONE (OUTPATIENT)
Dept: FAMILY MEDICINE | Facility: CLINIC | Age: 42
End: 2022-02-01
Payer: COMMERCIAL

## 2022-02-01 NOTE — TELEPHONE ENCOUNTER
Short Term Disability form was received via mail from [facility] today for patient. I put in out guide and put on Reina HAYDEN's desk. Please fax completed ppwk back to [facility] at [phone]

## 2022-02-02 NOTE — TELEPHONE ENCOUNTER
Placed call to [employer], they stated pt requesting an extension of benefits. I will call pt this afternoon. Forms at my desk.

## 2022-02-02 NOTE — TELEPHONE ENCOUNTER
I cleared her to go back to full-time work I believe January 24.  Can you ask what these forms are for?  Is it just reiterating what I had done last time?  If she needing part-time work or further follow-up?

## 2022-02-11 DIAGNOSIS — M54.16 LUMBAR RADICULOPATHY: Primary | ICD-10-CM

## 2022-02-11 RX ORDER — METHYLPREDNISOLONE 4 MG
TABLET, DOSE PACK ORAL
Qty: 21 TABLET | Refills: 0 | Status: SHIPPED | OUTPATIENT
Start: 2022-02-11 | End: 2022-02-24

## 2022-02-15 ENCOUNTER — MYC MEDICAL ADVICE (OUTPATIENT)
Dept: FAMILY MEDICINE | Facility: CLINIC | Age: 42
End: 2022-02-15
Payer: COMMERCIAL

## 2022-02-15 DIAGNOSIS — G43.909 MIGRAINE WITHOUT STATUS MIGRAINOSUS, NOT INTRACTABLE, UNSPECIFIED MIGRAINE TYPE: Primary | ICD-10-CM

## 2022-02-15 RX ORDER — AMITRIPTYLINE HYDROCHLORIDE 50 MG/1
50 TABLET ORAL AT BEDTIME
Qty: 90 TABLET | Refills: 3 | Status: SHIPPED | OUTPATIENT
Start: 2022-02-15 | End: 2022-11-30

## 2022-02-15 RX ORDER — TOPIRAMATE 50 MG/1
50 TABLET, FILM COATED ORAL DAILY
Qty: 90 TABLET | Refills: 3 | Status: SHIPPED | OUTPATIENT
Start: 2022-02-15 | End: 2022-11-30

## 2022-02-15 NOTE — TELEPHONE ENCOUNTER
Please advise.   Topiramate not on current med list.   Topiramate     Dispensed Days Supply Quantity Provider Pharmacy   TOPIRAMATE 50MG TABLETS 06/29/2021 15 30 Units [facility]...   TOPIRAMATE 50MG TABLETS 03/10/2021 30 60 Units [facility] #...        Please also advise on dosing of Amitriptyline

## 2022-02-17 ENCOUNTER — HOSPITAL ENCOUNTER (OUTPATIENT)
Dept: MRI IMAGING | Facility: CLINIC | Age: 42
Discharge: HOME | End: 2022-02-17
Attending: SURGERY | Admitting: SURGERY
Payer: COMMERCIAL

## 2022-02-17 ENCOUNTER — HOSPITAL ENCOUNTER (OUTPATIENT)
Dept: CARDIOLOGY | Facility: HOSPITAL | Age: 42
End: 2022-02-17
Attending: FAMILY MEDICINE
Payer: COMMERCIAL

## 2022-02-17 ENCOUNTER — VIRTUAL VISIT (OUTPATIENT)
Dept: NEUROSURGERY | Facility: CLINIC | Age: 42
End: 2022-02-17
Attending: SURGERY
Payer: COMMERCIAL

## 2022-02-17 DIAGNOSIS — R26.89 IMBALANCE: Primary | ICD-10-CM

## 2022-02-17 DIAGNOSIS — R26.89 IMBALANCE: ICD-10-CM

## 2022-02-17 DIAGNOSIS — M48.02 SPINAL STENOSIS IN CERVICAL REGION: ICD-10-CM

## 2022-02-17 DIAGNOSIS — R07.89 CHEST PRESSURE: ICD-10-CM

## 2022-02-17 DIAGNOSIS — R00.0 TACHYCARDIA: ICD-10-CM

## 2022-02-17 DIAGNOSIS — M54.50 ACUTE RIGHT-SIDED LOW BACK PAIN WITHOUT SCIATICA: Primary | ICD-10-CM

## 2022-02-17 LAB — LVEF ECHO: NORMAL

## 2022-02-17 PROCEDURE — 99213 OFFICE O/P EST LOW 20 MIN: CPT | Mod: 95 | Performed by: SURGERY

## 2022-02-17 PROCEDURE — 72148 MRI LUMBAR SPINE W/O DYE: CPT

## 2022-02-17 NOTE — PROGRESS NOTES
Patient is a 42-year-old female who is status post left lumbar 5-sacral 1 microdiscectomy on 10/4/2021.    Since last week. Has a constant dull ache in her right lower back. She underwent a medrol dose pack without relief. Overall no significant leg symptoms.     In the last two weeks her balance has gotten worse. She feels she is almost falling over when doing high knee kicks. Unable to balance on either leg. Increased constipation without incontinence. Increased urinary frequency without incontinence. Feels she has increased weakness of her arms and hands. Has upcoming session with PT and she hopes to retest her strength at this visit. She feels she is having difficulty opening juice packets. Has some pain that travels into her biceps bilaterally. On/off. Takes gabapentin at bedtime.     MRI cervical and lumbar wo contrast. Follow-up after films obtained. Continue PT for now.     Start of call 9:07  End of call: 9:15  Total length of call: 8 minutes      Radha Lilly MD

## 2022-02-17 NOTE — LETTER
2/17/2022         RE: Madeline Szymanski  [address] [facility] [id number]        Dear Colleague,    Thank you for referring your patient, Madeline Szymanski, to the [facility]. Please see a copy of my visit note below.    Patient is a 42-year-old female who is status post left lumbar 5-sacral 1 microdiscectomy on 10/4/2021.    Since last week. Has a constant dull ache in her right lower back. She underwent a medrol dose pack without relief. Overall no significant leg symptoms.     In the last two weeks her balance has gotten worse. She feels she is almost falling over when doing high knee kicks. Unable to balance on either leg. Increased constipation without incontinence. Increased urinary frequency without incontinence. Feels she has increased weakness of her arms and hands. Has upcoming session with PT and she hopes to retest her strength at this visit. She feels she is having difficulty opening juice packets. Has some pain that travels into her biceps bilaterally. On/off. Takes gabapentin at bedtime.     MRI cervical and lumbar wo contrast. Follow-up after films obtained. Continue PT for now.     Start of call 9:07  End of call: 9:15  Total length of call: 8 minutes      Radha Lilly MD       Again, thank you for allowing me to participate in the care of your patient.        Sincerely,        Radha Lilly MD

## 2022-02-23 ENCOUNTER — HOSPITAL ENCOUNTER (OUTPATIENT)
Dept: PHYSICAL THERAPY | Facility: CLINIC | Age: 42
End: 2022-02-23
Payer: COMMERCIAL

## 2022-02-23 DIAGNOSIS — M47.12 CERVICAL SPONDYLOSIS WITH MYELOPATHY: ICD-10-CM

## 2022-02-23 DIAGNOSIS — M54.16 LUMBAR RADICULOPATHY: Primary | ICD-10-CM

## 2022-02-23 PROCEDURE — 97110 THERAPEUTIC EXERCISES: CPT | Mod: GP | Performed by: PHYSICAL THERAPIST

## 2022-02-23 NOTE — PROGRESS NOTES
Daily Assessment:  Patient seen for 6th follow up neck and low back pain with history of lumbar surgery. She returns to PT after a long break due to having contracted COVID-19 and being hospitalized. She reports over the last few weeks that upper extremity numbness tingling and pain has been worse bilaterally , as well as subjective feelings of weakness and difficulty with things like opening juice box. With assessment today patient does have increase in  strength compared to previous , but did have some mild weakness noted with wrist flexion. PT did trial cervical traction unit today with reduction in pain initially , patient to see if this makes any ongoing change throughout the course of the day.     Date 2/23/2022 12/15/2021   Lumbar Parameters     Top Dead Center (TDC) 18 18   Counterbalance (CB) 356 356   Seat Pad 1 1   Femur Restraint 6 6   Week/Visit     Enter Week/Visit # 1/2 1/1   Weight (lbs) 113# 234# Max  110 Ex   Reps (#) 16 15   Time 122 124   ROM (degrees) 0-39 0-39   Pain  fatigue   Flex:Ext ratio  3.21:1   Cervical Parameters     Top Dead Center (TDC) 54 54   Counterbalance (CB) 1.7 1.7   Seat Height 437 437   Week/Visit     Enter Week/Visit # 1/2 1/1   Weight (lbs) 141# 214# Max  141# ex   Reps (#) 20 20   Time 82 81   ROM (degrees) 24-63 24-63   Pain     Flex:Ext ratio  1.86:1     Date 12/15/2021   Exercise    Nustep X 5 min level 4   Rotary torso 90 seconds 30#'s started ILDA [place], PT

## 2022-02-24 ENCOUNTER — OFFICE VISIT (OUTPATIENT)
Dept: PHYSICAL MEDICINE AND REHAB | Facility: CLINIC | Age: 42
End: 2022-02-24
Payer: COMMERCIAL

## 2022-02-24 VITALS — HEART RATE: 107 BPM | OXYGEN SATURATION: 99 % | DIASTOLIC BLOOD PRESSURE: 78 MMHG | SYSTOLIC BLOOD PRESSURE: 124 MMHG

## 2022-02-24 DIAGNOSIS — Z98.890 HISTORY OF LUMBAR SURGERY: ICD-10-CM

## 2022-02-24 DIAGNOSIS — M43.16 SPONDYLOLISTHESIS OF LUMBAR REGION: ICD-10-CM

## 2022-02-24 DIAGNOSIS — G89.29 CHRONIC NECK PAIN: ICD-10-CM

## 2022-02-24 DIAGNOSIS — M54.50 CHRONIC RIGHT-SIDED LOW BACK PAIN WITHOUT SCIATICA: Primary | ICD-10-CM

## 2022-02-24 DIAGNOSIS — M54.2 CHRONIC NECK PAIN: ICD-10-CM

## 2022-02-24 DIAGNOSIS — G89.29 CHRONIC RIGHT-SIDED LOW BACK PAIN WITHOUT SCIATICA: Primary | ICD-10-CM

## 2022-02-24 DIAGNOSIS — M48.061 STENOSIS OF LATERAL RECESS OF LUMBAR SPINE: ICD-10-CM

## 2022-02-24 DIAGNOSIS — M47.816 LUMBAR FACET ARTHROPATHY: ICD-10-CM

## 2022-02-24 PROCEDURE — 99214 OFFICE O/P EST MOD 30 MIN: CPT | Performed by: NURSE PRACTITIONER

## 2022-02-24 RX ORDER — HYDROCODONE BITARTRATE AND ACETAMINOPHEN 5; 325 MG/1; MG/1
1 TABLET ORAL 2 TIMES DAILY PRN
Qty: 10 TABLET | Refills: 0 | Status: SHIPPED | OUTPATIENT
Start: 2022-02-24 | End: 2022-03-01

## 2022-02-24 RX ORDER — PREGABALIN 50 MG/1
50-100 CAPSULE ORAL AT BEDTIME
Qty: 60 CAPSULE | Refills: 3 | Status: SHIPPED | OUTPATIENT
Start: 2022-02-24 | End: 2022-07-25

## 2022-02-24 ASSESSMENT — PAIN SCALES - GENERAL: PAINLEVEL: SEVERE PAIN (7)

## 2022-02-24 NOTE — LETTER
2022         RE: Madeline Szymanski  [address]  [facility] [address]        Dear Colleague,    Thank you for referring your patient, Madeline Szymanski, to the [facility] [place]. Please see a copy of my visit note below.      Assessment:     Diagnoses and all orders for this visit:  Chronic right-sided low back pain without sciatica  -     XR Lumbar Spine G/E 4 Views; Future  -     pregabalin (LYRICA) 50 MG capsule; Take 1-2 capsules ( mg) by mouth At Bedtime  -     HYDROcodone-acetaminophen (NORCO) 5-325 MG tablet; Take 1 tablet by mouth 2 times daily as needed for severe pain Do not drive while taking  Lumbar facet arthropathy  -     XR Lumbar Spine G/E 4 Views; Future  Spondylolisthesis of lumbar region  -     XR Lumbar Spine G/E 4 Views; Future  Stenosis of lateral recess of lumbar spine  -     XR Lumbar Spine G/E 4 Views; Future  History of lumbar surgery  -     XR Lumbar Spine G/E 4 Views; Future  Chronic neck pain     Madeline Szymanski is a 42 year old y.o. female with past medical history significant for migraine, depressive disorder, , gastric bypass surgery, L5-S1 hemilaminectomy left medial facetectomy foraminotomies and microdiscectomy Dr. Lilly 10/4/2021, who presents today for follow-up regarding:    -Ongoing right low back pain lumbosacral junction, some increase with facet loading however also cannot rule out pain related to L5-S1 retrolisthesis and lateral recess stenosis L5-S1.  No current radicular symptoms.     Plan:     A shared decision making plan was used. The patient's values and choices were respected. Prior medical records were reviewed today. The following represents what was discussed and decided upon by the provider and the patient.        -DIAGNOSTIC TESTS: Images were personally reviewed and interpreted.   -Patient is scheduled for cervical MRI-ordered by Dr. Lilly to evaluate ongoing neck and upper extremity pain.  --Ordered lumbar spine  flexion-extension x-ray to evaluate L5-S1 retrolisthesis stability.  --Lumbar MRI 2/17/2022 with microdiscectomy and left hemilaminectomy changes at L5-S1 with granulation tissue left paracentral region, moderate left lateral recess and mild right lateral recess stenosis.  2 mm L5-S1 retrolisthesis.  --Cervical MRI 8/7/2021 Moderate to Severe spinal stenosis C5-6.   --Lumbar spine flexion-extension x-ray 8/10/2021 shows no spondylolisthesis and no instability.  --Lumbar spine MRI 8/6/2021 with L5-S1 mild disc height loss with disc bulge on the left with osteophytes with mild left foraminal stenosis lateral recess stenosis and S1 compression.    -INTERVENTIONS: Consider RIGHT L4, L5 medial branch block targeting the right L5-S1 facet joint.  If patient has a positive response would be a good candidate for moving forward with radiofrequency ablation.  -If no benefit we could consider a right L5-S1 TFESI she does have a retrolisthesis and lateral recess stenosis at this level.    -MEDICATIONS: Advised patient to slowly wean off of Gabapentin and stop this medication.  Once it stopped can start Lyrica 50 mg 1 tablet at bedtime, if tolerable after a couple of nights she can go to 2 tablets, 100 mg at bedtime we will see if this may provide better benefit than previous gabapentin.  --Due to intolerance of oral NSAIDs did advise patient to trial diclofenac gel 3 times daily to right low back pain for least 1 week to see if that can improve pain by reducing inflammation.  -Prescribed hydrocodone 5/325 mg 1 tablet twice daily as needed for severe breakthrough pain during transition off of gabapentin and onto Lyrica.  # 10 tablets given for 5 days worth.  [place]  checked.  This is for acute pain only.  Refills will not be given over the telephone.  Discussed the risks (eg, addiction, overdose, worsening pain, death) verses benefit of opioid use with patient today. Explained that this medication will not be a long term solution  to ongoing pain. Discussed using lowest effective dose and the importance of other measures for pain management including PT, other non-opioid medications, behavioral treatments, and other procedure options.   Discussed side effects of medications and proper use. Patient verbalized understanding.    -PHYSICAL THERAPY: Encourage patient continue working with physical therapy lumbar MedX program here at the spine center for intensive core strengthening.  Discussed the importance of core strengthening, ROM, stretching exercises with the patient and how each of these entities is important in decreasing pain.  Explained to the patient that the purpose of physical therapy is to teach the patient a home exercise program.  These exercises need to be performed every day in order to decrease pain and prevent future occurrences of pain.        -PATIENT EDUCATION:  Total time of 32 minutes, on the day of service, spent with the patient, reviewing the chart, placing orders, and documenting.   -Today we also discussed the issues related to the current COVID-19 pandemic, the pros and cons of the current treatment plan, the CDC guidelines such as social distancing, washing the hands, and covering the cough.    -FOLLOW UP: Follow-up as needed  Advised to contact clinic if symptoms worsen or change.    Subjective:     Madeline Szymanski is a 42 year old female who presents today for follow-up regarding Ongoing constant right low back pain lumbosacral junction that radiates to the right buttock region that is worse at bedtime that does wake her up in the middle the night.  Currently pain is a 7/10 (worst, 3 at its best.  Does report that generalized activity is aggravating, nothing is currently giving relief.  Patient denies radiating lower extremity pain or paresthesias, denies recent trips falls or balance changes, denies bowel or bladder loss control.  Denies lower extremity weakness.    Treatment to Date:  Left L5-S1 hemilaminectomy,  medial facetectomy, foraminotomy with L5-S1 left microdiscectomy 10/4/2021 Dr. Lilly.     Bilateral carpal tunnel steroid injection 11/11/2020 and 5/12/2021 with significant benefit.     L5-S1 TFESI 3/21/2018  Lumbar MBB 2/13/2019 with benefit LBP  Lumbar RFA 3/6/2019  Bilateral L5-S1 TFESI 6/3/2020 with benefit LBP.  Preprocedure pain 8/10, post 5/10.  Left S1-S2 TFESI 8/3/2021 with no lasting benefit.  Preprocedure pain 8/10, post 2/10.  Left L5-S1 TFESI 9/1/2021 with minimal lasting benefit.  Preprocedure pain 7/10, post 4/10.     Medications:  Gabapentin 300 mg 1 to 2 tablets at bedtime, unable to tolerate during the daytime.  Amitriptyline 75 mg at bedtime for migraines  Flexeril as needed  Tylenol as needed  Lidocaine gel as needed     No benefit with recent Medrol Dosepak.     *Patient unable to tolerate NSAIDs due to GI surgery.    Patient Active Problem List   Diagnosis     Painless urinary retention due to cauda equina syndrome (H)     Spinal stenosis in cervical region     Pneumonia due to 2019 novel coronavirus     Gastric band slippage     Bariatric surgery status     History of gastric ulcer     Hx of Helicobacter infection     Migraine     Panic attacks     S/P laparoscopic sleeve gastrectomy     Lumbar radiculopathy     Anxiety     Acute and chronic respiratory failure with hypoxia (H)       Current Outpatient Medications   Medication     acetaminophen (TYLENOL) 500 MG tablet     amitriptyline (ELAVIL) 50 MG tablet     eletriptan (RELPAX) 40 MG tablet     EPINEPHrine (ANY BX GENERIC EQUIV) 0.3 MG/0.3ML injection 2-pack     HYDROcodone-acetaminophen (NORCO) 5-325 MG tablet     hydrOXYzine (ATARAX) 25 MG tablet     lidocaine (LIDODERM) 5 % patch     pregabalin (LYRICA) 50 MG capsule     rizatriptan (MAXALT-MLT) 10 MG ODT     topiramate (TOPAMAX) 50 MG tablet     No current facility-administered medications for this visit.       Allergies   Allergen Reactions     Cephalexin Hives     Coconut Flavor  Anaphylaxis     Covid-19 (Mrna) Vaccine Anaphylaxis     Pfizer      Prochlorperazine Other (See Comments)     Other reaction(s): Tremors  Tremors  Other reaction(s): Tremors       Coconut Fatty Acids      Other reaction(s): Edema     Metoclopramide Other (See Comments)     Other reaction(s): Tremors  tremors  Other reaction(s): Tremors       Penicillins Hives       Past Medical History:   Diagnosis Date     Arthritis      Depressive disorder      Kidney infection      Migraine      Painless urinary retention due to cauda equina syndrome (H)      Pancreatitis      Spinal stenosis in cervical region      UTI (lower urinary tract infection)         Review of Systems  ROS: Positive for numbness and tingling upper extremities.  Specifically negative for bowel/bladder dysfunction, balance changes, headache, dizziness, foot drop, fevers, chills, appetite changes, nausea/vomiting, unexplained weight loss. Otherwise 13 systems reviewed are negative. Please see the patient's intake questionnaire from today for details.    Reviewed Social, Family, Past Medical and Past Surgical history with patient, no significant changes noted since prior visit.     Objective:     /78 (BP Location: Right arm, Patient Position: Sitting)   Pulse 107   SpO2 99%     PHYSICAL EXAMINATION:    --CONSTITUTIONAL: Well developed, well nourished, healthy appearing individual.  --PSYCHIATRIC: Appropriate mood and affect. No difficulty interacting due to temper, social withdrawal, or memory issues.  --SKIN: Lumbar region is dry and intact.   --RESPIRATORY: Normal rhythm and effort. No abnormal accessory muscle breathing patterns noted.   --MUSCULOSKELETAL:  Normal lumbar lordosis noted, no lateral shift.  --GROSS MOTOR: Easily arises from a seated position. Gait is non-antalgic  --LUMBAR SPINE:  Inspection reveals no evidence of deformity. Range of motion with slight limitations with forward flexion due to pain, slight limitation of lumbar  extension lateral rotation on the right due to pain. No tenderness to palpation lumbar spine. Straight leg raising in the seated position is negative to radicular pain. Sciatic notch non-tender.   --LOWER EXTREMITY MOTOR TESTING:  Plantar flexion left 5/5, right 5/5   Dorsiflexion left 5/5, right 5/5   Great toe MTP extension left 5/5, right 5/5  Knee flexion left 5/5, right 5/5  Knee extension left 5/5, right 5/5   Hip flexion left 5/5, right 5/5  Hip abduction left 5/5, right 5/5  Hip adduction left 5/5, right 5/5   --HIPS: Full range of motion bilaterally. Negative FABERs on the involved lower extremity.   --NEUROLOGIC: Bilateral patellar and achilles reflexes are physiologic and symmetric. Sensation to light touch is intact in the bilateral L4, L5, and S1 dermatomes.    RESULTS:   Imaging: Lumbar spine imaging was reviewed today. The images were shown to the patient and the findings were explained using a spine model.      MR Lumbar Spine w/o Contrast  Result Date: 2/18/2022  EXAM: MR LUMBAR SPINE W/O CONTRAST LOCATION: [facility] DATE/TIME: 2/17/2022 5:17 PM INDICATION: Patient is a 42-year-old female who is status post left lumbar 5-sacral 1 microdiscectomy on 10/4/2021. Since last week. Has a constant dull ache in her right lower back. She underwent a medrol dose pack without relief. COMPARISON: Lumbar spine MRI 10/17/2021. TECHNIQUE: Routine Lumbar Spine MRI without IV contrast. FINDINGS: Nomenclature is based on 5 lumbar type vertebral bodies. The conus ends at mid to distal L2. 1 mm retrolisthesis of L1 on L2. 2 mm retrolisthesis of L5 on S1. Vertebral body heights are maintained. Nonpathologic marrow signal. No MRI evidence for pars defects. Compared to the previous lumbar spine MRI, there has been interval moderate decrease in the previously noted edema involving the left L5-S1 laminectomy site and posterior paraspinal soft tissues. Normal diameter of the distal abdominal aorta.  The visualized bony pelvis is grossly within normal limits. T11-T12: Mild to moderate intervertebral disc height loss. Loss of the normal T2 signal within the disc. Small broad-based disc bulge with superimposed small central disc protrusion. Mild bilateral facet arthropathy. Mild spinal canal narrowing. No neural foraminal narrowing. T12-L1: Mild intervertebral disc height loss. Loss of the normal T2 signal within the disc. Small broad-based disc bulge. Mild bilateral facet arthropathy. No spinal canal narrowing. No neural foraminal narrowing. L1-L2: Mild intervertebral disc height loss. Loss of the normal T2 signal within the disc. Small broad-based disc bulge. Moderate bilateral facet arthropathy. No spinal canal narrowing. No neural foraminal narrowing. L2-L3: Normal intervertebral disc height and signal. Shallow broad-based disc bulge. Moderate bilateral facet arthropathy. No spinal canal narrowing. No neural foraminal narrowing. L3-L4: Normal intervertebral disc height and signal. Small broad-based disc bulge. Moderate bilateral facet arthropathy. No spinal canal narrowing. No neural foraminal narrowing. L4-L5: Normal intervertebral disc height and signal. Small broad-based disc bulge. Moderate bilateral facet arthropathy. No spinal canal narrowing. No neural foraminal narrowing. L5-S1: Mild intervertebral disc height loss. Loss of the normal T2 signal within the disc. Small broad-based disc bulge with superimposed small left paracentral/foraminal disc protrusion. Moderate bilateral facet arthropathy. Microdiscectomy changes. Left hemilaminectomy changes. Small amount of presumed granulation tissue at the microdiscectomy changes in the left paracentral region and along the left hemilaminectomy. Moderate narrowing of the left lateral recess. Mild narrowing of the right lateral  recess. No spinal canal narrowing. No right neural foraminal narrowing. Mild left neural foraminal narrowing.   IMPRESSION:   1.   Compared to the previous lumbar spine MRI 10/17/2021, there has been interval moderate decrease in the previously noted edema involving the left L5-S1 laminectomy site and posterior paraspinal soft tissues.   2.  Small amount of presumed granulation tissue at the microdiscectomy changes in the left paracentral region and along the left hemilaminectomy.   3.  Moderate narrowing of the left lateral recess and mild narrowing of the right lateral recess at L5-S1. These probably contact the bilateral traversing S1 nerve roots, left greater than right   4.  No high-grade spinal canal or neural foraminal narrowing.   5.  Mild narrowing of the left lateral recess at L5-S1.      Cervical spine MRI w/o contrast  Result Date: 11/24/2021  EXAM: MR CERVICAL SPINE W/O CONTRAST LOCATION: [facility] DATE/TIME: 11/24/2021 7:50 PM INDICATION: Neck trauma, focal neuro deficit or paresthesia (Age 16-64y) COMPARISON: MRI cervical spine dated 08/07/2021. TECHNIQUE: MRI Cervical Spine without IV contrast. FINDINGS: Normal vertebral body heights, alignment and marrow signal. No abnormal cord signal. No extraspinal abnormality. Craniovertebral junction and C1-C2: Normal. C2-C3: Normal disc height. No herniation. Normal facets. No spinal canal or neural foraminal stenosis. C3-C4: Normal disc height. No herniation. Normal facets. No spinal canal or neural foraminal stenosis. C4-C5: Slight loss of disc height. No herniation. Mild facet arthropathy. No spinal canal or neural foraminal stenosis. C5-C6: Moderate loss of disc height. Moderate diffuse disc osteophyte complex asymmetric to left. No herniation. Mild to moderate left paracentral spinal stenosis. Neural foraminal stenosis. C6-C7: Normal disc height. No herniation. Normal facets. No spinal canal or neural foraminal stenosis. C7-T1: Normal disc height. No herniation. Normal facets. No spinal canal or neural foraminal stenosis.   IMPRESSION:   1.  Mild to moderate  left paracentral spinal stenosis at C5-C6 secondary to eccentric disc osteophyte complex. Finding is stable/unchanged compared to 08/07/2021.   2.  No acute abnormalities or other significant degenerative abnormalities identified.         CT Head w/o Contrast  Result Date: 11/24/2021  EXAM: CT HEAD W/O CONTRAST LOCATION: [facility] DATE/TIME: 11/24/2021 4:55 PM INDICATION: Head injury. COMPARISON: Head CT 05/01/2019. TECHNIQUE: Routine CT Head without IV contrast. Multiplanar reformats. Dose reduction techniques were used. FINDINGS: INTRACRANIAL CONTENTS: No intracranial hemorrhage, extraaxial collection, or mass effect.  No CT evidence of acute infarct. Normal parenchymal attenuation. Normal ventricles and sulci. VISUALIZED ORBITS/SINUSES/MASTOIDS: No intraorbital abnormality. No paranasal sinus mucosal disease. No middle ear or mastoid effusion. BONES/SOFT TISSUES: No acute abnormality.   IMPRESSION: 1.  No acute intracranial abnormality.         XR Cervical Spine G/E 4 Views  Result Date: 8/11/2021  CERVICAL SPINE FOUR OR MORE VIEWS August 10, 2021 5:20 PM HISTORY: Neck pain. COMPARISON: MRI of the cervical spine dated 8/7/2021.   IMPRESSION: No gross vertebral body height loss identified. Alignment is within normal limits. On flexion and extension views, no significant dynamic spondylolisthesis identified. Multilevel degenerative disc disease, including mild-to-moderate disc space narrowing at C5-C6 and C6-C7. Small multilevel anterior endplate osteophytes. The prevertebral soft tissues appear normal. JUNIOR MARTINEZ MD   SYSTEM ID:  [id number]        XR Lumbar Spine G/E 4 Views  Result Date: 8/11/2021  LUMBAR SPINE FOUR OR MORE VIEWS August 10, 2021 5:15 PM HISTORY: Lumbar radiculopathy. COMPARISON: MRI lumbar spine 8/6/2021. CT abdomen and pelvis 5/19/2021.   IMPRESSION: Nomenclature is based on five lumbar vertebral bodies. There appears to be mild dextroconvex curvature at the  thoracolumbar junction. Alignment otherwise appears within normal limits. No significant dynamic spondylolisthesis identified on flexion-extension views. No gross vertebral body height loss. Mild multilevel degenerative disc space narrowing with small anterior marginal endplate osteophytes. Mild lower lumbar degenerative facet arthropathy. There is a suture material noted in the left upper quadrant of the abdomen, as before. JUNIOR MARTINEZ MD   SYSTEM ID:  [id number]        MR Cervical Spine w/o Contrast  Result Date: 8/7/2021  EXAM: MR CERVICAL SPINE W/O CONTRAST LOCATION: [facility] DATE/TIME: 8/7/2021 1:19 AM INDICATION: Spinal stenosis, C-spine COMPARISON: Thoracic spine MRI 8/6/2021 TECHNIQUE: MRI Cervical Spine without IV contrast. FINDINGS: Vertebral body height and alignment is preserved. Moderate Modic type I changes anteriorly at T3-4. No definite abnormal signal in the cervical spine. No abnormal cord signal. No extraspinal abnormality. Craniovertebral junction and C1-C2: Normal. C2-C3: Normal disc height. No herniation. Normal facets. No spinal canal or neural foraminal stenosis. C3-C4: Normal disc height. No herniation. Normal facets. No spinal canal or neural foraminal stenosis. C4-C5: Slight loss of disc height. Slight facet arthropathy. No spinal canal or neural foraminal stenosis. C5-C6: Moderate loss of disc height. Mild to moderate diffuse disc osteophyte complex, asymmetric to the left. Slight left uncinate spurring. Moderate to severe central canal stenosis to the left of midline. No definite foraminal narrowing. C6-C7: Normal disc height. No herniation. Normal facets. No spinal canal or neural foraminal stenosis. C7-T1: Normal disc height. No herniation. Normal facets. No spinal canal or neural foraminal stenosis.   IMPRESSION:   1.  At C5-6 there is moderate to severe central canal stenosis to the left of midline secondary to an asymmetric left disc osteophyte  complex. No abnormal cord signal.   2.  Minimal degenerative changes elsewhere as described.      Lumbar spine MRI w/o contrast-presurgical  Result Date: 8/6/2021  MRI LUMBAR SPINE WITHOUT CONTRAST   8/6/2021 12:23 PM HISTORY: Low back pain, cauda equina syndrome suspected; Saddle anesthesia, unable to urinate and low back pain radiating down left leg with foot numbness. TECHNIQUE: Multiplanar multisequence MRI of the lumbar spine without contrast. COMPARISON: None. FINDINGS: Nomenclature is based on 5 lumbar vertebral bodies. Normal vertebral body heights and sagittal alignment. Unremarkable bone marrow signal. Varying degrees of multilevel intervertebral disc desiccation, most pronounced at L5-S1. Normal appearance of the distal spinal cord with the conus terminating at L2. There is a T2 hyperintense ovoid lesion in the right kidney measuring up to approximately 14 mm (series 801 image 18) incompletely characterized, but presumably representing a cyst. Otherwise, the visualized paraspinous soft tissues and bony pelvis are unremarkable. Segmental analysis: T12-L1: Minimal disc height loss. No herniation. Normal facets. No spinal canal or neural foraminal stenosis. L1-L2: Minimal disc height loss. No herniation. Normal facets. No spinal canal or neural foraminal stenosis. L2-L3: Normal disc height. No herniation. Normal facets. No spinal canal or neural foraminal stenosis. L3-L4: Normal disc height. No herniation. Normal facets. No spinal canal or neural foraminal stenosis. L4-L5: Normal disc height. No herniation. Normal facets. No spinal canal or neural foraminal stenosis. L5-S1: Mild disc height loss. Disc bulge slightly eccentric to the left with posterior/posterolateral endplate marginal osteophytes. Mild facet arthropathy. Mild bilateral, left greater than right, lateral recess stenosis without displacement or overt compression of the traversing S1 nerve roots. No central spinal canal stenosis. Mild left  neural foraminal narrowing. The right neural foramen is not significantly narrowed.   IMPRESSION:   1. Multilevel degenerative changes of the lumbar spine, as described.   2. No high-grade spinal canal stenosis.   3. Mild left greater than right lateral recess stenosis at L5-S1.   4. Mild left neural foraminal stenosis at L5-S1. Otherwise, no significant neural foraminal narrowing. JUNIOR MARTINEZ MD   SYSTEM ID:  [id number]     MR Thoracic Spine w/o Contrast  Result Date: 8/7/2021  EXAM: MR THORACIC SPINE W/O CONTRAST LOCATION: [facility] DATE/TIME: 8/6/2021 11:23 PM INDICATION: Low back pain, leg pain, urinary retention, saddle anesthesia. COMPARISON: Lumbar spine MRI dated 8/6/2021. TECHNIQUE: Routine Thoracic Spine MRI without IV contrast. FINDINGS: Partially visualized disc osteophyte complex at C5-C6 indents the ventral spinal cord. Question cord edema in the lower cervical spine cord, only seen on the first few images of the axial T2 sequence. Slight left apex curvature centered in the lower thoracic spine. There is accentuation of the normal thoracic spine kyphosis, with degenerative endplate changes and Schmorl's nodes, suggestive of Scheuermann's disease. There are Modic type I degenerative  endplate changes at T3-T4. No evidence of an acute compression deformity. There are multilevel disc protrusions with a small disc protrusion at C7-T1, T1-T2, a central disc extrusion at T3-T4 that extends above and below the intervertebral disc space measuring 12 mm in craniocaudal dimension, at T7-T8, T10-T11, and T11-T12. There is no high grade canal stenosis. Multilevel facet arthropathy. There is no high grade neural foraminal narrowing. No definite cord signal abnormality within the thoracic spinal cord. Small bilateral pleural effusions.   IMPRESSION:   1.  Degenerative changes of the thoracic spine with findings suggestive of Scheuermann's disease.   2.  No high grade spinal canal or  neural foraminal narrowing in the thoracic spine.   3.  Partially visualized disc osteophyte complex at C5-C6 that indents the ventral spinal cord. In addition there is questionable cord signal change in the lower cervical spine, which is incompletely evaluated on the current exam. Consider further evaluation with dedicated cervical spine MRI. Findings were discussed with Dr. Robbins at 12:24 AM on 08/07/2021.                            Again, thank you for allowing me to participate in the care of your patient.        Sincerely,        Esme Hodgson, CNP

## 2022-02-24 NOTE — PROGRESS NOTES
Assessment:     Diagnoses and all orders for this visit:  Chronic right-sided low back pain without sciatica  -     XR Lumbar Spine G/E 4 Views; Future  -     pregabalin (LYRICA) 50 MG capsule; Take 1-2 capsules ( mg) by mouth At Bedtime  -     HYDROcodone-acetaminophen (NORCO) 5-325 MG tablet; Take 1 tablet by mouth 2 times daily as needed for severe pain Do not drive while taking  Lumbar facet arthropathy  -     XR Lumbar Spine G/E 4 Views; Future  Spondylolisthesis of lumbar region  -     XR Lumbar Spine G/E 4 Views; Future  Stenosis of lateral recess of lumbar spine  -     XR Lumbar Spine G/E 4 Views; Future  History of lumbar surgery  -     XR Lumbar Spine G/E 4 Views; Future  Chronic neck pain     Madeline Szymanski is a 42 year old y.o. female with past medical history significant for migraine, depressive disorder, , gastric bypass surgery, L5-S1 hemilaminectomy left medial facetectomy foraminotomies and microdiscectomy Dr. Lilly 10/4/2021, who presents today for follow-up regarding:    -Ongoing right low back pain lumbosacral junction, some increase with facet loading however also cannot rule out pain related to L5-S1 retrolisthesis and lateral recess stenosis L5-S1.  No current radicular symptoms.     Plan:     A shared decision making plan was used. The patient's values and choices were respected. Prior medical records were reviewed today. The following represents what was discussed and decided upon by the provider and the patient.        -DIAGNOSTIC TESTS: Images were personally reviewed and interpreted.   -Patient is scheduled for cervical MRI-ordered by Dr. Lilly to evaluate ongoing neck and upper extremity pain.  --Ordered lumbar spine flexion-extension x-ray to evaluate L5-S1 retrolisthesis stability.  --Lumbar MRI 2022 with microdiscectomy and left hemilaminectomy changes at L5-S1 with granulation tissue left paracentral region, moderate left lateral recess and mild right lateral  recess stenosis.  2 mm L5-S1 retrolisthesis.  --Cervical MRI 8/7/2021 Moderate to Severe spinal stenosis C5-6.   --Lumbar spine flexion-extension x-ray 8/10/2021 shows no spondylolisthesis and no instability.  --Lumbar spine MRI 8/6/2021 with L5-S1 mild disc height loss with disc bulge on the left with osteophytes with mild left foraminal stenosis lateral recess stenosis and S1 compression.    -INTERVENTIONS: Consider RIGHT L4, L5 medial branch block targeting the right L5-S1 facet joint.  If patient has a positive response would be a good candidate for moving forward with radiofrequency ablation.  -If no benefit we could consider a right L5-S1 TFESI she does have a retrolisthesis and lateral recess stenosis at this level.    -MEDICATIONS: Advised patient to slowly wean off of Gabapentin and stop this medication.  Once it stopped can start Lyrica 50 mg 1 tablet at bedtime, if tolerable after a couple of nights she can go to 2 tablets, 100 mg at bedtime we will see if this may provide better benefit than previous gabapentin.  --Due to intolerance of oral NSAIDs did advise patient to trial diclofenac gel 3 times daily to right low back pain for least 1 week to see if that can improve pain by reducing inflammation.  -Prescribed hydrocodone 5/325 mg 1 tablet twice daily as needed for severe breakthrough pain during transition off of gabapentin and onto Lyrica.  # 10 tablets given for 5 days worth.  [place]  checked.  This is for acute pain only.  Refills will not be given over the telephone.  Discussed the risks (eg, addiction, overdose, worsening pain, death) verses benefit of opioid use with patient today. Explained that this medication will not be a long term solution to ongoing pain. Discussed using lowest effective dose and the importance of other measures for pain management including PT, other non-opioid medications, behavioral treatments, and other procedure options.   Discussed side effects of medications and  proper use. Patient verbalized understanding.    -PHYSICAL THERAPY: Encourage patient continue working with physical therapy lumbar Med[facility] program here at the spine center for intensive core strengthening.  Discussed the importance of core strengthening, ROM, stretching exercises with the patient and how each of these entities is important in decreasing pain.  Explained to the patient that the purpose of physical therapy is to teach the patient a home exercise program.  These exercises need to be performed every day in order to decrease pain and prevent future occurrences of pain.        -PATIENT EDUCATION:  Total time of 32 minutes, on the day of service, spent with the patient, reviewing the chart, placing orders, and documenting.   -Today we also discussed the issues related to the current COVID-19 pandemic, the pros and cons of the current treatment plan, the CDC guidelines such as social distancing, washing the hands, and covering the cough.    -FOLLOW UP: Follow-up as needed  Advised to contact clinic if symptoms worsen or change.    Subjective:     Madeline Szymanski is a 42 year old female who presents today for follow-up regarding Ongoing constant right low back pain lumbosacral junction that radiates to the right buttock region that is worse at bedtime that does wake her up in the middle the night.  Currently pain is a 7/10 (worst, 3 at its best.  Does report that generalized activity is aggravating, nothing is currently giving relief.  Patient denies radiating lower extremity pain or paresthesias, denies recent trips falls or balance changes, denies bowel or bladder loss control.  Denies lower extremity weakness.    Treatment to Date:  Left L5-S1 hemilaminectomy, medial facetectomy, foraminotomy with L5-S1 left microdiscectomy 10/4/2021 Dr. Lilly.     Bilateral carpal tunnel steroid injection 11/11/2020 and 5/12/2021 with significant benefit.     L5-S1 TFESI 3/21/2018  Lumbar MBB 2/13/2019 with benefit  LBP  Lumbar RFA 3/6/2019  Bilateral L5-S1 TFESI 6/3/2020 with benefit LBP.  Preprocedure pain 8/10, post 5/10.  Left S1-S2 TFESI 8/3/2021 with no lasting benefit.  Preprocedure pain 8/10, post 2/10.  Left L5-S1 TFESI 9/1/2021 with minimal lasting benefit.  Preprocedure pain 7/10, post 4/10.     Medications:  Gabapentin 300 mg 1 to 2 tablets at bedtime, unable to tolerate during the daytime.  Amitriptyline 75 mg at bedtime for migraines  Flexeril as needed  Tylenol as needed  Lidocaine gel as needed     No benefit with recent Medrol Dosepak.     *Patient unable to tolerate NSAIDs due to GI surgery.    Patient Active Problem List   Diagnosis     Painless urinary retention due to cauda equina syndrome (H)     Spinal stenosis in cervical region     Pneumonia due to 2019 novel coronavirus     Gastric band slippage     Bariatric surgery status     History of gastric ulcer     Hx of Helicobacter infection     Migraine     Panic attacks     S/P laparoscopic sleeve gastrectomy     Lumbar radiculopathy     Anxiety     Acute and chronic respiratory failure with hypoxia (H)       Current Outpatient Medications   Medication     acetaminophen (TYLENOL) 500 MG tablet     amitriptyline (ELAVIL) 50 MG tablet     eletriptan (RELPAX) 40 MG tablet     EPINEPHrine (ANY BX GENERIC EQUIV) 0.3 MG/0.3ML injection 2-pack     HYDROcodone-acetaminophen (NORCO) 5-325 MG tablet     hydrOXYzine (ATARAX) 25 MG tablet     lidocaine (LIDODERM) 5 % patch     pregabalin (LYRICA) 50 MG capsule     rizatriptan (MAXALT-MLT) 10 MG ODT     topiramate (TOPAMAX) 50 MG tablet     No current facility-administered medications for this visit.       Allergies   Allergen Reactions     Cephalexin Hives     Coconut Flavor Anaphylaxis     Covid-19 (Mrna) Vaccine Anaphylaxis     Pfizer      Prochlorperazine Other (See Comments)     Other reaction(s): Tremors  Tremors  Other reaction(s): Tremors       Coconut Fatty Acids      Other reaction(s): Edema     Metoclopramide  Other (See Comments)     Other reaction(s): Tremors  tremors  Other reaction(s): Tremors       Penicillins Hives       Past Medical History:   Diagnosis Date     Arthritis      Depressive disorder      Kidney infection      Migraine      Painless urinary retention due to cauda equina syndrome (H)      Pancreatitis      Spinal stenosis in cervical region      UTI (lower urinary tract infection)         Review of Systems  ROS: Positive for numbness and tingling upper extremities.  Specifically negative for bowel/bladder dysfunction, balance changes, headache, dizziness, foot drop, fevers, chills, appetite changes, nausea/vomiting, unexplained weight loss. Otherwise 13 systems reviewed are negative. Please see the patient's intake questionnaire from today for details.    Reviewed Social, Family, Past Medical and Past Surgical history with patient, no significant changes noted since prior visit.     Objective:     /78 (BP Location: Right arm, Patient Position: Sitting)   Pulse 107   SpO2 99%     PHYSICAL EXAMINATION:    --CONSTITUTIONAL: Well developed, well nourished, healthy appearing individual.  --PSYCHIATRIC: Appropriate mood and affect. No difficulty interacting due to temper, social withdrawal, or memory issues.  --SKIN: Lumbar region is dry and intact.   --RESPIRATORY: Normal rhythm and effort. No abnormal accessory muscle breathing patterns noted.   --MUSCULOSKELETAL:  Normal lumbar lordosis noted, no lateral shift.  --GROSS MOTOR: Easily arises from a seated position. Gait is non-antalgic  --LUMBAR SPINE:  Inspection reveals no evidence of deformity. Range of motion with slight limitations with forward flexion due to pain, slight limitation of lumbar extension lateral rotation on the right due to pain. No tenderness to palpation lumbar spine. Straight leg raising in the seated position is negative to radicular pain. Sciatic notch non-tender.   --LOWER EXTREMITY MOTOR TESTING:  Plantar flexion left 5/5,  right 5/5   Dorsiflexion left 5/5, right 5/5   Great toe MTP extension left 5/5, right 5/5  Knee flexion left 5/5, right 5/5  Knee extension left 5/5, right 5/5   Hip flexion left 5/5, right 5/5  Hip abduction left 5/5, right 5/5  Hip adduction left 5/5, right 5/5   --HIPS: Full range of motion bilaterally. Negative FABERs on the involved lower extremity.   --NEUROLOGIC: Bilateral patellar and achilles reflexes are physiologic and symmetric. Sensation to light touch is intact in the bilateral L4, L5, and S1 dermatomes.    RESULTS:   Imaging: Lumbar spine imaging was reviewed today. The images were shown to the patient and the findings were explained using a spine model.      MR Lumbar Spine w/o Contrast  Result Date: 2/18/2022  EXAM: MR LUMBAR SPINE W/O CONTRAST LOCATION: [facility] DATE/TIME: 2/17/2022 5:17 PM INDICATION: Patient is a 42-year-old female who is status post left lumbar 5-sacral 1 microdiscectomy on 10/4/2021. Since last week. Has a constant dull ache in her right lower back. She underwent a medrol dose pack without relief. COMPARISON: Lumbar spine MRI 10/17/2021. TECHNIQUE: Routine Lumbar Spine MRI without IV contrast. FINDINGS: Nomenclature is based on 5 lumbar type vertebral bodies. The conus ends at mid to distal L2. 1 mm retrolisthesis of L1 on L2. 2 mm retrolisthesis of L5 on S1. Vertebral body heights are maintained. Nonpathologic marrow signal. No MRI evidence for pars defects. Compared to the previous lumbar spine MRI, there has been interval moderate decrease in the previously noted edema involving the left L5-S1 laminectomy site and posterior paraspinal soft tissues. Normal diameter of the distal abdominal aorta. The visualized bony pelvis is grossly within normal limits. T11-T12: Mild to moderate intervertebral disc height loss. Loss of the normal T2 signal within the disc. Small broad-based disc bulge with superimposed small central disc protrusion. Mild  bilateral facet arthropathy. Mild spinal canal narrowing. No neural foraminal narrowing. T12-L1: Mild intervertebral disc height loss. Loss of the normal T2 signal within the disc. Small broad-based disc bulge. Mild bilateral facet arthropathy. No spinal canal narrowing. No neural foraminal narrowing. L1-L2: Mild intervertebral disc height loss. Loss of the normal T2 signal within the disc. Small broad-based disc bulge. Moderate bilateral facet arthropathy. No spinal canal narrowing. No neural foraminal narrowing. L2-L3: Normal intervertebral disc height and signal. Shallow broad-based disc bulge. Moderate bilateral facet arthropathy. No spinal canal narrowing. No neural foraminal narrowing. L3-L4: Normal intervertebral disc height and signal. Small broad-based disc bulge. Moderate bilateral facet arthropathy. No spinal canal narrowing. No neural foraminal narrowing. L4-L5: Normal intervertebral disc height and signal. Small broad-based disc bulge. Moderate bilateral facet arthropathy. No spinal canal narrowing. No neural foraminal narrowing. L5-S1: Mild intervertebral disc height loss. Loss of the normal T2 signal within the disc. Small broad-based disc bulge with superimposed small left paracentral/foraminal disc protrusion. Moderate bilateral facet arthropathy. Microdiscectomy changes. Left hemilaminectomy changes. Small amount of presumed granulation tissue at the microdiscectomy changes in the left paracentral region and along the left hemilaminectomy. Moderate narrowing of the left lateral recess. Mild narrowing of the right lateral  recess. No spinal canal narrowing. No right neural foraminal narrowing. Mild left neural foraminal narrowing.   IMPRESSION:   1.  Compared to the previous lumbar spine MRI 10/17/2021, there has been interval moderate decrease in the previously noted edema involving the left L5-S1 laminectomy site and posterior paraspinal soft tissues.   2.  Small amount of presumed granulation  tissue at the microdiscectomy changes in the left paracentral region and along the left hemilaminectomy.   3.  Moderate narrowing of the left lateral recess and mild narrowing of the right lateral recess at L5-S1. These probably contact the bilateral traversing S1 nerve roots, left greater than right   4.  No high-grade spinal canal or neural foraminal narrowing.   5.  Mild narrowing of the left lateral recess at L5-S1.      Cervical spine MRI w/o contrast  Result Date: 11/24/2021  EXAM: MR CERVICAL SPINE W/O CONTRAST LOCATION: [facility] DATE/TIME: 11/24/2021 7:50 PM INDICATION: Neck trauma, focal neuro deficit or paresthesia (Age 16-64y) COMPARISON: MRI cervical spine dated 08/07/2021. TECHNIQUE: MRI Cervical Spine without IV contrast. FINDINGS: Normal vertebral body heights, alignment and marrow signal. No abnormal cord signal. No extraspinal abnormality. Craniovertebral junction and C1-C2: Normal. C2-C3: Normal disc height. No herniation. Normal facets. No spinal canal or neural foraminal stenosis. C3-C4: Normal disc height. No herniation. Normal facets. No spinal canal or neural foraminal stenosis. C4-C5: Slight loss of disc height. No herniation. Mild facet arthropathy. No spinal canal or neural foraminal stenosis. C5-C6: Moderate loss of disc height. Moderate diffuse disc osteophyte complex asymmetric to left. No herniation. Mild to moderate left paracentral spinal stenosis. Neural foraminal stenosis. C6-C7: Normal disc height. No herniation. Normal facets. No spinal canal or neural foraminal stenosis. C7-T1: Normal disc height. No herniation. Normal facets. No spinal canal or neural foraminal stenosis.   IMPRESSION:   1.  Mild to moderate left paracentral spinal stenosis at C5-C6 secondary to eccentric disc osteophyte complex. Finding is stable/unchanged compared to 08/07/2021.   2.  No acute abnormalities or other significant degenerative abnormalities identified.         CT Head  w/o Contrast  Result Date: 11/24/2021  EXAM: CT HEAD W/O CONTRAST LOCATION: [facility] DATE/TIME: 11/24/2021 4:55 PM INDICATION: Head injury. COMPARISON: Head CT 05/01/2019. TECHNIQUE: Routine CT Head without IV contrast. Multiplanar reformats. Dose reduction techniques were used. FINDINGS: INTRACRANIAL CONTENTS: No intracranial hemorrhage, extraaxial collection, or mass effect.  No CT evidence of acute infarct. Normal parenchymal attenuation. Normal ventricles and sulci. VISUALIZED ORBITS/SINUSES/MASTOIDS: No intraorbital abnormality. No paranasal sinus mucosal disease. No middle ear or mastoid effusion. BONES/SOFT TISSUES: No acute abnormality.   IMPRESSION: 1.  No acute intracranial abnormality.         XR Cervical Spine G/E 4 Views  Result Date: 8/11/2021  CERVICAL SPINE FOUR OR MORE VIEWS August 10, 2021 5:20 PM HISTORY: Neck pain. COMPARISON: MRI of the cervical spine dated 8/7/2021.   IMPRESSION: No gross vertebral body height loss identified. Alignment is within normal limits. On flexion and extension views, no significant dynamic spondylolisthesis identified. Multilevel degenerative disc disease, including mild-to-moderate disc space narrowing at C5-C6 and C6-C7. Small multilevel anterior endplate osteophytes. The prevertebral soft tissues appear normal. JUNIOR MARTINEZ MD   SYSTEM ID:  [id number]        XR Lumbar Spine G/E 4 Views  Result Date: 8/11/2021  LUMBAR SPINE FOUR OR MORE VIEWS August 10, 2021 5:15 PM HISTORY: Lumbar radiculopathy. COMPARISON: MRI lumbar spine 8/6/2021. CT abdomen and pelvis 5/19/2021.   IMPRESSION: Nomenclature is based on five lumbar vertebral bodies. There appears to be mild dextroconvex curvature at the thoracolumbar junction. Alignment otherwise appears within normal limits. No significant dynamic spondylolisthesis identified on flexion-extension views. No gross vertebral body height loss. Mild multilevel degenerative disc space narrowing with small  anterior marginal endplate osteophytes. Mild lower lumbar degenerative facet arthropathy. There is a suture material noted in the left upper quadrant of the abdomen, as before. JUNIOR MARTINEZ MD   SYSTEM ID:  [id number]        MR Cervical Spine w/o Contrast  Result Date: 8/7/2021  EXAM: MR CERVICAL SPINE W/O CONTRAST LOCATION: [facility] DATE/TIME: 8/7/2021 1:19 AM INDICATION: Spinal stenosis, C-spine COMPARISON: Thoracic spine MRI 8/6/2021 TECHNIQUE: MRI Cervical Spine without IV contrast. FINDINGS: Vertebral body height and alignment is preserved. Moderate Modic type I changes anteriorly at T3-4. No definite abnormal signal in the cervical spine. No abnormal cord signal. No extraspinal abnormality. Craniovertebral junction and C1-C2: Normal. C2-C3: Normal disc height. No herniation. Normal facets. No spinal canal or neural foraminal stenosis. C3-C4: Normal disc height. No herniation. Normal facets. No spinal canal or neural foraminal stenosis. C4-C5: Slight loss of disc height. Slight facet arthropathy. No spinal canal or neural foraminal stenosis. C5-C6: Moderate loss of disc height. Mild to moderate diffuse disc osteophyte complex, asymmetric to the left. Slight left uncinate spurring. Moderate to severe central canal stenosis to the left of midline. No definite foraminal narrowing. C6-C7: Normal disc height. No herniation. Normal facets. No spinal canal or neural foraminal stenosis. C7-T1: Normal disc height. No herniation. Normal facets. No spinal canal or neural foraminal stenosis.   IMPRESSION:   1.  At C5-6 there is moderate to severe central canal stenosis to the left of midline secondary to an asymmetric left disc osteophyte complex. No abnormal cord signal.   2.  Minimal degenerative changes elsewhere as described.      Lumbar spine MRI w/o contrast-presurgical  Result Date: 8/6/2021  MRI LUMBAR SPINE WITHOUT CONTRAST   8/6/2021 12:23 PM HISTORY: Low back pain, cauda equina  syndrome suspected; Saddle anesthesia, unable to urinate and low back pain radiating down left leg with foot numbness. TECHNIQUE: Multiplanar multisequence MRI of the lumbar spine without contrast. COMPARISON: None. FINDINGS: Nomenclature is based on 5 lumbar vertebral bodies. Normal vertebral body heights and sagittal alignment. Unremarkable bone marrow signal. Varying degrees of multilevel intervertebral disc desiccation, most pronounced at L5-S1. Normal appearance of the distal spinal cord with the conus terminating at L2. There is a T2 hyperintense ovoid lesion in the right kidney measuring up to approximately 14 mm (series 801 image 18) incompletely characterized, but presumably representing a cyst. Otherwise, the visualized paraspinous soft tissues and bony pelvis are unremarkable. Segmental analysis: T12-L1: Minimal disc height loss. No herniation. Normal facets. No spinal canal or neural foraminal stenosis. L1-L2: Minimal disc height loss. No herniation. Normal facets. No spinal canal or neural foraminal stenosis. L2-L3: Normal disc height. No herniation. Normal facets. No spinal canal or neural foraminal stenosis. L3-L4: Normal disc height. No herniation. Normal facets. No spinal canal or neural foraminal stenosis. L4-L5: Normal disc height. No herniation. Normal facets. No spinal canal or neural foraminal stenosis. L5-S1: Mild disc height loss. Disc bulge slightly eccentric to the left with posterior/posterolateral endplate marginal osteophytes. Mild facet arthropathy. Mild bilateral, left greater than right, lateral recess stenosis without displacement or overt compression of the traversing S1 nerve roots. No central spinal canal stenosis. Mild left neural foraminal narrowing. The right neural foramen is not significantly narrowed.   IMPRESSION:   1. Multilevel degenerative changes of the lumbar spine, as described.   2. No high-grade spinal canal stenosis.   3. Mild left greater than right lateral recess  stenosis at L5-S1.   4. Mild left neural foraminal stenosis at L5-S1. Otherwise, no significant neural foraminal narrowing. JUNIOR MARTINEZ MD   SYSTEM ID:  [id number]     MR Thoracic Spine w/o Contrast  Result Date: 8/7/2021  EXAM: MR THORACIC SPINE W/O CONTRAST LOCATION: [facility] DATE/TIME: 8/6/2021 11:23 PM INDICATION: Low back pain, leg pain, urinary retention, saddle anesthesia. COMPARISON: Lumbar spine MRI dated 8/6/2021. TECHNIQUE: Routine Thoracic Spine MRI without IV contrast. FINDINGS: Partially visualized disc osteophyte complex at C5-C6 indents the ventral spinal cord. Question cord edema in the lower cervical spine cord, only seen on the first few images of the axial T2 sequence. Slight left apex curvature centered in the lower thoracic spine. There is accentuation of the normal thoracic spine kyphosis, with degenerative endplate changes and Schmorl's nodes, suggestive of Scheuermann's disease. There are Modic type I degenerative  endplate changes at T3-T4. No evidence of an acute compression deformity. There are multilevel disc protrusions with a small disc protrusion at C7-T1, T1-T2, a central disc extrusion at T3-T4 that extends above and below the intervertebral disc space measuring 12 mm in craniocaudal dimension, at T7-T8, T10-T11, and T11-T12. There is no high grade canal stenosis. Multilevel facet arthropathy. There is no high grade neural foraminal narrowing. No definite cord signal abnormality within the thoracic spinal cord. Small bilateral pleural effusions.   IMPRESSION:   1.  Degenerative changes of the thoracic spine with findings suggestive of Scheuermann's disease.   2.  No high grade spinal canal or neural foraminal narrowing in the thoracic spine.   3.  Partially visualized disc osteophyte complex at C5-C6 that indents the ventral spinal cord. In addition there is questionable cord signal change in the lower cervical spine, which is incompletely evaluated on  the current exam. Consider further evaluation with dedicated cervical spine MRI. Findings were discussed with Dr. Robbins at 12:24 AM on 08/07/2021.

## 2022-02-28 ENCOUNTER — MYC MEDICAL ADVICE (OUTPATIENT)
Dept: PHYSICAL MEDICINE AND REHAB | Facility: CLINIC | Age: 42
End: 2022-02-28

## 2022-02-28 DIAGNOSIS — M47.816 LUMBAR FACET ARTHROPATHY: ICD-10-CM

## 2022-02-28 DIAGNOSIS — G89.29 CHRONIC BILATERAL LOW BACK PAIN WITHOUT SCIATICA: Primary | ICD-10-CM

## 2022-02-28 DIAGNOSIS — M54.50 CHRONIC BILATERAL LOW BACK PAIN WITHOUT SCIATICA: Primary | ICD-10-CM

## 2022-03-02 ENCOUNTER — HOSPITAL ENCOUNTER (OUTPATIENT)
Dept: MRI IMAGING | Facility: HOSPITAL | Age: 42
End: 2022-03-02
Payer: COMMERCIAL

## 2022-03-02 ENCOUNTER — HOSPITAL ENCOUNTER (OUTPATIENT)
Dept: RADIOLOGY | Facility: HOSPITAL | Age: 42
End: 2022-03-02
Attending: NURSE PRACTITIONER
Payer: COMMERCIAL

## 2022-03-02 DIAGNOSIS — M54.50 CHRONIC RIGHT-SIDED LOW BACK PAIN WITHOUT SCIATICA: ICD-10-CM

## 2022-03-02 DIAGNOSIS — M47.816 LUMBAR FACET ARTHROPATHY: ICD-10-CM

## 2022-03-02 DIAGNOSIS — M43.16 SPONDYLOLISTHESIS OF LUMBAR REGION: ICD-10-CM

## 2022-03-02 DIAGNOSIS — M48.061 STENOSIS OF LATERAL RECESS OF LUMBAR SPINE: ICD-10-CM

## 2022-03-02 DIAGNOSIS — R26.89 IMBALANCE: ICD-10-CM

## 2022-03-02 DIAGNOSIS — Z98.890 HISTORY OF LUMBAR SURGERY: ICD-10-CM

## 2022-03-02 DIAGNOSIS — G89.29 CHRONIC RIGHT-SIDED LOW BACK PAIN WITHOUT SCIATICA: ICD-10-CM

## 2022-03-02 PROCEDURE — 72120 X-RAY BEND ONLY L-S SPINE: CPT

## 2022-03-02 PROCEDURE — 72141 MRI NECK SPINE W/O DYE: CPT

## 2022-03-08 ENCOUNTER — RADIOLOGY INJECTION OFFICE VISIT (OUTPATIENT)
Dept: PHYSICAL MEDICINE AND REHAB | Facility: CLINIC | Age: 42
End: 2022-03-08
Attending: NURSE PRACTITIONER
Payer: COMMERCIAL

## 2022-03-08 VITALS
TEMPERATURE: 98.8 F | RESPIRATION RATE: 16 BRPM | DIASTOLIC BLOOD PRESSURE: 78 MMHG | SYSTOLIC BLOOD PRESSURE: 124 MMHG | HEART RATE: 120 BPM | OXYGEN SATURATION: 100 %

## 2022-03-08 DIAGNOSIS — M47.816 LUMBAR FACET ARTHROPATHY: ICD-10-CM

## 2022-03-08 DIAGNOSIS — G89.29 CHRONIC BILATERAL LOW BACK PAIN WITHOUT SCIATICA: ICD-10-CM

## 2022-03-08 DIAGNOSIS — M54.50 CHRONIC BILATERAL LOW BACK PAIN WITHOUT SCIATICA: ICD-10-CM

## 2022-03-08 PROCEDURE — 64493 INJ PARAVERT F JNT L/S 1 LEV: CPT | Mod: 50 | Performed by: PAIN MEDICINE

## 2022-03-08 PROCEDURE — 64494 INJ PARAVERT F JNT L/S 2 LEV: CPT | Mod: 50 | Performed by: PAIN MEDICINE

## 2022-03-08 RX ORDER — LIDOCAINE HYDROCHLORIDE 10 MG/ML
INJECTION, SOLUTION EPIDURAL; INFILTRATION; INTRACAUDAL; PERINEURAL
Status: COMPLETED | OUTPATIENT
Start: 2022-03-08 | End: 2022-03-08

## 2022-03-08 RX ORDER — BUPIVACAINE HYDROCHLORIDE 7.5 MG/ML
INJECTION, SOLUTION EPIDURAL; RETROBULBAR
Status: COMPLETED | OUTPATIENT
Start: 2022-03-08 | End: 2022-03-08

## 2022-03-08 RX ADMIN — LIDOCAINE HYDROCHLORIDE 5 ML: 10 INJECTION, SOLUTION EPIDURAL; INFILTRATION; INTRACAUDAL; PERINEURAL at 15:55

## 2022-03-08 RX ADMIN — BUPIVACAINE HYDROCHLORIDE 3 ML: 7.5 INJECTION, SOLUTION EPIDURAL; RETROBULBAR at 15:55

## 2022-03-08 ASSESSMENT — PAIN SCALES - GENERAL
PAINLEVEL: MODERATE PAIN (5)
PAINLEVEL: MILD PAIN (3)

## 2022-03-08 NOTE — PATIENT INSTRUCTIONS
Please complete your pain diary and return the diary to the [facility] at your earliest convenience.  The [facility] will contact you to schedule your next appointment after your pain diary is reviewed by your doctor.  Thank you.    DISCHARGE INSTRUCTIONS    During office hours (8:00 a.m.- 4:00 p.m.) questions or concerns may be answered  by calling [facility] Navigation Nurses at  [phone].  Messages received after hours will be returned the following business day.      In the case of an emergency, please dial 911 or seek assistance at the nearest Emergency Room/Urgent Care facility.     All Patients:  ? You may experience an increase in your symptoms for the first 2 days, once the numbing medication wears off.    ? You may resume your regular medication, no pain medication until you have completed your diary    ? You may shower. No swimming, tub bath or hot tub for 24 hours; remove bandage after 4 hours    ? Continue your activities that can cause you pain to test the blocks.                         ? You should not drive for the next 3 to 5 hours (have someone drive you)           POSSIBLE PROCEDURE SIDE EFFECTS  -Call Spine Center if concerned-    Increased Pain  Increased numbness/tingling     Nausea/Vomiting  Bruising/bleeding at site (hematoma)             Swelling at site (edema) Headache  Difficulty walking  Infection        Fever greater than 100.5

## 2022-03-10 ENCOUNTER — OFFICE VISIT (OUTPATIENT)
Dept: NEUROSURGERY | Facility: CLINIC | Age: 42
End: 2022-03-10
Payer: COMMERCIAL

## 2022-03-10 VITALS
OXYGEN SATURATION: 99 % | BODY MASS INDEX: 33.12 KG/M2 | SYSTOLIC BLOOD PRESSURE: 122 MMHG | DIASTOLIC BLOOD PRESSURE: 83 MMHG | WEIGHT: 194 LBS | HEIGHT: 64 IN | HEART RATE: 93 BPM

## 2022-03-10 DIAGNOSIS — M47.12 CERVICAL SPONDYLOSIS WITH MYELOPATHY: Primary | ICD-10-CM

## 2022-03-10 DIAGNOSIS — M47.816 SPONDYLOSIS OF LUMBAR REGION WITHOUT MYELOPATHY OR RADICULOPATHY: Primary | ICD-10-CM

## 2022-03-10 PROCEDURE — 99213 OFFICE O/P EST LOW 20 MIN: CPT | Performed by: SURGERY

## 2022-03-10 NOTE — LETTER
3/10/2022         RE: Madeline Szymanski  [address] [facility] [address]        Dear Colleague,    Thank you for referring your patient, Madeline Szymanski, to the [facility]. Please see a copy of my visit note below.    Pain down arms into hands is new. Feels aching down biceps into forearms. Feels more with increase activity. Numbness and tingling in her hands. 1-3 digits. Dropping objects. Fine motor decreased in right>left. Balance is still off. Physical therapy no improvement in symptoms. Lyrica- without relief of pain or paresthesias.     Able to tandem ok. difficulty standing on  either leg R>L. Strength full.  Bilateral jackson's. Brisk x 4. No clonus or babinski     We discussed risks and benefits of C5-6 artificial disc replacement versus continued conservative management. She would like to proceed with a right C5-6 TFESI.      Radha Lilly MD       Again, thank you for allowing me to participate in the care of your patient.        Sincerely,        Radha Lilly MD

## 2022-03-10 NOTE — PROGRESS NOTES
Pain down arms into hands is new. Feels aching down biceps into forearms. Feels more with increase activity. Numbness and tingling in her hands. 1-3 digits. Dropping objects. Fine motor decreased in right>left. Balance is still off. Physical therapy no improvement in symptoms. Lyrica- without relief of pain or paresthesias.     Able to tandem ok. difficulty standing on  either leg R>L. Strength full.  Bilateral jackson's. Brisk x 4. No clonus or babinski     We discussed risks and benefits of C5-6 artificial disc replacement versus continued conservative management. She would like to proceed with a right C5-6 TFESI.      Radha Lilly MD

## 2022-03-15 ENCOUNTER — RADIOLOGY INJECTION OFFICE VISIT (OUTPATIENT)
Dept: PHYSICAL MEDICINE AND REHAB | Facility: CLINIC | Age: 42
End: 2022-03-15
Attending: NURSE PRACTITIONER
Payer: COMMERCIAL

## 2022-03-15 VITALS
OXYGEN SATURATION: 99 % | TEMPERATURE: 98.4 F | DIASTOLIC BLOOD PRESSURE: 80 MMHG | SYSTOLIC BLOOD PRESSURE: 132 MMHG | HEART RATE: 101 BPM

## 2022-03-15 DIAGNOSIS — M47.816 SPONDYLOSIS OF LUMBAR REGION WITHOUT MYELOPATHY OR RADICULOPATHY: Primary | ICD-10-CM

## 2022-03-15 DIAGNOSIS — M47.816 SPONDYLOSIS OF LUMBAR REGION WITHOUT MYELOPATHY OR RADICULOPATHY: ICD-10-CM

## 2022-03-15 PROCEDURE — 64494 INJ PARAVERT F JNT L/S 2 LEV: CPT | Mod: 50 | Performed by: PAIN MEDICINE

## 2022-03-15 PROCEDURE — 64493 INJ PARAVERT F JNT L/S 1 LEV: CPT | Mod: 50 | Performed by: PAIN MEDICINE

## 2022-03-15 RX ORDER — LIDOCAINE HYDROCHLORIDE 20 MG/ML
INJECTION, SOLUTION EPIDURAL; INFILTRATION; INTRACAUDAL; PERINEURAL
Status: COMPLETED | OUTPATIENT
Start: 2022-03-15 | End: 2022-03-15

## 2022-03-15 RX ORDER — FLUOXETINE 40 MG/1
40 CAPSULE ORAL DAILY
COMMUNITY
Start: 2022-02-23 | End: 2022-11-03

## 2022-03-15 RX ORDER — LIDOCAINE HYDROCHLORIDE 10 MG/ML
INJECTION, SOLUTION EPIDURAL; INFILTRATION; INTRACAUDAL; PERINEURAL
Status: COMPLETED | OUTPATIENT
Start: 2022-03-15 | End: 2022-03-15

## 2022-03-15 RX ADMIN — LIDOCAINE HYDROCHLORIDE 1 ML: 10 INJECTION, SOLUTION EPIDURAL; INFILTRATION; INTRACAUDAL; PERINEURAL at 08:08

## 2022-03-15 RX ADMIN — LIDOCAINE HYDROCHLORIDE 3 ML: 20 INJECTION, SOLUTION EPIDURAL; INFILTRATION; INTRACAUDAL; PERINEURAL at 08:08

## 2022-03-15 ASSESSMENT — PAIN SCALES - GENERAL
PAINLEVEL: NO PAIN (0)
PAINLEVEL: SEVERE PAIN (6)

## 2022-03-15 NOTE — PATIENT INSTRUCTIONS
*Return Pain Diary as soon as possible. We will review & get back to you with future plan of care.      DISCHARGE INSTRUCTIONS    During office hours (8:00 a.m.- 4:00 p.m.) questions or concerns may be answered  by calling [facility] Navigation Nurses at  [phone].  Messages received after hours will be returned the following business day.      In the case of an emergency, please dial 911 or seek assistance at the nearest Emergency Room/Urgent Care facility.     All Patients:  ? You may experience an increase in your symptoms for the first 2 days, once the numbing medication wears off.    ? You may resume your regular medication, no pain medication until you have completed your diary    ? You may shower. No swimming, tub bath or hot tub for 24 hours; remove bandage after 4 hours    ? Continue your activities that can cause you pain to test the blocks.                         ? You should not drive for the next 3 to 5 hours (have someone drive you)           POSSIBLE PROCEDURE SIDE EFFECTS  -Call Spine Center if concerned-    Increased Pain  Increased numbness/tingling     Nausea/Vomiting  Bruising/bleeding at site (hematoma)             Swelling at site (edema) Headache  Difficulty walking  Infection        Fever greater than 100.5

## 2022-03-24 ENCOUNTER — RADIOLOGY INJECTION OFFICE VISIT (OUTPATIENT)
Dept: PHYSICAL MEDICINE AND REHAB | Facility: CLINIC | Age: 42
End: 2022-03-24
Attending: NURSE PRACTITIONER
Payer: COMMERCIAL

## 2022-03-24 VITALS — OXYGEN SATURATION: 100 % | SYSTOLIC BLOOD PRESSURE: 130 MMHG | HEART RATE: 90 BPM | DIASTOLIC BLOOD PRESSURE: 90 MMHG

## 2022-03-24 DIAGNOSIS — M47.816 SPONDYLOSIS OF LUMBAR REGION WITHOUT MYELOPATHY OR RADICULOPATHY: ICD-10-CM

## 2022-03-24 PROCEDURE — 64636 DESTROY L/S FACET JNT ADDL: CPT | Mod: 50 | Performed by: PAIN MEDICINE

## 2022-03-24 PROCEDURE — 64635 DESTROY LUMB/SAC FACET JNT: CPT | Mod: 50 | Performed by: PAIN MEDICINE

## 2022-03-24 RX ORDER — LIDOCAINE HYDROCHLORIDE 20 MG/ML
INJECTION, SOLUTION EPIDURAL; INFILTRATION; INTRACAUDAL; PERINEURAL
Status: COMPLETED | OUTPATIENT
Start: 2022-03-24 | End: 2022-03-24

## 2022-03-24 RX ORDER — BUPIVACAINE HYDROCHLORIDE 2.5 MG/ML
INJECTION, SOLUTION EPIDURAL; INFILTRATION; INTRACAUDAL
Status: COMPLETED | OUTPATIENT
Start: 2022-03-24 | End: 2022-03-24

## 2022-03-24 RX ORDER — LIDOCAINE HYDROCHLORIDE 10 MG/ML
INJECTION, SOLUTION EPIDURAL; INFILTRATION; INTRACAUDAL; PERINEURAL
Status: COMPLETED | OUTPATIENT
Start: 2022-03-24 | End: 2022-03-24

## 2022-03-24 RX ADMIN — BUPIVACAINE HYDROCHLORIDE 5 ML: 2.5 INJECTION, SOLUTION EPIDURAL; INFILTRATION; INTRACAUDAL at 15:10

## 2022-03-24 RX ADMIN — LIDOCAINE HYDROCHLORIDE 5 ML: 20 INJECTION, SOLUTION EPIDURAL; INFILTRATION; INTRACAUDAL; PERINEURAL at 15:10

## 2022-03-24 RX ADMIN — LIDOCAINE HYDROCHLORIDE 1 ML: 10 INJECTION, SOLUTION EPIDURAL; INFILTRATION; INTRACAUDAL; PERINEURAL at 15:09

## 2022-03-24 ASSESSMENT — PAIN SCALES - GENERAL
PAINLEVEL: NO PAIN (0)
PAINLEVEL: SEVERE PAIN (6)

## 2022-03-24 NOTE — PATIENT INSTRUCTIONS
DISCHARGE INSTRUCTIONS    During office hours (8:00 a.m.- 4:00 p.m.) questions or concerns may be answered  by calling [facility] Navigation Nurses at  [phone].  Messages received after hours will be returned the following business day.      In the case of an emergency, please dial 911 or seek assistance at the nearest Emergency Room/Urgent Care facility.     All Patients:  ? You may experience an increase in your symptoms for the first 2 days, once the numbing medication wears off.    ? You may resume your regular medication, no pain medication until you have completed your diary    ? You may shower. No swimming, tub bath or hot tub for 24 hours; remove bandage after 4 hours    ? Continue your activities that can cause you pain to test the blocks.                         ? You should not drive for the next 3 to 5 hours (have someone drive you)           POSSIBLE PROCEDURE SIDE EFFECTS  -Call Spine Center if concerned-    Increased Pain  Increased numbness/tingling     Nausea/Vomiting  Bruising/bleeding at site (hematoma)             Swelling at site (edema) Headache  Difficulty walking  Infection        Fever greater than 100.5

## 2022-04-19 ENCOUNTER — RADIOLOGY INJECTION OFFICE VISIT (OUTPATIENT)
Dept: PHYSICAL MEDICINE AND REHAB | Facility: CLINIC | Age: 42
End: 2022-04-19
Attending: NURSE PRACTITIONER
Payer: COMMERCIAL

## 2022-04-19 VITALS
DIASTOLIC BLOOD PRESSURE: 85 MMHG | OXYGEN SATURATION: 98 % | TEMPERATURE: 98.6 F | HEART RATE: 105 BPM | SYSTOLIC BLOOD PRESSURE: 136 MMHG

## 2022-04-19 DIAGNOSIS — G56.03 BILATERAL CARPAL TUNNEL SYNDROME: ICD-10-CM

## 2022-04-19 DIAGNOSIS — R20.0 NUMBNESS AND TINGLING IN BOTH HANDS: ICD-10-CM

## 2022-04-19 DIAGNOSIS — G56.03 BILATERAL CARPAL TUNNEL SYNDROME: Primary | ICD-10-CM

## 2022-04-19 DIAGNOSIS — R20.2 NUMBNESS AND TINGLING IN BOTH HANDS: ICD-10-CM

## 2022-04-19 PROCEDURE — 20526 THER INJECTION CARP TUNNEL: CPT | Mod: 50 | Performed by: PAIN MEDICINE

## 2022-04-19 PROCEDURE — 76942 ECHO GUIDE FOR BIOPSY: CPT | Performed by: PAIN MEDICINE

## 2022-04-19 RX ORDER — LIDOCAINE HYDROCHLORIDE 20 MG/ML
INJECTION, SOLUTION EPIDURAL; INFILTRATION; INTRACAUDAL; PERINEURAL
Status: COMPLETED | OUTPATIENT
Start: 2022-04-19 | End: 2022-04-19

## 2022-04-19 RX ORDER — METHYLPREDNISOLONE ACETATE 40 MG/ML
INJECTION, SUSPENSION INTRA-ARTICULAR; INTRALESIONAL; INTRAMUSCULAR; SOFT TISSUE
Status: COMPLETED | OUTPATIENT
Start: 2022-04-19 | End: 2022-04-19

## 2022-04-19 RX ADMIN — METHYLPREDNISOLONE ACETATE 40 MG: 40 INJECTION, SUSPENSION INTRA-ARTICULAR; INTRALESIONAL; INTRAMUSCULAR; SOFT TISSUE at 09:30

## 2022-04-19 RX ADMIN — LIDOCAINE HYDROCHLORIDE 5 ML: 20 INJECTION, SOLUTION EPIDURAL; INFILTRATION; INTRACAUDAL; PERINEURAL at 09:32

## 2022-04-19 ASSESSMENT — PAIN SCALES - GENERAL
PAINLEVEL: NO PAIN (0)
PAINLEVEL: EXTREME PAIN (8)

## 2022-04-19 NOTE — PATIENT INSTRUCTIONS
DISCHARGE INSTRUCTIONS    During office hours (8:00 a.m.- 4:00 p.m.) questions or concerns may be answered  by calling [facility] Navigation Nurses at  [phone].  Messages received after hours will be returned the following business day.      In the case of an emergency, please dial 911 or seek assistance at the nearest Emergency Room/Urgent Care facility.     All Patients:  You may experience an increase in your symptoms for the first 2 days (It may take anywhere between 2 days- 2 weeks for the steroid to have maximum effect).    You may use ice on the injection site, as frequently as 20 minutes each hour if needed.    You may take your pain medicine.    You may continue taking your regular medication.    You may shower. No swimming, tub bath or hot tub for 48 hours.  You may remove your bandaid/bandage as soon as you are home.    You may resume light activities, as tolerated.    Resume your usual diet as tolerated.    It is strongly advised that you do not drive for 1-3 hours post injection.    If you have had oral sedation:  Do not drive for 8 hours post injection.      If you have had IV sedation:  Do not drive for 24 hours post injection.  Do not operate hazardous machinery or make important personal/business decisions for 24 hours.    POSSIBLE STEROID SIDE EFFECTS (If steroid/cortisone was used for your procedure)    -If you experience these symptoms, it should only last for a short period    Swelling of the legs              Skin redness (flushing)     Mouth (oral) irritation   Blood sugar (glucose) levels            Sweats                   Mood changes  Headache  Weakened immune system for up to 14 days, which could increase the risk of manuel the COVID-19 virus and/or experiencing more severe symptoms of the disease, if exposed.         POSSIBLE PROCEDURE SIDE EFFECTS    -Call the Spine Center if you are concerned    Increased Pain           Increased numbness/tingling      Nausea/Vomiting           Bruising/bleeding at site      Redness or swelling                                              Difficulty walking      Weakness          Fever greater than 100.5    *In the event of a severe headache after an epidural steroid injection that is relieved by lying down, please call the [facility] to speak with a clinical staff member*

## 2022-04-20 ENCOUNTER — MYC MEDICAL ADVICE (OUTPATIENT)
Dept: PHYSICAL MEDICINE AND REHAB | Facility: CLINIC | Age: 42
End: 2022-04-20
Payer: COMMERCIAL

## 2022-04-20 DIAGNOSIS — G89.29 CHRONIC NECK PAIN: ICD-10-CM

## 2022-04-20 DIAGNOSIS — M54.2 CHRONIC NECK PAIN: ICD-10-CM

## 2022-04-20 RX ORDER — LIDOCAINE 50 MG/G
PATCH TOPICAL
Qty: 14 PATCH | Refills: 3 | Status: SHIPPED | OUTPATIENT
Start: 2022-04-20 | End: 2023-05-18

## 2022-05-03 ENCOUNTER — TELEPHONE (OUTPATIENT)
Dept: NEUROSURGERY | Facility: CLINIC | Age: 42
End: 2022-05-03

## 2022-05-03 ENCOUNTER — RADIOLOGY INJECTION OFFICE VISIT (OUTPATIENT)
Dept: PHYSICAL MEDICINE AND REHAB | Facility: CLINIC | Age: 42
End: 2022-05-03
Attending: SURGERY
Payer: COMMERCIAL

## 2022-05-03 VITALS
TEMPERATURE: 98.4 F | DIASTOLIC BLOOD PRESSURE: 82 MMHG | SYSTOLIC BLOOD PRESSURE: 124 MMHG | RESPIRATION RATE: 16 BRPM | HEART RATE: 107 BPM | OXYGEN SATURATION: 98 %

## 2022-05-03 DIAGNOSIS — M47.12 CERVICAL SPONDYLOSIS WITH MYELOPATHY: ICD-10-CM

## 2022-05-03 PROCEDURE — 64479 NJX AA&/STRD TFRM EPI C/T 1: CPT | Mod: RT | Performed by: PHYSICAL MEDICINE & REHABILITATION

## 2022-05-03 RX ORDER — LIDOCAINE HYDROCHLORIDE 10 MG/ML
INJECTION, SOLUTION EPIDURAL; INFILTRATION; INTRACAUDAL; PERINEURAL
Status: COMPLETED | OUTPATIENT
Start: 2022-05-03 | End: 2022-05-03

## 2022-05-03 RX ORDER — DEXAMETHASONE SODIUM PHOSPHATE 10 MG/ML
INJECTION, SOLUTION INTRAMUSCULAR; INTRAVENOUS
Status: COMPLETED | OUTPATIENT
Start: 2022-05-03 | End: 2022-05-03

## 2022-05-03 RX ADMIN — LIDOCAINE HYDROCHLORIDE 2 ML: 10 INJECTION, SOLUTION EPIDURAL; INFILTRATION; INTRACAUDAL; PERINEURAL at 08:27

## 2022-05-03 RX ADMIN — DEXAMETHASONE SODIUM PHOSPHATE 10 MG: 10 INJECTION, SOLUTION INTRAMUSCULAR; INTRAVENOUS at 08:28

## 2022-05-03 ASSESSMENT — PAIN SCALES - GENERAL
PAINLEVEL: SEVERE PAIN (6)
PAINLEVEL: SEVERE PAIN (6)

## 2022-05-03 NOTE — TELEPHONE ENCOUNTER
Patient was going to try injection first and then call if she needs to proceed with surgery. Surgery Order scanned in

## 2022-05-03 NOTE — PATIENT INSTRUCTIONS
Follow-up visit with Dr. Lilly of [facility] in 2 weeks to discuss injection outcome and determine care plan going forward.       DISCHARGE INSTRUCTIONS    During office hours (8:00 a.m.- 4:00 p.m.) questions or concerns may be answered  by calling Sp[facility] Center Navigation Nurses at  [phone].  Messages received after hours will be returned the following business day.      In the case of an emergency, please dial 911 or seek assistance at the nearest Emergency Room/Urgent Care facility.     All Patients:    You may experience an increase in your symptoms for the first 2 days (It may take anywhere between 2 days- 2 weeks for the steroid to have maximum effect).    You may use ice on the injection site, as frequently as 20 minutes each hour if needed.    You may take your pain medicine.    You may continue taking your regular medication after your injection. If you have had a Medial Branch Block you may resume pain medication once your pain diary is completed.    You may shower. No swimming, tub bath or hot tub for 48 hours.  You may remove your bandaid/bandage as soon as you are home.    You may resume light activities, as tolerated.    Resume your usual diet as tolerated.    It is strongly advised that you do not drive for 1-3 hours post injection.    If you have had oral sedation:  Do not drive for 8 hours post injection.      If you have had IV sedation:  Do not drive for 24 hours post injection.  Do not operate hazardous machinery or make important personal/business decisions for 24 hours.      POSSIBLE STEROID SIDE EFFECTS (If steroid/cortisone was used for your procedure)    -If you experience these symptoms, it should only last for a short period    Swelling of the legs              Skin redness (flushing)     Mouth (oral) irritation   Blood sugar (glucose) levels            Sweats                    Mood changes  Headache  Sleeplessness  Weakened immune system for up to 14 days, which  could increase the risk of manuel the COVID-19 virus and/or experiencing more severe symptoms of the disease, if exposed.  Decreased effectiveness of the flu vaccine if given within 2 weeks of the steroid.         POSSIBLE PROCEDURE SIDE EFFECTS  -Call the Spine Center if you are concerned  Increased Pain           Increased numbness/tingling      Nausea/Vomiting          Bruising/bleeding at site      Redness or swelling                                              Difficulty walking      Weakness           Fever greater than 100.5    *In the event of a severe headache after an epidural steroid injection that is relieved by lying down, please call the [facility] to speak with a clinical staff member*

## 2022-06-02 NOTE — PROGRESS NOTES
[facility]    Outpatient Physical Therapy Discharge Note  Patient: Madeline Szymanski  : 1980    Beginning/End Dates of Reporting Period:  11/15/21 to 22    Referring Provider: Dr. Maxx MD    Therapy Diagnosis: decreased neck ROM, neck myofascial pain, neural tension, gastroc weakness, decreased lumbar mobility     Client Self Report: Had COVID 19 and ended up in hospital     Objective Measurements:  Objective Measure: DNF endurance  Details: >39 seconds  Objective Measure: Cervical ROM R/L rotation  Details:   Objective Measure: Cervical DSB  Details:   Objective Measure:  Strength  Details: .5  Objective Measure: Wrist flexion bilterally      Objective Measure: Biceps on left hyper        Goals:  Goal Identifier Strength   Goal Description Patient will improve gastroc strength to equal with right >15 heel raises  in 8 weeks   Target Date 01/10/22   Date Met      Progress (detail required for progress note):  Not assessed     Goal Identifier Mobility cervical   Goal Description patient will improve cervical rotation to > 60 degrees bilaterally for decrease neck tension, improved function, in 6 weeks:   Target Date 21   Date Met      Progress (detail required for progress note):  improved     Goal Identifier Mobility lumbar   Goal Description Patient will report no tightness with lumbar mobility testing in 6 weeks:   Target Date 21   Date Met      Progress (detail required for progress note):  improved     Goal Identifier Balance   Goal Description Patient will demonstrate improved balance, with tandem stance EC > 30 seocnds no UE assist, in 8 weeks:   Target Date 01/10/22   Date Met      Progress (detail required for progress note):  Not tested     Plan:  Discharge from therapy.    Discharge:    Reason for Discharge: Patient has met all  goals.    Equipment Issued: NA    Discharge Plan: Patient to continue home program.    Zheng Mesa, PT

## 2022-07-14 ENCOUNTER — PREP FOR PROCEDURE (OUTPATIENT)
Dept: NEUROSURGERY | Facility: CLINIC | Age: 42
End: 2022-07-14

## 2022-07-14 ENCOUNTER — OFFICE VISIT (OUTPATIENT)
Dept: NEUROSURGERY | Facility: CLINIC | Age: 42
End: 2022-07-14
Payer: COMMERCIAL

## 2022-07-14 VITALS
HEART RATE: 95 BPM | OXYGEN SATURATION: 98 % | HEIGHT: 64 IN | SYSTOLIC BLOOD PRESSURE: 121 MMHG | WEIGHT: 194 LBS | DIASTOLIC BLOOD PRESSURE: 84 MMHG | BODY MASS INDEX: 33.12 KG/M2

## 2022-07-14 DIAGNOSIS — M48.02 SPINAL STENOSIS IN CERVICAL REGION: Primary | ICD-10-CM

## 2022-07-14 DIAGNOSIS — M54.12 CERVICAL RADICULOPATHY: ICD-10-CM

## 2022-07-14 DIAGNOSIS — M47.12 CERVICAL SPONDYLOSIS WITH MYELOPATHY: Primary | ICD-10-CM

## 2022-07-14 DIAGNOSIS — M47.12 CERVICAL SPONDYLOSIS WITH MYELOPATHY: ICD-10-CM

## 2022-07-14 PROCEDURE — 99214 OFFICE O/P EST MOD 30 MIN: CPT | Performed by: SURGERY

## 2022-07-14 NOTE — PATIENT INSTRUCTIONS
Please review COMPLETE information about your proposed surgery, pre-operative requirements, post-operative course and expectations - available in a folder provided to you in clinic!    Your surgery scheduler will call you to begin the process of scheduling your surgery and appointments.     Pre-Operative    Pre-operative physical / Lab work with primary care physician within 30 days of surgical date. We will assist you in scheduling this.    If all pre-op appointments/test are not completed prior to your surgery date, you will be asked to reschedule your surgery.           As part of preparation for your upcoming procedure you are required to have a test for the novel Coronavirus/COVID-19.  The test needs to be completed within 4 days (96) hours of surgery.   We will assist you in scheduling this.   You may NOT receive the COVID-19 vaccine or booster 2 weeks before or after surgery.    Readiness Visits    Prior to surgery, you may have a telephone or in person readiness visit with our RN team to discuss your upcomming surgery, results of your pre-op physical, and lab work.   If you will require a collar/neck brace after surgery, you will be fitted for one at your readiness visit prior to surgery (scheduled by the surgery scheduler).     Shower procedure    Hibiclens shower: Use one packet the night before surgery and one packet the morning of surgery for a whole body shower. Avoid face, hair, and genitals.      Eating/Drinking    Stop all solid foods 8 hours before surgery.  Keep drinking clear liquids until 4 hours before surgery  Clear liquids include water, clear juice, black coffee, or clear tea without milk, Gatorade, clear soda.     Medications - please refer to the pre-operative medication instructions sheet in your folder    Hold Aspirin, NSAIDs (Advil/Ibuprofen, Indocin, Naproxen,Nuprin,Relafen/Nabumetone, Diclofenac,Meloxicam, Aleve, Celebrex) and all vitamins and supplements x 7-10 days prior to surgical  date  You can take Tylenol (Acetaminophen) for pain up until the date of your surgery   Do not exceed 3,000 mg per day   Any other medications prescribed, please discuss with your primary care provider at your pre-operative physical. Please discuss when/if it is safe for you to stop taking blood thinners with your primary care provider.   We will NOT provide pain medications prior to surgery. We will prescribe post-op pain medications for up to 6 weeks after surgery.       FMLA/Short-term disability    If you are currently employed, you will likely need to be off work for 4-6 weeks for post-op recovery and healing.  Please fax any FMLA/short term disability paperwork to [phone], mail it into the clinic, drop it off in person, or send via a [employer] message.   You may also call our clinic with the date in which you'd like to return to work, and we can provide a work letter to your employer  We will support Short-Term Disability up to 12 weeks, beginning the date of your surgery. We do NOT support Long-Term Disability/Social Security Benefits.     Pain Management after surgery    Dealing with pain    As your body heals, you might feel a stabbing, burning, or aching pain. You may also have some numbness.  Everyone feels pain differently, we may ask you to rate your pain using a pain scale. This will let us know how much pain you feel.   Keep in mind that medicine won't take away all of your pain. It helps to try other ways to relax and ease pain.     Things to help with pain    After surgery, we will give you medicine for your pain. These medications work well, but they can make you drowsy, itchy, or sick to your stomach, and constipated. Try to take narcotics with food if they cause nausea.   For mild to moderate pain, you can take medication such as Tylenol or Ibuprofen. These can be used with narcotics and muscle relaxants. *If you have had a fusion: do NOT use NSAIDs for 6 months after surgery.   Do NOT drive  while taking narcotic pain medication  Do NOT drink alcohol while using narcotic pain medication  You can utilize ice as needed (no longer than 20 minutes at one time) you may apply this over your covered incision  Utilize heat for muscle spasms, do not apply heat over your incision  If a muscle relaxer is prescribed, please do NOT take it at the same time as your narcotic pain medication. Take them at least 90 minutes apart.   You may also use pain cream/patches on sore muscles. Do NOT apply these on your incision. Patches may be cut in 1/2 if needed.     *After your surgery, if you will be staying in-patient, a nursing team will be monitoring you closely throughout your stay and communicate your health status to your surgeon and other providers.  You will be seen by Advanced Practice Providers (e.g., nurse practitioners, clinic nurse specialists, and physician assistants) who will check on you regularly to assess the status of your surgical recovery.     Incision Care    Look at your incision site every day. You  may need a mirror, or family member to help you.   Do not submerge your incision in water such as pools, hot tubs, baths for at least 6 weeks or until incision is healed  You may get your incision wet in the shower. Allow water and soap to run over incision, and gently pat dry.   Remove the dressing the day after you are discharged from the hospital. Keep the incision clean and dry at all times. This may require several bandage changes.   Contact us right away if you have:   Fever over 101 degrees farenheit  Green or yellow drainage (pus) from your incision or increased bloody drainage   Redness, swelling, or warmth at your surgery site   Notify the clinic [phone].    Activity Restrictions    For the first 6 weeks, no lifting,pushing, or pulling > 5-10 pounds, no bending, twisting.  Use the stairs in moderation   Walking: Walking is the best way to start exercise after surgery. Take short frequent  walks. You may gradually increase the distance as tolerated. If you feel pain, decrease your activity, but DO NOT stop walking. Walking will help you regain strength.  Avoid prolonged positioning for longer than 30 minutes (change positions frequently while awake)  No contact sports until after follow up visit  No high impact activities such as; running/jogging, snowmobile or 4 kidd riding or any other recreational vehicles until deemed safe by your surgeon/care team.   Please call the clinic if you develop any of the following symptoms:  Swelling and/or warmth in one or both legs  Pain or tenderness in your leg, ankle, foot, or arm   Red or discolored/pale skin     Post-Op Follow Up Appointments    We will call you to schedule these appointments after your discharge from the hospital.   Incision assessment within 2 weeks with a Registered Nurse   6 week post-op with a Nurse Practitioner/Physician Assistant. Your surgeon will be available on this day.

## 2022-07-14 NOTE — PROGRESS NOTES
"NEUROSURGERY FOLLOWUP  NOTE    Madeline Szymanski comes today in f/u, Patient is a 43 yo female who presents with neck and arm pain and imbalance. She fels most the pain is down her right arm into all of her fingers. blaance still feels off. Uses a railingto walk down stairs. Also dropping of objects which continues. She has tried TFESI and physical therapy without relief.       PHYSICAL EXAM:   Constitutional: /84   Pulse 95   Ht 5' 4\" (1.626 m)   Wt 194 lb (88 kg)   SpO2 98%   BMI 33.30 kg/m       Mental Status: A & O in no acute distress.  Affect is appropriate.  Speech is fluent.  Recent and remote memory are intact.  Attention span and concentration are normal.     Motor:  Normal bulk and tone all muscle groups of upper and lower extremities.     Sensory: Sensation intact bilaterally to light touch.      Coordination: slight imbalance with tandem gait      Reflexes; supinator, biceps, triceps, knee/ ankle jerk intact- brisk throughout x4.  b/l jackson's/   no babinski/ 1-2 beats on right clonus.    IMAGING:   I personally reviewed all radiographic images        CONSULTATION ASSESSMENT AND PLAN:    43 yo female who presents with neck and arm pain and imbalance. MRI of her cervical spine shows spinal canal stenosis with flattening of her spinal cord at C5-6. Also has mild left foraminal narrowing at this level. No significant  instability on xray. She has tried conservative management without her symptoms. Continues to have signs of myelopathy on exam. Recommend C5-6 anterior cervical decompression and artifical disc replacement. Risks of anterior neck surgery include but are not limited to inadequate symptom relief, nerve or spinal cord damage, durotomy, hematoma, infection, injury to trachea or esophagus, speech disturbance from injury to the recurrent laryngeal nerve, swallowing difficulties, hardware malfunction.       I spent more than 10 minutes in this apt, examining the pt, reviewing the scans, " reviewing notes from chart, discussing treatment options with risks and benefits and coordinating care.     Radha Lilly MD      CC:     Clinic, [address] [address]

## 2022-07-14 NOTE — LETTER
"    7/14/2022         RE: Madeline Szymanski  [address]  [facility] [address]        Dear Colleague,    Thank you for referring your patient, Madeline Szymanski, to the [facility]. Please see a copy of my visit note below.    NEUROSURGERY FOLLOWUP  NOTE    Madeline Szymanski comes today in f/u, Patient is a 43 yo female who presents with neck and arm pain and imbalance. She fels most the pain is down her right arm into all of her fingers. blaance still feels off. Uses a railingto walk down stairs. Also dropping of objects which continues. She has tried TFESI and physical therapy without relief.       PHYSICAL EXAM:   Constitutional: /84   Pulse 95   Ht 5' 4\" (1.626 m)   Wt 194 lb (88 kg)   SpO2 98%   BMI 33.30 kg/m       Mental Status: A & O in no acute distress.  Affect is appropriate.  Speech is fluent.  Recent and remote memory are intact.  Attention span and concentration are normal.     Motor:  Normal bulk and tone all muscle groups of upper and lower extremities.     Sensory: Sensation intact bilaterally to light touch.      Coordination: slight imbalance with tandem gait      Reflexes; supinator, biceps, triceps, knee/ ankle jerk intact- brisk throughout x4.  b/l jackson's/   no babinski/ 1-2 beats on right clonus.    IMAGING:   I personally reviewed all radiographic images        CONSULTATION ASSESSMENT AND PLAN:    43 yo female who presents with neck and arm pain and imbalance. MRI of her cervical spine shows spinal canal stenosis with flattening of her spinal cord at C5-6. Also has mild left foraminal narrowing at this level. No significant  instability on xray. She has tried conservative management without her symptoms. Continues to have signs of myelopathy on exam. Recommend C5-6 anterior cervical decompression and artifical disc replacement. Risks of anterior neck surgery include but are not limited to inadequate symptom relief, nerve or spinal cord damage, durotomy, " hematoma, infection, injury to trachea or esophagus, speech disturbance from injury to the recurrent laryngeal nerve, swallowing difficulties, hardware malfunction.       I spent more than 10 minutes in this apt, examining the pt, reviewing the scans, reviewing notes from chart, discussing treatment options with risks and benefits and coordinating care.     Radha Lilly MD      CC:     Clinic, [place] [address]        Again, thank you for allowing me to participate in the care of your patient.        Sincerely,        Radha Lilly MD

## 2022-07-25 ENCOUNTER — TELEPHONE (OUTPATIENT)
Dept: NEUROSURGERY | Facility: CLINIC | Age: 42
End: 2022-07-25

## 2022-07-25 DIAGNOSIS — M54.50 CHRONIC RIGHT-SIDED LOW BACK PAIN WITHOUT SCIATICA: ICD-10-CM

## 2022-07-25 DIAGNOSIS — G89.29 CHRONIC RIGHT-SIDED LOW BACK PAIN WITHOUT SCIATICA: ICD-10-CM

## 2022-07-25 RX ORDER — PREGABALIN 50 MG/1
50-100 CAPSULE ORAL AT BEDTIME
Qty: 60 CAPSULE | Refills: 0 | Status: SHIPPED | OUTPATIENT
Start: 2022-07-25 | End: 2022-09-19

## 2022-07-25 NOTE — TELEPHONE ENCOUNTER
ORDER FROM: Dr. Lilly     PRE AUTHORIZATION: PA Pending     METHOD OF PATIENT CONTACT: Spoke to patient in person, best number to contact #[phone].     PROCEDURE: Cervical 5 - Cervical 6 anterior cervical decompression and artificial disc replacement, use of microscope    SURGICAL DATE: 8/8/22 at 7:20am at [facility]     COVID TEST: 8/4/22 at 10am     READINESS VISIT: Please Call    PCP, CLINIC, PHONE#: No PCP, Pre-op physical 8/4/22 at 8:40am.     FILM INFO: XR Lumbar 3/2/22 at [facility], MRI Cervical 3/2/22 at [facility]     SURGICAL LETTER: No Letter

## 2022-07-26 ENCOUNTER — MEDICAL CORRESPONDENCE (OUTPATIENT)
Dept: NEUROSURGERY | Facility: CLINIC | Age: 42
End: 2022-07-26

## 2022-08-02 ENCOUNTER — LAB (OUTPATIENT)
Dept: LAB | Facility: CLINIC | Age: 42
End: 2022-08-02
Payer: COMMERCIAL

## 2022-08-02 DIAGNOSIS — Z11.59 NEED FOR HEPATITIS C SCREENING TEST: ICD-10-CM

## 2022-08-02 DIAGNOSIS — M47.12 CERVICAL SPONDYLOSIS WITH MYELOPATHY: ICD-10-CM

## 2022-08-02 DIAGNOSIS — Z11.4 SCREENING FOR HIV (HUMAN IMMUNODEFICIENCY VIRUS): ICD-10-CM

## 2022-08-02 LAB
ANION GAP SERPL CALCULATED.3IONS-SCNC: 10 MMOL/L (ref 7–15)
APTT PPP: 31 SECONDS (ref 22–38)
BUN SERPL-MCNC: 8.3 MG/DL (ref 6–20)
CALCIUM SERPL-MCNC: 9.1 MG/DL (ref 8.6–10)
CHLORIDE SERPL-SCNC: 104 MMOL/L (ref 98–107)
CREAT SERPL-MCNC: 0.89 MG/DL (ref 0.51–0.95)
DEPRECATED HCO3 PLAS-SCNC: 27 MMOL/L (ref 22–29)
ERYTHROCYTE [DISTWIDTH] IN BLOOD BY AUTOMATED COUNT: 13 % (ref 10–15)
GFR SERPL CREATININE-BSD FRML MDRD: 82 ML/MIN/1.73M2
GLUCOSE SERPL-MCNC: 76 MG/DL (ref 70–99)
HCT VFR BLD AUTO: 41 % (ref 35–47)
HGB BLD-MCNC: 13.4 G/DL (ref 11.7–15.7)
INR PPP: 0.94 (ref 0.85–1.15)
MCH RBC QN AUTO: 29.2 PG (ref 26.5–33)
MCHC RBC AUTO-ENTMCNC: 32.7 G/DL (ref 31.5–36.5)
MCV RBC AUTO: 89 FL (ref 78–100)
PLATELET # BLD AUTO: 298 10E3/UL (ref 150–450)
POTASSIUM SERPL-SCNC: 4 MMOL/L (ref 3.4–5.3)
RBC # BLD AUTO: 4.59 10E6/UL (ref 3.8–5.2)
SODIUM SERPL-SCNC: 141 MMOL/L (ref 136–145)
WBC # BLD AUTO: 6.6 10E3/UL (ref 4–11)

## 2022-08-02 PROCEDURE — 85610 PROTHROMBIN TIME: CPT

## 2022-08-02 PROCEDURE — 36415 COLL VENOUS BLD VENIPUNCTURE: CPT

## 2022-08-02 PROCEDURE — 87389 HIV-1 AG W/HIV-1&-2 AB AG IA: CPT

## 2022-08-02 PROCEDURE — 85027 COMPLETE CBC AUTOMATED: CPT

## 2022-08-02 PROCEDURE — 80048 BASIC METABOLIC PNL TOTAL CA: CPT

## 2022-08-02 PROCEDURE — 86803 HEPATITIS C AB TEST: CPT

## 2022-08-02 PROCEDURE — 85730 THROMBOPLASTIN TIME PARTIAL: CPT

## 2022-08-03 LAB
HCV AB SERPL QL IA: NONREACTIVE
HIV 1+2 AB+HIV1 P24 AG SERPL QL IA: NONREACTIVE

## 2022-08-04 ENCOUNTER — OFFICE VISIT (OUTPATIENT)
Dept: FAMILY MEDICINE | Facility: CLINIC | Age: 42
End: 2022-08-04
Payer: COMMERCIAL

## 2022-08-04 ENCOUNTER — LAB (OUTPATIENT)
Dept: LAB | Facility: CLINIC | Age: 42
End: 2022-08-04
Attending: SURGERY

## 2022-08-04 VITALS
TEMPERATURE: 98.5 F | HEIGHT: 64 IN | WEIGHT: 205 LBS | BODY MASS INDEX: 35 KG/M2 | DIASTOLIC BLOOD PRESSURE: 70 MMHG | SYSTOLIC BLOOD PRESSURE: 110 MMHG | OXYGEN SATURATION: 98 % | HEART RATE: 97 BPM

## 2022-08-04 DIAGNOSIS — Z01.818 PREOP GENERAL PHYSICAL EXAM: Primary | ICD-10-CM

## 2022-08-04 DIAGNOSIS — Z11.4 SCREENING FOR HIV (HUMAN IMMUNODEFICIENCY VIRUS): ICD-10-CM

## 2022-08-04 DIAGNOSIS — Z11.59 NEED FOR HEPATITIS C SCREENING TEST: ICD-10-CM

## 2022-08-04 DIAGNOSIS — M47.12 CERVICAL SPONDYLOSIS WITH MYELOPATHY: ICD-10-CM

## 2022-08-04 PROBLEM — R33.9: Status: RESOLVED | Noted: 2021-08-06 | Resolved: 2022-08-04

## 2022-08-04 PROBLEM — G83.4: Status: RESOLVED | Noted: 2021-08-06 | Resolved: 2022-08-04

## 2022-08-04 LAB — SARS-COV-2 RNA RESP QL NAA+PROBE: NEGATIVE

## 2022-08-04 PROCEDURE — 99214 OFFICE O/P EST MOD 30 MIN: CPT | Performed by: PHYSICIAN ASSISTANT

## 2022-08-04 PROCEDURE — U0005 INFEC AGEN DETEC AMPLI PROBE: HCPCS

## 2022-08-04 PROCEDURE — U0003 INFECTIOUS AGENT DETECTION BY NUCLEIC ACID (DNA OR RNA); SEVERE ACUTE RESPIRATORY SYNDROME CORONAVIRUS 2 (SARS-COV-2) (CORONAVIRUS DISEASE [COVID-19]), AMPLIFIED PROBE TECHNIQUE, MAKING USE OF HIGH THROUGHPUT TECHNOLOGIES AS DESCRIBED BY CMS-2020-01-R: HCPCS

## 2022-08-04 NOTE — PROGRESS NOTES
[facility] [place]  [address]  [place] [address]  Phone: [phone]  Primary Provider: [facility]  Pre-op Performing Provider: DIXON MATHEW      PREOPERATIVE EVALUATION:  Today's date: 8/4/2022    Madeline Szymanski is a 42 year old female who presents for a preoperative evaluation.    Surgical Information:  Surgery/Procedure: Cervical 5 - Cervical 6 anterior cervical decompression and artificial disc replacement, use of microscope  Surgery Location: [facility]  Surgeon: Dr. Radha Lilly  Surgery Date: 8/8/22  Time of Surgery: 7:20 AM  Where patient plans to recover: At home with family  Fax number for surgical facility: Note does not need to be faxed, will be available electronically in Epic.    Type of Anesthesia Anticipated: General    Assessment & Plan     The proposed surgical procedure is considered INTERMEDIATE risk.          RECOMMENDATION:  APPROVAL GIVEN to proceed with proposed procedure, without further diagnostic evaluation.      Subjective     HPI related to upcoming procedure: Neck pain > 5 years    Preop Questions 8/2/2022   1. Have you ever had a heart attack or stroke? No   2. Have you ever had surgery on your heart or blood vessels, such as a stent placement, a coronary artery bypass, or surgery on an artery in your head, neck, heart, or legs? No   3. Do you have chest pain with activity? No   4. Do you have a history of  heart failure? No   5. Do you currently have a cold, bronchitis or symptoms of other infection? No   6. Do you have a cough, shortness of breath, or wheezing? No   7. Do you or anyone in your family have previous history of blood clots? No   8. Do you or does anyone in your family have a serious bleeding problem such as prolonged bleeding following surgeries or cuts? No   9. Have you ever had problems with anemia or been told to take iron pills? No   10. Have you had any abnormal blood  loss such as black, tarry or bloody stools, or abnormal vaginal bleeding? No   11. Have you ever had a blood transfusion? YES - Spinal meningitis at 3 months   11a. Have you ever had a transfusion reaction? No   12. Are you willing to have a blood transfusion if it is medically needed before, during, or after your surgery? Yes   13. Have you or any of your relatives ever had problems with anesthesia? No   14. Do you have sleep apnea, excessive snoring or daytime drowsiness? No   15. Do you have any artifical heart valves or other implanted medical devices like a pacemaker, defibrillator, or continuous glucose monitor? No   16. Do you have artificial joints? No   17. Are you allergic to latex? No   18. Is there any chance that you may be pregnant? No       Health Care Directive:  Patient does not have a Health Care Directive or Living Will: Discussed advance care planning with patient; however, patient declined at this time.    Preoperative Review of :   reviewed - no record of controlled substances prescribed.          Review of Systems  Constitutional, neuro, ENT, endocrine, pulmonary, cardiac, gastrointestinal, genitourinary, musculoskeletal, integument and psychiatric systems are negative, except as otherwise noted.    Patient Active Problem List    Diagnosis Date Noted     Anxiety 01/01/2022     Priority: Medium     Acute and chronic respiratory failure with hypoxia (H) 01/01/2022     Priority: Medium     Pneumonia due to 2019 novel coronavirus 12/27/2021     Priority: Medium     Spinal stenosis in cervical region 08/24/2021     Priority: Medium     Added automatically from request for surgery [id number]       Painless urinary retention due to cauda equina syndrome (H) 08/06/2021     Priority: Medium     S/P laparoscopic sleeve gastrectomy 02/06/2020     Priority: Medium     Formatting of this note might be different from the original.  Dr. Basestt       Gastric band slippage 09/26/2019     Priority: Medium      Lumbar radiculopathy 2019     Priority: Medium     History of gastric ulcer 2018     Priority: Medium     Panic attacks 2018     Priority: Medium     Hx of Helicobacter infection 2014     Priority: Medium     Bariatric surgery status 2014     Priority: Medium     Formatting of this note might be different from the original.  Dr. Bassett       Migraine 2014     Priority: Medium     Formatting of this note might be different from the original.  Without aura        Past Medical History:   Diagnosis Date     Arthritis      Depressive disorder      History of blood transfusion 1978     Kidney infection      Migraine      Painless urinary retention due to cauda equina syndrome (H)      Pancreatitis      Spinal stenosis in cervical region      UTI (lower urinary tract infection)      Past Surgical History:   Procedure Laterality Date     ABDOMEN SURGERY        SECTION       GI SURGERY       GYN SURGERY  2018    Hysterectomy     LAMINECTOMY LUMBAR ONE LEVEL Left 10/04/2021    Procedure: LEFT LUMBAR 5-SACRAL 1 HEMILAMINECTOMY, MEDIAL FACETECTOMY, FORAMINOTOMY WITH;  Surgeon: Radha Lilly MD;  Location: [facility] OR     LUMBAR DISCECTOMY Left 10/04/2021    Procedure: LUMBAR 5-SACRAL 1 MICRODISCECTOMY;  Surgeon: Radha Lilly MD;  Location: [facility] OR     Current Outpatient Medications   Medication Sig Dispense Refill     amitriptyline (ELAVIL) 50 MG tablet Take 1 tablet (50 mg) by mouth At Bedtime 90 tablet 3     eletriptan (RELPAX) 40 MG tablet Take 1 tablet (40 mg) by mouth at onset of headache May repeat if needed in 2 hours. Max of 2 doses/24hours Max of 4 days/month 18 tablet 3     EPINEPHrine (ANY BX GENERIC EQUIV) 0.3 MG/0.3ML injection 2-pack Inject 0.3 mLs (0.3 mg) into the muscle once as needed for anaphylaxis 2 each 3     FLUoxetine (PROZAC) 40 MG capsule Take 40 mg by mouth daily       hydrOXYzine (ATARAX) 25 MG tablet Take 1 tablet (25 mg)  "by mouth 3 times daily as needed for anxiety 90 tablet 3     lidocaine (LIDODERM) 5 % patch Apply 1 patch to affected area as needed for 12 hours, remove for 12 hours before reapplying. 14 patch 3     pregabalin (LYRICA) 50 MG capsule Take 1-2 capsules ( mg) by mouth At Bedtime 60 capsule 0     rizatriptan (MAXALT-MLT) 10 MG ODT Take 1 tablet (10 mg) by mouth at onset of headache for migraine May repeat in 2 hours, with a max of 30 mg in 24 hours and a max of 5 days/month 18 tablet 3     topiramate (TOPAMAX) 50 MG tablet Take 1 tablet (50 mg) by mouth daily 90 tablet 3     acetaminophen (TYLENOL) 500 MG tablet Take 2 tablets (1,000 mg) by mouth every 6 hours as needed for mild pain or other (and adjunct with moderate or severe pain or per patient request)         Allergies   Allergen Reactions     Cephalexin Hives     Coconut Flavor Anaphylaxis     Covid-19 (Mrna) Vaccine Anaphylaxis     Pfizer      Prochlorperazine Other (See Comments)     Other reaction(s): Tremors  Tremors  Other reaction(s): Tremors       Coconut Fatty Acids      Other reaction(s): Edema     Metoclopramide Other (See Comments)     Other reaction(s): Tremors  tremors  Other reaction(s): Tremors       Penicillins Hives        Social History     Tobacco Use     Smoking status: Never Smoker     Smokeless tobacco: Never Used   Substance Use Topics     Alcohol use: Never       History   Drug Use Unknown         Objective     /70   Pulse 97   Temp 98.5  F (36.9  C) (Oral)   Ht 1.626 m (5' 4\")   Wt 93 kg (205 lb)   SpO2 98%   BMI 35.19 kg/m      Physical Exam    GENERAL APPEARANCE: healthy, alert and no distress     EYES: EOMI, PERRL     HENT: ear canals and TM's normal and nose and mouth without ulcers or lesions     NECK: no adenopathy, no asymmetry, masses, or scars and thyroid normal to palpation     RESP: lungs clear to auscultation - no rales, rhonchi or wheezes     CV: regular rates and rhythm, normal S1 S2, no S3 or S4 and no " murmur, click or rub     ABDOMEN:  soft, nontender, no HSM or masses and bowel sounds normal     MS: extremities normal- no gross deformities noted, no evidence of inflammation in joints, FROM in all extremities.     SKIN: no suspicious lesions or rashes     NEURO: Normal strength and tone, sensory exam grossly normal, mentation intact and speech normal     PSYCH: mentation appears normal. and affect normal/bright     LYMPHATICS: No cervical adenopathy    Recent Labs   Lab Test 08/02/22  1556 01/01/22  0636 12/31/21  0518 12/28/21  0810 12/27/21  1407   HGB 13.4 13.5 13.2   < > 11.8    340 290   < > 139*   INR 0.94  --   --   --  0.95     --  141   < > 133*   POTASSIUM 4.0  --  4.3   < > 3.4*   CR 0.89  --  0.66   < > 0.74    < > = values in this interval not displayed.        Diagnostics:  No labs were ordered during this visit.   No EKG required, no history of coronary heart disease, significant arrhythmia, peripheral arterial disease or other structural heart disease.    Revised Cardiac Risk Index (RCRI):  The patient has the following serious cardiovascular risks for perioperative complications:   - No serious cardiac risks = 0 points     RCRI Interpretation: 0 points: Class I (very low risk - 0.4% complication rate)           Signed Electronically by: Ranulfo Brandt PA-C  Copy of this evaluation report is provided to requesting physician.

## 2022-08-04 NOTE — H&P (VIEW-ONLY)
[facility] [place]  [address]  [place] [address]  Phone: [phone]  Primary Provider: [facility]  Pre-op Performing Provider: DIXON MATHEW      PREOPERATIVE EVALUATION:  Today's date: 8/4/2022    Madeline Szymanski is a 42 year old female who presents for a preoperative evaluation.    Surgical Information:  Surgery/Procedure: Cervical 5 - Cervical 6 anterior cervical decompression and artificial disc replacement, use of microscope  Surgery Location: [facility]  Surgeon: Dr. Radha Lilly  Surgery Date: 8/8/22  Time of Surgery: 7:20 AM  Where patient plans to recover: At home with family  Fax number for surgical facility: Note does not need to be faxed, will be available electronically in Epic.    Type of Anesthesia Anticipated: General    Assessment & Plan     The proposed surgical procedure is considered INTERMEDIATE risk.          RECOMMENDATION:  APPROVAL GIVEN to proceed with proposed procedure, without further diagnostic evaluation.      Subjective     HPI related to upcoming procedure: Neck pain > 5 years    Preop Questions 8/2/2022   1. Have you ever had a heart attack or stroke? No   2. Have you ever had surgery on your heart or blood vessels, such as a stent placement, a coronary artery bypass, or surgery on an artery in your head, neck, heart, or legs? No   3. Do you have chest pain with activity? No   4. Do you have a history of  heart failure? No   5. Do you currently have a cold, bronchitis or symptoms of other infection? No   6. Do you have a cough, shortness of breath, or wheezing? No   7. Do you or anyone in your family have previous history of blood clots? No   8. Do you or does anyone in your family have a serious bleeding problem such as prolonged bleeding following surgeries or cuts? No   9. Have you ever had problems with anemia or been told to take iron pills? No   10. Have you had any abnormal blood  loss such as black, tarry or bloody stools, or abnormal vaginal bleeding? No   11. Have you ever had a blood transfusion? YES - Spinal meningitis at 3 months   11a. Have you ever had a transfusion reaction? No   12. Are you willing to have a blood transfusion if it is medically needed before, during, or after your surgery? Yes   13. Have you or any of your relatives ever had problems with anesthesia? No   14. Do you have sleep apnea, excessive snoring or daytime drowsiness? No   15. Do you have any artifical heart valves or other implanted medical devices like a pacemaker, defibrillator, or continuous glucose monitor? No   16. Do you have artificial joints? No   17. Are you allergic to latex? No   18. Is there any chance that you may be pregnant? No       Health Care Directive:  Patient does not have a Health Care Directive or Living Will: Discussed advance care planning with patient; however, patient declined at this time.    Preoperative Review of :   reviewed - no record of controlled substances prescribed.          Review of Systems  Constitutional, neuro, ENT, endocrine, pulmonary, cardiac, gastrointestinal, genitourinary, musculoskeletal, integument and psychiatric systems are negative, except as otherwise noted.    Patient Active Problem List    Diagnosis Date Noted     Anxiety 01/01/2022     Priority: Medium     Acute and chronic respiratory failure with hypoxia (H) 01/01/2022     Priority: Medium     Pneumonia due to 2019 novel coronavirus 12/27/2021     Priority: Medium     Spinal stenosis in cervical region 08/24/2021     Priority: Medium     Added automatically from request for surgery [id number]       Painless urinary retention due to cauda equina syndrome (H) 08/06/2021     Priority: Medium     S/P laparoscopic sleeve gastrectomy 02/06/2020     Priority: Medium     Formatting of this note might be different from the original.  Dr. Bassett       Gastric band slippage 09/26/2019     Priority: Medium      Lumbar radiculopathy 2019     Priority: Medium     History of gastric ulcer 2018     Priority: Medium     Panic attacks 2018     Priority: Medium     Hx of Helicobacter infection 2014     Priority: Medium     Bariatric surgery status 2014     Priority: Medium     Formatting of this note might be different from the original.  Dr. Bassett       Migraine 2014     Priority: Medium     Formatting of this note might be different from the original.  Without aura        Past Medical History:   Diagnosis Date     Arthritis      Depressive disorder      History of blood transfusion 1978     Kidney infection      Migraine      Painless urinary retention due to cauda equina syndrome (H)      Pancreatitis      Spinal stenosis in cervical region      UTI (lower urinary tract infection)      Past Surgical History:   Procedure Laterality Date     ABDOMEN SURGERY        SECTION       GI SURGERY       GYN SURGERY  2018    Hysterectomy     LAMINECTOMY LUMBAR ONE LEVEL Left 10/04/2021    Procedure: LEFT LUMBAR 5-SACRAL 1 HEMILAMINECTOMY, MEDIAL FACETECTOMY, FORAMINOTOMY WITH;  Surgeon: Radha Lilly MD;  [facility] [facility]     LUMBAR DISCECTOMY Left 10/04/2021    Procedure: LUMBAR 5-SACRAL 1 MICRODISCECTOMY;  Surgeon: Radha Lilly MD;  Location: [facility]     Current Outpatient Medications   Medication Sig Dispense Refill     amitriptyline (ELAVIL) 50 MG tablet Take 1 tablet (50 mg) by mouth At Bedtime 90 tablet 3     eletriptan (RELPAX) 40 MG tablet Take 1 tablet (40 mg) by mouth at onset of headache May repeat if needed in 2 hours. Max of 2 doses/24hours Max of 4 days/month 18 tablet 3     EPINEPHrine (ANY BX GENERIC EQUIV) 0.3 MG/0.3ML injection 2-pack Inject 0.3 mLs (0.3 mg) into the muscle once as needed for anaphylaxis 2 each 3     FLUoxetine (PROZAC) 40 MG capsule Take 40 mg by mouth daily       hydrOXYzine (ATARAX) 25 MG tablet Take 1 tablet (25 mg)  "by mouth 3 times daily as needed for anxiety 90 tablet 3     lidocaine (LIDODERM) 5 % patch Apply 1 patch to affected area as needed for 12 hours, remove for 12 hours before reapplying. 14 patch 3     pregabalin (LYRICA) 50 MG capsule Take 1-2 capsules ( mg) by mouth At Bedtime 60 capsule 0     rizatriptan (MAXALT-MLT) 10 MG ODT Take 1 tablet (10 mg) by mouth at onset of headache for migraine May repeat in 2 hours, with a max of 30 mg in 24 hours and a max of 5 days/month 18 tablet 3     topiramate (TOPAMAX) 50 MG tablet Take 1 tablet (50 mg) by mouth daily 90 tablet 3     acetaminophen (TYLENOL) 500 MG tablet Take 2 tablets (1,000 mg) by mouth every 6 hours as needed for mild pain or other (and adjunct with moderate or severe pain or per patient request)         Allergies   Allergen Reactions     Cephalexin Hives     Coconut Flavor Anaphylaxis     Covid-19 (Mrna) Vaccine Anaphylaxis     Pfizer      Prochlorperazine Other (See Comments)     Other reaction(s): Tremors  Tremors  Other reaction(s): Tremors       Coconut Fatty Acids      Other reaction(s): Edema     Metoclopramide Other (See Comments)     Other reaction(s): Tremors  tremors  Other reaction(s): Tremors       Penicillins Hives        Social History     Tobacco Use     Smoking status: Never Smoker     Smokeless tobacco: Never Used   Substance Use Topics     Alcohol use: Never       History   Drug Use Unknown         Objective     /70   Pulse 97   Temp 98.5  F (36.9  C) (Oral)   Ht 1.626 m (5' 4\")   Wt 93 kg (205 lb)   SpO2 98%   BMI 35.19 kg/m      Physical Exam    GENERAL APPEARANCE: healthy, alert and no distress     EYES: EOMI, PERRL     HENT: ear canals and TM's normal and nose and mouth without ulcers or lesions     NECK: no adenopathy, no asymmetry, masses, or scars and thyroid normal to palpation     RESP: lungs clear to auscultation - no rales, rhonchi or wheezes     CV: regular rates and rhythm, normal S1 S2, no S3 or S4 and no " murmur, click or rub     ABDOMEN:  soft, nontender, no HSM or masses and bowel sounds normal     MS: extremities normal- no gross deformities noted, no evidence of inflammation in joints, FROM in all extremities.     SKIN: no suspicious lesions or rashes     NEURO: Normal strength and tone, sensory exam grossly normal, mentation intact and speech normal     PSYCH: mentation appears normal. and affect normal/bright     LYMPHATICS: No cervical adenopathy    Recent Labs   Lab Test 08/02/22  1556 01/01/22  0636 12/31/21  0518 12/28/21  0810 12/27/21  1407   HGB 13.4 13.5 13.2   < > 11.8    340 290   < > 139*   INR 0.94  --   --   --  0.95     --  141   < > 133*   POTASSIUM 4.0  --  4.3   < > 3.4*   CR 0.89  --  0.66   < > 0.74    < > = values in this interval not displayed.        Diagnostics:  No labs were ordered during this visit.   No EKG required, no history of coronary heart disease, significant arrhythmia, peripheral arterial disease or other structural heart disease.    Revised Cardiac Risk Index (RCRI):  The patient has the following serious cardiovascular risks for perioperative complications:   - No serious cardiac risks = 0 points     RCRI Interpretation: 0 points: Class I (very low risk - 0.4% complication rate)           Signed Electronically by: Ranulfo Brandt PA-C  Copy of this evaluation report is provided to requesting physician.

## 2022-08-05 ENCOUNTER — TELEPHONE (OUTPATIENT)
Dept: NEUROSURGERY | Facility: CLINIC | Age: 42
End: 2022-08-05

## 2022-08-05 NOTE — TELEPHONE ENCOUNTER
Called patient, discussed surgery, post-op course, expectations, follow up plan.    Reviewed H&P from 08/04/2022 - cleared for surgery  Labs - WNL    MRI done on 03/02/2022 - in [place]    To OR as planned.     Check in - 0530    Nothing to eat or drink after midnight the night before surgery.     Reviewed with patient: Arrive 2 hours prior to scheduled surgery.    Bring all pertinent films to hospital the day of surgery.     Continue to refrain from NSAIDS (Ibuprofen, Aleve, Naprosyn), ASA, Over the counter herbal medications or supplements, anti-coagulants and blood thinners.     Patient confirmed they have help/assistance in place at home upon discharge    Instructions: using a washcloth and a bottle of provided Hibiclens, wash your body, avoiding your face and genitals. Preferably, shower the night before surgery and the morning of surgery using a half a bottle each time for your whole body shower.        Patient was informed that we will provide up to 6 weeks prescription of pain medication for post-operative pain.      Instructed patient about the following: After your surgery, if you will be staying in-patient, a nursing provider team will be monitoring you closely throughout your stay and communicate your health status to your surgeon and other providers.  You will be seen by Advanced Practice Providers (e.g., nurse practitioners, clinic nurse specialist, and physician assistants) who will check on you regularly to assess the status of your surgery.     Christy Rose RN, CNRN

## 2022-08-06 ENCOUNTER — ANESTHESIA EVENT (OUTPATIENT)
Dept: SURGERY | Facility: HOSPITAL | Age: 42
End: 2022-08-06
Payer: COMMERCIAL

## 2022-08-08 ENCOUNTER — TRANSFERRED RECORDS (OUTPATIENT)
Dept: HEALTH INFORMATION MANAGEMENT | Facility: CLINIC | Age: 42
End: 2022-08-08

## 2022-08-08 ENCOUNTER — TELEPHONE (OUTPATIENT)
Dept: NEUROSURGERY | Facility: CLINIC | Age: 42
End: 2022-08-08

## 2022-08-08 ENCOUNTER — ANESTHESIA (OUTPATIENT)
Dept: SURGERY | Facility: HOSPITAL | Age: 42
End: 2022-08-08
Payer: COMMERCIAL

## 2022-08-08 ENCOUNTER — APPOINTMENT (OUTPATIENT)
Dept: RADIOLOGY | Facility: HOSPITAL | Age: 42
End: 2022-08-08
Attending: PHYSICIAN ASSISTANT
Payer: COMMERCIAL

## 2022-08-08 ENCOUNTER — HOSPITAL ENCOUNTER (OUTPATIENT)
Facility: HOSPITAL | Age: 42
Discharge: HOME | End: 2022-08-08
Attending: SURGERY | Admitting: SURGERY
Payer: COMMERCIAL

## 2022-08-08 VITALS
WEIGHT: 203 LBS | RESPIRATION RATE: 15 BRPM | BODY MASS INDEX: 34.84 KG/M2 | TEMPERATURE: 97.5 F | OXYGEN SATURATION: 94 % | HEART RATE: 84 BPM | DIASTOLIC BLOOD PRESSURE: 60 MMHG | SYSTOLIC BLOOD PRESSURE: 102 MMHG

## 2022-08-08 DIAGNOSIS — M48.02 SPINAL STENOSIS IN CERVICAL REGION: Primary | ICD-10-CM

## 2022-08-08 PROCEDURE — 250N000025 HC SEVOFLURANE, PER MIN: Performed by: SURGERY

## 2022-08-08 PROCEDURE — C1776 JOINT DEVICE (IMPLANTABLE): HCPCS | Performed by: SURGERY

## 2022-08-08 PROCEDURE — 250N000009 HC RX 250: Performed by: ANESTHESIOLOGY

## 2022-08-08 PROCEDURE — 710N000010 HC RECOVERY PHASE 1, LEVEL 2, PER MIN: Performed by: SURGERY

## 2022-08-08 PROCEDURE — 370N000017 HC ANESTHESIA TECHNICAL FEE, PER MIN: Performed by: SURGERY

## 2022-08-08 PROCEDURE — 250N000011 HC RX IP 250 OP 636: Performed by: PHYSICIAN ASSISTANT

## 2022-08-08 PROCEDURE — 999N000141 HC STATISTIC PRE-PROCEDURE NURSING ASSESSMENT: Performed by: SURGERY

## 2022-08-08 PROCEDURE — 360N000084 HC SURGERY LEVEL 4 W/ FLUORO, PER MIN: Performed by: SURGERY

## 2022-08-08 PROCEDURE — 250N000013 HC RX MED GY IP 250 OP 250 PS 637: Performed by: PHYSICIAN ASSISTANT

## 2022-08-08 PROCEDURE — 278N000051 HC OR IMPLANT GENERAL: Performed by: SURGERY

## 2022-08-08 PROCEDURE — 258N000003 HC RX IP 258 OP 636: Performed by: ANESTHESIOLOGY

## 2022-08-08 PROCEDURE — 22856 TOT DISC ARTHRP 1NTRSPC CRV: CPT | Performed by: SURGERY

## 2022-08-08 PROCEDURE — 258N000003 HC RX IP 258 OP 636: Performed by: NURSE ANESTHETIST, CERTIFIED REGISTERED

## 2022-08-08 PROCEDURE — 250N000013 HC RX MED GY IP 250 OP 250 PS 637: Performed by: SURGERY

## 2022-08-08 PROCEDURE — P9045 ALBUMIN (HUMAN), 5%, 250 ML: HCPCS | Performed by: NURSE ANESTHETIST, CERTIFIED REGISTERED

## 2022-08-08 PROCEDURE — 250N000013 HC RX MED GY IP 250 OP 250 PS 637: Performed by: ANESTHESIOLOGY

## 2022-08-08 PROCEDURE — 710N000012 HC RECOVERY PHASE 2, PER MINUTE: Performed by: SURGERY

## 2022-08-08 PROCEDURE — 999N000180 XR SURGERY CARM FLUORO LESS THAN 5 MIN

## 2022-08-08 PROCEDURE — 22856 TOT DISC ARTHRP 1NTRSPC CRV: CPT | Mod: AS | Performed by: PHYSICIAN ASSISTANT

## 2022-08-08 PROCEDURE — P9045 ALBUMIN (HUMAN), 5%, 250 ML: HCPCS | Performed by: PHYSICIAN ASSISTANT

## 2022-08-08 PROCEDURE — 250N000011 HC RX IP 250 OP 636: Performed by: NURSE ANESTHETIST, CERTIFIED REGISTERED

## 2022-08-08 PROCEDURE — 250N000011 HC RX IP 250 OP 636: Performed by: ANESTHESIOLOGY

## 2022-08-08 PROCEDURE — 272N000001 HC OR GENERAL SUPPLY STERILE: Performed by: SURGERY

## 2022-08-08 PROCEDURE — 250N000009 HC RX 250: Performed by: PHYSICIAN ASSISTANT

## 2022-08-08 PROCEDURE — 250N000009 HC RX 250: Performed by: NURSE ANESTHETIST, CERTIFIED REGISTERED

## 2022-08-08 DEVICE — DISC 6972450 PRESTIGE
Type: IMPLANTABLE DEVICE | Site: SPINE CERVICAL | Status: FUNCTIONAL
Brand: PRESTIGE LP™

## 2022-08-08 RX ORDER — MELOXICAM 15 MG/1
7.5 TABLET ORAL DAILY
Qty: 14 TABLET | Refills: 0 | Status: SHIPPED | OUTPATIENT
Start: 2022-08-08 | End: 2022-09-19

## 2022-08-08 RX ORDER — FAMOTIDINE 20 MG/1
20 TABLET, FILM COATED ORAL ONCE
Status: COMPLETED | OUTPATIENT
Start: 2022-08-08 | End: 2022-08-08

## 2022-08-08 RX ORDER — NALOXONE HYDROCHLORIDE 1 MG/ML
0.2 INJECTION INTRAMUSCULAR; INTRAVENOUS; SUBCUTANEOUS
Status: DISCONTINUED | OUTPATIENT
Start: 2022-08-08 | End: 2022-08-08 | Stop reason: HOSPADM

## 2022-08-08 RX ORDER — OXYCODONE HYDROCHLORIDE 5 MG/1
5 TABLET ORAL EVERY 4 HOURS PRN
Status: DISCONTINUED | OUTPATIENT
Start: 2022-08-08 | End: 2022-08-08 | Stop reason: HOSPADM

## 2022-08-08 RX ORDER — MEPERIDINE HYDROCHLORIDE 25 MG/ML
12.5 INJECTION INTRAMUSCULAR; INTRAVENOUS; SUBCUTANEOUS
Status: DISCONTINUED | OUTPATIENT
Start: 2022-08-08 | End: 2022-08-08 | Stop reason: HOSPADM

## 2022-08-08 RX ORDER — MAGNESIUM SULFATE 4 G/50ML
4 INJECTION INTRAVENOUS ONCE
Status: COMPLETED | OUTPATIENT
Start: 2022-08-08 | End: 2022-08-08

## 2022-08-08 RX ORDER — FENTANYL CITRATE 50 UG/ML
100 INJECTION, SOLUTION INTRAMUSCULAR; INTRAVENOUS
Status: DISCONTINUED | OUTPATIENT
Start: 2022-08-08 | End: 2022-08-08 | Stop reason: HOSPADM

## 2022-08-08 RX ORDER — KETAMINE HYDROCHLORIDE 10 MG/ML
INJECTION INTRAMUSCULAR; INTRAVENOUS PRN
Status: DISCONTINUED | OUTPATIENT
Start: 2022-08-08 | End: 2022-08-08

## 2022-08-08 RX ORDER — ACETAMINOPHEN 325 MG/1
975 TABLET ORAL ONCE
Status: COMPLETED | OUTPATIENT
Start: 2022-08-08 | End: 2022-08-08

## 2022-08-08 RX ORDER — HALOPERIDOL 5 MG/ML
1 INJECTION INTRAMUSCULAR
Status: DISCONTINUED | OUTPATIENT
Start: 2022-08-08 | End: 2022-08-08 | Stop reason: HOSPADM

## 2022-08-08 RX ORDER — LORAZEPAM 2 MG/ML
.5-1 INJECTION INTRAMUSCULAR
Status: DISCONTINUED | OUTPATIENT
Start: 2022-08-08 | End: 2022-08-08 | Stop reason: HOSPADM

## 2022-08-08 RX ORDER — FENTANYL CITRATE 50 UG/ML
25 INJECTION, SOLUTION INTRAMUSCULAR; INTRAVENOUS EVERY 5 MIN PRN
Status: DISCONTINUED | OUTPATIENT
Start: 2022-08-08 | End: 2022-08-08 | Stop reason: HOSPADM

## 2022-08-08 RX ORDER — DESFLURANE 240 ML/240ML
LIQUID RESPIRATORY (INHALATION)
Status: DISCONTINUED
Start: 2022-08-08 | End: 2022-08-08 | Stop reason: HOSPADM

## 2022-08-08 RX ORDER — PROPOFOL 10 MG/ML
INJECTION, EMULSION INTRAVENOUS PRN
Status: DISCONTINUED | OUTPATIENT
Start: 2022-08-08 | End: 2022-08-08

## 2022-08-08 RX ORDER — SCOLOPAMINE TRANSDERMAL SYSTEM 1 MG/1
1 PATCH, EXTENDED RELEASE TRANSDERMAL ONCE
Status: DISCONTINUED | OUTPATIENT
Start: 2022-08-08 | End: 2022-08-08 | Stop reason: HOSPADM

## 2022-08-08 RX ORDER — ALBUMIN HUMAN 5 %
25 INTRAVENOUS SOLUTION INTRAVENOUS ONCE
Status: COMPLETED | OUTPATIENT
Start: 2022-08-08 | End: 2022-08-08

## 2022-08-08 RX ORDER — ACETAMINOPHEN 325 MG/1
650 TABLET ORAL EVERY 4 HOURS PRN
Status: DISCONTINUED | OUTPATIENT
Start: 2022-08-11 | End: 2022-08-08 | Stop reason: HOSPADM

## 2022-08-08 RX ORDER — OXYCODONE HYDROCHLORIDE 5 MG/1
10 TABLET ORAL EVERY 4 HOURS PRN
Status: DISCONTINUED | OUTPATIENT
Start: 2022-08-08 | End: 2022-08-08 | Stop reason: HOSPADM

## 2022-08-08 RX ORDER — OXYCODONE HYDROCHLORIDE 5 MG/1
5 TABLET ORAL EVERY 4 HOURS PRN
Qty: 42 TABLET | Refills: 0 | Status: SHIPPED | OUTPATIENT
Start: 2022-08-08 | End: 2022-08-16

## 2022-08-08 RX ORDER — CLINDAMYCIN PHOSPHATE 900 MG/50ML
900 INJECTION, SOLUTION INTRAVENOUS
Status: COMPLETED | OUTPATIENT
Start: 2022-08-08 | End: 2022-08-08

## 2022-08-08 RX ORDER — ACETAMINOPHEN 325 MG/1
975 TABLET ORAL EVERY 8 HOURS
Status: DISCONTINUED | OUTPATIENT
Start: 2022-08-08 | End: 2022-08-08

## 2022-08-08 RX ORDER — ONDANSETRON 4 MG/1
4 TABLET, ORALLY DISINTEGRATING ORAL EVERY 6 HOURS PRN
Status: DISCONTINUED | OUTPATIENT
Start: 2022-08-08 | End: 2022-08-08 | Stop reason: HOSPADM

## 2022-08-08 RX ORDER — ONDANSETRON 2 MG/ML
4 INJECTION INTRAMUSCULAR; INTRAVENOUS EVERY 30 MIN PRN
Status: DISCONTINUED | OUTPATIENT
Start: 2022-08-08 | End: 2022-08-08 | Stop reason: HOSPADM

## 2022-08-08 RX ORDER — METHOCARBAMOL 500 MG/1
500 TABLET, FILM COATED ORAL EVERY 6 HOURS PRN
Status: DISCONTINUED | OUTPATIENT
Start: 2022-08-08 | End: 2022-08-08 | Stop reason: HOSPADM

## 2022-08-08 RX ORDER — PROPOFOL 10 MG/ML
INJECTION, EMULSION INTRAVENOUS CONTINUOUS PRN
Status: DISCONTINUED | OUTPATIENT
Start: 2022-08-08 | End: 2022-08-08

## 2022-08-08 RX ORDER — SODIUM CHLORIDE 9 MG/ML
INJECTION, SOLUTION INTRAVENOUS CONTINUOUS PRN
Status: DISCONTINUED | OUTPATIENT
Start: 2022-08-08 | End: 2022-08-08

## 2022-08-08 RX ORDER — SODIUM CHLORIDE, SODIUM LACTATE, POTASSIUM CHLORIDE, CALCIUM CHLORIDE 600; 310; 30; 20 MG/100ML; MG/100ML; MG/100ML; MG/100ML
INJECTION, SOLUTION INTRAVENOUS CONTINUOUS
Status: DISCONTINUED | OUTPATIENT
Start: 2022-08-08 | End: 2022-08-08 | Stop reason: HOSPADM

## 2022-08-08 RX ORDER — DEXMEDETOMIDINE HYDROCHLORIDE 4 UG/ML
INJECTION, SOLUTION INTRAVENOUS
Status: DISCONTINUED
Start: 2022-08-08 | End: 2022-08-08 | Stop reason: HOSPADM

## 2022-08-08 RX ORDER — ONDANSETRON 2 MG/ML
4 INJECTION INTRAMUSCULAR; INTRAVENOUS EVERY 6 HOURS PRN
Status: DISCONTINUED | OUTPATIENT
Start: 2022-08-08 | End: 2022-08-08 | Stop reason: HOSPADM

## 2022-08-08 RX ORDER — LIDOCAINE 40 MG/G
CREAM TOPICAL
Status: DISCONTINUED | OUTPATIENT
Start: 2022-08-08 | End: 2022-08-08 | Stop reason: HOSPADM

## 2022-08-08 RX ORDER — PROCHLORPERAZINE MALEATE 10 MG
10 TABLET ORAL EVERY 6 HOURS PRN
Status: DISCONTINUED | OUTPATIENT
Start: 2022-08-08 | End: 2022-08-08 | Stop reason: HOSPADM

## 2022-08-08 RX ORDER — ONDANSETRON 4 MG/1
4 TABLET, ORALLY DISINTEGRATING ORAL EVERY 30 MIN PRN
Status: DISCONTINUED | OUTPATIENT
Start: 2022-08-08 | End: 2022-08-08 | Stop reason: HOSPADM

## 2022-08-08 RX ORDER — ACETAMINOPHEN 325 MG/1
975 TABLET ORAL EVERY 8 HOURS
Status: DISCONTINUED | OUTPATIENT
Start: 2022-08-08 | End: 2022-08-08 | Stop reason: HOSPADM

## 2022-08-08 RX ORDER — FENTANYL CITRATE 50 UG/ML
25 INJECTION, SOLUTION INTRAMUSCULAR; INTRAVENOUS
Status: DISCONTINUED | OUTPATIENT
Start: 2022-08-08 | End: 2022-08-08 | Stop reason: HOSPADM

## 2022-08-08 RX ORDER — HYDROMORPHONE HYDROCHLORIDE 1 MG/ML
0.4 INJECTION, SOLUTION INTRAMUSCULAR; INTRAVENOUS; SUBCUTANEOUS EVERY 5 MIN PRN
Status: DISCONTINUED | OUTPATIENT
Start: 2022-08-08 | End: 2022-08-08 | Stop reason: HOSPADM

## 2022-08-08 RX ORDER — KETOROLAC TROMETHAMINE 30 MG/ML
15 INJECTION, SOLUTION INTRAMUSCULAR; INTRAVENOUS EVERY 6 HOURS PRN
Status: DISCONTINUED | OUTPATIENT
Start: 2022-08-08 | End: 2022-08-08 | Stop reason: HOSPADM

## 2022-08-08 RX ORDER — LIDOCAINE HYDROCHLORIDE 10 MG/ML
INJECTION, SOLUTION INFILTRATION; PERINEURAL PRN
Status: DISCONTINUED | OUTPATIENT
Start: 2022-08-08 | End: 2022-08-08

## 2022-08-08 RX ORDER — METHOCARBAMOL 500 MG/1
500 TABLET, FILM COATED ORAL EVERY 6 HOURS PRN
Qty: 42 TABLET | Refills: 1 | Status: SHIPPED | OUTPATIENT
Start: 2022-08-08 | End: 2022-11-03

## 2022-08-08 RX ORDER — DEXAMETHASONE SODIUM PHOSPHATE 10 MG/ML
10 INJECTION, SOLUTION INTRAMUSCULAR; INTRAVENOUS ONCE
Status: COMPLETED | OUTPATIENT
Start: 2022-08-08 | End: 2022-08-08

## 2022-08-08 RX ORDER — NALOXONE HYDROCHLORIDE 1 MG/ML
0.4 INJECTION INTRAMUSCULAR; INTRAVENOUS; SUBCUTANEOUS
Status: DISCONTINUED | OUTPATIENT
Start: 2022-08-08 | End: 2022-08-08 | Stop reason: HOSPADM

## 2022-08-08 RX ORDER — FENTANYL CITRATE 50 UG/ML
INJECTION, SOLUTION INTRAMUSCULAR; INTRAVENOUS PRN
Status: DISCONTINUED | OUTPATIENT
Start: 2022-08-08 | End: 2022-08-08

## 2022-08-08 RX ORDER — CLINDAMYCIN PHOSPHATE 900 MG/50ML
900 INJECTION, SOLUTION INTRAVENOUS SEE ADMIN INSTRUCTIONS
Status: DISCONTINUED | OUTPATIENT
Start: 2022-08-08 | End: 2022-08-08 | Stop reason: HOSPADM

## 2022-08-08 RX ADMIN — DEXAMETHASONE SODIUM PHOSPHATE 10 MG: 10 INJECTION, SOLUTION INTRAMUSCULAR; INTRAVENOUS at 07:27

## 2022-08-08 RX ADMIN — KETAMINE HYDROCHLORIDE 50 MG: 10 INJECTION, SOLUTION INTRAMUSCULAR; INTRAVENOUS at 07:27

## 2022-08-08 RX ADMIN — Medication 100 MG: at 07:27

## 2022-08-08 RX ADMIN — PROPOFOL 160 MG: 10 INJECTION, EMULSION INTRAVENOUS at 07:27

## 2022-08-08 RX ADMIN — ACETAMINOPHEN 975 MG: 325 TABLET ORAL at 06:38

## 2022-08-08 RX ADMIN — FENTANYL CITRATE 200 MCG: 50 INJECTION, SOLUTION INTRAMUSCULAR; INTRAVENOUS at 07:27

## 2022-08-08 RX ADMIN — HYDROMORPHONE HYDROCHLORIDE 0.5 MG: 1 INJECTION, SOLUTION INTRAMUSCULAR; INTRAVENOUS; SUBCUTANEOUS at 08:10

## 2022-08-08 RX ADMIN — ACETAMINOPHEN 975 MG: 325 TABLET ORAL at 12:10

## 2022-08-08 RX ADMIN — MIDAZOLAM 2 MG: 1 INJECTION INTRAMUSCULAR; INTRAVENOUS at 07:20

## 2022-08-08 RX ADMIN — PROPOFOL 40 MG: 10 INJECTION, EMULSION INTRAVENOUS at 08:02

## 2022-08-08 RX ADMIN — PROPOFOL 175 MCG/KG/MIN: 10 INJECTION, EMULSION INTRAVENOUS at 07:32

## 2022-08-08 RX ADMIN — CLINDAMYCIN IN 5 PERCENT DEXTROSE 900 MG: 18 INJECTION, SOLUTION INTRAVENOUS at 07:13

## 2022-08-08 RX ADMIN — LIDOCAINE HYDROCHLORIDE 50 MG: 10 INJECTION, SOLUTION INFILTRATION; PERINEURAL at 07:27

## 2022-08-08 RX ADMIN — ALBUMIN HUMAN: 0.05 INJECTION, SOLUTION INTRAVENOUS at 07:37

## 2022-08-08 RX ADMIN — ALBUMIN (HUMAN) 25 G: 12.5 INJECTION, SOLUTION INTRAVENOUS at 06:44

## 2022-08-08 RX ADMIN — SODIUM CHLORIDE, POTASSIUM CHLORIDE, SODIUM LACTATE AND CALCIUM CHLORIDE: 600; 310; 30; 20 INJECTION, SOLUTION INTRAVENOUS at 06:23

## 2022-08-08 RX ADMIN — MAGNESIUM SULFATE HEPTAHYDRATE 4 G: 80 INJECTION, SOLUTION INTRAVENOUS at 06:32

## 2022-08-08 RX ADMIN — FAMOTIDINE 20 MG: 20 TABLET ORAL at 06:27

## 2022-08-08 RX ADMIN — ALBUMIN HUMAN: 0.05 INJECTION, SOLUTION INTRAVENOUS at 07:51

## 2022-08-08 RX ADMIN — SCOPALAMINE 1 PATCH: 1 PATCH, EXTENDED RELEASE TRANSDERMAL at 06:53

## 2022-08-08 RX ADMIN — CLINDAMYCIN PHOSPHATE 900 MG: 900 INJECTION, SOLUTION INTRAVENOUS at 07:32

## 2022-08-08 RX ADMIN — DEXMEDETOMIDINE 0.5 MCG/KG/HR: 100 INJECTION, SOLUTION, CONCENTRATE INTRAVENOUS at 07:32

## 2022-08-08 RX ADMIN — SODIUM CHLORIDE, POTASSIUM CHLORIDE, SODIUM LACTATE AND CALCIUM CHLORIDE: 600; 310; 30; 20 INJECTION, SOLUTION INTRAVENOUS at 10:41

## 2022-08-08 RX ADMIN — METHOCARBAMOL 500 MG: 500 TABLET, COATED ORAL at 12:09

## 2022-08-08 RX ADMIN — SODIUM CHLORIDE: 9 INJECTION, SOLUTION INTRAVENOUS at 07:35

## 2022-08-08 NOTE — PHARMACY-ADMISSION MEDICATION HISTORY
Pharmacy Note - Admission Medication History  ______________________________________________________________________    Prior To Admission (PTA) med list completed and updated in EMR.       PTA Med List   Medication Sig Last Dose     amitriptyline (ELAVIL) 50 MG tablet Take 1 tablet (50 mg) by mouth At Bedtime 8/7/2022 at pm     eletriptan (RELPAX) 40 MG tablet Take 1 tablet (40 mg) by mouth at onset of headache May repeat if needed in 2 hours. Max of 2 doses/24hours Max of 4 days/month Unknown     EPINEPHrine (ANY BX GENERIC EQUIV) 0.3 MG/0.3ML injection 2-pack Inject 0.3 mLs (0.3 mg) into the muscle once as needed for anaphylaxis Unknown     FLUoxetine (PROZAC) 40 MG capsule Take 40 mg by mouth daily 8/7/2022 at am     hydrOXYzine (ATARAX) 25 MG tablet Take 1 tablet (25 mg) by mouth 3 times daily as needed for anxiety Unknown     lidocaine (LIDODERM) 5 % patch Apply 1 patch to affected area as needed for 12 hours, remove for 12 hours before reapplying. Unknown     pregabalin (LYRICA) 50 MG capsule Take 1-2 capsules ( mg) by mouth At Bedtime 8/7/2022 at 100 mg     rizatriptan (MAXALT-MLT) 10 MG ODT Take 1 tablet (10 mg) by mouth at onset of headache for migraine May repeat in 2 hours, with a max of 30 mg in 24 hours and a max of 5 days/month Unknown     topiramate (TOPAMAX) 50 MG tablet Take 1 tablet (50 mg) by mouth daily 8/7/2022 at am       Information source(s): Patient and CareEverywhere/SureScripts    Method of interview communication: in-person    Patient was asked about OTC/herbal products specifically.  PTA med list reflects this.    Based on the pharmacist's assessment, the PTA med list information appears reliable    Allergies were reviewed, assessed, and updated with the patient.      Patient does not use any multi-dose medications prior to admission.     Thank you for the opportunity to participate in the care of this patient.      Ashley Lee [facility]     8/8/2022     6:39 AM

## 2022-08-08 NOTE — BRIEF OP NOTE
[facility] Brief Operative Note    Pre-operative diagnosis: Spinal stenosis in cervical region [M48.02]  Cervical spondylosis with myelopathy [M47.12]  Cervical radiculopathy [M54.12]   Post-operative diagnosis same   Procedure: Procedure(s):  Cervical 5 - Cervical 6 anterior cervical decompression and artificial disc replacement, use of microscope   Surgeon(s): Surgeon(s) and Role:     * Radha Lilly MD - Primary     * Ashley Sen PA-C - Assisting   Estimated blood loss: 15 mL    Specimens: * No specimens in log *   Findings: neuro monitoring stable throughout procedure   Implant Name Type Inv. Item Serial No.  Lot No. LRB No. Used Action   DISC LP PRESTIGE [id number] - [id number] Total Joint Component/Insert DISC LP PRESTIGE 5X14MM  [employer] [id number] N/A 1 Implanted

## 2022-08-08 NOTE — INTERVAL H&P NOTE
"I have reviewed the surgical (or preoperative) H&P that is linked to this encounter, and examined the patient. There are no significant changes    Clinical Conditions Present on Arrival:  Clinically Significant Risk Factors Present on Admission                   # Obesity: Estimated body mass index is 34.84 kg/m  as calculated from the following:    Height as of 8/4/22: 5' 4\" (1.626 m).    Weight as of this encounter: 203 lb (92.1 kg).       "

## 2022-08-08 NOTE — ANESTHESIA PREPROCEDURE EVALUATION
Anesthesia Pre-Procedure Evaluation    Patient: Madeline Szymanski   MRN: [id number] : 1980        Preoperative Diagnosis: Lumbar radiculopathy [M54.16]  Spinal stenosis, lumbar region, without neurogenic claudication [M48.061]   Procedure : Procedure(s):  LEFT LUMBAR 5-SACRAL 1 HEMILAMINECTOMY, MEDIAL FACETECTOMY, FORAMINOTOMY WITH  POSSIBLE LUMBAR 5-SACRAL 1 MICRODISCECTOMY     Past Medical History:   Diagnosis Date     Anxiety      Arthritis      Depressive disorder      History of blood transfusion 1978     Kidney infection      Lumbar radiculopathy      Migraine      Migraine      Painless urinary retention due to cauda equina syndrome (H)      Pancreatitis      Panic attack      PONV (postoperative nausea and vomiting)      Spinal stenosis in cervical region      UTI (lower urinary tract infection)       Past Surgical History:   Procedure Laterality Date     ABDOMEN SURGERY        SECTION       GASTRECTOMY LAPAROSCOPIC SLEEVE       GI SURGERY       GYN SURGERY  2018    Hysterectomy     LAMINECTOMY LUMBAR ONE LEVEL Left 10/04/2021    Procedure: LEFT LUMBAR 5-SACRAL 1 HEMILAMINECTOMY, MEDIAL FACETECTOMY, FORAMINOTOMY WITH;  Surgeon: Radha Lilly MD;  Location: [facility] OR     LUMBAR DISCECTOMY Left 10/04/2021    Procedure: LUMBAR 5-SACRAL 1 MICRODISCECTOMY;  Surgeon: Radha Lilly MD;  Location: [facility] OR      Allergies   Allergen Reactions     Cephalexin Hives     Coconut Flavor Anaphylaxis     Covid-19 (Mrna) Vaccine Anaphylaxis     Pfizer      Prochlorperazine Other (See Comments)     Other reaction(s): Tremors  Tremors  Other reaction(s): Tremors       Coconut Fatty Acids      Other reaction(s): Edema     Metoclopramide Other (See Comments)     Other reaction(s): Tremors  tremors  Other reaction(s): Tremors       Penicillins Hives      Social History     Tobacco Use     Smoking status: Never Smoker     Smokeless tobacco: Never Used   Substance Use Topics      Alcohol use: Never      Wt Readings from Last 1 Encounters:   08/08/22 92.1 kg (203 lb)        Anesthesia Evaluation   Pt has had prior anesthetic. Type: General.        ROS/MED HX  ENT/Pulmonary:  - neg pulmonary ROS  (-) asthma   Neurologic: Comment: Disc herniation with cauda equina s/f L5-s1 hemilaminectomy w/ medial facetectomy      (+) migraines (Reglan works best for migraine abatement in pacu),     Cardiovascular:  - neg cardiovascular ROS  (-) hypertension   METS/Exercise Tolerance:     Hematologic:  - neg hematologic  ROS     Musculoskeletal:  - neg musculoskeletal ROS     GI/Hepatic:     (+) GERD (hx of gastric bypass),     Renal/Genitourinary:     (+) renal disease, type: CRI,     Endo:  - neg endo ROS   (+) Obesity (BMI 35),     Psychiatric/Substance Use:  - neg psychiatric ROS     Infectious Disease: Comment: Recent Results (from the past 120 hour(s))  -Asymptomatic COVID-19 Virus (Coronavirus) by PCR Nose  Collection Time: 10/01/21  1:36 PM  Specimen: Nose; Swab       Result                                            Value                         Ref Range                       SARS CoV2 PCR                                     Negative                      Negative                    - neg infectious disease ROS     Malignancy:  - neg malignancy ROS     Other:  - neg other ROS          Physical Exam    Airway        Mallampati: III   TM distance: > 3 FB   Neck ROM: limited   Mouth opening: > 3 cm    Respiratory Devices and Support         Dental  no notable dental history         Cardiovascular   cardiovascular exam normal          Pulmonary   pulmonary exam normal            Other findings: Head and neck ROM limited primarily by pain.    OUTSIDE LABS:  CBC:   Lab Results   Component Value Date    WBC 6.6 08/02/2022    WBC 5.3 01/01/2022    HGB 13.4 08/02/2022    HGB 13.5 01/01/2022    HCT 41.0 08/02/2022    HCT 42.9 01/01/2022     08/02/2022     01/01/2022     BMP:   Lab Results    Component Value Date     08/02/2022     12/31/2021    POTASSIUM 4.0 08/02/2022    POTASSIUM 4.3 12/31/2021    CHLORIDE 104 08/02/2022    CHLORIDE 105 12/31/2021    CO2 27 08/02/2022    CO2 26 12/31/2021    BUN 8.3 08/02/2022    BUN 13 12/31/2021    CR 0.89 08/02/2022    CR 0.66 12/31/2021    GLC 76 08/02/2022     (H) 12/31/2021     COAGS:   Lab Results   Component Value Date    PTT 31 08/02/2022    INR 0.94 08/02/2022    FIBR 551 (H) 12/31/2021     POC: No results found for: BGM, HCG, HCGS  HEPATIC:   Lab Results   Component Value Date    ALBUMIN 2.8 (L) 12/27/2021    PROTTOTAL 6.0 12/27/2021    ALT 22 12/27/2021    AST 26 12/27/2021    ALKPHOS 57 12/27/2021    BILITOTAL 0.4 12/27/2021     OTHER:   Lab Results   Component Value Date    LACT 0.7 12/27/2021    JE 9.1 08/02/2022    MAG 1.7 (L) 12/27/2021    LIPASE 34 09/26/2019    CRP 2.3 (H) 01/01/2022    SED 17 10/17/2021       Anesthesia Plan    ASA Status:  2   NPO Status:  NPO Appropriate    Anesthesia Type: General.     - Airway: ETT   Induction: Intravenous, Propofol.   Maintenance: Balanced.   Techniques and Equipment:     - Airway: Video-Laryngoscope         Consents    Anesthesia Plan(s) and associated risks, benefits, and realistic alternatives discussed. Questions answered and patient/representative(s) expressed understanding.    - Discussed:     - Discussed with:  Patient      - Extended Intubation/Ventilatory Support Discussed: No.      - Patient is DNR/DNI Status: No    Use of blood products discussed: No .     Postoperative Care    Pain management: IV analgesics, Multi-modal analgesia, Oral pain medications.   PONV prophylaxis: Ondansetron (or other 5HT-3), Dexamethasone or Solumedrol, Background Propofol Infusion, Scopolamine patch     Comments:    Other Comments: Scopolamine, Decadron, Zofran.  Precedex infusion.  Toradol, Tylenol.  Ketamine (0.5 mg/kg).  Magnesium.                Christopher Kim MD

## 2022-08-08 NOTE — TELEPHONE ENCOUNTER
PATIENT NAME:  Madeline Szymanski  YOB: 1980  MRN: [id number]  SURGEON: Dr. Lilly  DATE of SURGERY: 08/08/2022  PROCEDURE: C5-6 artifical disc replacement    FOLLOW-UP:  Wound Check: 7-10 days   Staples/Sutures Out: n/a  Post Op Visit: 6 weeks  Post-op Provider: JOHN on Dr. Lilly clinic day  DIAGNOSTICS: XR  DISPOSITION: Home same day    ADDITIONAL INSTRUCTIONS FOR MEDICAL STAFF:        Christy Rose RN, CNRN

## 2022-08-08 NOTE — ANESTHESIA PROCEDURE NOTES
Airway       Patient location during procedure: OR  Staff -        Anesthesiologist:  Christopher Kim MD       CRNA: Liss Piedra APRN CRNA       Performed By: CRNA  Consent for Airway        Urgency: elective  Indications and Patient Condition       Indications for airway management: erin-procedural       Induction type:intravenous       Mask difficulty assessment: 1 - vent by mask    Final Airway Details       Final airway type: endotracheal airway       Successful airway: ETT - single  Endotracheal Airway Details        ETT size (mm): 7.0       Cuffed: yes       Successful intubation technique: video laryngoscopy       VL Blade Size: Glidescope 3       Grade View of Cords: 1       Adjucts: stylet       Position: Right       Measured from: gums/teeth       Secured at (cm): 21       Bite block used: None    Post intubation assessment        Placement verified by: capnometry, equal breath sounds and chest rise        Number of attempts at approach: 1       Number of other approaches attempted: 0       Secured with: silk tape       Ease of procedure: easy       Dentition: Intact and Unchanged      
No oral lesions; no gross abnormalities

## 2022-08-08 NOTE — ANESTHESIA CARE TRANSFER NOTE
Patient: Madeline Szymanski    Procedure: Procedure(s):  Cervical 5 - Cervical 6 anterior cervical decompression and artificial disc replacement, use of microscope       Diagnosis: Spinal stenosis in cervical region [M48.02]  Cervical spondylosis with myelopathy [M47.12]  Cervical radiculopathy [M54.12]  Diagnosis Additional Information: No value filed.    Anesthesia Type:   General     Note:    Oropharynx: spontaneously breathing and oral airway in place  Level of Consciousness: drowsy  Oxygen Supplementation: face mask  Level of Supplemental Oxygen (L/min / FiO2): 6  Independent Airway: airway patency satisfactory and stable  Dentition: dentition unchanged  Vital Signs Stable: post-procedure vital signs reviewed and stable  Report to RN Given: handoff report given  Patient transferred to: PACU  Comments: Pt transferred to PACU. Extubated in OR and spontaneously breathing with sufficient gas exchange. On 6L O2 via FM. VSS. Normothermic. Report to RN at bedside.   PERRI Barron CRNA     Handoff Report: Identifed the Patient, Identified the Reponsible Provider, Reviewed the pertinent medical history, Discussed the surgical course, Reviewed Intra-OP anesthesia mangement and issues during anesthesia, Set expectations for post-procedure period and Allowed opportunity for questions and acknowledgement of understanding      Vitals:  Vitals Value Taken Time   BP 90/56 08/08/22 0933   Temp 36.2  C (97.2  F) 08/08/22 0933   Pulse 62 08/08/22 0933   Resp 14 08/08/22 0933   SpO2 95 % 08/08/22 0933       Electronically Signed By: PERRI Leahy CRNA  August 8, 2022  9:34 AM

## 2022-08-09 NOTE — OP NOTE
NEUROSURGERY OPERATIVE REPORT     DATE OF SERVICE: 8/8/2022     PREOPERATIVE DIAGNOSES:  Spinal stenosis in cervical region   Cervical spondylosis with myelopathy   Cervical radiculopathy       POSTOPERATIVE DIAGNOSES:  same    PROCEDURE:   1.  Cervical 5 - Cervical 6 anterior cervical decompression and artificial disc replacemen  2.  Use of operating room microscope.   3.  Use of neuro monitoring    SURGEON: Radha Lilly MD    ASSISTANT: Ashley Sen PA-C    INDICATIONS:  Madeline Szymanski is a 41 yo female who presents with neck and arm pain and imbalance. MRI of her cervical spine shows spinal canal stenosis with flattening of her spinal cord at C5-6. Also has mild left foraminal narrowing at this level. No significant  instability on xray. She has tried conservative management without her symptoms. Continues to have signs of myelopathy on exam. Recommend C5-6 anterior cervical decompression and artifical disc replacement. Risks of anterior neck surgery include but are not limited to inadequate symptom relief, nerve or spinal cord damage, durotomy, hematoma, infection, injury to trachea or esophagus, speech disturbance from injury to the recurrent laryngeal nerve, swallowing difficulties, hardware malfunction. She agreed to proceed.     PROCEDURE:  After obtaining informed consent, the patient was brought to the operating room with TEDs and pneumatic stockings in place.  IV antibiotics was administered. She was intubated under general endotracheal anesthesia with her neck.  She was positioned supine on the operating room table with a bump under the shoulders support behind the neck and the head cradled in a soft headrest leaving the neck in a slightly extended position.  Her shoulder were taped and retracted as needed to expose the anterior neck for a skin incision. She was prepped and draped in the usual sterile fashion.        The right sided anterior neck incision was opened sharply and extended down to  the platysma.  The platysma wasopened in one layer with sharp dissection and undercut superiorly and inferiorly for ease of reapproximation at the end of the procedure.  We next continued the dissection medial to this sternocleidomastoid muscle.   After identifying the carotid and gently retracting it laterally, as well as identifying the trachea and esophagus which were gently retracted medially with hand-held retractors, to open the dissection.  We continued the dissection sharply and once reaching the loose  Areolar plane we used blunt dissection down to the anterior surface of the vertebral bodies.   We placed a short marker needle in the disc space to verify levels with a lateral x-ray, cervical 5-6.     Once levels were confirmed we then proceeded to elevate the longus coli muscles bilaterally and placed our retractors.  We then used a [place] retractor system  into the anterior surface of cervical 5 and cervical 6 used to achieve distraction after cutting the anterior ligament. The operating microscope was the utilized for the remainder of the case.  The disc was removed with a combination of high speed air drill, [place], curettes and pituitary grabbers until the end plates were free of disc material. The posterior longitudinal ligament was opened with a blunt nerve hook. The remainder of the disc was removed with [place] rongeurs. We verified that the foramen were open with a blunt tip nerve hook. A trial was tapped into position using a slight amount of distraction on the [place] pins we the drilled 4 troughs through the trial. We then placed the artifical dis replacement device.  With the device in good position we released the [place] pins and verified a nice solid fit.  We removed the [place] distractor pins and filled the screw holes with bone wax.   A lateral x-ray was taken prior to the patient leaving the operating room.      An assistant was needed for positioning, retraction and closure.     Once  the construct was in good position, we copiously irrigated the incision with antibiotic-containing saline and achieved hemostasis , and remove the retractor system.  We verified good pulsation in the carotid.  We verified no injury to the trachea /esophagus.  We closed the platysma with interrupted 2-0 Vicryl ,  inverted 2-0 Vicryl to approximate the subcutaneous tissues to approximate the skin edges.  Monocryl and Exofin was placed. Sponge and needle counts were correct prior to closure x2.    Patient tolerated the procedure well, was taken to the recovery room at neurological baseline with no new deficits appreciated.     Estimated blood loss: 15 ml    Specimens: none    Findings: neuro monitoring stable throughout procedure     Implants:   Implant Name Type Inv. Item Serial No.  Lot No. LRB No. Used Action   DISC LP PRESTIGE 5X14MM - [id number] Total Joint Component/Insert DISC LP PRESTIGE 5X14MM  [employer] [id number] N/A 1 Implanted            Radha Lilly MD    Cc: Clinic - [facility]

## 2022-08-10 LAB
ATRIAL RATE - MUSE: 101 BPM
DIASTOLIC BLOOD PRESSURE - MUSE: NORMAL MMHG
INTERPRETATION ECG - MUSE: NORMAL
P AXIS - MUSE: 41 DEGREES
PR INTERVAL - MUSE: 186 MS
QRS DURATION - MUSE: 84 MS
QT - MUSE: 352 MS
QTC - MUSE: 456 MS
R AXIS - MUSE: 38 DEGREES
SYSTOLIC BLOOD PRESSURE - MUSE: NORMAL MMHG
T AXIS - MUSE: 29 DEGREES
VENTRICULAR RATE- MUSE: 101 BPM

## 2022-08-16 ENCOUNTER — ALLIED HEALTH/NURSE VISIT (OUTPATIENT)
Dept: NEUROSURGERY | Facility: CLINIC | Age: 42
End: 2022-08-16
Payer: COMMERCIAL

## 2022-08-16 ENCOUNTER — OFFICE VISIT (OUTPATIENT)
Dept: FAMILY MEDICINE | Facility: CLINIC | Age: 42
End: 2022-08-16
Payer: COMMERCIAL

## 2022-08-16 ENCOUNTER — HOSPITAL ENCOUNTER (OUTPATIENT)
Dept: GENERAL RADIOLOGY | Facility: HOSPITAL | Age: 42
Discharge: HOME | End: 2022-08-16
Attending: PHYSICIAN ASSISTANT | Admitting: PHYSICIAN ASSISTANT
Payer: COMMERCIAL

## 2022-08-16 VITALS
TEMPERATURE: 98.5 F | BODY MASS INDEX: 35.7 KG/M2 | HEART RATE: 87 BPM | WEIGHT: 208 LBS | DIASTOLIC BLOOD PRESSURE: 82 MMHG | SYSTOLIC BLOOD PRESSURE: 114 MMHG | RESPIRATION RATE: 18 BRPM | OXYGEN SATURATION: 99 %

## 2022-08-16 VITALS — HEART RATE: 76 BPM | DIASTOLIC BLOOD PRESSURE: 78 MMHG | SYSTOLIC BLOOD PRESSURE: 128 MMHG | RESPIRATION RATE: 18 BRPM

## 2022-08-16 DIAGNOSIS — M54.2 NECK PAIN: ICD-10-CM

## 2022-08-16 DIAGNOSIS — M54.2 NECK PAIN: Primary | ICD-10-CM

## 2022-08-16 DIAGNOSIS — M54.12 CERVICAL RADICULOPATHY: Primary | ICD-10-CM

## 2022-08-16 PROCEDURE — 72040 X-RAY EXAM NECK SPINE 2-3 VW: CPT | Mod: FY

## 2022-08-16 PROCEDURE — 99207 PR NO CHARGE NURSE ONLY: CPT

## 2022-08-16 PROCEDURE — 99214 OFFICE O/P EST MOD 30 MIN: CPT | Performed by: PHYSICIAN ASSISTANT

## 2022-08-16 NOTE — PROGRESS NOTES
Madeline Szymanski is status post Cervical 5 - Cervical 6 anterior cervical decompression and artificial disc replacement on 08/08/2022 with Dr. Lilly.  Preoperatively presented with neck and arm pain as well as imbalance and impaired dexterity.  Today she returns in follow up for a wound check. She is very pleased with her outcome - reports a resolved arm pain. Notes improved dexterity in her hands. Gait and balance are normal. No paresthesias. Takes Robaxin 500 mg tid for stiffness between shoulder blades.    Surgical wound WNL - CDI, no signs of infection or skin breakdown.  Incision well-healed: good skin approximation, no redness or visible/palpable edema, no tenderness to palpation.  PT. AF, denies fever, chills or sweats.  Pt. reports that the symptoms are improved from pre-op.    Christy Rose, RN, CNRN

## 2022-08-16 NOTE — PATIENT INSTRUCTIONS
Patient was educated on the natural course of injury after fall. She had disc prosthesis surgery last week.  X-ray shows borderline prevertebral soft tissue swelling measuring 4 to 5 mm, nonspecific. C5-C6 disc replacing prosthesis. Multilevel spondylosis. C4-C5 mild grade 1 anterolisthesis measuring 1 to 2 mm. No additional subluxations. No radiographic acute fractures. Conservative measures discussed including rest, ice, compression, elevation, and over-the-counter analgesics (Tylenol or Ibuprofen) as needed. See your primary care provider if symptoms worsen or do not improve in 7 days. Seek emergency care if you develop severe pain/swelling, inability to move extremity, skin paleness, or weakness.

## 2022-08-16 NOTE — PROGRESS NOTES
URGENT CARE VISIT:    SUBJECTIVE:   Chief Complaint   Patient presents with     Fall     Fell today left side pain and around neck       Madeline Szymanski is a 42 year old female who presents with a chief complaint of left neck pain.  Symptoms began today after falling on concrete. She had neck surgery last week. Pain exacerbated by movement. Relieved by rest.  She treated it initially with analgesics with no relief. This is the first time this type of injury has occurred to this patient.     PMH:   Past Medical History:   Diagnosis Date     Anxiety      Arthritis      Depressive disorder      History of blood transfusion 4/1978     Kidney infection      Lumbar radiculopathy      Migraine      Migraine      Painless urinary retention due to cauda equina syndrome (H)      Pancreatitis      Panic attack      PONV (postoperative nausea and vomiting)      Spinal stenosis in cervical region      UTI (lower urinary tract infection)      Allergies: Cephalexin, Coconut flavor, Covid-19 (mrna) vaccine, Prochlorperazine, Coconut fatty acids, Metoclopramide, and Penicillins   Medications:   Current Outpatient Medications   Medication Sig Dispense Refill     amitriptyline (ELAVIL) 50 MG tablet Take 1 tablet (50 mg) by mouth At Bedtime 90 tablet 3     eletriptan (RELPAX) 40 MG tablet Take 1 tablet (40 mg) by mouth at onset of headache May repeat if needed in 2 hours. Max of 2 doses/24hours Max of 4 days/month 18 tablet 3     EPINEPHrine (ANY BX GENERIC EQUIV) 0.3 MG/0.3ML injection 2-pack Inject 0.3 mLs (0.3 mg) into the muscle once as needed for anaphylaxis 2 each 3     FLUoxetine (PROZAC) 40 MG capsule Take 40 mg by mouth daily       hydrOXYzine (ATARAX) 25 MG tablet Take 1 tablet (25 mg) by mouth 3 times daily as needed for anxiety 90 tablet 3     lidocaine (LIDODERM) 5 % patch Apply 1 patch to affected area as needed for 12 hours, remove for 12 hours before reapplying. 14 patch 3     meloxicam (MOBIC) 15 MG tablet Take 0.5  tablets (7.5 mg) by mouth daily 14 tablet 0     methocarbamol (ROBAXIN) 500 MG tablet Take 1 tablet (500 mg) by mouth every 6 hours as needed for muscle spasms 42 tablet 1     pregabalin (LYRICA) 50 MG capsule Take 1-2 capsules ( mg) by mouth At Bedtime 60 capsule 0     rizatriptan (MAXALT-MLT) 10 MG ODT Take 1 tablet (10 mg) by mouth at onset of headache for migraine May repeat in 2 hours, with a max of 30 mg in 24 hours and a max of 5 days/month 18 tablet 3     topiramate (TOPAMAX) 50 MG tablet Take 1 tablet (50 mg) by mouth daily 90 tablet 3     Social History:   Social History     Tobacco Use     Smoking status: Never Smoker     Smokeless tobacco: Never Used   Substance Use Topics     Alcohol use: Never       ROS:  Review of systems negative except as stated above.    OBJECTIVE:  /82 (BP Location: Right arm, Patient Position: Sitting, Cuff Size: Adult Large)   Pulse 87   Temp 98.5  F (36.9  C) (Tympanic)   Resp 18   Wt 94.3 kg (208 lb)   SpO2 99%   BMI 35.70 kg/m    GENERAL APPEARANCE: healthy, alert and no distress  MUSCULOSKELETAL: Moderate TTP over left paracervical spine and trapezius. ROM is very limited due to ramy.  EXTREMITIES: peripheral pulses normal  SKIN: no abrasions noted on neck or back.  NEURO: sensation intact     IMAGING:  Results for orders placed or performed during the hospital encounter of 08/16/22   XR Cervical Spine 2/3 Views     Status: None    Narrative    EXAM: XR CERVICAL SPINE 2/3 VIEWS  LOCATION: [facility]  DATE/TIME: 8/16/2022 6:38 PM    INDICATION:  Neck pain  COMPARISON: None.  TECHNIQUE: CR Cervical Spine.      Impression    IMPRESSION:   Borderline prevertebral soft tissue swelling measuring 4 to 5 mm, nonspecific.    C5-C6 disc replacing prosthesis.    Multilevel spondylosis. C4-C5 mild grade 1 anterolisthesis measuring 1 to 2 mm. No additional subluxations.    No radiographic acute fractures. Vertebral heights maintained. Predental  space within normal limits. Dens suboptimally evaluated on open-mouth view.         ASSESSMENT:    ICD-10-CM    1. Neck pain  M54.2 XR Cervical Spine 2/3 Views       PLAN:  30 minutes spent on the date of the encounter doing chart review, review of outside records, review of test results, interpretation of tests, patient visit and documentation.   Patient Instructions   Patient was educated on the natural course of injury after fall. She had disc prosthesis surgery last week.  X-ray shows borderline prevertebral soft tissue swelling measuring 4 to 5 mm, nonspecific. C5-C6 disc replacing prosthesis. Multilevel spondylosis. C4-C5 mild grade 1 anterolisthesis measuring 1 to 2 mm. No additional subluxations. No radiographic acute fractures. Conservative measures discussed including rest, ice, compression, elevation, and over-the-counter analgesics (Tylenol or Ibuprofen) as needed. See your primary care provider if symptoms worsen or do not improve in 7 days. Seek emergency care if you develop severe pain/swelling, inability to move extremity, skin paleness, or weakness.     Patient verbalized understanding and is agreeable to plan. The patient was discharged ambulatory and in stable condition.    Angy Novoa PA-C on 8/16/2022 at 7:36 PM

## 2022-08-19 ENCOUNTER — MYC MEDICAL ADVICE (OUTPATIENT)
Dept: NEUROSURGERY | Facility: CLINIC | Age: 42
End: 2022-08-19

## 2022-08-19 DIAGNOSIS — M54.16 LUMBAR RADICULOPATHY: Primary | ICD-10-CM

## 2022-08-19 NOTE — PROGRESS NOTES
Madeline took a fall on a concrete and developed an acute onset of right sided low back pain. She requested lumbar xray to r/o fracture.  Christy Rose RN, CNRN

## 2022-08-22 ENCOUNTER — HOSPITAL ENCOUNTER (OUTPATIENT)
Dept: RADIOLOGY | Facility: HOSPITAL | Age: 42
Discharge: HOME | End: 2022-08-22
Attending: SURGERY | Admitting: SURGERY
Payer: COMMERCIAL

## 2022-08-22 ENCOUNTER — MEDICAL CORRESPONDENCE (OUTPATIENT)
Dept: NEUROSURGERY | Facility: CLINIC | Age: 42
End: 2022-08-22

## 2022-08-22 DIAGNOSIS — M54.16 LUMBAR RADICULOPATHY: ICD-10-CM

## 2022-08-22 PROCEDURE — 72100 X-RAY EXAM L-S SPINE 2/3 VWS: CPT

## 2022-08-23 ENCOUNTER — MYC MEDICAL ADVICE (OUTPATIENT)
Dept: NEUROSURGERY | Facility: CLINIC | Age: 42
End: 2022-08-23

## 2022-08-25 PROBLEM — F11.90 CHRONIC, CONTINUOUS USE OF OPIOIDS: Status: ACTIVE | Noted: 2022-08-25

## 2022-08-26 ENCOUNTER — MEDICAL CORRESPONDENCE (OUTPATIENT)
Dept: NEUROSURGERY | Facility: CLINIC | Age: 42
End: 2022-08-26

## 2022-09-16 ENCOUNTER — HOSPITAL ENCOUNTER (OUTPATIENT)
Dept: RADIOLOGY | Facility: HOSPITAL | Age: 42
Discharge: HOME | End: 2022-09-16
Attending: SURGERY | Admitting: SURGERY
Payer: COMMERCIAL

## 2022-09-16 DIAGNOSIS — M48.02 SPINAL STENOSIS IN CERVICAL REGION: ICD-10-CM

## 2022-09-16 PROCEDURE — 72040 X-RAY EXAM NECK SPINE 2-3 VW: CPT

## 2022-09-19 ENCOUNTER — MYC REFILL (OUTPATIENT)
Dept: PHYSICAL MEDICINE AND REHAB | Facility: CLINIC | Age: 42
End: 2022-09-19

## 2022-09-19 ENCOUNTER — OFFICE VISIT (OUTPATIENT)
Dept: NEUROSURGERY | Facility: CLINIC | Age: 42
End: 2022-09-19
Payer: COMMERCIAL

## 2022-09-19 VITALS
HEART RATE: 103 BPM | DIASTOLIC BLOOD PRESSURE: 75 MMHG | WEIGHT: 208 LBS | OXYGEN SATURATION: 98 % | SYSTOLIC BLOOD PRESSURE: 114 MMHG | HEIGHT: 64 IN | BODY MASS INDEX: 35.51 KG/M2

## 2022-09-19 DIAGNOSIS — M54.50 CHRONIC RIGHT-SIDED LOW BACK PAIN WITHOUT SCIATICA: ICD-10-CM

## 2022-09-19 DIAGNOSIS — G89.29 CHRONIC RIGHT-SIDED LOW BACK PAIN WITHOUT SCIATICA: ICD-10-CM

## 2022-09-19 DIAGNOSIS — M54.12 CERVICAL RADICULOPATHY: Primary | ICD-10-CM

## 2022-09-19 PROCEDURE — 99024 POSTOP FOLLOW-UP VISIT: CPT | Performed by: NURSE PRACTITIONER

## 2022-09-19 RX ORDER — PREGABALIN 50 MG/1
50-100 CAPSULE ORAL AT BEDTIME
Qty: 60 CAPSULE | Refills: 0 | Status: SHIPPED | OUTPATIENT
Start: 2022-09-19 | End: 2022-11-29

## 2022-09-19 NOTE — PROGRESS NOTES
Neurosurgery clinic note:      A/P:  Madeline is a 41yo F who is approx 6 wks status post Cervical 5 - Cervical 6 anterior cervical decompression and artificial disc replacement on 08/08/2022 with Dr. Lilly. Preoperatively presented with neck and arm pain as well as imbalance and impaired dexterity.    Madeline is having headaches most days which extend to the base of her neck. Start around 1-3am. Also can be worse at end of day. Not related to level of activity. Does not feel like migraine. Has tried her migraine rescue meds, they help only a little. Robaxin BID does not feel helpful and makes her tired so she cant take it during the day. Dexterity is improving. Some left arm pain, above and below the elbow which has not improved much with left elbow wrap. No arm numbness. We reviewed her cervical XR which show good hardware placement without complication. Exam is wnl today.    Neck pain likely r/t spasms which is typical at this point postop, should improve. Reviewed options for treatment. She does not wish to pursue a change in current meds or any additional meds at this time. Would prefer to reconnect with Shanda Hodgson about botox injections per her recent discussion with Dr Lilly given that her XR look good today. This is certainly reasonable. Recommended starting use of her home tens unit. Will provide referral to PT for massage and targeted L elbow eval/treatment.      Plan:  1. Start PT for neck and left elbow  2. OK to advance activity - push, pull, chores, lifting 10lbs increase by 5lbs per week  3. Proceed with Botox for neck (if referral needed, will let our office know)  4. Will clarify follow up plan with Dr Lilly      Exam:    General: seated in NAD, appears comfortable    Strength:  Deltoids: 5/5 right, 5/5 left  Triceps: 5/5 right, 5/5 left  Biceps: 5/5 right, 5/5 left  Handgrips: 5/5 right, 5/5 left  Intrinsics: 5/5 right, 5/5 left  Extensors: 5/5 right, 5/5 left  Hip flexors: 5/5 right, 5/5  left  Quads: 5/5 right, 5/5 left  Hamstrings: 5/5 right, 5/5 left    No cervical spine point tenderness, trap tenderness, or paraspinal musculature tenderness    Sensation is intact to light touch throughout upper and lower extremities bilaterally    Gait is smooth and coordinated.     Incision:CDI    Imaging:  Personally reviewed the following imaging studies today. Imaging also shown to patient at time of appt.  EXAM: XR CERVICAL SPINE 2/3 VIEWS  LOCATION: [facility]  DATE/TIME: 9/16/2022 4:43 PM     INDICATION: Spinal stenosis in cervical region  COMPARISON: Cervical spine radiographs 08/16/2022.  TECHNIQUE: CR Cervical Spine.                                                                      IMPRESSION: Usual cervical lordosis is maintained. Cervical vertebral body heights are maintained. Artificial disc replacement at C5-C6. Mild loss of disc space height at C6-C7. No prevertebral soft tissue swelling. The visualized portions of the lungs   are clear.      EXAM: MR CERVICAL SPINE W/O CONTRAST  LOCATION: [facility]  DATE/TIME: 3/2/2022 6:35 AM     INDICATION: Cervical radiculopathy, no red flags, chronic neck pain. Feels she has increased weakness of her arms and hands.   COMPARISON: Cervical spine MRI 11/24/2021  TECHNIQUE: MRI Cervical Spine without IV contrast.     FINDINGS: Normal sagittal alignment. Vertebral body heights are maintained. Nonpathologic marrow signal. No abnormal marrow edema. The visualized posterior fossa structures are within normal limits. No abnormal cord signal. Mild symmetric atrophy of the   posterior paraspinal musculature. The flow voids of the vertebral arteries are present. Modic type II changes at T3-T4.     C2-C3: Normal intervertebral disc height. No disc herniation. Mild bilateral facet arthropathy. No spinal canal or neural foraminal narrowing.     C3-C4: Normal intervertebral disc height. Broad-based disc bulge with  superimposed right foraminal disc protrusion. Moderate bilateral facet arthropathy. Mild spinal canal narrowing. Mild to moderate right neural foraminal narrowing. Mild left neural   foraminal narrowing.      C4-C5: Mild intervertebral disc height loss. Broad-based disc bulge. Moderate bilateral facet arthropathy. No spinal canal narrowing. Mild right neural foraminal narrowing. Mild left neural foraminal narrowing.     C5-C6: Mild intervertebral disc height loss. Broad-based disc bulge with superimposed central disc protrusion. Moderate bilateral facet arthropathy. Severe spinal canal narrowing. Mild flattening of the ventral cord. No right neural foraminal narrowing.   Mild left neural foraminal narrowing.      C6-C7: Mild intervertebral disc height loss. Broad-based disc bulge with superimposed small left paracentral disc protrusion. Mild bilateral facet arthropathy. Mild spinal canal narrowing and mild narrowing of the left lateral recess. Mild right neural   foraminal narrowing. No left neural foraminal narrowing.      C7-T1: Mild intervertebral disc height loss. Small central disc protrusion. Moderate bilateral facet arthropathy. No spinal canal narrowing. No neural foraminal narrowing.     T3-T4: Only evaluated on sagittal images. Severe intervertebral disc height loss. Broad-based disc bulge with superimposed central disc protrusion. Mild spinal canal narrowing. Mild right neural foraminal narrowing. No left neural foraminal narrowing.                                                                      IMPRESSION:  1.  Moderate multilevel degenerative changes of the cervical spine, stable to slightly worsened compared to prior cervical spine MRI 11/24/2021.  2.  Severe spinal canal narrowing with flattening of the ventral cord at C5-C6.  3.  Mild spinal canal narrowing at C3-C4 and T3-T4.  4.  Mild spinal canal narrowing and mild narrowing of the left lateral recess at C6-C7.  5.  Mild to moderate right and  mild left neural foraminal narrowing at C3-C4.  6.  Mild left neural foraminal narrowing at C5-C6.  7.  Mild right neural foraminal narrowing at C6-C7 and T3-T4.  8.  Modic type II changes at T3-T4.             Prisca Escudero FNP-C  [facility]  O. [phone]

## 2022-09-19 NOTE — LETTER
9/19/2022         RE: Madeline Szymanski  [address]  [place] [place] [address]        Dear Colleague,    Thank you for referring your patient, Madeline Szymanski, to the [facility]. Please see a copy of my visit note below.    Neurosurgery clinic note:      A/P:  Madeline is a 41yo F who is approx 6 wks status post Cervical 5 - Cervical 6 anterior cervical decompression and artificial disc replacement on 08/08/2022 with Dr. Lilly. Preoperatively presented with neck and arm pain as well as imbalance and impaired dexterity.    Madeline is having headaches most days which extend to the base of her neck. Start around 1-3am. Also can be worse at end of day. Not related to level of activity. Does not feel like migraine. Has tried her migraine rescue meds, they help only a little. Robaxin BID does not feel helpful and makes her tired so she cant take it during the day. Dexterity is improving. Some left arm pain, above and below the elbow which has not improved much with left elbow wrap. No arm numbness. We reviewed her cervical XR which show good hardware placement without complication. Exam is wnl today.    Neck pain likely r/t spasms which is typical at this point postop, should improve. Reviewed options for treatment. She does not wish to pursue a change in current meds or any additional meds at this time. Would prefer to reconnect with Shanda Hodgson about botox injections per her recent discussion with Dr Lilly given that her XR look good today. This is certainly reasonable. Recommended starting use of her home tens unit. Will provide referral to PT for massage and targeted L elbow eval/treatment.      Plan:  1. Start PT for neck and left elbow  2. OK to advance activity - push, pull, chores, lifting 10lbs increase by 5lbs per week  3. Proceed with Botox for neck (if referral needed, will let our office know)  4. Will clarify follow up plan with Dr Lilly      Exam:    General: seated in  NAD, appears comfortable    Strength:  Deltoids: 5/5 right, 5/5 left  Triceps: 5/5 right, 5/5 left  Biceps: 5/5 right, 5/5 left  Handgrips: 5/5 right, 5/5 left  Intrinsics: 5/5 right, 5/5 left  Extensors: 5/5 right, 5/5 left  Hip flexors: 5/5 right, 5/5 left  Quads: 5/5 right, 5/5 left  Hamstrings: 5/5 right, 5/5 left    No cervical spine point tenderness, trap tenderness, or paraspinal musculature tenderness    Sensation is intact to light touch throughout upper and lower extremities bilaterally    Gait is smooth and coordinated.     Incision:CDI    Imaging:  Personally reviewed the following imaging studies today. Imaging also shown to patient at time of appt.  EXAM: XR CERVICAL SPINE 2/3 VIEWS  LOCATION: [facility]  DATE/TIME: 9/16/2022 4:43 PM     INDICATION: Spinal stenosis in cervical region  COMPARISON: Cervical spine radiographs 08/16/2022.  TECHNIQUE: CR Cervical Spine.                                                                      IMPRESSION: Usual cervical lordosis is maintained. Cervical vertebral body heights are maintained. Artificial disc replacement at C5-C6. Mild loss of disc space height at C6-C7. No prevertebral soft tissue swelling. The visualized portions of the lungs   are clear.      EXAM: MR CERVICAL SPINE W/O CONTRAST  LOCATION: [facility]  DATE/TIME: 3/2/2022 6:35 AM     INDICATION: Cervical radiculopathy, no red flags, chronic neck pain. Feels she has increased weakness of her arms and hands.   COMPARISON: Cervical spine MRI 11/24/2021  TECHNIQUE: MRI Cervical Spine without IV contrast.     FINDINGS: Normal sagittal alignment. Vertebral body heights are maintained. Nonpathologic marrow signal. No abnormal marrow edema. The visualized posterior fossa structures are within normal limits. No abnormal cord signal. Mild symmetric atrophy of the   posterior paraspinal musculature. The flow voids of the vertebral arteries are present. Modic  type II changes at T3-T4.     C2-C3: Normal intervertebral disc height. No disc herniation. Mild bilateral facet arthropathy. No spinal canal or neural foraminal narrowing.     C3-C4: Normal intervertebral disc height. Broad-based disc bulge with superimposed right foraminal disc protrusion. Moderate bilateral facet arthropathy. Mild spinal canal narrowing. Mild to moderate right neural foraminal narrowing. Mild left neural   foraminal narrowing.      C4-C5: Mild intervertebral disc height loss. Broad-based disc bulge. Moderate bilateral facet arthropathy. No spinal canal narrowing. Mild right neural foraminal narrowing. Mild left neural foraminal narrowing.     C5-C6: Mild intervertebral disc height loss. Broad-based disc bulge with superimposed central disc protrusion. Moderate bilateral facet arthropathy. Severe spinal canal narrowing. Mild flattening of the ventral cord. No right neural foraminal narrowing.   Mild left neural foraminal narrowing.      C6-C7: Mild intervertebral disc height loss. Broad-based disc bulge with superimposed small left paracentral disc protrusion. Mild bilateral facet arthropathy. Mild spinal canal narrowing and mild narrowing of the left lateral recess. Mild right neural   foraminal narrowing. No left neural foraminal narrowing.      C7-T1: Mild intervertebral disc height loss. Small central disc protrusion. Moderate bilateral facet arthropathy. No spinal canal narrowing. No neural foraminal narrowing.     T3-T4: Only evaluated on sagittal images. Severe intervertebral disc height loss. Broad-based disc bulge with superimposed central disc protrusion. Mild spinal canal narrowing. Mild right neural foraminal narrowing. No left neural foraminal narrowing.                                                                      IMPRESSION:  1.  Moderate multilevel degenerative changes of the cervical spine, stable to slightly worsened compared to prior cervical spine MRI 11/24/2021.  2.   Severe spinal canal narrowing with flattening of the ventral cord at C5-C6.  3.  Mild spinal canal narrowing at C3-C4 and T3-T4.  4.  Mild spinal canal narrowing and mild narrowing of the left lateral recess at C6-C7.  5.  Mild to moderate right and mild left neural foraminal narrowing at C3-C4.  6.  Mild left neural foraminal narrowing at C5-C6.  7.  Mild right neural foraminal narrowing at C6-C7 and T3-T4.  8.  Modic type II changes at T3-T4.             Prisca Escudero FNP-C  [facility]  O. [phone]          Again, thank you for allowing me to participate in the care of your patient.        Sincerely,        Prisca Escudero, PERRI CNP

## 2022-09-23 ENCOUNTER — MEDICAL CORRESPONDENCE (OUTPATIENT)
Dept: NEUROSURGERY | Facility: CLINIC | Age: 42
End: 2022-09-23

## 2022-09-24 ENCOUNTER — HEALTH MAINTENANCE LETTER (OUTPATIENT)
Age: 42
End: 2022-09-24

## 2022-10-03 ENCOUNTER — MEDICAL CORRESPONDENCE (OUTPATIENT)
Dept: NEUROSURGERY | Facility: CLINIC | Age: 42
End: 2022-10-03

## 2022-10-20 ENCOUNTER — OFFICE VISIT (OUTPATIENT)
Dept: PHYSICAL MEDICINE AND REHAB | Facility: CLINIC | Age: 42
End: 2022-10-20
Payer: COMMERCIAL

## 2022-10-20 VITALS — OXYGEN SATURATION: 92 % | SYSTOLIC BLOOD PRESSURE: 139 MMHG | HEART RATE: 89 BPM | DIASTOLIC BLOOD PRESSURE: 92 MMHG

## 2022-10-20 DIAGNOSIS — M79.18 MYOFASCIAL PAIN: ICD-10-CM

## 2022-10-20 DIAGNOSIS — G89.29 CHRONIC NECK PAIN: Primary | ICD-10-CM

## 2022-10-20 DIAGNOSIS — M54.2 CHRONIC NECK PAIN: Primary | ICD-10-CM

## 2022-10-20 DIAGNOSIS — M54.81 BILATERAL OCCIPITAL NEURALGIA: ICD-10-CM

## 2022-10-20 PROCEDURE — 96372 THER/PROPH/DIAG INJ SC/IM: CPT | Performed by: NURSE PRACTITIONER

## 2022-10-20 PROCEDURE — 99214 OFFICE O/P EST MOD 30 MIN: CPT | Mod: 25 | Performed by: NURSE PRACTITIONER

## 2022-10-20 RX ORDER — KETOROLAC TROMETHAMINE 30 MG/ML
30 INJECTION, SOLUTION INTRAMUSCULAR; INTRAVENOUS ONCE
Status: COMPLETED | OUTPATIENT
Start: 2022-10-20 | End: 2022-10-20

## 2022-10-20 RX ADMIN — KETOROLAC TROMETHAMINE 30 MG: 30 INJECTION, SOLUTION INTRAMUSCULAR; INTRAVENOUS at 11:53

## 2022-10-20 ASSESSMENT — PAIN SCALES - GENERAL: PAINLEVEL: SEVERE PAIN (6)

## 2022-10-20 NOTE — PATIENT INSTRUCTIONS
~Please call our [facility] Nurse Navigation line [phone] with any questions or concerns about your treatment plan, if symptoms worsen and you would like to be seen urgently, or if you have problems controlling bladder and bowel function.       ~You have been referred for Physical Therapy to [facility]. They will call you to schedule an appointment.      Scheduling phone number is [phone] for [facility], or [place] location.  If you have not heard from the scheduling office within 2 business days, please call [phone] for ALL other locations.    Discussed the importance of core strengthening, ROM, stretching exercises and how each of these entities is important in decreasing pain and improving long term spine health.  The purpose of physical therapy is to teach you an individualized home exercise program.  These exercises need to be performed every day in order to decrease pain and prevent future occurrences of pain.

## 2022-10-20 NOTE — LETTER
10/20/2022         RE: Madeline Szymanski  [address] [place] [id number]        Dear Colleague,    Thank you for referring your patient, Madeline Szymanski, to the [facility]. Please see a copy of my visit note below.      Assessment:     Diagnoses and all orders for this visit:  Chronic neck pain  -     Physical Therapy Referral; Future  -     ketorolac (TORADOL) injection 30 mg  Bilateral occipital neuralgia  -     PAIN US Guided Occipital NB; Future  -     Physical Therapy Referral; Future  -     ketorolac (TORADOL) injection 30 mg  Myofascial pain  -     Physical Therapy Referral; Future  -     ketorolac (TORADOL) injection 30 mg     Madeline Szymanski is a 42 year old y.o. female with past medical history significant for migraine, depressive disorder, , gastric bypass surgery, L5-S1 hemilaminectomy left medial facetectomy foraminotomies and microdiscectomy Dr. Lilly 10/4/2021, who presents today for follow-up regarding:     -Bilateral occipital pain with headaches consistent with occipital neuralgia.    -Chronic generalized cervical spine pain with significant myofascial pain noted on exam, no clear trigger points identified however tautness to the left greater than right  trapezius region.    -Post Cervical 5 - Cervical 6 anterior cervical decompression and artificial disc replacement on 2022 with Dr. Lilly.  Positive Corby reflex on exam bilaterally, history of severe spinal stenosis prior to cervical surgery.    Plan:     A shared decision making plan was used.  The patient's values and choices were respected. Prior medical records were reviewed today. The following represents what was discussed and decided upon by the provider and the patient.     -DIAGNOSTIC TESTS: Images were personally reviewed and interpreted.   --Consider postsurgical cervical spine MRI with and without IV contrast if symptoms or not improving.  -- Cervical spine x-ray post  surgery 9/16/2022 with artificial disc replacement C5-6, mild disc height loss at C6-7.   --Lumbar MRI 2/17/2022 with microdiscectomy and left hemilaminectomy changes at L5-S1 with granulation tissue left paracentral region, moderate left lateral recess and mild right lateral recess stenosis.  2 mm L5-S1 retrolisthesis.  --Cervical MRI 8/7/2021 Moderate to Severe spinal stenosis C5-6.   --Lumbar spine flexion-extension x-ray 8/10/2021 shows no spondylolisthesis and no instability.  --Lumbar spine MRI 8/6/2021 with L5-S1 mild disc height loss with disc bulge on the left with osteophytes with mild left foraminal stenosis lateral recess stenosis and S1 compression.    -INTERVENTIONS: Ordered bilateral occipital nerve block under ultrasound to be completed with Dr. Zaragoza deceiving get relief with ongoing headaches related to occipital neuralgia.  -If no benefit with Toradol injection we did discuss potential trigger point injection into the trapezius muscle region.  No specific clear trigger point noted on exam however.    -MEDICATIONS: Recommend Toradol 30 mg IM injection today given she is unable to take oral NSAIDs to see if we can improve her headache.  Given in left Deltoid 10/20/22 by Cristel Olmstead, CMA.  Discussed side effects of medications and proper use. Patient verbalized understanding.    -PHYSICAL THERAPY: Referral to physical therapy specifically for myofascial release  Discussed the importance of core strengthening, ROM, stretching exercises with the patient and how each of these entities is important in decreasing pain.  Explained to the patient that the purpose of physical therapy is to teach the patient a home exercise program.  These exercises need to be performed every day in order to decrease pain and prevent future occurrences of pain.        -PATIENT EDUCATION: Total time of 32 minutes, on the day of service, spent with the patient, reviewing the chart, placing orders, and documenting.   -Today  we also discussed the issues related to the current COVID-19 pandemic, the pros and cons of the current treatment plan, the CDC guidelines such as social distancing, washing the hands, and covering the cough.    -FOLLOW UP: Follow-up tomorrow if symptoms or not improved post Toradol injection.  Advised to contact clinic if symptoms worsen or change.    Subjective:     Madeline Szymanski is a 42 year old female who presents today for follow-up regarding ongoing generalized neck pain that is greatest in the occipital region with radiating daily headaches which is quite frustrating.  Currently her neck pain and headaches are 6/10, 10 at its worst, 1 at its best.  She does report that ice, medications and lidocaine patch gives her some short-term benefit but this has been ongoing since about 1 week post spine surgery where she did have a fall and head trauma and did have an x-ray post fall as well with no concerning findings.  Currently she denies any radiating upper extremity pain.  Does report ongoing numbness and tingling to the left hand since surgery however.    Denies any recent trips or falls or balance changes.  Denies bowel or bladder loss control.  No myelopathic symptoms.     Treatment to Date:  Left L5-S1 hemilaminectomy, medial facetectomy, foraminotomy with L5-S1 left microdiscectomy 10/4/2021 Dr. Lilly.     Bilateral carpal tunnel steroid injection 11/11/2020 and 5/12/2021 with significant benefit.     L5-S1 TFESI 3/21/2018  Lumbar MBB 2/13/2019 with benefit LBP  Lumbar RFA 3/6/2019  Bilateral L5-S1 TFESI 6/3/2020 with benefit LBP.  Preprocedure pain 8/10, post 5/10.  Left S1-S2 TFESI 8/3/2021 with no lasting benefit.  Preprocedure pain 8/10, post 2/10.  Left L5-S1 TFESI 9/1/2021 with minimal lasting benefit.  Preprocedure pain 7/10, post 4/10.     Medications:  Gabapentin 300 mg 1 to 2 tablets at bedtime, unable to tolerate during the daytime.  Amitriptyline 75 mg at bedtime for migraines  Flexeril as  needed  Tylenol as needed  Lidocaine gel as needed     No benefit with recent Medrol Dosepak.     *Patient unable to tolerate NSAIDs due to GI surgery.    Patient Active Problem List   Diagnosis     Spinal stenosis in cervical region     Pneumonia due to 2019 novel coronavirus     Gastric band slippage     Bariatric surgery status     History of gastric ulcer     Hx of Helicobacter infection     Migraine     Panic attacks     S/P laparoscopic sleeve gastrectomy     Lumbar radiculopathy     Anxiety     Acute and chronic respiratory failure with hypoxia (H)     Chronic, continuous use of opioids       Current Outpatient Medications   Medication     amitriptyline (ELAVIL) 50 MG tablet     eletriptan (RELPAX) 40 MG tablet     pregabalin (LYRICA) 50 MG capsule     rizatriptan (MAXALT-MLT) 10 MG ODT     EPINEPHrine (ANY BX GENERIC EQUIV) 0.3 MG/0.3ML injection 2-pack     FLUoxetine (PROZAC) 40 MG capsule     hydrOXYzine (ATARAX) 25 MG tablet     lidocaine (LIDODERM) 5 % patch     methocarbamol (ROBAXIN) 500 MG tablet     topiramate (TOPAMAX) 50 MG tablet     Current Facility-Administered Medications   Medication     ketorolac (TORADOL) injection 30 mg       Allergies   Allergen Reactions     Cephalexin Hives     Coconut Flavor Anaphylaxis     Covid-19 (Mrna) Vaccine Anaphylaxis     Pfizer      Prochlorperazine Other (See Comments)     Tremors  When given with metoclopramide, okay by itself       Coconut Fatty Acids      Other reaction(s): Edema     Metoclopramide Other (See Comments)     Tremors  When given with prochlorperazine, okay by itself         Penicillins Hives       Past Medical History:   Diagnosis Date     Anxiety      Arthritis      Depressive disorder      History of blood transfusion 4/1978     Kidney infection      Lumbar radiculopathy      Migraine      Migraine      Painless urinary retention due to cauda equina syndrome (H)      Pancreatitis      Panic attack      PONV (postoperative nausea and  vomiting)      Spinal stenosis in cervical region      UTI (lower urinary tract infection)         Review of Systems  ROS: Positive for headache, numbness and tingling.  Specifically negative for bowel/bladder dysfunction, balance changes, dizziness, foot drop, fevers, chills, appetite changes, nausea/vomiting, unexplained weight loss. Otherwise 13 systems reviewed are negative. Please see the patient's intake questionnaire from today for details.    Reviewed Social, Family, Past Medical and Past Surgical history with patient, no significant changes noted since prior visit.     Objective:     BP (!) 139/92 (BP Location: Right arm, Patient Position: Sitting)   Pulse 89   SpO2 92%     PHYSICAL EXAMINATION:   --CONSTITUTIONAL: Vital signs as above. No acute distress. The patient is well nourished and well groomed.  --PSYCHIATRIC:  The patient is awake, alert, oriented to person, place, time and answering questions appropriately with clear speech. Appropriate mood and affect   --HEENT: Sclera are non-injected. Extraocular muscles are intact.   --SKIN: Skin over the face, bilateral upper extremities, and posterior torso is clean, dry, intact without rashes.  --RESPIRATORY: Normal rhythm and effort. No abnormal accessory muscle breathing patterns noted.   --GROSS MOTOR: Easily arises from a seated position.   --CERVICAL SPINE: Inspection reveals no evidence of deformity. Range of motion is not limited in cervical flexion, extension, lateral rotation. No tenderness to palpation cervical spine.    --SHOULDERS: Full range of motion bilaterally: abduction, flexion, cross chest movement internal/external rotation. Negative empty can.  --UPPER EXTREMITY MOTOR TESTING:  Wrist flexion left 5/5, right 5/5  Wrist extension left 5/5, right 5/5  Biceps left 5/5, right 5/5   Triceps left 5/5, right 5/5   Shoulder abduction left 5/5, right 5/5   left 5/5, right 5/5  --NEUROLOGIC: CN III-XII are grossly intact. 2/4 symmetric  biceps, brachioradialis, triceps reflexes bilaterally. Sensation to upper extremities is intact. Negative William's bilaterally.   --VASCULAR: Warm upper limbs bilaterally.     Imaging: Spine imaging was reviewed today. The images were shown to the patient and the findings were explained using a spine model.      MR Lumbar Spine w/o Contrast  Result Date: 2/18/2022  EXAM: MR LUMBAR SPINE W/O CONTRAST LOCATION: [facility] DATE/TIME: 2/17/2022 5:17 PM INDICATION: Patient is a 42-year-old female who is status post left lumbar 5-sacral 1 microdiscectomy on 10/4/2021. Since last week. Has a constant dull ache in her right lower back. She underwent a medrol dose pack without relief. COMPARISON: Lumbar spine MRI 10/17/2021. TECHNIQUE: Routine Lumbar Spine MRI without IV contrast. FINDINGS: Nomenclature is based on 5 lumbar type vertebral bodies. The conus ends at mid to distal L2. 1 mm retrolisthesis of L1 on L2. 2 mm retrolisthesis of L5 on S1. Vertebral body heights are maintained. Nonpathologic marrow signal. No MRI evidence for pars defects. Compared to the previous lumbar spine MRI, there has been interval moderate decrease in the previously noted edema involving the left L5-S1 laminectomy site and posterior paraspinal soft tissues. Normal diameter of the distal abdominal aorta. The visualized bony pelvis is grossly within normal limits. T11-T12: Mild to moderate intervertebral disc height loss. Loss of the normal T2 signal within the disc. Small broad-based disc bulge with superimposed small central disc protrusion. Mild bilateral facet arthropathy. Mild spinal canal narrowing. No neural foraminal narrowing. T12-L1: Mild intervertebral disc height loss. Loss of the normal T2 signal within the disc. Small broad-based disc bulge. Mild bilateral facet arthropathy. No spinal canal narrowing. No neural foraminal narrowing. L1-L2: Mild intervertebral disc height loss. Loss of the normal T2 signal  within the disc. Small broad-based disc bulge. Moderate bilateral facet arthropathy. No spinal canal narrowing. No neural foraminal narrowing. L2-L3: Normal intervertebral disc height and signal. Shallow broad-based disc bulge. Moderate bilateral facet arthropathy. No spinal canal narrowing. No neural foraminal narrowing. L3-L4: Normal intervertebral disc height and signal. Small broad-based disc bulge. Moderate bilateral facet arthropathy. No spinal canal narrowing. No neural foraminal narrowing. L4-L5: Normal intervertebral disc height and signal. Small broad-based disc bulge. Moderate bilateral facet arthropathy. No spinal canal narrowing. No neural foraminal narrowing. L5-S1: Mild intervertebral disc height loss. Loss of the normal T2 signal within the disc. Small broad-based disc bulge with superimposed small left paracentral/foraminal disc protrusion. Moderate bilateral facet arthropathy. Microdiscectomy changes. Left hemilaminectomy changes. Small amount of presumed granulation tissue at the microdiscectomy changes in the left paracentral region and along the left hemilaminectomy. Moderate narrowing of the left lateral recess. Mild narrowing of the right lateral  recess. No spinal canal narrowing. No right neural foraminal narrowing. Mild left neural foraminal narrowing.   IMPRESSION:   1.  Compared to the previous lumbar spine MRI 10/17/2021, there has been interval moderate decrease in the previously noted edema involving the left L5-S1 laminectomy site and posterior paraspinal soft tissues.   2.  Small amount of presumed granulation tissue at the microdiscectomy changes in the left paracentral region and along the left hemilaminectomy.   3.  Moderate narrowing of the left lateral recess and mild narrowing of the right lateral recess at L5-S1. These probably contact the bilateral traversing S1 nerve roots, left greater than right   4.  No high-grade spinal canal or neural foraminal narrowing.   5.  Mild  narrowing of the left lateral recess at L5-S1.        Cervical spine MRI w/o contrast  Result Date: 11/24/2021  EXAM: MR CERVICAL SPINE W/O CONTRAST LOCATION: [facility] DATE/TIME: 11/24/2021 7:50 PM INDICATION: Neck trauma, focal neuro deficit or paresthesia (Age 16-64y) COMPARISON: MRI cervical spine dated 08/07/2021. TECHNIQUE: MRI Cervical Spine without IV contrast. FINDINGS: Normal vertebral body heights, alignment and marrow signal. No abnormal cord signal. No extraspinal abnormality. Craniovertebral junction and C1-C2: Normal. C2-C3: Normal disc height. No herniation. Normal facets. No spinal canal or neural foraminal stenosis. C3-C4: Normal disc height. No herniation. Normal facets. No spinal canal or neural foraminal stenosis. C4-C5: Slight loss of disc height. No herniation. Mild facet arthropathy. No spinal canal or neural foraminal stenosis. C5-C6: Moderate loss of disc height. Moderate diffuse disc osteophyte complex asymmetric to left. No herniation. Mild to moderate left paracentral spinal stenosis. Neural foraminal stenosis. C6-C7: Normal disc height. No herniation. Normal facets. No spinal canal or neural foraminal stenosis. C7-T1: Normal disc height. No herniation. Normal facets. No spinal canal or neural foraminal stenosis.   IMPRESSION:   1.  Mild to moderate left paracentral spinal stenosis at C5-C6 secondary to eccentric disc osteophyte complex. Finding is stable/unchanged compared to 08/07/2021.   2.  No acute abnormalities or other significant degenerative abnormalities identified.         CT Head w/o Contrast  Result Date: 11/24/2021  EXAM: CT HEAD W/O CONTRAST LOCATION: [facility] DATE/TIME: 11/24/2021 4:55 PM INDICATION: Head injury. COMPARISON: Head CT 05/01/2019. TECHNIQUE: Routine CT Head without IV contrast. Multiplanar reformats. Dose reduction techniques were used. FINDINGS: INTRACRANIAL CONTENTS: No intracranial hemorrhage,  extraaxial collection, or mass effect.  No CT evidence of acute infarct. Normal parenchymal attenuation. Normal ventricles and sulci. VISUALIZED ORBITS/SINUSES/MASTOIDS: No intraorbital abnormality. No paranasal sinus mucosal disease. No middle ear or mastoid effusion. BONES/SOFT TISSUES: No acute abnormality.   IMPRESSION: 1.  No acute intracranial abnormality.         XR Cervical Spine G/E 4 Views  Result Date: 8/11/2021  CERVICAL SPINE FOUR OR MORE VIEWS August 10, 2021 5:20 PM HISTORY: Neck pain. COMPARISON: MRI of the cervical spine dated 8/7/2021.   IMPRESSION: No gross vertebral body height loss identified. Alignment is within normal limits. On flexion and extension views, no significant dynamic spondylolisthesis identified. Multilevel degenerative disc disease, including mild-to-moderate disc space narrowing at C5-C6 and C6-C7. Small multilevel anterior endplate osteophytes. The prevertebral soft tissues appear normal. JUNIOR MARTINEZ MD   SYSTEM ID:  [id number]        XR Lumbar Spine G/E 4 Views  Result Date: 8/11/2021  LUMBAR SPINE FOUR OR MORE VIEWS August 10, 2021 5:15 PM HISTORY: Lumbar radiculopathy. COMPARISON: MRI lumbar spine 8/6/2021. CT abdomen and pelvis 5/19/2021.   IMPRESSION: Nomenclature is based on five lumbar vertebral bodies. There appears to be mild dextroconvex curvature at the thoracolumbar junction. Alignment otherwise appears within normal limits. No significant dynamic spondylolisthesis identified on flexion-extension views. No gross vertebral body height loss. Mild multilevel degenerative disc space narrowing with small anterior marginal endplate osteophytes. Mild lower lumbar degenerative facet arthropathy. There is a suture material noted in the left upper quadrant of the abdomen, as before. JUNIOR MARTINEZ MD   SYSTEM ID:  [id number]        MR Cervical Spine w/o Contrast  Result Date: 8/7/2021  EXAM: MR CERVICAL SPINE W/O CONTRAST LOCATION: [facility]  DATE/TIME: 8/7/2021 1:19 AM INDICATION: Spinal stenosis, C-spine COMPARISON: Thoracic spine MRI 8/6/2021 TECHNIQUE: MRI Cervical Spine without IV contrast. FINDINGS: Vertebral body height and alignment is preserved. Moderate Modic type I changes anteriorly at T3-4. No definite abnormal signal in the cervical spine. No abnormal cord signal. No extraspinal abnormality. Craniovertebral junction and C1-C2: Normal. C2-C3: Normal disc height. No herniation. Normal facets. No spinal canal or neural foraminal stenosis. C3-C4: Normal disc height. No herniation. Normal facets. No spinal canal or neural foraminal stenosis. C4-C5: Slight loss of disc height. Slight facet arthropathy. No spinal canal or neural foraminal stenosis. C5-C6: Moderate loss of disc height. Mild to moderate diffuse disc osteophyte complex, asymmetric to the left. Slight left uncinate spurring. Moderate to severe central canal stenosis to the left of midline. No definite foraminal narrowing. C6-C7: Normal disc height. No herniation. Normal facets. No spinal canal or neural foraminal stenosis. C7-T1: Normal disc height. No herniation. Normal facets. No spinal canal or neural foraminal stenosis.   IMPRESSION:   1.  At C5-6 there is moderate to severe central canal stenosis to the left of midline secondary to an asymmetric left disc osteophyte complex. No abnormal cord signal.   2.  Minimal degenerative changes elsewhere as described.      Lumbar spine MRI w/o contrast-presurgical  Result Date: 8/6/2021  MRI LUMBAR SPINE WITHOUT CONTRAST   8/6/2021 12:23 PM HISTORY: Low back pain, cauda equina syndrome suspected; Saddle anesthesia, unable to urinate and low back pain radiating down left leg with foot numbness. TECHNIQUE: Multiplanar multisequence MRI of the lumbar spine without contrast. COMPARISON: None. FINDINGS: Nomenclature is based on 5 lumbar vertebral bodies. Normal vertebral body heights and sagittal alignment. Unremarkable bone marrow signal.  Varying degrees of multilevel intervertebral disc desiccation, most pronounced at L5-S1. Normal appearance of the distal spinal cord with the conus terminating at L2. There is a T2 hyperintense ovoid lesion in the right kidney measuring up to approximately 14 mm (series 801 image 18) incompletely characterized, but presumably representing a cyst. Otherwise, the visualized paraspinous soft tissues and bony pelvis are unremarkable. Segmental analysis: T12-L1: Minimal disc height loss. No herniation. Normal facets. No spinal canal or neural foraminal stenosis. L1-L2: Minimal disc height loss. No herniation. Normal facets. No spinal canal or neural foraminal stenosis. L2-L3: Normal disc height. No herniation. Normal facets. No spinal canal or neural foraminal stenosis. L3-L4: Normal disc height. No herniation. Normal facets. No spinal canal or neural foraminal stenosis. L4-L5: Normal disc height. No herniation. Normal facets. No spinal canal or neural foraminal stenosis. L5-S1: Mild disc height loss. Disc bulge slightly eccentric to the left with posterior/posterolateral endplate marginal osteophytes. Mild facet arthropathy. Mild bilateral, left greater than right, lateral recess stenosis without displacement or overt compression of the traversing S1 nerve roots. No central spinal canal stenosis. Mild left neural foraminal narrowing. The right neural foramen is not significantly narrowed.   IMPRESSION:   1. Multilevel degenerative changes of the lumbar spine, as described.   2. No high-grade spinal canal stenosis.   3. Mild left greater than right lateral recess stenosis at L5-S1.   4. Mild left neural foraminal stenosis at L5-S1. Otherwise, no significant neural foraminal narrowing. JUNIOR MARTINEZ MD   SYSTEM ID:  [id number]     MR Thoracic Spine w/o Contrast  Result Date: 8/7/2021  EXAM: MR THORACIC SPINE W/O CONTRAST LOCATION: [facility] DATE/TIME: 8/6/2021 11:23 PM INDICATION: Low back  pain, leg pain, urinary retention, saddle anesthesia. COMPARISON: Lumbar spine MRI dated 8/6/2021. TECHNIQUE: Routine Thoracic Spine MRI without IV contrast. FINDINGS: Partially visualized disc osteophyte complex at C5-C6 indents the ventral spinal cord. Question cord edema in the lower cervical spine cord, only seen on the first few images of the axial T2 sequence. Slight left apex curvature centered in the lower thoracic spine. There is accentuation of the normal thoracic spine kyphosis, with degenerative endplate changes and Schmorl's nodes, suggestive of Scheuermann's disease. There are Modic type I degenerative  endplate changes at T3-T4. No evidence of an acute compression deformity. There are multilevel disc protrusions with a small disc protrusion at C7-T1, T1-T2, a central disc extrusion at T3-T4 that extends above and below the intervertebral disc space measuring 12 mm in craniocaudal dimension, at T7-T8, T10-T11, and T11-T12. There is no high grade canal stenosis. Multilevel facet arthropathy. There is no high grade neural foraminal narrowing. No definite cord signal abnormality within the thoracic spinal cord. Small bilateral pleural effusions.   IMPRESSION:   1.  Degenerative changes of the thoracic spine with findings suggestive of Scheuermann's disease.   2.  No high grade spinal canal or neural foraminal narrowing in the thoracic spine.   3.  Partially visualized disc osteophyte complex at C5-C6 that indents the ventral spinal cord. In addition there is questionable cord signal change in the lower cervical spine, which is incompletely evaluated on the current exam. Consider further evaluation with dedicated cervical spine MRI. Findings were discussed with Dr. Robbins at 12:24 AM on 08/07/2021.                        Again, thank you for allowing me to participate in the care of your patient.        Sincerely,        Esme Hodgson, CNP

## 2022-10-20 NOTE — PROGRESS NOTES
Assessment:     Diagnoses and all orders for this visit:  Chronic neck pain  -     Physical Therapy Referral; Future  -     ketorolac (TORADOL) injection 30 mg  Bilateral occipital neuralgia  -     PAIN US Guided Occipital NB; Future  -     Physical Therapy Referral; Future  -     ketorolac (TORADOL) injection 30 mg  Myofascial pain  -     Physical Therapy Referral; Future  -     ketorolac (TORADOL) injection 30 mg     Madeline Szymanski is a 42 year old y.o. female with past medical history significant for migraine, depressive disorder, , gastric bypass surgery, L5-S1 hemilaminectomy left medial facetectomy foraminotomies and microdiscectomy Dr. Lilly 10/4/2021, who presents today for follow-up regarding:     -Bilateral occipital pain with headaches consistent with occipital neuralgia.    -Chronic generalized cervical spine pain with significant myofascial pain noted on exam, no clear trigger points identified however tautness to the left greater than right  trapezius region.    -Post Cervical 5 - Cervical 6 anterior cervical decompression and artificial disc replacement on 2022 with Dr. Lilly.  Positive Corby reflex on exam bilaterally, history of severe spinal stenosis prior to cervical surgery.    Plan:     A shared decision making plan was used.  The patient's values and choices were respected. Prior medical records were reviewed today. The following represents what was discussed and decided upon by the provider and the patient.     -DIAGNOSTIC TESTS: Images were personally reviewed and interpreted.   --Consider postsurgical cervical spine MRI with and without IV contrast if symptoms or not improving.  -- Cervical spine x-ray post surgery 2022 with artificial disc replacement C5-6, mild disc height loss at C6-7.   --Lumbar MRI 2022 with microdiscectomy and left hemilaminectomy changes at L5-S1 with granulation tissue left paracentral region, moderate left lateral recess and mild  right lateral recess stenosis.  2 mm L5-S1 retrolisthesis.  --Cervical MRI 8/7/2021 Moderate to Severe spinal stenosis C5-6.   --Lumbar spine flexion-extension x-ray 8/10/2021 shows no spondylolisthesis and no instability.  --Lumbar spine MRI 8/6/2021 with L5-S1 mild disc height loss with disc bulge on the left with osteophytes with mild left foraminal stenosis lateral recess stenosis and S1 compression.    -INTERVENTIONS: Ordered bilateral occipital nerve block under ultrasound to be completed with Dr. Zaragoza deceiving get relief with ongoing headaches related to occipital neuralgia.  -If no benefit with Toradol injection we did discuss potential trigger point injection into the trapezius muscle region.  No specific clear trigger point noted on exam however.    -MEDICATIONS: Recommend Toradol 30 mg IM injection today given she is unable to take oral NSAIDs to see if we can improve her headache.  Given in left Deltoid 10/20/22 by Cristel Olmstead CMA.  Discussed side effects of medications and proper use. Patient verbalized understanding.    -PHYSICAL THERAPY: Referral to physical therapy specifically for myofascial release  Discussed the importance of core strengthening, ROM, stretching exercises with the patient and how each of these entities is important in decreasing pain.  Explained to the patient that the purpose of physical therapy is to teach the patient a home exercise program.  These exercises need to be performed every day in order to decrease pain and prevent future occurrences of pain.        -PATIENT EDUCATION: Total time of 32 minutes, on the day of service, spent with the patient, reviewing the chart, placing orders, and documenting.   -Today we also discussed the issues related to the current COVID-19 pandemic, the pros and cons of the current treatment plan, the CDC guidelines such as social distancing, washing the hands, and covering the cough.    -FOLLOW UP: Follow-up tomorrow if symptoms or not  improved post Toradol injection.  Advised to contact clinic if symptoms worsen or change.    Subjective:     Madeline Szymanski is a 42 year old female who presents today for follow-up regarding ongoing generalized neck pain that is greatest in the occipital region with radiating daily headaches which is quite frustrating.  Currently her neck pain and headaches are 6/10, 10 at its worst, 1 at its best.  She does report that ice, medications and lidocaine patch gives her some short-term benefit but this has been ongoing since about 1 week post spine surgery where she did have a fall and head trauma and did have an x-ray post fall as well with no concerning findings.  Currently she denies any radiating upper extremity pain.  Does report ongoing numbness and tingling to the left hand since surgery however.    Denies any recent trips or falls or balance changes.  Denies bowel or bladder loss control.  No myelopathic symptoms.     Treatment to Date:  Left L5-S1 hemilaminectomy, medial facetectomy, foraminotomy with L5-S1 left microdiscectomy 10/4/2021 Dr. Lilly.     Bilateral carpal tunnel steroid injection 11/11/2020 and 5/12/2021 with significant benefit.     L5-S1 TFESI 3/21/2018  Lumbar MBB 2/13/2019 with benefit LBP  Lumbar RFA 3/6/2019  Bilateral L5-S1 TFESI 6/3/2020 with benefit LBP.  Preprocedure pain 8/10, post 5/10.  Left S1-S2 TFESI 8/3/2021 with no lasting benefit.  Preprocedure pain 8/10, post 2/10.  Left L5-S1 TFESI 9/1/2021 with minimal lasting benefit.  Preprocedure pain 7/10, post 4/10.     Medications:  Gabapentin 300 mg 1 to 2 tablets at bedtime, unable to tolerate during the daytime.  Amitriptyline 75 mg at bedtime for migraines  Flexeril as needed  Tylenol as needed  Lidocaine gel as needed     No benefit with recent Medrol Dosepak.     *Patient unable to tolerate NSAIDs due to GI surgery.    Patient Active Problem List   Diagnosis     Spinal stenosis in cervical region     Pneumonia due to 2019 novel  coronavirus     Gastric band slippage     Bariatric surgery status     History of gastric ulcer     Hx of Helicobacter infection     Migraine     Panic attacks     S/P laparoscopic sleeve gastrectomy     Lumbar radiculopathy     Anxiety     Acute and chronic respiratory failure with hypoxia (H)     Chronic, continuous use of opioids       Current Outpatient Medications   Medication     amitriptyline (ELAVIL) 50 MG tablet     eletriptan (RELPAX) 40 MG tablet     pregabalin (LYRICA) 50 MG capsule     rizatriptan (MAXALT-MLT) 10 MG ODT     EPINEPHrine (ANY BX GENERIC EQUIV) 0.3 MG/0.3ML injection 2-pack     FLUoxetine (PROZAC) 40 MG capsule     hydrOXYzine (ATARAX) 25 MG tablet     lidocaine (LIDODERM) 5 % patch     methocarbamol (ROBAXIN) 500 MG tablet     topiramate (TOPAMAX) 50 MG tablet     Current Facility-Administered Medications   Medication     ketorolac (TORADOL) injection 30 mg       Allergies   Allergen Reactions     Cephalexin Hives     Coconut Flavor Anaphylaxis     Covid-19 (Mrna) Vaccine Anaphylaxis     Pfizer      Prochlorperazine Other (See Comments)     Tremors  When given with metoclopramide, okay by itself       Coconut Fatty Acids      Other reaction(s): Edema     Metoclopramide Other (See Comments)     Tremors  When given with prochlorperazine, okay by itself         Penicillins Hives       Past Medical History:   Diagnosis Date     Anxiety      Arthritis      Depressive disorder      History of blood transfusion 4/1978     Kidney infection      Lumbar radiculopathy      Migraine      Migraine      Painless urinary retention due to cauda equina syndrome (H)      Pancreatitis      Panic attack      PONV (postoperative nausea and vomiting)      Spinal stenosis in cervical region      UTI (lower urinary tract infection)         Review of Systems  ROS: Positive for headache, numbness and tingling.  Specifically negative for bowel/bladder dysfunction, balance changes, dizziness, foot drop, fevers,  chills, appetite changes, nausea/vomiting, unexplained weight loss. Otherwise 13 systems reviewed are negative. Please see the patient's intake questionnaire from today for details.    Reviewed Social, Family, Past Medical and Past Surgical history with patient, no significant changes noted since prior visit.     Objective:     BP (!) 139/92 (BP Location: Right arm, Patient Position: Sitting)   Pulse 89   SpO2 92%     PHYSICAL EXAMINATION:   --CONSTITUTIONAL: Vital signs as above. No acute distress. The patient is well nourished and well groomed.  --PSYCHIATRIC:  The patient is awake, alert, oriented to person, place, time and answering questions appropriately with clear speech. Appropriate mood and affect   --HEENT: Sclera are non-injected. Extraocular muscles are intact.   --SKIN: Skin over the face, bilateral upper extremities, and posterior torso is clean, dry, intact without rashes.  --RESPIRATORY: Normal rhythm and effort. No abnormal accessory muscle breathing patterns noted.   --GROSS MOTOR: Easily arises from a seated position.   --CERVICAL SPINE: Inspection reveals no evidence of deformity. Range of motion is not limited in cervical flexion, extension, lateral rotation. No tenderness to palpation cervical spine.    --SHOULDERS: Full range of motion bilaterally: abduction, flexion, cross chest movement internal/external rotation. Negative empty can.  --UPPER EXTREMITY MOTOR TESTING:  Wrist flexion left 5/5, right 5/5  Wrist extension left 5/5, right 5/5  Biceps left 5/5, right 5/5   Triceps left 5/5, right 5/5   Shoulder abduction left 5/5, right 5/5   left 5/5, right 5/5  --NEUROLOGIC: CN III-XII are grossly intact. 2/4 symmetric biceps, brachioradialis, triceps reflexes bilaterally. Sensation to upper extremities is intact. Negative William's bilaterally.   --VASCULAR: Warm upper limbs bilaterally.     Imaging: Spine imaging was reviewed today. The images were shown to the patient and the findings  were explained using a spine model.      MR Lumbar Spine w/o Contrast  Result Date: 2/18/2022  EXAM: MR LUMBAR SPINE W/O CONTRAST LOCATION: [facility] DATE/TIME: 2/17/2022 5:17 PM INDICATION: Patient is a 42-year-old female who is status post left lumbar 5-sacral 1 microdiscectomy on 10/4/2021. Since last week. Has a constant dull ache in her right lower back. She underwent a medrol dose pack without relief. COMPARISON: Lumbar spine MRI 10/17/2021. TECHNIQUE: Routine Lumbar Spine MRI without IV contrast. FINDINGS: Nomenclature is based on 5 lumbar type vertebral bodies. The conus ends at mid to distal L2. 1 mm retrolisthesis of L1 on L2. 2 mm retrolisthesis of L5 on S1. Vertebral body heights are maintained. Nonpathologic marrow signal. No MRI evidence for pars defects. Compared to the previous lumbar spine MRI, there has been interval moderate decrease in the previously noted edema involving the left L5-S1 laminectomy site and posterior paraspinal soft tissues. Normal diameter of the distal abdominal aorta. The visualized bony pelvis is grossly within normal limits. T11-T12: Mild to moderate intervertebral disc height loss. Loss of the normal T2 signal within the disc. Small broad-based disc bulge with superimposed small central disc protrusion. Mild bilateral facet arthropathy. Mild spinal canal narrowing. No neural foraminal narrowing. T12-L1: Mild intervertebral disc height loss. Loss of the normal T2 signal within the disc. Small broad-based disc bulge. Mild bilateral facet arthropathy. No spinal canal narrowing. No neural foraminal narrowing. L1-L2: Mild intervertebral disc height loss. Loss of the normal T2 signal within the disc. Small broad-based disc bulge. Moderate bilateral facet arthropathy. No spinal canal narrowing. No neural foraminal narrowing. L2-L3: Normal intervertebral disc height and signal. Shallow broad-based disc bulge. Moderate bilateral facet arthropathy. No  spinal canal narrowing. No neural foraminal narrowing. L3-L4: Normal intervertebral disc height and signal. Small broad-based disc bulge. Moderate bilateral facet arthropathy. No spinal canal narrowing. No neural foraminal narrowing. L4-L5: Normal intervertebral disc height and signal. Small broad-based disc bulge. Moderate bilateral facet arthropathy. No spinal canal narrowing. No neural foraminal narrowing. L5-S1: Mild intervertebral disc height loss. Loss of the normal T2 signal within the disc. Small broad-based disc bulge with superimposed small left paracentral/foraminal disc protrusion. Moderate bilateral facet arthropathy. Microdiscectomy changes. Left hemilaminectomy changes. Small amount of presumed granulation tissue at the microdiscectomy changes in the left paracentral region and along the left hemilaminectomy. Moderate narrowing of the left lateral recess. Mild narrowing of the right lateral  recess. No spinal canal narrowing. No right neural foraminal narrowing. Mild left neural foraminal narrowing.   IMPRESSION:   1.  Compared to the previous lumbar spine MRI 10/17/2021, there has been interval moderate decrease in the previously noted edema involving the left L5-S1 laminectomy site and posterior paraspinal soft tissues.   2.  Small amount of presumed granulation tissue at the microdiscectomy changes in the left paracentral region and along the left hemilaminectomy.   3.  Moderate narrowing of the left lateral recess and mild narrowing of the right lateral recess at L5-S1. These probably contact the bilateral traversing S1 nerve roots, left greater than right   4.  No high-grade spinal canal or neural foraminal narrowing.   5.  Mild narrowing of the left lateral recess at L5-S1.        Cervical spine MRI w/o contrast  Result Date: 11/24/2021  EXAM: MR CERVICAL SPINE W/O CONTRAST LOCATION: [facility] DATE/TIME: 11/24/2021 7:50 PM INDICATION: Neck trauma, focal neuro deficit or  paresthesia (Age 16-64y) COMPARISON: MRI cervical spine dated 08/07/2021. TECHNIQUE: MRI Cervical Spine without IV contrast. FINDINGS: Normal vertebral body heights, alignment and marrow signal. No abnormal cord signal. No extraspinal abnormality. Craniovertebral junction and C1-C2: Normal. C2-C3: Normal disc height. No herniation. Normal facets. No spinal canal or neural foraminal stenosis. C3-C4: Normal disc height. No herniation. Normal facets. No spinal canal or neural foraminal stenosis. C4-C5: Slight loss of disc height. No herniation. Mild facet arthropathy. No spinal canal or neural foraminal stenosis. C5-C6: Moderate loss of disc height. Moderate diffuse disc osteophyte complex asymmetric to left. No herniation. Mild to moderate left paracentral spinal stenosis. Neural foraminal stenosis. C6-C7: Normal disc height. No herniation. Normal facets. No spinal canal or neural foraminal stenosis. C7-T1: Normal disc height. No herniation. Normal facets. No spinal canal or neural foraminal stenosis.   IMPRESSION:   1.  Mild to moderate left paracentral spinal stenosis at C5-C6 secondary to eccentric disc osteophyte complex. Finding is stable/unchanged compared to 08/07/2021.   2.  No acute abnormalities or other significant degenerative abnormalities identified.         CT Head w/o Contrast  Result Date: 11/24/2021  EXAM: CT HEAD W/O CONTRAST LOCATION: [facility] DATE/TIME: 11/24/2021 4:55 PM INDICATION: Head injury. COMPARISON: Head CT 05/01/2019. TECHNIQUE: Routine CT Head without IV contrast. Multiplanar reformats. Dose reduction techniques were used. FINDINGS: INTRACRANIAL CONTENTS: No intracranial hemorrhage, extraaxial collection, or mass effect.  No CT evidence of acute infarct. Normal parenchymal attenuation. Normal ventricles and sulci. VISUALIZED ORBITS/SINUSES/MASTOIDS: No intraorbital abnormality. No paranasal sinus mucosal disease. No middle ear or mastoid effusion. BONES/SOFT  TISSUES: No acute abnormality.   IMPRESSION: 1.  No acute intracranial abnormality.         XR Cervical Spine G/E 4 Views  Result Date: 8/11/2021  CERVICAL SPINE FOUR OR MORE VIEWS August 10, 2021 5:20 PM HISTORY: Neck pain. COMPARISON: MRI of the cervical spine dated 8/7/2021.   IMPRESSION: No gross vertebral body height loss identified. Alignment is within normal limits. On flexion and extension views, no significant dynamic spondylolisthesis identified. Multilevel degenerative disc disease, including mild-to-moderate disc space narrowing at C5-C6 and C6-C7. Small multilevel anterior endplate osteophytes. The prevertebral soft tissues appear normal. JUNIOR MARTINEZ MD   SYSTEM ID:  [id number]        XR Lumbar Spine G/E 4 Views  Result Date: 8/11/2021  LUMBAR SPINE FOUR OR MORE VIEWS August 10, 2021 5:15 PM HISTORY: Lumbar radiculopathy. COMPARISON: MRI lumbar spine 8/6/2021. CT abdomen and pelvis 5/19/2021.   IMPRESSION: Nomenclature is based on five lumbar vertebral bodies. There appears to be mild dextroconvex curvature at the thoracolumbar junction. Alignment otherwise appears within normal limits. No significant dynamic spondylolisthesis identified on flexion-extension views. No gross vertebral body height loss. Mild multilevel degenerative disc space narrowing with small anterior marginal endplate osteophytes. Mild lower lumbar degenerative facet arthropathy. There is a suture material noted in the left upper quadrant of the abdomen, as before. JUNIOR MARTINEZ MD   SYSTEM ID:  [id number]        MR Cervical Spine w/o Contrast  Result Date: 8/7/2021  EXAM: MR CERVICAL SPINE W/O CONTRAST LOCATION: [facility] DATE/TIME: 8/7/2021 1:19 AM INDICATION: Spinal stenosis, C-spine COMPARISON: Thoracic spine MRI 8/6/2021 TECHNIQUE: MRI Cervical Spine without IV contrast. FINDINGS: Vertebral body height and alignment is preserved. Moderate Modic type I changes anteriorly at T3-4. No definite  abnormal signal in the cervical spine. No abnormal cord signal. No extraspinal abnormality. Craniovertebral junction and C1-C2: Normal. C2-C3: Normal disc height. No herniation. Normal facets. No spinal canal or neural foraminal stenosis. C3-C4: Normal disc height. No herniation. Normal facets. No spinal canal or neural foraminal stenosis. C4-C5: Slight loss of disc height. Slight facet arthropathy. No spinal canal or neural foraminal stenosis. C5-C6: Moderate loss of disc height. Mild to moderate diffuse disc osteophyte complex, asymmetric to the left. Slight left uncinate spurring. Moderate to severe central canal stenosis to the left of midline. No definite foraminal narrowing. C6-C7: Normal disc height. No herniation. Normal facets. No spinal canal or neural foraminal stenosis. C7-T1: Normal disc height. No herniation. Normal facets. No spinal canal or neural foraminal stenosis.   IMPRESSION:   1.  At C5-6 there is moderate to severe central canal stenosis to the left of midline secondary to an asymmetric left disc osteophyte complex. No abnormal cord signal.   2.  Minimal degenerative changes elsewhere as described.      Lumbar spine MRI w/o contrast-presurgical  Result Date: 8/6/2021  MRI LUMBAR SPINE WITHOUT CONTRAST   8/6/2021 12:23 PM HISTORY: Low back pain, cauda equina syndrome suspected; Saddle anesthesia, unable to urinate and low back pain radiating down left leg with foot numbness. TECHNIQUE: Multiplanar multisequence MRI of the lumbar spine without contrast. COMPARISON: None. FINDINGS: Nomenclature is based on 5 lumbar vertebral bodies. Normal vertebral body heights and sagittal alignment. Unremarkable bone marrow signal. Varying degrees of multilevel intervertebral disc desiccation, most pronounced at L5-S1. Normal appearance of the distal spinal cord with the conus terminating at L2. There is a T2 hyperintense ovoid lesion in the right kidney measuring up to approximately 14 mm (series 801 image  18) incompletely characterized, but presumably representing a cyst. Otherwise, the visualized paraspinous soft tissues and bony pelvis are unremarkable. Segmental analysis: T12-L1: Minimal disc height loss. No herniation. Normal facets. No spinal canal or neural foraminal stenosis. L1-L2: Minimal disc height loss. No herniation. Normal facets. No spinal canal or neural foraminal stenosis. L2-L3: Normal disc height. No herniation. Normal facets. No spinal canal or neural foraminal stenosis. L3-L4: Normal disc height. No herniation. Normal facets. No spinal canal or neural foraminal stenosis. L4-L5: Normal disc height. No herniation. Normal facets. No spinal canal or neural foraminal stenosis. L5-S1: Mild disc height loss. Disc bulge slightly eccentric to the left with posterior/posterolateral endplate marginal osteophytes. Mild facet arthropathy. Mild bilateral, left greater than right, lateral recess stenosis without displacement or overt compression of the traversing S1 nerve roots. No central spinal canal stenosis. Mild left neural foraminal narrowing. The right neural foramen is not significantly narrowed.   IMPRESSION:   1. Multilevel degenerative changes of the lumbar spine, as described.   2. No high-grade spinal canal stenosis.   3. Mild left greater than right lateral recess stenosis at L5-S1.   4. Mild left neural foraminal stenosis at L5-S1. Otherwise, no significant neural foraminal narrowing. JUNIOR MARTINEZ MD   SYSTEM ID:  [id number]     MR Thoracic Spine w/o Contrast  Result Date: 8/7/2021  EXAM: MR THORACIC SPINE W/O CONTRAST LOCATION: [facility] DATE/TIME: 8/6/2021 11:23 PM INDICATION: Low back pain, leg pain, urinary retention, saddle anesthesia. COMPARISON: Lumbar spine MRI dated 8/6/2021. TECHNIQUE: Routine Thoracic Spine MRI without IV contrast. FINDINGS: Partially visualized disc osteophyte complex at C5-C6 indents the ventral spinal cord. Question cord edema in the  lower cervical spine cord, only seen on the first few images of the axial T2 sequence. Slight left apex curvature centered in the lower thoracic spine. There is accentuation of the normal thoracic spine kyphosis, with degenerative endplate changes and Schmorl's nodes, suggestive of Scheuermann's disease. There are Modic type I degenerative  endplate changes at T3-T4. No evidence of an acute compression deformity. There are multilevel disc protrusions with a small disc protrusion at C7-T1, T1-T2, a central disc extrusion at T3-T4 that extends above and below the intervertebral disc space measuring 12 mm in craniocaudal dimension, at T7-T8, T10-T11, and T11-T12. There is no high grade canal stenosis. Multilevel facet arthropathy. There is no high grade neural foraminal narrowing. No definite cord signal abnormality within the thoracic spinal cord. Small bilateral pleural effusions.   IMPRESSION:   1.  Degenerative changes of the thoracic spine with findings suggestive of Scheuermann's disease.   2.  No high grade spinal canal or neural foraminal narrowing in the thoracic spine.   3.  Partially visualized disc osteophyte complex at C5-C6 that indents the ventral spinal cord. In addition there is questionable cord signal change in the lower cervical spine, which is incompletely evaluated on the current exam. Consider further evaluation with dedicated cervical spine MRI. Findings were discussed with Dr. Robbins at 12:24 AM on 08/07/2021.

## 2022-10-21 ENCOUNTER — OFFICE VISIT (OUTPATIENT)
Dept: PHYSICAL MEDICINE AND REHAB | Facility: CLINIC | Age: 42
End: 2022-10-21
Payer: COMMERCIAL

## 2022-10-21 VITALS
SYSTOLIC BLOOD PRESSURE: 144 MMHG | DIASTOLIC BLOOD PRESSURE: 92 MMHG | TEMPERATURE: 98 F | OXYGEN SATURATION: 96 % | HEART RATE: 95 BPM

## 2022-10-21 DIAGNOSIS — M25.519 TRIGGER POINT OF SHOULDER REGION, UNSPECIFIED LATERALITY: Primary | ICD-10-CM

## 2022-10-21 DIAGNOSIS — M79.18 MYOFASCIAL PAIN: ICD-10-CM

## 2022-10-21 DIAGNOSIS — M54.81 BILATERAL OCCIPITAL NEURALGIA: ICD-10-CM

## 2022-10-21 PROCEDURE — 96372 THER/PROPH/DIAG INJ SC/IM: CPT | Performed by: NURSE PRACTITIONER

## 2022-10-21 PROCEDURE — 20553 NJX 1/MLT TRIGGER POINTS 3/>: CPT | Mod: 79 | Performed by: NURSE PRACTITIONER

## 2022-10-21 RX ORDER — KETOROLAC TROMETHAMINE 30 MG/ML
30 INJECTION, SOLUTION INTRAMUSCULAR; INTRAVENOUS ONCE
Status: COMPLETED | OUTPATIENT
Start: 2022-10-21 | End: 2022-10-21

## 2022-10-21 RX ORDER — LIDOCAINE HYDROCHLORIDE 10 MG/ML
3 INJECTION, SOLUTION EPIDURAL; INFILTRATION; INTRACAUDAL; PERINEURAL ONCE
Status: COMPLETED | OUTPATIENT
Start: 2022-10-21 | End: 2022-10-21

## 2022-10-21 RX ADMIN — KETOROLAC TROMETHAMINE 30 MG: 30 INJECTION, SOLUTION INTRAMUSCULAR; INTRAVENOUS at 10:10

## 2022-10-21 RX ADMIN — LIDOCAINE HYDROCHLORIDE 3 ML: 10 INJECTION, SOLUTION EPIDURAL; INFILTRATION; INTRACAUDAL; PERINEURAL at 12:10

## 2022-10-21 NOTE — PROGRESS NOTES
Pre-procedure diagnosis: Bilateral trapezius and rhomboid trigger points, myofascial pain.  Post-procedure diagnosis:  Same    Procedure: Bilateral trapezius and rhomboid trigger point injections (no steroid).    The risks and benefits of the procedure were discussed with the patient which included infection, bleeding, pneumothorax, damage to surrounding structures/tissues.  The patient gave both verbal and written consent toproceed.    The trigger points were palpated and marked with indelible marking pen.  The areas to be injected today are:  1.  Trapezius right  2.  Trapezius left  2.  Rhomboid left    The area over the first jatin was cleansed with an alcohol swab.  Using a 27 guage 1.25 inch needle, the trigger point was dry needled.  Several muscle twitches were felt  and the patient reported reproduction of pain.  After aspiration was negative 1 ml of 1% Lidocaine was injected.  This was repeated at 2 other spots.  There was no bleeding afterwards but a bandaid was placed to prevent oozing offluid onto the clothes.      The patient's trapezius and rhomboid muscle was stretched.  The patient was shown how to stretchthis muscle. Stretching should be performed at least 3 times a day, holding the stretch for at least 60 seconds.   After a short period of observation the patient was discharged of their own power.      Pre-procedure pain scale: 6/10      Toradol 30 mg intramuscular given today as well for pain and inflammation.

## 2022-10-21 NOTE — PATIENT INSTRUCTIONS
~Please call our [facility] Nurse Navigation line [phone] with any questions or concerns about your treatment plan, if symptoms worsen and you would like to be seen urgently, or if you have problems controlling bladder and bowel function.       Importance of specialized Physical Therapy: Discussed the importance of core strengthening, ROM, stretching exercises with the patient and how each of these entities is important in decreasing pain.  Explained to the patient that the purpose of physical therapy is to teach the patient a home exercise program individualized to them and their specific health concerns.  These exercises need to be performed every day in order to decrease pain and prevent future occurrences of pain.           Please monitor the injection site for any redness, drainage, swelling.  If you notice any of these symptoms/signs please call the clinic immediately or to go the nearest emergency room.  Please call if you have any questions/concerns following your injection.    Please follow-up in 2 weeks as needed.

## 2022-10-21 NOTE — LETTER
10/21/2022         RE: Madeline Szymanski  [address] [place] [id number]        Dear Colleague,    Thank you for referring your patient, Madeline Szymanski, to the [facility]. Please see a copy of my visit note below.    Pre-procedure diagnosis: Bilateral trapezius and rhomboid trigger points, myofascial pain.  Post-procedure diagnosis:  Same    Procedure: Bilateral trapezius and rhomboid trigger point injections (no steroid).    The risks and benefits of the procedure were discussed with the patient which included infection, bleeding, pneumothorax, damage to surrounding structures/tissues.  The patient gave both verbal and written consent toproceed.    The trigger points were palpated and marked with indelible marking pen.  The areas to be injected today are:  1.  Trapezius right  2.  Trapezius left  2.  Rhomboid left    The area over the first jatin was cleansed with an alcohol swab.  Using a 27 guage 1.25 inch needle, the trigger point was dry needled.  Several muscle twitches were felt  and the patient reported reproduction of pain.  After aspiration was negative 1 ml of 1% Lidocaine was injected.  This was repeated at 2 other spots.  There was no bleeding afterwards but a bandaid was placed to prevent oozing offluid onto the clothes.      The patient's trapezius and rhomboid muscle was stretched.  The patient was shown how to stretchthis muscle. Stretching should be performed at least 3 times a day, holding the stretch for at least 60 seconds.   After a short period of observation the patient was discharged of their own power.      Pre-procedure pain scale: 6/10      Toradol 30 mg intramuscular given today as well for pain and inflammation.       Again, thank you for allowing me to participate in the care of your patient.        Sincerely,        Esme Hodgson, CNP

## 2022-10-24 ENCOUNTER — RADIOLOGY INJECTION OFFICE VISIT (OUTPATIENT)
Dept: PHYSICAL MEDICINE AND REHAB | Facility: CLINIC | Age: 42
End: 2022-10-24
Attending: NURSE PRACTITIONER
Payer: COMMERCIAL

## 2022-10-24 VITALS
SYSTOLIC BLOOD PRESSURE: 128 MMHG | OXYGEN SATURATION: 97 % | DIASTOLIC BLOOD PRESSURE: 82 MMHG | RESPIRATION RATE: 16 BRPM | HEART RATE: 95 BPM

## 2022-10-24 DIAGNOSIS — M54.81 BILATERAL OCCIPITAL NEURALGIA: ICD-10-CM

## 2022-10-24 PROCEDURE — 64405 NJX AA&/STRD GR OCPL NRV: CPT | Mod: 50 | Performed by: PAIN MEDICINE

## 2022-10-24 PROCEDURE — 76942 ECHO GUIDE FOR BIOPSY: CPT | Performed by: PAIN MEDICINE

## 2022-10-24 RX ORDER — LIDOCAINE HYDROCHLORIDE 10 MG/ML
INJECTION, SOLUTION EPIDURAL; INFILTRATION; INTRACAUDAL; PERINEURAL
Status: COMPLETED | OUTPATIENT
Start: 2022-10-24 | End: 2022-10-24

## 2022-10-24 RX ORDER — LIDOCAINE HYDROCHLORIDE 20 MG/ML
INJECTION, SOLUTION EPIDURAL; INFILTRATION; INTRACAUDAL; PERINEURAL
Status: COMPLETED | OUTPATIENT
Start: 2022-10-24 | End: 2022-10-24

## 2022-10-24 RX ORDER — BUPIVACAINE HYDROCHLORIDE 2.5 MG/ML
INJECTION, SOLUTION EPIDURAL; INFILTRATION; INTRACAUDAL
Status: COMPLETED | OUTPATIENT
Start: 2022-10-24 | End: 2022-10-24

## 2022-10-24 RX ORDER — METHYLPREDNISOLONE ACETATE 40 MG/ML
INJECTION, SUSPENSION INTRA-ARTICULAR; INTRALESIONAL; INTRAMUSCULAR; SOFT TISSUE
Status: COMPLETED | OUTPATIENT
Start: 2022-10-24 | End: 2022-10-24

## 2022-10-24 RX ADMIN — LIDOCAINE HYDROCHLORIDE 2 ML: 10 INJECTION, SOLUTION EPIDURAL; INFILTRATION; INTRACAUDAL; PERINEURAL at 13:06

## 2022-10-24 RX ADMIN — METHYLPREDNISOLONE ACETATE 40 MG: 40 INJECTION, SUSPENSION INTRA-ARTICULAR; INTRALESIONAL; INTRAMUSCULAR; SOFT TISSUE at 13:09

## 2022-10-24 RX ADMIN — LIDOCAINE HYDROCHLORIDE 3 ML: 20 INJECTION, SOLUTION EPIDURAL; INFILTRATION; INTRACAUDAL; PERINEURAL at 13:09

## 2022-10-24 RX ADMIN — BUPIVACAINE HYDROCHLORIDE 4 ML: 2.5 INJECTION, SOLUTION EPIDURAL; INFILTRATION; INTRACAUDAL at 13:10

## 2022-10-24 ASSESSMENT — PAIN SCALES - GENERAL: PAINLEVEL: SEVERE PAIN (6)

## 2022-11-03 ENCOUNTER — OFFICE VISIT (OUTPATIENT)
Dept: FAMILY MEDICINE | Facility: CLINIC | Age: 42
End: 2022-11-03
Payer: COMMERCIAL

## 2022-11-03 VITALS
RESPIRATION RATE: 16 BRPM | DIASTOLIC BLOOD PRESSURE: 76 MMHG | BODY MASS INDEX: 34.32 KG/M2 | WEIGHT: 206 LBS | SYSTOLIC BLOOD PRESSURE: 112 MMHG | HEIGHT: 65 IN | HEART RATE: 84 BPM | TEMPERATURE: 98.6 F

## 2022-11-03 DIAGNOSIS — K95.09 GASTRIC BAND SLIPPAGE: ICD-10-CM

## 2022-11-03 DIAGNOSIS — Z98.84 BARIATRIC SURGERY STATUS: ICD-10-CM

## 2022-11-03 DIAGNOSIS — Z87.11 HISTORY OF GASTRIC ULCER: Primary | ICD-10-CM

## 2022-11-03 DIAGNOSIS — F32.5 MAJOR DEPRESSIVE DISORDER WITH SINGLE EPISODE, IN FULL REMISSION (H): ICD-10-CM

## 2022-11-03 DIAGNOSIS — E66.811 CLASS 1 OBESITY DUE TO EXCESS CALORIES WITH SERIOUS COMORBIDITY AND BODY MASS INDEX (BMI) OF 34.0 TO 34.9 IN ADULT: ICD-10-CM

## 2022-11-03 DIAGNOSIS — F41.0 PANIC ATTACKS: ICD-10-CM

## 2022-11-03 DIAGNOSIS — Z98.84 S/P LAPAROSCOPIC SLEEVE GASTRECTOMY: ICD-10-CM

## 2022-11-03 DIAGNOSIS — R10.13 ABDOMINAL PAIN, EPIGASTRIC: ICD-10-CM

## 2022-11-03 DIAGNOSIS — Z79.899 ENCOUNTER FOR LONG-TERM (CURRENT) USE OF MEDICATIONS: ICD-10-CM

## 2022-11-03 DIAGNOSIS — Z12.4 CERVICAL CANCER SCREENING: ICD-10-CM

## 2022-11-03 DIAGNOSIS — G43.109 MIGRAINE WITH AURA AND WITHOUT STATUS MIGRAINOSUS, NOT INTRACTABLE: ICD-10-CM

## 2022-11-03 DIAGNOSIS — E66.09 CLASS 1 OBESITY DUE TO EXCESS CALORIES WITH SERIOUS COMORBIDITY AND BODY MASS INDEX (BMI) OF 34.0 TO 34.9 IN ADULT: ICD-10-CM

## 2022-11-03 PROBLEM — J96.21 ACUTE AND CHRONIC RESPIRATORY FAILURE WITH HYPOXIA (H): Status: RESOLVED | Noted: 2022-01-01 | Resolved: 2022-11-03

## 2022-11-03 PROBLEM — J12.82 PNEUMONIA DUE TO 2019 NOVEL CORONAVIRUS: Status: RESOLVED | Noted: 2021-12-27 | Resolved: 2022-11-03

## 2022-11-03 PROBLEM — F51.04 CHRONIC INSOMNIA: Status: ACTIVE | Noted: 2022-05-09

## 2022-11-03 PROBLEM — U07.1 PNEUMONIA DUE TO 2019 NOVEL CORONAVIRUS: Status: RESOLVED | Noted: 2021-12-27 | Resolved: 2022-11-03

## 2022-11-03 PROBLEM — F11.90 CHRONIC, CONTINUOUS USE OF OPIOIDS: Status: RESOLVED | Noted: 2022-08-25 | Resolved: 2022-11-03

## 2022-11-03 PROCEDURE — 96127 BRIEF EMOTIONAL/BEHAV ASSMT: CPT | Performed by: FAMILY MEDICINE

## 2022-11-03 PROCEDURE — 99214 OFFICE O/P EST MOD 30 MIN: CPT | Performed by: FAMILY MEDICINE

## 2022-11-03 RX ORDER — OMEPRAZOLE 40 MG/1
40 CAPSULE, DELAYED RELEASE ORAL DAILY
Qty: 30 CAPSULE | Refills: 1 | Status: SHIPPED | OUTPATIENT
Start: 2022-11-03 | End: 2023-05-05

## 2022-11-03 RX ORDER — ELETRIPTAN HYDROBROMIDE 40 MG/1
40 TABLET, FILM COATED ORAL
Qty: 18 TABLET | Refills: 3 | Status: SHIPPED | OUTPATIENT
Start: 2022-11-03 | End: 2023-11-20

## 2022-11-03 RX ORDER — FLUOXETINE 40 MG/1
40 CAPSULE ORAL DAILY
Qty: 90 CAPSULE | Refills: 2 | Status: SHIPPED | OUTPATIENT
Start: 2022-11-03 | End: 2023-06-05

## 2022-11-03 RX ORDER — RIZATRIPTAN BENZOATE 10 MG/1
10 TABLET, ORALLY DISINTEGRATING ORAL
Qty: 18 TABLET | Refills: 3 | Status: SHIPPED | OUTPATIENT
Start: 2022-11-03 | End: 2023-10-30

## 2022-11-03 RX ORDER — SUCRALFATE 1 G/1
1 TABLET ORAL 4 TIMES DAILY
Qty: 120 TABLET | Refills: 1 | Status: SHIPPED | OUTPATIENT
Start: 2022-11-03 | End: 2022-11-30

## 2022-11-03 ASSESSMENT — PATIENT HEALTH QUESTIONNAIRE - PHQ9
10. IF YOU CHECKED OFF ANY PROBLEMS, HOW DIFFICULT HAVE THESE PROBLEMS MADE IT FOR YOU TO DO YOUR WORK, TAKE CARE OF THINGS AT HOME, OR GET ALONG WITH OTHER PEOPLE: NOT DIFFICULT AT ALL
SUM OF ALL RESPONSES TO PHQ QUESTIONS 1-9: 4
SUM OF ALL RESPONSES TO PHQ QUESTIONS 1-9: 4

## 2022-11-03 ASSESSMENT — ANXIETY QUESTIONNAIRES
1. FEELING NERVOUS, ANXIOUS, OR ON EDGE: NOT AT ALL
2. NOT BEING ABLE TO STOP OR CONTROL WORRYING: NOT AT ALL
GAD7 TOTAL SCORE: 0
IF YOU CHECKED OFF ANY PROBLEMS ON THIS QUESTIONNAIRE, HOW DIFFICULT HAVE THESE PROBLEMS MADE IT FOR YOU TO DO YOUR WORK, TAKE CARE OF THINGS AT HOME, OR GET ALONG WITH OTHER PEOPLE: NOT DIFFICULT AT ALL
7. FEELING AFRAID AS IF SOMETHING AWFUL MIGHT HAPPEN: NOT AT ALL
GAD7 TOTAL SCORE: 0
8. IF YOU CHECKED OFF ANY PROBLEMS, HOW DIFFICULT HAVE THESE MADE IT FOR YOU TO DO YOUR WORK, TAKE CARE OF THINGS AT HOME, OR GET ALONG WITH OTHER PEOPLE?: NOT DIFFICULT AT ALL
6. BECOMING EASILY ANNOYED OR IRRITABLE: NOT AT ALL
4. TROUBLE RELAXING: NOT AT ALL
7. FEELING AFRAID AS IF SOMETHING AWFUL MIGHT HAPPEN: NOT AT ALL
GAD7 TOTAL SCORE: 0
5. BEING SO RESTLESS THAT IT IS HARD TO SIT STILL: NOT AT ALL
3. WORRYING TOO MUCH ABOUT DIFFERENT THINGS: NOT AT ALL

## 2022-11-03 ASSESSMENT — ENCOUNTER SYMPTOMS: CONSTITUTIONAL NEGATIVE: 1

## 2022-11-03 NOTE — Clinical Note
FYI only: this patient underwent gastric sleeve in February 2020 through [facility].  Her surgeon is no longer with that practice and they were unable to see her until next year.  Her exam today was quite benign.  I placed referral to discuss potential side effects/consequences of her surgery in the context of her symptoms.  I wanted to make sure that you are not surprised when she arrives on 11/11 in clinic as it is a post bariatric surgery question (I believe).

## 2022-11-03 NOTE — ASSESSMENT & PLAN NOTE
"Establish care visit.  Context, sense of urgency given recent struggles with abdominal pain.  Status post gastric sleeve.  Bariatric surgeon is no longer available for consultation.  We will treat her for \"gastritis\" with PPI, H2 blocker and Carafate.  She is very appropriately trying to identify any nutritional or behavioral pattern that may be contributory to her symptoms.  She describes eating in small amounts in accordance with general post gastric sleeve principles.  Referral placed to general surgery (who happens also be a bariatric/metabolic surgeon).  "

## 2022-11-03 NOTE — PROGRESS NOTES
"  Problem List Items Addressed This Visit     Bariatric surgery status    Class 1 obesity due to excess calories with serious comorbidity and body mass index (BMI) of 34.0 to 34.9 in adult    RESOLVED: Gastric band slippage    History of gastric ulcer - Primary    Relevant Orders    Adult General Surg Referral    Major depressive disorder with single episode, in full remission (H)    Relevant Medications    FLUoxetine (PROZAC) 40 MG capsule    Migraine    Relevant Medications    FLUoxetine (PROZAC) 40 MG capsule    eletriptan (RELPAX) 40 MG tablet    rizatriptan (MAXALT-MLT) 10 MG ODT    Panic attacks    Relevant Medications    FLUoxetine (PROZAC) 40 MG capsule    S/P laparoscopic sleeve gastrectomy     Establish care visit.  Context, sense of urgency given recent struggles with abdominal pain.  Status post gastric sleeve.  Bariatric surgeon is no longer available for consultation.  We will treat her for \"gastritis\" with PPI, H2 blocker and Carafate.  She is very appropriately trying to identify any nutritional or behavioral pattern that may be contributory to her symptoms.  She describes eating in small amounts in accordance with general post gastric sleeve principles.  Referral placed to general surgery (who happens also be a bariatric/metabolic surgeon).         Relevant Orders    Adult General Surg Referral   Other Visit Diagnoses     Encounter for long-term (current) use of medications        Cervical cancer screening        Abdominal pain, epigastric        Relevant Medications    omeprazole (PRILOSEC) 40 MG DR capsule    sucralfate (CARAFATE) 1 GM tablet          Isabella Correa is a 42 year old who presents for the following health issues   Chief Complaint   Patient presents with     Establish Care      S/p bariatric surgery:   - ~2+ years s/p gastric sleeve   - reflux for the past couple of weeks with pain over the past couple of days.     - now on a liquid diet.  \"Still getting terrible reflux.\"     - " "famotidine has not been helpful.     - struggling to keep medications down.  - no obvious trigger.  She has not had other epsides   - otherwise she feels normal.    - follow-up in December.     History of Present Illness       Reason for visit:  Establish care, med refills    She eats 2-3 servings of fruits and vegetables daily.She consumes 0 sweetened beverage(s) daily.She exercises with enough effort to increase her heart rate 9 or less minutes per day.  She exercises with enough effort to increase her heart rate 3 or less days per week. She is missing 1 dose(s) of medications per week.  She is not taking prescribed medications regularly due to remembering to take.    Today's PHQ-9         PHQ-9 Total Score: 4    PHQ-9 Q9 Thoughts of better off dead/self-harm past 2 weeks :   Not at all    How difficult have these problems made it for you to do your work, take care of things at home, or get along with other people: Not difficult at all  Today's GRIFFIN-7 Score: 0     Review of Systems   Constitutional: Negative.    All other systems reviewed and are negative.           Objective    /76 (BP Location: Left arm, Patient Position: Sitting, Cuff Size: Adult Large)   Pulse 84   Temp 98.6  F (37  C) (Oral)   Resp 16   Ht 1.643 m (5' 4.7\")   Wt 93.4 kg (206 lb)   BMI 34.60 kg/m    Body mass index is 34.6 kg/m .  Physical Exam  Nursing note reviewed.   Constitutional:       General: She is not in acute distress.     Appearance: Normal appearance. She is not ill-appearing.   HENT:      Head: Normocephalic and atraumatic.      Right Ear: External ear normal.      Left Ear: External ear normal.   Eyes:      General: No scleral icterus.     Extraocular Movements: Extraocular movements intact.      Conjunctiva/sclera: Conjunctivae normal.   Cardiovascular:      Rate and Rhythm: Normal rate and regular rhythm.      Heart sounds: Normal heart sounds. No murmur heard.    No friction rub. No gallop.   Pulmonary:      " Effort: Pulmonary effort is normal. No respiratory distress.      Breath sounds: Normal breath sounds. No wheezing or rales.   Abdominal:      Palpations: Abdomen is soft. There is no mass.      Tenderness: There is abdominal tenderness.   Musculoskeletal:         General: No swelling. Normal range of motion.   Skin:     General: Skin is warm.      Coloration: Skin is not jaundiced.      Findings: No rash.   Neurological:      General: No focal deficit present.      Mental Status: She is alert and oriented to person, place, and time. Mental status is at baseline.   Psychiatric:         Attention and Perception: Attention normal.         Mood and Affect: Mood normal.         Speech: Speech normal.         Thought Content: Thought content normal.             This note has been dictated using voice recognition software. Any grammatical or context distortions are unintentional and inherent to the software

## 2022-11-11 ENCOUNTER — OFFICE VISIT (OUTPATIENT)
Dept: SURGERY | Facility: CLINIC | Age: 42
End: 2022-11-11
Attending: FAMILY MEDICINE
Payer: COMMERCIAL

## 2022-11-11 VITALS
WEIGHT: 210 LBS | BODY MASS INDEX: 34.99 KG/M2 | SYSTOLIC BLOOD PRESSURE: 122 MMHG | HEIGHT: 65 IN | DIASTOLIC BLOOD PRESSURE: 70 MMHG

## 2022-11-11 DIAGNOSIS — Z87.11 HISTORY OF GASTRIC ULCER: ICD-10-CM

## 2022-11-11 DIAGNOSIS — Z98.84 S/P LAPAROSCOPIC SLEEVE GASTRECTOMY: ICD-10-CM

## 2022-11-11 PROCEDURE — 99204 OFFICE O/P NEW MOD 45 MIN: CPT | Performed by: SURGERY

## 2022-11-11 NOTE — LETTER
11/11/2022         RE: Madeline Szymanski  [facility] [facility] [address]        Dear Colleague,    Thank you for referring your patient, Madeline Szymanski, to the [facility] [place]. Please see a copy of my visit note below.    GENERAL SURGICAL CONSULTATION    I was requested by Sebas Valdez to consult on this pt to evaluate them for GERD.  She is a post op Sleeve Gastrectomy patient from the [facility].    HPI:  This is a 42 year old female here today with complaints of constant abdominal pain.  She had a Lap Band placed at [facility] in May of 2012   She had her Lap Band removed on Sept 2019.  She is status post a Lap Sleeve Gastrectomy at [facility] with Dr. Coy July in Feb 2020.  .  She has an appointment in Dec 2022 with Dr. Darden, but she was referred to our Bariatric Clinic as well.      Since her Lap Sleeve in 2020 she initially did well after surgery loosing 50 lbs.   That weight plus 4 lbs has returned.  About 2 months ago she started having increased 'Sever' GERD symptoms.  She started taking prilosec a few weeks after these symptoms started but she has been inconsistent on these meds.   She say Dr. Mendez 8 days ago and was started on Omeprazole and Carafate.  Innitially she felt a little better but now she does not feels she is improving.        She also describes LUQ abdominal pain that comes and goes.  She is not aware of what starts this pain.  It is a deep paiin that she decribes as a 1000 needles stabbing you.  It is short lived pain.        Her bowels are and have been highly irregular.  She is having a bowel movement every 5 days or so.      Allergies:Cephalexin, Coconut flavor, Covid-19 (mrna) vaccine, Prochlorperazine, Coconut fatty acids, Metoclopramide, and Penicillins    Past Medical History:   Diagnosis Date     Acute and chronic respiratory failure with hypoxia (H) 1/1/2022     Anxiety      Arthritis      Depressive disorder      Gastric  band slippage 2019     History of blood transfusion 1978     Kidney infection      Lumbar radiculopathy      Migraine      Migraine      Painless urinary retention due to cauda equina syndrome (H)      Pancreatitis      Panic attack      PONV (postoperative nausea and vomiting)      Spinal stenosis in cervical region      UTI (lower urinary tract infection)        Past Surgical History:   Procedure Laterality Date     ABDOMEN SURGERY        SECTION       FUSION CERVICAL ANTERIOR ONE LEVEL N/A 2022    Procedure: Cervical 5 - Cervical 6 anterior cervical decompression and artificial disc replacement, use of microscope;  Surgeon: Radha Lilly MD;  Location: [facility] OR     GASTRECTOMY LAPAROSCOPIC SLEEVE       GI SURGERY       GYN SURGERY  2018    Hysterectomy     HYSTERECTOMY TOTAL ABDOMINAL       LAMINECTOMY LUMBAR ONE LEVEL Left 10/04/2021    Procedure: LEFT LUMBAR 5-SACRAL 1 HEMILAMINECTOMY, MEDIAL FACETECTOMY, FORAMINOTOMY WITH;  Surgeon: Radha Lilly MD;  Location: [facility] OR     LUMBAR DISCECTOMY Left 10/04/2021    Procedure: LUMBAR 5-SACRAL 1 MICRODISCECTOMY;  Surgeon: Radha Lilly MD;  Location: [facility]       CURRENT MEDS:  Current Outpatient Medications   Medication Sig Dispense Refill     amitriptyline (ELAVIL) 50 MG tablet Take 1 tablet (50 mg) by mouth At Bedtime 90 tablet 3     eletriptan (RELPAX) 40 MG tablet Take 1 tablet (40 mg) by mouth at onset of headache May repeat if needed in 2 hours. Max of 2 doses/24hours Max of 4 days/month 18 tablet 3     EPINEPHrine (ANY BX GENERIC EQUIV) 0.3 MG/0.3ML injection 2-pack Inject 0.3 mLs (0.3 mg) into the muscle once as needed for anaphylaxis 2 each 3     FLUoxetine (PROZAC) 40 MG capsule Take 1 capsule (40 mg) by mouth daily 90 capsule 2     hydrOXYzine (ATARAX) 25 MG tablet Take 1 tablet (25 mg) by mouth 3 times daily as needed for anxiety 90 tablet 3     lidocaine (LIDODERM) 5 % patch Apply  "1 patch to affected area as needed for 12 hours, remove for 12 hours before reapplying. 14 patch 3     omeprazole (PRILOSEC) 40 MG DR capsule Take 1 capsule (40 mg) by mouth daily 30 capsule 1     pregabalin (LYRICA) 50 MG capsule Take 1-2 capsules ( mg) by mouth At Bedtime 60 capsule 0     rizatriptan (MAXALT-MLT) 10 MG ODT Take 1 tablet (10 mg) by mouth at onset of headache for migraine May repeat in 2 hours, with a max of 30 mg in 24 hours and a max of 5 days/month 18 tablet 3     sucralfate (CARAFATE) 1 GM tablet Take 1 tablet (1 g) by mouth 4 times daily 120 tablet 1     topiramate (TOPAMAX) 50 MG tablet Take 1 tablet (50 mg) by mouth daily 90 tablet 3       Family History   Problem Relation Age of Onset     Heart Disease Father      Hypertension Father      Substance Abuse Father      Low Back Problems Mother         back surgery     Breast Cancer Cousin      Breast Cancer Other      Mental Illness Nephew      Obesity Sister      Obesity Paternal Grandmother      Obesity Other      Family history is not pertinent to this patients Chief Complaint.     reports that she has never smoked. She has never used smokeless tobacco. She reports that she does not drink alcohol and does not use drugs.    Review of Systems -   10 point Review of systems is negative except for; as mentioned above in HPI and PMHx    /70 (BP Location: Right arm, Patient Position: Sitting, Cuff Size: Adult Small)   Ht 1.643 m (5' 4.7\")   Wt 95.3 kg (210 lb)   BMI 35.27 kg/m    Body mass index is 35.27 kg/m .    EXAM:  GENERAL: Well developed female  HEENT: EOMI, Anicteric Sclera, Moist Mucous Membranes,  In Mouth the pt does not have redness or bleeding gums  CARDIOVASCULAR: RRR w/out murmur   CHEST/LUNG: Clear to Auscultation  ABDOMEN:  Non tender to palpation, +BS  MUSCULOSKELETAL:  No deformities with good range of motion in all extremities  NEURO: She is ambulatory with good strength in both legs.  HEME/LYMPH: No Cervical " Adenopathy or tenderness.     IMAGES:  none    Assessment/Plan:  42-year-old patient that has had a significant bariatric surgical history through [facility] including the placement of a lap band followed by removal of the Lap-Band followed by laparoscopic sleeve gastrectomy.  The patient had 50 pound weight loss after her lap sleeve she is now gained that weight back plus a few pounds.  Her symptoms that seem to be associated with reflux date back a few months not the full 2 years since she is had her surgery.  She is recently been started on a proton pump inhibitor and Carafate.  I do not think she has been on this treatment long enough to know how effective it is going to be.  So as a first measure I would take these medications for the next 2 weeks to see if her symptoms are improving.  If her symptoms or not getting better then I would recommend starting with an upper endoscopy.    Of note the patient is scheduled to see Dr. Barrera of the [facility] bariatric surgical system.  I do think is most appropriate that her bariatric care continues within the same program for which she is already had multiple surgeries.  If for insurance or other reasons she needs to change to a different provider group we will be willing to help as best we can.      Sarwat Butterfield MD  [facility]  [phone]      Again, thank you for allowing me to participate in the care of your patient.        Sincerely,        Sarwat Butterfield MD

## 2022-11-11 NOTE — PROGRESS NOTES
GENERAL SURGICAL CONSULTATION    I was requested by Sebas Valdez to consult on this pt to evaluate them for GERD.  She is a post op Sleeve Gastrectomy patient from the [facility].    HPI:  This is a 42 year old female here today with complaints of constant abdominal pain.  She had a Lap Band placed at [facility] in May of 2012   She had her Lap Band removed on Sept 2019.  She is status post a Lap Sleeve Gastrectomy at [facility] with Dr. Anneliese Bassett in Feb 2020.  .  She has an appointment in Dec 2022 with Dr. Darden, but she was referred to our Bariatric Clinic as well.      Since her Lap Sleeve in 2020 she initially did well after surgery loosing 50 lbs.   That weight plus 4 lbs has returned.  About 2 months ago she started having increased 'Sever' GERD symptoms.  She started taking prilosec a few weeks after these symptoms started but she has been inconsistent on these meds.   She say Dr. Mendez 8 days ago and was started on Omeprazole and Carafate.  Innitially she felt a little better but now she does not feels she is improving.        She also describes LUQ abdominal pain that comes and goes.  She is not aware of what starts this pain.  It is a deep paiin that she decribes as a 1000 needles stabbing you.  It is short lived pain.        Her bowels are and have been highly irregular.  She is having a bowel movement every 5 days or so.      Allergies:Cephalexin, Coconut flavor, Covid-19 (mrna) vaccine, Prochlorperazine, Coconut fatty acids, Metoclopramide, and Penicillins    Past Medical History:   Diagnosis Date     Acute and chronic respiratory failure with hypoxia (H) 1/1/2022     Anxiety      Arthritis      Depressive disorder      Gastric band slippage 9/26/2019     History of blood transfusion 4/1978     Kidney infection      Lumbar radiculopathy      Migraine      Migraine      Painless urinary retention due to cauda equina syndrome (H)      Pancreatitis      Panic attack      PONV (postoperative nausea and  vomiting)      Spinal stenosis in cervical region      UTI (lower urinary tract infection)        Past Surgical History:   Procedure Laterality Date     ABDOMEN SURGERY        SECTION       FUSION CERVICAL ANTERIOR ONE LEVEL N/A 2022    Procedure: Cervical 5 - Cervical 6 anterior cervical decompression and artificial disc replacement, use of microscope;  Surgeon: Radha Lilly MD;  Location: [facility] OR     GASTRECTOMY LAPAROSCOPIC SLEEVE       GI SURGERY       GYN SURGERY  2018    Hysterectomy     HYSTERECTOMY TOTAL ABDOMINAL       LAMINECTOMY LUMBAR ONE LEVEL Left 10/04/2021    Procedure: LEFT LUMBAR 5-SACRAL 1 HEMILAMINECTOMY, MEDIAL FACETECTOMY, FORAMINOTOMY WITH;  Surgeon: Radha Lilly MD;  Location: [facility] OR     LUMBAR DISCECTOMY Left 10/04/2021    Procedure: LUMBAR 5-SACRAL 1 MICRODISCECTOMY;  Surgeon: Radha Lilly MD;  [place] [facility] [place]       CURRENT MEDS:  Current Outpatient Medications   Medication Sig Dispense Refill     amitriptyline (ELAVIL) 50 MG tablet Take 1 tablet (50 mg) by mouth At Bedtime 90 tablet 3     eletriptan (RELPAX) 40 MG tablet Take 1 tablet (40 mg) by mouth at onset of headache May repeat if needed in 2 hours. Max of 2 doses/24hours Max of 4 days/month 18 tablet 3     EPINEPHrine (ANY BX GENERIC EQUIV) 0.3 MG/0.3ML injection 2-pack Inject 0.3 mLs (0.3 mg) into the muscle once as needed for anaphylaxis 2 each 3     FLUoxetine (PROZAC) 40 MG capsule Take 1 capsule (40 mg) by mouth daily 90 capsule 2     hydrOXYzine (ATARAX) 25 MG tablet Take 1 tablet (25 mg) by mouth 3 times daily as needed for anxiety 90 tablet 3     lidocaine (LIDODERM) 5 % patch Apply 1 patch to affected area as needed for 12 hours, remove for 12 hours before reapplying. 14 patch 3     omeprazole (PRILOSEC) 40 MG DR capsule Take 1 capsule (40 mg) by mouth daily 30 capsule 1     pregabalin (LYRICA) 50 MG capsule Take 1-2 capsules ( mg) by mouth At  "Bedtime 60 capsule 0     rizatriptan (MAXALT-MLT) 10 MG ODT Take 1 tablet (10 mg) by mouth at onset of headache for migraine May repeat in 2 hours, with a max of 30 mg in 24 hours and a max of 5 days/month 18 tablet 3     sucralfate (CARAFATE) 1 GM tablet Take 1 tablet (1 g) by mouth 4 times daily 120 tablet 1     topiramate (TOPAMAX) 50 MG tablet Take 1 tablet (50 mg) by mouth daily 90 tablet 3       Family History   Problem Relation Age of Onset     Heart Disease Father      Hypertension Father      Substance Abuse Father      Low Back Problems Mother         back surgery     Breast Cancer Cousin      Breast Cancer Other      Mental Illness Nephew      Obesity Sister      Obesity Paternal Grandmother      Obesity Other      Family history is not pertinent to this patients Chief Complaint.     reports that she has never smoked. She has never used smokeless tobacco. She reports that she does not drink alcohol and does not use drugs.    Review of Systems -   10 point Review of systems is negative except for; as mentioned above in HPI and PMHx    /70 (BP Location: Right arm, Patient Position: Sitting, Cuff Size: Adult Small)   Ht 1.643 m (5' 4.7\")   Wt 95.3 kg (210 lb)   BMI 35.27 kg/m    Body mass index is 35.27 kg/m .    EXAM:  GENERAL: Well developed female  HEENT: EOMI, Anicteric Sclera, Moist Mucous Membranes,  In Mouth the pt does not have redness or bleeding gums  CARDIOVASCULAR: RRR w/out murmur   CHEST/LUNG: Clear to Auscultation  ABDOMEN:  Non tender to palpation, +BS  MUSCULOSKELETAL:  No deformities with good range of motion in all extremities  NEURO: She is ambulatory with good strength in both legs.  HEME/LYMPH: No Cervical Adenopathy or tenderness.     IMAGES:  none    Assessment/Plan:  42-year-old patient that has had a significant bariatric surgical history through [facility] including the placement of a lap band followed by removal of the Lap-Band followed by laparoscopic sleeve gastrectomy.  " The patient had 50 pound weight loss after her lap sleeve she is now gained that weight back plus a few pounds.  Her symptoms that seem to be associated with reflux date back a few months not the full 2 years since she is had her surgery.  She is recently been started on a proton pump inhibitor and Carafate.  I do not think she has been on this treatment long enough to know how effective it is going to be.  So as a first measure I would take these medications for the next 2 weeks to see if her symptoms are improving.  If her symptoms or not getting better then I would recommend starting with an upper endoscopy.    Of note the patient is scheduled to see Dr. Barrera of the [facility] bariatric surgical system.  I do think is most appropriate that her bariatric care continues within the same program for which she is already had multiple surgeries.  If for insurance or other reasons she needs to change to a different provider group we will be willing to help as best we can.      Sarwat Butterfield MD  [facility]  [phone]

## 2022-11-29 DIAGNOSIS — G89.29 CHRONIC RIGHT-SIDED LOW BACK PAIN WITHOUT SCIATICA: ICD-10-CM

## 2022-11-29 DIAGNOSIS — M54.50 CHRONIC RIGHT-SIDED LOW BACK PAIN WITHOUT SCIATICA: ICD-10-CM

## 2022-11-29 RX ORDER — PREGABALIN 50 MG/1
CAPSULE ORAL
Qty: 60 CAPSULE | Refills: 0 | Status: SHIPPED | OUTPATIENT
Start: 2022-11-29 | End: 2023-01-02

## 2022-11-30 ENCOUNTER — OFFICE VISIT (OUTPATIENT)
Dept: FAMILY MEDICINE | Facility: CLINIC | Age: 42
End: 2022-11-30
Payer: COMMERCIAL

## 2022-11-30 VITALS
HEIGHT: 64 IN | DIASTOLIC BLOOD PRESSURE: 88 MMHG | BODY MASS INDEX: 35.61 KG/M2 | OXYGEN SATURATION: 96 % | SYSTOLIC BLOOD PRESSURE: 130 MMHG | HEART RATE: 83 BPM | WEIGHT: 208.6 LBS

## 2022-11-30 DIAGNOSIS — E66.812 CLASS 2 SEVERE OBESITY DUE TO EXCESS CALORIES WITH SERIOUS COMORBIDITY AND BODY MASS INDEX (BMI) OF 35.0 TO 35.9 IN ADULT (H): Primary | ICD-10-CM

## 2022-11-30 DIAGNOSIS — E78.1 HYPERTRIGLYCERIDEMIA: ICD-10-CM

## 2022-11-30 DIAGNOSIS — E66.01 CLASS 2 SEVERE OBESITY DUE TO EXCESS CALORIES WITH SERIOUS COMORBIDITY AND BODY MASS INDEX (BMI) OF 35.0 TO 35.9 IN ADULT (H): Primary | ICD-10-CM

## 2022-11-30 DIAGNOSIS — F50.819 BINGE EATING DISORDER: ICD-10-CM

## 2022-11-30 DIAGNOSIS — R41.840 CONCENTRATION DEFICIT: ICD-10-CM

## 2022-11-30 DIAGNOSIS — G43.109 MIGRAINE WITH AURA AND WITHOUT STATUS MIGRAINOSUS, NOT INTRACTABLE: ICD-10-CM

## 2022-11-30 DIAGNOSIS — Z98.84 S/P LAPAROSCOPIC SLEEVE GASTRECTOMY: ICD-10-CM

## 2022-11-30 PROCEDURE — 99215 OFFICE O/P EST HI 40 MIN: CPT | Performed by: FAMILY MEDICINE

## 2022-11-30 RX ORDER — TOPIRAMATE 50 MG/1
50 TABLET, FILM COATED ORAL 2 TIMES DAILY
Qty: 180 TABLET | Refills: 1 | Status: SHIPPED | OUTPATIENT
Start: 2022-11-30 | End: 2023-11-20

## 2022-11-30 RX ORDER — LISDEXAMFETAMINE DIMESYLATE 50 MG/1
50 CAPSULE ORAL EVERY MORNING
Qty: 30 CAPSULE | Refills: 0 | Status: SHIPPED | OUTPATIENT
Start: 2022-11-30 | End: 2022-12-14

## 2022-11-30 RX ORDER — LISDEXAMFETAMINE DIMESYLATE 30 MG/1
30 CAPSULE ORAL EVERY MORNING
Qty: 15 CAPSULE | Refills: 0 | Status: SHIPPED | OUTPATIENT
Start: 2022-11-30 | End: 2022-12-14

## 2022-11-30 ASSESSMENT — ANXIETY QUESTIONNAIRES
IF YOU CHECKED OFF ANY PROBLEMS ON THIS QUESTIONNAIRE, HOW DIFFICULT HAVE THESE PROBLEMS MADE IT FOR YOU TO DO YOUR WORK, TAKE CARE OF THINGS AT HOME, OR GET ALONG WITH OTHER PEOPLE: SOMEWHAT DIFFICULT
GAD7 TOTAL SCORE: 7
1. FEELING NERVOUS, ANXIOUS, OR ON EDGE: SEVERAL DAYS
7. FEELING AFRAID AS IF SOMETHING AWFUL MIGHT HAPPEN: NOT AT ALL
GAD7 TOTAL SCORE: 7
GAD7 TOTAL SCORE: 7
5. BEING SO RESTLESS THAT IT IS HARD TO SIT STILL: MORE THAN HALF THE DAYS
7. FEELING AFRAID AS IF SOMETHING AWFUL MIGHT HAPPEN: NOT AT ALL
4. TROUBLE RELAXING: SEVERAL DAYS
2. NOT BEING ABLE TO STOP OR CONTROL WORRYING: SEVERAL DAYS
8. IF YOU CHECKED OFF ANY PROBLEMS, HOW DIFFICULT HAVE THESE MADE IT FOR YOU TO DO YOUR WORK, TAKE CARE OF THINGS AT HOME, OR GET ALONG WITH OTHER PEOPLE?: SOMEWHAT DIFFICULT
3. WORRYING TOO MUCH ABOUT DIFFERENT THINGS: SEVERAL DAYS
6. BECOMING EASILY ANNOYED OR IRRITABLE: SEVERAL DAYS

## 2022-11-30 ASSESSMENT — PATIENT HEALTH QUESTIONNAIRE - PHQ9
SUM OF ALL RESPONSES TO PHQ QUESTIONS 1-9: 2
SUM OF ALL RESPONSES TO PHQ QUESTIONS 1-9: 2
10. IF YOU CHECKED OFF ANY PROBLEMS, HOW DIFFICULT HAVE THESE PROBLEMS MADE IT FOR YOU TO DO YOUR WORK, TAKE CARE OF THINGS AT HOME, OR GET ALONG WITH OTHER PEOPLE: NOT DIFFICULT AT ALL
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
## 2022-11-30 NOTE — PATIENT INSTRUCTIONS
Macronutrient Goals    Minimum 3 meals per day, control daily calories   - 1200 -1500 female   - 1600 male  Minimum of 4 palm servings of protein daily.  Begin everyday with protein    - ~+ gm for female   - 120 gm for male  Be mindful of net carbs 50-75 gm/day  - (Total carbs - fiber)   - Less than 5 gm of carbs with breakfast    Supplementation   - 1 multivitamin   - clear and copious urination (adequate water consumption)   - 2000 mg fish oil capsules    - 2000 IU Vitamin D (Unless checked during clinic visit I will be checking your level today and may send a prescriptionto your pharmacy if necessary)

## 2022-11-30 NOTE — ASSESSMENT & PLAN NOTE
42 year old year old female in clinic today to discuss treatment of the following conditions through diet and lifestyle modification and weight loss:  1. Class 2 severe obesity due to excess calories with serious comorbidity and body mass index (BMI) of 35.0 to 35.9 in adult (H)    2. S/P laparoscopic sleeve gastrectomy    3. Hypertriglyceridemia    4. Concentration deficit    5. Migraine with aura and without status migrainosus, not intractable    6. Binge eating disorder      Intake: This patient has a long history of various efforts at weight loss.  She is approximately 2 years status post sleeve gastrectomy.  She has been on phentermine in the past.  She has been on Saxenda in the past.  We do lengthy discussion regarding different factors that might of contributed to weight gain.  She does seem to meet criteria for binge eating disorder.  This happens several times a month (decreased frequency since her sleeve gastrectomy given the restrictive effects).  She has a drive to binge multiple times per week.  We discussed this condition and various treatment options including pharmaceuticals or eating disorder programs.  - Trial of Vyvanse.  Initial dose will be 30 mg.  Titrate to 50 mg if she tolerates the first dose.  - We discussed more traditional weight loss medications.  She has been on liraglutide (Saxenda) in the past with success as it did greatly reduce her drive to eat or even thoughts of eating.  However, she has a history of pancreatitis.  I believe GLP-1 receptor agonist therapy is contraindicated.  - Phentermine was helpful but the thing that she liked about it was that she felt a stimulating effect.  We discussed that this is not the intended effect but rather a side effect.  I doubt phentermine would be helpful long-term for this individual.  - Lastly, given some concerns for ADHD she may do well on Contrave.  She has not previously been on Wellbutrin.  She does believe that she struggles with  concentration and task completion.  She is a family history of ADHD (children).  Referral placed for neuropsych testing.  She understands at this type of testing probably will not happen for at least 6 months given scheduling for this condition.

## 2022-12-13 ENCOUNTER — MYC MEDICAL ADVICE (OUTPATIENT)
Dept: FAMILY MEDICINE | Facility: CLINIC | Age: 42
End: 2022-12-13

## 2022-12-13 DIAGNOSIS — F50.819 BINGE EATING DISORDER: ICD-10-CM

## 2022-12-14 RX ORDER — LISDEXAMFETAMINE DIMESYLATE 40 MG/1
40 CAPSULE ORAL EVERY MORNING
Qty: 30 CAPSULE | Refills: 0 | Status: SHIPPED | OUTPATIENT
Start: 2022-12-14 | End: 2023-01-25

## 2022-12-14 RX ORDER — LISDEXAMFETAMINE DIMESYLATE 50 MG/1
50 CAPSULE ORAL EVERY MORNING
Qty: 30 CAPSULE | Refills: 0 | Status: CANCELLED | OUTPATIENT
Start: 2022-12-14

## 2023-01-05 ENCOUNTER — MYC MEDICAL ADVICE (OUTPATIENT)
Dept: PHYSICAL MEDICINE AND REHAB | Facility: CLINIC | Age: 43
End: 2023-01-05

## 2023-01-05 DIAGNOSIS — M54.81 BILATERAL OCCIPITAL NEURALGIA: Primary | ICD-10-CM

## 2023-01-09 ENCOUNTER — ANCILLARY PROCEDURE (OUTPATIENT)
Dept: GENERAL RADIOLOGY | Facility: CLINIC | Age: 43
End: 2023-01-09
Attending: STUDENT IN AN ORGANIZED HEALTH CARE EDUCATION/TRAINING PROGRAM
Payer: COMMERCIAL

## 2023-01-09 DIAGNOSIS — M25.531 ACUTE PAIN OF RIGHT WRIST: Primary | ICD-10-CM

## 2023-01-09 DIAGNOSIS — S69.91XA RIGHT WRIST INJURY, INITIAL ENCOUNTER: ICD-10-CM

## 2023-01-09 DIAGNOSIS — M25.531 ACUTE PAIN OF RIGHT WRIST: ICD-10-CM

## 2023-01-09 PROCEDURE — 73110 X-RAY EXAM OF WRIST: CPT | Mod: TC | Performed by: RADIOLOGY

## 2023-01-09 NOTE — PROGRESS NOTES
Patient walked over to [facility] from her job location down the [place] ([facility]) due to persistent pain, bruising and stiffness of the right radial hand and wrist after a fall on the ice ([place]) yesterday (1/8/23). X-ray ordered for further evaluation due to concern for fracture.    Cely Villarreal MD, CAM  [facility]

## 2023-01-25 ENCOUNTER — MYC MEDICAL ADVICE (OUTPATIENT)
Dept: FAMILY MEDICINE | Facility: CLINIC | Age: 43
End: 2023-01-25

## 2023-01-25 ENCOUNTER — RADIOLOGY INJECTION OFFICE VISIT (OUTPATIENT)
Dept: PHYSICAL MEDICINE AND REHAB | Facility: CLINIC | Age: 43
End: 2023-01-25
Attending: NURSE PRACTITIONER
Payer: COMMERCIAL

## 2023-01-25 ENCOUNTER — OFFICE VISIT (OUTPATIENT)
Dept: FAMILY MEDICINE | Facility: CLINIC | Age: 43
End: 2023-01-25
Attending: FAMILY MEDICINE
Payer: COMMERCIAL

## 2023-01-25 VITALS
HEIGHT: 64 IN | WEIGHT: 197.2 LBS | DIASTOLIC BLOOD PRESSURE: 80 MMHG | BODY MASS INDEX: 33.67 KG/M2 | SYSTOLIC BLOOD PRESSURE: 130 MMHG | OXYGEN SATURATION: 97 % | HEART RATE: 102 BPM

## 2023-01-25 VITALS
HEART RATE: 97 BPM | RESPIRATION RATE: 16 BRPM | SYSTOLIC BLOOD PRESSURE: 142 MMHG | OXYGEN SATURATION: 95 % | DIASTOLIC BLOOD PRESSURE: 90 MMHG | TEMPERATURE: 98.3 F

## 2023-01-25 DIAGNOSIS — Z12.31 VISIT FOR SCREENING MAMMOGRAM: ICD-10-CM

## 2023-01-25 DIAGNOSIS — F50.819 BINGE EATING DISORDER: ICD-10-CM

## 2023-01-25 DIAGNOSIS — G43.109 MIGRAINE WITH AURA AND WITHOUT STATUS MIGRAINOSUS, NOT INTRACTABLE: ICD-10-CM

## 2023-01-25 DIAGNOSIS — E78.1 HYPERTRIGLYCERIDEMIA: ICD-10-CM

## 2023-01-25 DIAGNOSIS — Z12.11 SCREEN FOR COLON CANCER: ICD-10-CM

## 2023-01-25 DIAGNOSIS — E66.09 CLASS 1 OBESITY DUE TO EXCESS CALORIES WITH SERIOUS COMORBIDITY AND BODY MASS INDEX (BMI) OF 33.0 TO 33.9 IN ADULT: Primary | ICD-10-CM

## 2023-01-25 DIAGNOSIS — E66.811 CLASS 1 OBESITY DUE TO EXCESS CALORIES WITH SERIOUS COMORBIDITY AND BODY MASS INDEX (BMI) OF 33.0 TO 33.9 IN ADULT: Primary | ICD-10-CM

## 2023-01-25 DIAGNOSIS — Z13.220 SCREENING FOR HYPERLIPIDEMIA: ICD-10-CM

## 2023-01-25 DIAGNOSIS — F32.5 MAJOR DEPRESSIVE DISORDER WITH SINGLE EPISODE, IN FULL REMISSION (H): ICD-10-CM

## 2023-01-25 DIAGNOSIS — M54.81 BILATERAL OCCIPITAL NEURALGIA: ICD-10-CM

## 2023-01-25 DIAGNOSIS — Z98.84 S/P LAPAROSCOPIC SLEEVE GASTRECTOMY: ICD-10-CM

## 2023-01-25 PROCEDURE — 64405 NJX AA&/STRD GR OCPL NRV: CPT | Mod: 50 | Performed by: PAIN MEDICINE

## 2023-01-25 PROCEDURE — 76942 ECHO GUIDE FOR BIOPSY: CPT | Performed by: PAIN MEDICINE

## 2023-01-25 PROCEDURE — 99213 OFFICE O/P EST LOW 20 MIN: CPT | Performed by: FAMILY MEDICINE

## 2023-01-25 RX ORDER — METHYLPREDNISOLONE ACETATE 40 MG/ML
INJECTION, SUSPENSION INTRA-ARTICULAR; INTRALESIONAL; INTRAMUSCULAR; SOFT TISSUE
Status: COMPLETED | OUTPATIENT
Start: 2023-01-25 | End: 2023-01-25

## 2023-01-25 RX ORDER — LIDOCAINE HYDROCHLORIDE 10 MG/ML
INJECTION, SOLUTION EPIDURAL; INFILTRATION; INTRACAUDAL; PERINEURAL
Status: COMPLETED | OUTPATIENT
Start: 2023-01-25 | End: 2023-01-25

## 2023-01-25 RX ORDER — BUPIVACAINE HYDROCHLORIDE 2.5 MG/ML
INJECTION, SOLUTION EPIDURAL; INFILTRATION; INTRACAUDAL
Status: COMPLETED | OUTPATIENT
Start: 2023-01-25 | End: 2023-01-25

## 2023-01-25 RX ORDER — LISDEXAMFETAMINE DIMESYLATE 40 MG/1
40 CAPSULE ORAL EVERY MORNING
Qty: 30 CAPSULE | Refills: 0 | Status: SHIPPED | OUTPATIENT
Start: 2023-01-25 | End: 2023-02-23

## 2023-01-25 RX ADMIN — BUPIVACAINE HYDROCHLORIDE 4 ML: 2.5 INJECTION, SOLUTION EPIDURAL; INFILTRATION; INTRACAUDAL at 11:40

## 2023-01-25 RX ADMIN — LIDOCAINE HYDROCHLORIDE 2 ML: 10 INJECTION, SOLUTION EPIDURAL; INFILTRATION; INTRACAUDAL; PERINEURAL at 11:40

## 2023-01-25 RX ADMIN — METHYLPREDNISOLONE ACETATE 40 MG: 40 INJECTION, SUSPENSION INTRA-ARTICULAR; INTRALESIONAL; INTRAMUSCULAR; SOFT TISSUE at 11:40

## 2023-01-25 ASSESSMENT — ANXIETY QUESTIONNAIRES
GAD7 TOTAL SCORE: 1
IF YOU CHECKED OFF ANY PROBLEMS ON THIS QUESTIONNAIRE, HOW DIFFICULT HAVE THESE PROBLEMS MADE IT FOR YOU TO DO YOUR WORK, TAKE CARE OF THINGS AT HOME, OR GET ALONG WITH OTHER PEOPLE: NOT DIFFICULT AT ALL
7. FEELING AFRAID AS IF SOMETHING AWFUL MIGHT HAPPEN: NOT AT ALL
3. WORRYING TOO MUCH ABOUT DIFFERENT THINGS: NOT AT ALL
5. BEING SO RESTLESS THAT IT IS HARD TO SIT STILL: NOT AT ALL
1. FEELING NERVOUS, ANXIOUS, OR ON EDGE: NOT AT ALL
GAD7 TOTAL SCORE: 1
7. FEELING AFRAID AS IF SOMETHING AWFUL MIGHT HAPPEN: NOT AT ALL
4. TROUBLE RELAXING: NOT AT ALL
2. NOT BEING ABLE TO STOP OR CONTROL WORRYING: NOT AT ALL
8. IF YOU CHECKED OFF ANY PROBLEMS, HOW DIFFICULT HAVE THESE MADE IT FOR YOU TO DO YOUR WORK, TAKE CARE OF THINGS AT HOME, OR GET ALONG WITH OTHER PEOPLE?: NOT DIFFICULT AT ALL
6. BECOMING EASILY ANNOYED OR IRRITABLE: SEVERAL DAYS

## 2023-01-25 ASSESSMENT — PAIN SCALES - GENERAL: PAINLEVEL: SEVERE PAIN (7)

## 2023-01-25 ASSESSMENT — ENCOUNTER SYMPTOMS: CONSTITUTIONAL NEGATIVE: 1

## 2023-01-25 NOTE — PROGRESS NOTES
Problem List Items Addressed This Visit     Class 1 obesity due to excess calories with serious comorbidity and body mass index (BMI) of 33.0 to 33.9 in adult - Primary     43 year old year old female in clinic today to discuss treatment of the following conditions through diet and lifestyle modification and weight loss:  1. S/P laparoscopic sleeve gastrectomy    2. Class 1 obesity due to excess calories with serious comorbidity and body mass index (BMI) of 33.0 to 33.9 in adult    3. Binge eating disorder    4. Hypertriglyceridemia    5. Visit for screening mammogram    6. Screen for colon cancer    7. Screening for hyperlipidemia    8. Major depressive disorder with single episode, in full remission (H)      The patient's weight loss result since the last visit was successful based on weight loss. Nutrition improve.  She is not struggling with craving currently.  PDMP reviewed.     - continue vyvanse,  No cost barrier.  Dx: BED.  She has had one binge episode since I last saw her.          Relevant Medications    lisdexamfetamine (VYVANSE) 40 MG capsule    Major depressive disorder with single episode, in full remission (H)     Mood stable.  No side effects from Vyvanse         Migraine with aura and without status migrainosus, not intractable     Request [facility] paperwork for ongoing struggles with migrainous headaches.  In the past, it has been adequate to complete this form so that she has 4-8 hours every couple of weeks indefinitely.  She will send the form via the [employer] system and I will complete.         S/P laparoscopic sleeve gastrectomy   Other Visit Diagnoses     Binge eating disorder        Relevant Medications    lisdexamfetamine (VYVANSE) 40 MG capsule    Hypertriglyceridemia        Visit for screening mammogram        Relevant Orders    MA SCREENING DIGITAL BILAT - Future  (s+30)    Screen for colon cancer        Relevant Orders    Colonoscopy Screening  Referral    Screening for  hyperlipidemia             Follow-up Visit   Expected date:  Apr 25, 2023 (Approximate)      Follow Up Appointment Details:     Follow-up with whom?: Me    Follow-Up for what?: Chronic Disease f/u    Chronic Disease f/u: General (Other)    Additional Details: Weight loss Follow up    How?: In Person or Virtual    Is this an as-needed follow-up?: No                     Subjective   Madeline is a 43 year old who presents for the following health issues   Chief Complaint   Patient presents with     Weight Loss     F/u     Forms     FMLA form      Patient presents for treatment of chronic, comorbid conditions using intensive therapeutic lifestyle interventions including nutrition, physical activity, and behavior management.   - successes: she says that she feels well on vyvanse.  Decreased urge to reach for snacks. She no longer hs desire to eat sweats. Eating and enjoying more fruits and veggies.  Cravings for carbs down.  This took a few days to take affect.    - struggles: emotional days.  Family is eating a different menu.     - exercise plan: not addressed   - stomach has been great.  She is at the end of PPI taper.    - tracking/journaling: ad farzaneh.  Carb and portion controlled.     - following nutritional plan: yes..  Deviations from plan: infrequent   - hunger: controlled.    - medication benefits: helpful. side effects: none      History of Present Illness       Reason for visit:  Weight management follow up    She eats 2-3 servings of fruits and vegetables daily.She consumes 0 sweetened beverage(s) daily.She exercises with enough effort to increase her heart rate 20 to 29 minutes per day.  She exercises with enough effort to increase her heart rate 5 days per week. She is missing 1 dose(s) of medications per week.  She is not taking prescribed medications regularly due to remembering to take.  Today's GRIFFIN-7 Score: 1    Review of Systems   Constitutional: Negative.    All other systems reviewed and are negative.    "        Objective    /80 (BP Location: Left arm, Patient Position: Sitting, Cuff Size: Adult Regular)   Pulse 102   Ht 1.626 m (5' 4\")   Wt 89.4 kg (197 lb 3.2 oz)   SpO2 97%   BMI 33.85 kg/m    Body mass index is 33.85 kg/m .  Physical Exam  Vitals reviewed.   Constitutional:       General: She is not in acute distress.     Appearance: Normal appearance. She is not ill-appearing.   HENT:      Head: Normocephalic and atraumatic.      Right Ear: External ear normal.      Left Ear: External ear normal.      Nose: Nose normal.      Mouth/Throat:      Pharynx: Oropharynx is clear. No oropharyngeal exudate or posterior oropharyngeal erythema.   Eyes:      General: No scleral icterus.        Right eye: No discharge.         Left eye: No discharge.      Extraocular Movements: Extraocular movements intact.      Conjunctiva/sclera: Conjunctivae normal.      Pupils: Pupils are equal, round, and reactive to light.   Neck:      Comments: No thyromegaly.  Cardiovascular:      Rate and Rhythm: Normal rate and regular rhythm.      Heart sounds: Normal heart sounds. No murmur heard.    No friction rub. No gallop.   Pulmonary:      Effort: Pulmonary effort is normal. No respiratory distress.      Breath sounds: Normal breath sounds. No wheezing or rales.   Abdominal:      General: There is no distension.      Palpations: Abdomen is soft. There is no mass.      Tenderness: There is no abdominal tenderness.   Musculoskeletal:         General: No signs of injury. Normal range of motion.      Cervical back: Normal range of motion.      Right lower leg: No edema.      Left lower leg: No edema.   Lymphadenopathy:      Cervical: No cervical adenopathy.   Skin:     General: Skin is warm.      Coloration: Skin is not jaundiced.      Findings: No rash.   Neurological:      General: No focal deficit present.      Mental Status: She is alert and oriented to person, place, and time.      Cranial Nerves: No cranial nerve deficit.      " Deep Tendon Reflexes: Reflexes normal.   Psychiatric:         Mood and Affect: Mood normal.             This note has been dictated using voice recognition software. Any grammatical or context distortions are unintentional and inherent to the software

## 2023-01-25 NOTE — ASSESSMENT & PLAN NOTE
Request [facility] paperwork for ongoing struggles with migrainous headaches.  In the past, it has been adequate to complete this form so that she has 4-8 hours every couple of weeks indefinitely.  She will send the form via the [employer] system and I will complete.

## 2023-01-25 NOTE — ASSESSMENT & PLAN NOTE
43 year old year old female in clinic today to discuss treatment of the following conditions through diet and lifestyle modification and weight loss:  1. S/P laparoscopic sleeve gastrectomy    2. Class 1 obesity due to excess calories with serious comorbidity and body mass index (BMI) of 33.0 to 33.9 in adult    3. Binge eating disorder    4. Hypertriglyceridemia    5. Visit for screening mammogram    6. Screen for colon cancer    7. Screening for hyperlipidemia    8. Major depressive disorder with single episode, in full remission (H)      The patient's weight loss result since the last visit was successful based on weight loss. Nutrition improve.  She is not struggling with craving currently.  PDMP reviewed.     - continue vyvanse,  No cost barrier.  Dx: BED.  She has had one binge episode since I last saw her.

## 2023-01-26 NOTE — TELEPHONE ENCOUNTER
Completed form faxed to [employer] at [phone].  Original placed in scan basket to be scanning.

## 2023-01-30 ENCOUNTER — HEALTH MAINTENANCE LETTER (OUTPATIENT)
Age: 43
End: 2023-01-30

## 2023-02-06 ENCOUNTER — ANCILLARY PROCEDURE (OUTPATIENT)
Dept: MAMMOGRAPHY | Facility: HOSPITAL | Age: 43
End: 2023-02-06
Attending: FAMILY MEDICINE
Payer: COMMERCIAL

## 2023-02-06 ENCOUNTER — MYC MEDICAL ADVICE (OUTPATIENT)
Dept: FAMILY MEDICINE | Facility: CLINIC | Age: 43
End: 2023-02-06
Payer: COMMERCIAL

## 2023-02-06 DIAGNOSIS — T75.3XXA MOTION SICKNESS, INITIAL ENCOUNTER: ICD-10-CM

## 2023-02-06 DIAGNOSIS — T75.3XXA MOTION SICKNESS: Primary | ICD-10-CM

## 2023-02-06 DIAGNOSIS — Z12.31 VISIT FOR SCREENING MAMMOGRAM: ICD-10-CM

## 2023-02-06 PROCEDURE — 77067 SCR MAMMO BI INCL CAD: CPT

## 2023-02-07 RX ORDER — SCOLOPAMINE TRANSDERMAL SYSTEM 1 MG/1
1 PATCH, EXTENDED RELEASE TRANSDERMAL
Qty: 3 PATCH | Refills: 0 | Status: SHIPPED | OUTPATIENT
Start: 2023-02-07 | End: 2023-05-05

## 2023-02-08 NOTE — PROGRESS NOTES
Assessment:     Diagnoses and all orders for this visit:  Right lumbar radiculopathy  -     PAIN Transforaminal CURT Inj Lumbosacral Right; Future  Chronic right-sided low back pain without sciatica  -     pregabalin (LYRICA) 75 MG capsule; Take 2 capsules (150 mg) by mouth 2 times daily  -     PAIN Transforaminal CURT Inj Lumbosacral Right; Future  DDD (degenerative disc disease), lumbar  -     PAIN Transforaminal CURT Inj Lumbosacral Right; Future  Lumbar foraminal stenosis  -     PAIN Transforaminal CURT Inj Lumbosacral Right; Future     Madeline Szymanski is a 43 year old y.o. female with past medical history significant for  migraine, depressive disorder, , gastric bypass surgery, L5-S1 hemilaminectomy left medial facetectomy foraminotomies and microdiscectomy Dr. Lilly 10/4/2021 who presents today for follow-up regarding:    -Right lumbar radiculopathy L5 dermatomal pattern.     Plan:     A shared decision making plan was used. The patient's values and choices were respected. Prior medical records were reviewed today. The following represents what was discussed and decided upon by the provider and the patient.        -DIAGNOSTIC TESTS: Images were personally reviewed and interpreted.   -- Lumbar spine x-ray 2022 with mild to moderate multilevel degenerative disc changes most advanced at T12-L1 and L5-S1.  -- Cervical spine x-ray post surgery 2022 with artificial disc replacement C5-6, mild disc height loss at C6-7.          --Lumbar MRI 2022 with microdiscectomy and left hemilaminectomy changes at L5-S1 with granulation tissue left paracentral region, moderate left lateral recess and mild right lateral recess stenosis.  2 mm L5-S1 retrolisthesis.  --Cervical MRI 2021 Moderate to Severe spinal stenosis C5-6.   --Lumbar spine flexion-extension x-ray 8/10/2021 shows no spondylolisthesis and no instability.  --Lumbar spine MRI 2021 with L5-S1 mild disc height loss with disc bulge on the  left with osteophytes with mild left foraminal stenosis lateral recess stenosis and S1 compression.    -INTERVENTIONS: Recommend right L5-S1 transforaminal epidural steroid injection to see we can improve right lumbar radiculopathy as she does have degenerative changes at L5-S1, location of previous surgery with lateral recess stenosis and spondylolisthesis.  -If no benefit with injection would recommend updated lumbar MRI.    -MEDICATIONS: Increase Lyrica to 75 mg, 2 tablets twice daily as tolerated.  Discussed side effects of medications and proper use. Patient verbalized understanding.    -PHYSICAL THERAPY: Encourage patient to continue with home exercises prior physical therapy sessions which she is diligent about doing at this time and part of a home physical therapy program, not getting benefit at this point from physical therapy exercises.  Discussed the importance of core strengthening, ROM, stretching exercises with the patient and how each of these entities is important in decreasing pain.  Explained to the patient that the purpose of physical therapy is to teach the patient a home exercise program.  These exercises need to be performed every day in order to decrease pain and prevent future occurrences of pain.        -PATIENT EDUCATION:  Total time of 32 minutes, on the day of service, spent with the patient, reviewing the chart, placing orders, and documenting.   -Today we also discussed the issues related to the current COVID-19 pandemic, the pros and cons of the current treatment plan, the CDC guidelines such as social distancing, washing the hands, and covering the cough.    -FOLLOW UP: Follow-up for injection  Advised to contact clinic if symptoms worsen or change.    Subjective:     Madeline Szymanski is a 43 year old female who presents today for follow-up regarding recurrent right low back pain with right lower extremity pain into the right buttock lateral thigh lateral shin and into the right foot  specifically into the right great toe ongoing for the last couple of weeks, however she has had similar type symptoms in the past.  Currently pain is a 5/10, 3 at its best aggravated with walking and laying down.  Currently denies any left-sided symptoms, denies any recent trips or falls or balance changes, denies any episodes of her right leg giving out on her.    Denies bowel or bladder loss control.    Treatment to Date:  Left L5-S1 hemilaminectomy, medial facetectomy, foraminotomy with L5-S1 left microdiscectomy 10/4/2021 Dr. Lilly.     Bilateral carpal tunnel steroid injection 11/11/2020 and 5/12/2021 with significant benefit.     L5-S1 TFESI 3/21/2018  Lumbar MBB 2/13/2019 with benefit LBP  Lumbar RFA 3/6/2019  Bilateral L5-S1 TFESI 6/3/2020 with benefit LBP.  Preprocedure pain 8/10, post 5/10.  Left S1-S2 TFESI 8/3/2021 with no lasting benefit.  Preprocedure pain 8/10, post 2/10.  Left L5-S1 TFESI 9/1/2021 with minimal lasting benefit.  Preprocedure pain 7/10, post 4/10.    Bilateral L3, L4, L5 RFA 3/24/2022.  Bilateral  carpal tunnel steroid injection 4/19/2022.  Right C5-6 TFESI 5/3/2022  Bilateral occipital nerve block 10/24/2022 with significant relief.  Bilateral occipital nerve block 1/25/2023 with significant relief     Medications:  Gabapentin 300 mg 1 to 2 tablets at bedtime, unable to tolerate during the daytime.  Amitriptyline 75 mg at bedtime for migraines  Flexeril as needed  Tylenol as needed  Lidocaine gel as needed     No benefit with recent Medrol Dosepak.     *Patient unable to tolerate NSAIDs due to GI surgery.    Patient Active Problem List   Diagnosis     Spinal stenosis in cervical region     History of gastric ulcer     Hx of Helicobacter infection     Migraine with aura and without status migrainosus, not intractable     Panic attacks     S/P laparoscopic sleeve gastrectomy     Lumbar radiculopathy     Major depressive disorder with single episode, in full remission (H)     Chronic  insomnia     Class 1 obesity due to excess calories with serious comorbidity and body mass index (BMI) of 33.0 to 33.9 in adult       Current Outpatient Medications   Medication     eletriptan (RELPAX) 40 MG tablet     EPINEPHrine (ANY BX GENERIC EQUIV) 0.3 MG/0.3ML injection 2-pack     FLUoxetine (PROZAC) 40 MG capsule     hydrOXYzine (ATARAX) 25 MG tablet     lidocaine (LIDODERM) 5 % patch     lisdexamfetamine (VYVANSE) 40 MG capsule     omeprazole (PRILOSEC) 40 MG DR capsule     pregabalin (LYRICA) 75 MG capsule     rizatriptan (MAXALT-MLT) 10 MG ODT     scopolamine (TRANSDERM) 1 MG/3DAYS 72 hr patch     topiramate (TOPAMAX) 50 MG tablet     No current facility-administered medications for this visit.       Allergies   Allergen Reactions     Cephalexin Hives     Coconut Flavor Anaphylaxis     Covid-19 (Mrna) Vaccine Anaphylaxis     Pfizer      Prochlorperazine Other (See Comments)     Tremors  When given with metoclopramide, okay by itself       Coconut Fatty Acids      Other reaction(s): Edema     Penicillins Hives       Past Medical History:   Diagnosis Date     Acute and chronic respiratory failure with hypoxia (H) 01/01/2022     Anxiety      Arthritis      Depressive disorder      Gastric band slippage 09/26/2019     History of blood transfusion 04/1978     Kidney infection      Lumbar radiculopathy      Migraine      Migraine      Painless urinary retention due to cauda equina syndrome (H)      Pancreatitis      Panic attack      PONV (postoperative nausea and vomiting)      Spinal stenosis in cervical region      UTI (lower urinary tract infection)         Review of Systems  ROS:  Specifically negative for bowel/bladder dysfunction, balance changes, headache, dizziness, foot drop, fevers, chills, appetite changes, nausea/vomiting, unexplained weight loss. Otherwise 13 systems reviewed are negative. Please see the patient's intake questionnaire from today for details.    Reviewed Social, Family, Past Medical  and Past Surgical history with patient, no significant changes noted since prior visit.     Objective:     /72 (BP Location: Right arm, Patient Position: Sitting)     PHYSICAL EXAMINATION:    --CONSTITUTIONAL: Well developed, well nourished, healthy appearing individual.  --PSYCHIATRIC: Appropriate mood and affect. No difficulty interacting due to temper, social withdrawal, or memory issues.  --SKIN: Lumbar region is dry and intact.   --RESPIRATORY: Normal rhythm and effort. No abnormal accessory muscle breathing patterns noted.   --MUSCULOSKELETAL:  Normal lumbar lordosis noted, no lateral shift.  --GROSS MOTOR: Easily arises from a seated position. Gait is non-antalgic  --LUMBAR SPINE:  Inspection reveals no evidence of deformity. Range of motion is not limited in lumbar flexion, extension, lateral rotation.   --LOWER EXTREMITY MOTOR TESTING:  Plantar flexion left 5/5, right 5/5   Dorsiflexion left 5/5, right 5/5   Great toe MTP extension left 5/5, right 5/5  Knee flexion left 5/5, right 5/5  Knee extension left 5/5, right 5/5   Hip flexion left 5/5, right 5/5  Hip abduction left 5/5, right 5/5  Hip adduction left 5/5, right 5/5   --HIPS: Full range of motion bilaterally.   --NEUROLOGIC: Bilateral patellar and achilles reflexes are physiologic and symmetric. Sensation to light touch is intact in the bilateral L4, L5, and S1 dermatomes.    RESULTS:   Imaging: Spine imaging was reviewed today. The images were shown to the patient and the findings were explained using a spine model.      MR Lumbar Spine w/o Contrast  Result Date: 2/18/2022  EXAM: MR LUMBAR SPINE W/O CONTRAST LOCATION: [facility] DATE/TIME: 2/17/2022 5:17 PM INDICATION: Patient is a 42-year-old female who is status post left lumbar 5-sacral 1 microdiscectomy on 10/4/2021. Since last week. Has a constant dull ache in her right lower back. She underwent a medrol dose pack without relief. COMPARISON: Lumbar spine MRI  10/17/2021. TECHNIQUE: Routine Lumbar Spine MRI without IV contrast. FINDINGS: Nomenclature is based on 5 lumbar type vertebral bodies. The conus ends at mid to distal L2. 1 mm retrolisthesis of L1 on L2. 2 mm retrolisthesis of L5 on S1. Vertebral body heights are maintained. Nonpathologic marrow signal. No MRI evidence for pars defects. Compared to the previous lumbar spine MRI, there has been interval moderate decrease in the previously noted edema involving the left L5-S1 laminectomy site and posterior paraspinal soft tissues. Normal diameter of the distal abdominal aorta. The visualized bony pelvis is grossly within normal limits. T11-T12: Mild to moderate intervertebral disc height loss. Loss of the normal T2 signal within the disc. Small broad-based disc bulge with superimposed small central disc protrusion. Mild bilateral facet arthropathy. Mild spinal canal narrowing. No neural foraminal narrowing. T12-L1: Mild intervertebral disc height loss. Loss of the normal T2 signal within the disc. Small broad-based disc bulge. Mild bilateral facet arthropathy. No spinal canal narrowing. No neural foraminal narrowing. L1-L2: Mild intervertebral disc height loss. Loss of the normal T2 signal within the disc. Small broad-based disc bulge. Moderate bilateral facet arthropathy. No spinal canal narrowing. No neural foraminal narrowing. L2-L3: Normal intervertebral disc height and signal. Shallow broad-based disc bulge. Moderate bilateral facet arthropathy. No spinal canal narrowing. No neural foraminal narrowing. L3-L4: Normal intervertebral disc height and signal. Small broad-based disc bulge. Moderate bilateral facet arthropathy. No spinal canal narrowing. No neural foraminal narrowing. L4-L5: Normal intervertebral disc height and signal. Small broad-based disc bulge. Moderate bilateral facet arthropathy. No spinal canal narrowing. No neural foraminal narrowing. L5-S1: Mild intervertebral disc height loss. Loss of the  normal T2 signal within the disc. Small broad-based disc bulge with superimposed small left paracentral/foraminal disc protrusion. Moderate bilateral facet arthropathy. Microdiscectomy changes. Left hemilaminectomy changes. Small amount of presumed granulation tissue at the microdiscectomy changes in the left paracentral region and along the left hemilaminectomy. Moderate narrowing of the left lateral recess. Mild narrowing of the right lateral  recess. No spinal canal narrowing. No right neural foraminal narrowing. Mild left neural foraminal narrowing.   IMPRESSION:   1.  Compared to the previous lumbar spine MRI 10/17/2021, there has been interval moderate decrease in the previously noted edema involving the left L5-S1 laminectomy site and posterior paraspinal soft tissues.   2.  Small amount of presumed granulation tissue at the microdiscectomy changes in the left paracentral region and along the left hemilaminectomy.   3.  Moderate narrowing of the left lateral recess and mild narrowing of the right lateral recess at L5-S1. These probably contact the bilateral traversing S1 nerve roots, left greater than right   4.  No high-grade spinal canal or neural foraminal narrowing.   5.  Mild narrowing of the left lateral recess at L5-S1.        Cervical spine MRI w/o contrast  Result Date: 11/24/2021  EXAM: MR CERVICAL SPINE W/O CONTRAST LOCATION: [facility] DATE/TIME: 11/24/2021 7:50 PM INDICATION: Neck trauma, focal neuro deficit or paresthesia (Age 16-64y) COMPARISON: MRI cervical spine dated 08/07/2021. TECHNIQUE: MRI Cervical Spine without IV contrast. FINDINGS: Normal vertebral body heights, alignment and marrow signal. No abnormal cord signal. No extraspinal abnormality. Craniovertebral junction and C1-C2: Normal. C2-C3: Normal disc height. No herniation. Normal facets. No spinal canal or neural foraminal stenosis. C3-C4: Normal disc height. No herniation. Normal facets. No spinal canal  or neural foraminal stenosis. C4-C5: Slight loss of disc height. No herniation. Mild facet arthropathy. No spinal canal or neural foraminal stenosis. C5-C6: Moderate loss of disc height. Moderate diffuse disc osteophyte complex asymmetric to left. No herniation. Mild to moderate left paracentral spinal stenosis. Neural foraminal stenosis. C6-C7: Normal disc height. No herniation. Normal facets. No spinal canal or neural foraminal stenosis. C7-T1: Normal disc height. No herniation. Normal facets. No spinal canal or neural foraminal stenosis.   IMPRESSION:   1.  Mild to moderate left paracentral spinal stenosis at C5-C6 secondary to eccentric disc osteophyte complex. Finding is stable/unchanged compared to 08/07/2021.   2.  No acute abnormalities or other significant degenerative abnormalities identified.         CT Head w/o Contrast  Result Date: 11/24/2021  EXAM: CT HEAD W/O CONTRAST LOCATION: [facility] DATE/TIME: 11/24/2021 4:55 PM INDICATION: Head injury. COMPARISON: Head CT 05/01/2019. TECHNIQUE: Routine CT Head without IV contrast. Multiplanar reformats. Dose reduction techniques were used. FINDINGS: INTRACRANIAL CONTENTS: No intracranial hemorrhage, extraaxial collection, or mass effect.  No CT evidence of acute infarct. Normal parenchymal attenuation. Normal ventricles and sulci. VISUALIZED ORBITS/SINUSES/MASTOIDS: No intraorbital abnormality. No paranasal sinus mucosal disease. No middle ear or mastoid effusion. BONES/SOFT TISSUES: No acute abnormality.   IMPRESSION: 1.  No acute intracranial abnormality.         XR Cervical Spine G/E 4 Views  Result Date: 8/11/2021  CERVICAL SPINE FOUR OR MORE VIEWS August 10, 2021 5:20 PM HISTORY: Neck pain. COMPARISON: MRI of the cervical spine dated 8/7/2021.   IMPRESSION: No gross vertebral body height loss identified. Alignment is within normal limits. On flexion and extension views, no significant dynamic spondylolisthesis identified.  Multilevel degenerative disc disease, including mild-to-moderate disc space narrowing at C5-C6 and C6-C7. Small multilevel anterior endplate osteophytes. The prevertebral soft tissues appear normal. JUNIOR MARTINEZ MD   SYSTEM ID:  [id number]        XR Lumbar Spine G/E 4 Views  Result Date: 8/11/2021  LUMBAR SPINE FOUR OR MORE VIEWS August 10, 2021 5:15 PM HISTORY: Lumbar radiculopathy. COMPARISON: MRI lumbar spine 8/6/2021. CT abdomen and pelvis 5/19/2021.   IMPRESSION: Nomenclature is based on five lumbar vertebral bodies. There appears to be mild dextroconvex curvature at the thoracolumbar junction. Alignment otherwise appears within normal limits. No significant dynamic spondylolisthesis identified on flexion-extension views. No gross vertebral body height loss. Mild multilevel degenerative disc space narrowing with small anterior marginal endplate osteophytes. Mild lower lumbar degenerative facet arthropathy. There is a suture material noted in the left upper quadrant of the abdomen, as before. JUNIOR MARTINEZ MD   SYSTEM ID:  [id number]        MR Cervical Spine w/o Contrast  Result Date: 8/7/2021  EXAM: MR CERVICAL SPINE W/O CONTRAST LOCATION: [facility] DATE/TIME: 8/7/2021 1:19 AM INDICATION: Spinal stenosis, C-spine COMPARISON: Thoracic spine MRI 8/6/2021 TECHNIQUE: MRI Cervical Spine without IV contrast. FINDINGS: Vertebral body height and alignment is preserved. Moderate Modic type I changes anteriorly at T3-4. No definite abnormal signal in the cervical spine. No abnormal cord signal. No extraspinal abnormality. Craniovertebral junction and C1-C2: Normal. C2-C3: Normal disc height. No herniation. Normal facets. No spinal canal or neural foraminal stenosis. C3-C4: Normal disc height. No herniation. Normal facets. No spinal canal or neural foraminal stenosis. C4-C5: Slight loss of disc height. Slight facet arthropathy. No spinal canal or neural foraminal stenosis. C5-C6: Moderate  loss of disc height. Mild to moderate diffuse disc osteophyte complex, asymmetric to the left. Slight left uncinate spurring. Moderate to severe central canal stenosis to the left of midline. No definite foraminal narrowing. C6-C7: Normal disc height. No herniation. Normal facets. No spinal canal or neural foraminal stenosis. C7-T1: Normal disc height. No herniation. Normal facets. No spinal canal or neural foraminal stenosis.   IMPRESSION:   1.  At C5-6 there is moderate to severe central canal stenosis to the left of midline secondary to an asymmetric left disc osteophyte complex. No abnormal cord signal.   2.  Minimal degenerative changes elsewhere as described.      Lumbar spine MRI w/o contrast-presurgical  Result Date: 8/6/2021  MRI LUMBAR SPINE WITHOUT CONTRAST   8/6/2021 12:23 PM HISTORY: Low back pain, cauda equina syndrome suspected; Saddle anesthesia, unable to urinate and low back pain radiating down left leg with foot numbness. TECHNIQUE: Multiplanar multisequence MRI of the lumbar spine without contrast. COMPARISON: None. FINDINGS: Nomenclature is based on 5 lumbar vertebral bodies. Normal vertebral body heights and sagittal alignment. Unremarkable bone marrow signal. Varying degrees of multilevel intervertebral disc desiccation, most pronounced at L5-S1. Normal appearance of the distal spinal cord with the conus terminating at L2. There is a T2 hyperintense ovoid lesion in the right kidney measuring up to approximately 14 mm (series 801 image 18) incompletely characterized, but presumably representing a cyst. Otherwise, the visualized paraspinous soft tissues and bony pelvis are unremarkable. Segmental analysis: T12-L1: Minimal disc height loss. No herniation. Normal facets. No spinal canal or neural foraminal stenosis. L1-L2: Minimal disc height loss. No herniation. Normal facets. No spinal canal or neural foraminal stenosis. L2-L3: Normal disc height. No herniation. Normal facets. No spinal canal or  neural foraminal stenosis. L3-L4: Normal disc height. No herniation. Normal facets. No spinal canal or neural foraminal stenosis. L4-L5: Normal disc height. No herniation. Normal facets. No spinal canal or neural foraminal stenosis. L5-S1: Mild disc height loss. Disc bulge slightly eccentric to the left with posterior/posterolateral endplate marginal osteophytes. Mild facet arthropathy. Mild bilateral, left greater than right, lateral recess stenosis without displacement or overt compression of the traversing S1 nerve roots. No central spinal canal stenosis. Mild left neural foraminal narrowing. The right neural foramen is not significantly narrowed.   IMPRESSION:   1. Multilevel degenerative changes of the lumbar spine, as described.   2. No high-grade spinal canal stenosis.   3. Mild left greater than right lateral recess stenosis at L5-S1.   4. Mild left neural foraminal stenosis at L5-S1. Otherwise, no significant neural foraminal narrowing. JUNIOR MARTINEZ MD   SYSTEM ID:  [id number]     MR Thoracic Spine w/o Contrast  Result Date: 8/7/2021  EXAM: MR THORACIC SPINE W/O CONTRAST LOCATION: [facility] DATE/TIME: 8/6/2021 11:23 PM INDICATION: Low back pain, leg pain, urinary retention, saddle anesthesia. COMPARISON: Lumbar spine MRI dated 8/6/2021. TECHNIQUE: Routine Thoracic Spine MRI without IV contrast. FINDINGS: Partially visualized disc osteophyte complex at C5-C6 indents the ventral spinal cord. Question cord edema in the lower cervical spine cord, only seen on the first few images of the axial T2 sequence. Slight left apex curvature centered in the lower thoracic spine. There is accentuation of the normal thoracic spine kyphosis, with degenerative endplate changes and Schmorl's nodes, suggestive of Scheuermann's disease. There are Modic type I degenerative  endplate changes at T3-T4. No evidence of an acute compression deformity. There are multilevel disc protrusions with a small  disc protrusion at C7-T1, T1-T2, a central disc extrusion at T3-T4 that extends above and below the intervertebral disc space measuring 12 mm in craniocaudal dimension, at T7-T8, T10-T11, and T11-T12. There is no high grade canal stenosis. Multilevel facet arthropathy. There is no high grade neural foraminal narrowing. No definite cord signal abnormality within the thoracic spinal cord. Small bilateral pleural effusions.   IMPRESSION:   1.  Degenerative changes of the thoracic spine with findings suggestive of Scheuermann's disease.   2.  No high grade spinal canal or neural foraminal narrowing in the thoracic spine.   3.  Partially visualized disc osteophyte complex at C5-C6 that indents the ventral spinal cord. In addition there is questionable cord signal change in the lower cervical spine, which is incompletely evaluated on the current exam. Consider further evaluation with dedicated cervical spine MRI. Findings were discussed with Dr. Robbins at 12:24 AM on 08/07/2021.

## 2023-02-09 ENCOUNTER — OFFICE VISIT (OUTPATIENT)
Dept: PHYSICAL MEDICINE AND REHAB | Facility: CLINIC | Age: 43
End: 2023-02-09
Payer: COMMERCIAL

## 2023-02-09 VITALS — SYSTOLIC BLOOD PRESSURE: 136 MMHG | DIASTOLIC BLOOD PRESSURE: 72 MMHG

## 2023-02-09 DIAGNOSIS — M48.061 LUMBAR FORAMINAL STENOSIS: ICD-10-CM

## 2023-02-09 DIAGNOSIS — M51.369 DDD (DEGENERATIVE DISC DISEASE), LUMBAR: ICD-10-CM

## 2023-02-09 DIAGNOSIS — M54.50 CHRONIC RIGHT-SIDED LOW BACK PAIN WITHOUT SCIATICA: ICD-10-CM

## 2023-02-09 DIAGNOSIS — G89.29 CHRONIC RIGHT-SIDED LOW BACK PAIN WITHOUT SCIATICA: ICD-10-CM

## 2023-02-09 DIAGNOSIS — M54.16 RIGHT LUMBAR RADICULOPATHY: Primary | ICD-10-CM

## 2023-02-09 PROCEDURE — 99214 OFFICE O/P EST MOD 30 MIN: CPT | Performed by: NURSE PRACTITIONER

## 2023-02-09 RX ORDER — PREGABALIN 75 MG/1
150 CAPSULE ORAL 2 TIMES DAILY
Qty: 120 CAPSULE | Refills: 3 | Status: SHIPPED | OUTPATIENT
Start: 2023-02-09 | End: 2023-06-12

## 2023-02-09 ASSESSMENT — PAIN SCALES - GENERAL: PAINLEVEL: MODERATE PAIN (5)

## 2023-02-09 NOTE — PATIENT INSTRUCTIONS
~Please call our [facility] Nurse Navigation line [phone] with any questions or concerns about your treatment plan, if symptoms worsen and you would like to be seen urgently, or if you have problems controlling bladder and bowel function.  ~Please note that any My Chart messages may take multiple days for a response due to the high volume of patients seen in clinic.   Anything sent Thursday night or after will be answered the following week when able, as Esme Hodgson CNP does not work in clinic on Fridays.        Importance of specialized Physical Therapy: Discussed the importance of core strengthening, ROM, stretching exercises with the patient and how each of these entities is important in decreasing pain.  Explained to the patient that the purpose of physical therapy is to teach the patient a home exercise program individualized to them and their specific health concerns.  These exercises need to be performed every day in order to decrease pain and prevent future occurrences of pain.           An injection has been ordered today to potentially help with your pain symptoms. These injections do not fix what is going on in your back, therefore they typically do not take away the pain completely, however they can many times help improve symptoms. Injections should always be completed along with other modalities such as physical therapy for the best long term outcomes. If injections alone are done, then pain will likely return.     [facility] Injection Requirements    A  is required for all fluoroscopically-guided injections.  Injection appointments may be cancelled if there are signs/symptoms of an active infection or if the patient is being actively treated with antibiotics for a diagnosed infection.  Patients may have their steroid injection cancelled if they have had another steroid injection within 2 weeks.  Diabetic patients will have their blood glucose  levels checked the day of their injection and the appointment will be rescheduled if the blood glucose level is 300 or higher.  Patients with allergies to cortisone, local anesthetics, iodine, or contrast dye should contact the [facility] to further discuss these considerations.  Patients scheduled for medial branch block diagnostic injections should refrain from taking pain medication the day of the procedure.  The medial branch block injection appointment will be rescheduled if the patient's pain rating is not 5/10 or greater at the time of the procedure.  Patients taking warfarin/Coumadin will have their INR checked the day of the procedure and the procedure may be rescheduled if the INR is greater than 3.0.  Please contact the [facility] [phone]) if you are taking any prescription blood-thinning medications (warfarin, Plavix, Lovenox, Eliquis, Brilinta, Effient, etc.) as special dosing adjustments may need to be made depending on the type of injection you are scheduled to receive.  It is recommended that you delay having your steroid injection if you have received a flu shot or shingles vaccine within 2 weeks.

## 2023-02-09 NOTE — LETTER
2023         RE: Madeline Szymanski  [facility] [address]        Dear Colleague,    Thank you for referring your patient, Madeline Szymanski, to the [facility]. Please see a copy of my visit note below.      Assessment:     Diagnoses and all orders for this visit:  Right lumbar radiculopathy  -     PAIN Transforaminal CURT Inj Lumbosacral Right; Future  Chronic right-sided low back pain without sciatica  -     pregabalin (LYRICA) 75 MG capsule; Take 2 capsules (150 mg) by mouth 2 times daily  -     PAIN Transforaminal CURT Inj Lumbosacral Right; Future  DDD (degenerative disc disease), lumbar  -     PAIN Transforaminal CURT Inj Lumbosacral Right; Future  Lumbar foraminal stenosis  -     PAIN Transforaminal CURT Inj Lumbosacral Right; Future     Madeline Szymanski is a 43 year old y.o. female with past medical history significant for  migraine, depressive disorder, , gastric bypass surgery, L5-S1 hemilaminectomy left medial facetectomy foraminotomies and microdiscectomy Dr. Lilly 10/4/2021 who presents today for follow-up regarding:    -Right lumbar radiculopathy L5 dermatomal pattern.     Plan:     A shared decision making plan was used. The patient's values and choices were respected. Prior medical records were reviewed today. The following represents what was discussed and decided upon by the provider and the patient.        -DIAGNOSTIC TESTS: Images were personally reviewed and interpreted.   -- Lumbar spine x-ray 2022 with mild to moderate multilevel degenerative disc changes most advanced at T12-L1 and L5-S1.  -- Cervical spine x-ray post surgery 2022 with artificial disc replacement C5-6, mild disc height loss at C6-7.          --Lumbar MRI 2022 with microdiscectomy and left hemilaminectomy changes at L5-S1 with granulation tissue left paracentral region, moderate left lateral recess and mild right lateral recess stenosis.  2 mm L5-S1  retrolisthesis.  --Cervical MRI 8/7/2021 Moderate to Severe spinal stenosis C5-6.   --Lumbar spine flexion-extension x-ray 8/10/2021 shows no spondylolisthesis and no instability.  --Lumbar spine MRI 8/6/2021 with L5-S1 mild disc height loss with disc bulge on the left with osteophytes with mild left foraminal stenosis lateral recess stenosis and S1 compression.    -INTERVENTIONS: Recommend right L5-S1 transforaminal epidural steroid injection to see we can improve right lumbar radiculopathy as she does have degenerative changes at L5-S1, location of previous surgery with lateral recess stenosis and spondylolisthesis.  -If no benefit with injection would recommend updated lumbar MRI.    -MEDICATIONS: Increase Lyrica to 75 mg, 2 tablets twice daily as tolerated.  Discussed side effects of medications and proper use. Patient verbalized understanding.    -PHYSICAL THERAPY: Encourage patient to continue with home exercises prior physical therapy sessions which she is diligent about doing at this time and part of a home physical therapy program, not getting benefit at this point from physical therapy exercises.  Discussed the importance of core strengthening, ROM, stretching exercises with the patient and how each of these entities is important in decreasing pain.  Explained to the patient that the purpose of physical therapy is to teach the patient a home exercise program.  These exercises need to be performed every day in order to decrease pain and prevent future occurrences of pain.        -PATIENT EDUCATION:  Total time of 32 minutes, on the day of service, spent with the patient, reviewing the chart, placing orders, and documenting.   -Today we also discussed the issues related to the current COVID-19 pandemic, the pros and cons of the current treatment plan, the CDC guidelines such as social distancing, washing the hands, and covering the cough.    -FOLLOW UP: Follow-up for injection  Advised to contact clinic if  symptoms worsen or change.    Subjective:     Madeline Szymanski is a 43 year old female who presents today for follow-up regarding recurrent right low back pain with right lower extremity pain into the right buttock lateral thigh lateral shin and into the right foot specifically into the right great toe ongoing for the last couple of weeks, however she has had similar type symptoms in the past.  Currently pain is a 5/10, 3 at its best aggravated with walking and laying down.  Currently denies any left-sided symptoms, denies any recent trips or falls or balance changes, denies any episodes of her right leg giving out on her.    Denies bowel or bladder loss control.    Treatment to Date:  Left L5-S1 hemilaminectomy, medial facetectomy, foraminotomy with L5-S1 left microdiscectomy 10/4/2021 Dr. Lilly.     Bilateral carpal tunnel steroid injection 11/11/2020 and 5/12/2021 with significant benefit.     L5-S1 TFESI 3/21/2018  Lumbar MBB 2/13/2019 with benefit LBP  Lumbar RFA 3/6/2019  Bilateral L5-S1 TFESI 6/3/2020 with benefit LBP.  Preprocedure pain 8/10, post 5/10.  Left S1-S2 TFESI 8/3/2021 with no lasting benefit.  Preprocedure pain 8/10, post 2/10.  Left L5-S1 TFESI 9/1/2021 with minimal lasting benefit.  Preprocedure pain 7/10, post 4/10.    Bilateral L3, L4, L5 RFA 3/24/2022.  Bilateral  carpal tunnel steroid injection 4/19/2022.  Right C5-6 TFESI 5/3/2022  Bilateral occipital nerve block 10/24/2022 with significant relief.  Bilateral occipital nerve block 1/25/2023 with significant relief     Medications:  Gabapentin 300 mg 1 to 2 tablets at bedtime, unable to tolerate during the daytime.  Amitriptyline 75 mg at bedtime for migraines  Flexeril as needed  Tylenol as needed  Lidocaine gel as needed     No benefit with recent Medrol Dosepak.     *Patient unable to tolerate NSAIDs due to GI surgery.    Patient Active Problem List   Diagnosis     Spinal stenosis in cervical region     History of gastric ulcer     Hx of  Helicobacter infection     Migraine with aura and without status migrainosus, not intractable     Panic attacks     S/P laparoscopic sleeve gastrectomy     Lumbar radiculopathy     Major depressive disorder with single episode, in full remission (H)     Chronic insomnia     Class 1 obesity due to excess calories with serious comorbidity and body mass index (BMI) of 33.0 to 33.9 in adult       Current Outpatient Medications   Medication     eletriptan (RELPAX) 40 MG tablet     EPINEPHrine (ANY BX GENERIC EQUIV) 0.3 MG/0.3ML injection 2-pack     FLUoxetine (PROZAC) 40 MG capsule     hydrOXYzine (ATARAX) 25 MG tablet     lidocaine (LIDODERM) 5 % patch     lisdexamfetamine (VYVANSE) 40 MG capsule     omeprazole (PRILOSEC) 40 MG DR capsule     pregabalin (LYRICA) 75 MG capsule     rizatriptan (MAXALT-MLT) 10 MG ODT     scopolamine (TRANSDERM) 1 MG/3DAYS 72 hr patch     topiramate (TOPAMAX) 50 MG tablet     No current facility-administered medications for this visit.       Allergies   Allergen Reactions     Cephalexin Hives     Coconut Flavor Anaphylaxis     Covid-19 (Mrna) Vaccine Anaphylaxis     Pfizer      Prochlorperazine Other (See Comments)     Tremors  When given with metoclopramide, okay by itself       Coconut Fatty Acids      Other reaction(s): Edema     Penicillins Hives       Past Medical History:   Diagnosis Date     Acute and chronic respiratory failure with hypoxia (H) 01/01/2022     Anxiety      Arthritis      Depressive disorder      Gastric band slippage 09/26/2019     History of blood transfusion 04/1978     Kidney infection      Lumbar radiculopathy      Migraine      Migraine      Painless urinary retention due to cauda equina syndrome (H)      Pancreatitis      Panic attack      PONV (postoperative nausea and vomiting)      Spinal stenosis in cervical region      UTI (lower urinary tract infection)         Review of Systems  ROS:  Specifically negative for bowel/bladder dysfunction, balance changes,  headache, dizziness, foot drop, fevers, chills, appetite changes, nausea/vomiting, unexplained weight loss. Otherwise 13 systems reviewed are negative. Please see the patient's intake questionnaire from today for details.    Reviewed Social, Family, Past Medical and Past Surgical history with patient, no significant changes noted since prior visit.     Objective:     /72 (BP Location: Right arm, Patient Position: Sitting)     PHYSICAL EXAMINATION:    --CONSTITUTIONAL: Well developed, well nourished, healthy appearing individual.  --PSYCHIATRIC: Appropriate mood and affect. No difficulty interacting due to temper, social withdrawal, or memory issues.  --SKIN: Lumbar region is dry and intact.   --RESPIRATORY: Normal rhythm and effort. No abnormal accessory muscle breathing patterns noted.   --MUSCULOSKELETAL:  Normal lumbar lordosis noted, no lateral shift.  --GROSS MOTOR: Easily arises from a seated position. Gait is non-antalgic  --LUMBAR SPINE:  Inspection reveals no evidence of deformity. Range of motion is not limited in lumbar flexion, extension, lateral rotation.   --LOWER EXTREMITY MOTOR TESTING:  Plantar flexion left 5/5, right 5/5   Dorsiflexion left 5/5, right 5/5   Great toe MTP extension left 5/5, right 5/5  Knee flexion left 5/5, right 5/5  Knee extension left 5/5, right 5/5   Hip flexion left 5/5, right 5/5  Hip abduction left 5/5, right 5/5  Hip adduction left 5/5, right 5/5   --HIPS: Full range of motion bilaterally.   --NEUROLOGIC: Bilateral patellar and achilles reflexes are physiologic and symmetric. Sensation to light touch is intact in the bilateral L4, L5, and S1 dermatomes.    RESULTS:   Imaging: Spine imaging was reviewed today. The images were shown to the patient and the findings were explained using a spine model.      MR Lumbar Spine w/o Contrast  Result Date: 2/18/2022  EXAM: MR LUMBAR SPINE W/O CONTRAST LOCATION: [facility] DATE/TIME: 2/17/2022 5:17 PM  INDICATION: Patient is a 42-year-old female who is status post left lumbar 5-sacral 1 microdiscectomy on 10/4/2021. Since last week. Has a constant dull ache in her right lower back. She underwent a medrol dose pack without relief. COMPARISON: Lumbar spine MRI 10/17/2021. TECHNIQUE: Routine Lumbar Spine MRI without IV contrast. FINDINGS: Nomenclature is based on 5 lumbar type vertebral bodies. The conus ends at mid to distal L2. 1 mm retrolisthesis of L1 on L2. 2 mm retrolisthesis of L5 on S1. Vertebral body heights are maintained. Nonpathologic marrow signal. No MRI evidence for pars defects. Compared to the previous lumbar spine MRI, there has been interval moderate decrease in the previously noted edema involving the left L5-S1 laminectomy site and posterior paraspinal soft tissues. Normal diameter of the distal abdominal aorta. The visualized bony pelvis is grossly within normal limits. T11-T12: Mild to moderate intervertebral disc height loss. Loss of the normal T2 signal within the disc. Small broad-based disc bulge with superimposed small central disc protrusion. Mild bilateral facet arthropathy. Mild spinal canal narrowing. No neural foraminal narrowing. T12-L1: Mild intervertebral disc height loss. Loss of the normal T2 signal within the disc. Small broad-based disc bulge. Mild bilateral facet arthropathy. No spinal canal narrowing. No neural foraminal narrowing. L1-L2: Mild intervertebral disc height loss. Loss of the normal T2 signal within the disc. Small broad-based disc bulge. Moderate bilateral facet arthropathy. No spinal canal narrowing. No neural foraminal narrowing. L2-L3: Normal intervertebral disc height and signal. Shallow broad-based disc bulge. Moderate bilateral facet arthropathy. No spinal canal narrowing. No neural foraminal narrowing. L3-L4: Normal intervertebral disc height and signal. Small broad-based disc bulge. Moderate bilateral facet arthropathy. No spinal canal narrowing. No  neural foraminal narrowing. L4-L5: Normal intervertebral disc height and signal. Small broad-based disc bulge. Moderate bilateral facet arthropathy. No spinal canal narrowing. No neural foraminal narrowing. L5-S1: Mild intervertebral disc height loss. Loss of the normal T2 signal within the disc. Small broad-based disc bulge with superimposed small left paracentral/foraminal disc protrusion. Moderate bilateral facet arthropathy. Microdiscectomy changes. Left hemilaminectomy changes. Small amount of presumed granulation tissue at the microdiscectomy changes in the left paracentral region and along the left hemilaminectomy. Moderate narrowing of the left lateral recess. Mild narrowing of the right lateral  recess. No spinal canal narrowing. No right neural foraminal narrowing. Mild left neural foraminal narrowing.   IMPRESSION:   1.  Compared to the previous lumbar spine MRI 10/17/2021, there has been interval moderate decrease in the previously noted edema involving the left L5-S1 laminectomy site and posterior paraspinal soft tissues.   2.  Small amount of presumed granulation tissue at the microdiscectomy changes in the left paracentral region and along the left hemilaminectomy.   3.  Moderate narrowing of the left lateral recess and mild narrowing of the right lateral recess at L5-S1. These probably contact the bilateral traversing S1 nerve roots, left greater than right   4.  No high-grade spinal canal or neural foraminal narrowing.   5.  Mild narrowing of the left lateral recess at L5-S1.        Cervical spine MRI w/o contrast  Result Date: 11/24/2021  EXAM: MR CERVICAL SPINE W/O CONTRAST LOCATION: [facility] DATE/TIME: 11/24/2021 7:50 PM INDICATION: Neck trauma, focal neuro deficit or paresthesia (Age 16-64y) COMPARISON: MRI cervical spine dated 08/07/2021. TECHNIQUE: MRI Cervical Spine without IV contrast. FINDINGS: Normal vertebral body heights, alignment and marrow signal. No  abnormal cord signal. No extraspinal abnormality. Craniovertebral junction and C1-C2: Normal. C2-C3: Normal disc height. No herniation. Normal facets. No spinal canal or neural foraminal stenosis. C3-C4: Normal disc height. No herniation. Normal facets. No spinal canal or neural foraminal stenosis. C4-C5: Slight loss of disc height. No herniation. Mild facet arthropathy. No spinal canal or neural foraminal stenosis. C5-C6: Moderate loss of disc height. Moderate diffuse disc osteophyte complex asymmetric to left. No herniation. Mild to moderate left paracentral spinal stenosis. Neural foraminal stenosis. C6-C7: Normal disc height. No herniation. Normal facets. No spinal canal or neural foraminal stenosis. C7-T1: Normal disc height. No herniation. Normal facets. No spinal canal or neural foraminal stenosis.   IMPRESSION:   1.  Mild to moderate left paracentral spinal stenosis at C5-C6 secondary to eccentric disc osteophyte complex. Finding is stable/unchanged compared to 08/07/2021.   2.  No acute abnormalities or other significant degenerative abnormalities identified.         CT Head w/o Contrast  Result Date: 11/24/2021  EXAM: CT HEAD W/O CONTRAST LOCATION: [facility] DATE/TIME: 11/24/2021 4:55 PM INDICATION: Head injury. COMPARISON: Head CT 05/01/2019. TECHNIQUE: Routine CT Head without IV contrast. Multiplanar reformats. Dose reduction techniques were used. FINDINGS: INTRACRANIAL CONTENTS: No intracranial hemorrhage, extraaxial collection, or mass effect.  No CT evidence of acute infarct. Normal parenchymal attenuation. Normal ventricles and sulci. VISUALIZED ORBITS/SINUSES/MASTOIDS: No intraorbital abnormality. No paranasal sinus mucosal disease. No middle ear or mastoid effusion. BONES/SOFT TISSUES: No acute abnormality.   IMPRESSION: 1.  No acute intracranial abnormality.         XR Cervical Spine G/E 4 Views  Result Date: 8/11/2021  CERVICAL SPINE FOUR OR MORE VIEWS August 10, 2021  5:20 PM HISTORY: Neck pain. COMPARISON: MRI of the cervical spine dated 8/7/2021.   IMPRESSION: No gross vertebral body height loss identified. Alignment is within normal limits. On flexion and extension views, no significant dynamic spondylolisthesis identified. Multilevel degenerative disc disease, including mild-to-moderate disc space narrowing at C5-C6 and C6-C7. Small multilevel anterior endplate osteophytes. The prevertebral soft tissues appear normal. JUNIOR MARTINEZ MD   SYSTEM ID:  [id number]        XR Lumbar Spine G/E 4 Views  Result Date: 8/11/2021  LUMBAR SPINE FOUR OR MORE VIEWS August 10, 2021 5:15 PM HISTORY: Lumbar radiculopathy. COMPARISON: MRI lumbar spine 8/6/2021. CT abdomen and pelvis 5/19/2021.   IMPRESSION: Nomenclature is based on five lumbar vertebral bodies. There appears to be mild dextroconvex curvature at the thoracolumbar junction. Alignment otherwise appears within normal limits. No significant dynamic spondylolisthesis identified on flexion-extension views. No gross vertebral body height loss. Mild multilevel degenerative disc space narrowing with small anterior marginal endplate osteophytes. Mild lower lumbar degenerative facet arthropathy. There is a suture material noted in the left upper quadrant of the abdomen, as before. JUNIOR MARTINEZ MD   SYSTEM ID:  [id number]        MR Cervical Spine w/o Contrast  Result Date: 8/7/2021  EXAM: MR CERVICAL SPINE W/O CONTRAST LOCATION: [facility] DATE/TIME: 8/7/2021 1:19 AM INDICATION: Spinal stenosis, C-spine COMPARISON: Thoracic spine MRI 8/6/2021 TECHNIQUE: MRI Cervical Spine without IV contrast. FINDINGS: Vertebral body height and alignment is preserved. Moderate Modic type I changes anteriorly at T3-4. No definite abnormal signal in the cervical spine. No abnormal cord signal. No extraspinal abnormality. Craniovertebral junction and C1-C2: Normal. C2-C3: Normal disc height. No herniation. Normal facets. No spinal  canal or neural foraminal stenosis. C3-C4: Normal disc height. No herniation. Normal facets. No spinal canal or neural foraminal stenosis. C4-C5: Slight loss of disc height. Slight facet arthropathy. No spinal canal or neural foraminal stenosis. C5-C6: Moderate loss of disc height. Mild to moderate diffuse disc osteophyte complex, asymmetric to the left. Slight left uncinate spurring. Moderate to severe central canal stenosis to the left of midline. No definite foraminal narrowing. C6-C7: Normal disc height. No herniation. Normal facets. No spinal canal or neural foraminal stenosis. C7-T1: Normal disc height. No herniation. Normal facets. No spinal canal or neural foraminal stenosis.   IMPRESSION:   1.  At C5-6 there is moderate to severe central canal stenosis to the left of midline secondary to an asymmetric left disc osteophyte complex. No abnormal cord signal.   2.  Minimal degenerative changes elsewhere as described.      Lumbar spine MRI w/o contrast-presurgical  Result Date: 8/6/2021  MRI LUMBAR SPINE WITHOUT CONTRAST   8/6/2021 12:23 PM HISTORY: Low back pain, cauda equina syndrome suspected; Saddle anesthesia, unable to urinate and low back pain radiating down left leg with foot numbness. TECHNIQUE: Multiplanar multisequence MRI of the lumbar spine without contrast. COMPARISON: None. FINDINGS: Nomenclature is based on 5 lumbar vertebral bodies. Normal vertebral body heights and sagittal alignment. Unremarkable bone marrow signal. Varying degrees of multilevel intervertebral disc desiccation, most pronounced at L5-S1. Normal appearance of the distal spinal cord with the conus terminating at L2. There is a T2 hyperintense ovoid lesion in the right kidney measuring up to approximately 14 mm (series 801 image 18) incompletely characterized, but presumably representing a cyst. Otherwise, the visualized paraspinous soft tissues and bony pelvis are unremarkable. Segmental analysis: T12-L1: Minimal disc height  loss. No herniation. Normal facets. No spinal canal or neural foraminal stenosis. L1-L2: Minimal disc height loss. No herniation. Normal facets. No spinal canal or neural foraminal stenosis. L2-L3: Normal disc height. No herniation. Normal facets. No spinal canal or neural foraminal stenosis. L3-L4: Normal disc height. No herniation. Normal facets. No spinal canal or neural foraminal stenosis. L4-L5: Normal disc height. No herniation. Normal facets. No spinal canal or neural foraminal stenosis. L5-S1: Mild disc height loss. Disc bulge slightly eccentric to the left with posterior/posterolateral endplate marginal osteophytes. Mild facet arthropathy. Mild bilateral, left greater than right, lateral recess stenosis without displacement or overt compression of the traversing S1 nerve roots. No central spinal canal stenosis. Mild left neural foraminal narrowing. The right neural foramen is not significantly narrowed.   IMPRESSION:   1. Multilevel degenerative changes of the lumbar spine, as described.   2. No high-grade spinal canal stenosis.   3. Mild left greater than right lateral recess stenosis at L5-S1.   4. Mild left neural foraminal stenosis at L5-S1. Otherwise, no significant neural foraminal narrowing. JUNIOR MARTINEZ MD   SYSTEM ID:  [id number]     MR Thoracic Spine w/o Contrast  Result Date: 8/7/2021  EXAM: MR THORACIC SPINE W/O CONTRAST LOCATION: [facility] DATE/TIME: 8/6/2021 11:23 PM INDICATION: Low back pain, leg pain, urinary retention, saddle anesthesia. COMPARISON: Lumbar spine MRI dated 8/6/2021. TECHNIQUE: Routine Thoracic Spine MRI without IV contrast. FINDINGS: Partially visualized disc osteophyte complex at C5-C6 indents the ventral spinal cord. Question cord edema in the lower cervical spine cord, only seen on the first few images of the axial T2 sequence. Slight left apex curvature centered in the lower thoracic spine. There is accentuation of the normal thoracic spine  kyphosis, with degenerative endplate changes and Schmorl's nodes, suggestive of Scheuermann's disease. There are Modic type I degenerative  endplate changes at T3-T4. No evidence of an acute compression deformity. There are multilevel disc protrusions with a small disc protrusion at C7-T1, T1-T2, a central disc extrusion at T3-T4 that extends above and below the intervertebral disc space measuring 12 mm in craniocaudal dimension, at T7-T8, T10-T11, and T11-T12. There is no high grade canal stenosis. Multilevel facet arthropathy. There is no high grade neural foraminal narrowing. No definite cord signal abnormality within the thoracic spinal cord. Small bilateral pleural effusions.   IMPRESSION:   1.  Degenerative changes of the thoracic spine with findings suggestive of Scheuermann's disease.   2.  No high grade spinal canal or neural foraminal narrowing in the thoracic spine.   3.  Partially visualized disc osteophyte complex at C5-C6 that indents the ventral spinal cord. In addition there is questionable cord signal change in the lower cervical spine, which is incompletely evaluated on the current exam. Consider further evaluation with dedicated cervical spine MRI. Findings were discussed with Dr. Robbins at 12:24 AM on 08/07/2021.                              Again, thank you for allowing me to participate in the care of your patient.        Sincerely,        Esme Hodgson, CNP

## 2023-02-15 ENCOUNTER — RADIOLOGY INJECTION OFFICE VISIT (OUTPATIENT)
Dept: PHYSICAL MEDICINE AND REHAB | Facility: CLINIC | Age: 43
End: 2023-02-15
Attending: NURSE PRACTITIONER
Payer: COMMERCIAL

## 2023-02-15 VITALS
OXYGEN SATURATION: 98 % | TEMPERATURE: 98.4 F | DIASTOLIC BLOOD PRESSURE: 80 MMHG | HEART RATE: 85 BPM | SYSTOLIC BLOOD PRESSURE: 130 MMHG | RESPIRATION RATE: 16 BRPM

## 2023-02-15 DIAGNOSIS — M51.369 DDD (DEGENERATIVE DISC DISEASE), LUMBAR: ICD-10-CM

## 2023-02-15 DIAGNOSIS — G89.29 CHRONIC RIGHT-SIDED LOW BACK PAIN WITHOUT SCIATICA: ICD-10-CM

## 2023-02-15 DIAGNOSIS — M54.16 RIGHT LUMBAR RADICULOPATHY: ICD-10-CM

## 2023-02-15 DIAGNOSIS — M48.061 LUMBAR FORAMINAL STENOSIS: ICD-10-CM

## 2023-02-15 DIAGNOSIS — M54.50 CHRONIC RIGHT-SIDED LOW BACK PAIN WITHOUT SCIATICA: ICD-10-CM

## 2023-02-15 PROCEDURE — 64483 NJX AA&/STRD TFRM EPI L/S 1: CPT | Mod: RT | Performed by: PAIN MEDICINE

## 2023-02-15 RX ORDER — DEXAMETHASONE SODIUM PHOSPHATE 10 MG/ML
INJECTION, SOLUTION INTRAMUSCULAR; INTRAVENOUS
Status: COMPLETED | OUTPATIENT
Start: 2023-02-15 | End: 2023-02-15

## 2023-02-15 RX ORDER — LIDOCAINE HYDROCHLORIDE 10 MG/ML
INJECTION, SOLUTION EPIDURAL; INFILTRATION; INTRACAUDAL; PERINEURAL
Status: COMPLETED | OUTPATIENT
Start: 2023-02-15 | End: 2023-02-15

## 2023-02-15 RX ADMIN — LIDOCAINE HYDROCHLORIDE 2 ML: 10 INJECTION, SOLUTION EPIDURAL; INFILTRATION; INTRACAUDAL; PERINEURAL at 10:35

## 2023-02-15 RX ADMIN — DEXAMETHASONE SODIUM PHOSPHATE 10 MG: 10 INJECTION, SOLUTION INTRAMUSCULAR; INTRAVENOUS at 10:36

## 2023-02-15 ASSESSMENT — PAIN SCALES - GENERAL
PAINLEVEL: SEVERE PAIN (6)
PAINLEVEL: MODERATE PAIN (4)

## 2023-02-15 NOTE — PATIENT INSTRUCTIONS
DISCHARGE INSTRUCTIONS    During office hours (8:00 a.m.- 4:00 p.m.) questions or concerns may be answered  by calling [facility] Navigation Nurses at  [phone].  Messages received after hours will be returned the following business day.      In the case of an emergency, please dial 911 or seek assistance at the nearest Emergency Room/Urgent Care facility.     All Patients:    You may experience an increase in your symptoms for the first 2 days (It may take anywhere between 2 days- 2 weeks for the steroid to have maximum effect).    You may use ice on the injection site, as frequently as 20 minutes each hour if needed.    You may take your pain medicine.    You may continue taking your regular medication after your injection. If you have had a Medial Branch Block you may resume pain medication once your pain diary is completed.    You may shower. No swimming, tub bath or hot tub for 48 hours.  You may remove your bandaid/bandage as soon as you are home.    You may resume light activities, as tolerated.    Resume your usual diet as tolerated.    It is strongly advised that you do not drive for 1-3 hours post injection.    If you have had oral sedation:  Do not drive for 8 hours post injection.      If you have had IV sedation:  Do not drive for 24 hours post injection.  Do not operate hazardous machinery or make important personal/business decisions for 24 hours.      POSSIBLE STEROID SIDE EFFECTS (If steroid/cortisone was used for your procedure)    -If you experience these symptoms, it should only last for a short period    Swelling of the legs              Skin redness (flushing)     Mouth (oral) irritation   Blood sugar (glucose) levels            Sweats                    Mood changes  Headache  Sleeplessness  Weakened immune system for up to 14 days, which could increase the risk of manuel the COVID-19 virus and/or experiencing more severe symptoms of the disease, if exposed.  Decreased  effectiveness of the flu vaccine if given within 2 weeks of the steroid.         POSSIBLE PROCEDURE SIDE EFFECTS  -Call the Spine Center if you are concerned  Increased Pain           Increased numbness/tingling      Nausea/Vomiting          Bruising/bleeding at site      Redness or swelling                                              Difficulty walking      Weakness           Fever greater than 100.5    *In the event of a severe headache after an epidural steroid injection that is relieved by lying down, please call the [facility] to speak with a clinical staff member*

## 2023-02-22 DIAGNOSIS — F50.819 BINGE EATING DISORDER: ICD-10-CM

## 2023-02-23 ENCOUNTER — MYC REFILL (OUTPATIENT)
Dept: FAMILY MEDICINE | Facility: CLINIC | Age: 43
End: 2023-02-23
Payer: COMMERCIAL

## 2023-02-23 DIAGNOSIS — F50.819 BINGE EATING DISORDER: ICD-10-CM

## 2023-02-23 RX ORDER — LISDEXAMFETAMINE DIMESYLATE 40 MG/1
CAPSULE ORAL
Qty: 30 CAPSULE | Refills: 0 | Status: SHIPPED | OUTPATIENT
Start: 2023-02-23 | End: 2023-03-17

## 2023-02-24 RX ORDER — LISDEXAMFETAMINE DIMESYLATE 40 MG/1
40 CAPSULE ORAL EVERY MORNING
Qty: 30 CAPSULE | Refills: 0 | OUTPATIENT
Start: 2023-02-24

## 2023-03-17 ENCOUNTER — MYC MEDICAL ADVICE (OUTPATIENT)
Dept: FAMILY MEDICINE | Facility: CLINIC | Age: 43
End: 2023-03-17
Payer: COMMERCIAL

## 2023-03-17 DIAGNOSIS — F50.819 BINGE EATING DISORDER: ICD-10-CM

## 2023-03-17 RX ORDER — LISDEXAMFETAMINE DIMESYLATE 50 MG/1
50 CAPSULE ORAL EVERY MORNING
Qty: 30 CAPSULE | Refills: 0 | Status: SHIPPED | OUTPATIENT
Start: 2023-03-17 | End: 2023-04-17

## 2023-03-17 NOTE — TELEPHONE ENCOUNTER
"OV notes from 1/25/23-   \"The patient's weight loss result since the last visit was successful based on weight loss. Nutrition improve.  She is not struggling with craving currently.  PDMP reviewed.     - continue vyvanse,  No cost barrier.  Dx: BED.  She has had one binge episode since I last saw her. \"      Please advise if willing to increase dose of vyvanse rx  "

## 2023-04-17 ENCOUNTER — MYC REFILL (OUTPATIENT)
Dept: FAMILY MEDICINE | Facility: CLINIC | Age: 43
End: 2023-04-17
Payer: COMMERCIAL

## 2023-04-17 DIAGNOSIS — F50.819 BINGE EATING DISORDER: ICD-10-CM

## 2023-04-18 RX ORDER — LISDEXAMFETAMINE DIMESYLATE 50 MG/1
50 CAPSULE ORAL EVERY MORNING
Qty: 30 CAPSULE | Refills: 0 | Status: SHIPPED | OUTPATIENT
Start: 2023-04-18 | End: 2023-05-05

## 2023-04-26 ENCOUNTER — RADIOLOGY INJECTION OFFICE VISIT (OUTPATIENT)
Dept: PHYSICAL MEDICINE AND REHAB | Facility: CLINIC | Age: 43
End: 2023-04-26
Attending: NURSE PRACTITIONER
Payer: COMMERCIAL

## 2023-04-26 ENCOUNTER — MYC MEDICAL ADVICE (OUTPATIENT)
Dept: PHYSICAL MEDICINE AND REHAB | Facility: CLINIC | Age: 43
End: 2023-04-26
Payer: COMMERCIAL

## 2023-04-26 VITALS
DIASTOLIC BLOOD PRESSURE: 82 MMHG | RESPIRATION RATE: 16 BRPM | SYSTOLIC BLOOD PRESSURE: 128 MMHG | HEART RATE: 91 BPM | TEMPERATURE: 98.1 F | OXYGEN SATURATION: 98 %

## 2023-04-26 DIAGNOSIS — R20.0 NUMBNESS AND TINGLING IN BOTH HANDS: ICD-10-CM

## 2023-04-26 DIAGNOSIS — G56.03 BILATERAL CARPAL TUNNEL SYNDROME: ICD-10-CM

## 2023-04-26 DIAGNOSIS — G56.03 BILATERAL CARPAL TUNNEL SYNDROME: Primary | ICD-10-CM

## 2023-04-26 DIAGNOSIS — R20.2 NUMBNESS AND TINGLING IN BOTH HANDS: ICD-10-CM

## 2023-04-26 PROCEDURE — 76942 ECHO GUIDE FOR BIOPSY: CPT | Performed by: PAIN MEDICINE

## 2023-04-26 PROCEDURE — 20526 THER INJECTION CARP TUNNEL: CPT | Mod: 50 | Performed by: PAIN MEDICINE

## 2023-04-26 RX ORDER — METHYLPREDNISOLONE ACETATE 40 MG/ML
INJECTION, SUSPENSION INTRA-ARTICULAR; INTRALESIONAL; INTRAMUSCULAR; SOFT TISSUE
Status: COMPLETED | OUTPATIENT
Start: 2023-04-26 | End: 2023-04-26

## 2023-04-26 RX ADMIN — METHYLPREDNISOLONE ACETATE 40 MG: 40 INJECTION, SUSPENSION INTRA-ARTICULAR; INTRALESIONAL; INTRAMUSCULAR; SOFT TISSUE at 15:24

## 2023-04-26 ASSESSMENT — PAIN SCALES - GENERAL
PAINLEVEL: NO PAIN (0)
PAINLEVEL: NO PAIN (0)

## 2023-04-26 NOTE — PATIENT INSTRUCTIONS
Follow-up visit with Esme Hodgson CNP in 2-4 weeks if symptoms worsen or fail to improve.  Otherwise, please follow-up on an as-needed basis going forward.       DISCHARGE INSTRUCTIONS    During office hours (8:00 a.m.- 4:00 p.m.) questions or concerns may be answered  by calling [facility] Nurses at  [phone].  Messages received after hours will be returned the following business day.      In the case of an emergency, please dial 911 or seek assistance at the nearest Emergency Room/Urgent Care facility.     All Patients:    You may experience an increase in your symptoms for the first 2 days (It may take anywhere between 2 days- 2 weeks for the steroid to have maximum effect).    You may use ice on the injection site, as frequently as 20 minutes each hour if needed.    You may take your pain medicine.    You may continue taking your regular medication after your injection. If you have had a Medial Branch Block you may resume pain medication once your pain diary is completed.    You may shower. No swimming, tub bath or hot tub for 48 hours.  You may remove your bandaid/bandage as soon as you are home.    You may resume light activities, as tolerated.    Resume your usual diet as tolerated.    It is strongly advised that you do not drive for 1-3 hours post injection.    If you have had oral sedation:  Do not drive for 8 hours post injection.      If you have had IV sedation:  Do not drive for 24 hours post injection.  Do not operate hazardous machinery or make important personal/business decisions for 24 hours.      POSSIBLE STEROID SIDE EFFECTS (If steroid/cortisone was used for your procedure)    -If you experience these symptoms, it should only last for a short period    Swelling of the legs              Skin redness (flushing)     Mouth (oral) irritation   Blood sugar (glucose) levels            Sweats                    Mood changes  Headache  Sleeplessness  Weakened immune system for up  to 14 days, which could increase the risk of manuel the COVID-19 virus and/or experiencing more severe symptoms of the disease, if exposed.  Decreased effectiveness of the flu vaccine if given within 2 weeks of the steroid.         POSSIBLE PROCEDURE SIDE EFFECTS  -Call the Spine Center if you are concerned  Increased Pain           Increased numbness/tingling      Nausea/Vomiting          Bruising/bleeding at site      Redness or swelling                                              Difficulty walking      Weakness           Fever greater than 100.5    *In the event of a severe headache after an epidural steroid injection that is relieved by lying down, please call the [facility] to speak with a clinical staff member*

## 2023-05-03 ENCOUNTER — HOSPITAL ENCOUNTER (EMERGENCY)
Facility: CLINIC | Age: 43
Discharge: HOME | End: 2023-05-03
Attending: EMERGENCY MEDICINE | Admitting: EMERGENCY MEDICINE
Payer: COMMERCIAL

## 2023-05-03 ENCOUNTER — OFFICE VISIT (OUTPATIENT)
Dept: FAMILY MEDICINE | Facility: CLINIC | Age: 43
End: 2023-05-03
Payer: COMMERCIAL

## 2023-05-03 VITALS
HEART RATE: 86 BPM | OXYGEN SATURATION: 99 % | DIASTOLIC BLOOD PRESSURE: 97 MMHG | TEMPERATURE: 98.2 F | BODY MASS INDEX: 31.24 KG/M2 | RESPIRATION RATE: 18 BRPM | SYSTOLIC BLOOD PRESSURE: 136 MMHG | WEIGHT: 182 LBS

## 2023-05-03 VITALS
OXYGEN SATURATION: 100 % | HEART RATE: 97 BPM | RESPIRATION RATE: 16 BRPM | TEMPERATURE: 97.6 F | DIASTOLIC BLOOD PRESSURE: 96 MMHG | SYSTOLIC BLOOD PRESSURE: 137 MMHG

## 2023-05-03 DIAGNOSIS — G43.909 MIGRAINE WITHOUT STATUS MIGRAINOSUS, NOT INTRACTABLE, UNSPECIFIED MIGRAINE TYPE: ICD-10-CM

## 2023-05-03 DIAGNOSIS — G43.009 MIGRAINE WITHOUT AURA AND WITHOUT STATUS MIGRAINOSUS, NOT INTRACTABLE: Primary | ICD-10-CM

## 2023-05-03 PROCEDURE — 96361 HYDRATE IV INFUSION ADD-ON: CPT

## 2023-05-03 PROCEDURE — 99207 PR NO BILLABLE SERVICE THIS VISIT: CPT | Performed by: PHYSICIAN ASSISTANT

## 2023-05-03 PROCEDURE — 258N000003 HC RX IP 258 OP 636: Performed by: EMERGENCY MEDICINE

## 2023-05-03 PROCEDURE — 250N000011 HC RX IP 250 OP 636: Performed by: EMERGENCY MEDICINE

## 2023-05-03 PROCEDURE — 99284 EMERGENCY DEPT VISIT MOD MDM: CPT | Mod: 25

## 2023-05-03 PROCEDURE — 96374 THER/PROPH/DIAG INJ IV PUSH: CPT

## 2023-05-03 PROCEDURE — 96375 TX/PRO/DX INJ NEW DRUG ADDON: CPT

## 2023-05-03 PROCEDURE — 250N000013 HC RX MED GY IP 250 OP 250 PS 637: Performed by: EMERGENCY MEDICINE

## 2023-05-03 RX ORDER — METOCLOPRAMIDE HYDROCHLORIDE 5 MG/ML
10 INJECTION INTRAMUSCULAR; INTRAVENOUS ONCE
Status: COMPLETED | OUTPATIENT
Start: 2023-05-03 | End: 2023-05-03

## 2023-05-03 RX ORDER — ACETAMINOPHEN 325 MG/1
650 TABLET ORAL ONCE
Status: COMPLETED | OUTPATIENT
Start: 2023-05-03 | End: 2023-05-03

## 2023-05-03 RX ORDER — DIPHENHYDRAMINE HYDROCHLORIDE 50 MG/ML
25 INJECTION INTRAMUSCULAR; INTRAVENOUS ONCE
Status: COMPLETED | OUTPATIENT
Start: 2023-05-03 | End: 2023-05-03

## 2023-05-03 RX ORDER — DEXAMETHASONE SODIUM PHOSPHATE 10 MG/ML
6 INJECTION, SOLUTION INTRAMUSCULAR; INTRAVENOUS ONCE
Status: COMPLETED | OUTPATIENT
Start: 2023-05-03 | End: 2023-05-03

## 2023-05-03 RX ADMIN — ACETAMINOPHEN 650 MG: 325 TABLET ORAL at 19:54

## 2023-05-03 RX ADMIN — DIPHENHYDRAMINE HYDROCHLORIDE 25 MG: 50 INJECTION, SOLUTION INTRAMUSCULAR; INTRAVENOUS at 19:53

## 2023-05-03 RX ADMIN — SODIUM CHLORIDE 500 ML: 9 INJECTION, SOLUTION INTRAVENOUS at 19:51

## 2023-05-03 RX ADMIN — METOCLOPRAMIDE HYDROCHLORIDE 10 MG: 5 INJECTION INTRAMUSCULAR; INTRAVENOUS at 19:53

## 2023-05-03 RX ADMIN — DEXAMETHASONE SODIUM PHOSPHATE 6 MG: 10 INJECTION, SOLUTION INTRAMUSCULAR; INTRAVENOUS at 20:47

## 2023-05-03 ASSESSMENT — ENCOUNTER SYMPTOMS
WEAKNESS: 0
NAUSEA: 1
COUGH: 0
VOMITING: 1
HEADACHES: 1
SORE THROAT: 0
PHOTOPHOBIA: 1
NUMBNESS: 0
FEVER: 0

## 2023-05-03 NOTE — PROGRESS NOTES
Assessment & Plan:      Problem List Items Addressed This Visit    None  Visit Diagnoses     Migraine without aura and without status migrainosus, not intractable    -  Primary        Medical Decision Making  Patient presents with migraines not improving with her usual medications.  She is seeking IV fluids with Reglan and Zofran.  I apologized and informed her that we do not have IV fluids here available.  She will instead present to the emergency room for further evaluation and treatment as needed.     Subjective:      Madeline Szymanski is a 43 year old female here for evaluation of migraines not improving with her usual medications.  Onset of symptoms was 2 days ago.  Patient usually gets good relief with IV fluids and a cocktail of Reglan and Zofran.     The following portions of the patient's history were reviewed and updated as appropriate: allergies, current medications, and problem list.     Review of Systems  Pertinent items are noted in HPI.    Allergies  Allergies   Allergen Reactions     Cephalexin Hives     Coconut Flavor Anaphylaxis     Covid-19 (Mrna) Vaccine Anaphylaxis     Pfizer      Prochlorperazine Other (See Comments)     Tremors  When given with metoclopramide, okay by itself       Coconut Fatty Acids      Other reaction(s): Edema     Penicillins Hives       Family History   Problem Relation Age of Onset     Heart Disease Father      Hypertension Father      Substance Abuse Father      Low Back Problems Mother         back surgery     Breast Cancer Cousin      Breast Cancer Other      Mental Illness Nephew      Obesity Sister      Obesity Paternal Grandmother      Obesity Other        Social History     Tobacco Use     Smoking status: Never     Smokeless tobacco: Never   Vaping Use     Vaping status: Never Used   Substance Use Topics     Alcohol use: Never        Objective:      BP (!) 136/97   Pulse 86   Temp 98.2  F (36.8  C) (Oral)   Resp 18   Wt 82.6 kg (182 lb)   SpO2 99%   BMI 31.24  kg/m    General appearance - Entered room and lights were turned off and patient was wearing sunglasses with head held in her hands and appearing in discomfort but not appearing toxic     Lab & Imaging Results    No results found for any visits on 05/03/23.    I personally reviewed these results and discussed findings with the patient.    The use of Dragon/[facility] dictation services was used to construct the content of this note; any grammatical errors are non-intentional. Please contact the author directly if you are in need of any clarification.

## 2023-05-04 NOTE — DISCHARGE INSTRUCTIONS
You are given dexamethasone to prevent rebound migraine headache.  Return to the ER for worsening symptoms.  Follow-up primary care doctor if no improvement

## 2023-05-04 NOTE — ED TRIAGE NOTES
Pt arrives to ED with c/o headache, nausea and vomiting since yesterday. Hx of migraines.       Triage Assessment     Row Name 05/03/23 0995       Triage Assessment (Adult)    Airway WDL WDL       Respiratory WDL    Respiratory WDL WDL       Skin Circulation/Temperature WDL    Skin Circulation/Temperature WDL WDL       Cardiac WDL    Cardiac WDL WDL       Peripheral/Neurovascular WDL    Peripheral Neurovascular WDL WDL       Cognitive/Neuro/Behavioral WDL    Cognitive/Neuro/Behavioral WDL WDL

## 2023-05-04 NOTE — ED PROVIDER NOTES
EMERGENCY DEPARTMENT ENCOUNTER      NAME: Madeline Szymanski  AGE: 43 year old female  YOB: 1980  MRN: [id number]  EVALUATION DATE & TIME: No admission date for patient encounter.    PCP: Sebas Valdez    ED PROVIDER: Gerardo Crisostomo MD        Chief Complaint   Patient presents with     Headache         FINAL IMPRESSION:  1. Migraine without status migrainosus, not intractable, unspecified migraine type          ED COURSE & MEDICAL DECISION MAKING:    Pertinent Labs & Imaging studies reviewed. (See chart for details)  43 year old female presents to the Emergency Department for evaluation of gradual onset headache that feels similar to previous migraines    Differential diagnosis includes but not limited to recurrent headache, migraine headache, tension headache, cluster headache, intracranial hemorrhage, intracranial mass, meningitis, encephalitis, rebound headache. No red flags to suggest CVA, SAH, brain mass, meningitis, encephalitis.  Pt is neuro intact. I do not feel imaging or lab testing is indicated.     Likely migraine.  Patient went to urgent care and was referred here.  I did review urgent care note.  Did have a CT head which was -2 years ago.    Plan for Reglan, Benadryl, IV fluids and Tylenol.  Headaches resolved.  Highly doubt subarachnoid hemorrhage or meningitis or tumor.    We will give dexamethasone to prevent rebound headache.  Plan for discharge home       7:53 PM I met the patient and performed my initial interview and exam.    8:43 PM I rechecked and updated the patient. Headache has improved.     Medical Decision Making    History:    Supplemental history from: Documented in chart, if applicable    External Record(s) reviewed: Documented in chart, if applicable.    Work Up:    Chart documentation includes differential considered and any EKGs or imaging independently interpreted by provider, where specified.    In additional to work up documented, I considered the following work up:  "Documented in chart, if applicable.    External consultation:    Discussion of management with another provider: Documented in chart, if applicable    Complicating factors:    Care impacted by chronic illness: N/A    Care affected by social determinants of health: N/A    Disposition considerations: Discharge. No recommendations on prescription strength medication(s). N/A.          Voice recognition software was used in the creation of this note. Any grammatical or nonsensical errors are due to inherent errors with the software and are not the intention of the writer.         At the conclusion of the encounter I discussed the results of all of the tests and the disposition. The questions were answered. The patient or family acknowledged understanding and was agreeable with the care plan.             MEDICATIONS GIVEN IN THE EMERGENCY:  Medications   dexamethasone PF (DECADRON) injection 6 mg (has no administration in time range)   metoclopramide (REGLAN) injection 10 mg (10 mg Intravenous $Given 5/3/23 1953)   diphenhydrAMINE (BENADRYL) injection 25 mg (25 mg Intravenous $Given 5/3/23 1953)   0.9% sodium chloride BOLUS (0 mLs Intravenous Stopped 5/3/23 2030)   acetaminophen (TYLENOL) tablet 650 mg (650 mg Oral $Given 5/3/23 1954)       NEW PRESCRIPTIONS STARTED AT TODAY'S ER VISIT  New Prescriptions    No medications on file          =================================================================    HPI    Triage note  \" \"      Patient information was obtained from: Patient    Use of : N/A       Madeline Szymanski is a 43 year old female with a pertinent history of migraine with aura who presents to this ED by walk in for evaluation of headache.     Patient reports that she began having a gradual onset of a headache yesterday during work that was located on the left side and now is frontal of her scalp. She has a history of migraines but has not had to come to the ED in a long time. She was at urgent care but " sent here for medication and fluids. Patient notes this is like her typical migraines but is worse than normal. Patient takes rizatriptan and eletriptan with minor relief. Notes tylenol does not help. Does also take vyvanse. Patient had nausea and vomiting earlier and has light and sound sensitivity with her headache. Patient denies any numbness, weakness, cough, fever, sore throat, or any other complaints at this time.       REVIEW OF SYSTEMS   Review of Systems   Constitutional: Negative for fever.   HENT: Negative for sore throat.    Eyes: Positive for photophobia.   Respiratory: Negative for cough.    Gastrointestinal: Positive for nausea and vomiting.   Neurological: Positive for headaches. Negative for weakness and numbness.   All other systems reviewed and are negative.       PAST MEDICAL HISTORY:  Past Medical History:   Diagnosis Date     Acute and chronic respiratory failure with hypoxia (H) 2022     Anxiety      Arthritis      Depressive disorder      Gastric band slippage 2019     History of blood transfusion 1978     Kidney infection      Lumbar radiculopathy      Migraine      Migraine      Painless urinary retention due to cauda equina syndrome (H)      Pancreatitis      Panic attack      PONV (postoperative nausea and vomiting)      Spinal stenosis in cervical region      UTI (lower urinary tract infection)        PAST SURGICAL HISTORY:  Past Surgical History:   Procedure Laterality Date     ABDOMEN SURGERY        SECTION       FUSION CERVICAL ANTERIOR ONE LEVEL N/A 2022    Procedure: Cervical 5 - Cervical 6 anterior cervical decompression and artificial disc replacement, use of microscope;  Surgeon: Radha Lilly MD;  Location: [facility] [place] OR     GASTRECTOMY LAPAROSCOPIC SLEEVE       GI SURGERY       GYN SURGERY  2018    Hysterectomy     HYSTERECTOMY TOTAL ABDOMINAL       LAMINECTOMY LUMBAR ONE LEVEL Left 10/04/2021    Procedure: LEFT LUMBAR 5-SACRAL 1  HEMILAMINECTOMY, MEDIAL FACETECTOMY, FORAMINOTOMY WITH;  Surgeon: Radha Lilly MD;  Location: [facility] [place] OR     LUMBAR DISCECTOMY Left 10/04/2021    Procedure: LUMBAR 5-SACRAL 1 MICRODISCECTOMY;  Surgeon: Radha Lilly MD;  Location: [facility] OR           CURRENT MEDICATIONS:    eletriptan (RELPAX) 40 MG tablet  EPINEPHrine (ANY BX GENERIC EQUIV) 0.3 MG/0.3ML injection 2-pack  FLUoxetine (PROZAC) 40 MG capsule  hydrOXYzine (ATARAX) 25 MG tablet  lidocaine (LIDODERM) 5 % patch  lisdexamfetamine (VYVANSE) 50 MG capsule  omeprazole (PRILOSEC) 40 MG DR capsule  pregabalin (LYRICA) 75 MG capsule  rizatriptan (MAXALT-MLT) 10 MG ODT  scopolamine (TRANSDERM) 1 MG/3DAYS 72 hr patch  topiramate (TOPAMAX) 50 MG tablet        ALLERGIES:  Allergies   Allergen Reactions     Cephalexin Hives     Coconut Flavor Anaphylaxis     Covid-19 (Mrna) Vaccine Anaphylaxis     Pfizer      Prochlorperazine Other (See Comments)     Tremors  When given with metoclopramide, okay by itself       Coconut Fatty Acids      Other reaction(s): Edema     Penicillins Hives       FAMILY HISTORY:  Family History   Problem Relation Age of Onset     Heart Disease Father      Hypertension Father      Substance Abuse Father      Low Back Problems Mother         back surgery     Breast Cancer Cousin      Breast Cancer Other      Mental Illness Nephew      Obesity Sister      Obesity Paternal Grandmother      Obesity Other        SOCIAL HISTORY:   Social History     Socioeconomic History     Marital status:    Tobacco Use     Smoking status: Never     Smokeless tobacco: Never   Vaping Use     Vaping status: Never Used   Substance and Sexual Activity     Alcohol use: Never     Drug use: Never     Sexual activity: Yes     Partners: Male     Birth control/protection: Female Surgical   Other Topics Concern     Parent/sibling w/ CABG, MI or angioplasty before 65F 55M? Yes     Comment: Father       VITALS:  BP (!) 137/96   Pulse 97    Temp 97.6  F (36.4  C) (Temporal)   Resp 16   SpO2 100%     PHYSICAL EXAM      Vitals: BP (!) 137/96   Pulse 97   Temp 97.6  F (36.4  C) (Temporal)   Resp 16   SpO2 100%   General: Appears in no acute distress, awake, alert, interactive. Wearing sunglasses.   Eyes: Conjunctivae non-injected. Sclera anicteric. PEERL.   HENT: Atraumatic. Face symmetrical.   Neck: Supple. Full ROM.   Respiratory/Chest: Respiration unlabored.  Heart: Regular rate and rhythm  Abdomen: non distended  Musculoskeletal: Normal extremities. No edema or erythema.  Skin: Normal color. No rash or diaphoresis.  Neurologic: Face symmetric, moves all extremities spontaneously. Speech clear.  Psychiatric: Oriented to person, place, and time. Affect appropriate.       LAB:  All pertinent labs reviewed and interpreted.       RADIOLOGY:  Reviewed all pertinent imaging. Please see official radiology report.  No orders to display       ITommy, am serving as a scribe to document services personally performed by Spike Crisostomo MD based on my observation and the provider's statements to me. I, Dr. Spike Crisostomo, attest that Tommy Martin is acting in a scribe capacity, has observed my performance of the services and has documented them in accordance with my direction.    Spike Crisostomo MD  Emergency Medicine  [facility] EMERGENCY ROOM  [address] [address]  [phone]     Spike Crisostomo MD  05/03/23 7525

## 2023-05-05 ENCOUNTER — VIRTUAL VISIT (OUTPATIENT)
Dept: FAMILY MEDICINE | Facility: CLINIC | Age: 43
End: 2023-05-05
Attending: FAMILY MEDICINE
Payer: COMMERCIAL

## 2023-05-05 DIAGNOSIS — F32.5 MAJOR DEPRESSIVE DISORDER WITH SINGLE EPISODE, IN FULL REMISSION (H): ICD-10-CM

## 2023-05-05 DIAGNOSIS — E66.09 CLASS 1 OBESITY DUE TO EXCESS CALORIES WITH SERIOUS COMORBIDITY AND BODY MASS INDEX (BMI) OF 31.0 TO 31.9 IN ADULT: ICD-10-CM

## 2023-05-05 DIAGNOSIS — F50.819 BINGE EATING DISORDER: Primary | ICD-10-CM

## 2023-05-05 DIAGNOSIS — Z12.11 SCREEN FOR COLON CANCER: ICD-10-CM

## 2023-05-05 DIAGNOSIS — E66.811 CLASS 1 OBESITY DUE TO EXCESS CALORIES WITH SERIOUS COMORBIDITY AND BODY MASS INDEX (BMI) OF 31.0 TO 31.9 IN ADULT: ICD-10-CM

## 2023-05-05 PROCEDURE — 99213 OFFICE O/P EST LOW 20 MIN: CPT | Mod: VID | Performed by: FAMILY MEDICINE

## 2023-05-05 RX ORDER — LISDEXAMFETAMINE DIMESYLATE 50 MG/1
50 CAPSULE ORAL EVERY MORNING
Qty: 30 CAPSULE | Refills: 0 | Status: SHIPPED | OUTPATIENT
Start: 2023-05-05 | End: 2023-06-05

## 2023-05-05 ASSESSMENT — ANXIETY QUESTIONNAIRES
7. FEELING AFRAID AS IF SOMETHING AWFUL MIGHT HAPPEN: NOT AT ALL
GAD7 TOTAL SCORE: 13
7. FEELING AFRAID AS IF SOMETHING AWFUL MIGHT HAPPEN: NOT AT ALL
1. FEELING NERVOUS, ANXIOUS, OR ON EDGE: MORE THAN HALF THE DAYS
3. WORRYING TOO MUCH ABOUT DIFFERENT THINGS: MORE THAN HALF THE DAYS
5. BEING SO RESTLESS THAT IT IS HARD TO SIT STILL: SEVERAL DAYS
8. IF YOU CHECKED OFF ANY PROBLEMS, HOW DIFFICULT HAVE THESE MADE IT FOR YOU TO DO YOUR WORK, TAKE CARE OF THINGS AT HOME, OR GET ALONG WITH OTHER PEOPLE?: SOMEWHAT DIFFICULT
2. NOT BEING ABLE TO STOP OR CONTROL WORRYING: MORE THAN HALF THE DAYS
IF YOU CHECKED OFF ANY PROBLEMS ON THIS QUESTIONNAIRE, HOW DIFFICULT HAVE THESE PROBLEMS MADE IT FOR YOU TO DO YOUR WORK, TAKE CARE OF THINGS AT HOME, OR GET ALONG WITH OTHER PEOPLE: SOMEWHAT DIFFICULT
4. TROUBLE RELAXING: NEARLY EVERY DAY
6. BECOMING EASILY ANNOYED OR IRRITABLE: NEARLY EVERY DAY
GAD7 TOTAL SCORE: 13
GAD7 TOTAL SCORE: 13

## 2023-05-05 ASSESSMENT — ENCOUNTER SYMPTOMS: CONSTITUTIONAL NEGATIVE: 1

## 2023-05-05 NOTE — PROGRESS NOTES
Madeline is a 43 year old who is being evaluated via a billable video visit.      How would you like to obtain your AVS? MyChart  If the video visit is dropped, the invitation should be resent by: Text to cell phone: [phone]  Will anyone else be joining your video visit? No    Assessment & Plan   Problem List Items Addressed This Visit     Class 1 obesity due to excess calories with serious comorbidity and body mass index (BMI) of 31.0 to 31.9 in adult     43 year old year old female in clinic today to discuss treatment of the following conditions through diet and lifestyle modification and weight loss:  1. Binge eating disorder    2. Screen for colon cancer    3. Major depressive disorder with single episode, in full remission (H)      The patient's weight loss result since the last visit was successful based on ongoing weight loss.  She is down 12.5% over the past 6 months.  Energy is okay.  She is struggling to support her daughter.  Has been leaning on her employers [employer] for support.  We briefly reviewed medications.  She does miss some doses which causes some disruption of movement.  Overall, I believe she is doing well.  Recommend that we continue current plan.  We will continue Vyvanse and treatment of binge eating disorder.  I would like to see this patient back for complete physical in 3 to 6 months.         Relevant Medications    lisdexamfetamine (VYVANSE) 50 MG capsule    Major depressive disorder with single episode, in full remission (H)     She sometimes forgets these medications.  Overall doing okay. Using [employer].         Other Visit Diagnoses     Binge eating disorder    -  Primary    Relevant Medications    lisdexamfetamine (VYVANSE) 50 MG capsule    Screen for colon cancer        Relevant Orders    Colonoscopy Screening  Referral          Sebas Valdez MD  [facility]    Isabella Correa is a 43 year old, presenting for the following health issues:  Weight  Loss (Follow-up/)        5/5/2023    10:46 AM   Additional Questions   Roomed by SAC   Accompanied by self         5/5/2023    10:46 AM   Patient Reported Additional Medications   Patient reports taking the following new medications no     Patient presents for treatment of chronic, comorbid conditions using intensive therapeutic lifestyle interventions including nutrition, physical activity, and behavior management.   - successes: medication helpful. Not binging.     - struggles: stress high.  Daughter continues to struggle.     - mood: variable. Using [employer].  Works to calm self.     - exercise plan: active on job    History of Present Illness       Reason for visit:  Medication management    She eats 0-1 servings of fruits and vegetables daily.She consumes 0 sweetened beverage(s) daily.She exercises with enough effort to increase her heart rate 20 to 29 minutes per day.  She exercises with enough effort to increase her heart rate 3 or less days per week. She is missing 1 dose(s) of medications per week.  She is not taking prescribed medications regularly due to remembering to take.           Review of Systems   Constitutional: Negative.    All other systems reviewed and are negative.           Objective    Vitals - Patient Reported  Systolic (Patient Reported): (!) 153  Diastolic (Patient Reported): (!) 92  Weight (Patient Reported): 82.6 kg (182 lb) (2 days ago at ER)        Physical Exam  Nursing note reviewed.   Constitutional:       General: She is not in acute distress.     Appearance: Normal appearance. She is not ill-appearing.   HENT:      Head: Normocephalic and atraumatic.   Eyes:      Extraocular Movements: Extraocular movements intact.      Conjunctiva/sclera: Conjunctivae normal.   Pulmonary:      Effort: Pulmonary effort is normal.   Neurological:      Mental Status: She is alert and oriented to person, place, and time.   Psychiatric:         Attention and Perception: Attention normal.         Mood and  Affect: Mood normal.         Speech: Speech normal.         Thought Content: Thought content normal.                      Video-Visit Details    Type of service:  Video Visit     Originating Location (pt. Location): Home  Distant Location (provider location):  On-site  Platform used for Video Visit: Agatha

## 2023-05-05 NOTE — ASSESSMENT & PLAN NOTE
43 year old year old female in clinic today to discuss treatment of the following conditions through diet and lifestyle modification and weight loss:  1. Binge eating disorder    2. Screen for colon cancer    3. Major depressive disorder with single episode, in full remission (H)      The patient's weight loss result since the last visit was successful based on ongoing weight loss.  She is down 12.5% over the past 6 months.  Energy is okay.  She is struggling to support her daughter.  Has been leaning on her employers [employer] for support.  We briefly reviewed medications.  She does miss some doses which causes some disruption of movement.  Overall, I believe she is doing well.  Recommend that we continue current plan.  We will continue Vyvanse and treatment of binge eating disorder.  I would like to see this patient back for complete physical in 3 to 6 months.

## 2023-05-18 DIAGNOSIS — M54.2 CHRONIC NECK PAIN: ICD-10-CM

## 2023-05-18 DIAGNOSIS — G89.29 CHRONIC NECK PAIN: ICD-10-CM

## 2023-05-18 RX ORDER — LIDOCAINE 50 MG/G
PATCH TOPICAL
Qty: 10 PATCH | Refills: 3 | Status: SHIPPED | OUTPATIENT
Start: 2023-05-18 | End: 2023-12-26

## 2023-05-18 NOTE — PROGRESS NOTES
Assessment:     Diagnoses and all orders for this visit:  Chronic neck pain  -     MR Cervical Spine w/o & w Contrast; Future  Bilateral occipital neuralgia  -     PAIN US Guided Occipital NB; Future  Arthropathy of cervical facet joint  -     MR Cervical Spine w/o & w Contrast; Future  Hx of cervical spine surgery  -     MR Cervical Spine w/o & w Contrast; Future  Chronic right-sided low back pain with right-sided sciatica  -     MR Lumbar Spine w/o & w Contrast; Future  -     oxyCODONE-acetaminophen (PERCOCET) 5-325 MG tablet; Take 1 tablet by mouth 3 times daily as needed for pain  Lumbar foraminal stenosis  -     MR Lumbar Spine w/o & w Contrast; Future  DDD (degenerative disc disease), lumbar  -     MR Lumbar Spine w/o & w Contrast; Future  History of lumbosacral spine surgery  -     MR Lumbar Spine w/o & w Contrast; Future  Right lumbar radiculopathy  -     oxyCODONE-acetaminophen (PERCOCET) 5-325 MG tablet; Take 1 tablet by mouth 3 times daily as needed for pain  -     predniSONE (DELTASONE) 10 MG tablet; Prednisone 10 mg tablet, 3 tablets p.o. daily for 3 days, then 2 tablets p.o. daily for 3 days, then 1 tablet p.o. daily for 3 days then stop.  -     oxyCODONE (ROXICODONE) 5 MG tablet; Take 1 tablet (5 mg) by mouth 3 times daily as needed for pain     Madeline BLAS Szymanski is a 43 year old y.o. female with past medical history significant for migraine, depressive disorder, , gastric bypass surgery, L5-S1 hemilaminectomy left medial facetectomy foraminotomies and microdiscectomy Dr. Lilly 10/4/2021 who presents today for follow-up regarding:    -Right low back pain at the belt line radiating to the upper sacral junction and right buttock right anterior thigh consistent with with radiculopathy.  No lasting benefit with right L5-S1 TFESI.    -Bilateral occipital neuralgia with significant relief with previous occipital nerve block for 3 months.    -Chronic left upper cervical spine pain with  consistent  with cervical facet arthropathy.     Plan:     A shared decision making plan was used. The patient's values and choices were respected. Prior medical records were reviewed today. The following represents what was discussed and decided upon by the provider and the patient.        -DIAGNOSTIC TESTS: Images were personally reviewed and interpreted.   -- Ordered updated cervical and lumbar spine MRI with and without IV contrast to evaluate neck and back pain postsurgery.  -- Lumbar spine x-ray 8/22/2022 with mild to moderate multilevel degenerative disc changes most advanced at T12-L1 and L5-S1.  -- Cervical spine x-ray post surgery 9/16/2022 with artificial disc replacement C5-6, mild disc height loss at C6-7.          --Lumbar MRI 2/17/2022 with microdiscectomy and left hemilaminectomy changes at L5-S1 with granulation tissue left paracentral region, moderate left lateral recess and mild right lateral recess stenosis.  2 mm L5-S1 retrolisthesis.  --Cervical MRI 8/7/2021 Moderate to Severe spinal stenosis C5-6.   --Lumbar spine flexion-extension x-ray 8/10/2021 shows no spondylolisthesis and no instability.  --Lumbar spine MRI 8/6/2021 with L5-S1 mild disc height loss with disc bulge on the left with osteophytes with mild left foraminal stenosis lateral recess stenosis and S1 compression.    -INTERVENTIONS: Consider referral L4-5 RIGHT TFESI versus SI joint steroid injection versus medial branch block pending updated imaging.  Order placed for bilateral occipital nerve block.  Currently her insurance is stating that occipital nerve blocks are experimental and they do not cover them however she has gotten dramatic/significant relief for 3 months with previous nerve block and currently occipital neuralgia headaches are constant again bilateral.  If we are unable to obtain authorization for this we could consider potential left medial branch block upper cervical spines pending updated imaging.  She has not had imaging  since cervical spine surgery.    -MEDICATIONS:   Advised patient continue with Lyrica and lidocaine patches as needed.   Prescribed Percocet 5/325 mg 1 tablet every 8 hours as needed for severe breakthrough pain number 21 tablets given for 7 days with.  **Addendum 5/23/2023: Patient did have an intolerance to Percocet with increased nausea therefore changed medication to oxycodone 5 mg 1 tablet every 8 hours as needed for severe breakthrough pain number 21 tablets given for 7 days worth.  Patient has tolerated this medication better in the past.  Also prescribed Medrol Dosepak for severe lumbar radiculopathy Supracaine improve current symptoms while she is waiting on her MRI.  [place]  checked.  This is for acute pain only.  Refills will not be given over the telephone.  Discussed the risks (eg, addiction, overdose, worsening pain, death) verses benefit of opioid use with patient today. Explained that this medication will not be a long term solution to ongoing pain. Discussed using lowest effective dose and the importance of other measures for pain management including PT, other non-opioid medications, behavioral treatments, and other procedure options.   Discussed side effects of medications and proper use. Patient verbalized understanding.    -PHYSICAL THERAPY: Encourage patient to continue with physical therapy, she is part of a home physical therapy program and continuing with home exercises for both chronic neck and back pain.  Discussed the importance of core strengthening, ROM, stretching exercises with the patient and how each of these entities is important in decreasing pain.  Explained to the patient that the purpose of physical therapy is to teach the patient a home exercise program.  These exercises need to be performed every day in order to decrease pain and prevent future occurrences of pain.        -PATIENT EDUCATION:  Total time of 32 minutes, on the day of service, spent with the patient, reviewing  the chart, placing orders, and documenting.   -Today we also discussed the issues related to the current COVID-19 pandemic, the pros and cons of the current treatment plan, the CDC guidelines such as social distancing, washing the hands, and covering the cough.    -FOLLOW UP: Follow-up MyChart message for imaging results  Advised to contact clinic if symptoms worsen or change.    Subjective:     Madeline Szymanski is a 43 year old female who presents today for follow-up regarding chronic bilateral low back pain that is greater on the right at the belt line radiating to the lumbosacral junction and right buttock with pain into the right groin area as well as anterior thigh with some pain into the right lateral shin and anterior shin as well.  Unfortunately she received minimal lasting benefit with previous L5-S1 TFESI.  Currently her right leg pain is a 6/10, and 8 at its worst, 3 at its best.  Aggravated with prolonged sitting, standing, sleeping on 1 side, does improve slightly with walking, TENS unit, lidocaine patches and ice.  Patient is also having recurrent headaches with pain into the occipital region and sensitivity over the occipital region, she has gotten significant relief with occipital nerve blocks in the past for 3 months, unfortunately her insurance is no longer covering these which she is frustrated by.  She does also have cervical spine pain upper cervical spine that is greater on the left.    Treatment to Date:  Left L5-S1 hemilaminectomy, medial facetectomy, foraminotomy with L5-S1 left microdiscectomy 10/4/2021 Dr. Lilly.     Bilateral carpal tunnel steroid injection 11/11/2020 and 5/12/2021 with significant benefit.     L5-S1 TFESI 3/21/2018  Lumbar MBB 2/13/2019 with benefit LBP  Lumbar RFA 3/6/2019  Bilateral L5-S1 TFESI 6/3/2020 with benefit LBP.  Preprocedure pain 8/10, post 5/10.  Left S1-S2 TFESI 8/3/2021 with no lasting benefit.  Preprocedure pain 8/10, post 2/10.  Left L5-S1 TFESI 9/1/2021  with minimal lasting benefit.  Preprocedure pain 7/10, post 4/10.    Bilateral L3, L4, L5 RFA 3/24/2022.  Bilateral  carpal tunnel steroid injection 4/19/2022.  Right C5-6 TFESI 5/3/2022  Bilateral occipital nerve block 10/24/2022 with significant relief.  Bilateral occipital nerve block 1/25/2023 with significant relief  RIGHT L5-S1 TFESI 2/15/2023 with minimal initial or lasting benefit.   Bilateral carpal tunnel steroid injection 4/26/2023 with significant relief.     Medications:  Gabapentin 300 mg 1 to 2 tablets at bedtime, unable to tolerate during the daytime.  Amitriptyline 75 mg at bedtime for migraines  Flexeril as needed  Tylenol as needed  Lidocaine gel as needed     No benefit with recent Medrol Dosepak.     *Patient unable to tolerate NSAIDs due to GI surgery.    Patient Active Problem List   Diagnosis     Spinal stenosis in cervical region     History of gastric ulcer     Hx of Helicobacter infection     Migraine with aura and without status migrainosus, not intractable     Panic attacks     S/P laparoscopic sleeve gastrectomy     Lumbar radiculopathy     Major depressive disorder with single episode, in full remission (H)     Chronic insomnia     Class 1 obesity due to excess calories with serious comorbidity and body mass index (BMI) of 31.0 to 31.9 in adult       Current Outpatient Medications   Medication     eletriptan (RELPAX) 40 MG tablet     EPINEPHrine (ANY BX GENERIC EQUIV) 0.3 MG/0.3ML injection 2-pack     FLUoxetine (PROZAC) 40 MG capsule     hydrOXYzine (ATARAX) 25 MG tablet     lidocaine (LIDODERM) 5 % patch     lisdexamfetamine (VYVANSE) 50 MG capsule     oxyCODONE-acetaminophen (PERCOCET) 5-325 MG tablet     pregabalin (LYRICA) 75 MG capsule     rizatriptan (MAXALT-MLT) 10 MG ODT     topiramate (TOPAMAX) 50 MG tablet     oxyCODONE (ROXICODONE) 5 MG tablet     predniSONE (DELTASONE) 10 MG tablet     No current facility-administered medications for this visit.       Allergies   Allergen  Reactions     Cephalexin Hives     Coconut Flavor Anaphylaxis     Covid-19 (Mrna) Vaccine Anaphylaxis     [employer]      Prochlorperazine Other (See Comments)     Tremors  When given with metoclopramide, okay by itself       Coconut Fatty Acids      Other reaction(s): Edema     Penicillins Hives       Past Medical History:   Diagnosis Date     Acute and chronic respiratory failure with hypoxia (H) 01/01/2022     Anxiety      Arthritis      Depressive disorder      Gastric band slippage 09/26/2019     History of blood transfusion 04/1978     Kidney infection      Lumbar radiculopathy      Migraine      Migraine      Painless urinary retention due to cauda equina syndrome (H)      Pancreatitis      Panic attack      PONV (postoperative nausea and vomiting)      Spinal stenosis in cervical region      UTI (lower urinary tract infection)         Review of Systems  ROS:  Specifically negative for bowel/bladder dysfunction, balance changes, headache, dizziness, foot drop, fevers, chills, appetite changes, nausea/vomiting, unexplained weight loss. Otherwise 13 systems reviewed are negative. Please see the patient's intake questionnaire from today for details.    Reviewed Social, Family, Past Medical and Past Surgical history with patient, no significant changes noted since prior visit.     Objective:     /80 (BP Location: Right arm, Patient Position: Sitting)     PHYSICAL EXAMINATION:    --CONSTITUTIONAL: Well developed, well nourished, healthy appearing individual.  --PSYCHIATRIC: Appropriate mood and affect. No difficulty interacting due to temper, social withdrawal, or memory issues.  --SKIN: Lumbar region is dry and intact.   --RESPIRATORY: Normal rhythm and effort. No abnormal accessory muscle breathing patterns noted.   --MUSCULOSKELETAL:  Normal lumbar lordosis noted, no lateral shift.  --GROSS MOTOR: Easily arises from a seated position. Gait is non-antalgic  --LUMBAR SPINE:  Inspection reveals no evidence of  deformity. Range of motion is not limited in lumbar flexion, extension, lateral rotation.  Tenderness to palpation L4-5 midline and lumbosacral junction bilaterally right greater than left as well as bilateral right greater than left sacroiliac notch.  Straight leg raising is positive to back pain on the right.  --SACROILIAC JOINT: Positive right distraction.  Positive right Zeb's with reproduction of pain to affected extremity.  Positive right Gaenslen's Test with reproduction of pain to affected extremity.   --LOWER EXTREMITY MOTOR TESTING:  Plantar flexion left 5/5, right 5/5   Dorsiflexion left 5/5, right 5/5   Great toe MTP extension left 5/5, right 5/5  Knee flexion left 5/5, right 5/5  Knee extension left 5/5, right 5/5   Hip flexion left 5/5, right 5/5  Hip abduction left 5/5, right 5/5  Hip adduction left 5/5, right 5/5   --HIPS: Full range of motion bilaterally.  Negative scour maneuver.  --NEUROLOGIC: Bilateral patellar and achilles reflexes are physiologic and symmetric. Sensation to light touch is intact in the bilateral L4, L5, and S1 dermatomes.    RESULTS:   Imaging: Spine imaging was reviewed today. The images were shown to the patient and the findings were explained using a spine model.

## 2023-05-22 ENCOUNTER — OFFICE VISIT (OUTPATIENT)
Dept: PHYSICAL MEDICINE AND REHAB | Facility: CLINIC | Age: 43
End: 2023-05-22
Payer: COMMERCIAL

## 2023-05-22 VITALS — SYSTOLIC BLOOD PRESSURE: 128 MMHG | DIASTOLIC BLOOD PRESSURE: 80 MMHG

## 2023-05-22 DIAGNOSIS — G89.29 CHRONIC NECK PAIN: Primary | ICD-10-CM

## 2023-05-22 DIAGNOSIS — Z98.890 HX OF CERVICAL SPINE SURGERY: ICD-10-CM

## 2023-05-22 DIAGNOSIS — M47.812 ARTHROPATHY OF CERVICAL FACET JOINT: ICD-10-CM

## 2023-05-22 DIAGNOSIS — M51.369 DDD (DEGENERATIVE DISC DISEASE), LUMBAR: ICD-10-CM

## 2023-05-22 DIAGNOSIS — M48.061 LUMBAR FORAMINAL STENOSIS: ICD-10-CM

## 2023-05-22 DIAGNOSIS — M54.16 RIGHT LUMBAR RADICULOPATHY: ICD-10-CM

## 2023-05-22 DIAGNOSIS — M54.41 CHRONIC RIGHT-SIDED LOW BACK PAIN WITH RIGHT-SIDED SCIATICA: ICD-10-CM

## 2023-05-22 DIAGNOSIS — G89.29 CHRONIC RIGHT-SIDED LOW BACK PAIN WITH RIGHT-SIDED SCIATICA: ICD-10-CM

## 2023-05-22 DIAGNOSIS — M54.2 CHRONIC NECK PAIN: Primary | ICD-10-CM

## 2023-05-22 DIAGNOSIS — M54.81 BILATERAL OCCIPITAL NEURALGIA: ICD-10-CM

## 2023-05-22 DIAGNOSIS — Z98.890 HISTORY OF LUMBOSACRAL SPINE SURGERY: ICD-10-CM

## 2023-05-22 PROCEDURE — 99214 OFFICE O/P EST MOD 30 MIN: CPT | Performed by: NURSE PRACTITIONER

## 2023-05-22 RX ORDER — OXYCODONE AND ACETAMINOPHEN 5; 325 MG/1; MG/1
1 TABLET ORAL 3 TIMES DAILY PRN
Qty: 21 TABLET | Refills: 0 | Status: SHIPPED | OUTPATIENT
Start: 2023-05-22 | End: 2023-05-29

## 2023-05-22 ASSESSMENT — PAIN SCALES - GENERAL: PAINLEVEL: SEVERE PAIN (6)

## 2023-05-22 NOTE — LETTER
2023         RE: Madeline Szymanski  [facility] [address]        Dear Colleague,    Thank you for referring your patient, Madeline Szymanski, to the [facility]UR[facility]ERY. Please see a copy of my visit note below.      Assessment:     Diagnoses and all orders for this visit:  Chronic neck pain  -     MR Cervical Spine w/o & w Contrast; Future  Bilateral occipital neuralgia  -     PAIN US Guided Occipital NB; Future  Arthropathy of cervical facet joint  -     MR Cervical Spine w/o & w Contrast; Future  Hx of cervical spine surgery  -     MR Cervical Spine w/o & w Contrast; Future  Chronic right-sided low back pain with right-sided sciatica  -     MR Lumbar Spine w/o & w Contrast; Future  Lumbar foraminal stenosis  -     MR Lumbar Spine w/o & w Contrast; Future  DDD (degenerative disc disease), lumbar  -     MR Lumbar Spine w/o & w Contrast; Future  History of lumbosacral spine surgery  -     MR Lumbar Spine w/o & w Contrast; Future  Right lumbar radiculopathy     Madeline Szymanski is a 43 year old y.o. female with past medical history significant for migraine, depressive disorder, , gastric bypass surgery, L5-S1 hemilaminectomy left medial facetectomy foraminotomies and microdiscectomy Dr. Lilly 10/4/2021 who presents today for follow-up regarding:    -Right low back pain at the belt line radiating to the upper sacral junction and right buttock right anterior thigh consistent with with radiculopathy.  No lasting benefit with right L5-S1 TFESI.    -Bilateral occipital neuralgia with significant relief with previous occipital nerve block for 3 months.    -Chronic left upper cervical spine pain with  consistent with cervical facet arthropathy.     Plan:     A shared decision making plan was used. The patient's values and choices were respected. Prior medical records were reviewed today. The following represents what was discussed and decided upon by the provider and the  patient.        -DIAGNOSTIC TESTS: Images were personally reviewed and interpreted.   -- Ordered updated cervical and lumbar spine MRI with and without IV contrast to evaluate neck and back pain postsurgery.  -- Lumbar spine x-ray 8/22/2022 with mild to moderate multilevel degenerative disc changes most advanced at T12-L1 and L5-S1.  -- Cervical spine x-ray post surgery 9/16/2022 with artificial disc replacement C5-6, mild disc height loss at C6-7.          --Lumbar MRI 2/17/2022 with microdiscectomy and left hemilaminectomy changes at L5-S1 with granulation tissue left paracentral region, moderate left lateral recess and mild right lateral recess stenosis.  2 mm L5-S1 retrolisthesis.  --Cervical MRI 8/7/2021 Moderate to Severe spinal stenosis C5-6.   --Lumbar spine flexion-extension x-ray 8/10/2021 shows no spondylolisthesis and no instability.  --Lumbar spine MRI 8/6/2021 with L5-S1 mild disc height loss with disc bulge on the left with osteophytes with mild left foraminal stenosis lateral recess stenosis and S1 compression.    -INTERVENTIONS: Consider referral L4-5 RIGHT TFESI versus SI joint steroid injection versus medial branch block pending updated imaging.  Order placed for bilateral occipital nerve block.  Currently her insurance is stating that occipital nerve blocks are experimental and they do not cover them however she has gotten dramatic/significant relief for 3 months with previous nerve block and currently occipital neuralgia headaches are constant again bilateral.  If we are unable to obtain authorization for this we could consider potential left medial branch block upper cervical spines pending updated imaging.  She has not had imaging since cervical spine surgery.    -MEDICATIONS: No changes in medications advised patient continue with Lyrica and lidocaine patches as needed.    Discussed side effects of medications and proper use. Patient verbalized understanding.    -PHYSICAL THERAPY: Encourage  patient to continue with physical therapy, she is part of a home physical therapy program and continuing with home exercises for both chronic neck and back pain.  Discussed the importance of core strengthening, ROM, stretching exercises with the patient and how each of these entities is important in decreasing pain.  Explained to the patient that the purpose of physical therapy is to teach the patient a home exercise program.  These exercises need to be performed every day in order to decrease pain and prevent future occurrences of pain.        -PATIENT EDUCATION:  Total time of 32 minutes, on the day of service, spent with the patient, reviewing the chart, placing orders, and documenting.   -Today we also discussed the issues related to the current COVID-19 pandemic, the pros and cons of the current treatment plan, the CDC guidelines such as social distancing, washing the hands, and covering the cough.    -FOLLOW UP: Follow-up [employer] message for imaging results  Advised to contact clinic if symptoms worsen or change.    Subjective:     Madeline Szymanski is a 43 year old female who presents today for follow-up regarding chronic bilateral low back pain that is greater on the right at the belt line radiating to the lumbosacral junction and right buttock with pain into the right groin area as well as anterior thigh with some pain into the right lateral shin and anterior shin as well.  Unfortunately she received minimal lasting benefit with previous L5-S1 TFESI.  Currently her right leg pain is a 6/10, and 8 at its worst, 3 at its best.  Aggravated with prolonged sitting, standing, sleeping on 1 side, does improve slightly with walking, TENS unit, lidocaine patches and ice.  Patient is also having recurrent headaches with pain into the occipital region and sensitivity over the occipital region, she has gotten significant relief with occipital nerve blocks in the past for 3 months, unfortunately her insurance is no longer  covering these which she is frustrated by.  She does also have cervical spine pain upper cervical spine that is greater on the left.    Treatment to Date:  Left L5-S1 hemilaminectomy, medial facetectomy, foraminotomy with L5-S1 left microdiscectomy 10/4/2021 Dr. Lilly.     Bilateral carpal tunnel steroid injection 11/11/2020 and 5/12/2021 with significant benefit.     L5-S1 TFESI 3/21/2018  Lumbar MBB 2/13/2019 with benefit LBP  Lumbar RFA 3/6/2019  Bilateral L5-S1 TFESI 6/3/2020 with benefit LBP.  Preprocedure pain 8/10, post 5/10.  Left S1-S2 TFESI 8/3/2021 with no lasting benefit.  Preprocedure pain 8/10, post 2/10.  Left L5-S1 TFESI 9/1/2021 with minimal lasting benefit.  Preprocedure pain 7/10, post 4/10.    Bilateral L3, L4, L5 RFA 3/24/2022.  Bilateral  carpal tunnel steroid injection 4/19/2022.  Right C5-6 TFESI 5/3/2022  Bilateral occipital nerve block 10/24/2022 with significant relief.  Bilateral occipital nerve block 1/25/2023 with significant relief  RIGHT L5-S1 TFESI 2/15/2023 with minimal initial or lasting benefit.   Bilateral carpal tunnel steroid injection 4/26/2023 with significant relief.     Medications:  Gabapentin 300 mg 1 to 2 tablets at bedtime, unable to tolerate during the daytime.  Amitriptyline 75 mg at bedtime for migraines  Flexeril as needed  Tylenol as needed  Lidocaine gel as needed     No benefit with recent Medrol Dosepak.     *Patient unable to tolerate NSAIDs due to GI surgery.    Patient Active Problem List   Diagnosis     Spinal stenosis in cervical region     History of gastric ulcer     Hx of Helicobacter infection     Migraine with aura and without status migrainosus, not intractable     Panic attacks     S/P laparoscopic sleeve gastrectomy     Lumbar radiculopathy     Major depressive disorder with single episode, in full remission (H)     Chronic insomnia     Class 1 obesity due to excess calories with serious comorbidity and body mass index (BMI) of 31.0 to 31.9 in  adult       Current Outpatient Medications   Medication     eletriptan (RELPAX) 40 MG tablet     EPINEPHrine (ANY BX GENERIC EQUIV) 0.3 MG/0.3ML injection 2-pack     FLUoxetine (PROZAC) 40 MG capsule     hydrOXYzine (ATARAX) 25 MG tablet     lidocaine (LIDODERM) 5 % patch     lisdexamfetamine (VYVANSE) 50 MG capsule     pregabalin (LYRICA) 75 MG capsule     rizatriptan (MAXALT-MLT) 10 MG ODT     topiramate (TOPAMAX) 50 MG tablet     No current facility-administered medications for this visit.       Allergies   Allergen Reactions     Cephalexin Hives     Coconut Flavor Anaphylaxis     Covid-19 (Mrna) Vaccine Anaphylaxis     Pfizer      Prochlorperazine Other (See Comments)     Tremors  When given with metoclopramide, okay by itself       Coconut Fatty Acids      Other reaction(s): Edema     Penicillins Hives       Past Medical History:   Diagnosis Date     Acute and chronic respiratory failure with hypoxia (H) 01/01/2022     Anxiety      Arthritis      Depressive disorder      Gastric band slippage 09/26/2019     History of blood transfusion 04/1978     Kidney infection      Lumbar radiculopathy      Migraine      Migraine      Painless urinary retention due to cauda equina syndrome (H)      Pancreatitis      Panic attack      PONV (postoperative nausea and vomiting)      Spinal stenosis in cervical region      UTI (lower urinary tract infection)         Review of Systems  ROS:  Specifically negative for bowel/bladder dysfunction, balance changes, headache, dizziness, foot drop, fevers, chills, appetite changes, nausea/vomiting, unexplained weight loss. Otherwise 13 systems reviewed are negative. Please see the patient's intake questionnaire from today for details.    Reviewed Social, Family, Past Medical and Past Surgical history with patient, no significant changes noted since prior visit.     Objective:     /80 (BP Location: Right arm, Patient Position: Sitting)     PHYSICAL EXAMINATION:    --CONSTITUTIONAL:  Well developed, well nourished, healthy appearing individual.  --PSYCHIATRIC: Appropriate mood and affect. No difficulty interacting due to temper, social withdrawal, or memory issues.  --SKIN: Lumbar region is dry and intact.   --RESPIRATORY: Normal rhythm and effort. No abnormal accessory muscle breathing patterns noted.   --MUSCULOSKELETAL:  Normal lumbar lordosis noted, no lateral shift.  --GROSS MOTOR: Easily arises from a seated position. Gait is non-antalgic  --LUMBAR SPINE:  Inspection reveals no evidence of deformity. Range of motion is not limited in lumbar flexion, extension, lateral rotation.  Tenderness to palpation L4-5 midline and lumbosacral junction bilaterally right greater than left as well as bilateral right greater than left sacroiliac notch.  Straight leg raising is positive to back pain on the right.  --SACROILIAC JOINT: Positive right distraction.  Positive right Zeb's with reproduction of pain to affected extremity.  Positive right Gaenslen's Test with reproduction of pain to affected extremity.   --LOWER EXTREMITY MOTOR TESTING:  Plantar flexion left 5/5, right 5/5   Dorsiflexion left 5/5, right 5/5   Great toe MTP extension left 5/5, right 5/5  Knee flexion left 5/5, right 5/5  Knee extension left 5/5, right 5/5   Hip flexion left 5/5, right 5/5  Hip abduction left 5/5, right 5/5  Hip adduction left 5/5, right 5/5   --HIPS: Full range of motion bilaterally.  Negative scour maneuver.  --NEUROLOGIC: Bilateral patellar and achilles reflexes are physiologic and symmetric. Sensation to light touch is intact in the bilateral L4, L5, and S1 dermatomes.    RESULTS:   Imaging: Spine imaging was reviewed today. The images were shown to the patient and the findings were explained using a spine model.                                Again, thank you for allowing me to participate in the care of your patient.        Sincerely,        Esme Hodgson, CNP

## 2023-05-22 NOTE — PATIENT INSTRUCTIONS
~Please call our [facility] Nurse Navigation line [phone] with any questions or concerns about your treatment plan, if symptoms worsen and you would like to be seen urgently, or if you have problems controlling bladder and bowel function.  ~Please note that any My Chart messages may take multiple days for a response due to the high volume of patients seen in clinic.   Anything sent Thursday night or after will be answered the following week when able, as Esme Hodgson CNP does not work in clinic on Fridays.        Importance of specialized Physical Therapy: Discussed the importance of core strengthening, ROM, stretching exercises with the patient and how each of these entities is important in decreasing pain.  Explained to the patient that the purpose of physical therapy is to teach the patient a home exercise program individualized to them and their specific health concerns.  These exercises need to be performed every day in order to decrease pain and prevent future occurrences of pain.           Imaging has been ordered. Radiology will call you to schedule. Please call below if you do not hear from them in the next couple of days.     [facility] Radiology Scheduling    Please call [phone] to schedule your image(s) (select option #1). There are 3 different locations, see below.     [facility]  [address] [facility] [place]  [address]   [place] [id number]    [place] [id number] [place] [address]       You have been prescribed a controlled substance medication today for pain control.   This medication must be taken as prescribed only as it can be very dangerous to your health if taken more than prescribed.  We discussed the risks (eg, addiction, overdose, worsening pain, death) verses the potential benefit of opioid medication use. Explained that this medication will not be a  long term solution to ongoing pain. Discussed using lowest effective dose and the importance of other measures for pain management including physical therapy, other non-opioid medications, behavioral treatments, acupuncture and massage, and other procedure options.     **For any further refills on opioid pain medication you must schedule, in advance, an in person clinic or video visit to discuss refill further, in which you may or may not receive refills.   Esme Hodgson CNP is in clinic Monday - Thursdays. This medication must be refilled by one provider only. Last minute appointments may not be able to be accommodated.

## 2023-05-23 ENCOUNTER — TELEPHONE (OUTPATIENT)
Dept: PHYSICAL MEDICINE AND REHAB | Facility: CLINIC | Age: 43
End: 2023-05-23
Payer: COMMERCIAL

## 2023-05-23 RX ORDER — PREDNISONE 10 MG/1
TABLET ORAL
Qty: 18 TABLET | Refills: 0 | Status: SHIPPED | OUTPATIENT
Start: 2023-05-23 | End: 2023-06-01

## 2023-05-23 RX ORDER — OXYCODONE HYDROCHLORIDE 5 MG/1
5 TABLET ORAL 3 TIMES DAILY PRN
Qty: 21 TABLET | Refills: 0 | Status: SHIPPED | OUTPATIENT
Start: 2023-05-23 | End: 2023-05-30

## 2023-05-23 NOTE — TELEPHONE ENCOUNTER
[facility] Call Center    Phone Message    May a detailed message be left on voicemail: yes     Reason for Call: Medication Question or concern regarding medication   Prescription Clarification  Name of Medication: Oxycodone     Prescribing Provider: Esme Hodgson [facility]    Pharmacy: [facility]      What on the order needs clarification?     Pt picked up rx of percocet on 05/22 and cub received rx for oxycodone on 05/23.  Both rx's from Esme Hodgson.  [facility] asking if this add on or if this is replacing the percocet.    [facility] asking for call back at [phone]    Action Taken: Message routed to:  [facility]    Travel Screening: Not Applicable

## 2023-05-24 DIAGNOSIS — R29.898 WEAKNESS OF RIGHT LOWER EXTREMITY: ICD-10-CM

## 2023-05-24 DIAGNOSIS — M54.41 CHRONIC RIGHT-SIDED LOW BACK PAIN WITH RIGHT-SIDED SCIATICA: Primary | ICD-10-CM

## 2023-05-24 DIAGNOSIS — Z98.890 HISTORY OF LUMBOSACRAL SPINE SURGERY: ICD-10-CM

## 2023-05-24 DIAGNOSIS — G89.29 CHRONIC RIGHT-SIDED LOW BACK PAIN WITH RIGHT-SIDED SCIATICA: Primary | ICD-10-CM

## 2023-05-24 DIAGNOSIS — M54.16 RIGHT LUMBAR RADICULOPATHY: ICD-10-CM

## 2023-05-25 ENCOUNTER — HOSPITAL ENCOUNTER (OUTPATIENT)
Dept: MRI IMAGING | Facility: HOSPITAL | Age: 43
Discharge: HOME | End: 2023-05-25
Attending: NURSE PRACTITIONER
Payer: COMMERCIAL

## 2023-05-25 DIAGNOSIS — M47.812 ARTHROPATHY OF CERVICAL FACET JOINT: ICD-10-CM

## 2023-05-25 DIAGNOSIS — G89.29 CHRONIC RIGHT-SIDED LOW BACK PAIN WITH RIGHT-SIDED SCIATICA: ICD-10-CM

## 2023-05-25 DIAGNOSIS — M51.369 DDD (DEGENERATIVE DISC DISEASE), LUMBAR: ICD-10-CM

## 2023-05-25 DIAGNOSIS — Z98.890 HISTORY OF LUMBOSACRAL SPINE SURGERY: ICD-10-CM

## 2023-05-25 DIAGNOSIS — Z98.890 HX OF CERVICAL SPINE SURGERY: ICD-10-CM

## 2023-05-25 DIAGNOSIS — M54.2 CHRONIC NECK PAIN: ICD-10-CM

## 2023-05-25 DIAGNOSIS — R29.898 WEAKNESS OF RIGHT LOWER EXTREMITY: ICD-10-CM

## 2023-05-25 DIAGNOSIS — G89.29 CHRONIC NECK PAIN: ICD-10-CM

## 2023-05-25 DIAGNOSIS — G89.29 CHRONIC RIGHT-SIDED LOW BACK PAIN WITH RIGHT-SIDED SCIATICA: Primary | ICD-10-CM

## 2023-05-25 DIAGNOSIS — M48.061 LUMBAR FORAMINAL STENOSIS: ICD-10-CM

## 2023-05-25 DIAGNOSIS — M54.41 CHRONIC RIGHT-SIDED LOW BACK PAIN WITH RIGHT-SIDED SCIATICA: Primary | ICD-10-CM

## 2023-05-25 DIAGNOSIS — M54.16 RIGHT LUMBAR RADICULOPATHY: ICD-10-CM

## 2023-05-25 DIAGNOSIS — M54.41 CHRONIC RIGHT-SIDED LOW BACK PAIN WITH RIGHT-SIDED SCIATICA: ICD-10-CM

## 2023-05-25 PROCEDURE — 255N000002 HC RX 255 OP 636: Performed by: NURSE PRACTITIONER

## 2023-05-25 PROCEDURE — 72156 MRI NECK SPINE W/O & W/DYE: CPT

## 2023-05-25 PROCEDURE — A9585 GADOBUTROL INJECTION: HCPCS | Performed by: NURSE PRACTITIONER

## 2023-05-25 PROCEDURE — 72158 MRI LUMBAR SPINE W/O & W/DYE: CPT

## 2023-05-25 RX ORDER — GADOBUTROL 604.72 MG/ML
0.1 INJECTION INTRAVENOUS ONCE
Status: COMPLETED | OUTPATIENT
Start: 2023-05-25 | End: 2023-05-25

## 2023-05-25 RX ADMIN — GADOBUTROL 8 ML: 604.72 INJECTION INTRAVENOUS at 18:05

## 2023-05-30 ENCOUNTER — RADIOLOGY INJECTION OFFICE VISIT (OUTPATIENT)
Dept: PHYSICAL MEDICINE AND REHAB | Facility: CLINIC | Age: 43
End: 2023-05-30
Attending: NURSE PRACTITIONER
Payer: COMMERCIAL

## 2023-05-30 VITALS
SYSTOLIC BLOOD PRESSURE: 124 MMHG | RESPIRATION RATE: 18 BRPM | HEART RATE: 99 BPM | DIASTOLIC BLOOD PRESSURE: 80 MMHG | OXYGEN SATURATION: 97 % | TEMPERATURE: 98.2 F

## 2023-05-30 DIAGNOSIS — Z98.890 HISTORY OF LUMBOSACRAL SPINE SURGERY: ICD-10-CM

## 2023-05-30 DIAGNOSIS — G89.29 CHRONIC NECK PAIN: ICD-10-CM

## 2023-05-30 DIAGNOSIS — M54.41 CHRONIC RIGHT-SIDED LOW BACK PAIN WITH RIGHT-SIDED SCIATICA: ICD-10-CM

## 2023-05-30 DIAGNOSIS — M54.16 RIGHT LUMBAR RADICULOPATHY: ICD-10-CM

## 2023-05-30 DIAGNOSIS — R29.898 WEAKNESS OF RIGHT LOWER EXTREMITY: ICD-10-CM

## 2023-05-30 DIAGNOSIS — M54.81 BILATERAL OCCIPITAL NEURALGIA: ICD-10-CM

## 2023-05-30 DIAGNOSIS — M54.2 CHRONIC NECK PAIN: ICD-10-CM

## 2023-05-30 DIAGNOSIS — G89.29 CHRONIC RIGHT-SIDED LOW BACK PAIN WITH RIGHT-SIDED SCIATICA: Primary | ICD-10-CM

## 2023-05-30 DIAGNOSIS — M54.41 CHRONIC RIGHT-SIDED LOW BACK PAIN WITH RIGHT-SIDED SCIATICA: Primary | ICD-10-CM

## 2023-05-30 DIAGNOSIS — Z98.890 HX OF CERVICAL SPINE SURGERY: ICD-10-CM

## 2023-05-30 DIAGNOSIS — G89.29 CHRONIC RIGHT-SIDED LOW BACK PAIN WITH RIGHT-SIDED SCIATICA: ICD-10-CM

## 2023-05-30 PROCEDURE — 64484 NJX AA&/STRD TFRM EPI L/S EA: CPT | Mod: RT | Performed by: PAIN MEDICINE

## 2023-05-30 PROCEDURE — 64483 NJX AA&/STRD TFRM EPI L/S 1: CPT | Mod: RT | Performed by: PAIN MEDICINE

## 2023-05-30 RX ORDER — LIDOCAINE HYDROCHLORIDE 10 MG/ML
INJECTION, SOLUTION EPIDURAL; INFILTRATION; INTRACAUDAL; PERINEURAL
Status: COMPLETED | OUTPATIENT
Start: 2023-05-30 | End: 2023-05-30

## 2023-05-30 RX ORDER — DEXAMETHASONE SODIUM PHOSPHATE 10 MG/ML
INJECTION, SOLUTION INTRAMUSCULAR; INTRAVENOUS
Status: COMPLETED | OUTPATIENT
Start: 2023-05-30 | End: 2023-05-30

## 2023-05-30 RX ADMIN — LIDOCAINE HYDROCHLORIDE 4 ML: 10 INJECTION, SOLUTION EPIDURAL; INFILTRATION; INTRACAUDAL; PERINEURAL at 11:39

## 2023-05-30 RX ADMIN — DEXAMETHASONE SODIUM PHOSPHATE 20 MG: 10 INJECTION, SOLUTION INTRAMUSCULAR; INTRAVENOUS at 11:39

## 2023-05-30 ASSESSMENT — PAIN SCALES - GENERAL
PAINLEVEL: MODERATE PAIN (4)
PAINLEVEL: EXTREME PAIN (8)

## 2023-05-30 NOTE — PATIENT INSTRUCTIONS
DISCHARGE INSTRUCTIONS    During office hours (8:00 a.m.- 4:00 p.m.) questions or concerns may be answered  by calling Spine Center Navigation Nurses at  [phone].  Messages received after hours will be returned the following business day.      In the case of an emergency, please dial 911 or seek assistance at the nearest Emergency Room/Urgent Care facility.     All Patients:    You may experience an increase in your symptoms for the first 2 days (It may take anywhere between 2 days- 2 weeks for the steroid to have maximum effect).    You may use ice on the injection site, as frequently as 20 minutes each hour if needed.    You may take your pain medicine.    You may continue taking your regular medication after your injection. If you have had a Medial Branch Block you may resume pain medication once your pain diary is completed.    You may shower. No swimming, tub bath or hot tub for 48 hours.  You may remove your bandaid/bandage as soon as you are home.    You may resume light activities, as tolerated.    Resume your usual diet as tolerated.    It is strongly advised that you do not drive for 1-3 hours post injection.    If you have had oral sedation:  Do not drive for 8 hours post injection.      If you have had IV sedation:  Do not drive for 24 hours post injection.  Do not operate hazardous machinery or make important personal/business decisions for 24 hours.      POSSIBLE STEROID SIDE EFFECTS (If steroid/cortisone was used for your procedure)    -If you experience these symptoms, it should only last for a short period    Swelling of the legs              Skin redness (flushing)     Mouth (oral) irritation   Blood sugar (glucose) levels            Sweats                    Mood changes  Headache  Sleeplessness  Weakened immune system for up to 14 days, which could increase the risk of manuel the COVID-19 virus and/or experiencing more severe symptoms of the disease, if exposed.  Decreased  effectiveness of the flu vaccine if given within 2 weeks of the steroid.         POSSIBLE PROCEDURE SIDE EFFECTS  -Call the Spine Center if you are concerned  Increased Pain           Increased numbness/tingling      Nausea/Vomiting          Bruising/bleeding at site      Redness or swelling                                              Difficulty walking      Weakness           Fever greater than 100.5    *In the event of a severe headache after an epidural steroid injection that is relieved by lying down, please call the [facility] to speak with a clinical staff member*

## 2023-05-31 NOTE — PROGRESS NOTES
Assessment:   Madeline Szymanski is a 43 year old y.o. female with past medical history significant for migraine, obesity, depression, history of gastric ulcer, chronic insomnia, status post laparoscopic sleeve gastrectomy who presents today for follow-up regarding chronic neck pain with cervicogenic headaches as well as migraine.  Headaches have been getting progressively worse.  She had been getting relief with occipital nerve blocks but they are no longer covered by her insurance.  She is currently in a 3-week headache flare.  Over the past 3 months she has had greater than 15 headache days per month.  Headaches last 20 out of 24 hours of the day.  She has tried and failed extensive conservative treatment (see below).  - Patient has a history of a C5-C6 disc replacement on August 8, 2022 with Dr. Lilly.  My review of an MRI cervical spine shows multilevel cervical spondylosis.  There is mild spinal canal stenosis C5-6.  There is scattered mild to moderate foraminal stenosis.    PSP: Esme Hodgson CNP  Plan:     A shared decision making plan was used.  The patient's values and choices were respected.  The following represents what was discussed and decided upon by the physician assistant and the patient.      1.  DIAGNOSTIC TESTS: I reviewed the MRI cervical spine.  No further diagnostic tests were ordered.    2.  PHYSICAL THERAPY: Patient went to physical therapy for neck pain in 2021.  She should continue with home exercises.    3.  MEDICATIONS: No changes are made to the patient's medications.    4.  INTERVENTIONS: I offer the patient Botox injections for chronic migraine.  Patient indicated she would like to proceed.  We will send request to prior authorization team.    5.  PATIENT EDUCATION: Patient is in agreement the above plan.  All questions were answered.    6.  FOLLOW-UP: We will notify the patient if we get prior authorization for Botox injections for chronic migraine and assist with  scheduling.    Subjective:     Madeline Szymanski is a 43 year old female who presents today for follow-up regarding chronic headaches with migraine.  Patient reports that she has 2 different types of headaches.  She has had migraines for years.  Her migraine headache starts at the top of her head and radiates down and are associated with photo/phonophobia.  Since she had cervical spine surgery in 2022 she also developed headaches that began in her neck and radiate up into the occipital region and spread around the left ear and eventually causes global headaches.  She had been getting occipital nerve blocks which were helpful but insurance stopped covering the occipital nerve blocks.  She is interested in finding out of Botox may be option for her.    Patient complains of chronic headaches.  For the past 3 months she has had at least 15 headache days per month.  She is currently in a 3-week flare of daily headache.  Her headaches typically last 20 out of 24 hours of the day.  She is experiencing both types of headaches daily.  The migraines typically occur in the afternoon and evening.  She rates her pain today as a 3 out of 10.  At its worst it is a 7 out of 10.  At its best it is a 2 out of 10.  Pain is aggravated movement, light, sound.  Pain is alleviated with being in the dark, applying ice, lidocaine, sometimes nothing helps.    Treatment to date:  - C5-6 disc replacement surgery August 8, 2020 to Dr. Lilly    - Physical therapy for neck pain 2021    - Bilateral occipital nerve block January 25, 2023 with significant relief  - Bilateral occipital nerve block October 24, 2022 with significant relief  - Right C5-6 TFESI May 3, 2022    Medications: She states that she tried all of these medications for at least 8 weeks.  -Gabapentin stopped working  - Pregabalin 75 mg at bedtime currently with no significant relief  - Amitriptyline, tried for 1 year but had to stop due to side effects  - Cyclobenzaprine helped with  low back pain but did not help with neck pain/headaches.  - Tylenol twice daily is not helpful  - Topiramate twice daily.  She tried to wean off this medication recently because she felt she was taking too many pills but restarted it due to ongoing headaches  - Relpax daily is somewhat helpful  - Rizatriptan twice daily is not very helpful  - Fluoxetine    Review of Systems:  Positive for numbness/tingling, headache, pain much worse in neck, trip/stumble/falls.  Negative for weakness, loss of bowel/bladder control, inability to urinate, difficulty swallowing, difficulty with hand skills, fevers, unintentional weight loss.     Objective:   CONSTITUTIONAL:  Vital signs as above.  No acute distress.  The patient is well nourished and well groomed.    PSYCHIATRIC:  The patient is awake, alert, oriented to person, place and time.  The patient is answering questions appropriately with clear speech.  Normal affect.  HEENT: Normocephalic, atraumatic.  Sclera clear.    SKIN: Exposed skin is clean, dry, intact without rashes.  MUSCULOSKELETAL: Moving all extremities equally.  NEUROLOGICAL: Cranial nerves II through XII grossly intact.    RESULTS: I reviewed the MRI cervical spine from [facility] dated May 25, 2023.  This shows multilevel cervical spondylosis with congenital spinal canal narrowing.  Postoperative changes of an anterior cervical disc arthroplasty C5-C6.  There is decreased mild spinal canal stenosis at this level.  There is mild to moderate foraminal stenosis right C3-4, bilaterally C5-6.  There is mild foraminal stenosis left C3-4, bilaterally C6-7.  Normal cervical spinal cord signal.     CT head November 24, 2021 showed no acute intracranial abnormality

## 2023-06-01 ENCOUNTER — OFFICE VISIT (OUTPATIENT)
Dept: PHYSICAL MEDICINE AND REHAB | Facility: CLINIC | Age: 43
End: 2023-06-01
Payer: COMMERCIAL

## 2023-06-01 VITALS
WEIGHT: 182 LBS | BODY MASS INDEX: 31.07 KG/M2 | DIASTOLIC BLOOD PRESSURE: 78 MMHG | OXYGEN SATURATION: 98 % | HEIGHT: 64 IN | SYSTOLIC BLOOD PRESSURE: 120 MMHG | HEART RATE: 88 BPM

## 2023-06-01 DIAGNOSIS — G43.709 CHRONIC MIGRAINE WITHOUT AURA WITHOUT STATUS MIGRAINOSUS, NOT INTRACTABLE: Primary | ICD-10-CM

## 2023-06-01 PROCEDURE — 99214 OFFICE O/P EST MOD 30 MIN: CPT | Performed by: PHYSICIAN ASSISTANT

## 2023-06-01 ASSESSMENT — PAIN SCALES - GENERAL: PAINLEVEL: MILD PAIN (3)

## 2023-06-01 NOTE — LETTER
6/1/2023         RE: Madeline Szymanski  [facility] [facility] [address]        Dear Colleague,    Thank you for referring your patient, Madeline Szymanski, to the [facility]. Please see a copy of my visit note below.    Assessment:   Madeline Szymanski is a 43 year old y.o. female with past medical history significant for migraine, obesity, depression, history of gastric ulcer, chronic insomnia, status post laparoscopic sleeve gastrectomy who presents today for follow-up regarding chronic neck pain with cervicogenic headaches as well as migraine.  Headaches have been getting progressively worse.  She had been getting relief with occipital nerve blocks but they are no longer covered by her insurance.  She is currently in a 3-week headache flare.  Over the past 3 months she has had greater than 15 headache days per month.  Headaches last 20 out of 24 hours of the day.  She has tried and failed extensive conservative treatment (see below).  - Patient has a history of a C5-C6 disc replacement on August 8, 2022 with Dr. Lilly.  My review of an MRI cervical spine shows multilevel cervical spondylosis.  There is mild spinal canal stenosis C5-6.  There is scattered mild to moderate foraminal stenosis.    PSP: Esme Hodgson CNP  Plan:     A shared decision making plan was used.  The patient's values and choices were respected.  The following represents what was discussed and decided upon by the physician assistant and the patient.      1.  DIAGNOSTIC TESTS: I reviewed the MRI cervical spine.  No further diagnostic tests were ordered.    2.  PHYSICAL THERAPY: Patient went to physical therapy for neck pain in 2021.  She should continue with home exercises.    3.  MEDICATIONS: No changes are made to the patient's medications.    4.  INTERVENTIONS: I offer the patient Botox injections for chronic migraine.  Patient indicated she would like to proceed.  We will send request to prior  authorization team.    5.  PATIENT EDUCATION: Patient is in agreement the above plan.  All questions were answered.    6.  FOLLOW-UP: We will notify the patient if we get prior authorization for Botox injections for chronic migraine and assist with scheduling.    Subjective:     Madeline Szymanski is a 43 year old female who presents today for follow-up regarding chronic headaches with migraine.  Patient reports that she has 2 different types of headaches.  She has had migraines for years.  Her migraine headache starts at the top of her head and radiates down and are associated with photo/phonophobia.  Since she had cervical spine surgery in 2022 she also developed headaches that began in her neck and radiate up into the occipital region and spread around the left ear and eventually causes global headaches.  She had been getting occipital nerve blocks which were helpful but insurance stopped covering the occipital nerve blocks.  She is interested in finding out of Botox may be option for her.    Patient complains of chronic headaches.  For the past 3 months she has had at least 15 headache days per month.  She is currently in a 3-week flare of daily headache.  Her headaches typically last 20 out of 24 hours of the day.  She is experiencing both types of headaches daily.  The migraines typically occur in the afternoon and evening.  She rates her pain today as a 3 out of 10.  At its worst it is a 7 out of 10.  At its best it is a 2 out of 10.  Pain is aggravated movement, light, sound.  Pain is alleviated with being in the dark, applying ice, lidocaine, sometimes nothing helps.    Treatment to date:  - C5-6 disc replacement surgery August 8, 2020 to Dr. Lilly    - Physical therapy for neck pain 2021    - Bilateral occipital nerve block January 25, 2023 with significant relief  - Bilateral occipital nerve block October 24, 2022 with significant relief  - Right C5-6 TFESI May 3, 2022    Medications: She states that she  tried all of these medications for at least 8 weeks.  -Gabapentin stopped working  - Pregabalin 75 mg at bedtime currently with no significant relief  - Amitriptyline, tried for 1 year but had to stop due to side effects  - Cyclobenzaprine helped with low back pain but did not help with neck pain/headaches.  - Tylenol twice daily is not helpful  - Topiramate twice daily.  She tried to wean off this medication recently because she felt she was taking too many pills but restarted it due to ongoing headaches  - Relpax daily is somewhat helpful  - Rizatriptan twice daily is not very helpful  - Fluoxetine    Review of Systems:  Positive for numbness/tingling, headache, pain much worse in neck, trip/stumble/falls.  Negative for weakness, loss of bowel/bladder control, inability to urinate, difficulty swallowing, difficulty with hand skills, fevers, unintentional weight loss.     Objective:   CONSTITUTIONAL:  Vital signs as above.  No acute distress.  The patient is well nourished and well groomed.    PSYCHIATRIC:  The patient is awake, alert, oriented to person, place and time.  The patient is answering questions appropriately with clear speech.  Normal affect.  HEENT: Normocephalic, atraumatic.  Sclera clear.    SKIN: Exposed skin is clean, dry, intact without rashes.  MUSCULOSKELETAL: Moving all extremities equally.  NEUROLOGICAL: Cranial nerves II through XII grossly intact.    RESULTS: I reviewed the MRI cervical spine from [facility] dated May 25, 2023.  This shows multilevel cervical spondylosis with congenital spinal canal narrowing.  Postoperative changes of an anterior cervical disc arthroplasty C5-C6.  There is decreased mild spinal canal stenosis at this level.  There is mild to moderate foraminal stenosis right C3-4, bilaterally C5-6.  There is mild foraminal stenosis left C3-4, bilaterally C6-7.  Normal cervical spinal cord signal.     CT head November 24, 2021 showed no acute intracranial  abnormality      Again, thank you for allowing me to participate in the care of your patient.        Sincerely,        Latha Bowling PA-C

## 2023-06-05 ENCOUNTER — VIRTUAL VISIT (OUTPATIENT)
Dept: FAMILY MEDICINE | Facility: CLINIC | Age: 43
End: 2023-06-05
Payer: COMMERCIAL

## 2023-06-05 ENCOUNTER — THERAPY VISIT (OUTPATIENT)
Dept: PHYSICAL THERAPY | Facility: CLINIC | Age: 43
End: 2023-06-05
Payer: COMMERCIAL

## 2023-06-05 VITALS
HEIGHT: 64 IN | OXYGEN SATURATION: 98 % | BODY MASS INDEX: 31.34 KG/M2 | DIASTOLIC BLOOD PRESSURE: 85 MMHG | WEIGHT: 183.6 LBS | HEART RATE: 90 BPM | SYSTOLIC BLOOD PRESSURE: 124 MMHG | RESPIRATION RATE: 16 BRPM

## 2023-06-05 DIAGNOSIS — E66.09 CLASS 1 OBESITY DUE TO EXCESS CALORIES WITH SERIOUS COMORBIDITY AND BODY MASS INDEX (BMI) OF 31.0 TO 31.9 IN ADULT: Primary | ICD-10-CM

## 2023-06-05 DIAGNOSIS — E78.1 HYPERTRIGLYCERIDEMIA: ICD-10-CM

## 2023-06-05 DIAGNOSIS — G89.29 CHRONIC NECK PAIN: ICD-10-CM

## 2023-06-05 DIAGNOSIS — Z12.11 SCREEN FOR COLON CANCER: ICD-10-CM

## 2023-06-05 DIAGNOSIS — M54.81 BILATERAL OCCIPITAL NEURALGIA: ICD-10-CM

## 2023-06-05 DIAGNOSIS — M54.16 RIGHT LUMBAR RADICULOPATHY: ICD-10-CM

## 2023-06-05 DIAGNOSIS — F50.819 BINGE EATING DISORDER: ICD-10-CM

## 2023-06-05 DIAGNOSIS — G89.29 CHRONIC RIGHT-SIDED LOW BACK PAIN WITH RIGHT-SIDED SCIATICA: Primary | ICD-10-CM

## 2023-06-05 DIAGNOSIS — F32.5 MAJOR DEPRESSIVE DISORDER WITH SINGLE EPISODE, IN FULL REMISSION (H): ICD-10-CM

## 2023-06-05 DIAGNOSIS — Z98.890 HX OF CERVICAL SPINE SURGERY: ICD-10-CM

## 2023-06-05 DIAGNOSIS — M54.2 CHRONIC NECK PAIN: ICD-10-CM

## 2023-06-05 DIAGNOSIS — R29.898 WEAKNESS OF RIGHT LOWER EXTREMITY: ICD-10-CM

## 2023-06-05 DIAGNOSIS — M48.02 SPINAL STENOSIS IN CERVICAL REGION: ICD-10-CM

## 2023-06-05 DIAGNOSIS — M54.41 CHRONIC RIGHT-SIDED LOW BACK PAIN WITH RIGHT-SIDED SCIATICA: Primary | ICD-10-CM

## 2023-06-05 DIAGNOSIS — E66.811 CLASS 1 OBESITY DUE TO EXCESS CALORIES WITH SERIOUS COMORBIDITY AND BODY MASS INDEX (BMI) OF 31.0 TO 31.9 IN ADULT: Primary | ICD-10-CM

## 2023-06-05 DIAGNOSIS — Z98.890 HISTORY OF LUMBOSACRAL SPINE SURGERY: ICD-10-CM

## 2023-06-05 PROCEDURE — 97161 PT EVAL LOW COMPLEX 20 MIN: CPT | Mod: GP | Performed by: PHYSICAL THERAPIST

## 2023-06-05 PROCEDURE — 99214 OFFICE O/P EST MOD 30 MIN: CPT | Performed by: FAMILY MEDICINE

## 2023-06-05 RX ORDER — LISDEXAMFETAMINE DIMESYLATE 50 MG/1
50 CAPSULE ORAL EVERY MORNING
Qty: 30 CAPSULE | Refills: 0 | Status: SHIPPED | OUTPATIENT
Start: 2023-06-05 | End: 2023-07-03

## 2023-06-05 RX ORDER — DULOXETIN HYDROCHLORIDE 30 MG/1
30 CAPSULE, DELAYED RELEASE ORAL DAILY
Qty: 30 CAPSULE | Refills: 1 | Status: SHIPPED | OUTPATIENT
Start: 2023-06-05 | End: 2023-07-03

## 2023-06-05 ASSESSMENT — ANXIETY QUESTIONNAIRES
4. TROUBLE RELAXING: NEARLY EVERY DAY
1. FEELING NERVOUS, ANXIOUS, OR ON EDGE: SEVERAL DAYS
IF YOU CHECKED OFF ANY PROBLEMS ON THIS QUESTIONNAIRE, HOW DIFFICULT HAVE THESE PROBLEMS MADE IT FOR YOU TO DO YOUR WORK, TAKE CARE OF THINGS AT HOME, OR GET ALONG WITH OTHER PEOPLE: SOMEWHAT DIFFICULT
3. WORRYING TOO MUCH ABOUT DIFFERENT THINGS: MORE THAN HALF THE DAYS
5. BEING SO RESTLESS THAT IT IS HARD TO SIT STILL: NEARLY EVERY DAY
7. FEELING AFRAID AS IF SOMETHING AWFUL MIGHT HAPPEN: NOT AT ALL
8. IF YOU CHECKED OFF ANY PROBLEMS, HOW DIFFICULT HAVE THESE MADE IT FOR YOU TO DO YOUR WORK, TAKE CARE OF THINGS AT HOME, OR GET ALONG WITH OTHER PEOPLE?: SOMEWHAT DIFFICULT
GAD7 TOTAL SCORE: 12
7. FEELING AFRAID AS IF SOMETHING AWFUL MIGHT HAPPEN: NOT AT ALL
GAD7 TOTAL SCORE: 12
2. NOT BEING ABLE TO STOP OR CONTROL WORRYING: NOT AT ALL
GAD7 TOTAL SCORE: 12
6. BECOMING EASILY ANNOYED OR IRRITABLE: NEARLY EVERY DAY

## 2023-06-05 ASSESSMENT — PATIENT HEALTH QUESTIONNAIRE - PHQ9
SUM OF ALL RESPONSES TO PHQ QUESTIONS 1-9: 9
SUM OF ALL RESPONSES TO PHQ QUESTIONS 1-9: 9
10. IF YOU CHECKED OFF ANY PROBLEMS, HOW DIFFICULT HAVE THESE PROBLEMS MADE IT FOR YOU TO DO YOUR WORK, TAKE CARE OF THINGS AT HOME, OR GET ALONG WITH OTHER PEOPLE: SOMEWHAT DIFFICULT

## 2023-06-05 ASSESSMENT — ENCOUNTER SYMPTOMS: BACK PAIN: 1

## 2023-06-05 NOTE — ASSESSMENT & PLAN NOTE
Struggling with depression/anxiety.  She does not think that fluoxetine has been as helpful.  She has a scheduled psychotherapist appointment.  Looking for alternative medications for depression.  - Trial of Cymbalta given struggles with pain.  Initial dose will be 30 mg although I suspect a therapeutic dose which would be 60 mg.

## 2023-06-05 NOTE — PROGRESS NOTES
Assessment & Plan   Problem List Items Addressed This Visit     Class 1 obesity due to excess calories with serious comorbidity and body mass index (BMI) of 31.0 to 31.9 in adult - Primary     Weight stable despite stress and almost daily migrainous headaches.  Continue Vyvanse for binge eating disorder.         Relevant Medications    lisdexamfetamine (VYVANSE) 50 MG capsule    Major depressive disorder with single episode, in full remission (H)     Struggling with depression/anxiety.  She does not think that fluoxetine has been as helpful.  She has a scheduled psychotherapist appointment.  Looking for alternative medications for depression.  - Trial of Cymbalta given struggles with pain.  Initial dose will be 30 mg although I suspect a therapeutic dose which would be 60 mg.         Relevant Medications    DULoxetine (CYMBALTA) 30 MG capsule    Spinal stenosis in cervical region     Ongoing neck and back pain with associated headaches.  She had been on occipital nerve blocks until late last year when they were discontinued by her new insurance plan.  Her spine care team is hopeful that they will get coverage for Botox injections which they think may help.  [facility] paperwork submitted as this patient is having daily migraine headaches.  The goal of a medical leave is to start physical therapy, psychotherapy, hopefully Botox injections or resume occipital nerve blocks and focus on self-care.  - Trial of Cymbalta.  Initial dose will be 30 mg although I suspect we will need to titrate.         Relevant Medications    DULoxetine (CYMBALTA) 30 MG capsule   Other Visit Diagnoses     Screen for colon cancer        Relevant Medications    DULoxetine (CYMBALTA) 30 MG capsule    Hypertriglyceridemia        Relevant Medications    DULoxetine (CYMBALTA) 30 MG capsule    Binge eating disorder        Relevant Medications    lisdexamfetamine (VYVANSE) 50 MG capsule           BMI:   Estimated body mass index is 31.51 kg/m  as  "calculated from the following:    Height as of this encounter: 1.626 m (5' 4\").    Weight as of this encounter: 83.3 kg (183 lb 9.6 oz).   Weight management plan: Specific weight management program called Comprehensive weight management discussed    Sebas Valdez MD  [facility] KAYLEY Correa is a 43 year old, presenting for the following health issues:  Back Pain (X3 wks, seeing spine doctor, want leave of absent fill out)        6/5/2023    11:21 AM   Additional Questions   Roomed by [place] MA   Accompanied by self         6/5/2023    11:21 AM   Patient Reported Additional Medications   Patient reports taking the following new medications None     Multiple concerns:   - Mood: She is not sure that fluoxetine and has been helpful.  She went off of it a couple weeks ago.  No change in symptoms.  Struggling with anxiety and depression.  Also struggling with pain as described low.  - Daily headaches: Dramatically worse since occipital nerve blocks were discontinued.  With her new insurance this calendar year she was told that her established regimen is \"experimental.\"  This was recommended by her [facility] provider and had been providing relief.  - Neck and back pain: In physical therapy.  Using MedX protocol.  She is sore and generally takes several hours to recover after a session.  She had PT this morning.  - Stress has been high as her children have been struggling for multiple different health and mental health reasons.  She believes that her binge eating disorder is in check for the most part.  When she does have an episode of binging it tends to be on cheese and crackers.  -She is scheduled to start psychotherapy.    Back Pain     History of Present Illness       Back Pain:  She presents for follow up of back pain. Patient's back pain is a chronic problem.  Location of back pain:  Right lower back, left lower back, right middle of back, right side of neck, left side of " "neck, right buttock, right hip and right side of waist  Description of back pain: burning, dull ache and shooting  Back pain spreads: right buttocks, right thigh, right knee and right foot    Since patient first noticed back pain, pain is: rapidly worsening  Does back pain interfere with her job:  Yes      Headaches:   Since the patient's last clinic visit, headaches are: worsened  The patient is getting headaches:  Daily  She is not able to do normal daily activities when she has a migraine.  The patient is taking the following rescue/relief medications:  Maxalt and Relpax   Patient states \"The relief is inconsistent\" from the rescue/relief medications.   The patient is taking the following medications to prevent migraines:  Topomax  In the past 4 weeks, the patient has gone to an Urgent Care or Emergency Room 1 time times due to headaches.    Reason for visit:  [facility] paperwork    She eats 2-3 servings of fruits and vegetables daily.She consumes 0 sweetened beverage(s) daily.She exercises with enough effort to increase her heart rate 10 to 19 minutes per day.  She exercises with enough effort to increase her heart rate 3 or less days per week. She is missing 1 dose(s) of medications per week.  She is not taking prescribed medications regularly due to remembering to take.    Today's PHQ-9         PHQ-9 Total Score: 9    PHQ-9 Q9 Thoughts of better off dead/self-harm past 2 weeks :   Not at all    How difficult have these problems made it for you to do your work, take care of things at home, or get along with other people: Somewhat difficult  Today's GRIFFIN-7 Score: 12      Review of Systems   Musculoskeletal: Positive for back pain.          Objective    /85 (BP Location: Left arm, Patient Position: Sitting, Cuff Size: Adult Large)   Pulse 90   Resp 16   Ht 1.626 m (5' 4\")   Wt 83.3 kg (183 lb 9.6 oz)   SpO2 98%   BMI 31.51 kg/m    Body mass index is 31.51 kg/m .  Physical Exam  Nursing note reviewed. "   Constitutional:       General: She is not in acute distress.     Appearance: Normal appearance. She is not ill-appearing.   HENT:      Head: Normocephalic and atraumatic.   Eyes:      Extraocular Movements: Extraocular movements intact.      Conjunctiva/sclera: Conjunctivae normal.   Pulmonary:      Effort: Pulmonary effort is normal.   Neurological:      Mental Status: She is alert and oriented to person, place, and time.   Psychiatric:         Attention and Perception: Attention normal.         Mood and Affect: Mood normal.         Speech: Speech normal.         Thought Content: Thought content normal.        Extensive records reviewed as part of today's visit including today's physical therapy session and last week's spine care session.

## 2023-06-05 NOTE — ASSESSMENT & PLAN NOTE
Ongoing neck and back pain with associated headaches.  She had been on occipital nerve blocks until late last year when they were discontinued by her new insurance plan.  Her spine care team is hopeful that they will get coverage for Botox injections which they think may help.  [facility] paperwork submitted as this patient is having daily migraine headaches.  The goal of a medical leave is to start physical therapy, psychotherapy, hopefully Botox injections or resume occipital nerve blocks and focus on self-care.  - Trial of Cymbalta.  Initial dose will be 30 mg although I suspect we will need to titrate.

## 2023-06-05 NOTE — PROGRESS NOTES
PHYSICAL THERAPY EVALUATION  Type of Visit: Evaluation    See electronic medical record for Abuse and Falls Screening details.    Subjective      Presenting condition or subjective complaint: Chronic neck and back pain with migraines  Date of onset: 05/30/23 (Order date)    Relevant medical history:   see below  Dates & types of surgery:  see below    Prior diagnostic imaging/testing results: MRI IMPRESSION:  CERVICAL SPINE MRI:  1. Multilevel cervical spondylosis superimposed on congenital spinal canal narrowing, not significantly progressed since 03/02/2022.  2. Postsurgical changes of anterior cervical disc arthroplasty at C5-C6 with surgical decompression of the spinal canal. Decreased mild spinal canal stenosis.  3. Mild to moderate neural foraminal stenosis on the right at C3-C4 and bilaterally at C5-C6. Mild neural foraminal stenosis on the left at C3-C4 and bilaterally at C6-C7.  4. Normal cervical spinal cord signal.     LUMBAR SPINE MRI:  1. No significant change since 02/17/2022. Multilevel lumbar spondylosis status post left hemilaminectomy and presumed microdiscectomy at L5-S1. Enhancing scar within the left lateral recess at L5-S1 with wide surgical decompression of the descending S1   nerve roots. No residual or recurrent disc herniation.  2. Mild to moderate left and mild right neural foraminal stenosis at L5-S1.  3. Mild spinal canal stenosis at L4-L5.   Prior therapy history for the same diagnosis, illness or injury: Yes PT    Patient comes to PT with history of chronic neck pain with migraines and low back pain with history of L5-S1 hemilaminectomy, left medial facetectomy, forminotomies, and microdiscectomy 10/4/21and C5-6 disc replacement 8/8/22. Patient is looking into getting botox injections for headaches, just had right L5-S1, S1-S2 TFESI on 5/30/23. No change the pain yet, felt better for a day, but then back. Pain Described as neck gets very tight, numbness in both arms and hands 3-5th  digits bilaterally when she wakes up and moving around stretch helps with that some (this is over the last 2 months) No weakness. Right>left side of the back is burning pain, leg pain on the right burning and dull/numb down the leg to the front of the shin, did have some weakness in the shin. Worse with walking, sitting, laying down, sleep, standing, work. yard and housework also difficult. Better with migraine meds temporarily, TENS unit temporarily, stretching can take the edge for the back. Is hoping to do the machines.       Prior Level of Function   Transfers:   Ambulation:   ADL:   IADL:     Living Environment  Social support:     Type of home:     Stairs to enter the home:         Ramp:     Stairs inside the home:         Help at home:    Equipment owned:       Employment: Yes    Hobbies/Interests:      Patient goals for therapy:      Pain assessment: current 8/10, worst 9/10, best 3/10     Objective     Posture Observation:      Patient with mild scapular and cervical protraction    Cervical ROM:    Date: 6/5/2023     *Indicate scale AROM AROM AROM   Cervical Flexion 40 deg     Cervical Extension 40 deg      Right Left Right Left Right Left   Cervical Sidebending 25 deg 35 deg       Cervical Rotation 60 deg 60 deg         Strength     Date: 6/5/2023     Cervical Myotomes/5 Right Left Right Left Right Left   Cervical Flexion (C1-2)         Cervical Sidebending (C3)         Shoulder Elevation (C4) 5 5       Shoulder Abduction (C5) 5 5       Elbow Flexion (C6) 5 5       Elbow Extension (C7) 5 5       Wrist Flexion (C7) 5 5       Wrist Extension (C6) 5 5       Thumb abduction (C8) 5 5       Finger Abduction (T1) 5 5         Sensation   Not tested     Reflex Testing  Cervical Dermatomes Right Left UE Reflexes Right Left   Back of the Head (C2)   Biceps (C5-6)     Supraclavicular Fossa (C3)   Brachioradialis (C5-6)     AC Joint (C4)   Triceps (C7-8)     Lateral Biceps (C5)   Corby s test     Palmar Thumb (C6)    LE Reflexes     Palmar 3rd Finger (C7)   Patellar (L3-4)     Palmar 5th Finger (C8)   Achilles (S1-2)     Ulnar Forearm (T1)   Babinski Response       Flexibility: decreased upper trapezius and levator bilaterally, mild hamstring tightness    Palpation: tenderness to palpation of cervical paraspinals, levator, upper trapezius    Passive Mobility-Joint Integrity: Not tested    Cervical Special Tests     Cervical Special Tests Right Left UE Nerve Mobility Right Left   Cervical compression   Median nerve     Cervical distraction - - Ulnar nerve     Spurling s test - - Radial nerve     Shoulder abduction sign   Thoracic outlet     Deep neck flexor endurance test   Scott Echavarria s     Sharper-Kahlil   Cervical rotation lateral flexion     Alar ligament test   Other:     Transverse Ligament Test   Other:       Lumbar ROM:    Date: 6/5/2023     *Indicate scale AROM AROM AROM   Lumbar Flexion 12 cm      Lumbar Extension Mild limit      Right Left Right Left Right Left   Lumbar Sidebending WNL WNL       Lumbar Rotation           Lower Extremity Strength:     Date: 6/5/2023     LE strength/5 Right Left Right Left Right Left   Hip Flexion (L1-3) 5 5       Hip Extension (L5-S1) 4 5       Hip Abduction (L4-5) 5 5       Hip Adduction (L2-3)         Hip External Rotation         Hip Internal Rotation         Knee Extension (L3-4) 5 5       Knee Flexion         Ankle Dorsiflexion (L4-5) 4 5       Great Toe Extension (L5) 4 5       Ankle Plantar flexion (S1) Able to do 15 heel raises difficult 15       Abdominals        Sensation    Not tested       Reflex Testing  Lumbar Dermatomes Right Left UE Reflexes Right Left   Iliac Crest and Groin (L1)   Biceps (C5-6)     Anterior Medial Thigh (L2)   Brachioradialis (C5-6)     Anterior Thigh, Medial Epicondyle Femur (L3)   Triceps (C7-8)     Lateral Thigh, Anterior Knee, Medial Leg/Malleolus (L4)   Corby s test     Lateral Leg, Dorsal Foot (L5)   LE Reflexes     Lateral Foot (S1)    Patellar (L3-4)     Posterior Leg (S2)   Achilles (S1-2)     Other:   Babinski Response       Palpation: tenderness to right QL significantly with increase in RLE symptoms, right glut    Lumbar Special Tests:     Lumbar Special Tests Right Left SI Tests Right  Left   Quadrant test   SI Compression     Straight leg raise 75 + 75 - SI Distraction     Crossover response   POSH Test - -   Slump   Sacral Thrust     Sit-up test  FADIR + groin -   Trunk extensor endurance test  Resisted Abduction - -   Prone instability test  MIREYA - -   Pubic shotgun  Other:       Repeated Motion Testing:  Pain with repeated extensions initially some in leg, more in back, no clear centralization today    Passive Mobility - Joint Integrity:  Not tested    Assessment & Plan   CLINICAL IMPRESSIONS   Medical Diagnosis: Chronic right-sided low back pain with right-sided sciatica  Right lumbar radiculopathy  History of lumbosacral spine surgery  Weakness of right lower extremity  Chronic neck pain  Hx of cervical spine surgery  Bilateral occipital neuralgia    Treatment Diagnosis: (P) RLE weakness, lumbar radiculitis, + LE neural tension, decreased cervical ROM      Impression/Assessment: Patient is a 43 year old female with low back and neck pain complaints.  The following significant findings have been identified: Pain, Decreased ROM/flexibility, Decreased joint mobility, Decreased strength and + LE neural tension testing. These impairments interfere with their ability to perform self care tasks, work tasks, recreational activities, household chores, driving  and community mobility as compared to previous level of function.     Clinical Decision Making (Complexity):   Clinical Presentation: Stable/Uncomplicated  Clinical Presentation Rationale: based on medical and personal factors listed in PT evaluation  Clinical Decision Making (Complexity): Low complexity    PLAN OF CARE  Treatment Interventions:  Modalities: E-stim  Interventions: Manual  Therapy, Neuromuscular Re-education, Therapeutic Activity, Therapeutic Exercise, Self-Care/Home Management    Long Term Goals     PT Goal 1  Goal Description: (P) Patient will be able to walk > 2 miles without increased pain in 8 weeks:  PT Goal 2  Goal Description: (P) Patient will be able to do yard and housework without increased pain in 10 weeks:  PT Goal 3  Goal Description: (P) Patient will demonstrate > average strength per age matched norms on cervical and lumbar Medx in 12 weeks:  PT Goal 4  Goal Description: (P) Patient will improve NDI and WIL by MDIC for improved objective function in 12 weeks:  PT Goal 5  Goal Description: (P) Patient will report > 50% improvement in back pain and headaches in 12 weeks:      Frequency of Treatment: 1-2X/week  Duration of Treatment: up to 16 visits over 12 weeks:    Recommended Referrals to Other Professionals: NA  Education Assessment:        Risks and benefits of evaluation/treatment have been explained.   Patient/Family/caregiver agrees with Plan of Care.     Evaluation Time:     PT Eval, Low Complexity Minutes (54006): (P) 28    Signing Clinician: Zheng Mesa, PT

## 2023-06-05 NOTE — ASSESSMENT & PLAN NOTE
Weight stable despite stress and almost daily migrainous headaches.  Continue Vyvanse for binge eating disorder.

## 2023-06-07 ENCOUNTER — THERAPY VISIT (OUTPATIENT)
Dept: PHYSICAL THERAPY | Facility: CLINIC | Age: 43
End: 2023-06-07
Payer: COMMERCIAL

## 2023-06-07 DIAGNOSIS — M54.16 RIGHT LUMBAR RADICULOPATHY: ICD-10-CM

## 2023-06-07 DIAGNOSIS — M54.81 BILATERAL OCCIPITAL NEURALGIA: ICD-10-CM

## 2023-06-07 DIAGNOSIS — R29.898 WEAKNESS OF RIGHT LOWER EXTREMITY: ICD-10-CM

## 2023-06-07 DIAGNOSIS — G89.29 CHRONIC RIGHT-SIDED LOW BACK PAIN WITH RIGHT-SIDED SCIATICA: Primary | ICD-10-CM

## 2023-06-07 DIAGNOSIS — M47.12 CERVICAL SPONDYLOSIS WITH MYELOPATHY: ICD-10-CM

## 2023-06-07 DIAGNOSIS — M54.2 CHRONIC NECK PAIN: ICD-10-CM

## 2023-06-07 DIAGNOSIS — Z98.890 HX OF CERVICAL SPINE SURGERY: ICD-10-CM

## 2023-06-07 DIAGNOSIS — Z98.890 HISTORY OF LUMBOSACRAL SPINE SURGERY: ICD-10-CM

## 2023-06-07 DIAGNOSIS — M54.16 LUMBAR RADICULOPATHY: ICD-10-CM

## 2023-06-07 DIAGNOSIS — M54.41 CHRONIC RIGHT-SIDED LOW BACK PAIN WITH RIGHT-SIDED SCIATICA: Primary | ICD-10-CM

## 2023-06-07 DIAGNOSIS — G89.29 CHRONIC NECK PAIN: ICD-10-CM

## 2023-06-07 PROCEDURE — 97110 THERAPEUTIC EXERCISES: CPT | Mod: GP | Performed by: PHYSICAL THERAPIST

## 2023-06-07 NOTE — PROGRESS NOTES
Lumbar MedX Initial testing    AROM (full=  0-72  lumbar) 0-45   Max Extension Torque  253#   Flex: ext ratio (ideal 1.4:1) 1.89:1     Cervical MedX Initial testing   AROM (full= 0-120  cervical) 21-66   Max Extension Torque  179#    Flex: ext ratio (ideal 1.4:1) 1.00:1     Date 6/7/2023   Lumbar Parameters    Top Dead Center (TDC) 21   Counterbalance (CB) 361   Seat Pad 1   Femur Restraint 5   Week/Visit    Enter Week/Visit # 1/1 test   Weight (lbs) 100# ex   Reps (#) 15   Time 140   ROM (degrees) 0-45   Pain    Therapist cues    Cervical Parameters    Top Dead Center (TDC) 42   Counterbalance (CB) 1   Seat Height 448   Week/Visit    Enter Week/Visit # 1/1 test   Weight (lbs) 114# ex   Reps (#) 15   Time 136   ROM (degrees) 21-66   Pain increase    Therapist cues      Date 6/7/2023   Exercise    Treadmill X 5 min   Rotary torso 90 seconds 30#'s started R   Banded shoulder pulldonws X 10 level 5 not for HEP   Prone I's X 10 with cues on scap set   Supine LE neuromobilizations X 10

## 2023-06-08 ENCOUNTER — MYC MEDICAL ADVICE (OUTPATIENT)
Dept: FAMILY MEDICINE | Facility: CLINIC | Age: 43
End: 2023-06-08

## 2023-06-10 DIAGNOSIS — G89.29 CHRONIC RIGHT-SIDED LOW BACK PAIN WITHOUT SCIATICA: ICD-10-CM

## 2023-06-10 DIAGNOSIS — M54.50 CHRONIC RIGHT-SIDED LOW BACK PAIN WITHOUT SCIATICA: ICD-10-CM

## 2023-06-12 ENCOUNTER — THERAPY VISIT (OUTPATIENT)
Dept: PHYSICAL THERAPY | Facility: CLINIC | Age: 43
End: 2023-06-12
Payer: COMMERCIAL

## 2023-06-12 DIAGNOSIS — M54.16 RIGHT LUMBAR RADICULOPATHY: ICD-10-CM

## 2023-06-12 DIAGNOSIS — G89.29 CHRONIC RIGHT-SIDED LOW BACK PAIN WITH RIGHT-SIDED SCIATICA: Primary | ICD-10-CM

## 2023-06-12 DIAGNOSIS — Z98.890 HISTORY OF LUMBOSACRAL SPINE SURGERY: ICD-10-CM

## 2023-06-12 DIAGNOSIS — M54.16 LUMBAR RADICULOPATHY: ICD-10-CM

## 2023-06-12 DIAGNOSIS — M54.2 CHRONIC NECK PAIN: ICD-10-CM

## 2023-06-12 DIAGNOSIS — M54.81 BILATERAL OCCIPITAL NEURALGIA: ICD-10-CM

## 2023-06-12 DIAGNOSIS — M47.12 CERVICAL SPONDYLOSIS WITH MYELOPATHY: ICD-10-CM

## 2023-06-12 DIAGNOSIS — R29.898 WEAKNESS OF RIGHT LOWER EXTREMITY: ICD-10-CM

## 2023-06-12 DIAGNOSIS — G89.29 CHRONIC NECK PAIN: ICD-10-CM

## 2023-06-12 DIAGNOSIS — Z98.890 HX OF CERVICAL SPINE SURGERY: ICD-10-CM

## 2023-06-12 DIAGNOSIS — M54.41 CHRONIC RIGHT-SIDED LOW BACK PAIN WITH RIGHT-SIDED SCIATICA: Primary | ICD-10-CM

## 2023-06-12 PROCEDURE — 97110 THERAPEUTIC EXERCISES: CPT | Mod: GP | Performed by: PHYSICAL THERAPIST

## 2023-06-12 RX ORDER — PREGABALIN 75 MG/1
150 CAPSULE ORAL 2 TIMES DAILY
Qty: 120 CAPSULE | Refills: 0 | Status: SHIPPED | OUTPATIENT
Start: 2023-06-12 | End: 2023-07-12

## 2023-06-12 NOTE — TELEPHONE ENCOUNTER
Form faxed with changes and sent to [facility] rec to scan and extra copy made for clinic.

## 2023-06-12 NOTE — PROGRESS NOTES
Lumbar MedX Initial testing    AROM (full=  0-72  lumbar) 0-45   Max Extension Torque  253#   Flex: ext ratio (ideal 1.4:1) 1.89:1     Cervical MedX Initial testing   AROM (full= 0-120  cervical) 21-66   Max Extension Torque  179#    Flex: ext ratio (ideal 1.4:1) 1.00:1     Date 6/12/2023 6/7/2023   Lumbar Parameters     Top Dead Center (TDC) 21 21   Counterbalance (CB) 361 361   Seat Pad 1 1   Femur Restraint 5 5   Week/Visit     Enter Week/Visit # 1/2 1/1 test   Weight (lbs) 116# 100# ex   Reps (#) 20 15   Time 119 140   ROM (degrees) 0-45 0-45   Pain fatigue    Therapist cues     Cervical Parameters     Top Dead Center (TDC) 42 42   Counterbalance (CB) 1 1   Seat Height 448 448   Week/Visit     Enter Week/Visit # 1/2 1/1 test   Weight (lbs) 120# 114# ex   Reps (#) 25 15   Time 133 136   ROM (degrees) 21-66 21-66   Pain fatigue increase    Therapist cues       Date 6/12/2023 6/7/2023   Exercise     Treadmill X 5 min X 5 min   Rotary torso 90 seconds 32#'s started L 30#'s started R   Banded shoulder pulldonws  X 10 level 5 not for HEP   Prone I's  X 10 with cues on scap set   Supine LE neuromobilizations  X 10    4 way neck SH 1 X 20 SB 20#'s    Gluteal myofascial protocol (full arc, upper arc, lower arc, combination flex/ext with arc, and piriformis cruncher) All exercises X 10 cues on initial form    Reach and roll  X 5    Reformer leg press Double leg X 10 all springs X 10 SL all springs    Reformer 90/90 pulldown X 10 2R    Reformer bridges X 10 all springs

## 2023-06-16 ENCOUNTER — THERAPY VISIT (OUTPATIENT)
Dept: PHYSICAL THERAPY | Facility: CLINIC | Age: 43
End: 2023-06-16
Payer: COMMERCIAL

## 2023-06-16 DIAGNOSIS — M47.12 CERVICAL SPONDYLOSIS WITH MYELOPATHY: ICD-10-CM

## 2023-06-16 DIAGNOSIS — R29.898 WEAKNESS OF RIGHT LOWER EXTREMITY: ICD-10-CM

## 2023-06-16 DIAGNOSIS — G89.29 CHRONIC NECK PAIN: ICD-10-CM

## 2023-06-16 DIAGNOSIS — M54.81 BILATERAL OCCIPITAL NEURALGIA: ICD-10-CM

## 2023-06-16 DIAGNOSIS — Z98.890 HX OF CERVICAL SPINE SURGERY: ICD-10-CM

## 2023-06-16 DIAGNOSIS — M54.16 RIGHT LUMBAR RADICULOPATHY: ICD-10-CM

## 2023-06-16 DIAGNOSIS — G89.29 CHRONIC RIGHT-SIDED LOW BACK PAIN WITH RIGHT-SIDED SCIATICA: Primary | ICD-10-CM

## 2023-06-16 DIAGNOSIS — M54.41 CHRONIC RIGHT-SIDED LOW BACK PAIN WITH RIGHT-SIDED SCIATICA: Primary | ICD-10-CM

## 2023-06-16 DIAGNOSIS — M54.2 CHRONIC NECK PAIN: ICD-10-CM

## 2023-06-16 DIAGNOSIS — M54.16 LUMBAR RADICULOPATHY: ICD-10-CM

## 2023-06-16 DIAGNOSIS — Z98.890 HISTORY OF LUMBOSACRAL SPINE SURGERY: ICD-10-CM

## 2023-06-16 PROCEDURE — 97110 THERAPEUTIC EXERCISES: CPT | Mod: GP | Performed by: PHYSICAL THERAPIST

## 2023-06-16 NOTE — PROGRESS NOTES
Lumbar MedX Initial testing    AROM (full=  0-72  lumbar) 0-45   Max Extension Torque  253#   Flex: ext ratio (ideal 1.4:1) 1.89:1     Cervical MedX Initial testing   AROM (full= 0-120  cervical) 21-66   Max Extension Torque  179#    Flex: ext ratio (ideal 1.4:1) 1.00:1     Date 6/16/2023 6/12/2023 6/7/2023   Lumbar Parameters      Top Dead Center (TDC) 21 21 21   Counterbalance (CB) 361 361 361   Seat Pad 1 1 1   Femur Restraint 5 5 5   Week/Visit      Enter Week/Visit # 2/1 1/2 1/1 test   Weight (lbs) 118# 116# 100# ex   Reps (#) 20 20 15   Time 113 119 140   ROM (degrees) 0-45 0-45 0-45   Pain  fatigue    Therapist cues      Cervical Parameters      Top Dead Center (TDC) 42 42 42   Counterbalance (CB) 1 1 1   Seat Height 448 448 448   Week/Visit      Enter Week/Visit # 2/1 1/2 1/1 test   Weight (lbs) 129# 120# 114# ex   Reps (#) 35 25 15   Time 139 133 136   ROM (degrees) 21-66 21-66 21-66   Pain  fatigue increase    Therapist cues        Date 6/16/2023 6/12/2023 6/7/2023   Exercise      Treadmill X 5 min  X 5 min X 5 min   Rotary torso 90 seconds 32#'s started R 32#'s started L 30#'s started R   Banded shoulder pulldonws   X 10 level 5 not for HEP   Prone I's   X 10 with cues on scap set   Supine LE neuromobilizations   X 10    4 way neck SH 1 X 20 SB 22#'s  X 20 SB 20#'s    Gluteal myofascial protocol (full arc, upper arc, lower arc, combination flex/ext with arc, and piriformis cruncher)  All exercises X 10 cues on initial form    Reach and roll   X 5    Reformer leg press  Double leg X 10 all springs X 10 SL all springs    Reformer 90/90 pulldown  X 10 2R    Reformer bridges  X 10 all springs

## 2023-06-23 ENCOUNTER — THERAPY VISIT (OUTPATIENT)
Dept: PHYSICAL THERAPY | Facility: CLINIC | Age: 43
End: 2023-06-23
Payer: COMMERCIAL

## 2023-06-23 DIAGNOSIS — G89.29 CHRONIC RIGHT-SIDED LOW BACK PAIN WITH RIGHT-SIDED SCIATICA: Primary | ICD-10-CM

## 2023-06-23 DIAGNOSIS — M54.16 RIGHT LUMBAR RADICULOPATHY: ICD-10-CM

## 2023-06-23 DIAGNOSIS — M47.12 CERVICAL SPONDYLOSIS WITH MYELOPATHY: ICD-10-CM

## 2023-06-23 DIAGNOSIS — Z98.890 HISTORY OF LUMBOSACRAL SPINE SURGERY: ICD-10-CM

## 2023-06-23 DIAGNOSIS — G89.29 CHRONIC NECK PAIN: ICD-10-CM

## 2023-06-23 DIAGNOSIS — M54.16 LUMBAR RADICULOPATHY: ICD-10-CM

## 2023-06-23 DIAGNOSIS — M54.81 BILATERAL OCCIPITAL NEURALGIA: ICD-10-CM

## 2023-06-23 DIAGNOSIS — R29.898 WEAKNESS OF RIGHT LOWER EXTREMITY: ICD-10-CM

## 2023-06-23 DIAGNOSIS — Z98.890 HX OF CERVICAL SPINE SURGERY: ICD-10-CM

## 2023-06-23 DIAGNOSIS — M54.2 CHRONIC NECK PAIN: ICD-10-CM

## 2023-06-23 DIAGNOSIS — M54.41 CHRONIC RIGHT-SIDED LOW BACK PAIN WITH RIGHT-SIDED SCIATICA: Primary | ICD-10-CM

## 2023-06-23 PROCEDURE — 97110 THERAPEUTIC EXERCISES: CPT | Mod: GP | Performed by: PHYSICAL THERAPIST

## 2023-06-23 NOTE — PROGRESS NOTES
Madeline Szymanski is a 43 year old female who presents today for Botox injections for chronic migraine.    Pre-procedure diagnosis: Migraine headaches  Post-procedure diagnosis: Same    PROCEDURE:  Botulinum toxin (Botox) injections.      The risks and benefits of the procedure were discussed with the patient which included muscle weakness (including facial droop, inability to swallow, and inability to hold one's head up), allergic reaction, pain, bruising, bleeding, swelling, and death.  She opted to proceed and gave written and verbal consent.    The Botox paradigm was followed. The skin over each site was cleansed with alcohol. A 27 guage 5/8 inch needle was used for injection at all sites.     The bilateral  muscles were injected with a total of 10 units (5 units each side).   The Procerus muscle was injected with a total of 5 units.   The Frontalis muscle was injected with a total of 20 units, divided into 4 sites.  The Temporalis muscle was injected with 20 units divided into 4 sites and performed bilaterally (total of 8 sites for total of 40 units).  The occipitalis muscle was injected with 30 units divided into 6 sites.  The cervical paraspinal muscles were injected with 20 units divided into 4 sites.  The trapezius muscle was injected with 30 units divided into 6 sites.     TOTAL UNITS: 155  Wasted units: 45 units.    The patient tolerated the injection well and afterwards did not have any dizziness/lightheadedness or nausea. The patient was observed for a short period of time and then was discharged.  The patient was encouraged to call with any questions/concerns prior to the follow-up appointment.  The patient will follow-up in 4 weeks.

## 2023-06-23 NOTE — PROGRESS NOTES
Lumbar MedX Initial testing    AROM (full=  0-72  lumbar) 0-45   Max Extension Torque  253#   Flex: ext ratio (ideal 1.4:1) 1.89:1     Cervical MedX Initial testing   AROM (full= 0-120  cervical) 21-66   Max Extension Torque  179#    Flex: ext ratio (ideal 1.4:1) 1.00:1     Date 6/23/2023 6/16/2023 6/12/2023 6/7/2023   Lumbar Parameters       Top Dead Center (TDC) 21 21 21 21   Counterbalance (CB) 361 361 361 361   Seat Pad 1 1 1 1   Femur Restraint 5 5 5 5   Week/Visit       Enter Week/Visit # 2/2 2/1 1/2 1/1 test   Weight (lbs) 120# 118# 116# 100# ex   Reps (#) 30 20 20 15   Time 124 113 119 140   ROM (degrees) 0-45 0-45 0-45 0-45   Pain Fatigue can feel right side  fatigue    Therapist cues pacing      Cervical Parameters       Top Dead Center (TDC)  42 42 42   Counterbalance (CB)  1 1 1   Seat Height  448 448 448   Week/Visit       Enter Week/Visit #  2/1 1/2 1/1 test   Weight (lbs)  129# 120# 114# ex   Reps (#)  35 25 15   Time  139 133 136   ROM (degrees)  21-66 21-66 21-66   Pain   fatigue increase    Therapist cues Held 2/2 time and hair        Date 6/23/2023 6/16/2023 6/12/2023 6/7/2023   Exercise       Treadmill X 5 min  X 5 min  X 5 min X 5 min   Rotary torso 90 seconds 34#'s started L 32#'s started R 32#'s started L 30#'s started R   Banded shoulder pulldonws    X 10 level 5 not for HEP   Prone I's    X 10 with cues on scap set   Supine LE neuromobilizations    X 10    4 way neck SH 1 X 20 SB 24#'s  (inc next session) X 20 SB 22#'s  X 20 SB 20#'s    Gluteal myofascial protocol (full arc, upper arc, lower arc, combination flex/ext with arc, and piriformis cruncher)   All exercises X 10 cues on initial form    Reach and roll    X 5    Reformer leg press   Double leg X 10 all springs X 10 SL all springs    Reformer 90/90 pulldown   X 10 2R    Reformer bridges   X 10 all springs

## 2023-06-27 ENCOUNTER — OFFICE VISIT (OUTPATIENT)
Dept: PHYSICAL MEDICINE AND REHAB | Facility: CLINIC | Age: 43
End: 2023-06-27
Payer: COMMERCIAL

## 2023-06-27 VITALS — DIASTOLIC BLOOD PRESSURE: 86 MMHG | HEART RATE: 104 BPM | SYSTOLIC BLOOD PRESSURE: 119 MMHG

## 2023-06-27 DIAGNOSIS — G43.709 CHRONIC MIGRAINE WITHOUT AURA WITHOUT STATUS MIGRAINOSUS, NOT INTRACTABLE: Primary | ICD-10-CM

## 2023-06-27 PROCEDURE — 64615 CHEMODENERV MUSC MIGRAINE: CPT | Performed by: PHYSICIAN ASSISTANT

## 2023-06-27 ASSESSMENT — PAIN SCALES - GENERAL: PAINLEVEL: SEVERE PAIN (7)

## 2023-06-27 NOTE — PATIENT INSTRUCTIONS
Please monitor for any redness/drainage/swelling over the injection sites.  If any of these things occur, please call the clinic immediately or go to the nearest emergency room.     Do not submerge the injection sites underwater for 48 hours after the injection.      The botox injections will begin working in 3 days but it may take 3 weeks before you notice relief of your pain.  The effects should last a minimum of 3 months.      Please follow-up in 4 weeks.  Please call if you have any questions/concerns before your next appointment.

## 2023-06-27 NOTE — LETTER
6/27/2023         RE: Madeline Szymanski  [address] [place] [id number]        Dear Colleague,    Thank you for referring your patient, Madeline Szymanski, to the [facility]. Please see a copy of my visit note below.    Madeline Szymanski is a 43 year old female who presents today for Botox injections for chronic migraine.    Pre-procedure diagnosis: Migraine headaches  Post-procedure diagnosis: Same    PROCEDURE:  Botulinum toxin (Botox) injections.      The risks and benefits of the procedure were discussed with the patient which included muscle weakness (including facial droop, inability to swallow, and inability to hold one's head up), allergic reaction, pain, bruising, bleeding, swelling, and death.  She opted to proceed and gave written and verbal consent.    The Botox paradigm was followed. The skin over each site was cleansed with alcohol. A 27 guage 5/8 inch needle was used for injection at all sites.     The bilateral  muscles were injected with a total of 10 units (5 units each side).   The Procerus muscle was injected with a total of 5 units.   The Frontalis muscle was injected with a total of 20 units, divided into 4 sites.  The Temporalis muscle was injected with 20 units divided into 4 sites and performed bilaterally (total of 8 sites for total of 40 units).  The occipitalis muscle was injected with 30 units divided into 6 sites.  The cervical paraspinal muscles were injected with 20 units divided into 4 sites.  The trapezius muscle was injected with 30 units divided into 6 sites.     TOTAL UNITS: 155  Wasted units: 45 units.    The patient tolerated the injection well and afterwards did not have any dizziness/lightheadedness or nausea. The patient was observed for a short period of time and then was discharged.  The patient was encouraged to call with any questions/concerns prior to the follow-up appointment.  The patient will follow-up in 4 weeks.          Again, thank you for allowing me to participate in the care of your patient.        Sincerely,        Latha Bowling PA-C

## 2023-07-03 ENCOUNTER — VIRTUAL VISIT (OUTPATIENT)
Dept: FAMILY MEDICINE | Facility: CLINIC | Age: 43
End: 2023-07-03
Payer: COMMERCIAL

## 2023-07-03 ENCOUNTER — THERAPY VISIT (OUTPATIENT)
Dept: PHYSICAL THERAPY | Facility: CLINIC | Age: 43
End: 2023-07-03
Payer: COMMERCIAL

## 2023-07-03 DIAGNOSIS — R29.898 WEAKNESS OF RIGHT LOWER EXTREMITY: ICD-10-CM

## 2023-07-03 DIAGNOSIS — M54.2 CHRONIC NECK PAIN: ICD-10-CM

## 2023-07-03 DIAGNOSIS — G43.109 MIGRAINE WITH AURA AND WITHOUT STATUS MIGRAINOSUS, NOT INTRACTABLE: ICD-10-CM

## 2023-07-03 DIAGNOSIS — Z12.11 SCREEN FOR COLON CANCER: ICD-10-CM

## 2023-07-03 DIAGNOSIS — F50.819 BINGE EATING DISORDER: ICD-10-CM

## 2023-07-03 DIAGNOSIS — M47.12 CERVICAL SPONDYLOSIS WITH MYELOPATHY: ICD-10-CM

## 2023-07-03 DIAGNOSIS — Z98.890 HX OF CERVICAL SPINE SURGERY: ICD-10-CM

## 2023-07-03 DIAGNOSIS — M54.16 LUMBAR RADICULOPATHY: ICD-10-CM

## 2023-07-03 DIAGNOSIS — Z98.890 HISTORY OF LUMBOSACRAL SPINE SURGERY: ICD-10-CM

## 2023-07-03 DIAGNOSIS — E78.1 HYPERTRIGLYCERIDEMIA: ICD-10-CM

## 2023-07-03 DIAGNOSIS — G89.29 CHRONIC NECK PAIN: ICD-10-CM

## 2023-07-03 DIAGNOSIS — F32.5 MAJOR DEPRESSIVE DISORDER WITH SINGLE EPISODE, IN FULL REMISSION (H): ICD-10-CM

## 2023-07-03 DIAGNOSIS — G89.29 CHRONIC RIGHT-SIDED LOW BACK PAIN WITH RIGHT-SIDED SCIATICA: Primary | ICD-10-CM

## 2023-07-03 DIAGNOSIS — M54.81 BILATERAL OCCIPITAL NEURALGIA: ICD-10-CM

## 2023-07-03 DIAGNOSIS — M54.16 RIGHT LUMBAR RADICULOPATHY: ICD-10-CM

## 2023-07-03 DIAGNOSIS — M48.02 SPINAL STENOSIS IN CERVICAL REGION: Primary | ICD-10-CM

## 2023-07-03 DIAGNOSIS — M54.41 CHRONIC RIGHT-SIDED LOW BACK PAIN WITH RIGHT-SIDED SCIATICA: Primary | ICD-10-CM

## 2023-07-03 PROCEDURE — 97110 THERAPEUTIC EXERCISES: CPT | Mod: GP | Performed by: PHYSICAL THERAPIST

## 2023-07-03 PROCEDURE — 99213 OFFICE O/P EST LOW 20 MIN: CPT | Mod: VID | Performed by: FAMILY MEDICINE

## 2023-07-03 RX ORDER — DULOXETIN HYDROCHLORIDE 30 MG/1
30 CAPSULE, DELAYED RELEASE ORAL DAILY
Qty: 90 CAPSULE | Refills: 1 | Status: SHIPPED | OUTPATIENT
Start: 2023-07-03 | End: 2023-09-27

## 2023-07-03 RX ORDER — LISDEXAMFETAMINE DIMESYLATE 50 MG/1
50 CAPSULE ORAL EVERY MORNING
Qty: 30 CAPSULE | Refills: 0 | Status: SHIPPED | OUTPATIENT
Start: 2023-07-03 | End: 2023-08-17

## 2023-07-03 ASSESSMENT — ANXIETY QUESTIONNAIRES
4. TROUBLE RELAXING: SEVERAL DAYS
IF YOU CHECKED OFF ANY PROBLEMS ON THIS QUESTIONNAIRE, HOW DIFFICULT HAVE THESE PROBLEMS MADE IT FOR YOU TO DO YOUR WORK, TAKE CARE OF THINGS AT HOME, OR GET ALONG WITH OTHER PEOPLE: NOT DIFFICULT AT ALL
6. BECOMING EASILY ANNOYED OR IRRITABLE: NOT AT ALL
7. FEELING AFRAID AS IF SOMETHING AWFUL MIGHT HAPPEN: NOT AT ALL
5. BEING SO RESTLESS THAT IT IS HARD TO SIT STILL: NOT AT ALL
2. NOT BEING ABLE TO STOP OR CONTROL WORRYING: SEVERAL DAYS
1. FEELING NERVOUS, ANXIOUS, OR ON EDGE: NOT AT ALL
3. WORRYING TOO MUCH ABOUT DIFFERENT THINGS: SEVERAL DAYS
GAD7 TOTAL SCORE: 3
GAD7 TOTAL SCORE: 3

## 2023-07-03 ASSESSMENT — ENCOUNTER SYMPTOMS: CONSTITUTIONAL NEGATIVE: 1

## 2023-07-03 NOTE — LETTER
July 3, 2023      Madeline Szymanski  [facility] [facility] [address]        To Whom It May Concern:    Madeline Szymanski was seen in our clinic. She may return to work with no specific restrictions on 7/6/2023.  She may still required additional time off to address migraine headache flares per the FMLA paper that was submitted on 6/5/2023.      Sincerely,        Sebas Valdez MD

## 2023-07-03 NOTE — PROGRESS NOTES
Madeline is a 43 year old who is being evaluated via a billable video visit.      How would you like to obtain your AVS? MyChart  If the video visit is dropped, the invitation should be resent by: Text to cell phone: [phone]  Will anyone else be joining your video visit? No    Assessment & Plan   Problem List Items Addressed This Visit     Major depressive disorder with single episode, in full remission (H)     She has found duloxetine to be helpful from a mental health perspective.  For now we will continue 30 mg; her previous dose.         Relevant Medications    DULoxetine (CYMBALTA) 30 MG capsule    Migraine with aura and without status migrainosus, not intractable     Unclear benefit to Botox yet.  The patient understands that Botox sometimes takes time in order to become effective.         Relevant Medications    DULoxetine (CYMBALTA) 30 MG capsule    Spinal stenosis in cervical region - Primary     Neck and back pain improved.  Continue duloxetine at 30 mg.  From my perspective, its unclear whether or not the low-dose of duloxetine is resulted in improvement.  Nevertheless, this is encouraging.  She should return to work.  Letter provided for her to return to full duties on 7/6 (with [facility] support for migraine flares).         Relevant Medications    DULoxetine (CYMBALTA) 30 MG capsule   Other Visit Diagnoses     Screen for colon cancer        Relevant Medications    DULoxetine (CYMBALTA) 30 MG capsule    Binge eating disorder        Relevant Medications    lisdexamfetamine (VYVANSE) 50 MG capsule    Hypertriglyceridemia        Relevant Medications    DULoxetine (CYMBALTA) 30 MG capsule          Sebas Valdez MD  [facility]    Isabella Correa is a 43 year old, presenting for the following health issues:  Other (Need work release paperwork)        7/3/2023     1:51 PM   Additional Questions   Roomed by N[place] MA   Accompanied by Self     Forms 7/3/2023   Any forms needing  "to be completed Yes         7/3/2023     1:51 PM   Patient Reported Additional Medications   Patient reports taking the following new medications None     Back pain and headaches   - back pain better.   - botox: she is not sure if it has been helpful.  She had her first therapy last week.  She has a number of locations on her scalp that are sore to the touch.  Skin appears normal.  She is hopefully that this will   - duloxetine: she has found it to be helpful with mood and wonders if back pain symptoms are better as a result.     History of Present Illness       Back Pain:  She presents for follow up of back pain. Patient's back pain is a chronic problem.  Location of back pain:  Right lower back  Description of back pain: dull ache  Back pain spreads: right buttocks    Since patient first noticed back pain, pain is: gradually improving  Does back pain interfere with her job:  Not applicable      Headaches:   Since the patient's last clinic visit, headaches are: no change  The patient is getting headaches:  Every other day  She is not able to do normal daily activities when she has a migraine.  The patient is taking the following rescue/relief medications:  Maxalt and Relpax   Patient states \"I get some relief\" from the rescue/relief medications.   The patient is taking the following medications to prevent migraines:  Topomax  In the past 4 weeks, the patient has gone to an Urgent Care or Emergency Room 0 times times due to headaches.    She eats 2-3 servings of fruits and vegetables daily.She consumes 0 sweetened beverage(s) daily.She exercises with enough effort to increase her heart rate 10 to 19 minutes per day.  She exercises with enough effort to increase her heart rate 3 or less days per week. She is missing 1 dose(s) of medications per week.  She is not taking prescribed medications regularly due to remembering to take.  Today's GRIFFIN-7 Score: 3    Review of Systems   Constitutional: Negative.    All other " "systems reviewed and are negative.         Objective    Vitals - Patient Reported  Weight (Patient Reported): 83.1 kg (183 lb 3.2 oz)  Height (Patient Reported): 162.6 cm (5' 4\")  BMI (Based on Pt Reported Ht/Wt): 31.45        Physical Exam  Nursing note reviewed.   Constitutional:       General: She is not in acute distress.     Appearance: Normal appearance. She is not ill-appearing.   HENT:      Head: Normocephalic and atraumatic.   Eyes:      Extraocular Movements: Extraocular movements intact.      Conjunctiva/sclera: Conjunctivae normal.   Pulmonary:      Effort: Pulmonary effort is normal.   Neurological:      Mental Status: She is alert and oriented to person, place, and time.   Psychiatric:         Attention and Perception: Attention normal.         Mood and Affect: Mood normal.         Speech: Speech normal.         Thought Content: Thought content normal.             Video-Visit Details    Type of service:  Video Visit     Originating Location (pt. Location): Home  Distant Location (provider location):  On-site  Platform used for Video Visit: Agatha"

## 2023-07-03 NOTE — ASSESSMENT & PLAN NOTE
She has found duloxetine to be helpful from a mental health perspective.  For now we will continue 30 mg; her previous dose.

## 2023-07-03 NOTE — ASSESSMENT & PLAN NOTE
Neck and back pain improved.  Continue duloxetine at 30 mg.  From my perspective, its unclear whether or not the low-dose of duloxetine is resulted in improvement.  Nevertheless, this is encouraging.  She should return to work.  Letter provided for her to return to full duties on 7/6 (with [facility] support for migraine flares).

## 2023-07-03 NOTE — ASSESSMENT & PLAN NOTE
Unclear benefit to Botox yet.  The patient understands that Botox sometimes takes time in order to become effective.

## 2023-07-03 NOTE — PROGRESS NOTES
Lumbar MedX Initial testing    AROM (full=  0-72  lumbar) 0-45   Max Extension Torque  253#   Flex: ext ratio (ideal 1.4:1) 1.89:1     Cervical MedX Initial testing   AROM (full= 0-120  cervical) 21-66   Max Extension Torque  179#    Flex: ext ratio (ideal 1.4:1) 1.00:1     Date 7/3/2023 6/23/2023 6/16/2023 6/12/2023 6/7/2023   Lumbar Parameters        Top Dead Center (TDC) 21 21 21 21 21   Counterbalance (CB) 361 361 361 361 361   Seat Pad 1 1 1 1 1   Femur Restraint 5 5 5 5 5   Week/Visit        Enter Week/Visit # 3/1 2/2 2/1 1/2 1/1 test   Weight (lbs) 128# 120# 118# 116# 100# ex   Reps (#) 20 30 20 20 15   Time 111 124 113 119 140   ROM (degrees) 0-45 0-45 0-45 0-45 0-45   Pain  Fatigue can feel right side  fatigue    Therapist cues  pacing      Cervical Parameters        Top Dead Center (TDC) 42  42 42 42   Counterbalance (CB) 1  1 1 1   Seat Height 448  448 448 448   Week/Visit        Enter Week/Visit # 2/2  2/1 1/2 1/1 test   Weight (lbs) 132#  129# 120# 114# ex   Reps (#) 24  35 25 15   Time 103  139 133 136   ROM (degrees) 21-66 21-66 21-66 21-66   Pain    fatigue increase    Therapist cues  Held 2/2 time and hair        Date 7/3/2023 6/23/2023 6/16/2023 6/12/2023 6/7/2023   Exercise        Treadmill X 5 min X 5 min  X 5 min  X 5 min X 5 min   Rotary torso 90 seconds 34#'s to R 34#'s started L 32#'s started R 32#'s started L 30#'s started R   Banded shoulder pulldonws     X 10 level 5 not for HEP   Prone I's     X 10 with cues on scap set   Supine LE neuromobilizations     X 10    4 way neck SH 1 X 20 SB 24#'s  X 20 SB 24#'s  (inc next session) X 20 SB 22#'s  X 20 SB 20#'s    Gluteal myofascial protocol (full arc, upper arc, lower arc, combination flex/ext with arc, and piriformis cruncher)    All exercises X 10 cues on initial form    Reach and roll     X 5    Reformer leg press    Double leg X 10 all springs X 10 SL all springs    Reformer 90/90 pulldown    X 10 2R    Reformer bridges    X 10 all  springs

## 2023-08-02 ENCOUNTER — MYC MEDICAL ADVICE (OUTPATIENT)
Dept: PHYSICAL MEDICINE AND REHAB | Facility: CLINIC | Age: 43
End: 2023-08-02
Payer: COMMERCIAL

## 2023-08-02 DIAGNOSIS — G89.29 CHRONIC NECK PAIN: Primary | ICD-10-CM

## 2023-08-02 DIAGNOSIS — M54.2 CHRONIC NECK PAIN: Primary | ICD-10-CM

## 2023-08-02 DIAGNOSIS — M54.2 TRIGGER POINT OF NECK: ICD-10-CM

## 2023-08-07 ENCOUNTER — RADIOLOGY INJECTION OFFICE VISIT (OUTPATIENT)
Dept: PHYSICAL MEDICINE AND REHAB | Facility: CLINIC | Age: 43
End: 2023-08-07
Attending: NURSE PRACTITIONER
Payer: COMMERCIAL

## 2023-08-07 VITALS
SYSTOLIC BLOOD PRESSURE: 132 MMHG | OXYGEN SATURATION: 99 % | DIASTOLIC BLOOD PRESSURE: 82 MMHG | TEMPERATURE: 97.9 F | HEART RATE: 90 BPM

## 2023-08-07 DIAGNOSIS — M54.2 TRIGGER POINT OF NECK: ICD-10-CM

## 2023-08-07 DIAGNOSIS — G89.29 CHRONIC NECK PAIN: ICD-10-CM

## 2023-08-07 DIAGNOSIS — M54.2 CHRONIC NECK PAIN: ICD-10-CM

## 2023-08-07 PROCEDURE — 76942 ECHO GUIDE FOR BIOPSY: CPT | Performed by: PAIN MEDICINE

## 2023-08-07 PROCEDURE — 20553 NJX 1/MLT TRIGGER POINTS 3/>: CPT | Performed by: PAIN MEDICINE

## 2023-08-07 RX ORDER — METHYLPREDNISOLONE ACETATE 40 MG/ML
INJECTION, SUSPENSION INTRA-ARTICULAR; INTRALESIONAL; INTRAMUSCULAR; SOFT TISSUE
Status: COMPLETED | OUTPATIENT
Start: 2023-08-07 | End: 2023-08-07

## 2023-08-07 RX ORDER — BUPIVACAINE HYDROCHLORIDE 2.5 MG/ML
INJECTION, SOLUTION EPIDURAL; INFILTRATION; INTRACAUDAL
Status: COMPLETED | OUTPATIENT
Start: 2023-08-07 | End: 2023-08-07

## 2023-08-07 RX ADMIN — METHYLPREDNISOLONE ACETATE 20 MG: 40 INJECTION, SUSPENSION INTRA-ARTICULAR; INTRALESIONAL; INTRAMUSCULAR; SOFT TISSUE at 08:36

## 2023-08-07 RX ADMIN — BUPIVACAINE HYDROCHLORIDE 5 ML: 2.5 INJECTION, SOLUTION EPIDURAL; INFILTRATION; INTRACAUDAL at 08:37

## 2023-08-07 ASSESSMENT — PAIN SCALES - GENERAL
PAINLEVEL: MODERATE PAIN (4)
PAINLEVEL: NO PAIN (0)

## 2023-08-07 NOTE — PATIENT INSTRUCTIONS
Follow-up visit to discuss injection outcome and determine care plan going forward.     DISCHARGE INSTRUCTIONS    During office hours (8:00 a.m.- 4:00 p.m.) questions or concerns may be answered  by calling [facility] Navigation Nurses at  [phone].  Messages received after hours will be returned the following business day.      In the case of an emergency, please dial 911 or seek assistance at the nearest Emergency Room/Urgent Care facility.     All Patients:    You may experience an increase in your symptoms for the first 2 days (It may take anywhere between 2 days- 2 weeks for the steroid to have maximum effect).    You may use ice on the injection site, as frequently as 20 minutes each hour if needed.    You may take your pain medicine.    You may continue taking your regular medication after your injection. If you have had a Medial Branch Block you may resume pain medication once your pain diary is completed.    You may shower. No swimming, tub bath or hot tub for 48 hours.  You may remove your bandaid/bandage as soon as you are home.    You may resume light activities, as tolerated.    Resume your usual diet as tolerated.    It is strongly advised that you do not drive for 1-3 hours post injection.    If you have had oral sedation:  Do not drive for 8 hours post injection.      If you have had IV sedation:  Do not drive for 24 hours post injection.  Do not operate hazardous machinery or make important personal/business decisions for 24 hours.      POSSIBLE STEROID SIDE EFFECTS (If steroid/cortisone was used for your procedure)    -If you experience these symptoms, it should only last for a short period    Swelling of the legs              Skin redness (flushing)     Mouth (oral) irritation   Blood sugar (glucose) levels            Sweats                    Mood changes  Headache  Sleeplessness  Weakened immune system for up to 14 days, which could increase the risk of manuel the COVID-19 virus  and/or experiencing more severe symptoms of the disease, if exposed.  Decreased effectiveness of the flu vaccine if given within 2 weeks of the steroid.         POSSIBLE PROCEDURE SIDE EFFECTS  -Call the Spine Center if you are concerned  Increased Pain           Increased numbness/tingling      Nausea/Vomiting          Bruising/bleeding at site      Redness or swelling                                              Difficulty walking      Weakness           Fever greater than 100.5    *In the event of a severe headache after an epidural steroid injection that is relieved by lying down, please call the [facility] to speak with a clinical staff member*

## 2023-08-17 ENCOUNTER — MYC REFILL (OUTPATIENT)
Dept: FAMILY MEDICINE | Facility: CLINIC | Age: 43
End: 2023-08-17
Payer: COMMERCIAL

## 2023-08-17 DIAGNOSIS — F50.819 BINGE EATING DISORDER: ICD-10-CM

## 2023-08-17 RX ORDER — LISDEXAMFETAMINE DIMESYLATE 60 MG/1
60 CAPSULE ORAL EVERY MORNING
Qty: 30 CAPSULE | Refills: 0 | Status: SHIPPED | OUTPATIENT
Start: 2023-08-17 | End: 2023-09-12

## 2023-08-28 ENCOUNTER — MYC MEDICAL ADVICE (OUTPATIENT)
Dept: FAMILY MEDICINE | Facility: CLINIC | Age: 43
End: 2023-08-28
Payer: COMMERCIAL

## 2023-08-28 DIAGNOSIS — F50.819 BINGE EATING DISORDER: Primary | ICD-10-CM

## 2023-08-28 RX ORDER — LISDEXAMFETAMINE DIMESYLATE 30 MG/1
60 CAPSULE ORAL EVERY MORNING
Qty: 60 CAPSULE | Refills: 0 | Status: SHIPPED | OUTPATIENT
Start: 2023-08-28 | End: 2023-11-20

## 2023-08-28 NOTE — TELEPHONE ENCOUNTER
Problem List Items Addressed This Visit       Binge eating disorder - Primary     Reviewed previous records. Request consistent with plan. With pharmacy shortage of 60 mg tab, will prescribe at 60 mg but at two 30 mg tabs daily as covering provider.         Relevant Medications    lisdexamfetamine (VYVANSE) 30 MG capsule

## 2023-08-28 NOTE — TELEPHONE ENCOUNTER
"Pended requested RX for review and if agree, approval.  Please note: change to \"take 2 tablets every day\" is needed on RX.    "

## 2023-08-28 NOTE — ASSESSMENT & PLAN NOTE
Reviewed previous records. Request consistent with plan. With pharmacy shortage of 60 mg tab, will prescribe at 60 mg but at two 30 mg tabs daily as covering provider.

## 2023-09-06 ENCOUNTER — MYC MEDICAL ADVICE (OUTPATIENT)
Dept: PHYSICAL MEDICINE AND REHAB | Facility: CLINIC | Age: 43
End: 2023-09-06
Payer: COMMERCIAL

## 2023-09-06 DIAGNOSIS — M54.16 RIGHT LUMBAR RADICULOPATHY: Primary | ICD-10-CM

## 2023-09-06 RX ORDER — CYCLOBENZAPRINE HCL 10 MG
10 TABLET ORAL 3 TIMES DAILY PRN
Qty: 30 TABLET | Refills: 3 | Status: SHIPPED | OUTPATIENT
Start: 2023-09-06

## 2023-09-06 RX ORDER — PREDNISONE 10 MG/1
TABLET ORAL
Qty: 18 TABLET | Refills: 0 | Status: SHIPPED | OUTPATIENT
Start: 2023-09-06 | End: 2023-11-08

## 2023-09-06 NOTE — PROGRESS NOTES
DISCHARGE  Reason for Discharge: Patient chooses to discontinue therapy.  Patient has made good progress towards goals and has HEP. Plan to return to work and continue HEP independently.    Equipment Issued: NA    Discharge Plan: Patient to continue home program.    Referring Provider:  Esme Hodgson

## 2023-09-12 ENCOUNTER — MYC MEDICAL ADVICE (OUTPATIENT)
Dept: FAMILY MEDICINE | Facility: CLINIC | Age: 43
End: 2023-09-12
Payer: COMMERCIAL

## 2023-09-12 DIAGNOSIS — F50.819 BINGE EATING DISORDER: ICD-10-CM

## 2023-09-12 RX ORDER — LISDEXAMFETAMINE DIMESYLATE 60 MG/1
60 CAPSULE ORAL EVERY MORNING
Qty: 30 CAPSULE | Refills: 0 | Status: SHIPPED | OUTPATIENT
Start: 2023-09-12 | End: 2023-09-27

## 2023-09-25 NOTE — PROGRESS NOTES
Madeline Szymanski is a 43 year old female who presents today for Botox injections for chronic migraine.    Patient is status post Botox injections for chronic migraine June 27, 2023.  Patient reports 75% relief of headache pain after the injections.  She noticed her headaches are less frequent and less severe.  Relief started wearing off 3 weeks ago.  She has had 2 migraines over the past 3 weeks.  She has also had mild headaches every other day.    Pre-procedure diagnosis: Migraine headaches  Post-procedure diagnosis: Same    PROCEDURE:  Botulinum toxin (Botox) injections.      The risks and benefits of the procedure were discussed with the patient which included muscle weakness (including facial droop, inability to swallow, and inability to hold one's head up), allergic reaction, pain, bruising, bleeding, swelling, and death.  She opted to proceed and gave written and verbal consent.    The Botox paradigm was followed. The skin over each site was cleansed with alcohol. A 27 guage 5/8 inch needle was used for injection at all sites.     The bilateral  muscles were injected with a total of 10 units (5 units each side).   The Procerus muscle was injected with a total of 5 units.   The Frontalis muscle was injected with a total of 20 units, divided into 4 sites.  The Temporalis muscle was injected with 20 units divided into 4 sites and performed bilaterally (total of 8 sites for total of 40 units).  The occipitalis muscle was injected with 30 units divided into 6 sites.  The cervical paraspinal muscles were injected with 20 units divided into 4 sites.  The trapezius muscle was injected with 30 units divided into 6 sites.     TOTAL UNITS: 155  Wasted units: 45 units.    The patient tolerated the injection well and afterwards did not have any dizziness/lightheadedness or nausea. The patient was observed for a short period of time and then was discharged.  The patient was encouraged to call with any  questions/concerns prior to the follow-up appointment.  The patient will follow-up in 4 weeks.

## 2023-09-27 ENCOUNTER — MYC MEDICAL ADVICE (OUTPATIENT)
Dept: FAMILY MEDICINE | Facility: CLINIC | Age: 43
End: 2023-09-27
Payer: COMMERCIAL

## 2023-09-27 DIAGNOSIS — F50.819 BINGE EATING DISORDER: ICD-10-CM

## 2023-09-27 DIAGNOSIS — E78.1 HYPERTRIGLYCERIDEMIA: ICD-10-CM

## 2023-09-27 DIAGNOSIS — M48.02 SPINAL STENOSIS IN CERVICAL REGION: ICD-10-CM

## 2023-09-27 DIAGNOSIS — Z12.11 SCREEN FOR COLON CANCER: ICD-10-CM

## 2023-09-27 DIAGNOSIS — F32.5 MAJOR DEPRESSIVE DISORDER WITH SINGLE EPISODE, IN FULL REMISSION (H): ICD-10-CM

## 2023-09-27 RX ORDER — DULOXETIN HYDROCHLORIDE 60 MG/1
60 CAPSULE, DELAYED RELEASE ORAL DAILY
Qty: 90 CAPSULE | Refills: 1 | Status: SHIPPED | OUTPATIENT
Start: 2023-09-27 | End: 2023-11-20

## 2023-09-27 RX ORDER — LISDEXAMFETAMINE DIMESYLATE 60 MG/1
60 CAPSULE ORAL EVERY MORNING
Qty: 30 CAPSULE | Refills: 0 | Status: SHIPPED | OUTPATIENT
Start: 2023-09-27 | End: 2023-11-20

## 2023-09-28 ENCOUNTER — OFFICE VISIT (OUTPATIENT)
Dept: PHYSICAL MEDICINE AND REHAB | Facility: CLINIC | Age: 43
End: 2023-09-28
Payer: COMMERCIAL

## 2023-09-28 VITALS — HEART RATE: 94 BPM | DIASTOLIC BLOOD PRESSURE: 91 MMHG | TEMPERATURE: 98.4 F | SYSTOLIC BLOOD PRESSURE: 144 MMHG

## 2023-09-28 DIAGNOSIS — G43.709 CHRONIC MIGRAINE WITHOUT AURA WITHOUT STATUS MIGRAINOSUS, NOT INTRACTABLE: Primary | ICD-10-CM

## 2023-09-28 PROCEDURE — 64615 CHEMODENERV MUSC MIGRAINE: CPT | Performed by: PHYSICIAN ASSISTANT

## 2023-09-28 ASSESSMENT — PAIN SCALES - GENERAL: PAINLEVEL: MODERATE PAIN (4)

## 2023-09-28 NOTE — LETTER
9/28/2023         RE: Madeline Szymanski  [address]  [facility] [address]        Dear Colleague,    Thank you for referring your patient, Madeline Szymanski, to the [facility]. Please see a copy of my visit note below.    Madeline Szymanski is a 43 year old female who presents today for Botox injections for chronic migraine.    Patient is status post Botox injections for chronic migraine June 27, 2023.  Patient reports 75% relief of headache pain after the injections.  She noticed her headaches are less frequent and less severe.  Relief started wearing off 3 weeks ago.  She has had 2 migraines over the past 3 weeks.  She has also had mild headaches every other day.    Pre-procedure diagnosis: Migraine headaches  Post-procedure diagnosis: Same    PROCEDURE:  Botulinum toxin (Botox) injections.      The risks and benefits of the procedure were discussed with the patient which included muscle weakness (including facial droop, inability to swallow, and inability to hold one's head up), allergic reaction, pain, bruising, bleeding, swelling, and death.  She opted to proceed and gave written and verbal consent.    The Botox paradigm was followed. The skin over each site was cleansed with alcohol. A 27 guage 5/8 inch needle was used for injection at all sites.     The bilateral  muscles were injected with a total of 10 units (5 units each side).   The Procerus muscle was injected with a total of 5 units.   The Frontalis muscle was injected with a total of 20 units, divided into 4 sites.  The Temporalis muscle was injected with 20 units divided into 4 sites and performed bilaterally (total of 8 sites for total of 40 units).  The occipitalis muscle was injected with 30 units divided into 6 sites.  The cervical paraspinal muscles were injected with 20 units divided into 4 sites.  The trapezius muscle was injected with 30 units divided into 6 sites.     TOTAL UNITS: 155  Wasted units: 45  units.    The patient tolerated the injection well and afterwards did not have any dizziness/lightheadedness or nausea. The patient was observed for a short period of time and then was discharged.  The patient was encouraged to call with any questions/concerns prior to the follow-up appointment.  The patient will follow-up in 4 weeks.       Again, thank you for allowing me to participate in the care of your patient.        Sincerely,        Latha Bowling PA-C

## 2023-10-10 ENCOUNTER — MYC MEDICAL ADVICE (OUTPATIENT)
Dept: PHYSICAL MEDICINE AND REHAB | Facility: CLINIC | Age: 43
End: 2023-10-10
Payer: COMMERCIAL

## 2023-10-10 DIAGNOSIS — G56.03 BILATERAL CARPAL TUNNEL SYNDROME: Primary | ICD-10-CM

## 2023-10-10 DIAGNOSIS — R20.2 NUMBNESS AND TINGLING IN BOTH HANDS: ICD-10-CM

## 2023-10-10 DIAGNOSIS — R20.0 NUMBNESS AND TINGLING IN BOTH HANDS: ICD-10-CM

## 2023-10-11 ENCOUNTER — RADIOLOGY INJECTION OFFICE VISIT (OUTPATIENT)
Dept: PHYSICAL MEDICINE AND REHAB | Facility: CLINIC | Age: 43
End: 2023-10-11
Attending: NURSE PRACTITIONER
Payer: COMMERCIAL

## 2023-10-11 VITALS
SYSTOLIC BLOOD PRESSURE: 128 MMHG | DIASTOLIC BLOOD PRESSURE: 82 MMHG | HEART RATE: 101 BPM | TEMPERATURE: 98.1 F | OXYGEN SATURATION: 99 % | RESPIRATION RATE: 16 BRPM

## 2023-10-11 DIAGNOSIS — G56.03 BILATERAL CARPAL TUNNEL SYNDROME: ICD-10-CM

## 2023-10-11 DIAGNOSIS — R20.2 NUMBNESS AND TINGLING IN BOTH HANDS: ICD-10-CM

## 2023-10-11 DIAGNOSIS — R20.0 NUMBNESS AND TINGLING IN BOTH HANDS: ICD-10-CM

## 2023-10-11 PROCEDURE — 76942 ECHO GUIDE FOR BIOPSY: CPT | Performed by: PAIN MEDICINE

## 2023-10-11 PROCEDURE — 20526 THER INJECTION CARP TUNNEL: CPT | Mod: 50 | Performed by: PAIN MEDICINE

## 2023-10-11 RX ORDER — METHYLPREDNISOLONE ACETATE 40 MG/ML
INJECTION, SUSPENSION INTRA-ARTICULAR; INTRALESIONAL; INTRAMUSCULAR; SOFT TISSUE
Status: COMPLETED | OUTPATIENT
Start: 2023-10-11 | End: 2023-10-11

## 2023-10-11 RX ADMIN — METHYLPREDNISOLONE ACETATE 40 MG: 40 INJECTION, SUSPENSION INTRA-ARTICULAR; INTRALESIONAL; INTRAMUSCULAR; SOFT TISSUE at 11:07

## 2023-10-11 ASSESSMENT — PAIN SCALES - GENERAL
PAINLEVEL: NO PAIN (0)
PAINLEVEL: NO PAIN (0)

## 2023-10-13 ENCOUNTER — RADIOLOGY INJECTION OFFICE VISIT (OUTPATIENT)
Dept: PHYSICAL MEDICINE AND REHAB | Facility: CLINIC | Age: 43
End: 2023-10-13
Attending: NURSE PRACTITIONER
Payer: COMMERCIAL

## 2023-10-13 VITALS
HEART RATE: 90 BPM | RESPIRATION RATE: 16 BRPM | OXYGEN SATURATION: 98 % | SYSTOLIC BLOOD PRESSURE: 132 MMHG | TEMPERATURE: 98.8 F | DIASTOLIC BLOOD PRESSURE: 84 MMHG

## 2023-10-13 DIAGNOSIS — M47.816 LUMBAR FACET ARTHROPATHY: ICD-10-CM

## 2023-10-13 DIAGNOSIS — G89.29 CHRONIC BILATERAL LOW BACK PAIN WITHOUT SCIATICA: ICD-10-CM

## 2023-10-13 DIAGNOSIS — M54.50 CHRONIC BILATERAL LOW BACK PAIN WITHOUT SCIATICA: ICD-10-CM

## 2023-10-13 PROCEDURE — 64636 DESTROY L/S FACET JNT ADDL: CPT | Mod: 50 | Performed by: PAIN MEDICINE

## 2023-10-13 PROCEDURE — 64635 DESTROY LUMB/SAC FACET JNT: CPT | Mod: 50 | Performed by: PAIN MEDICINE

## 2023-10-13 RX ORDER — LIDOCAINE HYDROCHLORIDE 10 MG/ML
INJECTION, SOLUTION EPIDURAL; INFILTRATION; INTRACAUDAL; PERINEURAL
Status: COMPLETED | OUTPATIENT
Start: 2023-10-13 | End: 2023-10-13

## 2023-10-13 RX ORDER — BUPIVACAINE HYDROCHLORIDE 2.5 MG/ML
INJECTION, SOLUTION EPIDURAL; INFILTRATION; INTRACAUDAL
Status: COMPLETED | OUTPATIENT
Start: 2023-10-13 | End: 2023-10-13

## 2023-10-13 RX ADMIN — BUPIVACAINE HYDROCHLORIDE 6 ML: 2.5 INJECTION, SOLUTION EPIDURAL; INFILTRATION; INTRACAUDAL at 15:20

## 2023-10-13 RX ADMIN — LIDOCAINE HYDROCHLORIDE 6 ML: 10 INJECTION, SOLUTION EPIDURAL; INFILTRATION; INTRACAUDAL; PERINEURAL at 15:19

## 2023-10-13 ASSESSMENT — PAIN SCALES - GENERAL
PAINLEVEL: MILD PAIN (3)
PAINLEVEL: MODERATE PAIN (5)

## 2023-10-13 NOTE — PATIENT INSTRUCTIONS
Follow-up visit with Esme Hodgson CNP in 6 weeks to discuss procedure outcome and determine care plan going forward.      DISCHARGE INSTRUCTIONS    During office hours (8:00 a.m.-4:00 p.m.) questions or concerns may be answered  by calling [facility] at  [phone].  Messages received after hours will be returned the following business day.      In the case of an emergency,please dial 911 or seek assistance at the nearest Emergency Room/Urgent Care facility.     All Patients:  You may experience an increase in your symptoms forthe first 5-7 days. Soreness may persist during the first few weeks as it takes 4-6 weeks for the nerves to fully heal.    You may use ice on the injection site,as frequently as 20 minutes each hour if needed. It is recommended NOT to apply heat during the first 24 hours.    You may take your pain medicine.    You may continue taking your regular medication.    You may shower. No swimming, tub bath or hot tub for 48hours. This also includes no lotions, ointments or patches to the needle sites as well. You may remove your bandaid/bandage as soon as you are home.    You mayresume light activities, as tolerated.    Resume your usual diet as tolerated.    It is strongly advisedthat you do not drive for 1-3 hours post injection.    If you have had oral sedation:  Do not drive for 8 hours post injection.             POSSIBLE PROCEDURE SIDE EFFECTS    -Call the [facility] Center if you are concerned    IncreasedPain           Increased numbness/tingling      Nausea/Vomiting          Bruising/bleeding at site      Redness or swelling  Difficulty walking      Weakness          Fever greater than 100.5

## 2023-10-14 ENCOUNTER — HEALTH MAINTENANCE LETTER (OUTPATIENT)
Age: 43
End: 2023-10-14

## 2023-10-29 DIAGNOSIS — G43.109 MIGRAINE WITH AURA AND WITHOUT STATUS MIGRAINOSUS, NOT INTRACTABLE: ICD-10-CM

## 2023-10-30 RX ORDER — RIZATRIPTAN BENZOATE 10 MG/1
10 TABLET, ORALLY DISINTEGRATING ORAL
Qty: 18 TABLET | Refills: 0 | Status: SHIPPED | OUTPATIENT
Start: 2023-10-30 | End: 2024-01-24

## 2023-11-07 ENCOUNTER — HOSPITAL ENCOUNTER (OUTPATIENT)
Dept: MRI IMAGING | Facility: CLINIC | Age: 43
Discharge: HOME | End: 2023-11-07
Attending: SURGERY
Payer: COMMERCIAL

## 2023-11-07 ENCOUNTER — HOSPITAL ENCOUNTER (OUTPATIENT)
Dept: CT IMAGING | Facility: CLINIC | Age: 43
Discharge: HOME | End: 2023-11-07
Attending: SURGERY
Payer: COMMERCIAL

## 2023-11-07 ENCOUNTER — MYC MEDICAL ADVICE (OUTPATIENT)
Dept: NEUROSURGERY | Facility: CLINIC | Age: 43
End: 2023-11-07
Payer: COMMERCIAL

## 2023-11-07 ENCOUNTER — TELEPHONE (OUTPATIENT)
Dept: NEUROSURGERY | Facility: CLINIC | Age: 43
End: 2023-11-07
Payer: COMMERCIAL

## 2023-11-07 DIAGNOSIS — M54.12 CERVICAL RADICULOPATHY: Primary | ICD-10-CM

## 2023-11-07 DIAGNOSIS — M54.12 CERVICAL RADICULOPATHY: ICD-10-CM

## 2023-11-07 PROCEDURE — 255N000002 HC RX 255 OP 636: Performed by: SURGERY

## 2023-11-07 PROCEDURE — 72156 MRI NECK SPINE W/O & W/DYE: CPT

## 2023-11-07 PROCEDURE — 72125 CT NECK SPINE W/O DYE: CPT

## 2023-11-07 PROCEDURE — A9585 GADOBUTROL INJECTION: HCPCS | Performed by: SURGERY

## 2023-11-07 RX ORDER — GADOBUTROL 604.72 MG/ML
8 INJECTION INTRAVENOUS ONCE
Status: COMPLETED | OUTPATIENT
Start: 2023-11-07 | End: 2023-11-07

## 2023-11-07 RX ADMIN — GADOBUTROL 8 ML: 604.72 INJECTION INTRAVENOUS at 15:24

## 2023-11-07 NOTE — TELEPHONE ENCOUNTER
Madeline reports a sudden onset of numbness in her face, shoulder, shoulder blade and deltoid on the right as well as decreased dexterity on the right after she fell this morning at home. She has a limited neck ROM, endorses pain in her neck when turns her head to the right.   Per discussion with Dr. Lilly - will get a STAT cervical MRI. Recommendations will follow.  Christy Rose RN, CNRN

## 2023-11-08 ENCOUNTER — TELEPHONE (OUTPATIENT)
Dept: NEUROSURGERY | Facility: CLINIC | Age: 43
End: 2023-11-08
Payer: COMMERCIAL

## 2023-11-08 ENCOUNTER — OFFICE VISIT (OUTPATIENT)
Dept: PHYSICAL MEDICINE AND REHAB | Facility: CLINIC | Age: 43
End: 2023-11-08
Payer: COMMERCIAL

## 2023-11-08 VITALS — OXYGEN SATURATION: 99 % | SYSTOLIC BLOOD PRESSURE: 166 MMHG | DIASTOLIC BLOOD PRESSURE: 92 MMHG | HEART RATE: 103 BPM

## 2023-11-08 DIAGNOSIS — Z98.890 HX OF CERVICAL SPINE SURGERY: ICD-10-CM

## 2023-11-08 DIAGNOSIS — M54.50 CHRONIC RIGHT-SIDED LOW BACK PAIN WITHOUT SCIATICA: ICD-10-CM

## 2023-11-08 DIAGNOSIS — G89.29 CHRONIC RIGHT-SIDED LOW BACK PAIN WITHOUT SCIATICA: ICD-10-CM

## 2023-11-08 DIAGNOSIS — M54.12 RIGHT CERVICAL RADICULOPATHY: Primary | ICD-10-CM

## 2023-11-08 DIAGNOSIS — S16.1XXA CERVICAL MYOFASCIAL STRAIN, INITIAL ENCOUNTER: ICD-10-CM

## 2023-11-08 PROCEDURE — 96372 THER/PROPH/DIAG INJ SC/IM: CPT | Performed by: NURSE PRACTITIONER

## 2023-11-08 PROCEDURE — 99214 OFFICE O/P EST MOD 30 MIN: CPT | Mod: 25 | Performed by: NURSE PRACTITIONER

## 2023-11-08 RX ORDER — PREGABALIN 75 MG/1
150 CAPSULE ORAL 2 TIMES DAILY
Qty: 120 CAPSULE | Refills: 4 | Status: SHIPPED | OUTPATIENT
Start: 2023-11-08 | End: 2024-06-27

## 2023-11-08 RX ORDER — KETOROLAC TROMETHAMINE 30 MG/ML
30 INJECTION, SOLUTION INTRAMUSCULAR; INTRAVENOUS ONCE
Status: COMPLETED | OUTPATIENT
Start: 2023-11-08 | End: 2023-11-08

## 2023-11-08 RX ADMIN — KETOROLAC TROMETHAMINE 30 MG: 30 INJECTION, SOLUTION INTRAMUSCULAR; INTRAVENOUS at 11:49

## 2023-11-08 NOTE — Clinical Note
11/8/2023         RE: Madeline Szymanski  [address]  [facility] [address]        Dear Colleague,    Thank you for referring your patient, Madeline Szymanski, to the [facility]. Please see a copy of my visit note below.      Assessment:     There are no diagnoses linked to this encounter.   Madeline Szymanski is a 43 year old y.o. female with past medical history significant for *** who presents today for follow-up regarding:     -***    Plan:     A shared decision making plan was used.  The patient's values and choices were respected. Prior medical records were reviewed today. The following represents what was discussed and decided upon by the provider and the patient.     -DIAGNOSTIC TESTS: Images were personally reviewed and interpreted.   --***     -INTERVENTIONS: ***    -MEDICATIONS: ***   Discussed side effects of medications and proper use. Patient verbalized understanding.    -PHYSICAL THERAPY: ***  Discussed the importance of core strengthening, ROM, stretching exercises with the patient and how each of these entities is important in decreasing pain.  Explained to the patient that the purpose of physical therapy is to teach the patient a home exercise program.  These exercises need to be performed every day in order to decrease pain and prevent future occurrences of pain.        -PATIENT EDUCATION: Total time of *** minutes, on the day of service, spent with the patient, reviewing the chart, placing orders, and documenting.     -FOLLOW UP: ***   Advised to contact clinic if symptoms worsen or change.    Subjective:     Madeline Szymanski is a 43 year old female who presents today for follow-up regarding ***    No myelopathic symptoms.     Evaluation to Date: ***    Treatment to Date: ***    Patient Active Problem List   Diagnosis    Spinal stenosis in cervical region    History of gastric ulcer    Hx of Helicobacter infection    Migraine with aura and without status migrainosus, not  intractable    Panic attacks    S/P laparoscopic sleeve gastrectomy    Lumbar radiculopathy    Major depressive disorder with single episode, in full remission (H24)    Chronic insomnia    Class 1 obesity due to excess calories with serious comorbidity and body mass index (BMI) of 31.0 to 31.9 in adult    Chronic right-sided low back pain with right-sided sciatica    History of lumbosacral spine surgery    Weakness of right lower extremity    Chronic neck pain    Bilateral occipital neuralgia    Binge eating disorder       Current Outpatient Medications   Medication    cyclobenzaprine (FLEXERIL) 10 MG tablet    DULoxetine (CYMBALTA) 60 MG capsule    eletriptan (RELPAX) 40 MG tablet    EPINEPHrine (ANY BX GENERIC EQUIV) 0.3 MG/0.3ML injection 2-pack    hydrOXYzine (ATARAX) 25 MG tablet    lidocaine (LIDODERM) 5 % patch    lisdexamfetamine (VYVANSE) 30 MG capsule    lisdexamfetamine (VYVANSE) 60 MG capsule    pregabalin (LYRICA) 75 MG capsule    rizatriptan (MAXALT-MLT) 10 MG ODT    topiramate (TOPAMAX) 50 MG tablet     No current facility-administered medications for this visit.       Allergies   Allergen Reactions    Cephalexin Hives    Coconut Flavor Anaphylaxis    Covid-19 (Mrna) Vaccine Anaphylaxis     Pfizer     Prochlorperazine Other (See Comments)     Tremors  When given with metoclopramide, okay by itself      Coconut Fatty Acids      Other reaction(s): Edema    Penicillins Hives       Past Medical History:   Diagnosis Date    Acute and chronic respiratory failure with hypoxia (H) 01/01/2022    Anxiety     Arthritis     Depressive disorder     Gastric band slippage 09/26/2019    History of blood transfusion 04/1978    Kidney infection     Lumbar radiculopathy     Migraine     Migraine     Painless urinary retention due to cauda equina syndrome (H)     Pancreatitis     Panic attack     PONV (postoperative nausea and vomiting)     Spinal stenosis in cervical region     UTI (lower urinary tract infection)          Review of Systems  ROS: *** Specifically negative for bowel/bladder dysfunction, balance changes, headache, dizziness, foot drop, fevers, chills, appetite changes, nausea/vomiting, unexplained weight loss. Otherwise 13 systems reviewed are negative. Please see the patient's intake questionnaire from today for details.    Reviewed Social, Family, Past Medical and Past Surgical history with patient, no significant changes noted since prior visit.     Objective:     BP (!) 166/92 (BP Location: Right arm, Patient Position: Sitting)   Pulse 103   SpO2 99%     PHYSICAL EXAMINATION:   --CONSTITUTIONAL: Vital signs as above. No acute distress. The patient is well nourished and well groomed.  --PSYCHIATRIC:  The patient is awake, alert, oriented to person, place, time and answering questions appropriately with clear speech. Appropriate mood and affect   --HEENT: Sclera are non-injected. Extraocular muscles are intact.   --SKIN: Skin over the face, bilateral upper extremities, and posterior torso is clean, dry, intact without rashes.  --RESPIRATORY: Normal rhythm and effort. No abnormal accessory muscle breathing patterns noted.   --GROSS MOTOR: Easily arises from a seated position.   --CERVICAL SPINE: Inspection reveals no evidence of deformity. Range of motion is not limited in cervical flexion, extension, lateral rotation. No tenderness to palpation cervical spine.    --SHOULDERS: Full range of motion bilaterally: abduction, flexion, cross chest movement internal/external rotation. Negative empty can.  --UPPER EXTREMITY MOTOR TESTING:  Wrist flexion left 5/5, right 5/5  Wrist extension left 5/5, right 5/5  Biceps left 5/5, right 5/5   Triceps left 5/5, right 5/5   Shoulder abduction left 5/5, right 5/5   left 5/5, right 5/5  --NEUROLOGIC: CN III-XII are grossly intact. 2/4 symmetric biceps, brachioradialis, triceps reflexes bilaterally. Sensation to upper extremities is intact. Negative William's bilaterally.    --VASCULAR: Warm upper limbs bilaterally.     Imaging: Spine imaging was reviewed today. The images were shown to the patient and the findings were explained using a spine model.    CT Cervical Spine w/o Contrast    Result Date: 11/7/2023  EXAM: CT CERVICAL SPINE W/O CONTRAST LOCATION: [facility] DATE: 11/7/2023 INDICATION: Cervical radiculopathy. COMPARISON: MRI cervical spine with and without contrast 05/25/2023, MRI cervical spine without contrast 03/02/2022. TECHNIQUE: Routine CT Cervical Spine without IV contrast. Multiplanar reformats. Dose reduction techniques were used. FINDINGS: VERTEBRA: There is disc arthroplasty at the C5-C6 level. Surgical hardware appears intact. Cervical vertebral body heights are maintained. No acute cervical spine fracture. CANAL/FORAMINA: No CT evidence of high-grade spinal canal stenosis or neural foraminal narrowing. Mild cervical spondylitic changes. EXTRASPINAL: No extraspinal abnormality.     IMPRESSION: 1.  Postsurgical changes of disc arthroplasty at C5-C6. 2.  No CT evidence of high-grade spinal canal stenosis or neural foraminal narrowing.     MR Cervical Spine w/o & w Contrast    Result Date: 11/7/2023  MRI OF THE CERVICAL SPINE WITHOUT AND WITH CONTRAST 11/7/2023 4:27 PM COMPARISON: Cervical spine MRI 3/2/2022. HISTORY: Cervical radiculopathy. TECHNIQUE: Multiplanar, multisequence MRI images of the cervical spine were acquired without and with Gadavist 8 mL gadolinium IV contrast. FINDINGS: There has been interval placement of ACDF hardware at C5-C6. There is normal alignment of the cervical vertebrae. Vertebral body heights of the cervical spine are normal. Marrow signal throughout the cervical vertebrae is within normal limits. There is no evidence for fracture or pathologic bony lesion of the cervical spine. Minimal posterior disc bulging throughout the cervical spine. The cervical spinal cord is normal in contour. There is no abnormal  signal in the cervical spinal cord. There is no evidence for intrathecal abnormality. Level by level: C2-C3: Normal disc height and contour. Mild facet arthropathy bilaterally. No spinal canal stenosis. No foraminal stenosis on either side. C3-C4: There is facet arthropathy bilaterally, uncinate hypertrophy bilaterally and a posterior broad-based disc-osteophyte complex. No spinal canal stenosis. Mild left foraminal narrowing. Mild right foraminal narrowing. C4-C5: There is facet arthropathy bilaterally, uncinate hypertrophy bilaterally and a posterior broad-based disc-osteophyte complex. No spinal canal stenosis. No foraminal stenosis on either side. C5-C6: (Fused level) No spinal canal stenosis. No foraminal stenosis on either side. C6-C7: There is facet arthropathy bilaterally, uncinate hypertrophy bilaterally and a posterior broad-based disc-osteophyte complex. No spinal canal stenosis. No foraminal stenosis on either side. C7-T1: There is facet arthropathy bilaterally, uncinate hypertrophy bilaterally and a posterior broad-based disc-osteophyte complex. No spinal canal stenosis. No foraminal stenosis on either side.     IMPRESSION: 1. Postoperative and diffuse degenerative change of the cervical spine as detailed above. 2. No high-grade spinal canal or high-grade neural foraminal narrowing at any level of the cervical spine. LARISA BOB MD   SYSTEM ID:  [id number]    PAIN RFA Lumbar/Sacral Joint Nerve Bilateral    Result Date: 10/13/2023  LUMBAR RADIOFREQUENCY ABLATION/NEUROTOMY PROCEDURE Performed on: 10/13/23 Pre Procedure Diagnosis: Lumbar spondylosis, Low back pain, Lumbar facet syndrome Post Procedure Diagnosis:  Same Procedure Performed:  Lumbar Radiofrequency ablation/neurotomy procedure Clinical Scenario:  As per office notes Anesthesia/Fluids:  As per intra-procedure documentation Vital Signs:  As per intra-procedure documentation Level Injected: L3, L4, L5 medial branch and dorsal rami Side  Injected:  Bilateral CC: Madeline Szymanski is a 43 year-old female who presents today for a L3, L4, L5 medial branch/dorsal rami radiofrequency ablation/neurotomy procedure.  The patient has had significant relief with two sets of diagnostic injections and would like to proceed with definitive treatment today.  Imaging reviewed.  The procedure of lumbar radiofrequency ablation/neurotomy was discussed in detail along with th risks, including but not limited to:   nerve injury, infection, bleeding, allergic reaction,  worsening of pain along with risk of stroke, paralysis and death.  The effectiveness of this procedure is approximately 80% per the medical literature, meaning that 20% of patients will not have any relief, despite having good response to the medial branch blocks.  If effective, the patient may receive pain relief for 6-9 months or longer.  The patient decided to proceed and signed informed consent.  The patient denies any symptoms of an active infection and denies taking antibiotics. The patient denies taking any prescription blood thinning medications. The patient denies any allergies to iodine or iodine contrast. The patient was placed  in the prone position in the fluoroscopy table.  A procedural pause was performed to verify patient identify, site location and side for the procedure.   The low back was prepped and draped in the usual sterile fashion.  After anesthetizing the skin with 1% lidocaine, a 100 mm,18 gauge spinal needle was introduced at the aforementioned levels on the right side using fluoroscopic guidance.  Lateral views were used to confirm placement.  After fluoroscopic views confirmed placement, motor stimulation was performed.  Please refer to the nursing intra-procedure note for details.  No radicular symptoms were appreciated by the patient during stimulation and there was observable muscle twitching during motor stimulation.  After aspiration for blood was negative, 1.5 mL of 2%  lidocaine was injected was injected at each level.  After waiting 2 minutes for the local anesthetic to take effect, the probes were brought to a final temperature of 80 degrees celsius and burning occurred for 90 seconds. The needles were then rotated 180 degrees and then the probes were again brought to a temperature of 80 degrees celsius for 90 seconds.  The needles were then flushed with 1 cc of 0.25% bupivacaine was they were with drawn.   The exact same procedure was repeated on the left side.   With stimulation on the left side, the patient denied experiencing any radicular symptoms.   The needles were then flushed with 1 cc of 0.25% bupivacaine as they were withdrawn on the left side.  Pre-procedure pain score:  5/10 Post-procedure pain score:  3/10  The patient tolerated the procedure well. The patient was instructed to call if there are any questions/concerns after the procedure.  After a short period of observation, the patient was discharged.  Patient will follow-up with Esme Hodgson in 6 weeks.    Pain Us Therapeutic Inj of Carpal Tunnel Tahir    Result Date: 10/11/2023  Pre Procedure Diagnosis:  Bilateral carpal tunnel Post Procedure Diagnosis:  Same Procedure Performed:   Bilateral carpal tunnel injection with Ultrasound Guidance Clinical Scenario:  As per office notes Vital Signs:  As per rooming/preprocedure documentation Side Injected: Bilateral  After discussing the risks, benefits, and alternatives to the procedure, the patient expressed understanding and wished to proceed.  The patient was brought to the procedure suite and placed in the seated position.  A procedural pause was conducted to verify:  correct patient identity, procedure to be performed and as applicable, correct side and site, correct patient position, and availability of implants, special equipment or special requirements.  A simple surgical tray was used.  A simple surgical tray was used.   Prior to the procedure, the left  wrist was examined with a hockey stick transducer to visualize the left median nerve at the couple tunnel and determine the optimal needle path. Following this, the area was prepared with a ChloraPrep scrub, then re-examined using the same transducer, a sterile ultrasound transducer cover, and ultrasound transducer gel. using ultrasound guidance, a 2 inch, 25 gauge needle was advanced into the left wrist towards the median nerve. After visualization of the tip near the lfcn and negative aspiration for blood, a mixture of 20 mg of Depo-Medrol with 2.5 cc of 2% lidocaine was injected into the left wrist at the carpal tunnel near the median nerve. Following the injection, the needle was withdrawn.    Exact same procedure was done on the right side.  20 mg of Depo-Medrol with 2.5 cc of 2% lidocaine was injected on the right side.  The patient tolerated the procedure well and there were no apparent complications.  After an appropriate amount of observation, the patient was dismissed from the clinic in good condition under their own power.  Prepain score: 0/10 post pain score 0/10.                     Assessment:     There are no diagnoses linked to this encounter.   Madeline Szymanski is a 43 year old y.o. female with past medical history significant for migraine, depressive disorder, , gastric bypass surgery, L5-S1 hemilaminectomy left medial facetectomy foraminotomies and microdiscectomy Dr. Llily 10/4/2021 who presents today for follow-up regarding:     -***    Plan:     A shared decision making plan was used.  The patient's values and choices were respected. Prior medical records were reviewed today. The following represents what was discussed and decided upon by the provider and the patient.     -DIAGNOSTIC TESTS: Images were personally reviewed and interpreted.   --***   -- Lumbar spine x-ray 2022 with mild to moderate multilevel degenerative disc changes most advanced at T12-L1 and L5-S1.  -- Cervical  spine x-ray post surgery 9/16/2022 with artificial disc replacement C5-6, mild disc height loss at C6-7.          --Lumbar MRI 2/17/2022 with microdiscectomy and left hemilaminectomy changes at L5-S1 with granulation tissue left paracentral region, moderate left lateral recess and mild right lateral recess stenosis.  2 mm L5-S1 retrolisthesis.  --Cervical MRI 8/7/2021 Moderate to Severe spinal stenosis C5-6.   --Lumbar spine flexion-extension x-ray 8/10/2021 shows no spondylolisthesis and no instability.  --Lumbar spine MRI 8/6/2021 with L5-S1 mild disc height loss with disc bulge on the left with osteophytes with mild left foraminal stenosis lateral recess stenosis and S1 compression.    -INTERVENTIONS: ***    -MEDICATIONS: ***   Discussed side effects of medications and proper use. Patient verbalized understanding.    -PHYSICAL THERAPY: ***  Discussed the importance of core strengthening, ROM, stretching exercises with the patient and how each of these entities is important in decreasing pain.  Explained to the patient that the purpose of physical therapy is to teach the patient a home exercise program.  These exercises need to be performed every day in order to decrease pain and prevent future occurrences of pain.        -PATIENT EDUCATION: Total time of *** minutes, on the day of service, spent with the patient, reviewing the chart, placing orders, and documenting.     -FOLLOW UP: ***   Advised to contact clinic if symptoms worsen or change.    Subjective:     Madeline Szymanski is a 43 year old female who presents today for follow-up regarding ***    No myelopathic symptoms.     Treatment to Date:  Left L5-S1 hemilaminectomy, medial facetectomy, foraminotomy with L5-S1 left microdiscectomy 10/4/2021 Dr. Lilly.     Bilateral carpal tunnel steroid injection 11/11/2020 and 5/12/2021 with significant benefit.     L5-S1 TFESI 3/21/2018  Lumbar MBB 2/13/2019 with benefit LBP  Lumbar RFA 3/6/2019  Bilateral L5-S1 TFESI  6/3/2020 with benefit LBP.  Preprocedure pain 8/10, post 5/10.  Left S1-S2 TFESI 8/3/2021 with no lasting benefit.  Preprocedure pain 8/10, post 2/10.  Left L5-S1 TFESI 9/1/2021 with minimal lasting benefit.  Preprocedure pain 7/10, post 4/10.    Bilateral L3, L4, L5 RFA 3/24/2022.  Bilateral  carpal tunnel steroid injection 4/19/2022.  Right C5-6 TFESI 5/3/2022  Bilateral occipital nerve block 10/24/2022 with significant relief.  Bilateral occipital nerve block 1/25/2023 with significant relief  RIGHT L5-S1 TFESI 2/15/2023 with minimal initial or lasting benefit.   Bilateral carpal tunnel steroid injection 4/26/2023 with significant relief.     Medications:  Gabapentin 300 mg 1 to 2 tablets at bedtime, unable to tolerate during the daytime.  Amitriptyline 75 mg at bedtime for migraines  Flexeril as needed  Tylenol as needed  Lidocaine gel as needed     No benefit with recent Medrol Dosepak.     *Patient unable to tolerate NSAIDs due to GI surgery.    Patient Active Problem List   Diagnosis     Spinal stenosis in cervical region     History of gastric ulcer     Hx of Helicobacter infection     Migraine with aura and without status migrainosus, not intractable     Panic attacks     S/P laparoscopic sleeve gastrectomy     Lumbar radiculopathy     Major depressive disorder with single episode, in full remission (H24)     Chronic insomnia     Class 1 obesity due to excess calories with serious comorbidity and body mass index (BMI) of 31.0 to 31.9 in adult     Chronic right-sided low back pain with right-sided sciatica     History of lumbosacral spine surgery     Weakness of right lower extremity     Chronic neck pain     Bilateral occipital neuralgia     Binge eating disorder       Current Outpatient Medications   Medication     cyclobenzaprine (FLEXERIL) 10 MG tablet     DULoxetine (CYMBALTA) 60 MG capsule     eletriptan (RELPAX) 40 MG tablet     EPINEPHrine (ANY BX GENERIC EQUIV) 0.3 MG/0.3ML injection 2-pack      hydrOXYzine (ATARAX) 25 MG tablet     lidocaine (LIDODERM) 5 % patch     lisdexamfetamine (VYVANSE) 30 MG capsule     lisdexamfetamine (VYVANSE) 60 MG capsule     pregabalin (LYRICA) 75 MG capsule     rizatriptan (MAXALT-MLT) 10 MG ODT     topiramate (TOPAMAX) 50 MG tablet     No current facility-administered medications for this visit.       Allergies   Allergen Reactions     Cephalexin Hives     Coconut Flavor Anaphylaxis     Covid-19 (Mrna) Vaccine Anaphylaxis     Pfizer      Prochlorperazine Other (See Comments)     Tremors  When given with metoclopramide, okay by itself       Coconut Fatty Acids      Other reaction(s): Edema     Penicillins Hives       Past Medical History:   Diagnosis Date     Acute and chronic respiratory failure with hypoxia (H) 01/01/2022     Anxiety      Arthritis      Depressive disorder      Gastric band slippage 09/26/2019     History of blood transfusion 04/1978     Kidney infection      Lumbar radiculopathy      Migraine      Migraine      Painless urinary retention due to cauda equina syndrome (H)      Pancreatitis      Panic attack      PONV (postoperative nausea and vomiting)      Spinal stenosis in cervical region      UTI (lower urinary tract infection)         Review of Systems  ROS: *** Specifically negative for bowel/bladder dysfunction, balance changes, headache, dizziness, foot drop, fevers, chills, appetite changes, nausea/vomiting, unexplained weight loss. Otherwise 13 systems reviewed are negative. Please see the patient's intake questionnaire from today for details.    Reviewed Social, Family, Past Medical and Past Surgical history with patient, no significant changes noted since prior visit.     Objective:     BP (!) 166/92 (BP Location: Right arm, Patient Position: Sitting)   Pulse 103   SpO2 99%     PHYSICAL EXAMINATION:   --CONSTITUTIONAL: Vital signs as above. No acute distress. The patient is well nourished and well groomed.  --PSYCHIATRIC:  The patient is awake,  alert, oriented to person, place, time and answering questions appropriately with clear speech. Appropriate mood and affect   --HEENT: Sclera are non-injected. Extraocular muscles are intact.   --SKIN: Skin over the face, bilateral upper extremities, and posterior torso is clean, dry, intact without rashes.  --RESPIRATORY: Normal rhythm and effort. No abnormal accessory muscle breathing patterns noted.   --GROSS MOTOR: Easily arises from a seated position.   --CERVICAL SPINE: Inspection reveals no evidence of deformity. Range of motion is not limited in cervical flexion, extension, lateral rotation. No tenderness to palpation cervical spine.    --SHOULDERS: Full range of motion bilaterally: abduction, flexion, cross chest movement internal/external rotation. Negative empty can.  --UPPER EXTREMITY MOTOR TESTING:  Wrist flexion left 5/5, right 5/5  Wrist extension left 5/5, right 5/5  Biceps left 5/5, right 5/5   Triceps left 5/5, right 5/5   Shoulder abduction left 5/5, right 5/5   left 5/5, right 5/5  --NEUROLOGIC: CN III-XII are grossly intact. 2/4 symmetric biceps, brachioradialis, triceps reflexes bilaterally. Sensation to upper extremities is intact. Negative William's bilaterally.   --VASCULAR: Warm upper limbs bilaterally.     Imaging: Spine imaging was reviewed today. The images were shown to the patient and the findings were explained using a spine model.    CT Cervical Spine w/o Contrast  Result Date: 11/7/2023  EXAM: CT CERVICAL SPINE W/O CONTRAST LOCATION: [facility] DATE: 11/7/2023 INDICATION: Cervical radiculopathy. COMPARISON: MRI cervical spine with and without contrast 05/25/2023, MRI cervical spine without contrast 03/02/2022. TECHNIQUE: Routine CT Cervical Spine without IV contrast. Multiplanar reformats. Dose reduction techniques were used. FINDINGS: VERTEBRA: There is disc arthroplasty at the C5-C6 level. Surgical hardware appears intact. Cervical vertebral body  heights are maintained. No acute cervical spine fracture. CANAL/FORAMINA: No CT evidence of high-grade spinal canal stenosis or neural foraminal narrowing. Mild cervical spondylitic changes. EXTRASPINAL: No extraspinal abnormality.   IMPRESSION: 1.  Postsurgical changes of disc arthroplasty at C5-C6. 2.  No CT evidence of high-grade spinal canal stenosis or neural foraminal narrowing.       MR Cervical Spine w/o & w Contrast  Result Date: 11/7/2023  MRI OF THE CERVICAL SPINE WITHOUT AND WITH CONTRAST 11/7/2023 4:27 PM COMPARISON: Cervical spine MRI 3/2/2022. HISTORY: Cervical radiculopathy. TECHNIQUE: Multiplanar, multisequence MRI images of the cervical spine were acquired without and with Gadavist 8 mL gadolinium IV contrast. FINDINGS: There has been interval placement of ACDF hardware at C5-C6. There is normal alignment of the cervical vertebrae. Vertebral body heights of the cervical spine are normal. Marrow signal throughout the cervical vertebrae is within normal limits. There is no evidence for fracture or pathologic bony lesion of the cervical spine. Minimal posterior disc bulging throughout the cervical spine. The cervical spinal cord is normal in contour. There is no abnormal signal in the cervical spinal cord. There is no evidence for intrathecal abnormality. Level by level: C2-C3: Normal disc height and contour. Mild facet arthropathy bilaterally. No spinal canal stenosis. No foraminal stenosis on either side. C3-C4: There is facet arthropathy bilaterally, uncinate hypertrophy bilaterally and a posterior broad-based disc-osteophyte complex. No spinal canal stenosis. Mild left foraminal narrowing. Mild right foraminal narrowing. C4-C5: There is facet arthropathy bilaterally, uncinate hypertrophy bilaterally and a posterior broad-based disc-osteophyte complex. No spinal canal stenosis. No foraminal stenosis on either side. C5-C6: (Fused level) No spinal canal stenosis. No foraminal stenosis on either side.  C6-C7: There is facet arthropathy bilaterally, uncinate hypertrophy bilaterally and a posterior broad-based disc-osteophyte complex. No spinal canal stenosis. No foraminal stenosis on either side. C7-T1: There is facet arthropathy bilaterally, uncinate hypertrophy bilaterally and a posterior broad-based disc-osteophyte complex. No spinal canal stenosis. No foraminal stenosis on either side.   IMPRESSION: 1. Postoperative and diffuse degenerative change of the cervical spine as detailed above. 2. No high-grade spinal canal or high-grade neural foraminal narrowing at any level of the cervical spine. LARISA BOB MD   SYSTEM ID:  [id number]                    Again, thank you for allowing me to participate in the care of your patient.        Sincerely,        Esme Hodgson, CNP

## 2023-11-08 NOTE — PATIENT INSTRUCTIONS
~Spine Center Scheduling #[phone].  ~Please call our [facility] Spine Nurse Navigation #[phone] with any questions or concerns about your treatment plan, if symptoms worsen and you would like to be seen urgently, or if you have problems controlling bladder and bowel function.  ~For any future flareups or new symptoms, recommend follow-up in clinic or contact the nurse navigator line.  ~Please note that any My Chart messages may take multiple days for a response due to the high volume of patients seen in clinic.  Anything sent Thursday night or after will be answered the following week when able, as Esme Hodgson CNP does not work in clinic on Fridays.   ~Esme Hodgson CNP is at the [facility] on the first and third Tuesdays of the month only, otherwise primarily at the [facility].        Importance of specialized Physical Therapy: Discussed the importance of core strengthening, ROM, stretching exercises with the patient and how each of these entities is important in decreasing pain.  Explained to the patient that the purpose of physical therapy is to teach the patient a home exercise program individualized to them and their specific health concerns.  These exercises need to be performed every day in order to decrease pain and prevent future occurrences of pain.

## 2023-11-08 NOTE — LETTER
November 8, 2023      Madeline Szymanski  [address] [place] [address]        To Whom It May Concern:    Madeline Szymanski was seen in our clinic. She may return to work with the following: Recommend lifting restrictions of 10 pounds at this time, limiting bending twisting as tolerated, limit overhead work as much as possible.  Restrictions for 2 weeks through 11/22/2023.  We will revisit further workability needs at that time.      Sincerely,        Esme Hodgson, CNP

## 2023-11-08 NOTE — PROGRESS NOTES
Assessment:     Diagnoses and all orders for this visit:  Right cervical radiculopathy  -     ketorolac (TORADOL) injection 30 mg  Cervical myofascial strain, initial encounter  -     ketorolac (TORADOL) injection 30 mg  Hx of cervical spine surgery  -     ketorolac (TORADOL) injection 30 mg  Chronic right-sided low back pain without sciatica  -     pregabalin (LYRICA) 75 MG capsule; Take 2 capsules (150 mg) by mouth 2 times daily  -     ketorolac (TORADOL) injection 30 mg     Madeline Szymanski is a 43 year old y.o. female with past medical history significant for migraine, depressive disorder, , gastric bypass surgery, L5-S1 hemilaminectomy left medial facetectomy foraminotomies and microdiscectomy Dr. Lilly 10/4/2021 who presents today for follow-up regarding:     -Acute right neck pain radiating to the right upper extremity to the elbow with some consistency with cervical radiculopathy however myofascial component as well.  Onset post fall at home 2023.  Updated cervical CT and MRI unremarkable for acute finding.      Patient is neurologically intact on exam which is reassuring.    Plan:     A shared decision making plan was used.  The patient's values and choices were respected. Prior medical records were reviewed today. The following represents what was discussed and decided upon by the provider and the patient.     -DIAGNOSTIC TESTS: Images were personally reviewed and interpreted.   --Cervical spine MRI and cervical CT scan 2023 with ACDF findings C5-6, no apparent complications of the hardware.  There is disc osteophyte complex at C3-4 greater on the right with mild bilateral foraminal stenosis right greater than left.  No nerve compression in otherwise noted at any other level.  No concerning finding.  --Lumbar spine x-ray 2022 with mild to moderate multilevel degenerative disc changes most advanced at T12-L1 and L5-S1.  --Cervical spine x-ray post surgery 2022 with artificial  disc replacement C5-6, mild disc height loss at C6-7.          --Lumbar MRI 2/17/2022 with microdiscectomy and left hemilaminectomy changes at L5-S1 with granulation tissue left paracentral region, moderate left lateral recess and mild right lateral recess stenosis.  2 mm L5-S1 retrolisthesis.  --Cervical MRI 8/7/2021 Moderate to Severe spinal stenosis C5-6.   --Lumbar spine flexion-extension x-ray 8/10/2021 shows no spondylolisthesis and no instability.  --Lumbar spine MRI 8/6/2021 with L5-S1 mild disc height loss with disc bulge on the left with osteophytes with mild left foraminal stenosis lateral recess stenosis and S1 compression.    -INTERVENTIONS: No recommendations for injections at this time.    -MEDICATIONS: Ordered Toradol 30 mg to be given intramuscular today.  She is unable to take oral NSAIDs due to history of gastric bypass.  Did discuss that if symptoms aggravate in the next couple of days we could repeat this x1 this week as well.  Otherwise advised patient to continue with cyclobenzaprine and Lyrica, refill sent for Lyrica.  Discussed side effects of medications and proper use. Patient verbalized understanding.  -Addendum 11/9/2023: Initial Toradol 30 mg IM 11/8/2023 with 75% relief.  -Completed additional Toradol 30 mg IM injection x1 11/9/2023 in clinic by Cristel Olmstead CMA.  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    -PHYSICAL THERAPY: Did discuss revisiting physical therapy if symptoms are not improving in the next week as well.  Discussed the importance of core strengthening, ROM, stretching exercises with the patient and how each of these entities is important in decreasing pain.  Explained to the patient that the purpose of physical therapy is to teach the patient a home exercise program.  These exercises need to be performed every day in order to decrease pain and prevent future occurrences of pain.        -PATIENT EDUCATION: Total time of 32 minutes, on  the day of service, spent with the patient, reviewing the chart, placing orders, and documenting.     -FOLLOW UP: Follow-up as needed  Advised to contact clinic if symptoms worsen or change.    Subjective:     Madeline Szymanski is a 43 year old female who presents today for follow-up regarding right-sided neck pain rating to the right shoulder upper arm to the elbow post scapular region with some pain into the occipital region on the right ongoing since she had a fall at home landing on her right side yesterday 11/7/2023.  Currently pain is fairly significant and she is having a lot of numbness in the same location, pain symptoms at a 9/10 right now, a 10 at its worst, 4 at its best.  Aggravated with turning her head or using her right arm.  Ice and rest give her some benefit.  Denies any balance changes today.  Denies bowel or bladder loss control, denies saddle anesthesia.    No myelopathic symptoms.     Treatment to Date:  Left L5-S1 hemilaminectomy, medial facetectomy, foraminotomy with L5-S1 left microdiscectomy 10/4/2021 Dr. Lilly.     Bilateral carpal tunnel steroid injection 11/11/2020 and 5/12/2021 with significant benefit.     L5-S1 TFESI 3/21/2018  Lumbar MBB 2/13/2019 with benefit LBP  Lumbar RFA 3/6/2019  Bilateral L5-S1 TFESI 6/3/2020 with benefit LBP.  Preprocedure pain 8/10, post 5/10.  Left S1-S2 TFESI 8/3/2021 with no lasting benefit.  Preprocedure pain 8/10, post 2/10.  Left L5-S1 TFESI 9/1/2021 with minimal lasting benefit.  Preprocedure pain 7/10, post 4/10.    Bilateral L3, L4, L5 RFA 3/24/2022.  Bilateral  carpal tunnel steroid injection 4/19/2022.  Right C5-6 TFESI 5/3/2022  Bilateral occipital nerve block 10/24/2022 with significant relief.  Bilateral occipital nerve block 1/25/2023 with significant relief  RIGHT L5-S1 TFESI 2/15/2023 with minimal initial or lasting benefit.   Bilateral carpal tunnel steroid injection 4/26/2023 with significant relief.  Carpal tunnel steroid injection  10/11/2023 with significant relief.  Bilateral L3, L4, L5 RFA 10/13/2023     Medications:  Gabapentin 300 mg 1 to 2 tablets at bedtime, unable to tolerate during the daytime.  Amitriptyline 75 mg at bedtime for migraines  Flexeril as needed  Tylenol as needed  Lidocaine gel as needed     No benefit with recent Medrol Dosepak.     *Patient unable to tolerate NSAIDs due to GI surgery.    Patient Active Problem List   Diagnosis    Spinal stenosis in cervical region    History of gastric ulcer    Hx of Helicobacter infection    Migraine with aura and without status migrainosus, not intractable    Panic attacks    S/P laparoscopic sleeve gastrectomy    Lumbar radiculopathy    Major depressive disorder with single episode, in full remission (H24)    Chronic insomnia    Class 1 obesity due to excess calories with serious comorbidity and body mass index (BMI) of 31.0 to 31.9 in adult    Chronic right-sided low back pain with right-sided sciatica    History of lumbosacral spine surgery    Weakness of right lower extremity    Chronic neck pain    Bilateral occipital neuralgia    Binge eating disorder       Current Outpatient Medications   Medication    cyclobenzaprine (FLEXERIL) 10 MG tablet    DULoxetine (CYMBALTA) 60 MG capsule    eletriptan (RELPAX) 40 MG tablet    EPINEPHrine (ANY BX GENERIC EQUIV) 0.3 MG/0.3ML injection 2-pack    hydrOXYzine (ATARAX) 25 MG tablet    lidocaine (LIDODERM) 5 % patch    lisdexamfetamine (VYVANSE) 30 MG capsule    lisdexamfetamine (VYVANSE) 60 MG capsule    pregabalin (LYRICA) 75 MG capsule    rizatriptan (MAXALT-MLT) 10 MG ODT    topiramate (TOPAMAX) 50 MG tablet     Current Facility-Administered Medications   Medication    ketorolac (TORADOL) injection 30 mg       Allergies   Allergen Reactions    Cephalexin Hives    Coconut Flavor Anaphylaxis    Covid-19 (Mrna) Vaccine Anaphylaxis     Pfizer     Prochlorperazine Other (See Comments)     Tremors  When given with metoclopramide, okay by  itself      Coconut Fatty Acids      Other reaction(s): Edema    Penicillins Hives       Past Medical History:   Diagnosis Date    Acute and chronic respiratory failure with hypoxia (H) 01/01/2022    Anxiety     Arthritis     Depressive disorder     Gastric band slippage 09/26/2019    History of blood transfusion 04/1978    Kidney infection     Lumbar radiculopathy     Migraine     Migraine     Painless urinary retention due to cauda equina syndrome (H)     Pancreatitis     Panic attack     PONV (postoperative nausea and vomiting)     Spinal stenosis in cervical region     UTI (lower urinary tract infection)         Review of Systems  ROS: Specifically negative for bowel/bladder dysfunction, balance changes, headache, dizziness, foot drop, fevers, chills, appetite changes, nausea/vomiting, unexplained weight loss. Otherwise 13 systems reviewed are negative. Please see the patient's intake questionnaire from today for details.    Reviewed Social, Family, Past Medical and Past Surgical history with patient, no significant changes noted since prior visit.     Objective:     BP (!) 166/92 (BP Location: Right arm, Patient Position: Sitting)   Pulse 103   SpO2 99%     PHYSICAL EXAMINATION:   --CONSTITUTIONAL: Vital signs as above. No acute distress. The patient is well nourished and well groomed.  --PSYCHIATRIC:  The patient is awake, alert, oriented to person, place, time and answering questions appropriately with clear speech. Appropriate mood and affect   --HEENT: Sclera are non-injected. Extraocular muscles are intact.   --SKIN: Skin over the face, bilateral upper extremities, and posterior torso is clean, dry, intact without rashes.  --RESPIRATORY: Normal rhythm and effort. No abnormal accessory muscle breathing patterns noted.   --GROSS MOTOR: Easily arises from a seated position.   --CERVICAL SPINE: Inspection reveals no evidence of deformity. Range of motion is limited all range of motion due to pain.  Generalized tenderness to palpation right greater than left paraspinal muscles, trapezius region rhomboid region and right shoulder.    --SHOULDERS: Full range of motion bilaterally: abduction, flexion, cross chest movement internal/external rotation. Negative empty can.  --UPPER EXTREMITY MOTOR TESTING:  Wrist flexion left 5/5, right 5/5  Wrist extension left 5/5, right 5/5  Biceps left 5/5, right 5/5   Triceps left 5/5, right 5/5   Shoulder abduction left 5/5, right 5/5   left 5/5, right 5/5  --NEUROLOGIC: CN III-XII are grossly intact. 2/4 symmetric biceps, brachioradialis, triceps reflexes bilaterally. Sensation to upper extremities is intact. Negative William's bilaterally.   --VASCULAR: Warm upper limbs bilaterally.     Imaging: Spine imaging was reviewed today. The images were shown to the patient and the findings were explained using a spine model.      CT Cervical Spine w/o Contrast  Result Date: 11/7/2023  EXAM: CT CERVICAL SPINE W/O CONTRAST LOCATION: [facility] DATE: 11/7/2023 INDICATION: Cervical radiculopathy. COMPARISON: MRI cervical spine with and without contrast 05/25/2023, MRI cervical spine without contrast 03/02/2022. TECHNIQUE: Routine CT Cervical Spine without IV contrast. Multiplanar reformats. Dose reduction techniques were used. FINDINGS: VERTEBRA: There is disc arthroplasty at the C5-C6 level. Surgical hardware appears intact. Cervical vertebral body heights are maintained. No acute cervical spine fracture. CANAL/FORAMINA: No CT evidence of high-grade spinal canal stenosis or neural foraminal narrowing. Mild cervical spondylitic changes. EXTRASPINAL: No extraspinal abnormality.   IMPRESSION: 1.  Postsurgical changes of disc arthroplasty at C5-C6. 2.  No CT evidence of high-grade spinal canal stenosis or neural foraminal narrowing.       MR Cervical Spine w/o & w Contrast  Result Date: 11/7/2023  MRI OF THE CERVICAL SPINE WITHOUT AND WITH CONTRAST 11/7/2023  4:27 PM COMPARISON: Cervical spine MRI 3/2/2022. HISTORY: Cervical radiculopathy. TECHNIQUE: Multiplanar, multisequence MRI images of the cervical spine were acquired without and with Gadavist 8 mL gadolinium IV contrast. FINDINGS: There has been interval placement of ACDF hardware at C5-C6. There is normal alignment of the cervical vertebrae. Vertebral body heights of the cervical spine are normal. Marrow signal throughout the cervical vertebrae is within normal limits. There is no evidence for fracture or pathologic bony lesion of the cervical spine. Minimal posterior disc bulging throughout the cervical spine. The cervical spinal cord is normal in contour. There is no abnormal signal in the cervical spinal cord. There is no evidence for intrathecal abnormality. Level by level: C2-C3: Normal disc height and contour. Mild facet arthropathy bilaterally. No spinal canal stenosis. No foraminal stenosis on either side. C3-C4: There is facet arthropathy bilaterally, uncinate hypertrophy bilaterally and a posterior broad-based disc-osteophyte complex. No spinal canal stenosis. Mild left foraminal narrowing. Mild right foraminal narrowing. C4-C5: There is facet arthropathy bilaterally, uncinate hypertrophy bilaterally and a posterior broad-based disc-osteophyte complex. No spinal canal stenosis. No foraminal stenosis on either side. C5-C6: (Fused level) No spinal canal stenosis. No foraminal stenosis on either side. C6-C7: There is facet arthropathy bilaterally, uncinate hypertrophy bilaterally and a posterior broad-based disc-osteophyte complex. No spinal canal stenosis. No foraminal stenosis on either side. C7-T1: There is facet arthropathy bilaterally, uncinate hypertrophy bilaterally and a posterior broad-based disc-osteophyte complex. No spinal canal stenosis. No foraminal stenosis on either side.   IMPRESSION: 1. Postoperative and diffuse degenerative change of the cervical spine as detailed above. 2. No high-grade  spinal canal or high-grade neural foraminal narrowing at any level of the cervical spine. LARISA BOB MD   SYSTEM ID:  [id number]

## 2023-11-08 NOTE — TELEPHONE ENCOUNTER
Neelam Correa after review of images no acute abnormalities noted     FINDINGS: There has been interval placement of ACDF hardware at C5-C6.  There is normal alignment of the cervical vertebrae. Vertebral body  heights of the cervical spine are normal. Marrow signal throughout the  cervical vertebrae is within normal limits. There is no evidence for  fracture or pathologic bony lesion of the cervical spine.      Minimal posterior disc bulging throughout the cervical spine.     The cervical spinal cord is normal in contour. There is no abnormal  signal in the cervical spinal cord. There is no evidence for  intrathecal abnormality.     Level by level:     C2-C3: Normal disc height and contour. Mild facet arthropathy  bilaterally. No spinal canal stenosis. No foraminal stenosis on either  side.      C3-C4: There is facet arthropathy bilaterally, uncinate hypertrophy  bilaterally and a posterior broad-based disc-osteophyte complex. No  spinal canal stenosis. Mild left foraminal narrowing. Mild right  foraminal narrowing.     C4-C5: There is facet arthropathy bilaterally, uncinate hypertrophy  bilaterally and a posterior broad-based disc-osteophyte complex. No  spinal canal stenosis. No foraminal stenosis on either side.     C5-C6: (Fused level) No spinal canal stenosis. No foraminal stenosis  on either side.     C6-C7: There is facet arthropathy bilaterally, uncinate hypertrophy  bilaterally and a posterior broad-based disc-osteophyte complex. No  spinal canal stenosis. No foraminal stenosis on either side.     C7-T1: There is facet arthropathy bilaterally, uncinate hypertrophy  bilaterally and a posterior broad-based disc-osteophyte complex. No  spinal canal stenosis. No foraminal stenosis on either side.                                                                      IMPRESSION:  1. Postoperative and diffuse degenerative change of the cervical spine  as detailed above.  2. No high-grade spinal canal or high-grade  neural foraminal narrowing  at any level of the cervical spine.    FINDINGS:   VERTEBRA: There is disc arthroplasty at the C5-C6 level. Surgical hardware appears intact. Cervical vertebral body heights are maintained. No acute cervical spine fracture.      CANAL/FORAMINA: No CT evidence of high-grade spinal canal stenosis or neural foraminal narrowing. Mild cervical spondylitic changes.     EXTRASPINAL: No extraspinal abnormality.                                                                      IMPRESSION:  1.  Postsurgical changes of disc arthroplasty at C5-C6.  2.  No CT evidence of high-grade spinal canal stenosis or neural foraminal narrowing.    Discussed with Dr. Lilly and recommendations to follow up with spine center should symptoms presist    Ashley Sen PA-C  [facility]  O: [phone]

## 2023-11-09 ENCOUNTER — MYC MEDICAL ADVICE (OUTPATIENT)
Dept: PHYSICAL MEDICINE AND REHAB | Facility: CLINIC | Age: 43
End: 2023-11-09
Payer: COMMERCIAL

## 2023-11-09 DIAGNOSIS — S16.1XXA CERVICAL MYOFASCIAL STRAIN, INITIAL ENCOUNTER: ICD-10-CM

## 2023-11-09 DIAGNOSIS — Z98.890 HX OF CERVICAL SPINE SURGERY: ICD-10-CM

## 2023-11-09 DIAGNOSIS — M54.12 RIGHT CERVICAL RADICULOPATHY: Primary | ICD-10-CM

## 2023-11-09 PROCEDURE — 96372 THER/PROPH/DIAG INJ SC/IM: CPT | Performed by: NURSE PRACTITIONER

## 2023-11-09 RX ORDER — KETOROLAC TROMETHAMINE 30 MG/ML
30 INJECTION, SOLUTION INTRAMUSCULAR; INTRAVENOUS ONCE
Status: COMPLETED | OUTPATIENT
Start: 2023-11-09 | End: 2023-11-09

## 2023-11-09 RX ADMIN — KETOROLAC TROMETHAMINE 30 MG: 30 INJECTION, SOLUTION INTRAMUSCULAR; INTRAVENOUS at 08:50

## 2023-11-09 NOTE — TELEPHONE ENCOUNTER
Another Toradol injection was given in clinic today. See Esme's office note from yesterday for chart, 11/8/23.

## 2023-11-20 ENCOUNTER — LAB (OUTPATIENT)
Dept: FAMILY MEDICINE | Facility: CLINIC | Age: 43
End: 2023-11-20

## 2023-11-20 ENCOUNTER — OFFICE VISIT (OUTPATIENT)
Dept: FAMILY MEDICINE | Facility: CLINIC | Age: 43
End: 2023-11-20
Attending: FAMILY MEDICINE
Payer: COMMERCIAL

## 2023-11-20 VITALS
DIASTOLIC BLOOD PRESSURE: 85 MMHG | RESPIRATION RATE: 16 BRPM | HEIGHT: 64 IN | BODY MASS INDEX: 33.05 KG/M2 | WEIGHT: 193.6 LBS | OXYGEN SATURATION: 98 % | SYSTOLIC BLOOD PRESSURE: 131 MMHG | HEART RATE: 87 BPM | TEMPERATURE: 98.5 F

## 2023-11-20 DIAGNOSIS — N95.1 MENOPAUSAL SYNDROME (HOT FLASHES): ICD-10-CM

## 2023-11-20 DIAGNOSIS — F32.5 MAJOR DEPRESSIVE DISORDER WITH SINGLE EPISODE, IN FULL REMISSION (H): ICD-10-CM

## 2023-11-20 DIAGNOSIS — E66.09 CLASS 1 OBESITY DUE TO EXCESS CALORIES WITH SERIOUS COMORBIDITY AND BODY MASS INDEX (BMI) OF 33.0 TO 33.9 IN ADULT: ICD-10-CM

## 2023-11-20 DIAGNOSIS — F50.819 BINGE EATING DISORDER: ICD-10-CM

## 2023-11-20 DIAGNOSIS — Z00.00 ENCOUNTER FOR ROUTINE ADULT HEALTH EXAMINATION WITHOUT ABNORMAL FINDINGS: Primary | ICD-10-CM

## 2023-11-20 DIAGNOSIS — G43.109 MIGRAINE WITH AURA AND WITHOUT STATUS MIGRAINOSUS, NOT INTRACTABLE: ICD-10-CM

## 2023-11-20 DIAGNOSIS — Z12.11 SCREEN FOR COLON CANCER: ICD-10-CM

## 2023-11-20 DIAGNOSIS — E78.1 HYPERTRIGLYCERIDEMIA: ICD-10-CM

## 2023-11-20 DIAGNOSIS — E66.811 CLASS 1 OBESITY DUE TO EXCESS CALORIES WITH SERIOUS COMORBIDITY AND BODY MASS INDEX (BMI) OF 33.0 TO 33.9 IN ADULT: ICD-10-CM

## 2023-11-20 DIAGNOSIS — Z13.1 SCREENING FOR DIABETES MELLITUS: ICD-10-CM

## 2023-11-20 DIAGNOSIS — M54.16 LUMBAR RADICULOPATHY: ICD-10-CM

## 2023-11-20 DIAGNOSIS — Z23 NEED FOR VACCINATION: ICD-10-CM

## 2023-11-20 DIAGNOSIS — M48.02 SPINAL STENOSIS IN CERVICAL REGION: ICD-10-CM

## 2023-11-20 LAB
ALT SERPL W P-5'-P-CCNC: 27 U/L (ref 0–50)
ANION GAP SERPL CALCULATED.3IONS-SCNC: 9 MMOL/L (ref 7–15)
AST SERPL W P-5'-P-CCNC: 44 U/L (ref 0–45)
BUN SERPL-MCNC: 7.3 MG/DL (ref 6–20)
CALCIUM SERPL-MCNC: 9.7 MG/DL (ref 8.6–10)
CHLORIDE SERPL-SCNC: 104 MMOL/L (ref 98–107)
CHOLEST SERPL-MCNC: 163 MG/DL
CREAT SERPL-MCNC: 0.81 MG/DL (ref 0.51–0.95)
DEPRECATED HCO3 PLAS-SCNC: 31 MMOL/L (ref 22–29)
EGFRCR SERPLBLD CKD-EPI 2021: >90 ML/MIN/1.73M2
FSH SERPL IRP2-ACNC: 54.6 MIU/ML
GLUCOSE SERPL-MCNC: 75 MG/DL (ref 70–99)
HDLC SERPL-MCNC: 66 MG/DL
LDLC SERPL CALC-MCNC: 86 MG/DL
NONHDLC SERPL-MCNC: 97 MG/DL
POTASSIUM SERPL-SCNC: 4.3 MMOL/L (ref 3.4–5.3)
SODIUM SERPL-SCNC: 144 MMOL/L (ref 135–145)
TRIGL SERPL-MCNC: 57 MG/DL

## 2023-11-20 PROCEDURE — 84460 ALANINE AMINO (ALT) (SGPT): CPT | Performed by: FAMILY MEDICINE

## 2023-11-20 PROCEDURE — 36415 COLL VENOUS BLD VENIPUNCTURE: CPT | Performed by: FAMILY MEDICINE

## 2023-11-20 PROCEDURE — 99214 OFFICE O/P EST MOD 30 MIN: CPT | Mod: 25 | Performed by: FAMILY MEDICINE

## 2023-11-20 PROCEDURE — 84450 TRANSFERASE (AST) (SGOT): CPT | Performed by: FAMILY MEDICINE

## 2023-11-20 PROCEDURE — 83001 ASSAY OF GONADOTROPIN (FSH): CPT | Performed by: FAMILY MEDICINE

## 2023-11-20 PROCEDURE — 80048 BASIC METABOLIC PNL TOTAL CA: CPT | Performed by: FAMILY MEDICINE

## 2023-11-20 PROCEDURE — 90471 IMMUNIZATION ADMIN: CPT | Performed by: FAMILY MEDICINE

## 2023-11-20 PROCEDURE — 99396 PREV VISIT EST AGE 40-64: CPT | Mod: 25 | Performed by: FAMILY MEDICINE

## 2023-11-20 PROCEDURE — 90746 HEPB VACCINE 3 DOSE ADULT IM: CPT | Performed by: FAMILY MEDICINE

## 2023-11-20 PROCEDURE — 80061 LIPID PANEL: CPT | Performed by: FAMILY MEDICINE

## 2023-11-20 RX ORDER — ELETRIPTAN HYDROBROMIDE 40 MG/1
40 TABLET, FILM COATED ORAL
Qty: 18 TABLET | Refills: 3 | Status: SHIPPED | OUTPATIENT
Start: 2023-11-20

## 2023-11-20 RX ORDER — LISDEXAMFETAMINE DIMESYLATE 60 MG/1
60 CAPSULE ORAL EVERY MORNING
Qty: 30 CAPSULE | Refills: 0 | Status: SHIPPED | OUTPATIENT
Start: 2023-11-20 | End: 2023-12-28

## 2023-11-20 RX ORDER — DULOXETIN HYDROCHLORIDE 60 MG/1
60 CAPSULE, DELAYED RELEASE ORAL DAILY
Qty: 90 CAPSULE | Refills: 3 | Status: SHIPPED | OUTPATIENT
Start: 2023-11-20 | End: 2024-04-30

## 2023-11-20 ASSESSMENT — ENCOUNTER SYMPTOMS
MYALGIAS: 1
HEMATURIA: 0
PALPITATIONS: 0
COUGH: 0
FEVER: 0
HEMATOCHEZIA: 0
DIZZINESS: 0
EYE PAIN: 0
JOINT SWELLING: 0
NERVOUS/ANXIOUS: 1
CHILLS: 0
ARTHRALGIAS: 0
HEARTBURN: 0
NAUSEA: 0
WEAKNESS: 0
DIARRHEA: 0
ABDOMINAL PAIN: 0
CONSTIPATION: 0
HEADACHES: 1
SHORTNESS OF BREATH: 0
DYSURIA: 0
FREQUENCY: 0
SORE THROAT: 0
PARESTHESIAS: 0
BREAST MASS: 0

## 2023-11-20 ASSESSMENT — PATIENT HEALTH QUESTIONNAIRE - PHQ9
SUM OF ALL RESPONSES TO PHQ QUESTIONS 1-9: 7
10. IF YOU CHECKED OFF ANY PROBLEMS, HOW DIFFICULT HAVE THESE PROBLEMS MADE IT FOR YOU TO DO YOUR WORK, TAKE CARE OF THINGS AT HOME, OR GET ALONG WITH OTHER PEOPLE: SOMEWHAT DIFFICULT
SUM OF ALL RESPONSES TO PHQ QUESTIONS 1-9: 7

## 2023-11-20 ASSESSMENT — ANXIETY QUESTIONNAIRES
GAD7 TOTAL SCORE: 5
4. TROUBLE RELAXING: NOT AT ALL
1. FEELING NERVOUS, ANXIOUS, OR ON EDGE: MORE THAN HALF THE DAYS
7. FEELING AFRAID AS IF SOMETHING AWFUL MIGHT HAPPEN: NOT AT ALL
2. NOT BEING ABLE TO STOP OR CONTROL WORRYING: NOT AT ALL
5. BEING SO RESTLESS THAT IT IS HARD TO SIT STILL: NOT AT ALL
IF YOU CHECKED OFF ANY PROBLEMS ON THIS QUESTIONNAIRE, HOW DIFFICULT HAVE THESE PROBLEMS MADE IT FOR YOU TO DO YOUR WORK, TAKE CARE OF THINGS AT HOME, OR GET ALONG WITH OTHER PEOPLE: SOMEWHAT DIFFICULT
6. BECOMING EASILY ANNOYED OR IRRITABLE: SEVERAL DAYS
GAD7 TOTAL SCORE: 5
3. WORRYING TOO MUCH ABOUT DIFFERENT THINGS: MORE THAN HALF THE DAYS

## 2023-11-20 NOTE — ASSESSMENT & PLAN NOTE
She has found Vyvanse to be helpful at 60 mg.  No side effects.  I think this is unlikely to be causing some of the hot flash symptoms that she has been experiencing.  Recently she has added exercise to her regimen and is trying to eat in a more structured way.  I recommended a protein rich and vegetable rich diet.   No

## 2023-11-20 NOTE — ASSESSMENT & PLAN NOTE
Status post tubal ligation.  Status post hysterectomy (benign causes).  She is describing symptoms of menopause over the past 4 to 6 weeks.  We will check FSH.  Reviewed whether or not she be a candidate for hormone replacement therapy.  I believe that she would.  She has a relationship with a gynecologist and will check in with them.

## 2023-11-20 NOTE — ASSESSMENT & PLAN NOTE
Weight stable.  Reviewed nutrition.  Status post gastric sleeve.  Continue Vyvanse as discussed elsewhere.

## 2023-11-20 NOTE — PROGRESS NOTES
"   SUBJECTIVE:   Madeline is a 43 year old, presenting for the following:  Physical        11/20/2023     8:04 AM   Additional Questions   Roomed by ac   Accompanied by self     Chief Complaint   Patient presents with    Physical     Hot flashes:   - concerned about menopause   - started in October   - tearful at times.  Abruptly.   - hysterectomy for DUB 2019.     BED:    - she has not struggled with binging.   - we have adjusted the dose.  She has an appetite.  \"I feel like I need to eat.\"    - she does not feel like she needs to eat emotionally.  Without the medication, \"I need to eat all day long.\"     - side effects: some dry mouth. No emotional lability other than the past 2 weeks.  Sleep has been okay.     Nutrition:    - breakfast coffee and \"something.\"    - lunch is portion controlled.   - dinner: planned.  \"Way better.\"     Movement:    - eliptical and weight lifting circuit.  \"It has been great.\"    - after work.    - energyhsa been better.     Migraines:   - relpax first.  If not working she will take eletripan.   - botox: helpful.      Healthy Habits:     Getting at least 3 servings of Calcium per day:  Yes    Bi-annual eye exam:  Yes    Dental care twice a year:  Yes    Sleep apnea or symptoms of sleep apnea:  None    Diet:  Regular (no restrictions)    Frequency of exercise:  2-3 days/week    Duration of exercise:  30-45 minutes    Taking medications regularly:  Yes    Medication side effects:  None    Additional concerns today:  Yes    Today's PHQ-9 Score:       11/20/2023     7:48 AM   PHQ-9 SCORE   PHQ-9 Total Score MyChart 7 (Mild depression)   PHQ-9 Total Score 7       Social History     Tobacco Use    Smoking status: Never     Passive exposure: Never    Smokeless tobacco: Never   Substance Use Topics    Alcohol use: Never             11/20/2023     7:57 AM   Alcohol Use   Prescreen: >3 drinks/day or >7 drinks/week? No     Reviewed orders with patient.  Reviewed health maintenance and updated " orders accordingly - Yes      Breast Cancer Screening:    FHS-7:       8/2/2022     8:31 PM 2/6/2023    10:26 AM 11/20/2023     7:58 AM   Breast CA Risk Assessment (FHS-7)   Did any of your first-degree relatives have breast or ovarian cancer? Yes Yes Yes   Did any of your relatives have bilateral breast cancer? No No Unknown   Did any man in your family have breast cancer? No No No   Did any woman in your family have breast and ovarian cancer? No No Yes   Did any woman in your family have breast cancer before age 50 y? Yes Yes Yes   Do you have 2 or more relatives with breast and/or ovarian cancer? No No Yes   Do you have 2 or more relatives with breast and/or bowel cancer? No No Yes       Mammogram Screening: Recommended annual mammography  Pertinent mammograms are reviewed under the imaging tab.    History of abnormal Pap smear: Status post benign hysterectomy. Health Maintenance and Surgical History updated.     Reviewed and updated as needed this visit by clinical staff   Tobacco  Allergies  Meds  Problems  Med Hx  Surg Hx  Fam Hx          Reviewed and updated as needed this visit by Provider   Tobacco  Allergies  Meds  Problems  Med Hx  Surg Hx  Fam Hx           Review of Systems   Constitutional:  Negative for chills and fever.   HENT:  Negative for congestion, ear pain, hearing loss and sore throat.    Eyes:  Negative for pain and visual disturbance.   Respiratory:  Negative for cough and shortness of breath.    Cardiovascular:  Negative for chest pain, palpitations and peripheral edema.   Gastrointestinal:  Negative for abdominal pain, constipation, diarrhea, heartburn, hematochezia and nausea.   Breasts:  Negative for tenderness, breast mass and discharge.   Genitourinary:  Negative for dysuria, frequency, genital sores, hematuria, pelvic pain, urgency, vaginal bleeding and vaginal discharge.   Musculoskeletal:  Positive for myalgias. Negative for arthralgias and joint swelling.   Skin:   "Negative for rash.   Neurological:  Positive for headaches. Negative for dizziness, weakness and paresthesias.   Psychiatric/Behavioral:  Positive for mood changes. The patient is nervous/anxious.       OBJECTIVE:   /85 (BP Location: Left arm, Patient Position: Sitting, Cuff Size: Adult Regular)   Pulse 87   Temp 98.5  F (36.9  C) (Oral)   Resp 16   Ht 1.626 m (5' 4\")   Wt 87.8 kg (193 lb 9.6 oz)   SpO2 98%   BMI 33.23 kg/m    Physical Exam  Vitals reviewed.   Constitutional:       General: She is not in acute distress.     Appearance: Normal appearance. She is not ill-appearing.   HENT:      Head: Normocephalic and atraumatic.      Right Ear: External ear normal.      Left Ear: External ear normal.      Nose: Nose normal.      Mouth/Throat:      Pharynx: Oropharynx is clear. No oropharyngeal exudate or posterior oropharyngeal erythema.   Eyes:      General: No scleral icterus.        Right eye: No discharge.         Left eye: No discharge.      Extraocular Movements: Extraocular movements intact.      Conjunctiva/sclera: Conjunctivae normal.      Pupils: Pupils are equal, round, and reactive to light.   Neck:      Comments: No thyromegaly.  Cardiovascular:      Rate and Rhythm: Normal rate and regular rhythm.      Heart sounds: Normal heart sounds. No murmur heard.     No friction rub. No gallop.   Pulmonary:      Effort: Pulmonary effort is normal. No respiratory distress.      Breath sounds: Normal breath sounds. No wheezing or rales.   Abdominal:      General: There is no distension.      Palpations: Abdomen is soft. There is no mass.      Tenderness: There is no abdominal tenderness.   Musculoskeletal:         General: No signs of injury. Normal range of motion.      Cervical back: Normal range of motion.      Right lower leg: No edema.      Left lower leg: No edema.   Lymphadenopathy:      Cervical: No cervical adenopathy.   Skin:     General: Skin is warm.      Coloration: Skin is not jaundiced. "      Findings: No rash.   Neurological:      General: No focal deficit present.      Mental Status: She is alert and oriented to person, place, and time.      Cranial Nerves: No cranial nerve deficit.      Deep Tendon Reflexes: Reflexes normal.   Psychiatric:         Mood and Affect: Mood normal.         Diagnostic Test Results:  Labs reviewed in Epic    ASSESSMENT/PLAN:     Problem List Items Addressed This Visit       Binge eating disorder     She has found Vyvanse to be helpful at 60 mg.  No side effects.  I think this is unlikely to be causing some of the hot flash symptoms that she has been experiencing.  Recently she has added exercise to her regimen and is trying to eat in a more structured way.  I recommended a protein rich and vegetable rich diet.         Relevant Medications    lisdexamfetamine (VYVANSE) 60 MG capsule    Class 1 obesity due to excess calories with serious comorbidity and body mass index (BMI) of 33.0 to 33.9 in adult     Weight stable.  Reviewed nutrition.  Status post gastric sleeve.  Continue Vyvanse as discussed elsewhere.         Relevant Medications    lisdexamfetamine (VYVANSE) 60 MG capsule    Encounter for routine adult health examination without abnormal findings - Primary     Annual exam.  Nonfasting lab work will be completed today.  I believe that she is at risk for metabolic syndrome and related conditions.  Anticipate her triglycerides will be elevated this morning.  Screen for diabetes.  Reviewed nutrition.  She is adding strength training.  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  Future blood draw we will check for antibodies as I think it is likely she has received some or all of the vaccine series in the past.         Lumbar radiculopathy     Duloxetine helpful.         Major depressive disorder with single episode, in full remission (H24)     She has found 60 mg of duloxetine to be quite helpful.  No side effects.  Refills sent to pharmacy.  Recheck in  6 months.         Relevant Medications    DULoxetine (CYMBALTA) 60 MG capsule    Other Relevant Orders    Basic metabolic panel  (Ca, Cl, CO2, Creat, Gluc, K, Na, BUN)    ALT    AST    Menopausal syndrome (hot flashes)     Status post tubal ligation.  Status post hysterectomy (benign causes).  She is describing symptoms of menopause over the past 4 to 6 weeks.  We will check FSH.  Reviewed whether or not she be a candidate for hormone replacement therapy.  I believe that she would.  She has a relationship with a gynecologist and will check in with them.         Relevant Orders    Follicle stimulating hormone    Migraine with aura and without status migrainosus, not intractable    Relevant Medications    eletriptan (RELPAX) 40 MG tablet    DULoxetine (CYMBALTA) 60 MG capsule    Spinal stenosis in cervical region    Relevant Medications    DULoxetine (CYMBALTA) 60 MG capsule     Other Visit Diagnoses       Screen for colon cancer        Relevant Medications    DULoxetine (CYMBALTA) 60 MG capsule    Other Relevant Orders    COLOGUARD([employer])    Hypertriglyceridemia        Relevant Medications    DULoxetine (CYMBALTA) 60 MG capsule    Other Relevant Orders    Lipid panel reflex to direct LDL Non-fasting    Screening for diabetes mellitus        Relevant Orders    Basic metabolic panel  (Ca, Cl, CO2, Creat, Gluc, K, Na, BUN)    ALT    AST    Need for vaccination              Patient has been advised of split billing requirements and indicates understanding: Yes    COUNSELING:  Reviewed preventive health counseling, as reflected in patient instructions       Regular exercise       Healthy diet/nutrition    She reports that she has never smoked. She has never been exposed to tobacco smoke. She has never used smokeless tobacco.      Sebas Valdez MD  [facility]  Answers submitted by the patient for this visit:  Patient Health Questionnaire (Submitted on 11/20/2023)  If you checked off  any problems, how difficult have these problems made it for you to do your work, take care of things at home, or get along with other people?: Somewhat difficult  PHQ9 TOTAL SCORE: 7  GRIFFIN-7 (Submitted on 11/20/2023)  GRIFFIN 7 TOTAL SCORE: 5

## 2023-11-20 NOTE — ASSESSMENT & PLAN NOTE
She has found 60 mg of duloxetine to be quite helpful.  No side effects.  Refills sent to pharmacy.  Recheck in 6 months.

## 2023-12-05 LAB — NONINV COLON CA DNA+OCC BLD SCRN STL QL: NORMAL

## 2023-12-21 NOTE — PROGRESS NOTES
Madeline Szymanski is a 43 year old female who presents today for Botox injections for chronic migraine.    Patient is status post Botox injections for chronic migraine September 20, 2023.  Patient reports 95% relief of headaches for over 2 months.  She began to have relief only 4 days after the injections.  2 weeks ago she began to experience daily headache again.  These have been severe headaches.  She has had 1 migraine headache per week for the past 2 weeks.    Pre-procedure diagnosis: Migraine headaches  Post-procedure diagnosis: Same    PROCEDURE:  Botulinum toxin (Botox) injections.      The risks and benefits of the procedure were discussed with the patient which included muscle weakness (including facial droop, inability to swallow, and inability to hold one's head up), allergic reaction, pain, bruising, bleeding, swelling, and death.  She opted to proceed and gave written and verbal consent.    The Botox paradigm was followed. The skin over each site was cleansed with alcohol. A 27 guage 5/8 inch needle was used for injection at all sites.     The bilateral  muscles were injected with a total of 10 units (5 units each side).   The Procerus muscle was injected with a total of 5 units.   The Frontalis muscle was injected with a total of 20 units, divided into 4 sites.  The Temporalis muscle was injected with 20 units divided into 4 sites and performed bilaterally (total of 8 sites for total of 40 units).  The occipitalis muscle was injected with 30 units divided into 6 sites.  The cervical paraspinal muscles were injected with 20 units divided into 4 sites.  The trapezius muscle was injected with 30 units divided into 6 sites.     TOTAL UNITS: 155  Wasted units: 45 units.    The patient tolerated the injection well and afterwards did not have any dizziness/lightheadedness or nausea. The patient was observed for a short period of time and then was discharged.  The patient was encouraged to call with any  questions/concerns prior to the follow-up appointment.

## 2023-12-26 DIAGNOSIS — M54.2 CHRONIC NECK PAIN: ICD-10-CM

## 2023-12-26 DIAGNOSIS — G89.29 CHRONIC NECK PAIN: ICD-10-CM

## 2023-12-26 RX ORDER — LIDOCAINE 50 MG/G
PATCH TOPICAL
Qty: 10 PATCH | Refills: 0 | Status: SHIPPED | OUTPATIENT
Start: 2023-12-26 | End: 2024-05-15

## 2023-12-26 NOTE — TELEPHONE ENCOUNTER
Last appointment: 11/30/23  Next appointment: 12/28/23    Notes/Comments:      Rx request(s) has been reviewed.

## 2023-12-27 ENCOUNTER — MYC MEDICAL ADVICE (OUTPATIENT)
Dept: FAMILY MEDICINE | Facility: CLINIC | Age: 43
End: 2023-12-27
Payer: COMMERCIAL

## 2023-12-27 DIAGNOSIS — F50.819 BINGE EATING DISORDER: ICD-10-CM

## 2023-12-28 ENCOUNTER — OFFICE VISIT (OUTPATIENT)
Dept: PHYSICAL MEDICINE AND REHAB | Facility: CLINIC | Age: 43
End: 2023-12-28
Payer: COMMERCIAL

## 2023-12-28 VITALS
HEART RATE: 87 BPM | SYSTOLIC BLOOD PRESSURE: 134 MMHG | WEIGHT: 193 LBS | TEMPERATURE: 98.8 F | DIASTOLIC BLOOD PRESSURE: 93 MMHG | HEIGHT: 64 IN | BODY MASS INDEX: 32.95 KG/M2

## 2023-12-28 DIAGNOSIS — G43.709 CHRONIC MIGRAINE WITHOUT AURA WITHOUT STATUS MIGRAINOSUS, NOT INTRACTABLE: Primary | ICD-10-CM

## 2023-12-28 PROCEDURE — 64615 CHEMODENERV MUSC MIGRAINE: CPT | Performed by: PHYSICIAN ASSISTANT

## 2023-12-28 RX ORDER — LISDEXAMFETAMINE DIMESYLATE 60 MG/1
60 CAPSULE ORAL EVERY MORNING
Qty: 30 CAPSULE | Refills: 0 | Status: SHIPPED | OUTPATIENT
Start: 2023-12-28 | End: 2023-12-29

## 2023-12-28 ASSESSMENT — PAIN SCALES - GENERAL: PAINLEVEL: MODERATE PAIN (4)

## 2023-12-28 NOTE — PATIENT INSTRUCTIONS
Please monitor for any redness/drainage/swelling over the injection sites.  If any of these things occur, please call the clinic immediately or go to the nearest emergency room.     Do not submerge the injection sites underwater for 48 hours after the injection.      The botox injections will begin working in 3 days but it may take 3 weeks before you notice relief of your pain.  The effects should last a minimum of 3 months.

## 2023-12-28 NOTE — LETTER
12/28/2023         RE: Madeline Szymanski  [address]  [facility] [address]        Dear Colleague,    Thank you for referring your patient, Madeline Szymanski, to the [facility]. Please see a copy of my visit note below.    Madeline Szymanski is a 43 year old female who presents today for Botox injections for chronic migraine.    Patient is status post Botox injections for chronic migraine September 20, 2023.  Patient reports 95% relief of headaches for over 2 months.  She began to have relief only 4 days after the injections.  2 weeks ago she began to experience daily headache again.  These have been severe headaches.  She has had 1 migraine headache per week for the past 2 weeks.    Pre-procedure diagnosis: Migraine headaches  Post-procedure diagnosis: Same    PROCEDURE:  Botulinum toxin (Botox) injections.      The risks and benefits of the procedure were discussed with the patient which included muscle weakness (including facial droop, inability to swallow, and inability to hold one's head up), allergic reaction, pain, bruising, bleeding, swelling, and death.  She opted to proceed and gave written and verbal consent.    The Botox paradigm was followed. The skin over each site was cleansed with alcohol. A 27 guage 5/8 inch needle was used for injection at all sites.     The bilateral  muscles were injected with a total of 10 units (5 units each side).   The Procerus muscle was injected with a total of 5 units.   The Frontalis muscle was injected with a total of 20 units, divided into 4 sites.  The Temporalis muscle was injected with 20 units divided into 4 sites and performed bilaterally (total of 8 sites for total of 40 units).  The occipitalis muscle was injected with 30 units divided into 6 sites.  The cervical paraspinal muscles were injected with 20 units divided into 4 sites.  The trapezius muscle was injected with 30 units divided into 6 sites.     TOTAL UNITS:  155  Wasted units: 45 units.    The patient tolerated the injection well and afterwards did not have any dizziness/lightheadedness or nausea. The patient was observed for a short period of time and then was discharged.  The patient was encouraged to call with any questions/concerns prior to the follow-up appointment.        Again, thank you for allowing me to participate in the care of your patient.        Sincerely,        Latha Bowling PA-C

## 2023-12-29 RX ORDER — LISDEXAMFETAMINE DIMESYLATE 50 MG/1
50 CAPSULE ORAL EVERY MORNING
Qty: 30 CAPSULE | Refills: 0 | Status: SHIPPED | OUTPATIENT
Start: 2023-12-29 | End: 2024-01-15

## 2024-01-08 ENCOUNTER — PATIENT OUTREACH (OUTPATIENT)
Dept: CARE COORDINATION | Facility: CLINIC | Age: 44
End: 2024-01-08
Payer: COMMERCIAL

## 2024-01-08 NOTE — ADDENDUM NOTE
Encounter addended by: Zheng Mesa, PT on: 6/2/2022 10:39 AM   Actions taken: Episode resolved, Clinical Note Signed 115

## 2024-01-15 ENCOUNTER — MYC MEDICAL ADVICE (OUTPATIENT)
Dept: FAMILY MEDICINE | Facility: CLINIC | Age: 44
End: 2024-01-15
Payer: COMMERCIAL

## 2024-01-15 DIAGNOSIS — F50.819 BINGE EATING DISORDER: ICD-10-CM

## 2024-01-15 RX ORDER — LISDEXAMFETAMINE DIMESYLATE 50 MG/1
50 CAPSULE ORAL EVERY MORNING
Qty: 30 CAPSULE | Refills: 0 | Status: SHIPPED | OUTPATIENT
Start: 2024-01-15 | End: 2024-02-26

## 2024-01-24 DIAGNOSIS — G43.109 MIGRAINE WITH AURA AND WITHOUT STATUS MIGRAINOSUS, NOT INTRACTABLE: ICD-10-CM

## 2024-01-24 NOTE — TELEPHONE ENCOUNTER
Medication Request    Medication name: rizatriptan (MAXALT-MLT) 10 MG ODT     Requested [facility]acy: [id number]    When was patient last seen by provider?:  11/20/23    Patient offered appointment:  Yes 05/20/24    Okay to leave a detailed message: no

## 2024-01-25 RX ORDER — RIZATRIPTAN BENZOATE 10 MG/1
10 TABLET, ORALLY DISINTEGRATING ORAL
Qty: 18 TABLET | Refills: 0 | Status: SHIPPED | OUTPATIENT
Start: 2024-01-25 | End: 2024-05-15

## 2024-02-01 ENCOUNTER — HOSPITAL ENCOUNTER (EMERGENCY)
Facility: HOSPITAL | Age: 44
Discharge: HOME | End: 2024-02-01
Attending: STUDENT IN AN ORGANIZED HEALTH CARE EDUCATION/TRAINING PROGRAM | Admitting: STUDENT IN AN ORGANIZED HEALTH CARE EDUCATION/TRAINING PROGRAM
Payer: COMMERCIAL

## 2024-02-01 VITALS
SYSTOLIC BLOOD PRESSURE: 122 MMHG | HEIGHT: 64 IN | DIASTOLIC BLOOD PRESSURE: 71 MMHG | BODY MASS INDEX: 32.1 KG/M2 | RESPIRATION RATE: 13 BRPM | TEMPERATURE: 98.4 F | HEART RATE: 109 BPM | OXYGEN SATURATION: 97 % | WEIGHT: 188 LBS

## 2024-02-01 DIAGNOSIS — T78.40XA ALLERGIC REACTION, INITIAL ENCOUNTER: ICD-10-CM

## 2024-02-01 DIAGNOSIS — T78.2XXA ANAPHYLAXIS, INITIAL ENCOUNTER: ICD-10-CM

## 2024-02-01 PROCEDURE — 96375 TX/PRO/DX INJ NEW DRUG ADDON: CPT

## 2024-02-01 PROCEDURE — 250N000011 HC RX IP 250 OP 636: Performed by: STUDENT IN AN ORGANIZED HEALTH CARE EDUCATION/TRAINING PROGRAM

## 2024-02-01 PROCEDURE — 96374 THER/PROPH/DIAG INJ IV PUSH: CPT

## 2024-02-01 PROCEDURE — 250N000009 HC RX 250: Performed by: STUDENT IN AN ORGANIZED HEALTH CARE EDUCATION/TRAINING PROGRAM

## 2024-02-01 PROCEDURE — 99285 EMERGENCY DEPT VISIT HI MDM: CPT | Mod: 25

## 2024-02-01 RX ORDER — METHYLPREDNISOLONE 4 MG
TABLET, DOSE PACK ORAL
Qty: 21 TABLET | Refills: 0 | Status: SHIPPED | OUTPATIENT
Start: 2024-02-01 | End: 2024-03-28

## 2024-02-01 RX ORDER — FAMOTIDINE 20 MG/1
20 TABLET, FILM COATED ORAL 2 TIMES DAILY
Qty: 20 TABLET | Refills: 0 | Status: SHIPPED | OUTPATIENT
Start: 2024-02-01 | End: 2024-02-01

## 2024-02-01 RX ORDER — FAMOTIDINE 20 MG/1
20 TABLET, FILM COATED ORAL 2 TIMES DAILY
Qty: 20 TABLET | Refills: 0 | Status: SHIPPED | OUTPATIENT
Start: 2024-02-01 | End: 2024-03-28

## 2024-02-01 RX ORDER — METHYLPREDNISOLONE 4 MG
TABLET, DOSE PACK ORAL
Qty: 21 TABLET | Refills: 0 | Status: SHIPPED | OUTPATIENT
Start: 2024-02-01 | End: 2024-02-01

## 2024-02-01 RX ORDER — EPINEPHRINE 0.3 MG/.3ML
0.3 INJECTION SUBCUTANEOUS PRN
Qty: 0.6 ML | Refills: 0 | Status: SHIPPED | OUTPATIENT
Start: 2024-02-01 | End: 2024-02-01

## 2024-02-01 RX ORDER — DIPHENHYDRAMINE HYDROCHLORIDE 50 MG/ML
25 INJECTION INTRAMUSCULAR; INTRAVENOUS ONCE
Status: DISCONTINUED | OUTPATIENT
Start: 2024-02-01 | End: 2024-02-01

## 2024-02-01 RX ORDER — EPINEPHRINE 0.3 MG/.3ML
0.3 INJECTION SUBCUTANEOUS PRN
Qty: 0.6 ML | Refills: 0 | Status: SHIPPED | OUTPATIENT
Start: 2024-02-01

## 2024-02-01 RX ORDER — METHYLPREDNISOLONE SODIUM SUCCINATE 125 MG/2ML
125 INJECTION, POWDER, LYOPHILIZED, FOR SOLUTION INTRAMUSCULAR; INTRAVENOUS ONCE
Status: COMPLETED | OUTPATIENT
Start: 2024-02-01 | End: 2024-02-01

## 2024-02-01 RX ADMIN — FAMOTIDINE 20 MG: 10 INJECTION, SOLUTION INTRAVENOUS at 10:28

## 2024-02-01 RX ADMIN — EPINEPHRINE 0.5 MG: 1 INJECTION INTRAMUSCULAR; INTRAVENOUS; SUBCUTANEOUS at 10:26

## 2024-02-01 RX ADMIN — METHYLPREDNISOLONE SODIUM SUCCINATE 125 MG: 125 INJECTION, POWDER, FOR SOLUTION INTRAMUSCULAR; INTRAVENOUS at 10:27

## 2024-02-01 ASSESSMENT — ACTIVITIES OF DAILY LIVING (ADL)
ADLS_ACUITY_SCORE: 35

## 2024-02-01 NOTE — ED NOTES
Patient is feeling much better than when she first presented. Able to talk freely in full sentences. States the pressure and tightness in her chest and throat are gone.  Taking ice chips.

## 2024-02-01 NOTE — ED NOTES
Bed: JNED-03  Expected date: 2/1/24  Expected time:   Means of arrival: Ambulance  Comments:  [place], 44F, allergic reaction, got 2 epi pens and oral benadryl

## 2024-02-01 NOTE — Clinical Note
Madeline Szymanski was seen and treated in our emergency department on 2/1/2024.  She may return to work on 02/02/2024.       If you have any questions or concerns, please don't hesitate to call.      Matt Manzano MD

## 2024-02-01 NOTE — ED TRIAGE NOTES
"Pt arrived via ambulance from work in [employer] to ED. Pt ate coconut desert, shortly after learning there was coconut in the food. After 5 minutes pt felt \"crushing pain in the back of throat\", on a breathing tx, medics gave oral Benadryl and 2 Epis, VS updated. Pt appears to have blotchyness on face, no itchiness or rash present. Currently in no pain      Triage Assessment (Adult)       Row Name 02/01/24 1003          Triage Assessment    Airway WDL WDL     Additional Documentation Breath Sounds (Group)        Breath Sounds    Breath Sounds All Fields     All Lung Fields Breath Sounds clear;equal bilaterally        Cardiac WDL    Cardiac WDL WDL        Cognitive/Neuro/Behavioral WDL    Cognitive/Neuro/Behavioral WDL WDL                     "

## 2024-02-01 NOTE — DISCHARGE INSTRUCTIONS
Please take the medications prescribed as directed, and keep your EpiPen on you at all times.  You may treat any itchiness that arises or rash, with Benadryl as needed.    Return to the emergency department at once if you develop difficulty breathing, wheezing, chest discomfort, swelling around the mouth.

## 2024-02-01 NOTE — ED NOTES
"Pt resting in bed, significant other at bedside. Appeared to be anxious with breathing again, author gave Epi in arm IM, as well as other meds. Pt currently calm, stated she is \"very tired\". Face redness has gone down, VS rechecked, will continue to monitor.   "

## 2024-02-01 NOTE — ED PROVIDER NOTES
EMERGENCY DEPARTMENT ENCOUNTER      NAME: Madeline Szymanski  AGE: 44 year old female  YOB: 1980  MRN: [id number]  EVALUATION DATE & TIME: 2/1/2024  9:58 AM    PCP: Sebas Valdez    ED PROVIDER: Matt Manzano MD      Chief Complaint   Patient presents with    Allergic Reaction         FINAL IMPRESSION:  1. Allergic reaction, initial encounter    2. Anaphylaxis, initial encounter          ED COURSE & MEDICAL DECISION MAKING:    Pertinent Labs & Imaging studies reviewed. 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  She had received 2 IM doses of epinephrine, p.o. Benadryl with EMS.  She currently has a feeling of throat tightness, but examination does not show evidence of oropharyngeal swelling.  No wheezing.  She had initially had nausea, but is no longer present.  No abdominal pain.  She is tachycardic and hypertensive as expected after 2 doses of epinephrine.  No rashes.  Plan to repeat IM dose of epinephrine given her persistent throat symptoms, and will add in IV Benadryl, IV Pepcid, IV methylprednisolone.  Will monitor the patient for 4 hours after epinephrine to ensure stability, and will prescribe her a new EpiPen to go home with as she does not have hers anymore.     Patient observed for 5 hours, maintained stability after EpiPen, and will be discharged at this time.    Medical Decision Making    History:  Supplemental history from: Documented in chart  External Record(s) reviewed: Documented in chart    Work Up:  Chart documentation includes differential considered and any EKGs or imaging independently interpreted by provider, where  specified.  In additional to work up documented, I considered the following work up: Documented in chart, if applicable.    External consultation:  Discussion of management with another provider: Documented in chart, if applicable    Complicating factors:  Care impacted by chronic illness: N/A  Care affected by social determinants of health: N/A    Disposition considerations: Discharge. I prescribed additional prescription strength medication(s) as charted. See documentation for any additional details.        At the conclusion of the encounter I discussed the results of all of the tests and the disposition. The questions were answered. The patient or family acknowledged understanding and was agreeable with the care plan.     30 minutes of critical care time     MEDICATIONS GIVEN IN THE EMERGENCY:  Medications   sodium chloride (PF) 0.9% PF flush 3 mL (3 mLs Intracatheter $Given 2/1/24 1028)   sodium chloride (PF) 0.9% PF flush 3 mL (has no administration in time range)   famotidine (PEPCID) injection 20 mg (20 mg Intravenous $Given 2/1/24 1028)   methylPREDNISolone sodium succinate (solu-MEDROL) injection 125 mg (125 mg Intravenous $Given 2/1/24 1027)   EPINEPHrine (ADRENALIN) kit 0.3-0.5 mg (0.5 mg Intramuscular $Given 2/1/24 1026)       NEW PRESCRIPTIONS STARTED AT TODAY'S ER VISIT  Current Discharge Medication List        START taking these medications    Details   famotidine (PEPCID) 20 MG tablet Take 1 tablet (20 mg) by mouth 2 times daily  Qty: 20 tablet, Refills: 0      methylPREDNISolone (MEDROL DOSEPAK) 4 MG tablet therapy pack Follow Package Directions  Qty: 21 tablet, Refills: 0                =================================================================    HPI    Patient information was obtained from: patient    Use of : N/A        Madelineloraine Szymanski is a 44 year old female with a pertinent history of prior 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  She  was at work today, and ingested a dessert that she did not know had coconut in it.  Shortly after she developed shortness of breath, throat tightness, nausea.  She was given 2 EpiPen's, and her nausea has now resolved, but still feels the throat symptoms described.  No wheezing.  No vomiting.  No rash.  Her prior EpiPen prescription has  and she no longer has it.  EMS gave Benadryl p.o. in addition to the EpiPen's.      PAST MEDICAL HISTORY:  Past Medical History:   Diagnosis Date    Acute and chronic respiratory failure with hypoxia (H) 2022    Anxiety     Arthritis     Depressive disorder     Gastric band slippage 2019    History of blood transfusion 1978    Kidney infection     Lumbar radiculopathy     Migraine     Migraine     Painless urinary retention due to cauda equina syndrome (H)     Pancreatitis     Panic attack     PONV (postoperative nausea and vomiting)     Spinal stenosis in cervical region     UTI (lower urinary tract infection)        PAST SURGICAL HISTORY:  Past Surgical History:   Procedure Laterality Date    ABDOMEN SURGERY       SECTION      FUSION CERVICAL ANTERIOR ONE LEVEL N/A 2022    Procedure: Cervical 5 - Cervical 6 anterior cervical decompression and artificial disc replacement, use of microscope;  Surgeon: Radha Lilly MD;  Location: [facility] OR    GASTRECTOMY LAPAROSCOPIC SLEEVE      GI SURGERY      GYN SURGERY  2018    Hysterectomy    HYSTERECTOMY TOTAL ABDOMINAL      HYSTERECTOMY, PAP NO LONGER INDICATED      LAMINECTOMY LUMBAR ONE LEVEL Left 10/04/2021    Procedure: LEFT LUMBAR 5-SACRAL 1 HEMILAMINECTOMY, MEDIAL FACETECTOMY, FORAMINOTOMY WITH;  Surgeon: Radha Lilly MD;  Location: [facility] OR    LUMBAR DISCECTOMY Left 10/04/2021    Procedure: LUMBAR 5-SACRAL 1 MICRODISCECTOMY;  Surgeon: Radha Lilly MD;  Location: [facility] OR           CURRENT MEDICATIONS:    EPINEPHrine (ANY BX GENERIC EQUIV) 0.3  MG/0.3ML injection 2-pack  famotidine (PEPCID) 20 MG tablet  methylPREDNISolone (MEDROL DOSEPAK) 4 MG tablet therapy pack  cyclobenzaprine (FLEXERIL) 10 MG tablet  DULoxetine (CYMBALTA) 60 MG capsule  eletriptan (RELPAX) 40 MG tablet  hydrOXYzine (ATARAX) 25 MG tablet  lidocaine (LIDODERM) 5 % patch  lisdexamfetamine (VYVANSE) 50 MG capsule  pregabalin (LYRICA) 75 MG capsule  rizatriptan (MAXALT-MLT) 10 MG ODT        ALLERGIES:  Allergies   Allergen Reactions    Cephalexin Hives    Coconut Flavor Anaphylaxis    Covid-19 (Mrna) Vaccine Anaphylaxis     Pfizer     Prochlorperazine Other (See Comments)     Tremors  When given with metoclopramide, okay by itself      Coconut Fatty Acids      Other reaction(s): Edema    Penicillins Hives       FAMILY HISTORY:  Family History   Problem Relation Age of Onset    Low Back Problems Mother         back surgery    Heart Disease Father     Hypertension Father     Substance Abuse Father     Obesity Sister     Obesity Paternal Grandmother     Mental Illness Nephew     Breast Cancer Cousin     Breast Cancer Other     Obesity Other     Cerebrovascular Disease Maternal Uncle     Cerebrovascular Disease Maternal Uncle     Colon Cancer No family hx of        SOCIAL HISTORY:   Social History     Socioeconomic History    Marital status:    Tobacco Use    Smoking status: Never     Passive exposure: Never    Smokeless tobacco: Never   Vaping Use    Vaping Use: Never used   Substance and Sexual Activity    Alcohol use: Never    Drug use: Never    Sexual activity: Yes     Partners: Male     Birth control/protection: Female Surgical   Other Topics Concern    Parent/sibling w/ CABG, MI or angioplasty before 65F 55M? Yes     Comment: Father     Social Determinants of Health     Financial Resource Strain: Low Risk  (11/20/2023)    Financial Resource Strain     Within the past 12 months, have you or your family members you live with been unable to get utilities (heat, electricity) when it  "was really needed?: No   Food Insecurity: Low Risk  (11/20/2023)    Food Insecurity     Within the past 12 months, did you worry that your food would run out before you got money to buy more?: No     Within the past 12 months, did the food you bought just not last and you didn t have money to get more?: No   Transportation Needs: Low Risk  (11/20/2023)    Transportation Needs     Within the past 12 months, has lack of transportation kept you from medical appointments, getting your medicines, non-medical meetings or appointments, work, or from getting things that you need?: No   Interpersonal Safety: Low Risk  (11/20/2023)    Interpersonal Safety     Do you feel physically and emotionally safe where you currently live?: Yes     Within the past 12 months, have you been hit, slapped, kicked or otherwise physically hurt by someone?: No     Within the past 12 months, have you been humiliated or emotionally abused in other ways by your partner or ex-partner?: No   Housing Stability: Low Risk  (11/20/2023)    Housing Stability     Do you have housing? : Yes     Are you worried about losing your housing?: No       VITALS:  /73   Pulse 100   Temp 98.4  F (36.9  C)   Resp 14   Ht 1.626 m (5' 4\")   Wt 85.3 kg (188 lb)   SpO2 96%   BMI 32.27 kg/m      PHYSICAL EXAM    Physical Exam  Vitals and nursing note reviewed.   Constitutional:       General: She is not in acute distress.     Appearance: Normal appearance. She is normal weight. She is not ill-appearing.   HENT:      Head: Normocephalic and atraumatic.      Nose: Nose normal.      Mouth/Throat:      Mouth: Mucous membranes are moist.      Pharynx: Oropharynx is clear. No oropharyngeal exudate or posterior oropharyngeal erythema.      Comments: No swelling of the oropharynx  Eyes:      Extraocular Movements: Extraocular movements intact.      Conjunctiva/sclera: Conjunctivae normal.      Pupils: Pupils are equal, round, and reactive to light.   Cardiovascular: "      Rate and Rhythm: Regular rhythm. Tachycardia present.      Pulses: Normal pulses.      Heart sounds: Normal heart sounds. No murmur heard.  Pulmonary:      Effort: Pulmonary effort is normal. No respiratory distress.      Breath sounds: Normal breath sounds. No wheezing.   Abdominal:      General: Abdomen is flat. There is no distension.      Palpations: Abdomen is soft.      Tenderness: There is no abdominal tenderness.   Musculoskeletal:         General: Normal range of motion.      Cervical back: Normal range of motion.      Right lower leg: No edema.      Left lower leg: No edema.   Skin:     General: Skin is warm and dry.      Capillary Refill: Capillary refill takes less than 2 seconds.      Findings: No rash.   Neurological:      General: No focal deficit present.      Mental Status: She is alert and oriented to person, place, and time. Mental status is at baseline.   Psychiatric:         Mood and Affect: Mood normal.         Behavior: Behavior normal.         Thought Content: Thought content normal.         Judgment: Judgment normal.            LAB:  All pertinent labs reviewed and interpreted.       RADIOLOGY:  Reviewed all pertinent imaging. Please see official radiology report.  No orders to display       PROCEDURES:   None      [employer] Documentation:   CMS Diagnoses:               Matt Manzano MD  [facility] EMERGENCY DEPARTMENT  [address] [address]  [phone]       Matt Manzano MD  02/01/24 2680

## 2024-02-12 ENCOUNTER — E-VISIT (OUTPATIENT)
Dept: URGENT CARE | Facility: CLINIC | Age: 44
End: 2024-02-12
Payer: COMMERCIAL

## 2024-02-12 DIAGNOSIS — N39.0 ACUTE UTI (URINARY TRACT INFECTION): Primary | ICD-10-CM

## 2024-02-12 PROCEDURE — 99421 OL DIG E/M SVC 5-10 MIN: CPT | Performed by: NURSE PRACTITIONER

## 2024-02-12 RX ORDER — SULFAMETHOXAZOLE/TRIMETHOPRIM 800-160 MG
1 TABLET ORAL 2 TIMES DAILY
Qty: 6 TABLET | Refills: 0 | Status: SHIPPED | OUTPATIENT
Start: 2024-02-12 | End: 2024-02-15

## 2024-02-12 NOTE — PATIENT INSTRUCTIONS
Dear Madeline Szymanski    After reviewing your responses, I've been able to diagnose you with a urinary tract infection, which is a common infection of the bladder with bacteria.  This is not a sexually transmitted infection, though urinating immediately after intercourse can help prevent infections.  Drinking lots of fluids is also helpful to clear your current infection and prevent the next one.      I have sent a prescription for antibiotics to your pharmacy to treat this infection.    It is important that you take all of your prescribed medication even if your symptoms are improving after a few doses.  Taking all of your medicine helps prevent the symptoms from returning.     If your symptoms worsen, you develop pain in your back or stomach, develop fevers, or are not improving in 5 days, please contact your primary care provider for an appointment or visit any of our convenient Walk-in or Urgent Care Centers to be seen, which can be found on our website here.    Thanks again for choosing us as your health care partner,    PERRI Hartmann CNP  Urinary Tract Infection (UTI) in Women: Care Instructions  Overview     A urinary tract infection, or UTI, is a general term for an infection anywhere between the kidneys and the urethra (where urine comes out). Most UTIs are bladder infections. They often cause pain or burning when you urinate.  UTIs are caused by bacteria and can be cured with antibiotics. Be sure to complete your treatment so that the infection does not get worse.  Follow-up care is a key part of your treatment and safety. Be sure to make and go to all appointments, and call your doctor if you are having problems. It's also a good idea to know your test results and keep a list of the medicines you take.  How can you care for yourself at home?  Take your antibiotics as directed. Do not stop taking them just because you feel better. You need to take the full course of antibiotics.  Drink extra water  "and other fluids for the next day or two. This will help make the urine less concentrated and help wash out the bacteria that are causing the infection. (If you have kidney, heart, or liver disease and have to limit fluids, talk with your doctor before you increase the amount of fluids you drink.)  Avoid drinks that are carbonated or have caffeine. They can irritate the bladder.  Urinate often. Try to empty your bladder each time.  To relieve pain, take a hot bath or lay a heating pad set on low over your lower belly or genital area. Never go to sleep with a heating pad in place.  To prevent UTIs  Drink plenty of water each day. This helps you urinate often, which clears bacteria from your system. (If you have kidney, heart, or liver disease and have to limit fluids, talk with your doctor before you increase the amount of fluids you drink.)  Urinate when you need to.  If you are sexually active, urinate right after you have sex.  Change sanitary pads often.  Avoid douches, bubble baths, feminine hygiene sprays, and other feminine hygiene products that have deodorants.  After going to the bathroom, wipe from front to back.  When should you call for help?   Call your doctor now or seek immediate medical care if:    Symptoms such as fever, chills, nausea, or vomiting get worse or appear for the first time.     You have new pain in your back just below your rib cage. This is called flank pain.     There is new blood or pus in your urine.     You have any problems with your antibiotic medicine.   Watch closely for changes in your health, and be sure to contact your doctor if:    You are not getting better after taking an antibiotic for 2 days.     Your symptoms go away but then come back.   Where can you learn more?  Go to https://www.healthwise.net/patiented  Enter K848 in the search box to learn more about \"Urinary Tract Infection (UTI) in Women: Care Instructions.\"  Current as of: February 28, " 2023               Content Version: 13.8    3049-9082 [employer].   Care instructions adapted under license by your healthcare professional. If you have questions about a medical condition or this instruction, always ask your healthcare professional. [employer] disclaims any warranty or liability for your use of this information.

## 2024-02-19 ENCOUNTER — OFFICE VISIT (OUTPATIENT)
Dept: PHYSICAL MEDICINE AND REHAB | Facility: CLINIC | Age: 44
End: 2024-02-19
Payer: COMMERCIAL

## 2024-02-19 VITALS
DIASTOLIC BLOOD PRESSURE: 86 MMHG | SYSTOLIC BLOOD PRESSURE: 127 MMHG | TEMPERATURE: 98.9 F | OXYGEN SATURATION: 97 % | HEART RATE: 100 BPM

## 2024-02-19 DIAGNOSIS — M54.81 BILATERAL OCCIPITAL NEURALGIA: Primary | ICD-10-CM

## 2024-02-19 PROCEDURE — 64405 NJX AA&/STRD GR OCPL NRV: CPT | Mod: 50 | Performed by: PAIN MEDICINE

## 2024-02-19 RX ORDER — BUPIVACAINE HYDROCHLORIDE AND EPINEPHRINE 2.5; 5 MG/ML; UG/ML
4 INJECTION, SOLUTION EPIDURAL; INFILTRATION; INTRACAUDAL; PERINEURAL ONCE
Status: DISCONTINUED | OUTPATIENT
Start: 2024-02-19 | End: 2024-02-19

## 2024-02-19 RX ORDER — BUPIVACAINE HYDROCHLORIDE 2.5 MG/ML
4 INJECTION, SOLUTION EPIDURAL; INFILTRATION; INTRACAUDAL ONCE
Status: COMPLETED | OUTPATIENT
Start: 2024-02-19 | End: 2024-02-19

## 2024-02-19 RX ORDER — METHYLPREDNISOLONE ACETATE 80 MG/ML
40 INJECTION, SUSPENSION INTRA-ARTICULAR; INTRALESIONAL; INTRAMUSCULAR; SOFT TISSUE ONCE
Status: COMPLETED | OUTPATIENT
Start: 2024-02-19 | End: 2024-02-19

## 2024-02-19 RX ADMIN — BUPIVACAINE HYDROCHLORIDE 10 MG: 2.5 INJECTION, SOLUTION EPIDURAL; INFILTRATION; INTRACAUDAL at 08:37

## 2024-02-19 RX ADMIN — METHYLPREDNISOLONE ACETATE 40 MG: 80 INJECTION, SUSPENSION INTRA-ARTICULAR; INTRALESIONAL; INTRAMUSCULAR; SOFT TISSUE at 08:04

## 2024-02-19 ASSESSMENT — PAIN SCALES - GENERAL: PAINLEVEL: MODERATE PAIN (5)

## 2024-02-19 NOTE — PROGRESS NOTES
Pre-procedure diagnosis: Bilateral occipital neuralgia  Post-procedure diagnosis:  Same  PROCEDURE: Bilateral occipital nerve block.    The risks of the procedure were discussed with the patient.  These include infection, bleeding, increased pain, no change in pain were discussed with the patient.  The patient gave written and verbal consent to proceed with the procedure.    The most tender area of the left occipital nerve at the nuchal ridge was palpated.  This area was then cleansed with a chlorhexidine swab x 2.  A solution of 4 mL of 20 mg of Depomedrol mixed with equal parts of 0.25% bupivacaine and 2% lidocaine  was drawn up.  A 25 guage 2 inch needle was inserted down to os.  After aspiration was negative, once mL of the solution was injected one third of the way from the occipital protuberance and the mastoid process on the right.  Without withdrawing the needle from the skin, the needle was redirected towards the mastoid process.  After aspiration was negative, the remaining injectate was injected.  The exact same procedure was done on the left injecting 20 mg of Depo-Medrol, 1.5 cc of 2% lidocaine and 2 cc of 0.25% bupivacaine.    The patient tolerated the procedure well.  The patient was monitored for a short period of time and then discharged under her own power.     Prepain score: 5/10    Post pain score: 3/10

## 2024-02-19 NOTE — LETTER
2/19/2024         RE: Madeline Szymanski  [address]  [place] [address]        Dear Colleague,    Thank you for referring your patient, Madeline Szymanski, to the [facility] SPINE AND NEUROSURGERY. Please see a copy of my visit note below.    Pre-procedure diagnosis: Bilateral occipital neuralgia  Post-procedure diagnosis:  Same  PROCEDURE: Bilateral occipital nerve block.    The risks of the procedure were discussed with the patient.  These include infection, bleeding, increased pain, no change in pain were discussed with the patient.  The patient gave written and verbal consent to proceed with the procedure.    The most tender area of the left occipital nerve at the nuchal ridge was palpated.  This area was then cleansed with a chlorhexidine swab x 2.  A solution of 4 mL of 20 mg of Depomedrol mixed with equal parts of 0.25% bupivacaine and 2% lidocaine  was drawn up.  A 25 guage 2 inch needle was inserted down to os.  After aspiration was negative, once mL of the solution was injected one third of the way from the occipital protuberance and the mastoid process on the right.  Without withdrawing the needle from the skin, the needle was redirected towards the mastoid process.  After aspiration was negative, the remaining injectate was injected.  The exact same procedure was done on the left injecting 20 mg of Depo-Medrol, 1.5 cc of 2% lidocaine and 2 cc of 0.25% bupivacaine.    The patient tolerated the procedure well.  The patient was monitored for a short period of time and then discharged under her own power.     Prepain score: 5/10    Post pain score: 3/10      Again, thank you for allowing me to participate in the care of your patient.        Sincerely,        Marco Zaragoza, DO

## 2024-02-26 ENCOUNTER — MYC REFILL (OUTPATIENT)
Dept: FAMILY MEDICINE | Facility: CLINIC | Age: 44
End: 2024-02-26
Payer: COMMERCIAL

## 2024-02-26 DIAGNOSIS — F50.819 BINGE EATING DISORDER: ICD-10-CM

## 2024-02-26 RX ORDER — LISDEXAMFETAMINE DIMESYLATE 50 MG/1
50 CAPSULE ORAL EVERY MORNING
Qty: 30 CAPSULE | Refills: 0 | Status: SHIPPED | OUTPATIENT
Start: 2024-02-26 | End: 2024-04-10

## 2024-02-26 NOTE — CONFIDENTIAL NOTE
I am unable to send electronic prescription to this pharmacy.  I generated a paper prescription.  Please asked patient what her preference would be for this medication.

## 2024-03-28 ENCOUNTER — OFFICE VISIT (OUTPATIENT)
Dept: PHYSICAL MEDICINE AND REHAB | Facility: CLINIC | Age: 44
End: 2024-03-28
Payer: COMMERCIAL

## 2024-03-28 VITALS
WEIGHT: 187 LBS | DIASTOLIC BLOOD PRESSURE: 74 MMHG | HEART RATE: 108 BPM | TEMPERATURE: 98.2 F | OXYGEN SATURATION: 97 % | BODY MASS INDEX: 31.92 KG/M2 | SYSTOLIC BLOOD PRESSURE: 104 MMHG | HEIGHT: 64 IN

## 2024-03-28 DIAGNOSIS — G43.709 CHRONIC MIGRAINE WITHOUT AURA WITHOUT STATUS MIGRAINOSUS, NOT INTRACTABLE: Primary | ICD-10-CM

## 2024-03-28 PROCEDURE — 64615 CHEMODENERV MUSC MIGRAINE: CPT | Performed by: PHYSICAL MEDICINE & REHABILITATION

## 2024-03-28 ASSESSMENT — PAIN SCALES - GENERAL: PAINLEVEL: MODERATE PAIN (5)

## 2024-03-28 NOTE — PROGRESS NOTES
Two vials of BOTOX (100u/vial) were reconstituted with 2ml of NS per vial (Lot: [id number], EXP: 10/1/2024). BOTOX Lot: [id number], Exp: 05/2026.

## 2024-03-28 NOTE — PROGRESS NOTES
PROCEDURE NOTE: Intramuscular Onabotulinum Toxin-A (Botox) Injection      PROCEDURE DATE: 3/28/2024    PATIENT NAME: Madeline Szymanski  YOB: 1980    ATTENDING PHYSICIAN: Amberly Tellez MD  FELLOW/RESIDENT PHYSICIAN: None     PREOPERATIVE DIAGNOSIS:   Chronic migraine without aura without status migrainosus, not intractable  (primary encounter diagnosis)   POSTOPERATIVE DIAGNOSIS: same    PROCEDURE PERFORMED: Onabotulinum toxin-A (Botox) Injection - Migraine Protocol      Patient was greeted in exam room. The risk, benefits and alternatives to the procedure were again reviewed with her and informed consent was obtained and the patient agreed to proceed. A time-out was performed. Following review alternatives, benefits and risks, the procedure was carried out under sterile technique with alcohol.    The sites injected today were 5 units of Botox in the  on each side. The Procerus 5 units. Frontalis 10 units on each side. Temporalis 20 units each side. Occipitalis 15 Units on each side. Cervical paraspinal 10 units each side.  Finally the trapezius was injected 15 units on each side.     100 units of botulinum toxin was diluted 50 units/mL of preservative free saline (2:1).     Total of 155 units used.  45 units wasted.            Patient was informed that the goal will be to see continued/new improvement with increased functionality or decreased frequency of headaches and pain. The patient understood goals and will keep track.       The pre and post procedure pain score did not change, this is expected.      The patient did not have any complications.     Follow up x12 weeks for repeat injection or with referring provider.

## 2024-03-28 NOTE — LETTER
3/28/2024         RE: Madeline Szymanski  [address]  [place] [address]        Dear Colleague,    Thank you for referring your patient, Madeline Szymanski, to the [facility] SPINE AND NEUROSURGERY. Please see a copy of my visit note below.    Two vials of BOTOX (100u/vial) were reconstituted with 2ml of NS per vial (Lot: [id number], EXP: 10/1/2024). BOTOX Lot: [id number], Exp: 05/2026.    PROCEDURE NOTE: Intramuscular Onabotulinum Toxin-A (Botox) Injection Under Ultrasound Guidance    PROCEDURE DATE: 3/28/2024    PATIENT NAME: Madeline Szymanski  YOB: 1980    ATTENDING PHYSICIAN: Amberly Tellez MD  FELLOW/RESIDENT PHYSICIAN: None     PREOPERATIVE DIAGNOSIS:   Chronic migraine without aura without status migrainosus, not intractable  (primary encounter diagnosis)   POSTOPERATIVE DIAGNOSIS: same    PROCEDURE PERFORMED: Onabotulinum toxin-A (Botox) Injection - Migraine Protocol      Patient was greeted in exam room. The risk, benefits and alternatives to the procedure were again reviewed with her and informed consent was obtained and the patient agreed to proceed. A time-out was performed. Following review alternatives, benefits and risks, the procedure was carried out under sterile technique with alcohol.    The sites injected today were 5 units of Botox in the  on each side. The Procerus 5 units. Frontalis 10 units on each side. Temporalis 20 units each side. Occipitalis 15 Units on each side. Cervical paraspinal 10 units each side.  Finally the trapezius was injected 15 units on each side.     100 units of botulinum toxin was diluted 50 units/mL of preservative free saline (2:1).     Total of 155 units used.  45 units wasted.            Patient was informed that the goal will be to see continued/new improvement with increased functionality or decreased frequency of headaches and pain. The patient understood goals and will keep track.       The pre and post procedure pain score did not  change, this is expected.      The patient did not have any complications.     Follow up x12 weeks for repeat injection or with referring provider.           Again, thank you for allowing me to participate in the care of your patient.        Sincerely,        Amberly Tellez MD

## 2024-04-10 ENCOUNTER — MYC REFILL (OUTPATIENT)
Dept: FAMILY MEDICINE | Facility: CLINIC | Age: 44
End: 2024-04-10
Payer: COMMERCIAL

## 2024-04-10 DIAGNOSIS — F50.819 BINGE EATING DISORDER: ICD-10-CM

## 2024-04-11 RX ORDER — LISDEXAMFETAMINE DIMESYLATE 50 MG/1
50 CAPSULE ORAL EVERY MORNING
Qty: 30 CAPSULE | Refills: 0 | Status: SHIPPED | OUTPATIENT
Start: 2024-04-11 | End: 2024-04-30

## 2024-04-15 ENCOUNTER — MYC MEDICAL ADVICE (OUTPATIENT)
Dept: FAMILY MEDICINE | Facility: CLINIC | Age: 44
End: 2024-04-15
Payer: COMMERCIAL

## 2024-04-15 DIAGNOSIS — G43.109 MIGRAINE WITH AURA AND WITHOUT STATUS MIGRAINOSUS, NOT INTRACTABLE: Primary | ICD-10-CM

## 2024-04-16 RX ORDER — TOPIRAMATE 50 MG/1
50 TABLET, FILM COATED ORAL 2 TIMES DAILY
Qty: 60 TABLET | Refills: 2 | Status: SHIPPED | OUTPATIENT
Start: 2024-04-16 | End: 2024-04-30

## 2024-04-16 NOTE — TELEPHONE ENCOUNTER
Called patient and relayed message from Dr. Valdez below.  Patient agreeable to restarting topiramate and will  from pharmacy today.  2 week follow up video visit scheduled with Dr. Valdez on 4/30/24 at 9:20am.  No further questions/concerns.      Moriah Armenta RN  [facility]

## 2024-04-30 ENCOUNTER — VIRTUAL VISIT (OUTPATIENT)
Dept: FAMILY MEDICINE | Facility: CLINIC | Age: 44
End: 2024-04-30
Payer: COMMERCIAL

## 2024-04-30 DIAGNOSIS — G43.109 MIGRAINE WITH AURA AND WITHOUT STATUS MIGRAINOSUS, NOT INTRACTABLE: Primary | ICD-10-CM

## 2024-04-30 DIAGNOSIS — E66.09 CLASS 1 OBESITY DUE TO EXCESS CALORIES WITH SERIOUS COMORBIDITY AND BODY MASS INDEX (BMI) OF 33.0 TO 33.9 IN ADULT: ICD-10-CM

## 2024-04-30 DIAGNOSIS — Z12.31 ENCOUNTER FOR SCREENING MAMMOGRAM FOR BREAST CANCER: ICD-10-CM

## 2024-04-30 DIAGNOSIS — F32.5 MAJOR DEPRESSIVE DISORDER WITH SINGLE EPISODE, IN FULL REMISSION (H): ICD-10-CM

## 2024-04-30 DIAGNOSIS — E66.811 CLASS 1 OBESITY DUE TO EXCESS CALORIES WITH SERIOUS COMORBIDITY AND BODY MASS INDEX (BMI) OF 33.0 TO 33.9 IN ADULT: ICD-10-CM

## 2024-04-30 DIAGNOSIS — F50.819 BINGE EATING DISORDER: ICD-10-CM

## 2024-04-30 DIAGNOSIS — Z12.11 SCREEN FOR COLON CANCER: ICD-10-CM

## 2024-04-30 PROCEDURE — 99214 OFFICE O/P EST MOD 30 MIN: CPT | Mod: 95 | Performed by: FAMILY MEDICINE

## 2024-04-30 RX ORDER — LISDEXAMFETAMINE DIMESYLATE 60 MG/1
60 CAPSULE ORAL EVERY MORNING
Qty: 30 CAPSULE | Refills: 0 | Status: SHIPPED | OUTPATIENT
Start: 2024-04-30 | End: 2024-06-04

## 2024-04-30 RX ORDER — TOPIRAMATE 50 MG/1
50 TABLET, FILM COATED ORAL DAILY
Qty: 90 TABLET | Refills: 1 | Status: SHIPPED | OUTPATIENT
Start: 2024-04-30 | End: 2024-08-06

## 2024-04-30 ASSESSMENT — PATIENT HEALTH QUESTIONNAIRE - PHQ9
SUM OF ALL RESPONSES TO PHQ QUESTIONS 1-9: 3
10. IF YOU CHECKED OFF ANY PROBLEMS, HOW DIFFICULT HAVE THESE PROBLEMS MADE IT FOR YOU TO DO YOUR WORK, TAKE CARE OF THINGS AT HOME, OR GET ALONG WITH OTHER PEOPLE: SOMEWHAT DIFFICULT
SUM OF ALL RESPONSES TO PHQ QUESTIONS 1-9: 3

## 2024-04-30 NOTE — PROGRESS NOTES
Madeline is a 44 year old who is being evaluated via a billable video visit.    How would you like to obtain your AVS? MyChart  If the video visit is dropped, the invitation should be resent by: Text to cell phone: [phone]  Will anyone else be joining your video visit? No      Assessment & Plan   Problem List Items Addressed This Visit       Binge eating disorder     Stable.  She finds the 60 mg/day dosing to be more helpful.  Will restore this.  This dose sent to pharmacy.  Recheck in 3-6 months recommended.         Relevant Medications    lisdexamfetamine (VYVANSE) 60 MG capsule    Class 1 obesity due to excess calories with serious comorbidity and body mass index (BMI) of 33.0 to 33.9 in adult     Weight reviewed in the context of headaches and binge eating.         Relevant Medications    lisdexamfetamine (VYVANSE) 60 MG capsule    Major depressive disorder with single episode, in full remission (H24)     Mood stable-improved.  She is no longer on duloxetine as this seems to be contributing to more headaches.  For now, no specific SSRI or SNRI therapy is recommended.         Migraine with aura and without status migrainosus, not intractable - Primary     The patient has been struggling with migraine headaches.  Currently she is in a good cycle and she believes that she is figured out optimal way of timing her medicines.  Overall she finds topiramate to be helpful.  However when she takes in the evening she finds yourself to be sedated the next day.  She is been taking 50 mg in the morning which has been working well.  Refill of this medicine sent to pharmacy.  I think it is unlikely that lisdexamfetamine is contributing to her headaches.  Dosing as discussed elsewhere.  Continue Botox therapies through PMR.         Relevant Medications    topiramate (TOPAMAX) 50 MG tablet     Other Visit Diagnoses       Screen for colon cancer        Relevant Orders    Colonoscopy Screening  Referral    Encounter for  "screening mammogram for breast cancer        Relevant Orders    MA Screen Bilateral w/Jeremy           BMI  Estimated body mass index is 32.1 kg/m  as calculated from the following:    Height as of 3/28/24: 1.626 m (5' 4\").    Weight as of 3/28/24: 84.8 kg (187 lb).   Weight management plan: Specific weight management program called [facility] discussed    Subjective   Madeline is a 44 year old, presenting for the following health issues:  RECHECK (Follow-up on migraines )        4/30/2024     9:15 AM   Additional Questions   Roomed by claribel     Headaches:   - ongoing.  Taking topiramate in the morning.  If she takes topiramate at night she is tried the next day.  One severe headache per week. No other side effects.  She has noticed some taste changes that have subsided.  She has had some symptoms of paraesthesias.    - she has been taking vyvanse later in the day   - duloxetine negated the affect of vyvanse.     - botox has started to be really helpful with muscle tightness and tension.     - most recent high  weight was 202.  She gained weight with recent headaches.  She wonders if she would do better back up on 60mg.  Down to 193.  She is concerned this would worsen her headaches.    - nutrition: focusing on protein.  \"30-30-30\" which means she eats omelette in the morning. Dinner is protein/veggies.  Lunch is light and is more of a snacking behavior throughout the day.  Lots of water.      History of Present Illness       Headaches:   Since the patient's last clinic visit, headaches are: improved  The patient is getting headaches:  1 /wk  She is not able to do normal daily activities when she has a migraine.  The patient is taking the following rescue/relief medications:  Maxalt and Relpax   Patient states \"The relief is inconsistent\" from the rescue/relief medications.   The patient is taking the following medications to prevent migraines:  Topomax  In the past 4 weeks, the patient has gone to an " Urgent Care or Emergency Room 0 times times due to headaches.    She eats 2-3 servings of fruits and vegetables daily.She consumes 0 sweetened beverage(s) daily.She exercises with enough effort to increase her heart rate 20 to 29 minutes per day.  She exercises with enough effort to increase her heart rate 3 or less days per week.   She is taking medications regularly.        Objective           Vitals:  No vitals were obtained today due to virtual visit.    Physical Exam   GENERAL: alert and no distress  EYES: Eyes grossly normal to inspection.  No discharge or erythema, or obvious scleral/conjunctival abnormalities.  RESP: No audible wheeze, cough, or visible cyanosis.    SKIN: Visible skin clear. No significant rash, abnormal pigmentation or lesions.  NEURO: Cranial nerves grossly intact.  Mentation and speech appropriate for age.  PSYCH: Appropriate affect, tone, and pace of words        Video-Visit Details    Type of service:  Video Visit   Originating Location (pt. Location): Home    Distant Location (provider location):  On-site  Platform used for Video Visit: Agatha  Signed Electronically by: Sebas Valdez MD

## 2024-04-30 NOTE — ASSESSMENT & PLAN NOTE
Stable.  She finds the 60 mg/day dosing to be more helpful.  Will restore this.  This dose sent to pharmacy.  Recheck in 3-6 months recommended.

## 2024-04-30 NOTE — ASSESSMENT & PLAN NOTE
DISCHARGE SUMMARY:

 

DATE OF ADMISSION:  02/17/17

 

DATE OF DISCHARGE:  02/17/17

 

PRINCIPAL DISCHARGE DIAGNOSIS:  Chest pain.

 

SECONDARY DIAGNOSES:

1.  Hypertension.

2.  Hyperlipidemia.

3.  Prostate cancer, status post radiation therapy.

4.  Benign prostatic hypertrophy.

5.  Anxiety.

6.  Gastroesophageal reflux disease.

 

DISCHARGE MEDICATION REGIMEN:

1.  Klonopin 0.5 mg by mouth 3 times daily as needed for anxiety.

2.  Triamterene/hydrochlorothiazide 37.5/25 mg 1 capsule by mouth daily.

3.  Verapamil 360 mg by mouth daily.

4.  MiraLax one packet by mouth daily.

5.  Potassium chloride 20 mEq by mouth 2 times daily.

6.  Metoprolol tartrate 25 mg by mouth daily.

7.  Ibuprofen 200 mg by mouth every 8 hours as needed for pain.

8.  Losartan 50 mg by mouth 3 times daily.

9.  Flaxseed 2 capsules by mouth daily.

10.  Evolocumab 140 mg subcutaneous every 2 weeks.

11.  Lisinopril 40 mg by mouth daily.

12.  Calcium carbonate and vitamin D 1 tablet by mouth 2 times a week.

13.  Colace 1 tablet by mouth 2 times daily.

14.  Albuterol 1 puff inhale 4 times daily as needed for shortness of breath or wheezing.

15.  Aspirin 81 mg by mouth daily.

16.  Astepro 1 spray in both nares 2 times daily.

 

STUDIES DONE DURING HOSPITALIZATION:  Chest x-ray, impression:  No radiographic evidence of acute ca
rdiopulmonary disease.

 

Nuclear cardiac stress test, impression:  No evidence for stress-induced myocardial ischemia or pres
ence of an infarct.  Normal left ventricular wall motion and ejection fraction.

 

HISTORY OF PRESENT ILLNESS AND HOSPITAL SUMMARY:  Please see the full history and physical by Dr. Yasir Pederson for full details.  Briefly, Mr. Omer is a 71-year-old man with past medical history as a
rebecca, who presented to the hospital with some mild left-sided chest pain that resolved shortly after
 coming to the hospital.  The patient was monitored overnight on telemetry, which was unremarkable. 
 His troponins were trended, which remained negative and he had nuclear cardiac stress test that was
 also unremarkable.  The patient's symptoms did not return.  He will be discharged home.  Follow up 
with his PCP as an outpatient.

 

TIME SPENT:  Total time spent on this discharge, 35 minutes.

 

This is the summary of hospitalization, please see the full medical record for further details.

 

 [id number]/[facility] #: [id number] Mood stable-improved.  She is no longer on duloxetine as this seems to be contributing to more headaches.  For now, no specific SSRI or SNRI therapy is recommended.

## 2024-04-30 NOTE — ASSESSMENT & PLAN NOTE
The patient has been struggling with migraine headaches.  Currently she is in a good cycle and she believes that she is figured out optimal way of timing her medicines.  Overall she finds topiramate to be helpful.  However when she takes in the evening she finds yourself to be sedated the next day.  She is been taking 50 mg in the morning which has been working well.  Refill of this medicine sent to pharmacy.  I think it is unlikely that lisdexamfetamine is contributing to her headaches.  Dosing as discussed elsewhere.  Continue Botox therapies through PMR.

## 2024-05-15 ENCOUNTER — MYC REFILL (OUTPATIENT)
Dept: FAMILY MEDICINE | Facility: CLINIC | Age: 44
End: 2024-05-15
Payer: COMMERCIAL

## 2024-05-15 ENCOUNTER — MYC MEDICAL ADVICE (OUTPATIENT)
Dept: PHYSICAL MEDICINE AND REHAB | Facility: CLINIC | Age: 44
End: 2024-05-15
Payer: COMMERCIAL

## 2024-05-15 ENCOUNTER — MYC MEDICAL ADVICE (OUTPATIENT)
Dept: FAMILY MEDICINE | Facility: CLINIC | Age: 44
End: 2024-05-15
Payer: COMMERCIAL

## 2024-05-15 ENCOUNTER — MYC REFILL (OUTPATIENT)
Dept: PHYSICAL MEDICINE AND REHAB | Facility: CLINIC | Age: 44
End: 2024-05-15
Payer: COMMERCIAL

## 2024-05-15 DIAGNOSIS — M54.2 CHRONIC NECK PAIN: ICD-10-CM

## 2024-05-15 DIAGNOSIS — G56.03 BILATERAL CARPAL TUNNEL SYNDROME: ICD-10-CM

## 2024-05-15 DIAGNOSIS — R20.0 NUMBNESS AND TINGLING IN BOTH HANDS: ICD-10-CM

## 2024-05-15 DIAGNOSIS — U07.1 INFECTION DUE TO 2019 NOVEL CORONAVIRUS: ICD-10-CM

## 2024-05-15 DIAGNOSIS — G56.02 CARPAL TUNNEL SYNDROME OF LEFT WRIST: Primary | ICD-10-CM

## 2024-05-15 DIAGNOSIS — R20.2 NUMBNESS AND TINGLING IN BOTH HANDS: ICD-10-CM

## 2024-05-15 DIAGNOSIS — G89.29 CHRONIC NECK PAIN: ICD-10-CM

## 2024-05-15 DIAGNOSIS — G43.109 MIGRAINE WITH AURA AND WITHOUT STATUS MIGRAINOSUS, NOT INTRACTABLE: ICD-10-CM

## 2024-05-15 RX ORDER — RIZATRIPTAN BENZOATE 10 MG/1
10 TABLET, ORALLY DISINTEGRATING ORAL
Qty: 18 TABLET | Refills: 1 | Status: SHIPPED | OUTPATIENT
Start: 2024-05-15

## 2024-05-15 RX ORDER — LIDOCAINE 50 MG/G
PATCH TOPICAL
Qty: 28 PATCH | Refills: 3 | Status: SHIPPED | OUTPATIENT
Start: 2024-05-15

## 2024-05-17 ENCOUNTER — RADIOLOGY INJECTION OFFICE VISIT (OUTPATIENT)
Dept: PHYSICAL MEDICINE AND REHAB | Facility: CLINIC | Age: 44
End: 2024-05-17
Attending: NURSE PRACTITIONER
Payer: COMMERCIAL

## 2024-05-17 VITALS
TEMPERATURE: 98.7 F | RESPIRATION RATE: 16 BRPM | HEART RATE: 101 BPM | DIASTOLIC BLOOD PRESSURE: 86 MMHG | SYSTOLIC BLOOD PRESSURE: 142 MMHG | OXYGEN SATURATION: 99 %

## 2024-05-17 DIAGNOSIS — G56.03 BILATERAL CARPAL TUNNEL SYNDROME: ICD-10-CM

## 2024-05-17 DIAGNOSIS — R20.0 NUMBNESS AND TINGLING IN BOTH HANDS: ICD-10-CM

## 2024-05-17 DIAGNOSIS — R20.2 NUMBNESS AND TINGLING IN BOTH HANDS: ICD-10-CM

## 2024-05-17 PROCEDURE — 20526 THER INJECTION CARP TUNNEL: CPT | Mod: 50 | Performed by: PAIN MEDICINE

## 2024-05-17 PROCEDURE — 76942 ECHO GUIDE FOR BIOPSY: CPT | Performed by: PAIN MEDICINE

## 2024-05-17 RX ORDER — METHYLPREDNISOLONE ACETATE 40 MG/ML
INJECTION, SUSPENSION INTRA-ARTICULAR; INTRALESIONAL; INTRAMUSCULAR; SOFT TISSUE
Status: COMPLETED | OUTPATIENT
Start: 2024-05-17 | End: 2024-05-17

## 2024-05-17 RX ADMIN — METHYLPREDNISOLONE ACETATE 40 MG: 40 INJECTION, SUSPENSION INTRA-ARTICULAR; INTRALESIONAL; INTRAMUSCULAR; SOFT TISSUE at 15:11

## 2024-05-17 ASSESSMENT — PAIN SCALES - GENERAL
PAINLEVEL: NO PAIN (0)
PAINLEVEL: NO PAIN (0)

## 2024-05-17 NOTE — PATIENT INSTRUCTIONS
Follow-up visit with Esme Hodgson CNP in 2 weeks to discuss injection outcome and determine care plan going forward.       DISCHARGE INSTRUCTIONS    During office hours (8:00 a.m.- 4:00 p.m.) questions or concerns may be answered  by calling [facility] at  [phone].  Messages received after hours will be returned the following business day.      In the case of an emergency, please dial 911 or seek assistance at the nearest Emergency Room/Urgent Care facility.     All Patients:    You may experience an increase in your symptoms for the first 2 days (It may take anywhere between 2 days- 2 weeks for the steroid to have maximum effect).    You may use ice on the injection site, as frequently as 20 minutes each hour if needed.    You may take your pain medicine.    You may continue taking your regular medication after your injection. If you have had a Medial Branch Block you may resume pain medication once your pain diary is completed.    You may shower. No swimming, tub bath or hot tub for 48 hours.  You may remove your bandaid/bandage as soon as you are home.    You may resume light activities, as tolerated.    Resume your usual diet as tolerated.    It is strongly advised that you do not drive for 1-3 hours post injection.    If you have had oral sedation:  Do not drive for 8 hours post injection.      If you have had IV sedation:  Do not drive for 24 hours post injection.  Do not operate hazardous machinery or make important personal/business decisions for 24 hours.      POSSIBLE STEROID SIDE EFFECTS (If steroid/cortisone was used for your procedure)    -If you experience these symptoms, it should only last for a short period    Swelling of the legs              Skin redness (flushing)     Mouth (oral) irritation   Blood sugar (glucose) levels            Sweats                    Mood changes  Headache  Sleeplessness  Weakened immune system for up to 14 days, which could increase the  risk of manuel the COVID-19 virus and/or experiencing more severe symptoms of the disease, if exposed.  Decreased effectiveness of the flu vaccine if given within 2 weeks of the steroid.         POSSIBLE PROCEDURE SIDE EFFECTS  -Call the [facility] if you are concerned  Increased Pain           Increased numbness/tingling      Nausea/Vomiting          Bruising/bleeding at site      Redness or swelling                                              Difficulty walking      Weakness           Fever greater than 100.5    *In the event of a severe headache after an epidural steroid injection that is relieved by lying down, please call the [facility] to speak with a clinical staff member*

## 2024-05-21 RX ORDER — HYDROXYZINE HYDROCHLORIDE 25 MG/1
25 TABLET, FILM COATED ORAL 3 TIMES DAILY PRN
Qty: 90 TABLET | Refills: 3 | Status: SHIPPED | OUTPATIENT
Start: 2024-05-21

## 2024-06-04 ENCOUNTER — MYC REFILL (OUTPATIENT)
Dept: FAMILY MEDICINE | Facility: CLINIC | Age: 44
End: 2024-06-04

## 2024-06-04 DIAGNOSIS — G43.709 CHRONIC MIGRAINE WITHOUT AURA WITHOUT STATUS MIGRAINOSUS, NOT INTRACTABLE: Primary | ICD-10-CM

## 2024-06-04 DIAGNOSIS — F50.819 BINGE EATING DISORDER: ICD-10-CM

## 2024-06-04 PROCEDURE — 99207: CPT | Performed by: PHYSICIAN ASSISTANT

## 2024-06-04 RX ORDER — LISDEXAMFETAMINE DIMESYLATE 60 MG/1
60 CAPSULE ORAL EVERY MORNING
Qty: 30 CAPSULE | Refills: 0 | Status: SHIPPED | OUTPATIENT
Start: 2024-06-04 | End: 2024-07-15

## 2024-06-10 ENCOUNTER — E-VISIT (OUTPATIENT)
Dept: URGENT CARE | Facility: CLINIC | Age: 44
End: 2024-06-10
Payer: COMMERCIAL

## 2024-06-10 DIAGNOSIS — N39.0 ACUTE UTI (URINARY TRACT INFECTION): Primary | ICD-10-CM

## 2024-06-10 PROCEDURE — 99421 OL DIG E/M SVC 5-10 MIN: CPT | Performed by: PREVENTIVE MEDICINE

## 2024-06-10 RX ORDER — NITROFURANTOIN 25; 75 MG/1; MG/1
100 CAPSULE ORAL 2 TIMES DAILY
Qty: 10 CAPSULE | Refills: 0 | Status: SHIPPED | OUTPATIENT
Start: 2024-06-10 | End: 2024-06-15

## 2024-06-10 NOTE — PATIENT INSTRUCTIONS
Dear Madeline Szymanski    After reviewing your responses, I've been able to diagnose you with a urinary tract infection, which is a common infection of the bladder with bacteria.  This is not a sexually transmitted infection, though urinating immediately after intercourse can help prevent infections.  Drinking lots of fluids is also helpful to clear your current infection and prevent the next one.      I have sent a prescription for antibiotics to your pharmacy to treat this infection.    It is important that you take all of your prescribed medication even if your symptoms are improving after a few doses.  Taking all of your medicine helps prevent the symptoms from returning.     If your symptoms worsen, you develop pain in your back or stomach, develop fevers, or are not improving in 5 days, please contact your primary care provider for an appointment or visit any of our convenient Walk-in or Urgent Care Centers to be seen, which can be found on our website here.    Thanks again for choosing us as your health care partner,    Luis Dean MD, MD  Urinary Tract Infection (UTI) in Women: Care Instructions  Overview     A urinary tract infection (UTI) is an infection caused by bacteria. It can happen anywhere in the urinary tract. A UTI can happen in the:  Kidneys.  Ureters, the tubes that connect the kidneys to the bladder.  Bladder.  Urethra, where the urine comes out.  Most UTIs are bladder infections. They often cause pain or burning when you urinate.  Most UTIs can be cured with antibiotics. If you are prescribed antibiotics, be sure to complete your treatment so that the infection does not get worse.  Follow-up care is a key part of your treatment and safety. Be sure to make and go to all appointments, and call your doctor if you are having problems. It's also a good idea to know your test results and keep a list of the medicines you take.  How can you care for yourself at home?  Take your  antibiotics as directed. Do not stop taking them just because you feel better. You need to take the full course of antibiotics.  Drink extra water and other fluids for the next day or two. This will help make the urine less concentrated and help wash out the bacteria that are causing the infection. (If you have kidney, heart, or liver disease and have to limit fluids, talk with your doctor before you increase the amount of fluids you drink.)  Avoid drinks that are carbonated or have caffeine. They can irritate the bladder.  Urinate often. Try to empty your bladder each time.  To relieve pain, take a hot bath or lay a heating pad set on low over your lower belly or genital area. Never go to sleep with a heating pad in place.  To prevent UTIs  Drink plenty of water each day. This helps you urinate often, which clears bacteria from your system. (If you have kidney, heart, or liver disease and have to limit fluids, talk with your doctor before you increase the amount of fluids you drink.)  Urinate when you need to.  If you are sexually active, urinate right after you have sex.  Change sanitary pads often.  Avoid douches, bubble baths, feminine hygiene sprays, and other feminine hygiene products that have deodorants.  After going to the bathroom, wipe from front to back.  When should you call for help?   Call your doctor now or seek immediate medical care if:    You have new or worse fever, chills, nausea, or vomiting.     You have new pain in your back just below your rib cage. This is called flank pain.     There is new blood or pus in your urine.     You have any problems with your antibiotic medicine.   Watch closely for changes in your health, and be sure to contact your doctor if:    You are not getting better after taking an antibiotic for 2 days.     Your symptoms go away but then come back.   Where can you learn more?  Go to https://www.healthwise.net/patiented  Enter K848 in the search box to learn more about  "\"Urinary Tract Infection (UTI) in Women: Care Instructions.\"  Current as of: November 15, 2023               Content Version: 14.0    1573-8684 [employer].   Care instructions adapted under license by your healthcare professional. If you have questions about a medical condition or this instruction, always ask your healthcare professional. [employer] disclaims any warranty or liability for your use of this information.      "

## 2024-06-26 NOTE — PROGRESS NOTES
Assessment:     Diagnoses and all orders for this visit:  Chronic bilateral low back pain without sciatica  Lumbar facet arthropathy  Chronic right-sided low back pain without sciatica  -     pregabalin (LYRICA) 75 MG capsule; Take 2 capsules (150 mg) by mouth 2 times daily  Right buttock pain  -     oxyCODONE-acetaminophen (PERCOCET) 5-325 MG tablet; Take 1 tablet by mouth every 8 hours as needed for severe pain  -     PAIN Joint Injection Sacroiliac Joint Right; Future  Sacroiliac joint pain  -     PAIN Joint Injection Sacroiliac Joint Right; Future  SI (sacroiliac) joint dysfunction  -     PAIN Joint Injection Sacroiliac Joint Right; Future     Madeline Szymanski is a 44 year old y.o. female with past medical history significant for migraine, depressive disorder, , gastric bypass surgery, L5-S1 hemilaminectomy left medial facetectomy foraminotomies and microdiscectomy Dr. Lilly 10/4/2021  who presents today for follow-up regarding:    -Chronic right low back pain and right buttock pain, with pain localizing to the sacroiliac notch with increased pain with SI provocative maneuvers.    -Numbness and tingling right lower extremity     Plan:     A shared decision making plan was used. The patient's values and choices were respected. Prior medical records were reviewed today. The following represents what was discussed and decided upon by the provider and the patient.        -DIAGNOSTIC TESTS: Images were personally reviewed and interpreted.   --Cervical spine MRI and cervical CT scan 2023 with ACDF findings C5-6, no apparent complications of the hardware.  There is disc osteophyte complex at C3-4 greater on the right with mild bilateral foraminal stenosis right greater than left.  No nerve compression in otherwise noted at any other level.  No concerning finding.  --Lumbar spine x-ray 2022 with mild to moderate multilevel degenerative disc changes most advanced at T12-L1 and L5-S1.  --Cervical spine  x-ray post surgery 9/16/2022 with artificial disc replacement C5-6, mild disc height loss at C6-7.          --Lumbar MRI 2/17/2022 with microdiscectomy and left hemilaminectomy changes at L5-S1 with granulation tissue left paracentral region, moderate left lateral recess and mild right lateral recess stenosis.  2 mm L5-S1 retrolisthesis.  --Cervical MRI 8/7/2021 Moderate to Severe spinal stenosis C5-6.   --Lumbar spine flexion-extension x-ray 8/10/2021 shows no spondylolisthesis and no instability.  --Lumbar spine MRI 8/6/2021 with L5-S1 mild disc height loss with disc bulge on the left with osteophytes with mild left foraminal stenosis lateral recess stenosis and S1 compression.    -INTERVENTIONS: Order placed for right sacroiliac joint steroid injection to see if we get further relief of right SI joint pain.  Patient does have pain that localizes to the SI joint with increased pain with SI provocative maneuvers.  If no benefit with this injection we could trial a right L5-S1 and S1-S2 TFESI.  She does have foraminal stenosis on the right at L5-S1 with mild S1 compression as well.    -MEDICATIONS: Refilled Lyrica 150 mg twice daily.  Also prescribed Percocet 5/325 mg 1 tablet every 8 hours as needed for severe breakthrough pain number 21 tablets given for 7 days worth.  [place]  checked.  This is for acute pain only.  Refills will not be given over the telephone.  Discussed the risks (eg, addiction, overdose, worsening pain, death) verses benefit of opioid use with patient today.  Patient is aware that cannot drive while taking this medication.  Explained that this medication will not be a long term solution to ongoing pain. Discussed using lowest effective dose and the importance of other measures for pain management including PT, other non-opioid medications, behavioral treatments, and other procedure options.   Discussed side effects of medications and proper use. Patient verbalized understanding.    -PHYSICAL  THERAPY: Encourage patient to continue with home exercises from prior physical therapy sessions, she is part of a home physical therapy program for lumbar spine and SI joint pain.  Discussed the importance of core strengthening, ROM, stretching exercises with the patient and how each of these entities is important in decreasing pain.  Explained to the patient that the purpose of physical therapy is to teach the patient a home exercise program.  These exercises need to be performed every day in order to decrease pain and prevent future occurrences of pain.        -PATIENT EDUCATION:  Total time of 32 minutes, on the day of service, spent with the patient, reviewing the chart, placing orders, and documenting.     -FOLLOW UP: Follow-up for injection at the spine center than 2 weeks postinjection  Advised to contact clinic if symptoms worsen or change.    Subjective:     Madeline Szymanski is a 44 year old female who presents today for follow-up regarding generalized worsening right buttock pain that has been progressive over the last 6 months or so.  Currently pain is a constant 8/10, 3 at its worst aggravated with prolonged sitting as well as standing and laying, she does have a difficult time sleeping at bedtime.  She does report that pain is improved slightly with ice stretching and moving.  She does report intermittent numbness and tingling right foot as well however otherwise denies any radiating right leg pain.  Denies any recent trips or falls or episodes of her legs giving out on her.  Currently denies left-sided symptoms.  Denies bowel or bladder loss control, denies saddle anesthesia.    Treatment to Date:  Left L5-S1 hemilaminectomy, medial facetectomy, foraminotomy with L5-S1 left microdiscectomy 10/4/2021 Dr. Lilly.     Bilateral carpal tunnel steroid injection 11/11/2020 and 5/12/2021 with significant benefit.     L5-S1 TFESI 3/21/2018  Lumbar MBB 2/13/2019 with benefit LBP  Lumbar RFA 3/6/2019  Bilateral  L5-S1 TFESI 6/3/2020 with benefit LBP.  Preprocedure pain 8/10, post 5/10.  Left S1-S2 TFESI 8/3/2021 with no lasting benefit.  Preprocedure pain 8/10, post 2/10.  Left L5-S1 TFESI 9/1/2021 with minimal lasting benefit.  Preprocedure pain 7/10, post 4/10.    Bilateral L3, L4, L5 RFA 3/24/2022.  Bilateral  carpal tunnel steroid injection 4/19/2022.  Right C5-6 TFESI 5/3/2022  Bilateral occipital nerve block 10/24/2022 with significant relief.  Bilateral occipital nerve block 1/25/2023 with significant relief  RIGHT L5-S1 TFESI 2/15/2023 with minimal initial or lasting benefit.   Bilateral carpal tunnel steroid injection 4/26/2023 with significant relief.  Carpal tunnel steroid injection 10/11/2023 with significant relief.  Bilateral L3, L4, L5 RFA 10/13/2023 with over 50% relief left greater than right.  Bilateral carpal tunnel steroid injection 5/17/2024 with significant relief.     Medications:  Gabapentin 300 mg 1 to 2 tablets at bedtime, unable to tolerate during the daytime.  Amitriptyline 75 mg at bedtime for migraines  Flexeril as needed  Tylenol as needed  Lidocaine gel as needed     No benefit with recent Medrol Dosepak.     *Patient unable to tolerate NSAIDs due to GI surgery.    Patient Active Problem List   Diagnosis    Spinal stenosis in cervical region    History of gastric ulcer    Hx of Helicobacter infection    Migraine with aura and without status migrainosus, not intractable    Panic attacks    S/P laparoscopic sleeve gastrectomy    Lumbar radiculopathy    Major depressive disorder with single episode, in full remission (H24)    Chronic insomnia    Class 1 obesity due to excess calories with serious comorbidity and body mass index (BMI) of 33.0 to 33.9 in adult    Chronic right-sided low back pain with right-sided sciatica    History of lumbosacral spine surgery    Weakness of right lower extremity    Chronic neck pain    Bilateral occipital neuralgia    Binge eating disorder    Menopausal syndrome  (hot flashes)    Encounter for routine adult health examination without abnormal findings       Current Outpatient Medications   Medication Sig Dispense Refill    cyclobenzaprine (FLEXERIL) 10 MG tablet Take 1 tablet (10 mg) by mouth 3 times daily as needed for muscle spasms 30 tablet 3    lidocaine (LIDODERM) 5 % patch Apply 1 patch externally to affected area as needed for up to 12 hours, then remove for 12 hours before reapplying. 28 patch 3    oxyCODONE-acetaminophen (PERCOCET) 5-325 MG tablet Take 1 tablet by mouth every 8 hours as needed for severe pain 21 tablet 0    pregabalin (LYRICA) 75 MG capsule Take 2 capsules (150 mg) by mouth 2 times daily 120 capsule 4    eletriptan (RELPAX) 40 MG tablet Take 1 tablet (40 mg) by mouth at onset of headache May repeat if needed in 2 hours. Max of 2 doses/24hours Max of 4 days/month 18 tablet 3    EPINEPHrine (ANY BX GENERIC EQUIV) 0.3 MG/0.3ML injection 2-pack Inject 0.3 mLs (0.3 mg) into the muscle as needed for anaphylaxis May repeat one time in 5-15 minutes if response to initial dose is inadequate. 0.6 mL 0    hydrOXYzine HCl (ATARAX) 25 MG tablet Take 1 tablet (25 mg) by mouth 3 times daily as needed for anxiety 90 tablet 3    lisdexamfetamine (VYVANSE) 60 MG capsule Take 1 capsule (60 mg) by mouth every morning 30 capsule 0    rizatriptan (MAXALT-MLT) 10 MG ODT Take 1 tablet (10 mg) by mouth at onset of headache for migraine May repeat in 2 hours, with a max of 30 mg in 24 hours and a max of 5 days/month 18 tablet 1    topiramate (TOPAMAX) 50 MG tablet Take 1 tablet (50 mg) by mouth daily 90 tablet 1     No current facility-administered medications for this visit.       Allergies   Allergen Reactions    Cephalexin Hives    Coconut Flavor Anaphylaxis    Covid-19 (Mrna) Vaccine Anaphylaxis     Pfizer     Prochlorperazine Other (See Comments)     Tremors  When given with metoclopramide, okay by itself      Coconut Fatty Acids      Other reaction(s): Edema     "Penicillins Hives       Past Medical History:   Diagnosis Date    Acute and chronic respiratory failure with hypoxia (H) 01/01/2022    Anxiety     Arthritis     Depressive disorder     Gastric band slippage 09/26/2019    History of blood transfusion 04/1978    Kidney infection     Lumbar radiculopathy     Migraine     Migraine     Painless urinary retention due to cauda equina syndrome (H)     Pancreatitis     Panic attack     PONV (postoperative nausea and vomiting)     Spinal stenosis in cervical region     UTI (lower urinary tract infection)         Review of Systems  ROS:  Specifically negative for bowel/bladder dysfunction, balance changes, headache, dizziness, foot drop, fevers, chills, appetite changes, nausea/vomiting, unexplained weight loss. Otherwise 13 systems reviewed are negative. Please see the patient's intake questionnaire from today for details.    Reviewed Social, Family, Past Medical and Past Surgical history with patient, no significant changes noted since prior visit.     Objective:     BP (!) 143/88 (BP Location: Right arm, Patient Position: Sitting, Cuff Size: Adult Regular)   Pulse 85   Temp 98  F (36.7  C) (Oral)   Ht 5' 4\" (1.626 m)   Wt 184 lb (83.5 kg)   SpO2 100%   BMI 31.58 kg/m      PHYSICAL EXAMINATION:    --CONSTITUTIONAL: Well developed, well nourished, healthy appearing individual.  --PSYCHIATRIC: Appropriate mood and affect. No difficulty interacting due to temper, social withdrawal, or memory issues.  --SKIN: Lumbar region is dry and intact.   --RESPIRATORY: Normal rhythm and effort. No abnormal accessory muscle breathing patterns noted.   --MUSCULOSKELETAL:  Normal lumbar lordosis noted, no lateral shift.  --GROSS MOTOR: Easily arises from a seated position. Gait is non-antalgic  --LUMBAR SPINE:  Inspection reveals no evidence of deformity. Range of motion is not limited in lumbar flexion, extension, lateral rotation. No tenderness to palpation lumbar spine. Straight leg " raising is negative to radicular pain. Sciatic notch non-tender on the left, tender on the right.   --SACROILIAC JOINT: Positive right distraction.  Positive right Zeb's with reproduction of pain to affected extremity.  Positive right Gaenslen's Test with reproduction of pain to affected extremity. Posterior Pelvic Pain Provocative Test positive right. Sacroiliac Joint Compression Test positive right. Sacral Thrust/Yeoman's Test positive right.  --LOWER EXTREMITY MOTOR TESTING:  Plantar flexion left 5/5, right 5/5   Dorsiflexion left 5/5, right 5/5   Great toe MTP extension left 5/5, right 5/5  Knee flexion left 5/5, right 5/5  Knee extension left 5/5, right 5/5   Hip flexion left 5/5, right 5/5  Hip abduction left 5/5, right 5/5  Hip adduction left 5/5, right 5/5   --HIPS: Full range of motion bilaterally.   --NEUROLOGIC: Bilateral patellar and achilles reflexes are physiologic and symmetric. Sensation to light touch is intact in the bilateral L4, L5, and S1 dermatomes.    RESULTS:   Imaging: Spine imaging was reviewed today. The images were shown to the patient and the findings were explained using a spine model.      Prior lumbar spine MRI reviewed

## 2024-06-27 ENCOUNTER — OFFICE VISIT (OUTPATIENT)
Dept: PHYSICAL MEDICINE AND REHAB | Facility: CLINIC | Age: 44
End: 2024-06-27
Payer: COMMERCIAL

## 2024-06-27 VITALS
SYSTOLIC BLOOD PRESSURE: 143 MMHG | OXYGEN SATURATION: 100 % | DIASTOLIC BLOOD PRESSURE: 88 MMHG | RESPIRATION RATE: 16 BRPM | HEART RATE: 85 BPM | TEMPERATURE: 98 F

## 2024-06-27 VITALS
SYSTOLIC BLOOD PRESSURE: 143 MMHG | HEART RATE: 85 BPM | DIASTOLIC BLOOD PRESSURE: 88 MMHG | WEIGHT: 184 LBS | OXYGEN SATURATION: 100 % | TEMPERATURE: 98 F | HEIGHT: 64 IN | BODY MASS INDEX: 31.41 KG/M2

## 2024-06-27 DIAGNOSIS — G43.711 INTRACTABLE CHRONIC MIGRAINE WITHOUT AURA AND WITH STATUS MIGRAINOSUS: Primary | ICD-10-CM

## 2024-06-27 DIAGNOSIS — M79.18 RIGHT BUTTOCK PAIN: ICD-10-CM

## 2024-06-27 DIAGNOSIS — G89.29 CHRONIC BILATERAL LOW BACK PAIN WITHOUT SCIATICA: Primary | ICD-10-CM

## 2024-06-27 DIAGNOSIS — M54.50 CHRONIC BILATERAL LOW BACK PAIN WITHOUT SCIATICA: Primary | ICD-10-CM

## 2024-06-27 DIAGNOSIS — M54.50 CHRONIC RIGHT-SIDED LOW BACK PAIN WITHOUT SCIATICA: ICD-10-CM

## 2024-06-27 DIAGNOSIS — M53.3 SI (SACROILIAC) JOINT DYSFUNCTION: ICD-10-CM

## 2024-06-27 DIAGNOSIS — G89.29 CHRONIC RIGHT-SIDED LOW BACK PAIN WITHOUT SCIATICA: ICD-10-CM

## 2024-06-27 DIAGNOSIS — M53.3 SACROILIAC JOINT PAIN: ICD-10-CM

## 2024-06-27 DIAGNOSIS — M47.816 LUMBAR FACET ARTHROPATHY: ICD-10-CM

## 2024-06-27 PROCEDURE — 99214 OFFICE O/P EST MOD 30 MIN: CPT | Performed by: NURSE PRACTITIONER

## 2024-06-27 PROCEDURE — 64615 CHEMODENERV MUSC MIGRAINE: CPT | Performed by: PHYSICAL MEDICINE & REHABILITATION

## 2024-06-27 RX ORDER — PREGABALIN 75 MG/1
150 CAPSULE ORAL 2 TIMES DAILY
Qty: 120 CAPSULE | Refills: 4 | Status: SHIPPED | OUTPATIENT
Start: 2024-06-27

## 2024-06-27 RX ORDER — OXYCODONE AND ACETAMINOPHEN 5; 325 MG/1; MG/1
1 TABLET ORAL EVERY 8 HOURS PRN
Qty: 21 TABLET | Refills: 0 | Status: SHIPPED | OUTPATIENT
Start: 2024-06-27 | End: 2024-07-04

## 2024-06-27 ASSESSMENT — PAIN SCALES - GENERAL
PAINLEVEL: EXTREME PAIN (8)

## 2024-06-27 NOTE — PATIENT INSTRUCTIONS
~Spine Center Scheduling #[phone].  ~Please call our [facility] Spine Nurse Navigation #[phone] with any questions or concerns about your treatment plan, if symptoms worsen and you would like to be seen urgently, or if you have problems controlling bladder and bowel function.  ~For any future flareups or new symptoms, recommend follow-up in clinic or contact the nurse navigator line.  ~Please note that any My Chart messages may take multiple days for a response due to the high volume of patients seen in clinic.  Anything sent Thursday night or after will be answered the following week when able, as Esme Hodgson CNP does not work in clinic on Fridays.   ~Esme Hodgson CNP is at the [facility] on Tuesdays, otherwise primarily at the [facility] [place].       An injection has been ordered today to potentially help with your pain symptoms. These injections do not fix what is going on in your back, therefore they typically do not take away the pain completely, however they can many times help improve symptoms. Injections should always be completed along with other modalities such as physical therapy for the best long term outcomes. If injections alone are done, then pain will likely return.     [facility] [place] Injection Requirements    A  is required for all fluoroscopically-guided injections.  Injection appointments may be cancelled if there are signs/symptoms of an active infection or if the patient is being actively treated with antibiotics for a diagnosed infection.  Patients may have their steroid injection cancelled if they have had another steroid injection within 2 weeks.  Diabetic patients will have their blood glucose levels checked the day of their injection and the appointment will be rescheduled if the blood glucose level is 300 or higher.  Patients with allergies to cortisone, local anesthetics, iodine, or contrast dye should contact the  Spine Center to further discuss these considerations.  Patients scheduled for medial branch block diagnostic injections should refrain from taking pain medication the day of the procedure.  The medial branch block injection appointment will be rescheduled if the patient's pain rating is not 5/10 or greater at the time of the procedure.  Patients taking warfarin/Coumadin will have their INR checked the day of the procedure and the procedure may be rescheduled if the INR is greater than 3.0.  Please contact the Spine Center (#[phone]) if you are taking any prescription blood-thinning medications (warfarin, Plavix, Lovenox, Eliquis, Brilinta, Effient, etc.) as special dosing adjustments may need to be made depending on the type of injection you are scheduled to receive.  It is recommended that you delay having your steroid injection if you have received a flu shot or shingles vaccine within 2 weeks.

## 2024-06-27 NOTE — LETTER
2024      Madeline Szymanski  [address]  [facility] [address]      Dear Colleague,    Thank you for referring your patient, Madeline Szymanski, to the [facility]UR[facility]ERY. Please see a copy of my visit note below.      Assessment:     Diagnoses and all orders for this visit:  Chronic bilateral low back pain without sciatica  Lumbar facet arthropathy  Chronic right-sided low back pain without sciatica  -     pregabalin (LYRICA) 75 MG capsule; Take 2 capsules (150 mg) by mouth 2 times daily  Right buttock pain  -     oxyCODONE-acetaminophen (PERCOCET) 5-325 MG tablet; Take 1 tablet by mouth every 8 hours as needed for severe pain  -     PAIN Joint Injection Sacroiliac Joint Right; Future  Sacroiliac joint pain  -     PAIN Joint Injection Sacroiliac Joint Right; Future  SI (sacroiliac) joint dysfunction  -     PAIN Joint Injection Sacroiliac Joint Right; Future     Madeline Szymanski is a 44 year old y.o. female with past medical history significant for migraine, depressive disorder, , gastric bypass surgery, L5-S1 hemilaminectomy left medial facetectomy foraminotomies and microdiscectomy Dr. Lilly 10/4/2021  who presents today for follow-up regarding:    -Chronic right low back pain and right buttock pain, with pain localizing to the sacroiliac notch with increased pain with SI provocative maneuvers.    -Numbness and tingling right lower extremity     Plan:     A shared decision making plan was used. The patient's values and choices were respected. Prior medical records were reviewed today. The following represents what was discussed and decided upon by the provider and the patient.        -DIAGNOSTIC TESTS: Images were personally reviewed and interpreted.   --Cervical spine MRI and cervical CT scan 2023 with ACDF findings C5-6, no apparent complications of the hardware.  There is disc osteophyte complex at C3-4 greater on the right with mild bilateral foraminal stenosis right  greater than left.  No nerve compression in otherwise noted at any other level.  No concerning finding.  --Lumbar spine x-ray 8/22/2022 with mild to moderate multilevel degenerative disc changes most advanced at T12-L1 and L5-S1.  --Cervical spine x-ray post surgery 9/16/2022 with artificial disc replacement C5-6, mild disc height loss at C6-7.          --Lumbar MRI 2/17/2022 with microdiscectomy and left hemilaminectomy changes at L5-S1 with granulation tissue left paracentral region, moderate left lateral recess and mild right lateral recess stenosis.  2 mm L5-S1 retrolisthesis.  --Cervical MRI 8/7/2021 Moderate to Severe spinal stenosis C5-6.   --Lumbar spine flexion-extension x-ray 8/10/2021 shows no spondylolisthesis and no instability.  --Lumbar spine MRI 8/6/2021 with L5-S1 mild disc height loss with disc bulge on the left with osteophytes with mild left foraminal stenosis lateral recess stenosis and S1 compression.    -INTERVENTIONS: Order placed for right sacroiliac joint steroid injection to see if we get further relief of right SI joint pain.  Patient does have pain that localizes to the SI joint with increased pain with SI provocative maneuvers.  If no benefit with this injection we could trial a right L5-S1 and S1-S2 TFESI.  She does have foraminal stenosis on the right at L5-S1 with mild S1 compression as well.    -MEDICATIONS: Refilled Lyrica 150 mg twice daily.  Also prescribed Percocet 5/325 mg 1 tablet every 8 hours as needed for severe breakthrough pain number 21 tablets given for 7 days worth.  [place]  checked.  This is for acute pain only.  Refills will not be given over the telephone.  Discussed the risks (eg, addiction, overdose, worsening pain, death) verses benefit of opioid use with patient today.  Patient is aware that cannot drive while taking this medication.  Explained that this medication will not be a long term solution to ongoing pain. Discussed using lowest effective dose and the  importance of other measures for pain management including PT, other non-opioid medications, behavioral treatments, and other procedure options.   Discussed side effects of medications and proper use. Patient verbalized understanding.    -PHYSICAL THERAPY: Encourage patient to continue with home exercises from prior physical therapy sessions, she is part of a home physical therapy program for lumbar spine and SI joint pain.  Discussed the importance of core strengthening, ROM, stretching exercises with the patient and how each of these entities is important in decreasing pain.  Explained to the patient that the purpose of physical therapy is to teach the patient a home exercise program.  These exercises need to be performed every day in order to decrease pain and prevent future occurrences of pain.        -PATIENT EDUCATION:  Total time of 32 minutes, on the day of service, spent with the patient, reviewing the chart, placing orders, and documenting.     -FOLLOW UP: Follow-up for injection at the spine center than 2 weeks postinjection  Advised to contact clinic if symptoms worsen or change.    Subjective:     Madeline Szymanski is a 44 year old female who presents today for follow-up regarding generalized worsening right buttock pain that has been progressive over the last 6 months or so.  Currently pain is a constant 8/10, 3 at its worst aggravated with prolonged sitting as well as standing and laying, she does have a difficult time sleeping at bedtime.  She does report that pain is improved slightly with ice stretching and moving.  She does report intermittent numbness and tingling right foot as well however otherwise denies any radiating right leg pain.  Denies any recent trips or falls or episodes of her legs giving out on her.  Currently denies left-sided symptoms.  Denies bowel or bladder loss control, denies saddle anesthesia.    Treatment to Date:  Left L5-S1 hemilaminectomy, medial facetectomy, foraminotomy  with L5-S1 left microdiscectomy 10/4/2021 Dr. Lilly.     Bilateral carpal tunnel steroid injection 11/11/2020 and 5/12/2021 with significant benefit.     L5-S1 TFESI 3/21/2018  Lumbar MBB 2/13/2019 with benefit LBP  Lumbar RFA 3/6/2019  Bilateral L5-S1 TFESI 6/3/2020 with benefit LBP.  Preprocedure pain 8/10, post 5/10.  Left S1-S2 TFESI 8/3/2021 with no lasting benefit.  Preprocedure pain 8/10, post 2/10.  Left L5-S1 TFESI 9/1/2021 with minimal lasting benefit.  Preprocedure pain 7/10, post 4/10.    Bilateral L3, L4, L5 RFA 3/24/2022.  Bilateral  carpal tunnel steroid injection 4/19/2022.  Right C5-6 TFESI 5/3/2022  Bilateral occipital nerve block 10/24/2022 with significant relief.  Bilateral occipital nerve block 1/25/2023 with significant relief  RIGHT L5-S1 TFESI 2/15/2023 with minimal initial or lasting benefit.   Bilateral carpal tunnel steroid injection 4/26/2023 with significant relief.  Carpal tunnel steroid injection 10/11/2023 with significant relief.  Bilateral L3, L4, L5 RFA 10/13/2023 with over 50% relief left greater than right.  Bilateral carpal tunnel steroid injection 5/17/2024 with significant relief.     Medications:  Gabapentin 300 mg 1 to 2 tablets at bedtime, unable to tolerate during the daytime.  Amitriptyline 75 mg at bedtime for migraines  Flexeril as needed  Tylenol as needed  Lidocaine gel as needed     No benefit with recent Medrol Dosepak.     *Patient unable to tolerate NSAIDs due to GI surgery.    Patient Active Problem List   Diagnosis     Spinal stenosis in cervical region     History of gastric ulcer     Hx of Helicobacter infection     Migraine with aura and without status migrainosus, not intractable     Panic attacks     S/P laparoscopic sleeve gastrectomy     Lumbar radiculopathy     Major depressive disorder with single episode, in full remission (H24)     Chronic insomnia     Class 1 obesity due to excess calories with serious comorbidity and body mass index (BMI) of 33.0  to 33.9 in adult     Chronic right-sided low back pain with right-sided sciatica     History of lumbosacral spine surgery     Weakness of right lower extremity     Chronic neck pain     Bilateral occipital neuralgia     Binge eating disorder     Menopausal syndrome (hot flashes)     Encounter for routine adult health examination without abnormal findings       Current Outpatient Medications   Medication Sig Dispense Refill     cyclobenzaprine (FLEXERIL) 10 MG tablet Take 1 tablet (10 mg) by mouth 3 times daily as needed for muscle spasms 30 tablet 3     lidocaine (LIDODERM) 5 % patch Apply 1 patch externally to affected area as needed for up to 12 hours, then remove for 12 hours before reapplying. 28 patch 3     oxyCODONE-acetaminophen (PERCOCET) 5-325 MG tablet Take 1 tablet by mouth every 8 hours as needed for severe pain 21 tablet 0     pregabalin (LYRICA) 75 MG capsule Take 2 capsules (150 mg) by mouth 2 times daily 120 capsule 4     eletriptan (RELPAX) 40 MG tablet Take 1 tablet (40 mg) by mouth at onset of headache May repeat if needed in 2 hours. Max of 2 doses/24hours Max of 4 days/month 18 tablet 3     EPINEPHrine (ANY BX GENERIC EQUIV) 0.3 MG/0.3ML injection 2-pack Inject 0.3 mLs (0.3 mg) into the muscle as needed for anaphylaxis May repeat one time in 5-15 minutes if response to initial dose is inadequate. 0.6 mL 0     hydrOXYzine HCl (ATARAX) 25 MG tablet Take 1 tablet (25 mg) by mouth 3 times daily as needed for anxiety 90 tablet 3     lisdexamfetamine (VYVANSE) 60 MG capsule Take 1 capsule (60 mg) by mouth every morning 30 capsule 0     rizatriptan (MAXALT-MLT) 10 MG ODT Take 1 tablet (10 mg) by mouth at onset of headache for migraine May repeat in 2 hours, with a max of 30 mg in 24 hours and a max of 5 days/month 18 tablet 1     topiramate (TOPAMAX) 50 MG tablet Take 1 tablet (50 mg) by mouth daily 90 tablet 1     No current facility-administered medications for this visit.       Allergies   Allergen  "Reactions     Cephalexin Hives     Coconut Flavor Anaphylaxis     Covid-19 (Mrna) Vaccine Anaphylaxis     Pfizer      Prochlorperazine Other (See Comments)     Tremors  When given with metoclopramide, okay by itself       Coconut Fatty Acids      Other reaction(s): Edema     Penicillins Hives       Past Medical History:   Diagnosis Date     Acute and chronic respiratory failure with hypoxia (H) 01/01/2022     Anxiety      Arthritis      Depressive disorder      Gastric band slippage 09/26/2019     History of blood transfusion 04/1978     Kidney infection      Lumbar radiculopathy      Migraine      Migraine      Painless urinary retention due to cauda equina syndrome (H)      Pancreatitis      Panic attack      PONV (postoperative nausea and vomiting)      Spinal stenosis in cervical region      UTI (lower urinary tract infection)         Review of Systems  ROS:  Specifically negative for bowel/bladder dysfunction, balance changes, headache, dizziness, foot drop, fevers, chills, appetite changes, nausea/vomiting, unexplained weight loss. Otherwise 13 systems reviewed are negative. Please see the patient's intake questionnaire from today for details.    Reviewed Social, Family, Past Medical and Past Surgical history with patient, no significant changes noted since prior visit.     Objective:     BP (!) 143/88 (BP Location: Right arm, Patient Position: Sitting, Cuff Size: Adult Regular)   Pulse 85   Temp 98  F (36.7  C) (Oral)   Ht 5' 4\" (1.626 m)   Wt 184 lb (83.5 kg)   SpO2 100%   BMI 31.58 kg/m      PHYSICAL EXAMINATION:    --CONSTITUTIONAL: Well developed, well nourished, healthy appearing individual.  --PSYCHIATRIC: Appropriate mood and affect. No difficulty interacting due to temper, social withdrawal, or memory issues.  --SKIN: Lumbar region is dry and intact.   --RESPIRATORY: Normal rhythm and effort. No abnormal accessory muscle breathing patterns noted.   --MUSCULOSKELETAL:  Normal lumbar lordosis " noted, no lateral shift.  --GROSS MOTOR: Easily arises from a seated position. Gait is non-antalgic  --LUMBAR SPINE:  Inspection reveals no evidence of deformity. Range of motion is not limited in lumbar flexion, extension, lateral rotation. No tenderness to palpation lumbar spine. Straight leg raising is negative to radicular pain. Sciatic notch non-tender on the left, tender on the right.   --SACROILIAC JOINT: Positive right distraction.  Positive right Zbe's with reproduction of pain to affected extremity.  Positive right Gaenslen's Test with reproduction of pain to affected extremity. Posterior Pelvic Pain Provocative Test positive right. Sacroiliac Joint Compression Test positive right. Sacral Thrust/Yeoman's Test positive right.  --LOWER EXTREMITY MOTOR TESTING:  Plantar flexion left 5/5, right 5/5   Dorsiflexion left 5/5, right 5/5   Great toe MTP extension left 5/5, right 5/5  Knee flexion left 5/5, right 5/5  Knee extension left 5/5, right 5/5   Hip flexion left 5/5, right 5/5  Hip abduction left 5/5, right 5/5  Hip adduction left 5/5, right 5/5   --HIPS: Full range of motion bilaterally.   --NEUROLOGIC: Bilateral patellar and achilles reflexes are physiologic and symmetric. Sensation to light touch is intact in the bilateral L4, L5, and S1 dermatomes.    RESULTS:   Imaging: Spine imaging was reviewed today. The images were shown to the patient and the findings were explained using a spine model.      Prior lumbar spine MRI reviewed                      Again, thank you for allowing me to participate in the care of your patient.        Sincerely,        Esme Hodgson, CNP

## 2024-06-27 NOTE — LETTER
6/27/2024      Madeline Szymanski  [address]  [facility] [address]      Dear Colleague,    Thank you for referring your patient, Madeline Szymanski, to the [facility] SPINE AND NEUROSURGERY. Please see a copy of my visit note below.    PROCEDURE NOTE: Intramuscular Onabotulinum Toxin-A (Botox) Injection Under Ultrasound Guidance    PROCEDURE DATE: 6/27/2024     PATIENT NAME: Madeline Szymanski  YOB: 1980    ATTENDING PHYSICIAN: Amberly Tellez MD  FELLOW/RESIDENT PHYSICIAN: None     PREOPERATIVE DIAGNOSIS:   Chronic migraine without aura without status migrainosus, not intractable  (primary encounter diagnosis)   POSTOPERATIVE DIAGNOSIS: same    INDICATIONS FOR THE PROCEDURE:  Madeline Szymanski is a 44 year old  female who with history of chronic migraine headaches and has failed conservative management with multiple other modalities (see detailed referring PM&R clinic note).  She has had excellent response with Botox migraine protocol in the past and returns for follow-up.    On average over a 30-day interval she reports:  Headache free days: 25 days  Mild to moderate headache: 3 days; compared to approximately 10 days prior to treatment  Severe headache: 2 days; compared to 5 days prior to treatment    She states that she may have missed about 2 days/month of work compared to x 5 days/month prior to treatment.    Notes significantly less use of rescue medications (Maxalt, Reglan) x 2 in the prior month    PROCEDURE PERFORMED: Onabotulinum toxin-A (Botox) Injection - Migraine Protocol      Patient was greeted in exam room. The risk, benefits and alternatives to the procedure were again reviewed with her and informed consent was obtained and the patient agreed to proceed. A time-out was performed. Following review alternatives, benefits and risks, the procedure was carried out under sterile technique with alcohol.    The sites injected today were 5 units of Botox in the  on each side. The  Procerus 5 units. Frontalis 10 units on each side. Temporalis 20 units each side. Occipitalis 15 Units on each side. Cervical paraspinal 10 units each side.  Finally the trapezius was injected 15 units on each side.     100 units of botulinum toxin was diluted 50 units/mL of preservative free saline (2:1).     Total of 155 units used.  45 units wasted.            Patient was informed that the goal will be to see continued/new improvement with increased functionality or decreased frequency of headaches and pain. The patient understood goals and will keep track.       The pre and post procedure pain score did not change, this is expected.      The patient did not have any complications.     Follow up x12 weeks for repeat injection or with referring provider: Latha Bowling PA-C     Again, thank you for allowing me to participate in the care of your patient.        Sincerely,        Amberly Tellez MD

## 2024-06-27 NOTE — PATIENT INSTRUCTIONS
DISCHARGE INSTRUCTIONS    During office hours (8:00 a.m.- 4:00 p.m.) questions or concerns may be answered  by calling Spine Center Navigation Nurses at  [phone].  Messages received after hours will be returned the following business day.      In the case of an emergency, please dial 911 or seek assistance at the nearest Emergency Room/Urgent Care facility.         Please monitor for any redness/drainage/swelling over the injection sites.  If any of these things occur, please call the clinic immediately or go to the nearest emergency room.     Do not submerge the injection sites underwater for 48 hours after the injection.       The botox injections will begin working in 3 days but it may take 3 weeks before you notice relief of your pain.  The effects should last a minimum of 3 months.       Please follow-up in 4 weeks.  Please call if you have any questions/concerns before your next appointment.

## 2024-06-27 NOTE — PROGRESS NOTES
PROCEDURE NOTE: Intramuscular Onabotulinum Toxin-A (Botox) Injection Under Ultrasound Guidance    PROCEDURE DATE: 6/27/2024     PATIENT NAME: Madeline Szymanski  YOB: 1980    ATTENDING PHYSICIAN: Amberly Tellez MD  FELLOW/RESIDENT PHYSICIAN: None     PREOPERATIVE DIAGNOSIS:   Chronic migraine without aura without status migrainosus, not intractable  (primary encounter diagnosis)   POSTOPERATIVE DIAGNOSIS: same    INDICATIONS FOR THE PROCEDURE:  Madeline Szymanski is a 44 year old  female who with history of chronic migraine headaches and has failed conservative management with multiple other modalities (see detailed referring PM&R clinic note).  She has had excellent response with Botox migraine protocol in the past and returns for follow-up.    On average over a 30-day interval she reports:  Headache free days: 25 days  Mild to moderate headache: 3 days; compared to approximately 10 days prior to treatment  Severe headache: 2 days; compared to 5 days prior to treatment    She states that she may have missed about 2 days/month of work compared to x 5 days/month prior to treatment.    Notes significantly less use of rescue medications (Maxalt, Reglan) x 2 in the prior month    PROCEDURE PERFORMED: Onabotulinum toxin-A (Botox) Injection - Migraine Protocol      Patient was greeted in exam room. The risk, benefits and alternatives to the procedure were again reviewed with her and informed consent was obtained and the patient agreed to proceed. A time-out was performed. Following review alternatives, benefits and risks, the procedure was carried out under sterile technique with alcohol.    The sites injected today were 5 units of Botox in the  on each side. The Procerus 5 units. Frontalis 10 units on each side. Temporalis 20 units each side. Occipitalis 15 Units on each side. Cervical paraspinal 10 units each side.  Finally the trapezius was injected 15 units on each side.     100 units of botulinum  toxin was diluted 50 units/mL of preservative free saline (2:1).     Total of 155 units used.  45 units wasted.            Patient was informed that the goal will be to see continued/new improvement with increased functionality or decreased frequency of headaches and pain. The patient understood goals and will keep track.       The pre and post procedure pain score did not change, this is expected.      The patient did not have any complications.     Follow up x12 weeks for repeat injection or with referring provider: Latha Bowling PA-C

## 2024-07-15 ENCOUNTER — MYC REFILL (OUTPATIENT)
Dept: FAMILY MEDICINE | Facility: CLINIC | Age: 44
End: 2024-07-15
Payer: COMMERCIAL

## 2024-07-15 DIAGNOSIS — F50.819 BINGE EATING DISORDER: ICD-10-CM

## 2024-07-15 RX ORDER — LISDEXAMFETAMINE DIMESYLATE 60 MG/1
60 CAPSULE ORAL EVERY MORNING
Qty: 30 CAPSULE | Refills: 0 | Status: SHIPPED | OUTPATIENT
Start: 2024-07-15 | End: 2024-08-06

## 2024-07-19 ENCOUNTER — RADIOLOGY INJECTION OFFICE VISIT (OUTPATIENT)
Dept: PHYSICAL MEDICINE AND REHAB | Facility: CLINIC | Age: 44
End: 2024-07-19
Attending: NURSE PRACTITIONER
Payer: COMMERCIAL

## 2024-07-19 VITALS
DIASTOLIC BLOOD PRESSURE: 82 MMHG | OXYGEN SATURATION: 99 % | HEART RATE: 88 BPM | SYSTOLIC BLOOD PRESSURE: 114 MMHG | TEMPERATURE: 97.5 F | RESPIRATION RATE: 16 BRPM

## 2024-07-19 DIAGNOSIS — M79.18 RIGHT BUTTOCK PAIN: ICD-10-CM

## 2024-07-19 DIAGNOSIS — M53.3 SACROILIAC JOINT PAIN: ICD-10-CM

## 2024-07-19 DIAGNOSIS — M53.3 SI (SACROILIAC) JOINT DYSFUNCTION: ICD-10-CM

## 2024-07-19 PROCEDURE — 27096 INJECT SACROILIAC JOINT: CPT | Mod: RT | Performed by: PAIN MEDICINE

## 2024-07-19 RX ORDER — TRIAMCINOLONE ACETONIDE 40 MG/ML
INJECTION, SUSPENSION INTRA-ARTICULAR; INTRAMUSCULAR
Status: COMPLETED | OUTPATIENT
Start: 2024-07-19 | End: 2024-07-19

## 2024-07-19 RX ORDER — ROPIVACAINE HYDROCHLORIDE 5 MG/ML
INJECTION, SOLUTION EPIDURAL; INFILTRATION; PERINEURAL
Status: COMPLETED | OUTPATIENT
Start: 2024-07-19 | End: 2024-07-19

## 2024-07-19 RX ORDER — LIDOCAINE HYDROCHLORIDE 10 MG/ML
INJECTION, SOLUTION EPIDURAL; INFILTRATION; INTRACAUDAL; PERINEURAL
Status: COMPLETED | OUTPATIENT
Start: 2024-07-19 | End: 2024-07-19

## 2024-07-19 RX ADMIN — TRIAMCINOLONE ACETONIDE 20 MG: 40 INJECTION, SUSPENSION INTRA-ARTICULAR; INTRAMUSCULAR at 08:04

## 2024-07-19 RX ADMIN — LIDOCAINE HYDROCHLORIDE 2 ML: 10 INJECTION, SOLUTION EPIDURAL; INFILTRATION; INTRACAUDAL; PERINEURAL at 08:04

## 2024-07-19 RX ADMIN — ROPIVACAINE HYDROCHLORIDE 2.5 ML: 5 INJECTION, SOLUTION EPIDURAL; INFILTRATION; PERINEURAL at 08:04

## 2024-07-19 ASSESSMENT — PAIN SCALES - GENERAL
PAINLEVEL: NO PAIN (0)
PAINLEVEL: SEVERE PAIN (7)

## 2024-07-19 NOTE — PATIENT INSTRUCTIONS
DISCHARGE INSTRUCTIONS    During office hours (8:00 a.m.- 4:00 p.m.) questions or concerns may be answered  by calling Sp[facility] Center Navigation Nurses at  [phone].  Messages received after hours will be returned the following business day.      In the case of an emergency, please dial 911 or seek assistance at the nearest Emergency Room/Urgent Care facility.     All Patients:    You may experience an increase in your symptoms for the first 2 days (It may take anywhere between 2 days- 2 weeks for the steroid to have maximum effect).    You may use ice on the injection site, as frequently as 20 minutes each hour if needed.    You may take your pain medicine.    You may continue taking your regular medication after your injection. If you have had a Medial Branch Block you may resume pain medication once your pain diary is completed.    You may shower. No swimming, tub bath or hot tub for 48 hours.  You may remove your bandaid/bandage as soon as you are home.    You may resume light activities, as tolerated.    Resume your usual diet as tolerated.    If you were told to hold any blood thinning medications you may resume taking them 24 hours after your procedure as prescribed.    It is strongly advised that you do not drive for 1-3 hours post injection.    If you have had oral sedation:  Do not drive for 8 hours post injection.      If you have had IV sedation:  Do not drive for 24 hours post injection.  Do not operate hazardous machinery or make important personal/business decisions for 24 hours.      POSSIBLE STEROID SIDE EFFECTS (If steroid/cortisone was used for your procedure)    -If you experience these symptoms, it should only last for a short period    Swelling of the legs              Skin redness (flushing)     Mouth (oral) irritation   Blood sugar (glucose) levels            Sweats                    Mood changes  Headache  Sleeplessness  Weakened immune system for up to 14 days, which could increase  the risk of manuel the COVID-19 virus and/or experiencing more severe symptoms of the disease, if exposed.  Decreased effectiveness of the flu vaccine if given within 2 weeks of the steroid.         POSSIBLE PROCEDURE SIDE EFFECTS  -Call the Spine Center if you are concerned  Increased Pain           Increased numbness/tingling      Nausea/Vomiting          Bruising/bleeding at site      Redness or swelling                                              Difficulty walking      Weakness           Fever greater than 100.5    *In the event of a severe headache after an epidural steroid injection that is relieved by lying down, please call the [facility] to speak with a clinical staff member*

## 2024-08-05 ENCOUNTER — MYC MEDICAL ADVICE (OUTPATIENT)
Dept: PHYSICAL MEDICINE AND REHAB | Facility: CLINIC | Age: 44
End: 2024-08-05

## 2024-08-05 ENCOUNTER — E-VISIT (OUTPATIENT)
Dept: URGENT CARE | Facility: URGENT CARE | Age: 44
End: 2024-08-05
Payer: COMMERCIAL

## 2024-08-05 DIAGNOSIS — Z98.890 HISTORY OF LUMBAR SURGERY: ICD-10-CM

## 2024-08-05 DIAGNOSIS — N39.0 ACUTE UTI (URINARY TRACT INFECTION): Primary | ICD-10-CM

## 2024-08-05 DIAGNOSIS — M54.16 RIGHT LUMBAR RADICULOPATHY: Primary | ICD-10-CM

## 2024-08-05 PROCEDURE — 99421 OL DIG E/M SVC 5-10 MIN: CPT | Performed by: PHYSICIAN ASSISTANT

## 2024-08-05 RX ORDER — NITROFURANTOIN 25; 75 MG/1; MG/1
100 CAPSULE ORAL 2 TIMES DAILY
Qty: 10 CAPSULE | Refills: 0 | Status: SHIPPED | OUTPATIENT
Start: 2024-08-05 | End: 2024-08-10

## 2024-08-05 NOTE — PATIENT INSTRUCTIONS
Dear Madeline Szymanski    After reviewing your responses, I've been able to diagnose you with a urinary tract infection, which is a common infection of the bladder with bacteria.  This is not a sexually transmitted infection, though urinating immediately after intercourse can help prevent infections.  Drinking lots of fluids is also helpful to clear your current infection and prevent the next one.      I have sent a prescription for antibiotics to your pharmacy to treat this infection.    It is important that you take all of your prescribed medication even if your symptoms are improving after a few doses.  Taking all of your medicine helps prevent the symptoms from returning.     If your symptoms worsen, you develop pain in your back or stomach, develop fevers, or are not improving in 5 days, please contact your primary care provider for an appointment or visit any of our convenient Walk-in or Urgent Care Centers to be seen, which can be found on our website here.    Thanks again for choosing us as your health care partner,    Angy Novoa PA-C  Urinary Tract Infection (UTI) in Women: Care Instructions  Overview     A urinary tract infection (UTI) is an infection caused by bacteria. It can happen anywhere in the urinary tract. A UTI can happen in the:  Kidneys.  Ureters, the tubes that connect the kidneys to the bladder.  Bladder.  Urethra, where the urine comes out.  Most UTIs are bladder infections. They often cause pain or burning when you urinate.  Most UTIs can be cured with antibiotics. If you are prescribed antibiotics, be sure to complete your treatment so that the infection does not get worse.  Follow-up care is a key part of your treatment and safety. Be sure to make and go to all appointments, and call your doctor if you are having problems. It's also a good idea to know your test results and keep a list of the medicines you take.  How can you care for yourself at home?  Take your antibiotics as directed. Do  "not stop taking them just because you feel better. You need to take the full course of antibiotics.  Drink extra water and other fluids for the next day or two. This will help make the urine less concentrated and help wash out the bacteria that are causing the infection. (If you have kidney, heart, or liver disease and have to limit fluids, talk with your doctor before you increase the amount of fluids you drink.)  Avoid drinks that are carbonated or have caffeine. They can irritate the bladder.  Urinate often. Try to empty your bladder each time.  To relieve pain, take a hot bath or lay a heating pad set on low over your lower belly or genital area. Never go to sleep with a heating pad in place.  To prevent UTIs  Drink plenty of water each day. This helps you urinate often, which clears bacteria from your system. (If you have kidney, heart, or liver disease and have to limit fluids, talk with your doctor before you increase the amount of fluids you drink.)  Urinate when you need to.  If you are sexually active, urinate right after you have sex.  Change sanitary pads often.  Avoid douches, bubble baths, feminine hygiene sprays, and other feminine hygiene products that have deodorants.  After going to the bathroom, wipe from front to back.  When should you call for help?   Call your doctor now or seek immediate medical care if:    You have new or worse fever, chills, nausea, or vomiting.     You have new pain in your back just below your rib cage. This is called flank pain.     There is new blood or pus in your urine.     You have any problems with your antibiotic medicine.   Watch closely for changes in your health, and be sure to contact your doctor if:    You are not getting better after taking an antibiotic for 2 days.     Your symptoms go away but then come back.   Where can you learn more?  Go to https://www.healthwise.net/patiented  Enter K848 in the search box to learn more about \"Urinary Tract Infection " "(UTI) in Women: Care Instructions.\"  Current as of: November 15, 2023               Content Version: 14.0    1388-0743 [employer].   Care instructions adapted under license by your healthcare professional. If you have questions about a medical condition or this instruction, always ask your healthcare professional. [employer] disclaims any warranty or liability for your use of this information.      "

## 2024-08-06 ENCOUNTER — VIRTUAL VISIT (OUTPATIENT)
Dept: FAMILY MEDICINE | Facility: CLINIC | Age: 44
End: 2024-08-06
Attending: FAMILY MEDICINE
Payer: COMMERCIAL

## 2024-08-06 ENCOUNTER — LAB (OUTPATIENT)
Dept: LAB | Facility: HOSPITAL | Age: 44
End: 2024-08-06
Attending: NURSE PRACTITIONER
Payer: COMMERCIAL

## 2024-08-06 ENCOUNTER — HOSPITAL ENCOUNTER (OUTPATIENT)
Dept: RADIOLOGY | Facility: HOSPITAL | Age: 44
Discharge: HOME | End: 2024-08-06
Attending: NURSE PRACTITIONER
Payer: COMMERCIAL

## 2024-08-06 DIAGNOSIS — M54.16 RIGHT LUMBAR RADICULOPATHY: ICD-10-CM

## 2024-08-06 DIAGNOSIS — E66.811 CLASS 1 OBESITY DUE TO EXCESS CALORIES WITH SERIOUS COMORBIDITY AND BODY MASS INDEX (BMI) OF 33.0 TO 33.9 IN ADULT: ICD-10-CM

## 2024-08-06 DIAGNOSIS — R25.2 CRAMP OF LIMB: ICD-10-CM

## 2024-08-06 DIAGNOSIS — E66.09 CLASS 1 OBESITY DUE TO EXCESS CALORIES WITH SERIOUS COMORBIDITY AND BODY MASS INDEX (BMI) OF 33.0 TO 33.9 IN ADULT: ICD-10-CM

## 2024-08-06 DIAGNOSIS — Z98.890 HISTORY OF LUMBAR SURGERY: ICD-10-CM

## 2024-08-06 DIAGNOSIS — F32.5 MAJOR DEPRESSIVE DISORDER WITH SINGLE EPISODE, IN FULL REMISSION (H): ICD-10-CM

## 2024-08-06 DIAGNOSIS — F50.819 BINGE EATING DISORDER: Primary | ICD-10-CM

## 2024-08-06 DIAGNOSIS — Z13.1 SCREENING FOR DIABETES MELLITUS: ICD-10-CM

## 2024-08-06 DIAGNOSIS — G43.109 MIGRAINE WITH AURA AND WITHOUT STATUS MIGRAINOSUS, NOT INTRACTABLE: ICD-10-CM

## 2024-08-06 LAB
ALT SERPL W P-5'-P-CCNC: 25 U/L (ref 0–50)
ANION GAP SERPL CALCULATED.3IONS-SCNC: 11 MMOL/L (ref 7–15)
BUN SERPL-MCNC: 10.1 MG/DL (ref 6–20)
CALCIUM SERPL-MCNC: 9 MG/DL (ref 8.8–10.4)
CHLORIDE SERPL-SCNC: 105 MMOL/L (ref 98–107)
CREAT SERPL-MCNC: 0.86 MG/DL (ref 0.51–0.95)
EGFRCR SERPLBLD CKD-EPI 2021: 84 ML/MIN/1.73M2
GLUCOSE SERPL-MCNC: 86 MG/DL (ref 70–99)
HCO3 SERPL-SCNC: 26 MMOL/L (ref 22–29)
MAGNESIUM SERPL-MCNC: 2.1 MG/DL (ref 1.7–2.3)
POTASSIUM SERPL-SCNC: 3.9 MMOL/L (ref 3.4–5.3)
SODIUM SERPL-SCNC: 142 MMOL/L (ref 135–145)
VIT D+METAB SERPL-MCNC: 19 NG/ML (ref 20–50)

## 2024-08-06 PROCEDURE — G2211 COMPLEX E/M VISIT ADD ON: HCPCS | Mod: 95 | Performed by: FAMILY MEDICINE

## 2024-08-06 PROCEDURE — 36415 COLL VENOUS BLD VENIPUNCTURE: CPT

## 2024-08-06 PROCEDURE — 80048 BASIC METABOLIC PNL TOTAL CA: CPT

## 2024-08-06 PROCEDURE — 72100 X-RAY EXAM L-S SPINE 2/3 VWS: CPT

## 2024-08-06 PROCEDURE — 82306 VITAMIN D 25 HYDROXY: CPT

## 2024-08-06 PROCEDURE — 83735 ASSAY OF MAGNESIUM: CPT

## 2024-08-06 PROCEDURE — 84460 ALANINE AMINO (ALT) (SGPT): CPT

## 2024-08-06 PROCEDURE — 99214 OFFICE O/P EST MOD 30 MIN: CPT | Mod: 95 | Performed by: FAMILY MEDICINE

## 2024-08-06 RX ORDER — ESTRADIOL 0.03 MG/D
1 FILM, EXTENDED RELEASE TRANSDERMAL
COMMUNITY
End: 2024-09-20

## 2024-08-06 RX ORDER — TESTOSTERONE MICRONIZED 100 %
POWDER (GRAM) MISCELLANEOUS
COMMUNITY
Start: 2024-07-30

## 2024-08-06 RX ORDER — TOPIRAMATE 50 MG/1
50 TABLET, FILM COATED ORAL DAILY
Qty: 90 TABLET | Refills: 1 | Status: SHIPPED | OUTPATIENT
Start: 2024-08-06

## 2024-08-06 RX ORDER — LISDEXAMFETAMINE DIMESYLATE 60 MG/1
60 CAPSULE ORAL EVERY MORNING
Qty: 30 CAPSULE | Refills: 0 | Status: SHIPPED | OUTPATIENT
Start: 2024-08-06 | End: 2024-09-20

## 2024-08-06 NOTE — ASSESSMENT & PLAN NOTE
Stable.  Topiramate helpful.  Recheck in 6 months.  Continue to work with other providers as well.

## 2024-08-06 NOTE — PROGRESS NOTES
Madeline is a 44 year old who is being evaluated via a billable video visit.    How would you like to obtain your AVS? MyChart  If the video visit is dropped, the invitation should be resent by: Text to cell phone: [phone]  Will anyone else be joining your video visit? No      Assessment & Plan   Problem List Items Addressed This Visit       Binge eating disorder - Primary    Relevant Medications    lisdexamfetamine (VYVANSE) 60 MG capsule    Class 1 obesity due to excess calories with serious comorbidity and body mass index (BMI) of 33.0 to 33.9 in adult     Class I obesity.  Binge eating disorder.  Continues to benefit from Vyvanse in terms of cravings and impulsivity around food.  Generally eating well.  Topiramate also helps with some appetite suppression.  She describes a number of different factors that have resulted in overall caloric deficit which has resulted in weight loss.  I recommended that she add or at least consider adding more protein.  I think this may also help with protein muscle synthesis given some of her ongoing struggles with back pain and the decreased efficacy of physical therapy.  Refills of medications provided.  She has been dealing with some cramping.  Check a magnesium level and basic metabolic panel as well as vitamin D.         Relevant Medications    lisdexamfetamine (VYVANSE) 60 MG capsule    Other Relevant Orders    Magnesium    Basic metabolic panel  (Ca, Cl, CO2, Creat, Gluc, K, Na, BUN)    ALT    Vitamin D Deficiency    Major depressive disorder with single episode, in full remission (H24)     Stable.         Migraine with aura and without status migrainosus, not intractable     Stable.  Topiramate helpful.  Recheck in 6 months.  Continue to work with other providers as well.         Relevant Medications    topiramate (TOPAMAX) 50 MG tablet     Other Visit Diagnoses       Cramp of limb        Relevant Orders    Magnesium    Basic metabolic panel  (Ca, Cl, CO2, Creat, Gluc,  "K, Na, BUN)    Screening for diabetes mellitus        Relevant Orders    Basic metabolic panel  (Ca, Cl, CO2, Creat, Gluc, K, Na, BUN)           The longitudinal plan of care for the diagnosis(es)/condition(s) as documented were addressed during this visit. Due to the added complexity in care, I will continue to support Madeline in the subsequent management and with ongoing continuity of care.      Isabella Correa is a 44 year old, presenting for the following health issues:  Weight Loss (Follow-up/)        8/6/2024     7:18 AM   Additional Questions   Roomed by xl     Weight / Binging:   - 2 jobs.  Busy with a lot of activities   - back pain.  PT has not been as helpful.   - medications helpful.    -  currently with a bladder infection   - still eats a meal between jobs at home.  Daytime meal includes veggies.    - protein: \"a lot actually.\" Deli meats and peanut butter.     History of Present Illness       Reason for visit:  LAB ORDER, MED CHECKUP, LUMBAR, HIP XRAY ORDER    She eats 0-1 servings of fruits and vegetables daily.She consumes 0 sweetened beverage(s) daily.She exercises with enough effort to increase her heart rate 20 to 29 minutes per day.  She exercises with enough effort to increase her heart rate 3 or less days per week.   She is taking medications regularly.      Objective    Vitals - Patient Reported  Weight (Patient Reported): 83.1 kg (183 lb 3.2 oz)      Vitals:  No vitals were obtained today due to virtual visit.    Physical Exam   GENERAL: alert and no distress  EYES: Eyes grossly normal to inspection.  No discharge or erythema, or obvious scleral/conjunctival abnormalities.  RESP: No audible wheeze, cough, or visible cyanosis.    SKIN: Visible skin clear. No significant rash, abnormal pigmentation or lesions.  NEURO: Cranial nerves grossly intact.  Mentation and speech appropriate for age.  PSYCH: Appropriate affect, tone, and pace of words      Video-Visit Details    Type of service:  " Video Visit   Originating Location (pt. Location): Home    Distant Location (provider location):  On-site  Platform used for Video Visit: Agatha  Signed Electronically by: Sebas Valdez MD

## 2024-08-06 NOTE — ASSESSMENT & PLAN NOTE
Class I obesity.  Binge eating disorder.  Continues to benefit from Vyvanse in terms of cravings and impulsivity around food.  Generally eating well.  Topiramate also helps with some appetite suppression.  She describes a number of different factors that have resulted in overall caloric deficit which has resulted in weight loss.  I recommended that she add or at least consider adding more protein.  I think this may also help with protein muscle synthesis given some of her ongoing struggles with back pain and the decreased efficacy of physical therapy.  Refills of medications provided.  She has been dealing with some cramping.  Check a magnesium level and basic metabolic panel as well as vitamin D.

## 2024-08-16 ENCOUNTER — HOSPITAL ENCOUNTER (OUTPATIENT)
Dept: MRI IMAGING | Facility: CLINIC | Age: 44
Discharge: HOME | End: 2024-08-16
Attending: NURSE PRACTITIONER | Admitting: NURSE PRACTITIONER
Payer: COMMERCIAL

## 2024-08-16 DIAGNOSIS — M54.16 RIGHT LUMBAR RADICULOPATHY: ICD-10-CM

## 2024-08-16 DIAGNOSIS — Z98.890 HISTORY OF LUMBAR SURGERY: ICD-10-CM

## 2024-08-16 PROCEDURE — 72148 MRI LUMBAR SPINE W/O DYE: CPT

## 2024-08-22 ENCOUNTER — MYC MEDICAL ADVICE (OUTPATIENT)
Dept: PHYSICAL MEDICINE AND REHAB | Facility: CLINIC | Age: 44
End: 2024-08-22
Payer: COMMERCIAL

## 2024-08-22 DIAGNOSIS — M53.3 SACROILIAC JOINT PAIN: ICD-10-CM

## 2024-08-22 DIAGNOSIS — G89.29 CHRONIC NECK PAIN: ICD-10-CM

## 2024-08-22 DIAGNOSIS — M54.16 RIGHT LUMBAR RADICULOPATHY: Primary | ICD-10-CM

## 2024-08-22 DIAGNOSIS — M54.81 BILATERAL OCCIPITAL NEURALGIA: ICD-10-CM

## 2024-08-22 DIAGNOSIS — M54.2 CHRONIC NECK PAIN: ICD-10-CM

## 2024-08-22 DIAGNOSIS — Z98.890 HISTORY OF LUMBAR SURGERY: ICD-10-CM

## 2024-08-28 ENCOUNTER — MYC MEDICAL ADVICE (OUTPATIENT)
Dept: FAMILY MEDICINE | Facility: CLINIC | Age: 44
End: 2024-08-28

## 2024-08-28 ENCOUNTER — OFFICE VISIT (OUTPATIENT)
Dept: PHYSICAL MEDICINE AND REHAB | Facility: CLINIC | Age: 44
End: 2024-08-28
Payer: COMMERCIAL

## 2024-08-28 VITALS — OXYGEN SATURATION: 98 % | DIASTOLIC BLOOD PRESSURE: 89 MMHG | SYSTOLIC BLOOD PRESSURE: 153 MMHG | HEART RATE: 91 BPM

## 2024-08-28 DIAGNOSIS — F32.5 MAJOR DEPRESSIVE DISORDER WITH SINGLE EPISODE, IN FULL REMISSION (H): Primary | ICD-10-CM

## 2024-08-28 DIAGNOSIS — M54.81 BILATERAL OCCIPITAL NEURALGIA: Primary | ICD-10-CM

## 2024-08-28 PROCEDURE — 64405 NJX AA&/STRD GR OCPL NRV: CPT | Mod: 50 | Performed by: PAIN MEDICINE

## 2024-08-28 PROCEDURE — 64450 NJX AA&/STRD OTHER PN/BRANCH: CPT | Mod: 50 | Performed by: PAIN MEDICINE

## 2024-08-28 RX ORDER — BUPIVACAINE HYDROCHLORIDE AND EPINEPHRINE 2.5; 5 MG/ML; UG/ML
4 INJECTION, SOLUTION EPIDURAL; INFILTRATION; INTRACAUDAL; PERINEURAL ONCE
Status: COMPLETED | OUTPATIENT
Start: 2024-08-28 | End: 2024-08-28

## 2024-08-28 RX ORDER — FLUOXETINE 40 MG/1
40 CAPSULE ORAL DAILY
Qty: 90 CAPSULE | Refills: 1 | Status: SHIPPED | OUTPATIENT
Start: 2024-08-28

## 2024-08-28 RX ORDER — METHYLPREDNISOLONE ACETATE 40 MG/ML
40 INJECTION, SUSPENSION INTRA-ARTICULAR; INTRALESIONAL; INTRAMUSCULAR; SOFT TISSUE ONCE
Status: COMPLETED | OUTPATIENT
Start: 2024-08-28 | End: 2024-08-28

## 2024-08-28 RX ADMIN — METHYLPREDNISOLONE ACETATE 40 MG: 40 INJECTION, SUSPENSION INTRA-ARTICULAR; INTRALESIONAL; INTRAMUSCULAR; SOFT TISSUE at 10:05

## 2024-08-28 RX ADMIN — BUPIVACAINE HYDROCHLORIDE AND EPINEPHRINE 10 MG: 2.5; 5 INJECTION, SOLUTION EPIDURAL; INFILTRATION; INTRACAUDAL; PERINEURAL at 09:58

## 2024-08-28 ASSESSMENT — PAIN SCALES - GENERAL: PAINLEVEL: SEVERE PAIN (7)

## 2024-08-28 NOTE — PROGRESS NOTES
Pre-procedure diagnosis: Bilateral occipital neuralgia  Post-procedure diagnosis:  Same  PROCEDURE: Bilateral greater and lesser occipital nerve block.    The risks of the procedure were discussed with the patient.  These include infection, bleeding, increased pain, no change in pain were discussed with the patient.  The patient gave written and verbal consent to proceed with the procedure.    The most tender area of the left occipital nerve at the nuchal ridge was palpated.  This area was then cleansed with a chlorhexidine swab x 2.  A solution of 4 mL of 20 mg of Depomedrol mixed with equal parts of 0.25% bupivacaine and 2% lidocaine  was drawn up.  A 25 guage 2 inch needle was inserted down to os.  After aspiration was negative, once mL of the solution was injected one third of the way from the occipital protuberance and the mastoid process.  Without withdrawing the needle from the skin, the needle was redirected towards the mastoid process.  After aspiration was negative, the remaining injectate was injected.  The exact same procedure was done on the right side.    The patient tolerated the procedure well.  The patient was monitored for a short period of time and then discharged under her own power.

## 2024-08-28 NOTE — LETTER
8/28/2024      Madeline Szymanski  [address]  [facility] [address]      Dear Colleague,    Thank you for referring your patient, Madeline Szymanski, to the [facility]. Please see a copy of my visit note below.    Pre-procedure diagnosis: Bilateral occipital neuralgia  Post-procedure diagnosis:  Same  PROCEDURE: Bilateral occipital nerve block.    The risks of the procedure were discussed with the patient.  These include infection, bleeding, increased pain, no change in pain were discussed with the patient.  The patient gave written and verbal consent to proceed with the procedure.    The most tender area of the left occipital nerve at the nuchal ridge was palpated.  This area was then cleansed with a chlorhexidine swab x 2.  A solution of 4 mL of 20 mg of Depomedrol mixed with equal parts of 0.25% bupivacaine and 2% lidocaine  was drawn up.  A 25 guage 2 inch needle was inserted down to os.  After aspiration was negative, once mL of the solution was injected one third of the way from the occipital protuberance and the mastoid process.  Without withdrawing the needle from the skin, the needle was redirected towards the mastoid process.  After aspiration was negative, the remaining injectate was injected.      The patient tolerated the procedure well.  The patient was monitored for a short period of time and then discharged under her own power.      Again, thank you for allowing me to participate in the care of your patient.        Sincerely,        Marco Zaragoza, DO

## 2024-09-20 ENCOUNTER — MYC REFILL (OUTPATIENT)
Dept: FAMILY MEDICINE | Facility: CLINIC | Age: 44
End: 2024-09-20
Payer: COMMERCIAL

## 2024-09-20 DIAGNOSIS — Z30.45 ENCOUNTER FOR SURVEILLANCE OF TRANSDERMAL PATCH HORMONAL CONTRACEPTIVE DEVICE: Primary | ICD-10-CM

## 2024-09-20 DIAGNOSIS — F50.819 BINGE EATING DISORDER: ICD-10-CM

## 2024-09-20 RX ORDER — LISDEXAMFETAMINE DIMESYLATE 60 MG/1
60 CAPSULE ORAL EVERY MORNING
Qty: 30 CAPSULE | Refills: 0 | Status: SHIPPED | OUTPATIENT
Start: 2024-09-20

## 2024-09-21 RX ORDER — ESTRADIOL 0.03 MG/D
1 FILM, EXTENDED RELEASE TRANSDERMAL
Qty: 8 PATCH | Refills: 5 | Status: SHIPPED | OUTPATIENT
Start: 2024-09-23

## 2024-10-14 ENCOUNTER — MYC REFILL (OUTPATIENT)
Dept: FAMILY MEDICINE | Facility: CLINIC | Age: 44
End: 2024-10-14
Payer: COMMERCIAL

## 2024-10-14 DIAGNOSIS — F50.811 MODERATE BINGE-EATING DISORDER: Primary | ICD-10-CM

## 2024-10-15 PROBLEM — F50.811 MODERATE BINGE-EATING DISORDER: Status: ACTIVE | Noted: 2023-08-28

## 2024-10-15 RX ORDER — LISDEXAMFETAMINE DIMESYLATE 60 MG/1
60 CAPSULE ORAL EVERY MORNING
Qty: 30 CAPSULE | Refills: 0 | Status: SHIPPED | OUTPATIENT
Start: 2024-10-15

## 2024-10-21 ENCOUNTER — PATIENT OUTREACH (OUTPATIENT)
Dept: CARE COORDINATION | Facility: CLINIC | Age: 44
End: 2024-10-21
Payer: COMMERCIAL

## 2024-10-24 DIAGNOSIS — M54.81 BILATERAL OCCIPITAL NEURALGIA: Primary | ICD-10-CM

## 2024-10-25 ENCOUNTER — OFFICE VISIT (OUTPATIENT)
Dept: PHYSICAL MEDICINE AND REHAB | Facility: CLINIC | Age: 44
End: 2024-10-25
Payer: COMMERCIAL

## 2024-10-25 VITALS
HEART RATE: 107 BPM | OXYGEN SATURATION: 98 % | RESPIRATION RATE: 16 BRPM | DIASTOLIC BLOOD PRESSURE: 92 MMHG | SYSTOLIC BLOOD PRESSURE: 134 MMHG | TEMPERATURE: 97.2 F

## 2024-10-25 DIAGNOSIS — M54.81 BILATERAL OCCIPITAL NEURALGIA: Primary | ICD-10-CM

## 2024-10-25 PROCEDURE — 64405 NJX AA&/STRD GR OCPL NRV: CPT | Mod: 50 | Performed by: PAIN MEDICINE

## 2024-10-25 PROCEDURE — 64450 NJX AA&/STRD OTHER PN/BRANCH: CPT | Mod: 50 | Performed by: PAIN MEDICINE

## 2024-10-25 RX ORDER — TRIAMCINOLONE ACETONIDE 40 MG/ML
40 INJECTION, SUSPENSION INTRA-ARTICULAR; INTRAMUSCULAR ONCE
Status: COMPLETED | OUTPATIENT
Start: 2024-10-25 | End: 2024-10-25

## 2024-10-25 RX ORDER — BUPIVACAINE HYDROCHLORIDE 2.5 MG/ML
4 INJECTION, SOLUTION EPIDURAL; INFILTRATION; INTRACAUDAL ONCE
Status: COMPLETED | OUTPATIENT
Start: 2024-10-25 | End: 2024-10-25

## 2024-10-25 RX ADMIN — TRIAMCINOLONE ACETONIDE 40 MG: 40 INJECTION, SUSPENSION INTRA-ARTICULAR; INTRAMUSCULAR at 11:08

## 2024-10-25 RX ADMIN — BUPIVACAINE HYDROCHLORIDE 10 MG: 2.5 INJECTION, SOLUTION EPIDURAL; INFILTRATION; INTRACAUDAL at 10:17

## 2024-10-25 ASSESSMENT — PAIN SCALES - GENERAL
PAINLEVEL_OUTOF10: NO PAIN (0)
PAINLEVEL_OUTOF10: SEVERE PAIN (6)

## 2024-10-25 NOTE — PROGRESS NOTES
Pre-procedure diagnosis: Bilateral occipital neuralgia  Post-procedure diagnosis:  Same  PROCEDURE: Greater and lesser bilateral occipital nerve block.    First Assistant: Brenton De Santiago third-year resident    The risks of the procedure were discussed with the patient.  These include infection, bleeding, increased pain, no change in pain were discussed with the patient.  The patient gave written and verbal consent to proceed with the procedure.    The most tender area of the left occipital nerve at the nuchal ridge was palpated.  This area was then cleansed with a chlorhexidine swab x 2.  A solution of 4 mL of 20 mg of Kenalog mixed with equal parts of 0.25% bupivacaine and 2% lidocaine  was drawn up.  A 25 guage 2 inch needle was inserted down to os.  After aspiration was negative, once mL of the solution was injected one third of the way from the occipital protuberance and the mastoid process.  Without withdrawing the needle from the skin, the needle was redirected towards the mastoid process.  After aspiration was negative, the remaining injectate was injected.  The exact same procedure was on the right side injecting 20 mg of Kenalog and equal parts of 0.25% bupivacaine and 2% lidocaine.       2

## 2024-10-25 NOTE — LETTER
10/25/2024      Madeline Szymanski  [address]  [facility] [address]      Dear Colleague,    Thank you for referring your patient, Madeline Szymanski, to the [facility]. Please see a copy of my visit note below.    Pre-procedure diagnosis: Bilateral occipital neuralgia  Post-procedure diagnosis:  Same  PROCEDURE: Greater and lesser bilateral occipital nerve block.    First Assistant: Brenton De Santiago third-year resident    The risks of the procedure were discussed with the patient.  These include infection, bleeding, increased pain, no change in pain were discussed with the patient.  The patient gave written and verbal consent to proceed with the procedure.    The most tender area of the left occipital nerve at the nuchal ridge was palpated.  This area was then cleansed with a chlorhexidine swab x 2.  A solution of 4 mL of 20 mg of Kenalog mixed with equal parts of 0.25% bupivacaine and 2% lidocaine  was drawn up.  A 25 guage 2 inch needle was inserted down to os.  After aspiration was negative, once mL of the solution was injected one third of the way from the occipital protuberance and the mastoid process.  Without withdrawing the needle from the skin, the needle was redirected towards the mastoid process.  After aspiration was negative, the remaining injectate was injected.  The exact same procedure was on the right side injecting 20 mg of Kenalog and equal parts of 0.25% bupivacaine and 2% lidocaine.      Again, thank you for allowing me to participate in the care of your patient.        Sincerely,        Marco Zaragoza, DO

## 2024-10-25 NOTE — PATIENT INSTRUCTIONS
DISCHARGE INSTRUCTIONS    During office hours (8:00 a.m.- 4:00 p.m.) questions or concerns may be answered  by calling [facility] Navigation Nurses at  [phone].  Messages received after hours will be returned the following business day.      In the case of an emergency, please dial 911 or seek assistance at the nearest Emergency Room/Urgent Care facility.     All Patients:    You may experience an increase in your symptoms for the first 2 days (It may take anywhere between 2 days- 2 weeks for the steroid to have maximum effect).    You may use ice on the injection site, as frequently as 20 minutes each hour if needed.    You may take your pain medicine.    You may continue taking your regular medication after your injection. If you have had a Medial Branch Block you may resume pain medication once your pain diary is completed.    You may shower. No swimming, tub bath or hot tub for 48 hours.  You may remove your bandaid/bandage as soon as you are home.    You may resume light activities, as tolerated.    Resume your usual diet as tolerated.    If you were told to hold any blood thinning medications you may resume taking them 24 hours after your procedure as prescribed.    It is strongly advised that you do not drive for 1-3 hours post injection.    If you have had oral sedation:  Do not drive for 8 hours post injection.      If you have had IV sedation:  Do not drive for 24 hours post injection.  Do not operate hazardous machinery or make important personal/business decisions for 24 hours.      POSSIBLE STEROID SIDE EFFECTS (If steroid/cortisone was used for your procedure)    -If you experience these symptoms, it should only last for a short period    Swelling of the legs              Skin redness (flushing)     Mouth (oral) irritation   Blood sugar (glucose) levels            Sweats                    Mood changes  Headache  Sleeplessness  Weakened immune system for up to 14 days, which could increase  the risk of manuel the COVID-19 virus and/or experiencing more severe symptoms of the disease, if exposed.  Decreased effectiveness of the flu vaccine if given within 2 weeks of the steroid.         POSSIBLE PROCEDURE SIDE EFFECTS  -Call the Spine Center if you are concerned  Increased Pain           Increased numbness/tingling      Nausea/Vomiting          Bruising/bleeding at site      Redness or swelling                                              Difficulty walking      Weakness           Fever greater than 100.5    *In the event of a severe headache after an epidural steroid injection that is relieved by lying down, please call the [facility] to speak with a clinical staff member*

## 2024-10-31 ENCOUNTER — DOCUMENTATION ONLY (OUTPATIENT)
Dept: PHYSICAL MEDICINE AND REHAB | Facility: CLINIC | Age: 44
End: 2024-10-31
Payer: COMMERCIAL

## 2024-10-31 DIAGNOSIS — L03.811 CELLULITIS OF HEAD EXCEPT FACE: Primary | ICD-10-CM

## 2024-10-31 RX ORDER — CLINDAMYCIN HYDROCHLORIDE 75 MG/1
75 CAPSULE ORAL EVERY 6 HOURS
Qty: 28 CAPSULE | Refills: 0 | Status: SHIPPED | OUTPATIENT
Start: 2024-10-31 | End: 2024-11-02 | Stop reason: DRUGHIGH

## 2024-10-31 NOTE — TELEPHONE ENCOUNTER
Patient is seen today as she has pain over the site where she had her occipital nerve block on 10/25/2024.  There is erythema with no warmth.    I did send in a prescription of clindamycin 75 mg every 6 hours for 7 days.  If she continues to have troubles with this then I recommend that she see her primary care provider.

## 2024-11-02 ENCOUNTER — HOSPITAL ENCOUNTER (EMERGENCY)
Facility: CLINIC | Age: 44
Discharge: HOME | End: 2024-11-02
Admitting: PHYSICIAN ASSISTANT
Payer: COMMERCIAL

## 2024-11-02 VITALS
DIASTOLIC BLOOD PRESSURE: 93 MMHG | HEIGHT: 64 IN | SYSTOLIC BLOOD PRESSURE: 161 MMHG | WEIGHT: 173 LBS | OXYGEN SATURATION: 98 % | HEART RATE: 80 BPM | TEMPERATURE: 98.2 F | RESPIRATION RATE: 16 BRPM | BODY MASS INDEX: 29.53 KG/M2

## 2024-11-02 DIAGNOSIS — T80.29XA INFECTION OF INJECTION SITE, INITIAL ENCOUNTER: ICD-10-CM

## 2024-11-02 PROCEDURE — 99283 EMERGENCY DEPT VISIT LOW MDM: CPT

## 2024-11-02 RX ORDER — CLINDAMYCIN HYDROCHLORIDE 300 MG/1
300 CAPSULE ORAL 4 TIMES DAILY
Qty: 28 CAPSULE | Refills: 0 | Status: SHIPPED | OUTPATIENT
Start: 2024-11-02 | End: 2024-11-09

## 2024-11-02 ASSESSMENT — COLUMBIA-SUICIDE SEVERITY RATING SCALE - C-SSRS
6. HAVE YOU EVER DONE ANYTHING, STARTED TO DO ANYTHING, OR PREPARED TO DO ANYTHING TO END YOUR LIFE?: NO
1. IN THE PAST MONTH, HAVE YOU WISHED YOU WERE DEAD OR WISHED YOU COULD GO TO SLEEP AND NOT WAKE UP?: NO
2. HAVE YOU ACTUALLY HAD ANY THOUGHTS OF KILLING YOURSELF IN THE PAST MONTH?: NO

## 2024-11-02 NOTE — ED PROVIDER NOTES
Emergency Department Encounter   NAME: Madeline Szymanski ; AGE: 44 year old female ; YOB: 1980 ; MRN: [id number] ; PCP: Sebas Valdez   ED PROVIDER: Kirsten Burkett PA-C    Evaluation Date & Time:   No admission date for patient encounter.    CHIEF COMPLAINT:  Headache and Infection      Impression and Plan   MDM: Madeline Szymanski is a 44 year old female who presents to the ED for evaluation of injection site pain.  The patient presents to the emergency department for evaluation of pain at her right occipital nerve block site after being administered 4 ml of equal parts 20mg Kenalog, 0.25% Bupivacaine, and 2% Lidocaine to the area on 10/25/2024 (~8 days ago). Started on Clindamycin prescribed by her pain medicine Dr. Zaragoza, [facility], last night (has had 2 doses in total).  Here in the ED, she is moderately hypertensive to the 160s though afebrile and vitally stable.  She is nontoxic-appearing but does appear uncomfortable.  She has a quarter sized area of lacy erythema to her right occipital scalp with some induration consistent with injection site infection/inflammation.  Performed soft tissue bedside ultrasound and there is no evidence of underlying abscess or drainable fluid collection.  Last lidocaine injection was back in August -no concerns for lidocaine toxicity.  Patient is neuro intact, no continuous vertiginous dizziness, or symptoms to suggest vertebral artery dissection. No significant soft tissue edema concerning for deep soft tissue space infection.  No indication for imaging at this time.  Discussed antibiotic dosing with ED pharmacist, and 75 mg of clindamycin 4 times daily is low.  Will increase this to 300 mg 4 times daily for 7 days.  Offered her further pain control options as she ultimately declined.  She does have a primary care provider and advised wound recheck in 48 hours to make sure this is improving and we reviewed concerning signs and symptoms to  return to the ER.  She verbalized understanding is comfortable to plan.  Discharged home in good condition. Advised BP recheck in PCP clinic.       *Patient examination and workup was initiated in triage due to inpatient hospital and Emergency Department bed shortage resulting in long waiting room wait times. Patient and/or guardian's consent was obtained.         Medical Decision Making  Obtained supplemental history:Supplemental history obtained?: No  Reviewed external records: External records reviewed?: Outpatient Record: Clinic visit on 10/25/2024 and orders placed on 10/31/2024  Care impacted by chronic illness:Chronic Pain and Hypertension  Did you consider but not order tests?: Work up considered but not performed and documented in chart, if applicable  Did you interpret images independently?: Independent interpretation of ECG and images noted in documentation, when applicable.  Consultation discussion with other provider:Did you involve another provider (consultant, MH, pharmacy, etc.)?: Yes - ED pharmacist   Discharge. I prescribed additional prescription strength medication(s) as charted. See documentation for any additional details.    MIPS: Not Applicable      ED COURSE:  2:27 PM I met and introduced myself to the patient. I gathered initial history and performed my physical exam. We discussed plan for discharge, follow up, and reasons to return to the ED.     At the conclusion of the encounter I discussed the results of all the tests and the disposition. The questions were answered. The patient or family acknowledged understanding and was agreeable with the care plan.    FINAL IMPRESSION:    ICD-10-CM    1. Infection of injection site, initial encounter  T80.29XA             MEDICATIONS GIVEN IN THE EMERGENCY DEPARTMENT:  Medications - No data to display      NEW PRESCRIPTIONS STARTED AT TODAY'S ED VISIT:  Discharge Medication List as of 11/2/2024  3:21 PM            HPI   Use of Intrepreter: N/A      Madeline Szymanski is a 44 year old female with a pertinent history of spinal stenosis in cervical region, H. pylori, panic attacks, lumbar radiculopathy, major depressive disorder, class I obesity, moderate binge eating disorder, subdural neuralgia, who presents to the ED by private vehicle for evaluation of head pain.    Per patient, she has a history of occipital neuralgia and her pain provider administers occipital nerve blocks for her.  She had bilateral occipital nerve blocks on 10/12/2024 with her last nerve blocks pain in August.  She has had multiple in the past and typically tolerates them well.  After her nerve block on , she noticed pain and some redness at the injection site on 10/31.  Her pain provider prescribed her clindamycin for infection and she has taken 1 tablet last night and 1 this morning though presented to the ED due to continued pain at the site.  She feels that there is a lump/some swelling to the area.  She has not had any fevers or chills, no neck pain or stiffness, vomiting, or persistent vertigo.      REVIEW OF SYSTEMS:  Pertinent positive and negative symptoms per HPI.       Medical History     Past Medical History:   Diagnosis Date    Acute and chronic respiratory failure with hypoxia (H) 2022    Anxiety     Arthritis     Depressive disorder     Gastric band slippage 2019    History of blood transfusion 1978    Kidney infection     Lumbar radiculopathy     Migraine     Migraine     Painless urinary retention due to cauda equina syndrome (H)     Pancreatitis     Panic attack     PONV (postoperative nausea and vomiting)     Spinal stenosis in cervical region     UTI (lower urinary tract infection)        Past Surgical History:   Procedure Laterality Date    ABDOMEN SURGERY       SECTION      FUSION CERVICAL ANTERIOR ONE LEVEL N/A 2022    Procedure: Cervical 5 - Cervical 6 anterior cervical decompression and artificial disc replacement, use of  microscope;  Surgeon: Radha Lilly MD;  Location: [facility] OR    GASTRECTOMY LAPAROSCOPIC SLEEVE      GI SURGERY      GYN SURGERY  01/2018    Hysterectomy    HYSTERECTOMY TOTAL ABDOMINAL      HYSTERECTOMY, PAP NO LONGER INDICATED      LAMINECTOMY LUMBAR ONE LEVEL Left 10/04/2021    Procedure: LEFT LUMBAR 5-SACRAL 1 HEMILAMINECTOMY, MEDIAL FACETECTOMY, FORAMINOTOMY WITH;  Surgeon: Radha Lilly MD;  Location: [facility] OR    LUMBAR DISCECTOMY Left 10/04/2021    Procedure: LUMBAR 5-SACRAL 1 MICRODISCECTOMY;  Surgeon: Radha Lilly MD;  Location: [facility]       Family History   Problem Relation Age of Onset    Low Back Problems Mother         back surgery    Heart Disease Father     Hypertension Father     Substance Abuse Father     Obesity Sister     Obesity Paternal Grandmother     Mental Illness Nephew     Breast Cancer Cousin     Breast Cancer Other     Obesity Other     Cerebrovascular Disease Maternal Uncle     Cerebrovascular Disease Maternal Uncle     Colon Cancer No family hx of        Social History     Tobacco Use    Smoking status: Never     Passive exposure: Never    Smokeless tobacco: Never   Vaping Use    Vaping status: Never Used   Substance Use Topics    Alcohol use: Never    Drug use: Never       clindamycin (CLEOCIN) 300 MG capsule  cyclobenzaprine (FLEXERIL) 10 MG tablet  eletriptan (RELPAX) 40 MG tablet  EPINEPHrine (ANY BX GENERIC EQUIV) 0.3 MG/0.3ML injection 2-pack  estradiol (VIVELLE-DOT) 0.025 MG/24HR bi-weekly patch  FLUoxetine (PROZAC) 40 MG capsule  hydrOXYzine HCl (ATARAX) 25 MG tablet  lidocaine (LIDODERM) 5 % patch  lisdexamfetamine (VYVANSE) 60 MG capsule  pregabalin (LYRICA) 75 MG capsule  rizatriptan (MAXALT-MLT) 10 MG ODT  testosterone POWD  topiramate (TOPAMAX) 50 MG tablet          Physical Exam     First Vitals:  Patient Vitals for the past 24 hrs:   BP Temp Temp src Pulse Resp SpO2 Height Weight   11/02/24 1525 (!) 161/93 -- -- 80 16 98 %  "-- --   11/02/24 1305 (!) 143/87 98.2  F (36.8  C) Oral 87 16 97 % 1.626 m (5' 4\") 78.5 kg (173 lb)         PHYSICAL EXAM:   Physical Exam  Vitals reviewed.   Constitutional:       General: She is not in acute distress.     Appearance: She is not toxic-appearing.      Comments: Appears uncomfortable.    HENT:      Head: Normocephalic and atraumatic.      Comments: Quarter sized area of yosef appearing erythema with a small area of induration. No fluctuance, significant edema, or crepitus. No vesicles or lesions. Entirety of scalp palpated and otherwise normal.      Right Ear: Tympanic membrane, ear canal and external ear normal.      Mouth/Throat:      Mouth: Mucous membranes are moist.   Eyes:      Extraocular Movements: Extraocular movements intact.      Conjunctiva/sclera: Conjunctivae normal.      Pupils: Pupils are equal, round, and reactive to light.   Musculoskeletal:      Cervical back: Normal range of motion and neck supple. No rigidity.   Neurological:      Mental Status: She is alert and oriented to person, place, and time. Mental status is at baseline.      Cranial Nerves: No facial asymmetry.      Gait: Gait is intact.      Comments: CN 3-12 intact. Normal speech.              Results     LAB:  All pertinent labs reviewed and interpreted  Labs Ordered and Resulted from Time of ED Arrival to Time of ED Departure - No data to display    RADIOLOGY:  No orders to display       Kirsten Burkett PA-C   Emergency Medicine   [facility] EMERGENCY ROOM       Kirsten Burkett PA-C  11/02/24 2312    "

## 2024-11-02 NOTE — ED TRIAGE NOTES
Patient presents to ED with headache and infection, patient had a occipital nerve block 8 days ago and has nausea and bump and pain to back of head at the injection site, started on Clindamycin yesterday, worse today.  Nicki Thompson RN.......11/2/2024 1:08 PM     Triage Assessment (Adult)       Row Name 11/02/24 1309          Triage Assessment    Airway WDL WDL        Respiratory WDL    Respiratory WDL WDL        Skin Circulation/Temperature WDL    Skin Circulation/Temperature WDL WDL        Cardiac WDL    Cardiac WDL WDL        Peripheral/Neurovascular WDL    Peripheral Neurovascular WDL WDL        Cognitive/Neuro/Behavioral WDL    Cognitive/Neuro/Behavioral WDL WDL

## 2024-11-02 NOTE — DISCHARGE INSTRUCTIONS
As we discussed, we will increase the dose of your clindamycin.  Please discontinue the antibiotic you have at home and start the new dosing.  I would like you to have a recheck of your skin by your primary care provider in the next 48 hours to make sure that this is improving.  If at any point you develop fevers or chills, significant swelling, worsening lump or redness, severe headache, vertiginous type dizziness or vomiting please return to the ER for further evaluation.    Your blood pressure was elevated in the emergencydepartment today and requires recheck and close follow-up in your primary care clinic. Untreated blood pressure can cause serious complications including, but not limited to stroke, heart attack/failure, and kidney disease.  Please make a close follow-up appointment to have this recheck performed. Please return to the emergency department immediately if you develop a severe headache, vision changes, chest pain, shortness of breath, orabdominal pain.

## 2024-11-04 ENCOUNTER — PATIENT OUTREACH (OUTPATIENT)
Dept: CARE COORDINATION | Facility: CLINIC | Age: 44
End: 2024-11-04
Payer: COMMERCIAL

## 2024-11-20 ENCOUNTER — MYC REFILL (OUTPATIENT)
Dept: FAMILY MEDICINE | Facility: CLINIC | Age: 44
End: 2024-11-20
Payer: COMMERCIAL

## 2024-11-20 DIAGNOSIS — F50.811 MODERATE BINGE-EATING DISORDER: ICD-10-CM

## 2024-11-21 RX ORDER — LISDEXAMFETAMINE DIMESYLATE 60 MG/1
60 CAPSULE ORAL EVERY MORNING
Qty: 30 CAPSULE | Refills: 0 | Status: SHIPPED | OUTPATIENT
Start: 2024-11-21

## 2024-12-05 ENCOUNTER — MYC MEDICAL ADVICE (OUTPATIENT)
Dept: PHYSICAL MEDICINE AND REHAB | Facility: CLINIC | Age: 44
End: 2024-12-05
Payer: COMMERCIAL

## 2024-12-05 DIAGNOSIS — M54.16 RIGHT LUMBAR RADICULOPATHY: Primary | ICD-10-CM

## 2024-12-05 DIAGNOSIS — G43.919 INTRACTABLE MIGRAINE WITHOUT STATUS MIGRAINOSUS, UNSPECIFIED MIGRAINE TYPE: ICD-10-CM

## 2024-12-05 RX ORDER — PREDNISONE 10 MG/1
TABLET ORAL
Qty: 18 TABLET | Refills: 0 | Status: SHIPPED | OUTPATIENT
Start: 2024-12-05

## 2024-12-09 DIAGNOSIS — G43.109 MIGRAINE WITH AURA AND WITHOUT STATUS MIGRAINOSUS, NOT INTRACTABLE: ICD-10-CM

## 2024-12-09 RX ORDER — ELETRIPTAN HYDROBROMIDE 40 MG/1
40 TABLET, FILM COATED ORAL
Qty: 18 TABLET | Refills: 3 | Status: SHIPPED | OUTPATIENT
Start: 2024-12-09

## 2024-12-30 ENCOUNTER — PATIENT OUTREACH (OUTPATIENT)
Dept: CARE COORDINATION | Facility: CLINIC | Age: 44
End: 2024-12-30
Payer: COMMERCIAL

## 2025-01-02 ENCOUNTER — ANCILLARY PROCEDURE (OUTPATIENT)
Dept: RADIOLOGY | Facility: CLINIC | Age: 45
End: 2025-01-02
Payer: COMMERCIAL

## 2025-01-07 ENCOUNTER — MYC MEDICAL ADVICE (OUTPATIENT)
Dept: FAMILY MEDICINE | Facility: CLINIC | Age: 45
End: 2025-01-07

## 2025-01-07 ENCOUNTER — PATIENT OUTREACH (OUTPATIENT)
Dept: CARE COORDINATION | Facility: CLINIC | Age: 45
End: 2025-01-07

## 2025-01-07 ENCOUNTER — VIRTUAL VISIT (OUTPATIENT)
Dept: FAMILY MEDICINE | Facility: CLINIC | Age: 45
End: 2025-01-07
Attending: FAMILY MEDICINE
Payer: COMMERCIAL

## 2025-01-07 DIAGNOSIS — F50.811 MODERATE BINGE-EATING DISORDER: Primary | ICD-10-CM

## 2025-01-07 DIAGNOSIS — E66.3 OVERWEIGHT WITH BODY MASS INDEX (BMI) OF 29 TO 29.9 IN ADULT: ICD-10-CM

## 2025-01-07 DIAGNOSIS — R03.0 ELEVATED BP WITHOUT DIAGNOSIS OF HYPERTENSION: ICD-10-CM

## 2025-01-07 PROCEDURE — 98005 SYNCH AUDIO-VIDEO EST LOW 20: CPT | Performed by: FAMILY MEDICINE

## 2025-01-07 ASSESSMENT — ANXIETY QUESTIONNAIRES
7. FEELING AFRAID AS IF SOMETHING AWFUL MIGHT HAPPEN: NOT AT ALL
8. IF YOU CHECKED OFF ANY PROBLEMS, HOW DIFFICULT HAVE THESE MADE IT FOR YOU TO DO YOUR WORK, TAKE CARE OF THINGS AT HOME, OR GET ALONG WITH OTHER PEOPLE?: NOT DIFFICULT AT ALL
6. BECOMING EASILY ANNOYED OR IRRITABLE: SEVERAL DAYS
GAD7 TOTAL SCORE: 3
4. TROUBLE RELAXING: SEVERAL DAYS
IF YOU CHECKED OFF ANY PROBLEMS ON THIS QUESTIONNAIRE, HOW DIFFICULT HAVE THESE PROBLEMS MADE IT FOR YOU TO DO YOUR WORK, TAKE CARE OF THINGS AT HOME, OR GET ALONG WITH OTHER PEOPLE: NOT DIFFICULT AT ALL
GAD7 TOTAL SCORE: 3
2. NOT BEING ABLE TO STOP OR CONTROL WORRYING: NOT AT ALL
5. BEING SO RESTLESS THAT IT IS HARD TO SIT STILL: SEVERAL DAYS
3. WORRYING TOO MUCH ABOUT DIFFERENT THINGS: NOT AT ALL
7. FEELING AFRAID AS IF SOMETHING AWFUL MIGHT HAPPEN: NOT AT ALL
GAD7 TOTAL SCORE: 3
1. FEELING NERVOUS, ANXIOUS, OR ON EDGE: NOT AT ALL

## 2025-01-07 ASSESSMENT — PATIENT HEALTH QUESTIONNAIRE - PHQ9
10. IF YOU CHECKED OFF ANY PROBLEMS, HOW DIFFICULT HAVE THESE PROBLEMS MADE IT FOR YOU TO DO YOUR WORK, TAKE CARE OF THINGS AT HOME, OR GET ALONG WITH OTHER PEOPLE: NOT DIFFICULT AT ALL
SUM OF ALL RESPONSES TO PHQ QUESTIONS 1-9: 0
SUM OF ALL RESPONSES TO PHQ QUESTIONS 1-9: 0

## 2025-01-07 NOTE — ASSESSMENT & PLAN NOTE
Since I last spoke with the patient, she has continued to lose weight.  She now has a BMI of 29.  A significant  of weight over the years for this individual has been binge eating disorder.  Vyvanse is quite helpful.  She is down 35 pounds or so since starting lisdexamfetamine.  Question of blood pressure.  She will report back and we may need to alter her plan.  She is eating well.  She is physically active to the best of her ability.  She is recovering from an injury.  Will plan for recheck in 6 months (assuming that her blood pressure is within target range).

## 2025-01-07 NOTE — ASSESSMENT & PLAN NOTE
All of her recent blood pressure measurements have been elevated.  These have been in acute settings.  I have asked her to check her blood pressure.  She works in the clinic.  She will report back.  If her blood pressure remains elevated we will either need to address with an anti-hypertensive medication or discontinue lisdexamfetamine.

## 2025-01-07 NOTE — PROGRESS NOTES
"Madeline is a 44 year old who is being evaluated via a billable video visit.    How would you like to obtain your AVS? MyChart  If the video visit is dropped, the invitation should be resent by: Text to cell phone: [phone]  Will anyone else be joining your video visit? No      Assessment & Plan   Problem List Items Addressed This Visit       Elevated BP without diagnosis of hypertension     All of her recent blood pressure measurements have been elevated.  These have been in acute settings.  I have asked her to check her blood pressure.  She works in the clinic.  She will report back.  If her blood pressure remains elevated we will either need to address with an anti-hypertensive medication or discontinue lisdexamfetamine.         Moderate binge-eating disorder - Primary    Overweight with body mass index (BMI) of 29 to 29.9 in adult     Since I last spoke with the patient, she has continued to lose weight.  She now has a BMI of 29.  A significant  of weight over the years for this individual has been binge eating disorder.  Vyvanse is quite helpful.  She is down 35 pounds or so since starting lisdexamfetamine.  Question of blood pressure.  She will report back and we may need to alter her plan.  She is eating well.  She is physically active to the best of her ability.  She is recovering from an injury.  Will plan for recheck in 6 months (assuming that her blood pressure is within target range).            BMI  Estimated body mass index is 29.7 kg/m  as calculated from the following:    Height as of 11/2/24: 1.626 m (5' 4\").    Weight as of 11/2/24: 78.5 kg (173 lb).   Weight management plan: Specific weight management program called Comprehensive Weight Management discussed    The longitudinal plan of care for the diagnosis(es)/condition(s) as documented were addressed during this visit. Due to the added complexity in care, I will continue to support Madeline in the subsequent management and with ongoing " continuity of care.    Isabella Correa is a 44 year old, presenting for the following health issues:  A.D.H.D        1/7/2025    11:45 AM   Additional Questions   Roomed by ac   Accompanied by self     Binging:    - vyvanse has been helpful   - helps with attention deficit hyperactivity disorder symptoms   - curbs appetite   - She generally feels well.  She is eating consistently.  She focuses on eating foods that contain protein and fiber.  - Blood pressure: This has not previously been a concern.  She has not been checking her blood pressure.  No lightheadedness, dizziness, headaches.   -Movement: She recently has been nonweightbearing as she injured herself walking down stairs.  She feels better now and is ready to return to full duty with her work.  Anticipate she will have paperwork completed later today in order to accomplish this.  This has limited her movement and she believes that she has not lost as much weight over the past month that she would have otherwise.    History of Present Illness       Reason for visit:  Follow up    She eats 2-3 servings of fruits and vegetables daily.She consumes 0 sweetened beverage(s) daily.She exercises with enough effort to increase her heart rate 20 to 29 minutes per day.  She exercises with enough effort to increase her heart rate 4 days per week.   She is taking medications regularly.         Objective    Vitals - Patient Reported  Weight (Patient Reported): 80 kg (176 lb 6.4 oz)      Physical Exam   GENERAL: alert and no distress  EYES: Eyes grossly normal to inspection.  No discharge or erythema, or obvious scleral/conjunctival abnormalities.  RESP: No audible wheeze, cough, or visible cyanosis.    SKIN: Visible skin clear. No significant rash, abnormal pigmentation or lesions.  NEURO: Cranial nerves grossly intact.  Mentation and speech appropriate for age.  PSYCH: Appropriate affect, tone, and pace of words      Video-Visit Details    Type of service:  Video  Visit   Originating Location (pt. Location): Home    Distant Location (provider location):  On-site  Platform used for Video Visit: Agatha  Signed Electronically by: Sebas Valdez MD

## 2025-01-12 ENCOUNTER — HEALTH MAINTENANCE LETTER (OUTPATIENT)
Age: 45
End: 2025-01-12

## 2025-02-04 ENCOUNTER — PATIENT OUTREACH (OUTPATIENT)
Dept: CARE COORDINATION | Facility: CLINIC | Age: 45
End: 2025-02-04
Payer: COMMERCIAL

## 2025-02-06 ENCOUNTER — MYC REFILL (OUTPATIENT)
Dept: FAMILY MEDICINE | Facility: CLINIC | Age: 45
End: 2025-02-06
Payer: COMMERCIAL

## 2025-02-06 DIAGNOSIS — F50.811 MODERATE BINGE-EATING DISORDER: ICD-10-CM

## 2025-02-06 RX ORDER — LISDEXAMFETAMINE DIMESYLATE 60 MG/1
60 CAPSULE ORAL EVERY MORNING
Qty: 30 CAPSULE | Refills: 0 | Status: SHIPPED | OUTPATIENT
Start: 2025-02-06

## 2025-02-19 ENCOUNTER — MYC MEDICAL ADVICE (OUTPATIENT)
Dept: FAMILY MEDICINE | Facility: CLINIC | Age: 45
End: 2025-02-19
Payer: COMMERCIAL

## 2025-02-19 NOTE — TELEPHONE ENCOUNTER
Last OV 1/7/25. Please advise if visit is needed for FMLA forms.    Forms not yet received by clinic.

## 2025-02-19 NOTE — TELEPHONE ENCOUNTER
Forms/Letter Request    Type of form/letter: FMLA - Intermittent    Number of hours per episode:  8 hours  Number of episodes per month:  4 times per month    Is Release of Information needed?: Yes  Was an BILL obtained?   See [employer] message to patient    Do we have the form/letter: Yes: Received by fax 02/19    Who is the form from? Patient    Where did/will the form come from? form was faxed in     When is form/letter needed by: As soon as provider is able    How would you like the form/letter returned: Fax : absence [facility] is [phone] per patient message    Patient Notified form requests are processed in 5-7 business days:Yes    Could we send this information to you in [employer] or would you prefer to receive a phone call?:   Patient would like to be contacted via [employer]

## 2025-02-28 ENCOUNTER — APPOINTMENT (OUTPATIENT)
Dept: CT IMAGING | Facility: HOSPITAL | Age: 45
End: 2025-02-28
Attending: STUDENT IN AN ORGANIZED HEALTH CARE EDUCATION/TRAINING PROGRAM
Payer: COMMERCIAL

## 2025-02-28 ENCOUNTER — HOSPITAL ENCOUNTER (EMERGENCY)
Facility: HOSPITAL | Age: 45
Discharge: HOME | End: 2025-02-28
Attending: STUDENT IN AN ORGANIZED HEALTH CARE EDUCATION/TRAINING PROGRAM | Admitting: STUDENT IN AN ORGANIZED HEALTH CARE EDUCATION/TRAINING PROGRAM
Payer: COMMERCIAL

## 2025-02-28 VITALS
BODY MASS INDEX: 29.53 KG/M2 | TEMPERATURE: 97 F | WEIGHT: 173 LBS | HEART RATE: 79 BPM | HEIGHT: 64 IN | DIASTOLIC BLOOD PRESSURE: 86 MMHG | OXYGEN SATURATION: 99 % | SYSTOLIC BLOOD PRESSURE: 149 MMHG | RESPIRATION RATE: 12 BRPM

## 2025-02-28 DIAGNOSIS — N12 PYELONEPHRITIS: ICD-10-CM

## 2025-02-28 LAB
ALBUMIN SERPL BCG-MCNC: 4.1 G/DL (ref 3.5–5.2)
ALBUMIN UR-MCNC: ABNORMAL G/DL
ALP SERPL-CCNC: 73 U/L (ref 40–150)
ALT SERPL W P-5'-P-CCNC: 16 U/L (ref 0–50)
ANION GAP SERPL CALCULATED.3IONS-SCNC: 9 MMOL/L (ref 7–15)
APPEARANCE UR: ABNORMAL
AST SERPL W P-5'-P-CCNC: 18 U/L (ref 0–45)
BACTERIA #/AREA URNS HPF: ABNORMAL /HPF
BASOPHILS # BLD AUTO: 0.1 10E3/UL (ref 0–0.2)
BASOPHILS NFR BLD AUTO: 1 %
BILIRUB SERPL-MCNC: 0.7 MG/DL
BILIRUB UR QL STRIP: ABNORMAL
BUN SERPL-MCNC: 8.9 MG/DL (ref 6–20)
CALCIUM SERPL-MCNC: 9.5 MG/DL (ref 8.8–10.4)
CHLORIDE SERPL-SCNC: 103 MMOL/L (ref 98–107)
COLOR UR AUTO: ABNORMAL
CREAT SERPL-MCNC: 0.83 MG/DL (ref 0.51–0.95)
EGFRCR SERPLBLD CKD-EPI 2021: 87 ML/MIN/1.73M2
EOSINOPHIL # BLD AUTO: 0.1 10E3/UL (ref 0–0.7)
EOSINOPHIL NFR BLD AUTO: 1 %
ERYTHROCYTE [DISTWIDTH] IN BLOOD BY AUTOMATED COUNT: 12.5 % (ref 10–15)
GLUCOSE SERPL-MCNC: 92 MG/DL (ref 70–99)
GLUCOSE UR STRIP-MCNC: ABNORMAL MG/DL
HCG UR QL: NEGATIVE
HCO3 SERPL-SCNC: 26 MMOL/L (ref 22–29)
HCT VFR BLD AUTO: 41.3 % (ref 35–47)
HGB BLD-MCNC: 13.7 G/DL (ref 11.7–15.7)
HGB UR QL STRIP: ABNORMAL
HYALINE CASTS: 1 /LPF
IMM GRANULOCYTES # BLD: 0 10E3/UL
IMM GRANULOCYTES NFR BLD: 0 %
KETONES UR STRIP-MCNC: ABNORMAL MG/DL
LEUKOCYTE ESTERASE UR QL STRIP: ABNORMAL
LIPASE SERPL-CCNC: 44 U/L (ref 13–60)
LYMPHOCYTES # BLD AUTO: 1.5 10E3/UL (ref 0.8–5.3)
LYMPHOCYTES NFR BLD AUTO: 24 %
MCH RBC QN AUTO: 28.1 PG (ref 26.5–33)
MCHC RBC AUTO-ENTMCNC: 33.2 G/DL (ref 31.5–36.5)
MCV RBC AUTO: 85 FL (ref 78–100)
MONOCYTES # BLD AUTO: 0.4 10E3/UL (ref 0–1.3)
MONOCYTES NFR BLD AUTO: 7 %
MUCOUS THREADS #/AREA URNS LPF: PRESENT /LPF
NEUTROPHILS # BLD AUTO: 4.2 10E3/UL (ref 1.6–8.3)
NEUTROPHILS NFR BLD AUTO: 66 %
NITRATE UR QL: ABNORMAL
NRBC # BLD AUTO: 0 10E3/UL
NRBC BLD AUTO-RTO: 0 /100
PH UR STRIP: ABNORMAL [PH]
PLATELET # BLD AUTO: 263 10E3/UL (ref 150–450)
POTASSIUM SERPL-SCNC: 3.6 MMOL/L (ref 3.4–5.3)
PROT SERPL-MCNC: 6.5 G/DL (ref 6.4–8.3)
RBC # BLD AUTO: 4.87 10E6/UL (ref 3.8–5.2)
RBC URINE: 10 /HPF
SODIUM SERPL-SCNC: 138 MMOL/L (ref 135–145)
SP GR UR STRIP: 1.02 (ref 1–1.03)
SQUAMOUS EPITHELIAL: 1 /HPF
UROBILINOGEN UR STRIP-MCNC: ABNORMAL MG/DL
WBC # BLD AUTO: 6.3 10E3/UL (ref 4–11)
WBC CLUMPS #/AREA URNS HPF: PRESENT /HPF
WBC URINE: >182 /HPF

## 2025-02-28 PROCEDURE — 96375 TX/PRO/DX INJ NEW DRUG ADDON: CPT

## 2025-02-28 PROCEDURE — 99285 EMERGENCY DEPT VISIT HI MDM: CPT | Mod: 25

## 2025-02-28 PROCEDURE — 250N000013 HC RX MED GY IP 250 OP 250 PS 637: Performed by: STUDENT IN AN ORGANIZED HEALTH CARE EDUCATION/TRAINING PROGRAM

## 2025-02-28 PROCEDURE — 81025 URINE PREGNANCY TEST: CPT | Performed by: STUDENT IN AN ORGANIZED HEALTH CARE EDUCATION/TRAINING PROGRAM

## 2025-02-28 PROCEDURE — 96374 THER/PROPH/DIAG INJ IV PUSH: CPT | Mod: 59

## 2025-02-28 PROCEDURE — 74177 CT ABD & PELVIS W/CONTRAST: CPT

## 2025-02-28 PROCEDURE — 96376 TX/PRO/DX INJ SAME DRUG ADON: CPT

## 2025-02-28 PROCEDURE — 96361 HYDRATE IV INFUSION ADD-ON: CPT

## 2025-02-28 PROCEDURE — 36415 COLL VENOUS BLD VENIPUNCTURE: CPT | Performed by: STUDENT IN AN ORGANIZED HEALTH CARE EDUCATION/TRAINING PROGRAM

## 2025-02-28 PROCEDURE — 85004 AUTOMATED DIFF WBC COUNT: CPT | Performed by: STUDENT IN AN ORGANIZED HEALTH CARE EDUCATION/TRAINING PROGRAM

## 2025-02-28 PROCEDURE — 258N000003 HC RX IP 258 OP 636: Performed by: STUDENT IN AN ORGANIZED HEALTH CARE EDUCATION/TRAINING PROGRAM

## 2025-02-28 PROCEDURE — 87186 SC STD MICRODIL/AGAR DIL: CPT | Performed by: STUDENT IN AN ORGANIZED HEALTH CARE EDUCATION/TRAINING PROGRAM

## 2025-02-28 PROCEDURE — 250N000011 HC RX IP 250 OP 636: Performed by: STUDENT IN AN ORGANIZED HEALTH CARE EDUCATION/TRAINING PROGRAM

## 2025-02-28 PROCEDURE — 85049 AUTOMATED PLATELET COUNT: CPT | Performed by: STUDENT IN AN ORGANIZED HEALTH CARE EDUCATION/TRAINING PROGRAM

## 2025-02-28 PROCEDURE — 81001 URINALYSIS AUTO W/SCOPE: CPT | Performed by: STUDENT IN AN ORGANIZED HEALTH CARE EDUCATION/TRAINING PROGRAM

## 2025-02-28 PROCEDURE — 83690 ASSAY OF LIPASE: CPT | Performed by: STUDENT IN AN ORGANIZED HEALTH CARE EDUCATION/TRAINING PROGRAM

## 2025-02-28 PROCEDURE — 80053 COMPREHEN METABOLIC PANEL: CPT | Performed by: STUDENT IN AN ORGANIZED HEALTH CARE EDUCATION/TRAINING PROGRAM

## 2025-02-28 RX ORDER — ONDANSETRON 4 MG/1
8 TABLET, ORALLY DISINTEGRATING ORAL EVERY 8 HOURS PRN
Qty: 10 TABLET | Refills: 0 | Status: SHIPPED | OUTPATIENT
Start: 2025-02-28 | End: 2025-02-28

## 2025-02-28 RX ORDER — MORPHINE SULFATE 4 MG/ML
4 INJECTION, SOLUTION INTRAMUSCULAR; INTRAVENOUS ONCE
Status: COMPLETED | OUTPATIENT
Start: 2025-02-28 | End: 2025-02-28

## 2025-02-28 RX ORDER — SULFAMETHOXAZOLE AND TRIMETHOPRIM 800; 160 MG/1; MG/1
1 TABLET ORAL 2 TIMES DAILY
Qty: 20 TABLET | Refills: 0 | Status: SHIPPED | OUTPATIENT
Start: 2025-02-28 | End: 2025-02-28

## 2025-02-28 RX ORDER — ONDANSETRON 4 MG/1
8 TABLET, ORALLY DISINTEGRATING ORAL EVERY 8 HOURS PRN
Qty: 10 TABLET | Refills: 0 | Status: SHIPPED | OUTPATIENT
Start: 2025-02-28

## 2025-02-28 RX ORDER — SULFAMETHOXAZOLE AND TRIMETHOPRIM 800; 160 MG/1; MG/1
1 TABLET ORAL 2 TIMES DAILY
Qty: 20 TABLET | Refills: 0 | Status: SHIPPED | OUTPATIENT
Start: 2025-02-28

## 2025-02-28 RX ORDER — IOPAMIDOL 755 MG/ML
84 INJECTION, SOLUTION INTRAVASCULAR ONCE
Status: COMPLETED | OUTPATIENT
Start: 2025-02-28 | End: 2025-02-28

## 2025-02-28 RX ORDER — ONDANSETRON 2 MG/ML
4 INJECTION INTRAMUSCULAR; INTRAVENOUS ONCE
Status: COMPLETED | OUTPATIENT
Start: 2025-02-28 | End: 2025-02-28

## 2025-02-28 RX ORDER — ACETAMINOPHEN 325 MG/1
650 TABLET ORAL ONCE
Status: COMPLETED | OUTPATIENT
Start: 2025-02-28 | End: 2025-02-28

## 2025-02-28 RX ORDER — HYDROCODONE BITARTRATE AND ACETAMINOPHEN 5; 325 MG/1; MG/1
1 TABLET ORAL EVERY 6 HOURS PRN
Qty: 3 TABLET | Refills: 0 | Status: SHIPPED | OUTPATIENT
Start: 2025-02-28

## 2025-02-28 RX ADMIN — ACETAMINOPHEN 650 MG: 325 TABLET ORAL at 12:02

## 2025-02-28 RX ADMIN — IOPAMIDOL 84 ML: 755 INJECTION, SOLUTION INTRAVENOUS at 11:40

## 2025-02-28 RX ADMIN — MORPHINE SULFATE 4 MG: 4 INJECTION, SOLUTION INTRAMUSCULAR; INTRAVENOUS at 12:02

## 2025-02-28 RX ADMIN — SODIUM CHLORIDE 1000 ML: 0.9 INJECTION, SOLUTION INTRAVENOUS at 10:31

## 2025-02-28 RX ADMIN — ONDANSETRON 4 MG: 2 INJECTION, SOLUTION INTRAMUSCULAR; INTRAVENOUS at 12:02

## 2025-02-28 RX ADMIN — ONDANSETRON 4 MG: 2 INJECTION, SOLUTION INTRAMUSCULAR; INTRAVENOUS at 10:35

## 2025-02-28 RX ADMIN — MORPHINE SULFATE 4 MG: 4 INJECTION, SOLUTION INTRAMUSCULAR; INTRAVENOUS at 10:35

## 2025-02-28 ASSESSMENT — COLUMBIA-SUICIDE SEVERITY RATING SCALE - C-SSRS
6. HAVE YOU EVER DONE ANYTHING, STARTED TO DO ANYTHING, OR PREPARED TO DO ANYTHING TO END YOUR LIFE?: NO
2. HAVE YOU ACTUALLY HAD ANY THOUGHTS OF KILLING YOURSELF IN THE PAST MONTH?: NO
1. IN THE PAST MONTH, HAVE YOU WISHED YOU WERE DEAD OR WISHED YOU COULD GO TO SLEEP AND NOT WAKE UP?: NO

## 2025-02-28 ASSESSMENT — ACTIVITIES OF DAILY LIVING (ADL)
ADLS_ACUITY_SCORE: 47

## 2025-02-28 NOTE — ED NOTES
Pt requesting additional stronger pain meds at time of discharge. States she is not able to take NSAIDs and tylenol does not work for her. Provider updated.

## 2025-02-28 NOTE — ED TRIAGE NOTES
Pt has had right back and flank pain, strong smelling urine, frequent urination since Tuesday and it got worse last night. Vomited at 8am today, took some pyridium last evening.  Pt does have a history of UTIS and has pyelo nephritis x1.

## 2025-02-28 NOTE — ED PROVIDER NOTES
EMERGENCY DEPARTMENT ENCOUNTER      NAME: Madeline Szymanski  AGE: 45 year old female  YOB: 1980  MRN: [id number]  EVALUATION DATE & TIME: 2/28/2025 10:05 AM    PCP: Sebas Valdez    ED PROVIDER: Ken Dejesus M.D.      Chief Complaint   Patient presents with    Urinary Symptoms         FINAL IMPRESSION:  1. Pyelonephritis          ED COURSE & MEDICAL DECISION MAKING:    Pertinent Labs & Imaging studies reviewed. (See chart for details)  45 year old female presents to the Emergency Department for evaluation of flank pain.  Patient with history of Rony.  She had some lower abdominal pain so CT scan was done to rule out appendicitis.  This is normal and reassuring.  Patient's blood work is also reassuring.  Urinalysis shows obvious infection.  Patient received some fluids and nausea medicine here in the ER which improved her symptoms.  She is allergic to Keflex so we will give the patient a prescription for Bactrim as that has worked for her in the past.  Lab results was reviewed and I do not see any previous urine cultures.  Will also give her prescription for Zofran and have her follow-up with her primary doctor.  Considered whether admission was necessary based on the diagnosis but patient is tolerating p.o. and I think will do well as an outpatient.    At the conclusion of the encounter I discussed the results of all of the tests and the disposition. The questions were answered. The patient or family acknowledged understanding and was agreeable with the care plan.     ED Course as of 02/28/25 1249   Fri Feb 28, 2025   1110 Patient's blood work is reassuring.  Urinalysis shows infection.  Because of her abdominal pain in the right lower quadrant we will do a CT scan just to rule out appendicitis although I suspect this is pyelonephritis.  Patient is feeling somewhat better with some Zofran and morphine we will continue with the fluids and await the CAT scan.           Medical Decision  Making  Obtained supplemental history:Supplemental history obtained?: No  Reviewed external records: External records reviewed?: No  Care impacted by chronic illness:Chronic Pain and Mental Health  Did you consider but not order tests?: Work up considered but not performed and documented in chart, if applicable  Did you interpret images independently?: Independent interpretation of ECG and images noted in documentation, when applicable.  Consultation discussion with other provider:Did you involve another provider (consultant, , pharmacy, etc.)?: No  Discharge. I prescribed additional prescription strength medication(s) as charted. I considered admission, but discharged patient after significant clinical improvement.    MIPS (CTPE, Dental pain, Maxwell, Sinusitis, Asthma/COPD, Head Trauma): Not Applicable              This patient involved a high degree of complexity in medical decision making, as significant risks were present and assessed. Recent encounters & results in medical record reviewed by me.     All workup (i.e. any EKG/labs/imaging as per charting below) reviewed and independently interpreted by me. See respective sections for details.       minutes of critical care time     MEDICATIONS GIVEN IN THE EMERGENCY:  Medications   sodium chloride 0.9% BOLUS 1,000 mL (0 mLs Intravenous Stopped 2/28/25 1121)   ondansetron (ZOFRAN) injection 4 mg (4 mg Intravenous $Given 2/28/25 1035)   morphine (PF) injection 4 mg (4 mg Intravenous $Given 2/28/25 1035)   iopamidol (ISOVUE-370) solution 84 mL (84 mLs Intravenous $Given 2/28/25 1140)   acetaminophen (TYLENOL) tablet 650 mg (650 mg Oral $Given 2/28/25 1202)   morphine (PF) injection 4 mg (4 mg Intravenous $Given 2/28/25 1202)   ondansetron (ZOFRAN) injection 4 mg (4 mg Intravenous $Given 2/28/25 1202)       NEW PRESCRIPTIONS STARTED AT TODAY'S ER VISIT  New Prescriptions    ONDANSETRON (ZOFRAN ODT) 4 MG ODT TAB    Take 2 tablets (8 mg) by mouth every 8 hours as  needed for nausea.    SULFAMETHOXAZOLE-TRIMETHOPRIM (BACTRIM DS) 800-160 MG TABLET    Take 1 tablet by mouth 2 times daily for 10 days.          =================================================================    HPI    Patient information was obtained from: the patient    Use of :         Madeline Szymanski is a 45 year old female with a pertinent history of hysterectomy, s/p laparoscopic sleeve gastrectomy, chronic right-sided lower back pain, with right-sided sciatica who presents for evaluation of UTI.    Patient reports three days of UTI symptoms including frequent urination, right flank pain, nausea, and vomiting. States these symptoms are consistent with UTIs she's had in the past. Also reports right lower abdominal pain. She also has history of kidney infection.       REVIEW OF SYSTEMS   Review of Systems   See HPI    PAST MEDICAL HISTORY:  Past Medical History:   Diagnosis Date    Acute and chronic respiratory failure with hypoxia (H) 2022    Anxiety     Arthritis     Depressive disorder     Gastric band slippage 2019    History of blood transfusion 1978    Kidney infection     Lumbar radiculopathy     Migraine     Migraine     Painless urinary retention due to cauda equina syndrome (H)     Pancreatitis     Panic attack     PONV (postoperative nausea and vomiting)     Spinal stenosis in cervical region     UTI (lower urinary tract infection)        PAST SURGICAL HISTORY:  Past Surgical History:   Procedure Laterality Date    ABDOMEN SURGERY       SECTION      FUSION CERVICAL ANTERIOR ONE LEVEL N/A 2022    Procedure: Cervical 5 - Cervical 6 anterior cervical decompression and artificial disc replacement, use of microscope;  Surgeon: Radha Lilly MD;  [place] [facility] [place]    GASTRECTOMY LAPAROSCOPIC SLEEVE      GI SURGERY      GYN SURGERY  2018    Hysterectomy    HYSTERECTOMY TOTAL ABDOMINAL      HYSTERECTOMY, PAP NO LONGER INDICATED      LAMINECTOMY  LUMBAR ONE LEVEL Left 10/04/2021    Procedure: LEFT LUMBAR 5-SACRAL 1 HEMILAMINECTOMY, MEDIAL FACETECTOMY, FORAMINOTOMY WITH;  Surgeon: Radha Lilly MD;  Location: [facility] Main OR    LUMBAR DISCECTOMY Left 10/04/2021    Procedure: LUMBAR 5-SACRAL 1 MICRODISCECTOMY;  Surgeon: Radha Lilly MD;  Location: [facility] Main OR           CURRENT MEDICATIONS:    ondansetron (ZOFRAN ODT) 4 MG ODT tab  sulfamethoxazole-trimethoprim (BACTRIM DS) 800-160 MG tablet  cyclobenzaprine (FLEXERIL) 10 MG tablet  eletriptan (RELPAX) 40 MG tablet  EPINEPHrine (ANY BX GENERIC EQUIV) 0.3 MG/0.3ML injection 2-pack  estradiol (VIVELLE-DOT) 0.025 MG/24HR bi-weekly patch  FLUoxetine (PROZAC) 40 MG capsule  hydrOXYzine HCl (ATARAX) 25 MG tablet  lidocaine (LIDODERM) 5 % patch  lisdexamfetamine (VYVANSE) 60 MG capsule  predniSONE (DELTASONE) 10 MG tablet  pregabalin (LYRICA) 75 MG capsule  rizatriptan (MAXALT-MLT) 10 MG ODT  testosterone POWD  topiramate (TOPAMAX) 50 MG tablet        ALLERGIES:  Allergies   Allergen Reactions    Cephalexin Hives    Coconut Flavoring Agent (Non-Screening) Anaphylaxis    Covid-19 (Mrna) Vaccine Anaphylaxis     Pfizer     Prochlorperazine Other (See Comments)     Tremors  When given with metoclopramide, okay by itself      Coconut Fatty Acid      Other reaction(s): Edema    Penicillins Hives       FAMILY HISTORY:  Family History   Problem Relation Age of Onset    Low Back Problems Mother         back surgery    Heart Disease Father     Hypertension Father     Substance Abuse Father     Obesity Sister     Obesity Paternal Grandmother     Mental Illness Nephew     Breast Cancer Cousin     Breast Cancer Other     Obesity Other     Cerebrovascular Disease Maternal Uncle     Cerebrovascular Disease Maternal Uncle     Colon Cancer No family hx of        SOCIAL HISTORY:   Social History     Socioeconomic History    Marital status:    Tobacco Use    Smoking status: Never     Passive exposure: Never  "   Smokeless tobacco: Never   Vaping Use    Vaping status: Never Used   Substance and Sexual Activity    Alcohol use: Never    Drug use: Never    Sexual activity: Yes     Partners: Male     Birth control/protection: Female Surgical   Other Topics Concern    Parent/sibling w/ CABG, MI or angioplasty before 65F 55M? Yes     Comment: Father     Social Drivers of Health     Financial Resource Strain: Low Risk  (11/20/2023)    Financial Resource Strain     Within the past 12 months, have you or your family members you live with been unable to get utilities (heat, electricity) when it was really needed?: No   Food Insecurity: Low Risk  (11/20/2023)    Food Insecurity     Within the past 12 months, did you worry that your food would run out before you got money to buy more?: No     Within the past 12 months, did the food you bought just not last and you didn t have money to get more?: No   Transportation Needs: Low Risk  (11/20/2023)    Transportation Needs     Within the past 12 months, has lack of transportation kept you from medical appointments, getting your medicines, non-medical meetings or appointments, work, or from getting things that you need?: No    Received from [facility]    Social Connections   Interpersonal Safety: Low Risk  (11/20/2023)    Interpersonal Safety     Do you feel physically and emotionally safe where you currently live?: Yes     Within the past 12 months, have you been hit, slapped, kicked or otherwise physically hurt by someone?: No     Within the past 12 months, have you been humiliated or emotionally abused in other ways by your partner or ex-partner?: No   Housing Stability: Low Risk  (11/20/2023)    Housing Stability     Do you have housing? : Yes     Are you worried about losing your housing?: No       VITALS:  BP (!) 144/97   Pulse 78   Temp 97  F (36.1  C) (Tympanic)   Resp 12   Ht 1.626 m (5' 4\")   Wt " 78.5 kg (173 lb)   SpO2 99%   BMI 29.70 kg/m        PHYSICAL EXAM    Constitutional: Well developed, Well nourished, NAD, GCS 15  HENT: Normocephalic, Atraumatic, Bilateral external ears normal, Oropharynx normal, mucous membranes moist, Nose normal. Neck-  Normal range of motion, No tenderness, Supple, No stridor.  Eyes: PERRL, EOMI, Conjunctiva normal, No discharge.   Respiratory: Normal breath sounds, No respiratory distress, No wheezing, Speaks full sentences easily. No cough.  Cardiovascular: Normal heart rate, Regular rhythm, No murmurs, No rubs, No gallops. Chest wall nontender.  GI:Soft, No masses No rebound or guarding. Mild right lower quadrant tenderness. Right CVA tenderness  Musculoskeletal: 2+ DP pulses. No edema.No cyanosis, No clubbing. Good range of motion in all major joints. No tenderness to palpation or major deformities noted.   Integument: Warm, Dry, No erythema, No rash. No petechiae.   Neurologic: Alert & oriented x 3,  CN 3-12 intact Normal motor function, Normal sensory function, No focal deficits noted. Normal gait. Normal finger to nose bilaterally  Psychiatric: Affect normal, Judgment normal, Mood normal. Cooperative.          LAB:  All pertinent labs reviewed and interpreted.  Labs Ordered and Resulted from Time of ED Arrival to Time of ED Departure   ROUTINE UA WITH MICROSCOPIC REFLEX TO CULTURE - Abnormal       Result Value    Color Urine Orange (*)     Appearance Urine Cloudy (*)     Glucose Urine        Bilirubin Urine        Ketones Urine        Specific Gravity Urine 1.019      Blood Urine        pH Urine        Protein Albumin Urine        Urobilinogen Urine        Nitrite Urine        Leukocyte Esterase Urine        Bacteria Urine Many (*)     WBC Clumps Urine Present (*)     Mucus Urine Present (*)     RBC Urine 10 (*)     WBC Urine >182 (*)     Squamous Epithelials Urine 1      Hyaline Casts Urine 1     COMPREHENSIVE METABOLIC PANEL - Normal    Sodium 138      Potassium 3.6       Carbon Dioxide (CO2) 26      Anion Gap 9      Urea Nitrogen 8.9      Creatinine 0.83      GFR Estimate 87      Calcium 9.5      Chloride 103      Glucose 92      Alkaline Phosphatase 73      AST 18      ALT 16      Protein Total 6.5      Albumin 4.1      Bilirubin Total 0.7     LIPASE - Normal    Lipase 44     HCG QUALITATIVE URINE - Normal    hCG Urine Qualitative Negative     CBC WITH PLATELETS AND DIFFERENTIAL    WBC Count 6.3      RBC Count 4.87      Hemoglobin 13.7      Hematocrit 41.3      MCV 85      MCH 28.1      MCHC 33.2      RDW 12.5      Platelet Count 263      % Neutrophils 66      % Lymphocytes 24      % Monocytes 7      % Eosinophils 1      % Basophils 1      % Immature Granulocytes 0      NRBCs per 100 WBC 0      Absolute Neutrophils 4.2      Absolute Lymphocytes 1.5      Absolute Monocytes 0.4      Absolute Eosinophils 0.1      Absolute Basophils 0.1      Absolute Immature Granulocytes 0.0      Absolute NRBCs 0.0     URINE CULTURE       RADIOLOGY:  Reviewed all pertinent imaging. Please see official radiology report.  CT Abdomen Pelvis w Contrast   Final Result   IMPRESSION:   No acute abnormalities in the abdomen or pelvis. Specifically, no appendicitis.                   I, Ken Lucas, am serving as a scribe to document services personally performed by Dr. Ken Dejesus based on my observation and the provider's statements to me. I, Ken Dejesus MD attest that Ken Lucas is acting in a scribe capacity, has observed my performance of the services and has documented them in accordance with my direction.    Ken Dejesus M.D.  Emergency Medicine  [facility] EMERGENCY DEPARTMENT  [facility] [facility] [address]  [phone]  Dept: [phone]       Ken Dejesus MD  02/28/25 [id number]

## 2025-03-01 LAB — BACTERIA UR CULT: ABNORMAL

## 2025-03-02 ENCOUNTER — TELEPHONE (OUTPATIENT)
Dept: NURSING | Facility: CLINIC | Age: 45
End: 2025-03-02
Payer: COMMERCIAL

## 2025-03-02 NOTE — TELEPHONE ENCOUNTER
[facility]    Reason for call: Lab Result Notification     Lab Result (including Rx patient on, if applicable).  If culture, copy of lab report at bottom.  Lab Result: Urine Culture - See Below    ED Rx: sulfamethoxazole-trimethoprim (BACTRIM DS) 800-160 MG tablet - Take 1 tablet by mouth 2 times daily (SUSCEPTIBLE)    Creatinine Level (mg/dl)   Creatinine   Date Value Ref Range Status   02/28/2025 0.83 0.51 - 0.95 mg/dL Final   05/19/2021 1.04 0.52 - 1.04 mg/dL Final    Creatinine clearance (ml/min), if applicable    Serum creatinine: 0.83 mg/dL 02/28/25 1029  Estimated creatinine clearance: 84.9 mL/min     ED Symptoms: Presented to the ED with flank pain, lower abdominal pain, frequent urination, nausea and vomiting.     Current Symptoms: Unable to assess.     3/2/2025 @ 1:49 PM: Patient returning call. States that her symptoms have improved. Denies any new or worsening symptoms. Care advice given per the urine culture protocol. Patient verbalized understanding and agreement with the plan of care.     RN Recommendations/Instructions per [place] ED lab result protocol:   [facility] ED lab result protocol utilized: Urine Culture  Continue antibiotic/medication as prescribed: Bactrim DS    Unable to reach patient/caregiver.     Left voicemail message requesting a call back to [phone] between 9 a.m. and 5:30 p.m. for patient's ED/ lab results.    Letter pended to be sent via [employer].       KATERIN MENDOZA RN

## 2025-03-02 NOTE — LETTER
March 2, 2025        Madeline Szymanski  [address]  [facility] [address]          Dear Madeline Szymanski:    You were seen in the [facility] Emergency Department at [facility] on 2/28/2025.  We are unable to reach you by phone, so we are sending you this letter.     It is important that you call [facility] Emergency Department lab result nurse at [phone], as we have information to relay to you AND/OR we MAY have to make some changes in your treatment.    Best time to call back is between 9AM and 5:30PM, 7 days a week.      Sincerely,     [facility] Emergency Department Lab Result RN  [phone]

## 2025-03-04 ENCOUNTER — OFFICE VISIT (OUTPATIENT)
Dept: PEDIATRICS | Facility: CLINIC | Age: 45
End: 2025-03-04
Payer: COMMERCIAL

## 2025-03-04 ENCOUNTER — PATIENT OUTREACH (OUTPATIENT)
Dept: FAMILY MEDICINE | Facility: CLINIC | Age: 45
End: 2025-03-04

## 2025-03-04 VITALS
DIASTOLIC BLOOD PRESSURE: 94 MMHG | BODY MASS INDEX: 30.4 KG/M2 | TEMPERATURE: 98.7 F | HEART RATE: 101 BPM | RESPIRATION RATE: 12 BRPM | HEIGHT: 64 IN | SYSTOLIC BLOOD PRESSURE: 139 MMHG | WEIGHT: 178.1 LBS | OXYGEN SATURATION: 99 %

## 2025-03-04 DIAGNOSIS — N10 ACUTE PYELONEPHRITIS: Primary | ICD-10-CM

## 2025-03-04 LAB
ALBUMIN UR-MCNC: NEGATIVE MG/DL
AMORPH CRY #/AREA URNS HPF: ABNORMAL /HPF
ANION GAP SERPL CALCULATED.3IONS-SCNC: 5 MMOL/L (ref 3–14)
APPEARANCE UR: CLEAR
BASOPHILS # BLD AUTO: 0.1 10E3/UL (ref 0–0.2)
BASOPHILS NFR BLD AUTO: 1 %
BILIRUB UR QL STRIP: NEGATIVE
BUN SERPL-MCNC: 9 MG/DL (ref 7–30)
CALCIUM SERPL-MCNC: 8.7 MG/DL (ref 8.5–10.1)
CHLORIDE BLD-SCNC: 107 MMOL/L (ref 94–109)
CO2 SERPL-SCNC: 25 MMOL/L (ref 20–32)
COLOR UR AUTO: YELLOW
CREAT SERPL-MCNC: 1.1 MG/DL (ref 0.52–1.04)
CRP SERPL-MCNC: 3.3 MG/L
EGFRCR SERPLBLD CKD-EPI 2021: 62 ML/MIN/1.73M2
EOSINOPHIL # BLD AUTO: 0.1 10E3/UL (ref 0–0.7)
EOSINOPHIL NFR BLD AUTO: 1 %
ERYTHROCYTE [DISTWIDTH] IN BLOOD BY AUTOMATED COUNT: 12.7 % (ref 10–15)
GLUCOSE BLD-MCNC: 107 MG/DL (ref 70–99)
GLUCOSE UR STRIP-MCNC: NEGATIVE MG/DL
HCT VFR BLD AUTO: 43.6 % (ref 35–47)
HGB BLD-MCNC: 14.6 G/DL (ref 11.7–15.7)
HGB UR QL STRIP: NEGATIVE
IMM GRANULOCYTES # BLD: 0 10E3/UL
IMM GRANULOCYTES NFR BLD: 0 %
KETONES UR STRIP-MCNC: NEGATIVE MG/DL
LEUKOCYTE ESTERASE UR QL STRIP: NEGATIVE
LYMPHOCYTES # BLD AUTO: 1.6 10E3/UL (ref 0.8–5.3)
LYMPHOCYTES NFR BLD AUTO: 25 %
MCH RBC QN AUTO: 29 PG (ref 26.5–33)
MCHC RBC AUTO-ENTMCNC: 33.5 G/DL (ref 31.5–36.5)
MCV RBC AUTO: 87 FL (ref 78–100)
MONOCYTES # BLD AUTO: 0.4 10E3/UL (ref 0–1.3)
MONOCYTES NFR BLD AUTO: 6 %
MUCOUS THREADS #/AREA URNS LPF: PRESENT /LPF
NEUTROPHILS # BLD AUTO: 4.3 10E3/UL (ref 1.6–8.3)
NEUTROPHILS NFR BLD AUTO: 67 %
NITRATE UR QL: NEGATIVE
PH UR STRIP: 7 [PH] (ref 5–8)
PLATELET # BLD AUTO: 339 10E3/UL (ref 150–450)
POTASSIUM BLD-SCNC: 4.5 MMOL/L (ref 3.4–5.3)
RBC # BLD AUTO: 5.03 10E6/UL (ref 3.8–5.2)
RBC #/AREA URNS AUTO: ABNORMAL /HPF
SODIUM SERPL-SCNC: 137 MMOL/L (ref 135–145)
SP GR UR STRIP: 1.01 (ref 1–1.03)
SQUAMOUS #/AREA URNS AUTO: ABNORMAL /LPF
UROBILINOGEN UR STRIP-ACNC: 1 E.U./DL
WBC # BLD AUTO: 6.4 10E3/UL (ref 4–11)
WBC #/AREA URNS AUTO: ABNORMAL /HPF

## 2025-03-04 PROCEDURE — 85025 COMPLETE CBC W/AUTO DIFF WBC: CPT | Performed by: EMERGENCY MEDICINE

## 2025-03-04 PROCEDURE — 3075F SYST BP GE 130 - 139MM HG: CPT | Performed by: EMERGENCY MEDICINE

## 2025-03-04 PROCEDURE — 36415 COLL VENOUS BLD VENIPUNCTURE: CPT | Performed by: EMERGENCY MEDICINE

## 2025-03-04 PROCEDURE — 1125F AMNT PAIN NOTED PAIN PRSNT: CPT | Performed by: EMERGENCY MEDICINE

## 2025-03-04 PROCEDURE — 96361 HYDRATE IV INFUSION ADD-ON: CPT | Performed by: EMERGENCY MEDICINE

## 2025-03-04 PROCEDURE — 81001 URINALYSIS AUTO W/SCOPE: CPT | Performed by: EMERGENCY MEDICINE

## 2025-03-04 PROCEDURE — 3080F DIAST BP >= 90 MM HG: CPT | Performed by: EMERGENCY MEDICINE

## 2025-03-04 PROCEDURE — 96374 THER/PROPH/DIAG INJ IV PUSH: CPT | Performed by: EMERGENCY MEDICINE

## 2025-03-04 PROCEDURE — 86140 C-REACTIVE PROTEIN: CPT | Performed by: EMERGENCY MEDICINE

## 2025-03-04 PROCEDURE — 99214 OFFICE O/P EST MOD 30 MIN: CPT | Mod: 25 | Performed by: EMERGENCY MEDICINE

## 2025-03-04 PROCEDURE — 80048 BASIC METABOLIC PNL TOTAL CA: CPT | Performed by: EMERGENCY MEDICINE

## 2025-03-04 RX ORDER — KETOROLAC TROMETHAMINE 30 MG/ML
30 INJECTION, SOLUTION INTRAMUSCULAR; INTRAVENOUS ONCE
Status: COMPLETED | OUTPATIENT
Start: 2025-03-04 | End: 2025-03-04

## 2025-03-04 RX ORDER — PHENAZOPYRIDINE HYDROCHLORIDE 200 MG/1
200 TABLET, FILM COATED ORAL 3 TIMES DAILY
Qty: 9 TABLET | Refills: 0 | Status: SHIPPED | OUTPATIENT
Start: 2025-03-04 | End: 2025-03-07

## 2025-03-04 RX ORDER — OXYCODONE HYDROCHLORIDE 5 MG/1
5 TABLET ORAL EVERY 6 HOURS PRN
Qty: 8 TABLET | Refills: 0 | Status: SHIPPED | OUTPATIENT
Start: 2025-03-04

## 2025-03-04 RX ADMIN — KETOROLAC TROMETHAMINE 30 MG: 30 INJECTION, SOLUTION INTRAMUSCULAR; INTRAVENOUS at 11:41

## 2025-03-04 RX ADMIN — Medication 1000 ML: at 11:41

## 2025-03-04 ASSESSMENT — PAIN SCALES - GENERAL: PAINLEVEL_OUTOF10: SEVERE PAIN (7)

## 2025-03-04 NOTE — PROGRESS NOTES
"Acute and Diagnostic Services Clinic Visit    Nursing triage note    Isabella Correa is a 45 year old, presenting for the following health issues:  Flank Pain (Bilateral)    Abdominal/Flank Pain  Onset/Duration: 1 Week.   Description:   Character: Sharp and Dull ache  Location: right flank left flank  Radiation: None  Intensity: 7/10 now, 8/10 at worst  Progression of Symptoms:  same  Accompanying Signs & Symptoms:  Fever/chills: no   Gas/Bloating: no   Nausea: YES  Vomitting: no   Diarrhea: no   Constipation:no   Dysuria: no            Hematuria: no            Frequency: no            Incontinence of urine: no   History:            Last bowel movement: three days ago  Trauma: no   Previous similar pain: YES Kidney infection- went to the ED and was put on antibiotics. Flank pain present on one side, but now both are present.  Previous tests done: CT           Previous Abdominal surgery: YES- Hysterectomy, Gastric band.  Precipitating factors:   Does the pain change with:     Food: no      Bowel Movement: no     Urination: no              Other factors: YES- walking and sitting is painful  Therapies tried and outcome:  Tylenol and Zofran isn't helping.     When food last eaten: This morning 8 am.        Objective    BP (!) 139/94   Pulse 101   Temp 98.7  F (37.1  C) (Oral)   Resp 12   Ht 1.626 m (5' 4\")   Wt 80.8 kg (178 lb 1.6 oz)   SpO2 99%   BMI 30.57 kg/m    Body mass index is 30.57 kg/m .          ADS PROVIDER NOTE  [facility]    History     Chief Complaint   Patient presents with    Flank Pain     Bilateral     HPI  Madeline Szymanski is a 45 year old female who was seen in the ER 4 days ago and diagnosed with pyelonephritis and placed on Bactrim.  Patient has taken the Bactrim over the last 4 days but states that she still has pain now in her bilateral flanks and lower abdomen and states that she does not feel like she is getting that much better.  Patient denies any fevers denies any vomiting. "  Patient's workup 4 days ago included labs as well as a CT that revealed no evidence of other intra-abdominal pathology.  Urine culture done revealed E. coli greater than 100,000 colonies in her urine that was sensitive to Bactrim and did not have any resistance to antibiotics.      EXAM: CT ABDOMEN PELVIS W CONTRAST  LOCATION: [facility]  DATE: 2/28/2025     INDICATION: rlq pain  COMPARISON: None.  TECHNIQUE: CT scan of the abdomen and pelvis was performed following injection of IV contrast. Multiplanar reformats were obtained. Dose reduction techniques were used.  CONTRAST: IsoVue 370 84mL     FINDINGS:      LOWER CHEST: Coronary calcifications.     HEPATOBILIARY: Subcentimeter hypoattenuating lesion/lesions are too small to characterize. No biliary dilation. Normal gallbladder.     PANCREAS: Normal.     SPLEEN: Normal.     ADRENAL GLANDS: Normal.     KIDNEYS/BLADDER: Benign renal cysts and/or probable cysts warrant no further follow up. No hydronephrosis. Normal urinary bladder.      BOWEL: Prior gastric surgery. No obstruction. Normal appendix. No bowel wall thickening or pneumatosis. No pneumoperitoneum or free fluid.      LYMPH NODES: No pathologically enlarged lymph nodes.     VASCULATURE: Nonaneurysmal aorta.      PELVIC ORGANS: No pelvic masses. Bilateral ovarian cysts versus follicles.     MUSCULOSKELETAL: Multilevel degenerative disc disease of the thoracolumbar spine.                                                                      IMPRESSION:  No acute abnormalities in the abdomen or pelvis. Specifically, no appendicitis.      I have reviewed the Medications, Allergies, Past Medical and Surgical History, and Social History in the [employer] system.    Past Medical History:   Diagnosis Date    Acute and chronic respiratory failure with hypoxia (H) 01/01/2022    Anxiety     Arthritis     Depressive disorder     Gastric band slippage 09/26/2019    History of blood transfusion 04/1978     Kidney infection     Lumbar radiculopathy     Migraine     Migraine     Painless urinary retention due to cauda equina syndrome (H)     Pancreatitis     Panic attack     PONV (postoperative nausea and vomiting)     Spinal stenosis in cervical region     UTI (lower urinary tract infection)        Past Surgical History:   Procedure Laterality Date    ABDOMEN SURGERY       SECTION      FUSION CERVICAL ANTERIOR ONE LEVEL N/A 2022    Procedure: Cervical 5 - Cervical 6 anterior cervical decompression and artificial disc replacement, use of microscope;  Surgeon: Radha Lilly MD;  Location: [facility] [place]    GASTRECTOMY LAPAROSCOPIC SLEEVE      GI SURGERY      GYN SURGERY  2018    Hysterectomy    HYSTERECTOMY TOTAL ABDOMINAL      HYSTERECTOMY, PAP NO LONGER INDICATED      LAMINECTOMY LUMBAR ONE LEVEL Left 10/04/2021    Procedure: LEFT LUMBAR 5-SACRAL 1 HEMILAMINECTOMY, MEDIAL FACETECTOMY, FORAMINOTOMY WITH;  Surgeon: Radha Lilly MD;  Location: [facility] [place]    LUMBAR DISCECTOMY Left 10/04/2021    Procedure: LUMBAR 5-SACRAL 1 MICRODISCECTOMY;  Surgeon: Radha Lilly MD;  Location: [facility] [place]         Dose / Directions   cyclobenzaprine 10 MG tablet  Commonly known as: FLEXERIL  Used for: Right lumbar radiculopathy      Dose: 10 mg  Take 1 tablet (10 mg) by mouth 3 times daily as needed for muscle spasms  Quantity: 30 tablet  Refills: 3     eletriptan 40 MG tablet  Commonly known as: RELPAX  Used for: Migraine with aura and without status migrainosus, not intractable      Dose: 40 mg  TAKE 1 TABLET (40 MG) BY MOUTH AT ONSET OF HEADACHE MAY REPEAT IF NEEDED IN 2 HOURS. MAX OF 2 DOSES/24HOURS MAX OF 4 DAYS/MONTH  Quantity: 18 tablet  Refills: 3     EPINEPHrine 0.3 MG/0.3ML injection 2-pack  Commonly known as: ANY BX GENERIC EQUIV      Dose: 0.3 mg  Inject 0.3 mLs (0.3 mg) into the muscle as needed for anaphylaxis May repeat one time in 5-15 minutes if response to initial  dose is inadequate.  Quantity: 0.6 mL  Refills: 0     estradiol 0.025 MG/24HR bi-weekly patch  Commonly known as: VIVELLE-DOT  Used for: Encounter for surveillance of transdermal patch hormonal contraceptive device      Dose: 1 patch  Apply 1 patch topically twice a week.  Quantity: 8 patch  Refills: 5     FLUoxetine 40 MG capsule  Commonly known as: PROzac  Used for: Major depressive disorder with single episode, in full remission      Dose: 40 mg  Take 1 capsule (40 mg) by mouth daily.  Quantity: 90 capsule  Refills: 1     HYDROcodone-acetaminophen 5-325 MG tablet  Commonly known as: NORCO      Dose: 1 tablet  Take 1 tablet by mouth every 6 hours as needed for pain.  Quantity: 3 tablet  Refills: 0     hydrOXYzine HCl 25 MG tablet  Commonly known as: ATARAX  Used for: Infection due to 2019 novel coronavirus      Dose: 25 mg  Take 1 tablet (25 mg) by mouth 3 times daily as needed for anxiety  Quantity: 90 tablet  Refills: 3     lidocaine 5 % patch  Commonly known as: LIDODERM  Used for: Chronic neck pain      Apply 1 patch externally to affected area as needed for up to 12 hours, then remove for 12 hours before reapplying.  Quantity: 28 patch  Refills: 3     lisdexamfetamine 60 MG capsule  Commonly known as: Vyvanse  Used for: Moderate binge-eating disorder      Dose: 60 mg  Take 1 capsule (60 mg) by mouth every morning.  Quantity: 30 capsule  Refills: 0     ondansetron 4 MG ODT tab  Commonly known as: ZOFRAN ODT      Dose: 8 mg  Take 2 tablets (8 mg) by mouth every 8 hours as needed for nausea.  Quantity: 10 tablet  Refills: 0     predniSONE 10 MG tablet  Commonly known as: DELTASONE  Used for: Right lumbar radiculopathy      Prednisone 10 mg tablet, 3 tablets p.o. daily for 3 days, then 2 tablets p.o. daily for 3 days, then 1 tablet p.o. daily for 3 days then stop.  Quantity: 18 tablet  Refills: 0     pregabalin 75 MG capsule  Commonly known as: LYRICA  Used for: Chronic right-sided low back pain without  sciatica      Dose: 150 mg  Take 2 capsules (150 mg) by mouth 2 times daily  Quantity: 120 capsule  Refills: 4     rizatriptan 10 MG ODT  Commonly known as: MAXALT-MLT  Used for: Migraine with aura and without status migrainosus, not intractable      Dose: 10 mg  Take 1 tablet (10 mg) by mouth at onset of headache for migraine May repeat in 2 hours, with a max of 30 mg in 24 hours and a max of 5 days/month  Quantity: 18 tablet  Refills: 1     sulfamethoxazole-trimethoprim 800-160 MG tablet  Commonly known as: Bactrim DS      Dose: 1 tablet  Take 1 tablet by mouth 2 times daily.  Quantity: 20 tablet  Refills: 0     testosterone Powd      Refills: 0     topiramate 50 MG tablet  Commonly known as: TOPAMAX  Used for: Migraine with aura and without status migrainosus, not intractable      Dose: 50 mg  Take 1 tablet (50 mg) by mouth daily  Quantity: 90 tablet  Refills: 1       Past medical history, past surgical history, medications, and allergies were reviewed with the patient. Additional pertinent items: None    Family History   Problem Relation Age of Onset    Low Back Problems Mother         back surgery    Heart Disease Father     Hypertension Father     Substance Abuse Father     Obesity Sister     Obesity Paternal Grandmother     Mental Illness Nephew     Breast Cancer Cousin     Breast Cancer Other     Obesity Other     Cerebrovascular Disease Maternal Uncle     Cerebrovascular Disease Maternal Uncle     Colon Cancer No family hx of        Social History     Tobacco Use    Smoking status: Never     Passive exposure: Never    Smokeless tobacco: Never   Substance Use Topics    Alcohol use: Never     Social history was reviewed with the patient. Additional pertinent items: None    Allergies   Allergen Reactions    Cephalexin Hives    Coconut Flavoring Agent (Non-Screening) Anaphylaxis    Covid-19 (Mrna) Vaccine Anaphylaxis     Pfizer     Prochlorperazine Other (See Comments)     Tremors  When given with  "metoclopramide, okay by itself      Coconut Fatty Acid      Other reaction(s): Edema    Penicillins Hives       Review of Systems  A medically appropriate review of systems was performed with pertinent positives and negatives noted in the HPI, and all other systems negative.    Physical Exam   BP: (!) 139/94  Pulse: 101  Temp: 98.7  F (37.1  C)  Resp: 12  Height: 162.6 cm (5' 4\")  Weight: 80.8 kg (178 lb 1.6 oz)  SpO2: 99 %      Physical Exam  Vitals and nursing note reviewed.   Constitutional:       Comments: Ambulatory conversant but appears fatigued   HENT:      Head: Atraumatic.   Eyes:      Extraocular Movements: Extraocular movements intact.      Pupils: Pupils are equal, round, and reactive to light.   Abdominal:      Palpations: Abdomen is soft.      Tenderness: There is no abdominal tenderness.   Musculoskeletal:         General: No deformity.      Cervical back: Neck supple.   Neurological:      General: No focal deficit present.      Mental Status: She is alert and oriented to person, place, and time.      Motor: No weakness.   Psychiatric:         Mood and Affect: Mood normal.       Bladder scan postvoid revealed 3 cc only.    ED Course     Orders Placed This Encounter   Procedures    UA with Microscopic reflex to Culture    Basic metabolic panel    CRP inflammation    CBC with platelets and differential    UA Microscopic with Reflex to Culture    IV access    IV- discontinue upon discharge    Bladder scan    CBC with platelets differential       Procedures         Administrations This Visit       ketorolac (TORADOL) injection 30 mg       Admin Date  03/04/2025 Action  $Given Dose  30 mg Route  Intravenous Documented By  Rachel Stubbs RN              sodium chloride 0.9% BOLUS 1,000 mL       Admin Date  03/04/2025 Action  $New Bag Dose  1,000 mL Rate  1,000 mL/hr Route  Intravenous Documented By  Rachel Stubbs RN                           Results for orders placed or performed in visit on " 03/04/25 (from the past 24 hours)   Basic metabolic panel   Result Value Ref Range    Sodium 137 135 - 145 mmol/L    Potassium 4.5 3.4 - 5.3 mmol/L    Chloride 107 94 - 109 mmol/L    Carbon Dioxide (CO2) 25 20 - 32 mmol/L    Anion Gap 5 3 - 14 mmol/L    Urea Nitrogen 9 7 - 30 mg/dL    Creatinine 1.10 (H) 0.52 - 1.04 mg/dL    GFR Estimate 62 >60 mL/min/1.73m2    Calcium 8.7 8.5 - 10.1 mg/dL    Glucose 107 (H) 70 - 99 mg/dL   CBC with platelets differential    Narrative    The following orders were created for panel order CBC with platelets differential.  Procedure                               Abnormality         Status                     ---------                               -----------         ------                     CBC with platelets and d...[[id number]]                      Final result                 Please view results for these tests on the individual orders.   UA with Microscopic reflex to Culture    Specimen: Urine, Clean Catch   Result Value Ref Range    Color Urine Yellow Colorless, Straw, Light Yellow, Yellow    Appearance Urine Clear Clear    Glucose Urine Negative Negative mg/dL    Bilirubin Urine Negative Negative    Ketones Urine Negative Negative mg/dL    Specific Gravity Urine 1.010 1.005 - 1.030    Blood Urine Negative Negative    pH Urine 7.0 5.0 - 8.0    Protein Albumin Urine Negative Negative mg/dL    Urobilinogen Urine 1.0 0.2, 1.0 E.U./dL    Nitrite Urine Negative Negative    Leukocyte Esterase Urine Negative Negative   CBC with platelets and differential   Result Value Ref Range    WBC Count 6.4 4.0 - 11.0 10e3/uL    RBC Count 5.03 3.80 - 5.20 10e6/uL    Hemoglobin 14.6 11.7 - 15.7 g/dL    Hematocrit 43.6 35.0 - 47.0 %    MCV 87 78 - 100 fL    MCH 29.0 26.5 - 33.0 pg    MCHC 33.5 31.5 - 36.5 g/dL    RDW 12.7 10.0 - 15.0 %    Platelet Count 339 150 - 450 10e3/uL    % Neutrophils 67 %    % Lymphocytes 25 %    % Monocytes 6 %    % Eosinophils 1 %    % Basophils 1 %    % Immature  Granulocytes 0 %    Absolute Neutrophils 4.3 1.6 - 8.3 10e3/uL    Absolute Lymphocytes 1.6 0.8 - 5.3 10e3/uL    Absolute Monocytes 0.4 0.0 - 1.3 10e3/uL    Absolute Eosinophils 0.1 0.0 - 0.7 10e3/uL    Absolute Basophils 0.1 0.0 - 0.2 10e3/uL    Absolute Immature Granulocytes 0.0 <=0.4 10e3/uL   UA Microscopic with Reflex to Culture   Result Value Ref Range    RBC Urine None Seen 0-2 /HPF /HPF    WBC Urine 0-5 0-5 /HPF /HPF    Squamous Epithelials Urine Few (A) None Seen /LPF    Mucus Urine Present (A) None Seen /LPF    Amorphous Crystals Urine Few (A) None Seen /HPF    Narrative    Urine Culture not indicated       Assessments & Plan (with Medical Decision Making)     I have reviewed the nursing notes.    Patient's labs especially the urine looks like the infection is resolving and the patient is improving.  At this point the treatment course will be continued with the addition of the medications below and the patient will be sent home    I have reviewed the findings, diagnosis, plan and need for follow up with the patient.    TOTAL PATIENT CARE TIME INCLUDING DOCUMENTATION, FAMILY COMMUNICATION AND CARE COORDINATION WAS 30 MINUTES.     New Prescriptions    OXYCODONE (ROXICODONE) 5 MG TABLET    Take 1 tablet (5 mg) by mouth every 6 hours as needed for moderate to severe pain.    PHENAZOPYRIDINE (PYRIDIUM) 200 MG TABLET    Take 1 tablet (200 mg) by mouth 3 times daily for 3 days.       Final diagnoses:   Acute pyelonephritis     Continue your antibiotic as prescribed    Fill your prescriptions given today and take as directed    Keep hydrated    Follow-up with your primary care in 7 to 10 days for recheck if not better    JUVENTINO SHELBY MD    3/4/2025   [facility]

## 2025-03-04 NOTE — TELEPHONE ENCOUNTER
Chart reviewed for TCM outreach from 2/28/25 ER visit for pyelonephritis.  Patient in communication with clinic RN today regarding ongoing symptoms and being evaluated at [facility] today (please see MyChart encounter from 3/4/2025 for further details). No outreach made.        Moriah Armenta RN  [facility]

## 2025-03-04 NOTE — PATIENT INSTRUCTIONS
Continue your antibiotic as prescribed    Fill your prescriptions given today and take as directed    Keep hydrated    Follow-up with your primary care in 7 to 10 days for recheck if not better   0 (no pain/absence of nonverbal indicators of pain)

## 2025-03-18 ENCOUNTER — MYC REFILL (OUTPATIENT)
Dept: FAMILY MEDICINE | Facility: CLINIC | Age: 45
End: 2025-03-18
Payer: COMMERCIAL

## 2025-03-18 DIAGNOSIS — F50.811 MODERATE BINGE-EATING DISORDER: ICD-10-CM

## 2025-03-18 RX ORDER — LISDEXAMFETAMINE DIMESYLATE 60 MG/1
60 CAPSULE ORAL EVERY MORNING
Qty: 30 CAPSULE | Refills: 0 | Status: SHIPPED | OUTPATIENT
Start: 2025-03-18

## 2025-03-20 ENCOUNTER — ANCILLARY PROCEDURE (OUTPATIENT)
Dept: MAMMOGRAPHY | Facility: HOSPITAL | Age: 45
End: 2025-03-20
Attending: FAMILY MEDICINE
Payer: COMMERCIAL

## 2025-03-20 DIAGNOSIS — Z12.31 ENCOUNTER FOR SCREENING MAMMOGRAM FOR BREAST CANCER: ICD-10-CM

## 2025-03-20 PROCEDURE — 77067 SCR MAMMO BI INCL CAD: CPT

## 2025-03-20 PROCEDURE — 77063 BREAST TOMOSYNTHESIS BI: CPT

## 2025-03-24 ENCOUNTER — OFFICE VISIT (OUTPATIENT)
Dept: FAMILY MEDICINE | Facility: CLINIC | Age: 45
End: 2025-03-24
Payer: COMMERCIAL

## 2025-03-24 ENCOUNTER — E-CONSULT (OUTPATIENT)
Dept: GASTROENTEROLOGY | Facility: CLINIC | Age: 45
End: 2025-03-24
Payer: COMMERCIAL

## 2025-03-24 VITALS
BODY MASS INDEX: 29.82 KG/M2 | TEMPERATURE: 98.8 F | HEIGHT: 64 IN | SYSTOLIC BLOOD PRESSURE: 138 MMHG | WEIGHT: 174.7 LBS | DIASTOLIC BLOOD PRESSURE: 84 MMHG | OXYGEN SATURATION: 98 % | HEART RATE: 104 BPM | RESPIRATION RATE: 12 BRPM

## 2025-03-24 DIAGNOSIS — Z82.49 FAMILY HISTORY OF ISCHEMIC HEART DISEASE: ICD-10-CM

## 2025-03-24 DIAGNOSIS — Q24.9 HEART ABNORMALITY: ICD-10-CM

## 2025-03-24 DIAGNOSIS — R49.9 CHANGE IN VOICE: Primary | ICD-10-CM

## 2025-03-24 DIAGNOSIS — K76.9 LESION OF LIVER LESS THAN 1 CM IN DIAMETER: ICD-10-CM

## 2025-03-24 PROBLEM — I25.10 CORONARY ARTERY CALCIFICATION SEEN ON CAT SCAN: Status: ACTIVE | Noted: 2025-03-24

## 2025-03-24 PROCEDURE — G2211 COMPLEX E/M VISIT ADD ON: HCPCS | Performed by: FAMILY MEDICINE

## 2025-03-24 PROCEDURE — 99207 PR NO BILLABLE SERVICE THIS VISIT: CPT | Performed by: INTERNAL MEDICINE

## 2025-03-24 PROCEDURE — 99207 E-CONSULT TO GASTROENTEROLOGY (ADULT OUTPT PROVIDER TO SPECIALIST WRITTEN QUESTION & RESPONSE): CPT | Performed by: FAMILY MEDICINE

## 2025-03-24 PROCEDURE — 3075F SYST BP GE 130 - 139MM HG: CPT | Performed by: FAMILY MEDICINE

## 2025-03-24 PROCEDURE — 99207 E-CONSULT TO HEPATOLOGY (ADULT OUTPT PROVIDER TO SPECIALIST WRITTEN QUESTION & RESPONSE): CPT | Performed by: FAMILY MEDICINE

## 2025-03-24 PROCEDURE — 99214 OFFICE O/P EST MOD 30 MIN: CPT | Performed by: FAMILY MEDICINE

## 2025-03-24 PROCEDURE — 3079F DIAST BP 80-89 MM HG: CPT | Performed by: FAMILY MEDICINE

## 2025-03-24 NOTE — ASSESSMENT & PLAN NOTE
Incidental finding seen in the upper portion of a CT scan of the abdomen and pelvis with contrast.  The patient and I reviewed the image ourselves.  Calcifications were not entirely obvious.  By ASCVD risk score, her heart disease risk is actually quite low and she does not meet criteria for statin.  We will evaluate this abnormality with a dedicated study.  CT coronary calcium scan ordered.  Orders:    CT Coronary Calcium Scan; Future

## 2025-03-24 NOTE — PROGRESS NOTES
Assessment & Plan   Assessment & Plan  Change in voice  The patient's voice changed in tone multiple times during the encounter.  This did not appear to be volitional.  I suspect some type of vocal cord dysfunction.  Unclear if this could be the result of a neurologic injury that started when she had a neck surgery a number of years ago.  She is relatively young without tobacco history and so laryngeal cancer is less likely.  Referral placed to ENT for evaluation.  Orders:     Adult ENT  Referral; Future    Heart abnormality  Incidental finding seen in the upper portion of a CT scan of the abdomen and pelvis with contrast.  The patient and I reviewed the image ourselves.  Calcifications were not entirely obvious.  By ASCVD risk score, her heart disease risk is actually quite low and she does not meet criteria for statin.  We will evaluate this abnormality with a dedicated study.  CT coronary calcium scan ordered.  Orders:     CT Coronary Calcium Scan; Future    Family history of ischemic heart disease    Orders:     CT Coronary Calcium Scan; Future    Lesion of liver less than 1 cm in diameter  The patient had a nonspecific abnormality on CT scan of abdomen and pelvis.  Liver labs at the time of the imaging were within normal limits.  E consultation placed for gastroenterology asking for recommendations regarding additional workup.  Hepatic MR?  Orders:     Adult E-Consult to Gastroenterology (Outpt Provider to Specialist Written Question & Response)         MED REC REQUIRED  Post Medication Reconciliation Status:  Patient was not discharged from an inpatient facility or TCU    The longitudinal plan of care for the diagnosis(es)/condition(s) as documented were addressed during this visit. Due to the added complexity in care, I will continue to support Madeline in the subsequent management and with ongoing continuity of care.      Isabella Correa is a 45 year old, presenting for the following health  "issues:  ER F/U (Follow-up from [facility] Emergency Department on 2/28/25)        3/24/2025     3:55 PM   Additional Questions   Roomed by xl     Multiple concerns:   - her voice comes and goes.  Weak today. No pain.  No obvious precipitant. \"Comes and goes.\"   - no history of smoking   - started after  her neck surgery (August 2022).  Worsening over the past three years.         The 10-year ASCVD risk score (Inge AMEZQUITA, et al., 2019) is: 0.6%    Values used to calculate the score:      Age: 45 years      Sex: Female      Is Non- : No      Diabetic: No      Tobacco smoker: No      Systolic Blood Pressure: 138 mmHg      Is BP treated: No      HDL Cholesterol: 66 mg/dL      Total Cholesterol: 163 mg/dL          Objective    /84   Pulse 104   Temp 98.8  F (37.1  C) (Oral)   Resp 12   Ht 1.626 m (5' 4\")   Wt 79.2 kg (174 lb 11.2 oz)   SpO2 98%   BMI 29.99 kg/m    Body mass index is 29.99 kg/m .  Physical Exam  Nursing note reviewed.   Constitutional:       General: She is not in acute distress.     Appearance: Normal appearance. She is not ill-appearing.   HENT:      Head: Normocephalic and atraumatic.   Eyes:      Extraocular Movements: Extraocular movements intact.      Conjunctiva/sclera: Conjunctivae normal.   Pulmonary:      Effort: Pulmonary effort is normal.   Neurological:      Mental Status: She is alert and oriented to person, place, and time.   Psychiatric:         Attention and Perception: Attention normal.         Mood and Affect: Mood normal.         Speech: Speech normal.         Thought Content: Thought content normal.                  Signed Electronically by: Sebas Valdez MD    "

## 2025-03-27 ENCOUNTER — E-CONSULT (OUTPATIENT)
Dept: GASTROENTEROLOGY | Facility: CLINIC | Age: 45
End: 2025-03-27
Payer: COMMERCIAL

## 2025-03-27 NOTE — PROGRESS NOTES
3/27/2025     E-Consult has been accepted.    Interprofessional consultation requested by:  Sebas Valdez MD      Clinical Question/Purpose: NOTE:  If any of the following are present, a face-to-face consult is recommended:    Patient assessment and information reviewed: 46yo noted to have scattered liver and renal cyst on imaging performed for other indication.  - Personally reviewed recent CT a/p.    Recommendations: No follow up recommended at this time for above liver lesions.       The recommendations provided in this E-Consult are based on a review of clinical data pertinent to the clinical question presented, without a review of the patient's complete medical record or, the benefit of a comprehensive in-person or virtual patient evaluation. This consultation should not replace the clinical judgement and evaluation of the provider ordering this E-Consult. Any new clinical issues, or changes in patient status since the filing of this E-Consult will need to be taken into account when assessing these recommendations. Please contact me if you have further questions.    My total time spent reviewing clinical information and formulating assessment was 10 minutes.        IAN SMART MD

## 2025-04-04 ENCOUNTER — ANCILLARY ORDERS (OUTPATIENT)
Dept: FAMILY MEDICINE | Facility: CLINIC | Age: 45
End: 2025-04-04

## 2025-04-04 ENCOUNTER — HOSPITAL ENCOUNTER (OUTPATIENT)
Dept: CT IMAGING | Facility: CLINIC | Age: 45
Discharge: HOME | End: 2025-04-04
Attending: FAMILY MEDICINE | Admitting: FAMILY MEDICINE
Payer: COMMERCIAL

## 2025-04-04 DIAGNOSIS — Z82.49 FAMILY HISTORY OF ISCHEMIC HEART DISEASE: ICD-10-CM

## 2025-04-04 DIAGNOSIS — Q24.9 HEART ABNORMALITY: ICD-10-CM

## 2025-04-04 DIAGNOSIS — Q24.9 HEART ABNORMALITY: Primary | ICD-10-CM

## 2025-04-04 PROCEDURE — 75571 CT HRT W/O DYE W/CA TEST: CPT

## 2025-04-04 PROCEDURE — 75571 CT HRT W/O DYE W/CA TEST: CPT | Mod: 26 | Performed by: INTERNAL MEDICINE

## 2025-04-05 LAB — RADIOLOGIST FLAGS: ABNORMAL

## 2025-04-07 ENCOUNTER — TELEPHONE (OUTPATIENT)
Dept: FAMILY MEDICINE | Facility: CLINIC | Age: 45
End: 2025-04-07
Payer: COMMERCIAL

## 2025-04-07 DIAGNOSIS — Q25.40 AORTIC ARCH ANOMALY: ICD-10-CM

## 2025-04-07 DIAGNOSIS — Q24.9 HEART ABNORMALITY: Primary | ICD-10-CM

## 2025-04-07 LAB
CV CALCIUM SCORE AGATSTON LM: 0
CV CALCIUM SCORING AGATSON LAD: 0
CV CALCIUM SCORING AGATSTON CX: 0
CV CALCIUM SCORING AGATSTON RCA: 0
CV CALCIUM SCORING AGATSTON TOTAL: 0

## 2025-04-07 NOTE — TELEPHONE ENCOUNTER
Date/time of call received from lab: 04/07/25 at 8:47 AM.    Lab test:  Radiologist consult for cardiology (calcium screening)    Lab value:    IMPRESSION:    1.  Prominent soft tissue density on the  view in the region of the proximal descending thoracic aorta. This could be positional, however, confirmation with noncontrast CT recommended.    Ordering provider name: [facility]    Ordering provider department: Primary Care Clinic    Primary Care Provider: Sebas Valdez     Result managed by: Clinic Hours: Primary Care clinic RN staff. Was test ordered in Primary Care?: Yes, ordered in Primary Care Primary Care: route telephone encounter high priority to ordering provider and ordering provider's clinic Nurse dmitry Major RN  [facility]

## 2025-04-10 ENCOUNTER — OFFICE VISIT (OUTPATIENT)
Dept: PHYSICAL MEDICINE AND REHAB | Facility: CLINIC | Age: 45
End: 2025-04-10
Payer: COMMERCIAL

## 2025-04-10 ENCOUNTER — MYC MEDICAL ADVICE (OUTPATIENT)
Dept: PHYSICAL MEDICINE AND REHAB | Facility: CLINIC | Age: 45
End: 2025-04-10
Payer: COMMERCIAL

## 2025-04-10 VITALS
HEART RATE: 106 BPM | SYSTOLIC BLOOD PRESSURE: 136 MMHG | DIASTOLIC BLOOD PRESSURE: 98 MMHG | OXYGEN SATURATION: 99 % | TEMPERATURE: 97 F

## 2025-04-10 DIAGNOSIS — M54.81 BILATERAL OCCIPITAL NEURALGIA: Primary | ICD-10-CM

## 2025-04-10 RX ORDER — LIDOCAINE HYDROCHLORIDE 20 MG/ML
3 INJECTION, SOLUTION INFILTRATION; PERINEURAL ONCE
Status: COMPLETED | OUTPATIENT
Start: 2025-04-10 | End: 2025-04-10

## 2025-04-10 RX ORDER — BUPIVACAINE HYDROCHLORIDE 2.5 MG/ML
4 INJECTION, SOLUTION EPIDURAL; INFILTRATION; INTRACAUDAL; PERINEURAL ONCE
Status: COMPLETED | OUTPATIENT
Start: 2025-04-10 | End: 2025-04-10

## 2025-04-10 RX ORDER — TRIAMCINOLONE ACETONIDE 40 MG/ML
40 INJECTION, SUSPENSION INTRA-ARTICULAR; INTRAMUSCULAR ONCE
Status: COMPLETED | OUTPATIENT
Start: 2025-04-10 | End: 2025-04-10

## 2025-04-10 RX ADMIN — BUPIVACAINE HYDROCHLORIDE 10 MG: 2.5 INJECTION, SOLUTION EPIDURAL; INFILTRATION; INTRACAUDAL; PERINEURAL at 11:22

## 2025-04-10 RX ADMIN — LIDOCAINE HYDROCHLORIDE 3 ML: 20 INJECTION, SOLUTION INFILTRATION; PERINEURAL at 11:24

## 2025-04-10 RX ADMIN — TRIAMCINOLONE ACETONIDE 40 MG: 40 INJECTION, SUSPENSION INTRA-ARTICULAR; INTRAMUSCULAR at 11:25

## 2025-04-10 NOTE — PROGRESS NOTES
Pre-procedure diagnosis: Greater occipital neuralgia  Post-procedure diagnosis:  Same  PROCEDURE: Bilateral greater occipital nerve block.    The risks of the procedure were discussed with the patient.  These include infection, bleeding, increased pain, no change in pain were discussed with the patient.  The patient gave written and verbal consent to proceed with the procedure.    The most tender area of the left occipital nerve at the nuchal ridge was palpated.  This area was then cleansed with a chlorhexidine swab x 2.  A solution of 4 mL of 20 mg of Kenalog mixed with equal parts of 0.25% bupivacaine and 2% lidocaine  was drawn up.  A 25 guage 2 inch needle was inserted down to os.  After aspiration was negative, once mL of the solution was injected one third of the way from the occipital protuberance and the mastoid process.  Without withdrawing the needle from the skin, the needle was redirected towards the mastoid process.  After aspiration was negative, the remaining injectate was injected.  The exact same procedure was done on the right side injecting 20 mg of Kenalog, equal parts of 0.25% bupivacaine and 2% lidocaine of a total of 4 mL of injectate.    The patient tolerated the procedure well.  The patient was monitored for a short period of time and then discharged under her own power.

## 2025-04-10 NOTE — LETTER
4/10/2025      Madeline Szymanski  [address]  [facility] [address]      Dear Colleague,    Thank you for referring your patient, Madeline Szymanski, to the [facility]. Please see a copy of my visit note below.    Pre-procedure diagnosis: Greater occipital neuralgia  Post-procedure diagnosis:  Same  PROCEDURE: Bilateral greater occipital nerve block.    The risks of the procedure were discussed with the patient.  These include infection, bleeding, increased pain, no change in pain were discussed with the patient.  The patient gave written and verbal consent to proceed with the procedure.    The most tender area of the left occipital nerve at the nuchal ridge was palpated.  This area was then cleansed with a chlorhexidine swab x 2.  A solution of 4 mL of 20 mg of Kenalog mixed with equal parts of 0.25% bupivacaine and 2% lidocaine  was drawn up.  A 25 guage 2 inch needle was inserted down to os.  After aspiration was negative, once mL of the solution was injected one third of the way from the occipital protuberance and the mastoid process.  Without withdrawing the needle from the skin, the needle was redirected towards the mastoid process.  After aspiration was negative, the remaining injectate was injected.  The exact same procedure was done on the right side injecting 20 mg of Kenalog, equal parts of 0.25% bupivacaine and 2% lidocaine of a total of 4 mL of injectate.    The patient tolerated the procedure well.  The patient was monitored for a short period of time and then discharged under her own power.       Again, thank you for allowing me to participate in the care of your patient.        Sincerely,        Marco Zaragoza, DO    Electronically signed

## 2025-04-11 ENCOUNTER — HOSPITAL ENCOUNTER (EMERGENCY)
Facility: HOSPITAL | Age: 45
Discharge: HOME | End: 2025-04-11
Attending: EMERGENCY MEDICINE | Admitting: EMERGENCY MEDICINE
Payer: COMMERCIAL

## 2025-04-11 VITALS
DIASTOLIC BLOOD PRESSURE: 92 MMHG | BODY MASS INDEX: 30.05 KG/M2 | HEIGHT: 64 IN | RESPIRATION RATE: 17 BRPM | OXYGEN SATURATION: 100 % | SYSTOLIC BLOOD PRESSURE: 133 MMHG | HEART RATE: 98 BPM | TEMPERATURE: 97.6 F | WEIGHT: 176 LBS

## 2025-04-11 DIAGNOSIS — M62.838 TRAPEZIUS MUSCLE SPASM: ICD-10-CM

## 2025-04-11 DIAGNOSIS — R51.9 ACUTE NONINTRACTABLE HEADACHE, UNSPECIFIED HEADACHE TYPE: ICD-10-CM

## 2025-04-11 PROCEDURE — 96372 THER/PROPH/DIAG INJ SC/IM: CPT | Performed by: EMERGENCY MEDICINE

## 2025-04-11 PROCEDURE — 250N000013 HC RX MED GY IP 250 OP 250 PS 637: Performed by: EMERGENCY MEDICINE

## 2025-04-11 PROCEDURE — 99284 EMERGENCY DEPT VISIT MOD MDM: CPT | Mod: 25 | Performed by: EMERGENCY MEDICINE

## 2025-04-11 PROCEDURE — 93005 ELECTROCARDIOGRAM TRACING: CPT | Performed by: EMERGENCY MEDICINE

## 2025-04-11 PROCEDURE — 250N000011 HC RX IP 250 OP 636: Mod: JZ | Performed by: EMERGENCY MEDICINE

## 2025-04-11 RX ORDER — KETOROLAC TROMETHAMINE 30 MG/ML
30 INJECTION, SOLUTION INTRAMUSCULAR; INTRAVENOUS ONCE
Status: COMPLETED | OUTPATIENT
Start: 2025-04-11 | End: 2025-04-11

## 2025-04-11 RX ORDER — DROPERIDOL 2.5 MG/ML
2.5 INJECTION, SOLUTION INTRAMUSCULAR; INTRAVENOUS ONCE
Status: COMPLETED | OUTPATIENT
Start: 2025-04-11 | End: 2025-04-11

## 2025-04-11 RX ORDER — CYCLOBENZAPRINE HCL 10 MG
10 TABLET ORAL ONCE
Status: COMPLETED | OUTPATIENT
Start: 2025-04-11 | End: 2025-04-11

## 2025-04-11 RX ADMIN — KETOROLAC TROMETHAMINE 30 MG: 30 INJECTION, SOLUTION INTRAMUSCULAR at 11:39

## 2025-04-11 RX ADMIN — CYCLOBENZAPRINE 10 MG: 10 TABLET, FILM COATED ORAL at 11:39

## 2025-04-11 RX ADMIN — DROPERIDOL 2.5 MG: 2.5 INJECTION, SOLUTION INTRAMUSCULAR; INTRAVENOUS at 14:10

## 2025-04-11 ASSESSMENT — ACTIVITIES OF DAILY LIVING (ADL)
ADLS_ACUITY_SCORE: 47

## 2025-04-11 ASSESSMENT — COLUMBIA-SUICIDE SEVERITY RATING SCALE - C-SSRS
2. HAVE YOU ACTUALLY HAD ANY THOUGHTS OF KILLING YOURSELF IN THE PAST MONTH?: NO
6. HAVE YOU EVER DONE ANYTHING, STARTED TO DO ANYTHING, OR PREPARED TO DO ANYTHING TO END YOUR LIFE?: NO
1. IN THE PAST MONTH, HAVE YOU WISHED YOU WERE DEAD OR WISHED YOU COULD GO TO SLEEP AND NOT WAKE UP?: NO

## 2025-04-11 NOTE — ED TRIAGE NOTES
The pt has a hx of chronic migraines, taking elitriptin at home. Pt states she had a occipital nerve block yesterday; pt had lefgt arm pain that started 3 hours ago today. Pt states the pain comes down from behind her neck and ear and into her left arm. The pt states she had a person take her BP today and noted her BP to be high; pt does not take BP meds at home. Arm pain 4/10.     Triage Assessment (Adult)       Row Name 04/11/25 1118          Triage Assessment    Airway WDL WDL        Respiratory WDL    Respiratory WDL WDL        Skin Circulation/Temperature WDL    Skin Circulation/Temperature WDL WDL        Cardiac WDL    Cardiac WDL WDL        Peripheral/Neurovascular WDL    Peripheral Neurovascular WDL WDL        Cognitive/Neuro/Behavioral WDL    Cognitive/Neuro/Behavioral WDL WDL        [place] Coma Scale    Best Eye Response 4-->(E4) spontaneous     Best Motor Response 6-->(M6) obeys commands     Best Verbal Response 5-->(V5) oriented     [place] Coma Scale Score 15

## 2025-04-11 NOTE — ED PROVIDER NOTES
EMERGENCY DEPARTMENT ENCOUNTER      NAME: Madeline Szymanski  AGE: 45 year old female  YOB: 1980  MRN: [id number]  EVALUATION DATE & TIME: No admission date for patient encounter.    PCP: Sebas Valdez    ED PROVIDER: Radha Abernathy MD    Chief Complaint   Patient presents with    Arm Pain    Headache    Hypertension         FINAL IMPRESSION:  1. Acute nonintractable headache, unspecified headache type    2. Trapezius muscle spasm          ED COURSE & MEDICAL DECISION MAKING:    Pertinent Labs & Imaging studies reviewed. (See chart for details)  45 year old female with history of binge eating disorder, obesity status post gastric sleeve, chronic low back pain status post laminectomy, status post previous hysterectomy, anxiety/depression who presents to the Emergency Department for evaluation of headache, left-sided neck pain that radiates down her left arm.  Patient seen in clinic yesterday with bilateral occipital neuralgia, status post bilateral occipital nerve block.  Had CT abdomen/pelvis in February noting coronary artery calcifications and therefore had recent CT scan of the chest on 4/4 for calcium score.  Calcium score of 0.  Called clinic this morning with complaints of ongoing headache with nausea.  Complained of elevated blood pressure but per home measurements this was in the 130s to 140s.  Her blood pressure here is elevated but normalized with treatment.  She has palpable spasm of the left trapezius.  I strongly suspect that this might be post procedural complication from her occipital nerve block.  She is clearly getting some left-sided radicular symptoms.  There may be component of her issues with chronic migraine, occipital neuralgia on top of a new tension headache.  Her dysphonia seems to be somewhat volitional or associated with her anxiety and resolves with reassurance.  I have no concerns that this is any sort of procedural complication.    Patient placed on monitor.  Given IM  Toradol, oral Flexeril.  Stated that the muscle relaxation muscle tension in her left shoulder and radicular symptoms are resolved but the headaches persist.  Given 2.5 mg of droperidol IM with resolution of her symptoms.  She is feeling much improved, and is safe for discharge home.    ED Course as of 04/11/25 1703   Fri Apr 11, 2025   1245 Patient states muscular pain is improved, still complains of a significant headache.  Her hoarseness in her voice is completely resolved   1447 Patient feeling improved requesting discharge       11:36 AM I met with the patient, obtained history, performed an initial exam, and discussed options and plan for diagnostics and treatment here in the ED.   2:51 PM We discussed plans for discharge including supportive cares, symptomatic treatment, outpatient follow up, and reasons to return to the emergency department.    Medical Decision Making  I obtained history from Other: na  Discharge. No recommendations on prescription strength medication(s). See documentation for any additional details.    MIPS (CTPE, Dental pain, Maxwell, Sinusitis, Asthma/COPD, Head Trauma): Not Applicable    SEPSIS: None          At the conclusion of the encounter I discussed the results of all of the tests and the disposition. The questions were answered. The patient or family acknowledged understanding and was agreeable with the care plan.    MEDICATIONS GIVEN IN THE EMERGENCY:  Medications   ketorolac (TORADOL) injection 30 mg (30 mg Intramuscular $Given 4/11/25 1139)   cyclobenzaprine (FLEXERIL) tablet 10 mg (10 mg Oral $Given 4/11/25 1139)   droPERidol (INAPSINE) injection 2.5 mg (2.5 mg Intramuscular $Given 4/11/25 1410)       NEW PRESCRIPTIONS STARTED AT TODAY'S ER VISIT  Discharge Medication List as of 4/11/2025  3:14 PM             =================================================================    HPI    Patient information was obtained from: the patient     Use of Intrepreter: N/A        Madeline ODONNELL  Stephon is a 45 year old female with pertinent medical history of chronic neck pain, migraines, lumbar radiculopathy, lumbosacral spine surgery, chronic right-sided low back pain with right-sided sciatica, panic attacks and elevated blood pressure without diagnosis of hypertension, who presents neck and arm pain.     The patient has a history of migraines without visual auras. She usually takes elitripin for this with resolution of her migraines. She occasionally gets Yesterday (4/10/2025), she had a bilateral nerve block for occipital neuralgia, which seemed to alleviate her symptoms. She has a history of elevated blood pressures, but is not taking any antihypertensives.     This morning (4/11/2025) at around 8:30 AM, the patient was rooming patients at work when she had a sudden onset of left arm pain. The pain radiates from the left side of her neck into her left scapula, left axilla and left forearm. The pain does not radiate into her chest or abdomen. Moving her left arm worsens the arm pain. She took elitripin at home this morning at 10:00 AM.     No recent cough, cold or chest congestion.     Per Chart Review, the patient was seen on 4/10/2025 at [facility] for a bilateral greater occipital nerve block for greater occipital neuralgia. 20 mg Kenaglog injected on both sides. The patient tolerated the procedure well and was discharged home.       PAST MEDICAL HISTORY:  Past Medical History:   Diagnosis Date    Acute and chronic respiratory failure with hypoxia (H) 01/01/2022    Anxiety     Arthritis     Depressive disorder     Gastric band slippage 09/26/2019    History of blood transfusion 04/1978    Kidney infection     Lumbar radiculopathy     Migraine     Migraine     Painless urinary retention due to cauda equina syndrome (H)     Pancreatitis     Panic attack     PONV (postoperative nausea and vomiting)     Spinal stenosis in cervical region     UTI (lower urinary tract infection)         PAST SURGICAL HISTORY:  Past Surgical History:   Procedure Laterality Date    ABDOMEN SURGERY      19 band removal     SECTION      FUSION CERVICAL ANTERIOR ONE LEVEL N/A 2022    Procedure: Cervical 5 - Cervical 6 anterior cervical decompression and artificial disc replacement, use of microscope;  Surgeon: Radha Lilly MD;  Location: [facility] OR    GASTRECTOMY LAPAROSCOPIC SLEEVE      GI SURGERY      Gastric  sleeve    GYN SURGERY  2018    Hysterectomy    HYSTERECTOMY TOTAL ABDOMINAL      HYSTERECTOMY, PAP NO LONGER INDICATED      LAMINECTOMY LUMBAR ONE LEVEL Left 10/04/2021    Procedure: LEFT LUMBAR 5-SACRAL 1 HEMILAMINECTOMY, MEDIAL FACETECTOMY, FORAMINOTOMY WITH;  Surgeon: Radha Lilly MD;  Location: [facility] OR    LUMBAR DISCECTOMY Left 10/04/2021    Procedure: LUMBAR 5-SACRAL 1 MICRODISCECTOMY;  Surgeon: Radha Lilly MD;  Location: [facility] OR       CURRENT MEDICATIONS:    Prior to Admission Medications   Prescriptions Last Dose Informant Patient Reported? Taking?   EPINEPHrine (ANY BX GENERIC EQUIV) 0.3 MG/0.3ML injection 2-pack   No No   Sig: Inject 0.3 mLs (0.3 mg) into the muscle as needed for anaphylaxis May repeat one time in 5-15 minutes if response to initial dose is inadequate.   FLUoxetine (PROZAC) 40 MG capsule   No No   Sig: Take 1 capsule (40 mg) by mouth daily.   eletriptan (RELPAX) 40 MG tablet   No No   Sig: TAKE 1 TABLET (40 MG) BY MOUTH AT ONSET OF HEADACHE MAY REPEAT IF NEEDED IN 2 HOURS. MAX OF 2 DOSES/24HOURS MAX OF 4 DAYS/MONTH   estradiol (VIVELLE-DOT) 0.025 MG/24HR bi-weekly patch   No No   Sig: Apply 1 patch topically twice a week.   hydrOXYzine HCl (ATARAX) 25 MG tablet   No No   Sig: Take 1 tablet (25 mg) by mouth 3 times daily as needed for anxiety   lidocaine (LIDODERM) 5 % patch   No No   Sig: Apply 1 patch externally to affected area as needed for up to 12 hours, then remove for 12 hours before reapplying.    lisdexamfetamine (VYVANSE) 60 MG capsule   No No   Sig: Take 1 capsule (60 mg) by mouth every morning.   pregabalin (LYRICA) 75 MG capsule   No No   Sig: Take 2 capsules (150 mg) by mouth 2 times daily   rizatriptan (MAXALT-MLT) 10 MG ODT   No No   Sig: Take 1 tablet (10 mg) by mouth at onset of headache for migraine May repeat in 2 hours, with a max of 30 mg in 24 hours and a max of 5 days/month   testosterone POWD   Yes No   topiramate (TOPAMAX) 50 MG tablet   No No   Sig: Take 1 tablet (50 mg) by mouth daily      Facility-Administered Medications Last Administration Doses Remaining   bupivacaine (MARCAINE) 0.25% preservative free injection 4/10/2025 11:22 AM 0   lidocaine 2% injection (MDV) 4/10/2025 11:24 AM 0   triamcinolone (KENALOG-40) injection 40 mg 4/10/2025 11:25 AM 0          ALLERGIES:  Allergies   Allergen Reactions    Cephalexin Hives    Coconut Flavoring Agent (Non-Screening) Anaphylaxis    Covid-19 (Mrna) Vaccine Anaphylaxis     Pfizer     Prochlorperazine Other (See Comments)     Tremors  When given with metoclopramide, okay by itself      Coconut Fatty Acid      Other reaction(s): Edema    Hydrocodone-Acetaminophen Dizziness, Headache and Nausea and Vomiting    Penicillins Hives       FAMILY HISTORY:  Family History   Problem Relation Age of Onset    Low Back Problems Mother         back surgery    Heart Disease Father     Hypertension Father     Substance Abuse Father     Obesity Sister     Obesity Paternal Grandmother     Breast Cancer Maternal Aunt 50        States 3 maternal aunts with a history of breast cancer in their 50's    Cerebrovascular Disease Maternal Uncle     Cerebrovascular Disease Maternal Uncle     Mental Illness Nephew     Breast Cancer Cousin 48        Maternal cousin    Breast Cancer Other         Age of onset unknown    Obesity Other     Cerebrovascular Disease Other     Mental Illness Nephew     Substance Abuse Other         Paternal    Colon Cancer No family hx of   "      SOCIAL HISTORY:  Social History     Tobacco Use    Smoking status: Never     Passive exposure: Never    Smokeless tobacco: Never   Vaping Use    Vaping status: Never Used   Substance Use Topics    Alcohol use: Never    Drug use: Never        VITALS:  Patient Vitals for the past 24 hrs:   BP Temp Temp src Pulse Resp SpO2 Height Weight   04/11/25 1520 (!) 133/92 -- -- 98 17 100 % -- --   04/11/25 1345 (!) 168/93 -- -- 75 -- 100 % -- --   04/11/25 1325 (!) 162/95 -- -- 70 -- 100 % -- --   04/11/25 1220 (!) 157/97 -- -- 83 16 100 % -- --   04/11/25 1143 (!) 165/102 -- -- 94 -- 97 % -- --   04/11/25 1125 (!) 168/93 -- -- 106 -- 100 % -- --   04/11/25 1116 (!) 183/112 97.6  F (36.4  C) Oral 107 12 100 % 1.626 m (5' 4\") 79.8 kg (176 lb)       PHYSICAL EXAM    General Appearance: Well-appearing, well-nourished. Anxious. Intermittent hoarseness in her voice, seems to be associated with when she is anxious.   Head:  Normocephalic, atraumatic. Reproducible tenderness to palpation of left trapezius, which is spasmed and palpably tight.   Eyes:  PERRL, conjunctiva/corneas clear, EOM's intact  ENT:  membranes are moist without pallor  Neck:  Supple, no midline tenderness palpation, no nuchal rigidity or meningismus  Cardio:  Regular rate and rhythm, no murmur/gallop/rub  Pulm:  No respiratory distress, clear to auscultation bilaterally  Extremities: Moves extremities normally, normal gait  Skin:  Skin warm, dry, no rashes  Neuro:  Alert and oriented ×3, moving all extremities, no gross sensory defects. Cranial nerves III-XII intact.      RADIOLOGY/LABS:  Reviewed all pertinent imaging. Please see official radiology report. All pertinent labs reviewed and interpreted.         EKG:  Performed at: 11:29 AM, 4/11/2025    Impression: Sinus rhythm. Normal EKG.     Rate: 100 bpm  Rhythm: Sinus rhythm  Axis: 57 87 39  GA Interval: 166 ms  QRS Interval: 82 ms  QTc Interval: 451 ms  ST Changes: None  Comparison: When compared with " EKG of 1/20/2022, no significant change was found.   I have independently reviewed and interpreted the EKG(s) documented above.      The creation of this record is based on the scribe s observations of the work being performed by Radha Abernathy MD and the provider s statements to them. It was created on her behalf by Catherine Castillo, a trained medical scribe. This document has been checked and approved by the attending provider.    Radha Abernathy MD  Emergency Medicine  [facility] EMERGENCY DEPARTMENT  [address] [place] [id number]  [phone]  Dept: [phone]     Radha Abernathy MD  04/11/25 0362

## 2025-04-12 LAB
ATRIAL RATE - MUSE: 100 BPM
DIASTOLIC BLOOD PRESSURE - MUSE: 93 MMHG
INTERPRETATION ECG - MUSE: NORMAL
P AXIS - MUSE: 57 DEGREES
PR INTERVAL - MUSE: 166 MS
QRS DURATION - MUSE: 82 MS
QT - MUSE: 350 MS
QTC - MUSE: 451 MS
R AXIS - MUSE: 87 DEGREES
SYSTOLIC BLOOD PRESSURE - MUSE: 168 MMHG
T AXIS - MUSE: 39 DEGREES
VENTRICULAR RATE- MUSE: 100 BPM

## 2025-04-14 ENCOUNTER — HOSPITAL ENCOUNTER (OUTPATIENT)
Dept: CT IMAGING | Facility: HOSPITAL | Age: 45
Discharge: HOME | End: 2025-04-14
Attending: FAMILY MEDICINE | Admitting: FAMILY MEDICINE
Payer: COMMERCIAL

## 2025-04-14 DIAGNOSIS — Q24.9 HEART ABNORMALITY: ICD-10-CM

## 2025-04-14 DIAGNOSIS — Q25.40 AORTIC ARCH ANOMALY: ICD-10-CM

## 2025-04-14 PROCEDURE — 71250 CT THORAX DX C-: CPT

## 2025-05-01 ENCOUNTER — MYC REFILL (OUTPATIENT)
Dept: PHYSICAL MEDICINE AND REHAB | Facility: CLINIC | Age: 45
End: 2025-05-01
Payer: COMMERCIAL

## 2025-05-01 DIAGNOSIS — G89.29 CHRONIC RIGHT-SIDED LOW BACK PAIN WITHOUT SCIATICA: ICD-10-CM

## 2025-05-01 DIAGNOSIS — M54.50 CHRONIC RIGHT-SIDED LOW BACK PAIN WITHOUT SCIATICA: ICD-10-CM

## 2025-05-01 RX ORDER — PREGABALIN 75 MG/1
150 CAPSULE ORAL 2 TIMES DAILY
Qty: 120 CAPSULE | Refills: 5 | Status: SHIPPED | OUTPATIENT
Start: 2025-05-01

## 2025-05-19 ENCOUNTER — PATIENT OUTREACH (OUTPATIENT)
Dept: CARE COORDINATION | Facility: CLINIC | Age: 45
End: 2025-05-19
Payer: COMMERCIAL

## 2025-05-28 ENCOUNTER — OFFICE VISIT (OUTPATIENT)
Dept: URGENT CARE | Facility: URGENT CARE | Age: 45
End: 2025-05-28
Payer: COMMERCIAL

## 2025-05-28 ENCOUNTER — MYC REFILL (OUTPATIENT)
Dept: FAMILY MEDICINE | Facility: CLINIC | Age: 45
End: 2025-05-28

## 2025-05-28 ENCOUNTER — MYC MEDICAL ADVICE (OUTPATIENT)
Dept: PHYSICAL MEDICINE AND REHAB | Facility: CLINIC | Age: 45
End: 2025-05-28

## 2025-05-28 ENCOUNTER — RESULTS FOLLOW-UP (OUTPATIENT)
Dept: URGENT CARE | Facility: URGENT CARE | Age: 45
End: 2025-05-28

## 2025-05-28 VITALS
OXYGEN SATURATION: 98 % | SYSTOLIC BLOOD PRESSURE: 144 MMHG | TEMPERATURE: 98.5 F | RESPIRATION RATE: 18 BRPM | DIASTOLIC BLOOD PRESSURE: 100 MMHG | HEART RATE: 94 BPM

## 2025-05-28 DIAGNOSIS — M47.816 LUMBAR FACET ARTHROPATHY: ICD-10-CM

## 2025-05-28 DIAGNOSIS — M54.50 CHRONIC BILATERAL LOW BACK PAIN WITHOUT SCIATICA: Primary | ICD-10-CM

## 2025-05-28 DIAGNOSIS — R50.9 FEVER, UNSPECIFIED FEVER CAUSE: Primary | ICD-10-CM

## 2025-05-28 DIAGNOSIS — B34.9 VIRAL SYNDROME: ICD-10-CM

## 2025-05-28 DIAGNOSIS — G89.29 CHRONIC BILATERAL LOW BACK PAIN WITHOUT SCIATICA: Primary | ICD-10-CM

## 2025-05-28 DIAGNOSIS — F50.811 MODERATE BINGE-EATING DISORDER: ICD-10-CM

## 2025-05-28 LAB
ALBUMIN UR-MCNC: NEGATIVE MG/DL
APPEARANCE UR: CLEAR
BILIRUB UR QL STRIP: NEGATIVE
COLOR UR AUTO: YELLOW
GLUCOSE UR STRIP-MCNC: NEGATIVE MG/DL
HGB UR QL STRIP: NEGATIVE
KETONES UR STRIP-MCNC: NEGATIVE MG/DL
LEUKOCYTE ESTERASE UR QL STRIP: NEGATIVE
NITRATE UR QL: NEGATIVE
PH UR STRIP: 6 [PH] (ref 5–8)
SP GR UR STRIP: 1.02 (ref 1–1.03)
UROBILINOGEN UR STRIP-ACNC: 1 E.U./DL

## 2025-05-28 PROCEDURE — 3080F DIAST BP >= 90 MM HG: CPT | Performed by: FAMILY MEDICINE

## 2025-05-28 PROCEDURE — 99213 OFFICE O/P EST LOW 20 MIN: CPT | Performed by: FAMILY MEDICINE

## 2025-05-28 PROCEDURE — 3077F SYST BP >= 140 MM HG: CPT | Performed by: FAMILY MEDICINE

## 2025-05-28 PROCEDURE — 81003 URINALYSIS AUTO W/O SCOPE: CPT | Performed by: FAMILY MEDICINE

## 2025-05-28 RX ORDER — LISDEXAMFETAMINE DIMESYLATE 70 MG/1
70 CAPSULE ORAL EVERY MORNING
Qty: 30 CAPSULE | Refills: 0 | Status: SHIPPED | OUTPATIENT
Start: 2025-05-28

## 2025-05-28 NOTE — PROGRESS NOTES
OUTPATIENT VISIT NOTE                                                   Date of Visit: 5/28/2025     Chief Complaint   Patient presents with:  URI: X 3 days chills,chest tightness,fatigue  and nausea.            History of Present Illness   Madeline Szymanski is a 45 year old female has been sick for three days, getting worse.  Fatigue,   Chills.  No temp taken.  No stuffy nose, sore throat, ear pain.  Headaches.  Myalgias.  No cough.  Stomach upset, nausea but no vomiting.  No diarrhea.  No rash.  No covid test.  Allergic to covid vaccine.    No known exposures.    No medications taken for the illness.       MEDICATIONS   Current Outpatient Medications   Medication Sig Dispense Refill    eletriptan (RELPAX) 40 MG tablet TAKE 1 TABLET (40 MG) BY MOUTH AT ONSET OF HEADACHE MAY REPEAT IF NEEDED IN 2 HOURS. MAX OF 2 DOSES/24HOURS MAX OF 4 DAYS/MONTH 18 tablet 3    EPINEPHrine (ANY BX GENERIC EQUIV) 0.3 MG/0.3ML injection 2-pack Inject 0.3 mLs (0.3 mg) into the muscle as needed for anaphylaxis May repeat one time in 5-15 minutes if response to initial dose is inadequate. 0.6 mL 0    estradiol (VIVELLE-DOT) 0.025 MG/24HR bi-weekly patch Apply 1 patch topically twice a week. 8 patch 5    FLUoxetine (PROZAC) 40 MG capsule Take 1 capsule (40 mg) by mouth daily. 90 capsule 1    hydrOXYzine HCl (ATARAX) 25 MG tablet Take 1 tablet (25 mg) by mouth 3 times daily as needed for anxiety 90 tablet 3    lidocaine (LIDODERM) 5 % patch Apply 1 patch externally to affected area as needed for up to 12 hours, then remove for 12 hours before reapplying. 28 patch 3    lisdexamfetamine (VYVANSE) 60 MG capsule Take 1 capsule (60 mg) by mouth every morning. 30 capsule 0    pregabalin (LYRICA) 75 MG capsule Take 2 capsules (150 mg) by mouth 2 times daily. 120 capsule 5    rizatriptan (MAXALT-MLT) 10 MG ODT Take 1 tablet (10 mg) by mouth at onset of headache for migraine May repeat in 2 hours, with a max of 30 mg in 24 hours and a max of 5  days/month 18 tablet 1    testosterone POWD       topiramate (TOPAMAX) 50 MG tablet Take 1 tablet (50 mg) by mouth daily 90 tablet 1     No current facility-administered medications for this visit.         SOCIAL HISTORY   Social History     Tobacco Use    Smoking status: Never     Passive exposure: Never    Smokeless tobacco: Never   Substance Use Topics    Alcohol use: Never           Physical Exam   Vitals:    05/28/25 1136   BP: (!) 144/100   Pulse: 94   Resp: 18   Temp: 98.5  F (36.9  C)   SpO2: 98%        GENERAL:   Alert. Oriented. Looks moderately ill  EYES: Clear  HENT:  Ears: R TM pearly gray. Normal landmarks. L TM pearly gray.  Normal landmarks  Nose: Clear.  Sinuses: Nontender.  Oropharynx:  No erythema. No exudate.  NECK: Supple. No adenopathy.  LUNGS: Clear to ascultation.  No crackles.  No wheezing  HEART: RRR  SKIN:  No rash.       Diagnostic Studies   LABS:  Results for orders placed or performed in visit on 05/28/25   UA Macroscopic with reflex to Microscopic and Culture     Status: Normal    Specimen: Urine, Clean Catch   Result Value Ref Range    Color Urine Yellow Colorless, Straw, Light Yellow, Yellow    Appearance Urine Clear Clear    Glucose Urine Negative Negative mg/dL    Bilirubin Urine Negative Negative    Ketones Urine Negative Negative mg/dL    Specific Gravity Urine 1.020 1.005 - 1.030    Blood Urine Negative Negative    pH Urine 6.0 5.0 - 8.0    Protein Albumin Urine Negative Negative mg/dL    Urobilinogen Urine 1.0 0.2, 1.0 E.U./dL    Nitrite Urine Negative Negative    Leukocyte Esterase Urine Negative Negative    Narrative    Microscopic not indicated               Assessment and Plan     Fever, unspecified fever cause    - UA Macroscopic with reflex to Microscopic and Culture    Viral syndrome  Urine clear.  Essentially normal Physical exam.  Viral infection.  Tylenol as needed.  Good fluid intake.                   Discussed signs / symptoms that warrant urgent / emergent medical  attention.   Recheck if worsening or not improving.       Moraima Mittal MD          Pertinent History     The following portions of the patient's history were reviewed and updated as appropriate: allergies, current medications, past family history, past medical history, past social history, past surgical history and problem list.

## 2025-05-28 NOTE — LETTER
May 28, 2025      Madeline Szymanski  [address] [facility] [address]        To Whom It May Concern:    Madeline ODONNELL Stephon  was seen on 5/28/2025.  Please excuse her  until 5/28-5/30/2025 due to illness.        Sincerely,        Moraima Mittal MD    Electronically signed

## 2025-05-28 NOTE — PATIENT INSTRUCTIONS
Tylenol 500-1000 mg three times a day as needed for pain and fever.    Good fluid intake.    Diet as tolerated.

## 2025-06-19 ENCOUNTER — MYC MEDICAL ADVICE (OUTPATIENT)
Dept: PHYSICAL MEDICINE AND REHAB | Facility: CLINIC | Age: 45
End: 2025-06-19
Payer: COMMERCIAL

## 2025-06-19 DIAGNOSIS — M79.18 MYOFASCIAL PAIN: Primary | ICD-10-CM

## 2025-06-19 RX ORDER — METHOCARBAMOL 500 MG/1
500 TABLET, FILM COATED ORAL 4 TIMES DAILY PRN
Qty: 60 TABLET | Refills: 3 | Status: SHIPPED | OUTPATIENT
Start: 2025-06-19

## 2025-06-20 ENCOUNTER — HOSPITAL ENCOUNTER (EMERGENCY)
Facility: HOSPITAL | Age: 45
Discharge: HOME | End: 2025-06-20
Admitting: PHYSICIAN ASSISTANT
Payer: COMMERCIAL

## 2025-06-20 VITALS
HEART RATE: 96 BPM | DIASTOLIC BLOOD PRESSURE: 84 MMHG | RESPIRATION RATE: 16 BRPM | WEIGHT: 169.9 LBS | OXYGEN SATURATION: 98 % | HEIGHT: 64 IN | BODY MASS INDEX: 29.01 KG/M2 | SYSTOLIC BLOOD PRESSURE: 146 MMHG | TEMPERATURE: 98 F

## 2025-06-20 DIAGNOSIS — G43.909 MIGRAINE HEADACHE: ICD-10-CM

## 2025-06-20 PROCEDURE — 250N000011 HC RX IP 250 OP 636: Performed by: PHYSICIAN ASSISTANT

## 2025-06-20 PROCEDURE — 96361 HYDRATE IV INFUSION ADD-ON: CPT

## 2025-06-20 PROCEDURE — 258N000003 HC RX IP 258 OP 636: Performed by: PHYSICIAN ASSISTANT

## 2025-06-20 PROCEDURE — 99284 EMERGENCY DEPT VISIT MOD MDM: CPT | Mod: 25

## 2025-06-20 PROCEDURE — 96375 TX/PRO/DX INJ NEW DRUG ADDON: CPT

## 2025-06-20 PROCEDURE — 96374 THER/PROPH/DIAG INJ IV PUSH: CPT

## 2025-06-20 RX ORDER — DIPHENHYDRAMINE HYDROCHLORIDE 50 MG/ML
25 INJECTION, SOLUTION INTRAMUSCULAR; INTRAVENOUS ONCE
Status: COMPLETED | OUTPATIENT
Start: 2025-06-20 | End: 2025-06-20

## 2025-06-20 RX ORDER — KETOROLAC TROMETHAMINE 15 MG/ML
15 INJECTION, SOLUTION INTRAMUSCULAR; INTRAVENOUS ONCE
Status: COMPLETED | OUTPATIENT
Start: 2025-06-20 | End: 2025-06-20

## 2025-06-20 RX ORDER — METOCLOPRAMIDE HYDROCHLORIDE 5 MG/ML
10 INJECTION INTRAMUSCULAR; INTRAVENOUS ONCE
Status: COMPLETED | OUTPATIENT
Start: 2025-06-20 | End: 2025-06-20

## 2025-06-20 RX ORDER — DEXAMETHASONE SODIUM PHOSPHATE 4 MG/ML
10 INJECTION, SOLUTION INTRA-ARTICULAR; INTRALESIONAL; INTRAMUSCULAR; INTRAVENOUS; SOFT TISSUE ONCE
Status: COMPLETED | OUTPATIENT
Start: 2025-06-20 | End: 2025-06-20

## 2025-06-20 RX ADMIN — SODIUM CHLORIDE 1000 ML: 0.9 INJECTION, SOLUTION INTRAVENOUS at 14:23

## 2025-06-20 RX ADMIN — DEXAMETHASONE SODIUM PHOSPHATE 10 MG: 4 INJECTION, SOLUTION INTRA-ARTICULAR; INTRALESIONAL; INTRAMUSCULAR; INTRAVENOUS; SOFT TISSUE at 14:30

## 2025-06-20 RX ADMIN — KETOROLAC TROMETHAMINE 15 MG: 15 INJECTION, SOLUTION INTRAMUSCULAR; INTRAVENOUS at 14:27

## 2025-06-20 RX ADMIN — METOCLOPRAMIDE 10 MG: 5 INJECTION, SOLUTION INTRAMUSCULAR; INTRAVENOUS at 14:24

## 2025-06-20 ASSESSMENT — COLUMBIA-SUICIDE SEVERITY RATING SCALE - C-SSRS
1. IN THE PAST MONTH, HAVE YOU WISHED YOU WERE DEAD OR WISHED YOU COULD GO TO SLEEP AND NOT WAKE UP?: NO
2. HAVE YOU ACTUALLY HAD ANY THOUGHTS OF KILLING YOURSELF IN THE PAST MONTH?: NO
6. HAVE YOU EVER DONE ANYTHING, STARTED TO DO ANYTHING, OR PREPARED TO DO ANYTHING TO END YOUR LIFE?: NO

## 2025-06-20 ASSESSMENT — ACTIVITIES OF DAILY LIVING (ADL)
ADLS_ACUITY_SCORE: 47
ADLS_ACUITY_SCORE: 47

## 2025-06-20 NOTE — DISCHARGE INSTRUCTIONS
Please follow-up with your primary care provider/neurology as needed for ongoing issues with headaches/migraines.  Return to the ER for any new or worsening symptoms.

## 2025-06-20 NOTE — ED TRIAGE NOTES
Patient is here with a migraine that has lasted for 14 days. Patient reports some nausea also.      Triage Assessment (Adult)       Row Name 06/20/25 1257          Triage Assessment    Airway WDL WDL        Respiratory WDL    Respiratory WDL WDL        Skin Circulation/Temperature WDL    Skin Circulation/Temperature WDL WDL        Cardiac WDL    Cardiac WDL WDL        Peripheral/Neurovascular WDL    Peripheral Neurovascular WDL WDL        Cognitive/Neuro/Behavioral WDL    Cognitive/Neuro/Behavioral WDL WDL

## 2025-06-20 NOTE — ED PROVIDER NOTES
EMERGENCY DEPARTMENT ENCOUNTER   NAME: Madeline Szymanski ; AGE: 45 year old female ; YOB: 1980 ; MRN: [id number] ; PCP: Sebas Valdez     Evaluation Date & Time: No admission date for patient encounter.    ED Provider: Cely Ocampo PA-C    CHIEF COMPLAINT     Headache      FINAL ASSESSMENT       ICD-10-CM    1. Migraine headache  G43.909           ED COURSE, MEDICAL DECISION MAKING, PLAN     ED course/notable events     1:40 PM Evaluated patient in an overflow triage area due to full department/inpatient boarders in the ED.    3:34 PM Rechecked and updated patient. Discharge and follow up plans discussed.    ______________________________________________________________________    Reason for visit   Madeline Szymanski is a 45 year old female with migraines, elevated blood pressure without diagnosis of hypertension, anxiety/depression presenting for 14 days of migraine headache despite taking her rescue medications.  Having associated nausea, photophobia, phonophobia.  Feels similar to previous migraine headaches.    Exam positives   Patient has some palpable pain to the right paraspinal musculature of the neck, but otherwise exam is benign.    Vitals   Blood pressure is elevated at 150/99, otherwise vitally normal. Minimal improvement by the end of the visit at 146/84. No suggestion of HTN emergency.     Labs   /     EKG   /    Imaging   /    Interventions/recheck   Near resolution of symptoms after meds.     Medications   sodium chloride 0.9% BOLUS 1,000 mL (0 mLs Intravenous Stopped 6/20/25 1538)   ketorolac (TORADOL) injection 15 mg (15 mg Intravenous $Given 6/20/25 1427)   diphenhydrAMINE (BENADRYL) injection 25 mg (50 mg Intravenous Not Given 6/20/25 1429)   metoclopramide (REGLAN) injection 10 mg (10 mg Intravenous $Given 6/20/25 1424)   dexAMETHasone (DECADRON) injection 10 mg (10 mg Intravenous $Given 6/20/25 1430)       Procedures  /    Consults   /    Assessment   Migraine headache    Patient  "states this feels consistent with a migraine headache.  Her typical rescue meds have not worked.  States she has had to come to the ER for interventions before in the past.  This was not sudden onset/thunderclap.  It is not the worst headache of her life.  Very low concern for an intracranial process such as CVA, SAH, large space-occupying mass.  She is neurologically intact.  Hemodynamically stable.  Currently nontoxic-appearing.    At this time I did not feel that any laboratory or imaging workup was required based on her history and substantial improvement after meds here.    Overall, I have a low concern for an acutely serious or imminently life threatening condition. I do not feel any further workup nor admission to the hospital is warranted, however, advised PCP follow up and discussed ER return precautions.     Plan   -Disposition: Considered admission, but labs and/or imaging reassuring and/or patient feeling improved thus ultimately discharged.     -Prescription(s) provided:   New Prescriptions    No medications on file        -Referral(s)/specialist contact information given: /    -Recommendations: Recommended symptomatic cares including acetaminophen as needed, ice/heat, topical analgesics , and fluids Recommended arranging a follow up with PCP.      Indications for re-evaluation in the ER discussed.   Patient/parent/guardian understanding and agreeable with the plan and will discharge to home in good condition.       Medical decision making factors  Independent historian(s): N/A  Care impacted by chronic illnesses: chronic migraines  External records reviewed: relevant previous visit(s) as detailed below under HPI  Independent interpretation: N/A  Consults: N/A  Disposition: See above under \"plan\"  Prescriptions: None    MIPS: Not Applicable  Critical care: N/A  Sepsis: None         HISTORY OF PRESENT ILLNESS   Patient information was obtained from: Patient   Use of Intrepreter: None     Pertinent past " "medical history: migraines, elevated blood pressure without diagnosis of hypertension, anxiety/depression    Madeline Szymanski is here by means of walk in  with self for evaluation of 14 days of a headache.    States she has a history of migraine headaches and occasionally has to come to the ER for interventions when her rescue medications do not work.    States she has tried rizatriptan, Robaxin, Araceli triptan, and also uses topiramate and Lyrica.  She has also taken some Tylenol.  States she cannot take oral ibuprofen.    States she can get the headache to tame down a bit, but then it comes back.  Today has been really bad.  States she has had associated nausea, photophobia, phonophobia.  Feels it on the left side of her head, face, neck, into the shoulder.    No fevers.  No vomiting.  States this does feel typical of a migraine headache for her.    Tells me that various different interventions have been tried in the ER and states that lately Toradol does not seem to be touching it.  States she has required \"large doses of Dilaudid\" when the headache lasts this long.     No other concerns.    Per my chart review  4/11/2025: [facility] ER for an acute non-intractable headache with trapezius muscle spasms.  She was given IM Toradol and Flexeril.  States that the shoulder symptoms resolved, but the headache persists.  She was then given 2.5 mg of droperidol IM with resolution of her symptoms.  4/15/2025: [place] [facility] for a greater occipital nerve block with bupivacaine, lidocaine, and triamcinolone acetonide.  5/30/2023: [place] ER for migraine.  Was given fluids, Reglan, Benadryl, Tylenol, and Decadron.  Had improvement and discharged to home.      PHYSICAL EXAM     First Vitals:  Patient Vitals for the past 24 hrs:   BP Temp Pulse Resp SpO2 Height Weight   06/20/25 1257 (!) 150/99 98  F (36.7  C) 96 16 99 % 1.626 m (5' 4\") 77.1 kg (169 lb 14.4 oz)       PHYSICAL EXAM:   Constitutional: No acute " distress.  Neuro:  Alertness: Awake and alert. GCS 15. Answers 2 questions appropriately. Follows 2 commands appropriately.   Speech: No slurred speech.   Vision: No gaze deviation. Vision intact.    Coordination: Finger-nose-finger smooth. Gait steady.   Sensations: Sensations intact and equal to b/l cheeks, forearms, thighs, and shins.   Strength: No facial droop. No eye droop. No arm drift. No leg drift. Upper and lower extremity strength is strong and equal b/l.   Psych: Calm and cooperative.  Head: Normocephalic.  Eyes: PERRL. EOMI. Conjunctivae clear. No crusting or mattering of the lids or lashes.   Mouth: Pink and moist.    Neck: Palpable pain to the right paraspinal musculature. FROM. No palpable pain over the cervical spine.   Cardio: Regular rate. Adequate perfusion to extremities. Regular rhythm. No murmurs.  Pulmonary: Oxygenating well on RA. No labored breathing. No wheezes. No rales. No rhonchi.   Abdomen: BS present. Soft. Non-distended. No rigidity. No palpable pain. No rebound. No guarding.   Back: Appears to move freely. No palpable pain over the thoracic/lumbar spine. No palpable paraspinal tenderness.    Upper extremities: Moves freely. No edema. Distal pulses intact. Sensations intact.   Lower extremities: Moves freely. No edema. Distal pulses intact. Sensations intact.   Skin: Natural color, warm, dry, intact.         MEDICAL HISTORY     Past Medical History:   Diagnosis Date    Acute and chronic respiratory failure with hypoxia (H) 01/01/2022    Anxiety     Arthritis     Depressive disorder     Gastric band slippage 09/26/2019    History of blood transfusion 04/1978    Kidney infection     Lumbar radiculopathy     Migraine     Migraine     Painless urinary retention due to cauda equina syndrome (H)     Pancreatitis     Panic attack     PONV (postoperative nausea and vomiting)     Spinal stenosis in cervical region     UTI (lower urinary tract infection)        Past Surgical History:    Procedure Laterality Date    ABDOMEN SURGERY      19 band removal     SECTION      FUSION CERVICAL ANTERIOR ONE LEVEL N/A 2022    Procedure: Cervical 5 - Cervical 6 anterior cervical decompression and artificial disc replacement, use of microscope;  Surgeon: Radha Lilly MD;  Location: [facility] [place] OR    GASTRECTOMY LAPAROSCOPIC SLEEVE      GI SURGERY      Gastric  sleeve    GYN SURGERY  2018    Hysterectomy    HYSTERECTOMY TOTAL ABDOMINAL      HYSTERECTOMY, PAP NO LONGER INDICATED      LAMINECTOMY LUMBAR ONE LEVEL Left 10/04/2021    Procedure: LEFT LUMBAR 5-SACRAL 1 HEMILAMINECTOMY, MEDIAL FACETECTOMY, FORAMINOTOMY WITH;  Surgeon: Radha Lilly MD;  Location: [facility] [place] OR    LUMBAR DISCECTOMY Left 10/04/2021    Procedure: LUMBAR 5-SACRAL 1 MICRODISCECTOMY;  Surgeon: Radha Lilly MD;  [place] [facility] [place]       Family History   Problem Relation Age of Onset    Low Back Problems Mother         back surgery    Heart Disease Father     Hypertension Father     Substance Abuse Father     Obesity Sister     Obesity Paternal Grandmother     Breast Cancer Maternal Aunt 50        States 3 maternal aunts with a history of breast cancer in their 50's    Cerebrovascular Disease Maternal Uncle     Cerebrovascular Disease Maternal Uncle     Mental Illness Nephew     Breast Cancer Cousin 48        Maternal cousin    Breast Cancer Other         Age of onset unknown    Obesity Other     Cerebrovascular Disease Other     Mental Illness Nephew     Substance Abuse Other         Paternal    Colon Cancer No family hx of        Social History     Tobacco Use    Smoking status: Never     Passive exposure: Never    Smokeless tobacco: Never   Vaping Use    Vaping status: Never Used   Substance Use Topics    Alcohol use: Never    Drug use: Never       Medications   eletriptan (RELPAX) 40 MG tablet  EPINEPHrine (ANY BX GENERIC EQUIV) 0.3 MG/0.3ML injection 2-pack  estradiol  (VIVELLE-DOT) 0.025 MG/24HR bi-weekly patch  FLUoxetine (PROZAC) 40 MG capsule  hydrOXYzine HCl (ATARAX) 25 MG tablet  lidocaine (LIDODERM) 5 % patch  lisdexamfetamine (VYVANSE) 70 MG capsule  methocarbamol (ROBAXIN) 500 MG tablet  pregabalin (LYRICA) 75 MG capsule  rizatriptan (MAXALT-MLT) 10 MG ODT  testosterone POWD  topiramate (TOPAMAX) 50 MG tablet        RESULTS     LAB:  All pertinent labs reviewed and interpreted  Labs Ordered and Resulted from Time of ED Arrival to Time of ED Departure - No data to display    RADIOLOGY:  No orders to display            Some or all of this documentation has been completed using dictation software and grammatical errors may be present. Please contact me with any concerns regarding this.     Cely Ocampo PA-C  Emergency Medicine   [facility] EMERGENCY DEPARTMENT       Cely Ocampo PA-C  06/20/25 9735

## 2025-06-29 DIAGNOSIS — G43.109 MIGRAINE WITH AURA AND WITHOUT STATUS MIGRAINOSUS, NOT INTRACTABLE: ICD-10-CM

## 2025-06-30 ENCOUNTER — MYC MEDICAL ADVICE (OUTPATIENT)
Dept: FAMILY MEDICINE | Facility: CLINIC | Age: 45
End: 2025-06-30
Payer: COMMERCIAL

## 2025-06-30 ENCOUNTER — MYC REFILL (OUTPATIENT)
Dept: PHYSICAL MEDICINE AND REHAB | Facility: CLINIC | Age: 45
End: 2025-06-30
Payer: COMMERCIAL

## 2025-06-30 ENCOUNTER — MYC REFILL (OUTPATIENT)
Dept: FAMILY MEDICINE | Facility: CLINIC | Age: 45
End: 2025-06-30
Payer: COMMERCIAL

## 2025-06-30 DIAGNOSIS — F50.811 MODERATE BINGE-EATING DISORDER: ICD-10-CM

## 2025-06-30 DIAGNOSIS — G43.109 MIGRAINE WITH AURA AND WITHOUT STATUS MIGRAINOSUS, NOT INTRACTABLE: ICD-10-CM

## 2025-06-30 DIAGNOSIS — M54.2 CHRONIC NECK PAIN: ICD-10-CM

## 2025-06-30 DIAGNOSIS — T78.2XXD ANAPHYLAXIS, SUBSEQUENT ENCOUNTER: Primary | ICD-10-CM

## 2025-06-30 DIAGNOSIS — G89.29 CHRONIC NECK PAIN: ICD-10-CM

## 2025-06-30 RX ORDER — LIDOCAINE 50 MG/G
PATCH TOPICAL
Qty: 28 PATCH | Refills: 3 | Status: SHIPPED | OUTPATIENT
Start: 2025-06-30

## 2025-06-30 RX ORDER — EPINEPHRINE 0.3 MG/.3ML
0.3 INJECTION SUBCUTANEOUS PRN
Qty: 0.6 ML | Refills: 2 | Status: SHIPPED | OUTPATIENT
Start: 2025-06-30

## 2025-06-30 RX ORDER — RIZATRIPTAN BENZOATE 10 MG/1
10 TABLET, ORALLY DISINTEGRATING ORAL
Qty: 28 TABLET | Refills: 1 | Status: SHIPPED | OUTPATIENT
Start: 2025-06-30

## 2025-06-30 RX ORDER — TOPIRAMATE 50 MG/1
50 TABLET, FILM COATED ORAL DAILY
Qty: 90 TABLET | Refills: 1 | Status: SHIPPED | OUTPATIENT
Start: 2025-06-30

## 2025-06-30 RX ORDER — LISDEXAMFETAMINE DIMESYLATE 60 MG/1
60 CAPSULE ORAL EVERY MORNING
Qty: 30 CAPSULE | Refills: 0 | Status: SHIPPED | OUTPATIENT
Start: 2025-06-30

## 2025-07-22 ENCOUNTER — RADIOLOGY INJECTION OFFICE VISIT (OUTPATIENT)
Dept: PHYSICAL MEDICINE AND REHAB | Facility: CLINIC | Age: 45
End: 2025-07-22
Attending: NURSE PRACTITIONER
Payer: COMMERCIAL

## 2025-07-22 VITALS
OXYGEN SATURATION: 98 % | HEART RATE: 87 BPM | SYSTOLIC BLOOD PRESSURE: 140 MMHG | TEMPERATURE: 98.4 F | RESPIRATION RATE: 16 BRPM | DIASTOLIC BLOOD PRESSURE: 90 MMHG

## 2025-07-22 DIAGNOSIS — G89.29 CHRONIC BILATERAL LOW BACK PAIN WITHOUT SCIATICA: ICD-10-CM

## 2025-07-22 DIAGNOSIS — M54.50 CHRONIC BILATERAL LOW BACK PAIN WITHOUT SCIATICA: ICD-10-CM

## 2025-07-22 DIAGNOSIS — M47.816 LUMBAR FACET ARTHROPATHY: ICD-10-CM

## 2025-07-22 PROCEDURE — 64635 DESTROY LUMB/SAC FACET JNT: CPT | Mod: 50 | Performed by: PAIN MEDICINE

## 2025-07-22 PROCEDURE — 64636 DESTROY L/S FACET JNT ADDL: CPT | Mod: 50 | Performed by: PAIN MEDICINE

## 2025-07-22 RX ORDER — LIDOCAINE HYDROCHLORIDE 10 MG/ML
INJECTION, SOLUTION EPIDURAL; INFILTRATION; INTRACAUDAL; PERINEURAL
Status: COMPLETED | OUTPATIENT
Start: 2025-07-22 | End: 2025-07-22

## 2025-07-22 RX ORDER — BUPIVACAINE HYDROCHLORIDE 2.5 MG/ML
INJECTION, SOLUTION EPIDURAL; INFILTRATION; INTRACAUDAL; PERINEURAL
Status: COMPLETED | OUTPATIENT
Start: 2025-07-22 | End: 2025-07-22

## 2025-07-22 RX ADMIN — LIDOCAINE HYDROCHLORIDE 10 ML: 10 INJECTION, SOLUTION EPIDURAL; INFILTRATION; INTRACAUDAL; PERINEURAL at 14:21

## 2025-07-22 RX ADMIN — BUPIVACAINE HYDROCHLORIDE 6 ML: 2.5 INJECTION, SOLUTION EPIDURAL; INFILTRATION; INTRACAUDAL; PERINEURAL at 14:21

## 2025-07-22 ASSESSMENT — PAIN SCALES - GENERAL
PAINLEVEL_OUTOF10: MILD PAIN (3)
PAINLEVEL_OUTOF10: MILD PAIN (3)

## 2025-07-22 NOTE — PATIENT INSTRUCTIONS
DISCHARGE INSTRUCTIONS    During office hours (8:00 a.m.-4:00 p.m.) questions or concerns may be answered  by calling [facility] Nurses at  [phone].  Messages received after hours will be returned the following business day.      In the case of an emergency,please dial 911 or seek assistance at the nearest Emergency Room/Urgent Care facility.     All Patients:  You may experience an increase in your symptoms for the first 5-7 days. Soreness may persist during the first few weeks as it takes 4-6 weeks for the nerves to fully heal.    You may use ice on the injection site,as frequently as 20 minutes each hour if needed. It is recommended NOT to apply heat during the first 24 hours.    You may take your pain medicine.    You may continue taking your regular medication.    You may shower. No swimming, tub bath or hot tub for 48hours. This also includes no lotions, ointments or patches to the needle sites as well. You may remove your bandaid/bandage as soon as you are home.    You mayresume light activities, as tolerated.    Resume your usual diet as tolerated.    It is strongly advised that you do not drive for 1-3 hours post injection.    If you have had oral sedation:  Do not drive for 8 hours post injection.             POSSIBLE PROCEDURE SIDE EFFECTS    -Call the [facility] if you are concerned    IncreasedPain           Increased numbness/tingling      Nausea/Vomiting          Bruising/bleeding at site      Redness or swelling  Difficulty walking      Weakness          Fever greater than 100.5

## 2025-07-28 ENCOUNTER — MYC REFILL (OUTPATIENT)
Dept: FAMILY MEDICINE | Facility: CLINIC | Age: 45
End: 2025-07-28
Payer: COMMERCIAL

## 2025-07-28 DIAGNOSIS — F50.811 MODERATE BINGE-EATING DISORDER: ICD-10-CM

## 2025-07-28 RX ORDER — LISDEXAMFETAMINE DIMESYLATE 60 MG/1
60 CAPSULE ORAL EVERY MORNING
Qty: 30 CAPSULE | Refills: 0 | Status: SHIPPED | OUTPATIENT
Start: 2025-07-28

## 2025-08-06 ENCOUNTER — ORDERS ONLY (AUTO-RELEASED) (OUTPATIENT)
Dept: FAMILY MEDICINE | Facility: CLINIC | Age: 45
End: 2025-08-06

## 2025-08-06 ENCOUNTER — OFFICE VISIT (OUTPATIENT)
Dept: FAMILY MEDICINE | Facility: CLINIC | Age: 45
End: 2025-08-06
Payer: COMMERCIAL

## 2025-08-06 VITALS
HEIGHT: 63 IN | DIASTOLIC BLOOD PRESSURE: 86 MMHG | SYSTOLIC BLOOD PRESSURE: 138 MMHG | WEIGHT: 171.1 LBS | TEMPERATURE: 98.2 F | HEART RATE: 96 BPM | RESPIRATION RATE: 16 BRPM | OXYGEN SATURATION: 98 % | BODY MASS INDEX: 30.32 KG/M2

## 2025-08-06 DIAGNOSIS — T78.2XXD ANAPHYLAXIS, SUBSEQUENT ENCOUNTER: ICD-10-CM

## 2025-08-06 DIAGNOSIS — Z12.11 SCREEN FOR COLON CANCER: ICD-10-CM

## 2025-08-06 DIAGNOSIS — E66.3 OVERWEIGHT WITH BODY MASS INDEX (BMI) OF 29 TO 29.9 IN ADULT: ICD-10-CM

## 2025-08-06 DIAGNOSIS — Z13.220 SCREENING FOR LIPID DISORDERS: ICD-10-CM

## 2025-08-06 DIAGNOSIS — Z30.45 ENCOUNTER FOR SURVEILLANCE OF TRANSDERMAL PATCH HORMONAL CONTRACEPTIVE DEVICE: ICD-10-CM

## 2025-08-06 DIAGNOSIS — N95.1 MENOPAUSAL SYNDROME (HOT FLASHES): ICD-10-CM

## 2025-08-06 DIAGNOSIS — F32.5 MAJOR DEPRESSIVE DISORDER WITH SINGLE EPISODE, IN FULL REMISSION: ICD-10-CM

## 2025-08-06 DIAGNOSIS — Z00.00 ENCOUNTER FOR ROUTINE ADULT HEALTH EXAMINATION WITHOUT ABNORMAL FINDINGS: Primary | ICD-10-CM

## 2025-08-06 DIAGNOSIS — G43.109 MIGRAINE WITH AURA AND WITHOUT STATUS MIGRAINOSUS, NOT INTRACTABLE: ICD-10-CM

## 2025-08-06 DIAGNOSIS — R03.0 ELEVATED BP WITHOUT DIAGNOSIS OF HYPERTENSION: ICD-10-CM

## 2025-08-06 DIAGNOSIS — F50.811 MODERATE BINGE-EATING DISORDER: ICD-10-CM

## 2025-08-06 DIAGNOSIS — Z13.1 SCREENING FOR DIABETES MELLITUS: ICD-10-CM

## 2025-08-06 PROBLEM — I25.10 CORONARY ARTERY CALCIFICATION SEEN ON CAT SCAN: Status: RESOLVED | Noted: 2025-03-24 | Resolved: 2025-08-06

## 2025-08-06 PROCEDURE — 99396 PREV VISIT EST AGE 40-64: CPT | Mod: 25 | Performed by: FAMILY MEDICINE

## 2025-08-06 PROCEDURE — 99214 OFFICE O/P EST MOD 30 MIN: CPT | Mod: 25 | Performed by: FAMILY MEDICINE

## 2025-08-06 PROCEDURE — G2211 COMPLEX E/M VISIT ADD ON: HCPCS | Performed by: FAMILY MEDICINE

## 2025-08-06 PROCEDURE — 84460 ALANINE AMINO (ALT) (SGPT): CPT | Performed by: FAMILY MEDICINE

## 2025-08-06 PROCEDURE — 90471 IMMUNIZATION ADMIN: CPT | Performed by: FAMILY MEDICINE

## 2025-08-06 PROCEDURE — 3079F DIAST BP 80-89 MM HG: CPT | Performed by: FAMILY MEDICINE

## 2025-08-06 PROCEDURE — 80048 BASIC METABOLIC PNL TOTAL CA: CPT | Performed by: FAMILY MEDICINE

## 2025-08-06 PROCEDURE — 90746 HEPB VACCINE 3 DOSE ADULT IM: CPT | Performed by: FAMILY MEDICINE

## 2025-08-06 PROCEDURE — 80061 LIPID PANEL: CPT | Performed by: FAMILY MEDICINE

## 2025-08-06 PROCEDURE — 36415 COLL VENOUS BLD VENIPUNCTURE: CPT | Performed by: FAMILY MEDICINE

## 2025-08-06 PROCEDURE — 90715 TDAP VACCINE 7 YRS/> IM: CPT | Performed by: FAMILY MEDICINE

## 2025-08-06 PROCEDURE — 3075F SYST BP GE 130 - 139MM HG: CPT | Performed by: FAMILY MEDICINE

## 2025-08-06 PROCEDURE — 90472 IMMUNIZATION ADMIN EACH ADD: CPT | Performed by: FAMILY MEDICINE

## 2025-08-06 RX ORDER — ESTRADIOL 0.03 MG/D
1 FILM, EXTENDED RELEASE TRANSDERMAL
Qty: 8 PATCH | Refills: 5 | Status: SHIPPED | OUTPATIENT
Start: 2025-08-07

## 2025-08-06 RX ORDER — TOPIRAMATE 50 MG/1
50 TABLET, FILM COATED ORAL DAILY
Qty: 90 TABLET | Refills: 1 | Status: SHIPPED | OUTPATIENT
Start: 2025-08-06

## 2025-08-06 RX ORDER — EPINEPHRINE 0.3 MG/.3ML
0.3 INJECTION SUBCUTANEOUS PRN
Qty: 0.6 ML | Refills: 2 | Status: SHIPPED | OUTPATIENT
Start: 2025-08-06

## 2025-08-06 SDOH — HEALTH STABILITY: PHYSICAL HEALTH: ON AVERAGE, HOW MANY DAYS PER WEEK DO YOU ENGAGE IN MODERATE TO STRENUOUS EXERCISE (LIKE A BRISK WALK)?: 1 DAY

## 2025-08-06 ASSESSMENT — ENCOUNTER SYMPTOMS
CHILLS: 0
SEIZURES: 0
FEVER: 0
VOMITING: 0
COLOR CHANGE: 0
PALPITATIONS: 0
ABDOMINAL PAIN: 0
ARTHRALGIAS: 0
DYSURIA: 0
SHORTNESS OF BREATH: 0
BACK PAIN: 0
EYE PAIN: 0
HEMATURIA: 0
COUGH: 0
SORE THROAT: 0

## 2025-08-06 ASSESSMENT — SOCIAL DETERMINANTS OF HEALTH (SDOH): HOW OFTEN DO YOU GET TOGETHER WITH FRIENDS OR RELATIVES?: PATIENT DECLINED

## 2025-08-06 ASSESSMENT — PATIENT HEALTH QUESTIONNAIRE - PHQ9
10. IF YOU CHECKED OFF ANY PROBLEMS, HOW DIFFICULT HAVE THESE PROBLEMS MADE IT FOR YOU TO DO YOUR WORK, TAKE CARE OF THINGS AT HOME, OR GET ALONG WITH OTHER PEOPLE: SOMEWHAT DIFFICULT
SUM OF ALL RESPONSES TO PHQ QUESTIONS 1-9: 3
SUM OF ALL RESPONSES TO PHQ QUESTIONS 1-9: 3

## 2025-08-07 LAB
ALT SERPL W P-5'-P-CCNC: 14 U/L (ref 0–50)
ANION GAP SERPL CALCULATED.3IONS-SCNC: 9 MMOL/L (ref 7–15)
BUN SERPL-MCNC: 11.4 MG/DL (ref 6–20)
CALCIUM SERPL-MCNC: 9 MG/DL (ref 8.8–10.4)
CHLORIDE SERPL-SCNC: 106 MMOL/L (ref 98–107)
CHOLEST SERPL-MCNC: 146 MG/DL
CREAT SERPL-MCNC: 0.85 MG/DL (ref 0.51–0.95)
EGFRCR SERPLBLD CKD-EPI 2021: 85 ML/MIN/1.73M2
FASTING STATUS PATIENT QL REPORTED: ABNORMAL
FASTING STATUS PATIENT QL REPORTED: NORMAL
GLUCOSE SERPL-MCNC: 124 MG/DL (ref 70–99)
HCO3 SERPL-SCNC: 29 MMOL/L (ref 22–29)
HDLC SERPL-MCNC: 60 MG/DL
LDLC SERPL CALC-MCNC: 68 MG/DL
NONHDLC SERPL-MCNC: 86 MG/DL
POTASSIUM SERPL-SCNC: 3.7 MMOL/L (ref 3.4–5.3)
SODIUM SERPL-SCNC: 144 MMOL/L (ref 135–145)
TRIGL SERPL-MCNC: 89 MG/DL

## (undated) DEVICE — RX SURGIFLO HEMOSTATIC MATRIX W/THROMBIN 8ML 2994

## (undated) DEVICE — MARKER SURG SKIN STRL 77734

## (undated) DEVICE — IOM CASE FLAT FEE

## (undated) DEVICE — TOOL DISSECT MIDAS MR8 14CM MATCH HEAD 3MM MR8-14MH30

## (undated) DEVICE — SOL WATER IRRIG 1000ML BOTTLE 2F7114

## (undated) DEVICE — Device

## (undated) DEVICE — GOWN XXL 9575

## (undated) DEVICE — DRAPE C-ARMOR 5 SIDED 5523

## (undated) DEVICE — SUCTION MANIFOLD NEPTUNE 2 SYS 1 PORT 702-025-000

## (undated) DEVICE — GOWN IMPERVIOUS BREATHABLE SMART LG 89015

## (undated) DEVICE — GLOVE BIOGEL PI ULTRATOUCH G SZ 6.5 42165

## (undated) DEVICE — DRSG KERLIX 4 1/2"X4YDS ROLL 6715

## (undated) DEVICE — GOWN LG DISP 9515

## (undated) DEVICE — PREP SCRUB SOL EXIDINE 4% CHG 4OZ 29002-404

## (undated) DEVICE — GLOVE BIOGEL PI ORTHOPRO SZ 8.0 47680

## (undated) DEVICE — CATH TRAY FOLEY SURESTEP 16FR DRAIN BAG STATOCK A899916

## (undated) DEVICE — ESU ELEC BLADE 2.75" COATED/INSULATED E1455

## (undated) DEVICE — ESU ELEC BLADE 6" COATED E1450-6

## (undated) DEVICE — TRAY PREP DRY SKIN SCRUB 067

## (undated) DEVICE — TUBING SUCTION MEDI-VAC 1/4"X20' N620A - HE

## (undated) DEVICE — GLOVE UNDER INDICATOR PI SZ 6.5 LF 41665

## (undated) DEVICE — DRAPE STERI 23X17 NON STERILE 1010NSD

## (undated) DEVICE — CUSHION INSERT LG PRONE VIEW JACKSON TABLE

## (undated) DEVICE — WRAP LUMBAR COMPRESS COLD THERAPY 4632P

## (undated) DEVICE — NEEDLE SPINAL DISP 22GA X 3.5" QUINCKE 333320

## (undated) DEVICE — ESU PENCIL SMOKE EVAC W/ROCKER SWITCH 0703-047-000

## (undated) DEVICE — IOM STANDARD SUPPLY FEE

## (undated) DEVICE — DRSG PRIMAPORE 03 1/8X6" 66000318

## (undated) DEVICE — SPONGE NEURO 1/2X1/2 WECK 200100

## (undated) DEVICE — SU MONOCRYL+ 4-0 18IN PS2 UND MCP496G

## (undated) DEVICE — ALCOHOL ISOPROPYL 4 OZ 70% IA7004

## (undated) DEVICE — SOL NACL 0.9% IRRIG 1000ML BOTTLE 2F7124

## (undated) DEVICE — PREP CHLORAPREP W/ORANGE TINT 10.5ML 930715

## (undated) DEVICE — PLATE GROUNDING ADULT W/CORD 9165L

## (undated) DEVICE — POSITIONER ARM CRADLE LAMINECTOMY DISP

## (undated) DEVICE — BLADE KNIFE SURG 11 371111

## (undated) DEVICE — DRAPE C-ARM 60X42" 1013

## (undated) DEVICE — PIN DISTRACTION 12MM TI BLUE DP-12-TB

## (undated) DEVICE — DECANTER VIAL 2006S

## (undated) DEVICE — CUSTOM PACK LUMBAR FUSION SNE5BLFHEA

## (undated) DEVICE — PITCHER STERILE 1000ML  SSK9004A

## (undated) DEVICE — ADH SKIN CLOSURE PREMIERPRO EXOFIN 1.0ML 3470

## (undated) DEVICE — SU VICRYL+ 0 8-18 CT1/CR UND VCP840D

## (undated) DEVICE — TAPE ADH POROUS 3IN CURITY STD 7046C

## (undated) DEVICE — DRAPE SHEET THYROID 77X123 89255

## (undated) DEVICE — PROTECTOR ARM STANDARD ONE STEP

## (undated) DEVICE — SUTURE VICRYL+ 3-0 18 SH/CR UND VCP864

## (undated) DEVICE — SUTURE VICRYL+ 2-0 18 CT1/CR VLT VCP839D

## (undated) RX ORDER — DEXAMETHASONE SODIUM PHOSPHATE 10 MG/ML
INJECTION INTRAMUSCULAR; INTRAVENOUS
Status: DISPENSED
Start: 2022-08-08

## (undated) RX ORDER — PROPOFOL 10 MG/ML
INJECTION, EMULSION INTRAVENOUS
Status: DISPENSED
Start: 2022-08-08

## (undated) RX ORDER — KETAMINE HYDROCHLORIDE 10 MG/ML
INJECTION INTRAMUSCULAR; INTRAVENOUS
Status: DISPENSED
Start: 2022-08-08

## (undated) RX ORDER — ONDANSETRON 2 MG/ML
INJECTION INTRAMUSCULAR; INTRAVENOUS
Status: DISPENSED
Start: 2022-08-08

## (undated) RX ORDER — FENTANYL CITRATE 50 UG/ML
INJECTION, SOLUTION INTRAMUSCULAR; INTRAVENOUS
Status: DISPENSED
Start: 2022-08-08

## (undated) RX ORDER — BACITRACIN ZINC 500 [USP'U]/G
OINTMENT TOPICAL
Status: DISPENSED
Start: 2021-10-04